# Patient Record
Sex: FEMALE | Race: WHITE | NOT HISPANIC OR LATINO | Employment: OTHER | URBAN - METROPOLITAN AREA
[De-identification: names, ages, dates, MRNs, and addresses within clinical notes are randomized per-mention and may not be internally consistent; named-entity substitution may affect disease eponyms.]

---

## 2017-01-11 ENCOUNTER — TELEPHONE (OUTPATIENT)
Dept: CARDIOLOGY | Facility: MEDICAL CENTER | Age: 72
End: 2017-01-11

## 2017-01-11 NOTE — TELEPHONE ENCOUNTER
----- Message from Prerna Arce R.N. sent at 1/11/2017  1:51 PM PST -----  Pt is not ours but labs in our system. Please send labs to Dr Rios and PCP. Thank you.

## 2017-06-26 ENCOUNTER — HOSPITAL ENCOUNTER (OUTPATIENT)
Dept: HOSPITAL 8 - CFH | Age: 72
Discharge: HOME | End: 2017-06-26
Attending: INTERNAL MEDICINE
Payer: MEDICARE

## 2017-06-26 DIAGNOSIS — I25.9: Primary | ICD-10-CM

## 2017-06-26 DIAGNOSIS — Z98.61: ICD-10-CM

## 2017-06-26 DIAGNOSIS — R29.898: ICD-10-CM

## 2017-06-26 PROCEDURE — 93017 CV STRESS TEST TRACING ONLY: CPT

## 2017-06-26 PROCEDURE — 78452 HT MUSCLE IMAGE SPECT MULT: CPT

## 2017-06-26 PROCEDURE — A9502 TC99M TETROFOSMIN: HCPCS

## 2018-01-01 ENCOUNTER — APPOINTMENT (OUTPATIENT)
Dept: RADIOLOGY | Facility: MEDICAL CENTER | Age: 73
DRG: 629 | End: 2018-01-01
Attending: ORTHOPAEDIC SURGERY
Payer: MEDICARE

## 2018-01-01 ENCOUNTER — APPOINTMENT (OUTPATIENT)
Dept: RADIOLOGY | Facility: MEDICAL CENTER | Age: 73
DRG: 629 | End: 2018-01-01
Attending: EMERGENCY MEDICINE
Payer: MEDICARE

## 2018-01-01 ENCOUNTER — HOSPITAL ENCOUNTER (OUTPATIENT)
Dept: RADIOLOGY | Facility: MEDICAL CENTER | Age: 73
End: 2018-10-25
Attending: NEUROLOGICAL SURGERY
Payer: MEDICARE

## 2018-01-01 ENCOUNTER — HOSPITAL ENCOUNTER (INPATIENT)
Facility: MEDICAL CENTER | Age: 73
LOS: 9 days | DRG: 629 | End: 2019-01-07
Attending: EMERGENCY MEDICINE | Admitting: HOSPITALIST
Payer: MEDICARE

## 2018-01-01 ENCOUNTER — OFFICE VISIT (OUTPATIENT)
Dept: ENDOCRINOLOGY | Facility: MEDICAL CENTER | Age: 73
End: 2018-01-01
Payer: MEDICARE

## 2018-01-01 VITALS
WEIGHT: 214 LBS | SYSTOLIC BLOOD PRESSURE: 122 MMHG | BODY MASS INDEX: 36.54 KG/M2 | HEIGHT: 64 IN | DIASTOLIC BLOOD PRESSURE: 70 MMHG | HEART RATE: 82 BPM | OXYGEN SATURATION: 96 %

## 2018-01-01 DIAGNOSIS — E11.628 DIABETIC FOOT INFECTION (HCC): ICD-10-CM

## 2018-01-01 DIAGNOSIS — E11.621 DIABETIC ULCER OF RIGHT MIDFOOT ASSOCIATED WITH TYPE 2 DIABETES MELLITUS, LIMITED TO BREAKDOWN OF SKIN (HCC): ICD-10-CM

## 2018-01-01 DIAGNOSIS — M25.562 LEFT KNEE PAIN, UNSPECIFIED CHRONICITY: ICD-10-CM

## 2018-01-01 DIAGNOSIS — E04.2 MULTIPLE THYROID NODULES: ICD-10-CM

## 2018-01-01 DIAGNOSIS — I10 ESSENTIAL HYPERTENSION: ICD-10-CM

## 2018-01-01 DIAGNOSIS — L08.9 DIABETIC FOOT INFECTION (HCC): ICD-10-CM

## 2018-01-01 DIAGNOSIS — Z79.4 TYPE 2 DIABETES MELLITUS WITH FOOT ULCER, WITH LONG-TERM CURRENT USE OF INSULIN (HCC): ICD-10-CM

## 2018-01-01 DIAGNOSIS — L97.414 DIABETIC ULCER OF RIGHT MIDFOOT ASSOCIATED WITH TYPE 2 DIABETES MELLITUS, WITH NECROSIS OF BONE (HCC): ICD-10-CM

## 2018-01-01 DIAGNOSIS — E11.621 TYPE 2 DIABETES MELLITUS WITH FOOT ULCER, WITH LONG-TERM CURRENT USE OF INSULIN (HCC): ICD-10-CM

## 2018-01-01 DIAGNOSIS — M25.552 LEFT HIP PAIN: ICD-10-CM

## 2018-01-01 DIAGNOSIS — Z79.4 TYPE 2 DIABETES MELLITUS WITHOUT COMPLICATION, WITH LONG-TERM CURRENT USE OF INSULIN (HCC): ICD-10-CM

## 2018-01-01 DIAGNOSIS — L97.509 TYPE 2 DIABETES MELLITUS WITH FOOT ULCER, WITH LONG-TERM CURRENT USE OF INSULIN (HCC): ICD-10-CM

## 2018-01-01 DIAGNOSIS — I48.0 PAROXYSMAL ATRIAL FIBRILLATION (HCC): ICD-10-CM

## 2018-01-01 DIAGNOSIS — L97.411 DIABETIC ULCER OF RIGHT MIDFOOT ASSOCIATED WITH TYPE 2 DIABETES MELLITUS, LIMITED TO BREAKDOWN OF SKIN (HCC): ICD-10-CM

## 2018-01-01 DIAGNOSIS — E11.9 TYPE 2 DIABETES MELLITUS WITHOUT COMPLICATION, WITH LONG-TERM CURRENT USE OF INSULIN (HCC): ICD-10-CM

## 2018-01-01 DIAGNOSIS — E11.621 DIABETIC ULCER OF RIGHT MIDFOOT ASSOCIATED WITH TYPE 2 DIABETES MELLITUS, WITH NECROSIS OF BONE (HCC): ICD-10-CM

## 2018-01-01 LAB
ALBUMIN SERPL BCP-MCNC: 3.8 G/DL (ref 3.2–4.9)
ALBUMIN/GLOB SERPL: 1.3 G/DL
ALP SERPL-CCNC: 66 U/L (ref 30–99)
ALT SERPL-CCNC: 12 U/L (ref 2–50)
ANION GAP SERPL CALC-SCNC: 7 MMOL/L (ref 0–11.9)
ANION GAP SERPL CALC-SCNC: 8 MMOL/L (ref 0–11.9)
ANION GAP SERPL CALC-SCNC: 8 MMOL/L (ref 0–11.9)
APTT PPP: 26.6 SEC (ref 24.7–36)
AST SERPL-CCNC: 15 U/L (ref 12–45)
BASOPHILS # BLD AUTO: 0.4 % (ref 0–1.8)
BASOPHILS # BLD AUTO: 0.9 % (ref 0–1.8)
BASOPHILS # BLD AUTO: 1 % (ref 0–1.8)
BASOPHILS # BLD: 0.05 K/UL (ref 0–0.12)
BASOPHILS # BLD: 0.06 K/UL (ref 0–0.12)
BASOPHILS # BLD: 0.08 K/UL (ref 0–0.12)
BILIRUB SERPL-MCNC: 0.3 MG/DL (ref 0.1–1.5)
BUN SERPL-MCNC: 25 MG/DL (ref 8–22)
BUN SERPL-MCNC: 28 MG/DL (ref 8–22)
BUN SERPL-MCNC: 36 MG/DL (ref 8–22)
CALCIUM SERPL-MCNC: 10.1 MG/DL (ref 8.5–10.5)
CALCIUM SERPL-MCNC: 9.2 MG/DL (ref 8.5–10.5)
CALCIUM SERPL-MCNC: 9.4 MG/DL (ref 8.5–10.5)
CHLORIDE SERPL-SCNC: 102 MMOL/L (ref 96–112)
CHLORIDE SERPL-SCNC: 104 MMOL/L (ref 96–112)
CHLORIDE SERPL-SCNC: 105 MMOL/L (ref 96–112)
CO2 SERPL-SCNC: 26 MMOL/L (ref 20–33)
CO2 SERPL-SCNC: 27 MMOL/L (ref 20–33)
CO2 SERPL-SCNC: 30 MMOL/L (ref 20–33)
CREAT SERPL-MCNC: 1.01 MG/DL (ref 0.5–1.4)
CREAT SERPL-MCNC: 1.02 MG/DL (ref 0.5–1.4)
CREAT SERPL-MCNC: 1.31 MG/DL (ref 0.5–1.4)
CRP SERPL HS-MCNC: 2.1 MG/DL (ref 0–0.75)
EKG IMPRESSION: NORMAL
EOSINOPHIL # BLD AUTO: 0.01 K/UL (ref 0–0.51)
EOSINOPHIL # BLD AUTO: 0.2 K/UL (ref 0–0.51)
EOSINOPHIL # BLD AUTO: 0.21 K/UL (ref 0–0.51)
EOSINOPHIL NFR BLD: 0.1 % (ref 0–6.9)
EOSINOPHIL NFR BLD: 2.4 % (ref 0–6.9)
EOSINOPHIL NFR BLD: 3 % (ref 0–6.9)
ERYTHROCYTE [DISTWIDTH] IN BLOOD BY AUTOMATED COUNT: 42.2 FL (ref 35.9–50)
ERYTHROCYTE [DISTWIDTH] IN BLOOD BY AUTOMATED COUNT: 43.9 FL (ref 35.9–50)
ERYTHROCYTE [DISTWIDTH] IN BLOOD BY AUTOMATED COUNT: 44 FL (ref 35.9–50)
ERYTHROCYTE [SEDIMENTATION RATE] IN BLOOD BY WESTERGREN METHOD: 38 MM/HOUR (ref 0–30)
EST. AVERAGE GLUCOSE BLD GHB EST-MCNC: 154 MG/DL
GLOBULIN SER CALC-MCNC: 3 G/DL (ref 1.9–3.5)
GLUCOSE BLD-MCNC: 156 MG/DL (ref 65–99)
GLUCOSE BLD-MCNC: 158 MG/DL (ref 65–99)
GLUCOSE BLD-MCNC: 161 MG/DL (ref 65–99)
GLUCOSE BLD-MCNC: 181 MG/DL (ref 65–99)
GLUCOSE BLD-MCNC: 197 MG/DL (ref 65–99)
GLUCOSE BLD-MCNC: 241 MG/DL (ref 65–99)
GLUCOSE BLD-MCNC: 277 MG/DL (ref 65–99)
GLUCOSE BLD-MCNC: 310 MG/DL (ref 65–99)
GLUCOSE BLD-MCNC: 312 MG/DL (ref 65–99)
GLUCOSE BLD-MCNC: 339 MG/DL (ref 65–99)
GLUCOSE SERPL-MCNC: 137 MG/DL (ref 65–99)
GLUCOSE SERPL-MCNC: 183 MG/DL (ref 65–99)
GLUCOSE SERPL-MCNC: 240 MG/DL (ref 65–99)
GRAM STN SPEC: NORMAL
GRAM STN SPEC: NORMAL
HBA1C MFR BLD: 7 % (ref 0–5.6)
HCT VFR BLD AUTO: 31.8 % (ref 37–47)
HCT VFR BLD AUTO: 32.1 % (ref 37–47)
HCT VFR BLD AUTO: 33.7 % (ref 37–47)
HGB BLD-MCNC: 10.5 G/DL (ref 12–16)
HGB BLD-MCNC: 10.7 G/DL (ref 12–16)
HGB BLD-MCNC: 11.1 G/DL (ref 12–16)
IMM GRANULOCYTES # BLD AUTO: 0.05 K/UL (ref 0–0.11)
IMM GRANULOCYTES NFR BLD AUTO: 0.4 % (ref 0–0.9)
IMM GRANULOCYTES NFR BLD AUTO: 0.6 % (ref 0–0.9)
IMM GRANULOCYTES NFR BLD AUTO: 0.7 % (ref 0–0.9)
INR PPP: 1.05 (ref 0.87–1.13)
LYMPHOCYTES # BLD AUTO: 1.3 K/UL (ref 1–4.8)
LYMPHOCYTES # BLD AUTO: 1.45 K/UL (ref 1–4.8)
LYMPHOCYTES # BLD AUTO: 1.57 K/UL (ref 1–4.8)
LYMPHOCYTES NFR BLD: 11.3 % (ref 22–41)
LYMPHOCYTES NFR BLD: 17.7 % (ref 22–41)
LYMPHOCYTES NFR BLD: 22.5 % (ref 22–41)
MCH RBC QN AUTO: 31 PG (ref 27–33)
MCH RBC QN AUTO: 31.1 PG (ref 27–33)
MCH RBC QN AUTO: 31.1 PG (ref 27–33)
MCHC RBC AUTO-ENTMCNC: 32.7 G/DL (ref 33.6–35)
MCHC RBC AUTO-ENTMCNC: 32.9 G/DL (ref 33.6–35)
MCHC RBC AUTO-ENTMCNC: 33.6 G/DL (ref 33.6–35)
MCV RBC AUTO: 92.4 FL (ref 81.4–97.8)
MCV RBC AUTO: 94.1 FL (ref 81.4–97.8)
MCV RBC AUTO: 95 FL (ref 81.4–97.8)
MONOCYTES # BLD AUTO: 0.58 K/UL (ref 0–0.85)
MONOCYTES # BLD AUTO: 0.63 K/UL (ref 0–0.85)
MONOCYTES # BLD AUTO: 1.05 K/UL (ref 0–0.85)
MONOCYTES NFR BLD AUTO: 7.1 % (ref 0–13.4)
MONOCYTES NFR BLD AUTO: 9 % (ref 0–13.4)
MONOCYTES NFR BLD AUTO: 9.1 % (ref 0–13.4)
NEUTROPHILS # BLD AUTO: 4.47 K/UL (ref 2–7.15)
NEUTROPHILS # BLD AUTO: 5.83 K/UL (ref 2–7.15)
NEUTROPHILS # BLD AUTO: 9.04 K/UL (ref 2–7.15)
NEUTROPHILS NFR BLD: 63.9 % (ref 44–72)
NEUTROPHILS NFR BLD: 71.2 % (ref 44–72)
NEUTROPHILS NFR BLD: 78.7 % (ref 44–72)
NRBC # BLD AUTO: 0 K/UL
NRBC BLD-RTO: 0 /100 WBC
PATHOLOGY CONSULT NOTE: NORMAL
PLATELET # BLD AUTO: 345 K/UL (ref 164–446)
PLATELET # BLD AUTO: 376 K/UL (ref 164–446)
PLATELET # BLD AUTO: 379 K/UL (ref 164–446)
PMV BLD AUTO: 9.1 FL (ref 9–12.9)
PMV BLD AUTO: 9.4 FL (ref 9–12.9)
PMV BLD AUTO: 9.4 FL (ref 9–12.9)
POTASSIUM SERPL-SCNC: 4.2 MMOL/L (ref 3.6–5.5)
POTASSIUM SERPL-SCNC: 4.3 MMOL/L (ref 3.6–5.5)
POTASSIUM SERPL-SCNC: 4.6 MMOL/L (ref 3.6–5.5)
PREALB SERPL-MCNC: 20 MG/DL (ref 18–38)
PROT SERPL-MCNC: 6.8 G/DL (ref 6–8.2)
PROTHROMBIN TIME: 13.8 SEC (ref 12–14.6)
RBC # BLD AUTO: 3.38 M/UL (ref 4.2–5.4)
RBC # BLD AUTO: 3.44 M/UL (ref 4.2–5.4)
RBC # BLD AUTO: 3.58 M/UL (ref 4.2–5.4)
SIGNIFICANT IND 70042: NORMAL
SIGNIFICANT IND 70042: NORMAL
SITE SITE: NORMAL
SITE SITE: NORMAL
SODIUM SERPL-SCNC: 138 MMOL/L (ref 135–145)
SODIUM SERPL-SCNC: 139 MMOL/L (ref 135–145)
SODIUM SERPL-SCNC: 140 MMOL/L (ref 135–145)
SOURCE SOURCE: NORMAL
SOURCE SOURCE: NORMAL
T4 FREE SERPL-MCNC: 1.5 NG/DL (ref 0.82–1.77)
TSH SERPL DL<=0.005 MIU/L-ACNC: 0.83 UIU/ML (ref 0.45–4.5)
VANCOMYCIN TROUGH SERPL-MCNC: 10.4 UG/ML (ref 10–20)
WBC # BLD AUTO: 11.5 K/UL (ref 4.8–10.8)
WBC # BLD AUTO: 7 K/UL (ref 4.8–10.8)
WBC # BLD AUTO: 8.2 K/UL (ref 4.8–10.8)

## 2018-01-01 PROCEDURE — 700102 HCHG RX REV CODE 250 W/ 637 OVERRIDE(OP): Performed by: HOSPITALIST

## 2018-01-01 PROCEDURE — 99285 EMERGENCY DEPT VISIT HI MDM: CPT

## 2018-01-01 PROCEDURE — 36415 COLL VENOUS BLD VENIPUNCTURE: CPT

## 2018-01-01 PROCEDURE — 88305 TISSUE EXAM BY PATHOLOGIST: CPT

## 2018-01-01 PROCEDURE — 99233 SBSQ HOSP IP/OBS HIGH 50: CPT | Performed by: HOSPITALIST

## 2018-01-01 PROCEDURE — 87075 CULTR BACTERIA EXCEPT BLOOD: CPT | Mod: 91

## 2018-01-01 PROCEDURE — 82962 GLUCOSE BLOOD TEST: CPT | Mod: 91

## 2018-01-01 PROCEDURE — 73630 X-RAY EXAM OF FOOT: CPT | Mod: RT

## 2018-01-01 PROCEDURE — 700105 HCHG RX REV CODE 258

## 2018-01-01 PROCEDURE — 160048 HCHG OR STATISTICAL LEVEL 1-5: Performed by: ORTHOPAEDIC SURGERY

## 2018-01-01 PROCEDURE — 700111 HCHG RX REV CODE 636 W/ 250 OVERRIDE (IP)

## 2018-01-01 PROCEDURE — 87077 CULTURE AEROBIC IDENTIFY: CPT

## 2018-01-01 PROCEDURE — 160035 HCHG PACU - 1ST 60 MINS PHASE I: Performed by: ORTHOPAEDIC SURGERY

## 2018-01-01 PROCEDURE — 700111 HCHG RX REV CODE 636 W/ 250 OVERRIDE (IP): Performed by: HOSPITALIST

## 2018-01-01 PROCEDURE — A9270 NON-COVERED ITEM OR SERVICE: HCPCS | Performed by: HOSPITALIST

## 2018-01-01 PROCEDURE — 93010 ELECTROCARDIOGRAM REPORT: CPT | Performed by: INTERNAL MEDICINE

## 2018-01-01 PROCEDURE — 0QBN0ZZ EXCISION OF RIGHT METATARSAL, OPEN APPROACH: ICD-10-PCS | Performed by: ORTHOPAEDIC SURGERY

## 2018-01-01 PROCEDURE — 160002 HCHG RECOVERY MINUTES (STAT): Performed by: ORTHOPAEDIC SURGERY

## 2018-01-01 PROCEDURE — A9270 NON-COVERED ITEM OR SERVICE: HCPCS | Performed by: ANESTHESIOLOGY

## 2018-01-01 PROCEDURE — 500423 HCHG DRESSING, ABD COMBINE: Performed by: ORTHOPAEDIC SURGERY

## 2018-01-01 PROCEDURE — 501838 HCHG SUTURE GENERAL: Performed by: ORTHOPAEDIC SURGERY

## 2018-01-01 PROCEDURE — 94760 N-INVAS EAR/PLS OXIMETRY 1: CPT

## 2018-01-01 PROCEDURE — A6454 SELF-ADHER BAND W>=3" <5"/YD: HCPCS | Performed by: ORTHOPAEDIC SURGERY

## 2018-01-01 PROCEDURE — 770001 HCHG ROOM/CARE - MED/SURG/GYN PRIV*

## 2018-01-01 PROCEDURE — 700105 HCHG RX REV CODE 258: Performed by: HOSPITALIST

## 2018-01-01 PROCEDURE — 87070 CULTURE OTHR SPECIMN AEROBIC: CPT | Mod: 91

## 2018-01-01 PROCEDURE — 80048 BASIC METABOLIC PNL TOTAL CA: CPT

## 2018-01-01 PROCEDURE — 73562 X-RAY EXAM OF KNEE 3: CPT | Mod: LT

## 2018-01-01 PROCEDURE — 0QBL0ZZ EXCISION OF RIGHT TARSAL, OPEN APPROACH: ICD-10-PCS | Performed by: ORTHOPAEDIC SURGERY

## 2018-01-01 PROCEDURE — 500881 HCHG PACK, EXTREMITY: Performed by: ORTHOPAEDIC SURGERY

## 2018-01-01 PROCEDURE — 85652 RBC SED RATE AUTOMATED: CPT

## 2018-01-01 PROCEDURE — 99214 OFFICE O/P EST MOD 30 MIN: CPT | Performed by: INTERNAL MEDICINE

## 2018-01-01 PROCEDURE — 700102 HCHG RX REV CODE 250 W/ 637 OVERRIDE(OP): Performed by: ANESTHESIOLOGY

## 2018-01-01 PROCEDURE — 99223 1ST HOSP IP/OBS HIGH 75: CPT | Performed by: INTERNAL MEDICINE

## 2018-01-01 PROCEDURE — 85025 COMPLETE CBC W/AUTO DIFF WBC: CPT

## 2018-01-01 PROCEDURE — 80202 ASSAY OF VANCOMYCIN: CPT

## 2018-01-01 PROCEDURE — 99223 1ST HOSP IP/OBS HIGH 75: CPT | Mod: AI | Performed by: HOSPITALIST

## 2018-01-01 PROCEDURE — 96367 TX/PROPH/DG ADDL SEQ IV INF: CPT

## 2018-01-01 PROCEDURE — 306587 SLEEVE,VASO CALF BARIATRIC: Performed by: HOSPITALIST

## 2018-01-01 PROCEDURE — 83036 HEMOGLOBIN GLYCOSYLATED A1C: CPT

## 2018-01-01 PROCEDURE — A6223 GAUZE >16<=48 NO W/SAL W/O B: HCPCS | Performed by: ORTHOPAEDIC SURGERY

## 2018-01-01 PROCEDURE — 93922 UPR/L XTREMITY ART 2 LEVELS: CPT

## 2018-01-01 PROCEDURE — 88311 DECALCIFY TISSUE: CPT

## 2018-01-01 PROCEDURE — 160038 HCHG SURGERY MINUTES - EA ADDL 1 MIN LEVEL 2: Performed by: ORTHOPAEDIC SURGERY

## 2018-01-01 PROCEDURE — 96366 THER/PROPH/DIAG IV INF ADDON: CPT

## 2018-01-01 PROCEDURE — 87015 SPECIMEN INFECT AGNT CONCNTJ: CPT

## 2018-01-01 PROCEDURE — 73502 X-RAY EXAM HIP UNI 2-3 VIEWS: CPT | Mod: LT

## 2018-01-01 PROCEDURE — 96365 THER/PROPH/DIAG IV INF INIT: CPT

## 2018-01-01 PROCEDURE — 770006 HCHG ROOM/CARE - MED/SURG/GYN SEMI*

## 2018-01-01 PROCEDURE — 306589 SLEEVE,VASO CALF LARGE: Performed by: HOSPITALIST

## 2018-01-01 PROCEDURE — 87186 SC STD MICRODIL/AGAR DIL: CPT

## 2018-01-01 PROCEDURE — 84134 ASSAY OF PREALBUMIN: CPT

## 2018-01-01 PROCEDURE — 160009 HCHG ANES TIME/MIN: Performed by: ORTHOPAEDIC SURGERY

## 2018-01-01 PROCEDURE — 0L8N0ZZ DIVISION OF RIGHT LOWER LEG TENDON, OPEN APPROACH: ICD-10-PCS | Performed by: ORTHOPAEDIC SURGERY

## 2018-01-01 PROCEDURE — 85610 PROTHROMBIN TIME: CPT

## 2018-01-01 PROCEDURE — 160027 HCHG SURGERY MINUTES - 1ST 30 MINS LEVEL 2: Performed by: ORTHOPAEDIC SURGERY

## 2018-01-01 PROCEDURE — 82962 GLUCOSE BLOOD TEST: CPT

## 2018-01-01 PROCEDURE — 99233 SBSQ HOSP IP/OBS HIGH 50: CPT | Performed by: INTERNAL MEDICINE

## 2018-01-01 PROCEDURE — 85730 THROMBOPLASTIN TIME PARTIAL: CPT

## 2018-01-01 PROCEDURE — 87205 SMEAR GRAM STAIN: CPT | Mod: 91

## 2018-01-01 PROCEDURE — 80053 COMPREHEN METABOLIC PANEL: CPT

## 2018-01-01 PROCEDURE — 99232 SBSQ HOSP IP/OBS MODERATE 35: CPT | Performed by: HOSPITALIST

## 2018-01-01 PROCEDURE — 93005 ELECTROCARDIOGRAM TRACING: CPT | Performed by: ORTHOPAEDIC SURGERY

## 2018-01-01 PROCEDURE — 86140 C-REACTIVE PROTEIN: CPT

## 2018-01-01 RX ORDER — OXYCODONE HYDROCHLORIDE 5 MG/1
5 TABLET ORAL
Status: DISCONTINUED | OUTPATIENT
Start: 2018-01-01 | End: 2019-01-01 | Stop reason: HOSPADM

## 2018-01-01 RX ORDER — BISACODYL 10 MG
10 SUPPOSITORY, RECTAL RECTAL
Status: DISCONTINUED | OUTPATIENT
Start: 2018-01-01 | End: 2019-01-01 | Stop reason: HOSPADM

## 2018-01-01 RX ORDER — HYDROCHLOROTHIAZIDE 25 MG/1
12.5 TABLET ORAL
Status: DISCONTINUED | OUTPATIENT
Start: 2018-01-01 | End: 2019-01-01 | Stop reason: HOSPADM

## 2018-01-01 RX ORDER — AMOXICILLIN 250 MG
2 CAPSULE ORAL 2 TIMES DAILY
Status: DISCONTINUED | OUTPATIENT
Start: 2018-01-01 | End: 2019-01-01 | Stop reason: HOSPADM

## 2018-01-01 RX ORDER — SODIUM CHLORIDE 9 MG/ML
INJECTION, SOLUTION INTRAVENOUS
Status: COMPLETED
Start: 2018-01-01 | End: 2018-01-01

## 2018-01-01 RX ORDER — HYDROMORPHONE HYDROCHLORIDE 1 MG/ML
0.2 INJECTION, SOLUTION INTRAMUSCULAR; INTRAVENOUS; SUBCUTANEOUS
Status: DISCONTINUED | OUTPATIENT
Start: 2018-01-01 | End: 2018-01-01 | Stop reason: HOSPADM

## 2018-01-01 RX ORDER — DEXTROSE MONOHYDRATE 25 G/50ML
25 INJECTION, SOLUTION INTRAVENOUS
Status: DISCONTINUED | OUTPATIENT
Start: 2018-01-01 | End: 2018-01-01

## 2018-01-01 RX ORDER — POLYETHYLENE GLYCOL 3350 17 G/17G
1 POWDER, FOR SOLUTION ORAL
Status: DISCONTINUED | OUTPATIENT
Start: 2018-01-01 | End: 2019-01-01 | Stop reason: HOSPADM

## 2018-01-01 RX ORDER — DEXTROSE MONOHYDRATE 25 G/50ML
25 INJECTION, SOLUTION INTRAVENOUS
Status: DISCONTINUED | OUTPATIENT
Start: 2018-01-01 | End: 2019-01-01 | Stop reason: HOSPADM

## 2018-01-01 RX ORDER — SULFAMETHOXAZOLE AND TRIMETHOPRIM 800; 160 MG/1; MG/1
1 TABLET ORAL EVERY 12 HOURS
Status: ON HOLD | COMMUNITY
Start: 2018-01-01 | End: 2019-01-01

## 2018-01-01 RX ORDER — OXYCODONE HCL 5 MG/5 ML
10 SOLUTION, ORAL ORAL
Status: DISCONTINUED | OUTPATIENT
Start: 2018-01-01 | End: 2018-01-01 | Stop reason: HOSPADM

## 2018-01-01 RX ORDER — CARVEDILOL 25 MG/1
12.5 TABLET ORAL DAILY
COMMUNITY
End: 2019-01-01

## 2018-01-01 RX ORDER — VANCOMYCIN HYDROCHLORIDE 500 MG/10ML
INJECTION, POWDER, LYOPHILIZED, FOR SOLUTION INTRAVENOUS
Status: COMPLETED | OUTPATIENT
Start: 2018-01-01 | End: 2018-01-01

## 2018-01-01 RX ORDER — SODIUM CHLORIDE 9 MG/ML
INJECTION, SOLUTION INTRAVENOUS CONTINUOUS
Status: DISCONTINUED | OUTPATIENT
Start: 2018-01-01 | End: 2018-01-01 | Stop reason: HOSPADM

## 2018-01-01 RX ORDER — TOLTERODINE TARTRATE 2 MG/1
4 TABLET, EXTENDED RELEASE ORAL DAILY
COMMUNITY
End: 2019-01-01

## 2018-01-01 RX ORDER — NIACIN 500 MG/1
1000 TABLET, EXTENDED RELEASE ORAL EVERY MORNING
Status: DISCONTINUED | OUTPATIENT
Start: 2018-01-01 | End: 2019-01-01 | Stop reason: HOSPADM

## 2018-01-01 RX ORDER — OXYBUTYNIN CHLORIDE 5 MG/1
5 TABLET ORAL DAILY
Status: DISCONTINUED | OUTPATIENT
Start: 2018-01-01 | End: 2019-01-01 | Stop reason: HOSPADM

## 2018-01-01 RX ORDER — LIDOCAINE HYDROCHLORIDE 10 MG/ML
INJECTION, SOLUTION INFILTRATION; PERINEURAL
Status: DISCONTINUED | OUTPATIENT
Start: 2018-01-01 | End: 2018-01-01 | Stop reason: HOSPADM

## 2018-01-01 RX ORDER — BUPIVACAINE HYDROCHLORIDE 5 MG/ML
INJECTION, SOLUTION EPIDURAL; INTRACAUDAL
Status: DISCONTINUED | OUTPATIENT
Start: 2018-01-01 | End: 2018-01-01 | Stop reason: HOSPADM

## 2018-01-01 RX ORDER — CARVEDILOL 12.5 MG/1
12.5 TABLET ORAL DAILY
Status: DISCONTINUED | OUTPATIENT
Start: 2018-01-01 | End: 2019-01-01 | Stop reason: HOSPADM

## 2018-01-01 RX ORDER — ACETAMINOPHEN 325 MG/1
650 TABLET ORAL EVERY 6 HOURS PRN
Status: DISCONTINUED | OUTPATIENT
Start: 2018-01-01 | End: 2019-01-01 | Stop reason: HOSPADM

## 2018-01-01 RX ORDER — INSULIN GLARGINE 100 [IU]/ML
24 INJECTION, SOLUTION SUBCUTANEOUS EVERY MORNING
Status: DISCONTINUED | OUTPATIENT
Start: 2019-01-01 | End: 2019-01-01

## 2018-01-01 RX ORDER — HYDROMORPHONE HYDROCHLORIDE 1 MG/ML
0.1 INJECTION, SOLUTION INTRAMUSCULAR; INTRAVENOUS; SUBCUTANEOUS
Status: DISCONTINUED | OUTPATIENT
Start: 2018-01-01 | End: 2018-01-01 | Stop reason: HOSPADM

## 2018-01-01 RX ORDER — ONDANSETRON 4 MG/1
4 TABLET, ORALLY DISINTEGRATING ORAL EVERY 4 HOURS PRN
Status: DISCONTINUED | OUTPATIENT
Start: 2018-01-01 | End: 2019-01-01 | Stop reason: HOSPADM

## 2018-01-01 RX ORDER — HALOPERIDOL 5 MG/ML
1 INJECTION INTRAMUSCULAR
Status: DISCONTINUED | OUTPATIENT
Start: 2018-01-01 | End: 2018-01-01 | Stop reason: HOSPADM

## 2018-01-01 RX ORDER — M-VIT,TX,IRON,MINS/CALC/FOLIC 27MG-0.4MG
1 TABLET ORAL DAILY
Status: DISCONTINUED | OUTPATIENT
Start: 2018-01-01 | End: 2019-01-01 | Stop reason: HOSPADM

## 2018-01-01 RX ORDER — M-VIT,TX,IRON,MINS/CALC/FOLIC 27MG-0.4MG
1 TABLET ORAL DAILY
COMMUNITY
End: 2019-01-01

## 2018-01-01 RX ORDER — DIPHENHYDRAMINE HYDROCHLORIDE 50 MG/ML
12.5 INJECTION INTRAMUSCULAR; INTRAVENOUS
Status: DISCONTINUED | OUTPATIENT
Start: 2018-01-01 | End: 2018-01-01 | Stop reason: HOSPADM

## 2018-01-01 RX ORDER — ONDANSETRON 2 MG/ML
4 INJECTION INTRAMUSCULAR; INTRAVENOUS EVERY 4 HOURS PRN
Status: DISCONTINUED | OUTPATIENT
Start: 2018-01-01 | End: 2019-01-01 | Stop reason: HOSPADM

## 2018-01-01 RX ORDER — GABAPENTIN 300 MG/1
300 CAPSULE ORAL ONCE
Status: COMPLETED | OUTPATIENT
Start: 2018-01-01 | End: 2018-01-01

## 2018-01-01 RX ORDER — PRAVASTATIN SODIUM 20 MG
20 TABLET ORAL NIGHTLY
COMMUNITY
End: 2019-01-01

## 2018-01-01 RX ORDER — OXYCODONE HYDROCHLORIDE 5 MG/1
2.5 TABLET ORAL
Status: DISCONTINUED | OUTPATIENT
Start: 2018-01-01 | End: 2019-01-01 | Stop reason: HOSPADM

## 2018-01-01 RX ORDER — HYDRALAZINE HYDROCHLORIDE 20 MG/ML
10 INJECTION INTRAMUSCULAR; INTRAVENOUS EVERY 4 HOURS PRN
Status: DISCONTINUED | OUTPATIENT
Start: 2018-01-01 | End: 2019-01-01 | Stop reason: HOSPADM

## 2018-01-01 RX ORDER — OXYCODONE HCL 5 MG/5 ML
5 SOLUTION, ORAL ORAL
Status: DISCONTINUED | OUTPATIENT
Start: 2018-01-01 | End: 2018-01-01 | Stop reason: HOSPADM

## 2018-01-01 RX ORDER — PRAVASTATIN SODIUM 20 MG
20 TABLET ORAL NIGHTLY
Status: DISCONTINUED | OUTPATIENT
Start: 2018-01-01 | End: 2019-01-01 | Stop reason: HOSPADM

## 2018-01-01 RX ORDER — LOSARTAN POTASSIUM AND HYDROCHLOROTHIAZIDE 12.5; 5 MG/1; MG/1
1 TABLET ORAL DAILY
Status: DISCONTINUED | OUTPATIENT
Start: 2018-01-01 | End: 2018-01-01

## 2018-01-01 RX ORDER — OXYBUTYNIN CHLORIDE 5 MG/1
5 TABLET ORAL DAILY
COMMUNITY
End: 2019-01-01

## 2018-01-01 RX ORDER — ACETAMINOPHEN 500 MG
1000 TABLET ORAL ONCE
Status: COMPLETED | OUTPATIENT
Start: 2018-01-01 | End: 2018-01-01

## 2018-01-01 RX ORDER — ONDANSETRON 2 MG/ML
4 INJECTION INTRAMUSCULAR; INTRAVENOUS
Status: DISCONTINUED | OUTPATIENT
Start: 2018-01-01 | End: 2018-01-01 | Stop reason: HOSPADM

## 2018-01-01 RX ORDER — HYDROMORPHONE HYDROCHLORIDE 1 MG/ML
0.25 INJECTION, SOLUTION INTRAMUSCULAR; INTRAVENOUS; SUBCUTANEOUS
Status: DISCONTINUED | OUTPATIENT
Start: 2018-01-01 | End: 2019-01-01 | Stop reason: HOSPADM

## 2018-01-01 RX ORDER — METRONIDAZOLE 500 MG/1
500 TABLET ORAL EVERY 8 HOURS
Status: DISCONTINUED | OUTPATIENT
Start: 2018-01-01 | End: 2019-01-01

## 2018-01-01 RX ORDER — TIZANIDINE 4 MG/1
4-8 TABLET ORAL
Status: DISCONTINUED | OUTPATIENT
Start: 2018-01-01 | End: 2019-01-01 | Stop reason: HOSPADM

## 2018-01-01 RX ORDER — INSULIN GLARGINE 100 [IU]/ML
20 INJECTION, SOLUTION SUBCUTANEOUS EVERY MORNING
Status: DISCONTINUED | OUTPATIENT
Start: 2018-01-01 | End: 2018-01-01

## 2018-01-01 RX ORDER — LOSARTAN POTASSIUM 50 MG/1
50 TABLET ORAL
Status: DISCONTINUED | OUTPATIENT
Start: 2018-01-01 | End: 2019-01-01 | Stop reason: HOSPADM

## 2018-01-01 RX ORDER — INSULIN GLARGINE 100 [IU]/ML
20 INJECTION, SOLUTION SUBCUTANEOUS EVERY MORNING
COMMUNITY
End: 2019-01-01

## 2018-01-01 RX ORDER — HYDRALAZINE HYDROCHLORIDE 20 MG/ML
5 INJECTION INTRAMUSCULAR; INTRAVENOUS
Status: DISCONTINUED | OUTPATIENT
Start: 2018-01-01 | End: 2018-01-01 | Stop reason: HOSPADM

## 2018-01-01 RX ORDER — LANOLIN ALCOHOL/MO/W.PET/CERES
1000 CREAM (GRAM) TOPICAL EVERY MORNING
COMMUNITY
End: 2019-01-01

## 2018-01-01 RX ORDER — LOSARTAN POTASSIUM AND HYDROCHLOROTHIAZIDE 12.5; 5 MG/1; MG/1
1 TABLET ORAL DAILY
COMMUNITY
End: 2019-01-01

## 2018-01-01 RX ORDER — HYDROMORPHONE HYDROCHLORIDE 1 MG/ML
0.4 INJECTION, SOLUTION INTRAMUSCULAR; INTRAVENOUS; SUBCUTANEOUS
Status: DISCONTINUED | OUTPATIENT
Start: 2018-01-01 | End: 2018-01-01 | Stop reason: HOSPADM

## 2018-01-01 RX ADMIN — MULTIPLE VITAMINS W/ MINERALS TAB 1 TABLET: TAB at 06:13

## 2018-01-01 RX ADMIN — LOSARTAN POTASSIUM 50 MG: 50 TABLET ORAL at 17:39

## 2018-01-01 RX ADMIN — METRONIDAZOLE 500 MG: 500 TABLET ORAL at 23:07

## 2018-01-01 RX ADMIN — CEFTRIAXONE 2 G: 2 INJECTION, POWDER, FOR SOLUTION INTRAMUSCULAR; INTRAVENOUS at 06:12

## 2018-01-01 RX ADMIN — OXYBUTYNIN CHLORIDE 5 MG: 5 TABLET ORAL at 06:24

## 2018-01-01 RX ADMIN — METRONIDAZOLE 500 MG: 500 TABLET ORAL at 22:04

## 2018-01-01 RX ADMIN — METRONIDAZOLE 500 MG: 500 TABLET ORAL at 13:24

## 2018-01-01 RX ADMIN — PRAVASTATIN SODIUM 20 MG: 10 TABLET ORAL at 22:04

## 2018-01-01 RX ADMIN — METRONIDAZOLE 500 MG: 500 TABLET ORAL at 22:00

## 2018-01-01 RX ADMIN — OXYBUTYNIN CHLORIDE 5 MG: 5 TABLET ORAL at 06:13

## 2018-01-01 RX ADMIN — ACETAMINOPHEN 1000 MG: 500 TABLET, FILM COATED ORAL at 16:35

## 2018-01-01 RX ADMIN — PRAVASTATIN SODIUM 20 MG: 10 TABLET ORAL at 23:07

## 2018-01-01 RX ADMIN — INSULIN HUMAN 2 UNITS: 100 INJECTION, SOLUTION PARENTERAL at 18:47

## 2018-01-01 RX ADMIN — ASPIRIN 81 MG: 81 TABLET, COATED ORAL at 17:37

## 2018-01-01 RX ADMIN — MULTIPLE VITAMINS W/ MINERALS TAB 1 TABLET: TAB at 06:24

## 2018-01-01 RX ADMIN — METRONIDAZOLE 500 MG: 500 TABLET ORAL at 12:28

## 2018-01-01 RX ADMIN — LOSARTAN POTASSIUM 50 MG: 50 TABLET ORAL at 06:13

## 2018-01-01 RX ADMIN — NIACIN 1000 MG: 500 TABLET, EXTENDED RELEASE ORAL at 06:16

## 2018-01-01 RX ADMIN — INSULIN HUMAN 6 UNITS: 100 INJECTION, SOLUTION PARENTERAL at 22:17

## 2018-01-01 RX ADMIN — METRONIDAZOLE 500 MG: 500 TABLET ORAL at 15:30

## 2018-01-01 RX ADMIN — VANCOMYCIN HYDROCHLORIDE 1500 MG: 100 INJECTION, POWDER, LYOPHILIZED, FOR SOLUTION INTRAVENOUS at 18:15

## 2018-01-01 RX ADMIN — NIACIN 1000 MG: 500 TABLET, EXTENDED RELEASE ORAL at 07:36

## 2018-01-01 RX ADMIN — METRONIDAZOLE 500 MG: 500 TABLET ORAL at 06:24

## 2018-01-01 RX ADMIN — CARVEDILOL 12.5 MG: 12.5 TABLET, FILM COATED ORAL at 17:38

## 2018-01-01 RX ADMIN — CARVEDILOL 12.5 MG: 12.5 TABLET, FILM COATED ORAL at 06:13

## 2018-01-01 RX ADMIN — HYDROCHLOROTHIAZIDE 12.5 MG: 25 TABLET ORAL at 06:13

## 2018-01-01 RX ADMIN — CARVEDILOL 12.5 MG: 12.5 TABLET, FILM COATED ORAL at 06:24

## 2018-01-01 RX ADMIN — SODIUM CHLORIDE: 9 INJECTION, SOLUTION INTRAVENOUS at 06:30

## 2018-01-01 RX ADMIN — VANCOMYCIN HYDROCHLORIDE 2400 MG: 100 INJECTION, POWDER, LYOPHILIZED, FOR SOLUTION INTRAVENOUS at 18:55

## 2018-01-01 RX ADMIN — INSULIN HUMAN 5 UNITS: 100 INJECTION, SOLUTION PARENTERAL at 06:27

## 2018-01-01 RX ADMIN — METRONIDAZOLE 500 MG: 500 TABLET ORAL at 06:13

## 2018-01-01 RX ADMIN — LOSARTAN POTASSIUM 50 MG: 50 TABLET ORAL at 06:24

## 2018-01-01 RX ADMIN — STANDARDIZED SENNA CONCENTRATE AND DOCUSATE SODIUM 2 TABLET: 8.6; 5 TABLET, FILM COATED ORAL at 06:14

## 2018-01-01 RX ADMIN — STANDARDIZED SENNA CONCENTRATE AND DOCUSATE SODIUM 2 TABLET: 8.6; 5 TABLET, FILM COATED ORAL at 06:25

## 2018-01-01 RX ADMIN — CEFTRIAXONE 2 G: 2 INJECTION, POWDER, FOR SOLUTION INTRAMUSCULAR; INTRAVENOUS at 06:25

## 2018-01-01 RX ADMIN — HYDROCHLOROTHIAZIDE 12.5 MG: 25 TABLET ORAL at 17:59

## 2018-01-01 RX ADMIN — SODIUM CHLORIDE: 9 INJECTION, SOLUTION INTRAVENOUS at 18:15

## 2018-01-01 RX ADMIN — PRAVASTATIN SODIUM 20 MG: 10 TABLET ORAL at 21:00

## 2018-01-01 RX ADMIN — INSULIN HUMAN 3 UNITS: 100 INJECTION, SOLUTION PARENTERAL at 12:21

## 2018-01-01 RX ADMIN — INSULIN GLARGINE 20 UNITS: 100 INJECTION, SOLUTION SUBCUTANEOUS at 06:27

## 2018-01-01 RX ADMIN — GABAPENTIN 300 MG: 300 CAPSULE ORAL at 16:35

## 2018-01-01 RX ADMIN — CEFTRIAXONE 2 G: 2 INJECTION, POWDER, FOR SOLUTION INTRAMUSCULAR; INTRAVENOUS at 15:30

## 2018-01-01 RX ADMIN — ASPIRIN 81 MG: 81 TABLET, COATED ORAL at 06:13

## 2018-01-01 RX ADMIN — HYDROCHLOROTHIAZIDE 12.5 MG: 25 TABLET ORAL at 06:24

## 2018-01-01 ASSESSMENT — ENCOUNTER SYMPTOMS
PALPITATIONS: 0
STRIDOR: 0
HEMOPTYSIS: 0
SORE THROAT: 0
FLANK PAIN: 0
SPEECH CHANGE: 0
MYALGIAS: 0
NAUSEA: 0
SHORTNESS OF BREATH: 0
EYE DISCHARGE: 0
HALLUCINATIONS: 0
SPUTUM PRODUCTION: 0
COUGH: 0
BLOOD IN STOOL: 0
SHORTNESS OF BREATH: 0
EYE PAIN: 0
PALPITATIONS: 0
BLOOD IN STOOL: 0
LOSS OF CONSCIOUSNESS: 0
MYALGIAS: 0
SEIZURES: 0
DIARRHEA: 0
NERVOUS/ANXIOUS: 0
BRUISES/BLEEDS EASILY: 0
ABDOMINAL PAIN: 0
VOMITING: 0
HEMOPTYSIS: 0
STRIDOR: 0
BRUISES/BLEEDS EASILY: 0
VOMITING: 0
EYE PAIN: 0
EYE REDNESS: 0
LOSS OF CONSCIOUSNESS: 0
ROS SKIN COMMENTS: RIGHT FOOT ULCER
ABDOMINAL PAIN: 0
FLANK PAIN: 0
HALLUCINATIONS: 0
EYE DISCHARGE: 0
CHILLS: 0
CHILLS: 0
SPEECH CHANGE: 0
COUGH: 0
NERVOUS/ANXIOUS: 0
SPUTUM PRODUCTION: 0
ROS SKIN COMMENTS: RIGHT FOOT ULCER
SORE THROAT: 0
FEVER: 0
FEVER: 0
FOCAL WEAKNESS: 0
DIARRHEA: 0
EYE REDNESS: 0
FOCAL WEAKNESS: 0
SEIZURES: 0

## 2018-01-01 ASSESSMENT — COGNITIVE AND FUNCTIONAL STATUS - GENERAL
SUGGESTED CMS G CODE MODIFIER MOBILITY: CK
MOBILITY SCORE: 18
SUGGESTED CMS G CODE MODIFIER DAILY ACTIVITY: CJ
WALKING IN HOSPITAL ROOM: A LITTLE
DRESSING REGULAR LOWER BODY CLOTHING: A LITTLE
DAILY ACTIVITIY SCORE: 21
HELP NEEDED FOR BATHING: A LITTLE
TOILETING: A LITTLE
MOVING TO AND FROM BED TO CHAIR: A LITTLE
MOVING FROM LYING ON BACK TO SITTING ON SIDE OF FLAT BED: A LITTLE
STANDING UP FROM CHAIR USING ARMS: A LITTLE
CLIMB 3 TO 5 STEPS WITH RAILING: A LOT

## 2018-01-01 ASSESSMENT — PATIENT HEALTH QUESTIONNAIRE - PHQ9
5. POOR APPETITE OR OVEREATING: NOT AT ALL
SUM OF ALL RESPONSES TO PHQ QUESTIONS 1-9: 3
6. FEELING BAD ABOUT YOURSELF - OR THAT YOU ARE A FAILURE OR HAVE LET YOURSELF OR YOUR FAMILY DOWN: NOT AL ALL
4. FEELING TIRED OR HAVING LITTLE ENERGY: SEVERAL DAYS
7. TROUBLE CONCENTRATING ON THINGS, SUCH AS READING THE NEWSPAPER OR WATCHING TELEVISION: NOT AT ALL
3. TROUBLE FALLING OR STAYING ASLEEP OR SLEEPING TOO MUCH: NOT AT ALL
9. THOUGHTS THAT YOU WOULD BE BETTER OFF DEAD, OR OF HURTING YOURSELF: NOT AT ALL
SUM OF ALL RESPONSES TO PHQ9 QUESTIONS 1 AND 2: 2
1. LITTLE INTEREST OR PLEASURE IN DOING THINGS: SEVERAL DAYS
8. MOVING OR SPEAKING SO SLOWLY THAT OTHER PEOPLE COULD HAVE NOTICED. OR THE OPPOSITE, BEING SO FIGETY OR RESTLESS THAT YOU HAVE BEEN MOVING AROUND A LOT MORE THAN USUAL: NOT AT ALL
2. FEELING DOWN, DEPRESSED, IRRITABLE, OR HOPELESS: SEVERAL DAYS

## 2018-01-01 ASSESSMENT — PAIN SCALES - GENERAL
PAINLEVEL_OUTOF10: 0

## 2018-01-01 ASSESSMENT — LIFESTYLE VARIABLES
ALCOHOL_USE: NO
EVER_SMOKED: YES

## 2018-05-07 ENCOUNTER — OFFICE VISIT (OUTPATIENT)
Dept: ENDOCRINOLOGY | Facility: MEDICAL CENTER | Age: 73
End: 2018-05-07
Payer: MEDICARE

## 2018-05-07 ENCOUNTER — APPOINTMENT (OUTPATIENT)
Dept: ENDOCRINOLOGY | Facility: MEDICAL CENTER | Age: 73
End: 2018-05-07
Payer: MEDICARE

## 2018-05-07 VITALS
HEIGHT: 64 IN | SYSTOLIC BLOOD PRESSURE: 120 MMHG | DIASTOLIC BLOOD PRESSURE: 63 MMHG | HEART RATE: 73 BPM | BODY MASS INDEX: 37.56 KG/M2 | WEIGHT: 220 LBS | OXYGEN SATURATION: 94 %

## 2018-05-07 DIAGNOSIS — E04.2 MULTIPLE THYROID NODULES: ICD-10-CM

## 2018-05-07 PROBLEM — E11.9 TYPE 2 DIABETES MELLITUS WITHOUT COMPLICATION (HCC): Status: ACTIVE | Noted: 2018-05-07

## 2018-05-07 PROBLEM — E78.2 MIXED HYPERLIPIDEMIA: Status: ACTIVE | Noted: 2018-05-07

## 2018-05-07 PROCEDURE — 99204 OFFICE O/P NEW MOD 45 MIN: CPT | Performed by: INTERNAL MEDICINE

## 2018-05-07 NOTE — PROGRESS NOTES
New Patient Consult Note  Primary care physician: Floyd Gould M.D.    Reason for consult: Multiple thyroid nodules    HPI:  Leonie Brock is a 72 y.o. old patient who comes in today for evaluation of thyroid nodules. Thyroid nodules were incidentally found on carotid ultrasound earlier this year. Thyroid ultrasound in February 2018 showed multiple thyroid nodules, in the right thyroid lobe she has 1.0 cm x 0.6 cm x 0.9 cm hypoechoic nodule, 1.6 cm x 1.4 cm x 1.4 cm hypoechoic nodule, 0.8 cm by 1.2 cm x 0.9 cm isoechoic nodule. In the left thyroid lobe she has 2.2 cm x 1.8 cm x 2.6 cm nodule and 0.9 cm x 1.7 cm x 1.5 cm nodule. No prior history of thyroid ultrasound. No history of neck radiation. No difficulty swallowing or breathing. No family history of thyroid cancer. She has never been on any thyroid medications. Labs in February 2018 showed normal TSH.    ROS:  Constitutional: No unintentional weight loss  Cardiac: No palpitations or racing heart  Resp: Positive for dyspnea on exertion  Neuro: No numbness or tinging in feet  All other systems were reviewed and were negative.    Past Medical History:  Patient Active Problem List    Diagnosis Date Noted   • Multiple thyroid nodules 05/07/2018   • Mixed hyperlipidemia 05/07/2018   • Type 2 diabetes mellitus without complication (HCC) 05/07/2018       Past Surgical History:  Past Surgical History:   Procedure Laterality Date   • GYN SURGERY      hysterectomy       Allergies:  Patient has no known allergies.    Social History:  Social History     Social History   • Marital status:      Spouse name: N/A   • Number of children: N/A   • Years of education: N/A     Occupational History   • Not on file.     Social History Main Topics   • Smoking status: Former Smoker   • Smokeless tobacco: Never Used   • Alcohol use No   • Drug use: No   • Sexual activity: Not on file     Other Topics Concern   • Not on file     Social History Narrative   • No narrative on  "file       Family History:  History reviewed. No pertinent family history.    Medications:    Current Outpatient Prescriptions:   •  clopidogrel (PLAVIX) 75 MG TABS, Take 75 mg by mouth every day., Disp: , Rfl:   •  aspirin 81 MG tablet, Take 81 mg by mouth every day. CONTINUE AS PRIOR TO ADMIT , Disp: , Rfl:   •  rosuvastatin (CRESTOR) 20 MG TABS, Take 20 mg by mouth every evening., Disp: , Rfl:   •  valsartan-hydrochlorothiazide (DIOVAN HCT) 80-12.5 MG per tablet, Take 1 Tab by mouth every day., Disp: , Rfl:   •  atenolol (TENORMIN) 100 MG TABS, Take 100 mg by mouth every evening., Disp: , Rfl:   •  Calcium Carbonate (CALCIUM 600) 600 MG TABS, Take 1 Tab by mouth every day., Disp: , Rfl:   •  celecoxib (CELEBREX) 200 MG CAPS, Take 200 mg by mouth 2 times a day as needed for Mild Pain., Disp: , Rfl:   •  tizanidine (ZANAFLEX) 6 MG capsule, Take 6 mg by mouth every evening., Disp: , Rfl:   •  Propoxyphene N-APAP (DARVOCET-N 100 PO), Take 1 Tab by mouth 1 time daily as needed., Disp: , Rfl:   •  insulin NPH (HUMULIN,NOVOLIN) 100 UNIT/ML SUSP, Inject  as instructed. Once daily in evening per sliding scale , Disp: , Rfl:   •  INSULIN REGULAR HUMAN SC, Inject  as instructed 3 times a day before meals. Per sliding scale, Disp: , Rfl:   •  metformin (GLUCOPHAGE) 1000 MG tablet, Take 1,000 mg by mouth 2 times a day, with meals., Disp: , Rfl:     Labs:  Labs from February 2018 showed TSH 1.18    Physical Examination:  Vital signs: /63   Pulse 73   Ht 1.626 m (5' 4\")   Wt 99.8 kg (220 lb)   SpO2 94%   BMI 37.76 kg/m²   General: No apparent distress, cooperative  Eyes: No scleral icterus or discharge  ENMT: Normal on external inspection of nose, lips  Neck: No abnormal masses on inspection  Resp: Normal effort, clear to auscultation bilaterally   CVS: Regular rate and rhythm, S1 S2 normal, no murmur   Extremities: No edema  Neuro: Alert and oriented  Skin: No rash  Psych: Normal mood and affect    Assessment and " Plan:    1. Multiple thyroid nodules  · We will obtain FNA biopsies of 1.6 cm x 1.4 cm x 1.4 cm right thyroid lobe nodule, 2.2 x 1.8 x 1.6 cm left thyroid lobe nodule and 0.9 x 1.7 x 1.5 cm left thyroid lobe nodules  · We discussed about complications of thyroid nodules including risk of thyroid cancer and hyperthyroidism  · We will repeat thyroid ultrasound in October 2018  - US-SOFT TISSUES OF HEAD - NECK; Future  - US-NEEDLE BX-THYROID; Future    Return in about 6 months (around 11/7/2018).    Thank you for allowing me to participate in the care of this patient.    Samantha Gupta M.D.  05/07/18    CC:   Floyd Gould M.D.    This note was created using voice recognition software (Dragon). The accuracy of the dictation is limited by the abilities of the software. I have reviewed the note prior to signing, however some errors in grammar and context are still possible. If you have any questions related to this note please do not hesitate to contact our office.

## 2018-05-07 NOTE — PROGRESS NOTES
New Patient Consult Note  Primary care physician: Floyd Gould M.D.    Reason for consult:     HPI:  Leonie Brock is a 72 y.o. old patient who comes in today for evaluation of multiple bilateral thyroid gland nodules.  She also has Uncontrolled Type 2 Diabetes that she is taking NPH and Regular insulin.  She has had a total Hysterectomy.    ROS:  Constitutional: No weight loss  Cardiac: No palpitations or racing heart  Resp: No shortness of breath  Neuro: No numbness or tinging in feet  Endo: No heat or cold intolerance, no polyuria or polydipsia  All other systems were reviewed and were negative.    Past Medical History:  There are no active problems to display for this patient.      Past Surgical History:  Past Surgical History:   Procedure Laterality Date   • GYN SURGERY      hysterectomy       Allergies:  Patient has no known allergies.    Social History:  Social History     Social History   • Marital status:      Spouse name: N/A   • Number of children: N/A   • Years of education: N/A     Occupational History   • Not on file.     Social History Main Topics   • Smoking status: Former Smoker   • Smokeless tobacco: Not on file   • Alcohol use No   • Drug use: No   • Sexual activity: Not on file     Other Topics Concern   • Not on file     Social History Narrative   • No narrative on file       Family History:  No family history on file.    Medications:    Current Outpatient Prescriptions:   •  clopidogrel (PLAVIX) 75 MG TABS, Take 75 mg by mouth every day., Disp: , Rfl:   •  aspirin 81 MG tablet, Take 81 mg by mouth every day. CONTINUE AS PRIOR TO ADMIT , Disp: , Rfl:   •  rosuvastatin (CRESTOR) 20 MG TABS, Take 20 mg by mouth every evening., Disp: , Rfl:   •  metformin (GLUCOPHAGE) 1000 MG tablet, Take 1,000 mg by mouth 2 times a day, with meals., Disp: , Rfl:   •  valsartan-hydrochlorothiazide (DIOVAN HCT) 80-12.5 MG per tablet, Take 1 Tab by mouth every day., Disp: , Rfl:   •  atenolol (TENORMIN)  100 MG TABS, Take 100 mg by mouth every evening., Disp: , Rfl:   •  Calcium Carbonate (CALCIUM 600) 600 MG TABS, Take 1 Tab by mouth every day., Disp: , Rfl:   •  celecoxib (CELEBREX) 200 MG CAPS, Take 200 mg by mouth 2 times a day as needed for Mild Pain., Disp: , Rfl:   •  tizanidine (ZANAFLEX) 6 MG capsule, Take 6 mg by mouth every evening., Disp: , Rfl:   •  Propoxyphene N-APAP (DARVOCET-N 100 PO), Take 1 Tab by mouth 1 time daily as needed., Disp: , Rfl:   •  insulin NPH (HUMULIN,NOVOLIN) 100 UNIT/ML SUSP, Inject  as instructed. Once daily in evening per sliding scale , Disp: , Rfl:   •  INSULIN REGULAR HUMAN SC, Inject  as instructed 3 times a day before meals. Per sliding scale, Disp: , Rfl:     Labs: Reviewed    Physical Examination:  Vital signs: There were no vitals taken for this visit. There is no height or weight on file to calculate BMI.  General: No apparent distress, cooperative  Eyes: No scleral icterus or discharge  ENMT: Normal on external inspection of nose, lips, normal thyroid exam  Neck: No abnormal masses on inspection  Resp: Normal effort, clear to auscultation bilaterally   CVS: Regular rate and rhythm, S1 S2 normal, no murmur   Extremities: No edema  Abdomen: abdominal obesity present  Neuro: Alert and oriented  Skin: No rash  Psych: Normal mood and affect, intact memory and able to make informed decisions    Assessment and Plan:    1. Thyroid nodule  ***    2. Uncontrolled type 2 diabetes mellitus without complication, with long-term current use of insulin (HCC)  ***    3. Postsurgical menopause  ***      No Follow-up on file.    Total face to face time spent with patient equals 60 minutes. 35/60 minutes were spent on counseling the patient about the pathophysiology of pituitary-thyroid axis, pituitary-adrenal axis, pituitary-gonadal axis, natural history of thyroid nodules, side effects and benefits of thyroid hormone, testosterone, Vit D and Vit B12 replacement.    Thank you for  allowing me to participate in the care of this patient.    Ericka Choudhary R.N.  05/07/18    CC:   Floyd Gould M.D.    This note was created using voice recognition software (Dragon). The accuracy of the dictation is limited by the abilities of the software. I have reviewed the note prior to signing, however some errors in grammar and context are still possible. If you have any questions related to this note please do not hesitate to contact our office.

## 2018-06-05 ENCOUNTER — HOSPITAL ENCOUNTER (OUTPATIENT)
Dept: HOSPITAL 8 - CFH | Age: 73
End: 2018-06-05
Attending: INTERNAL MEDICINE
Payer: MEDICARE

## 2018-06-05 DIAGNOSIS — I25.10: ICD-10-CM

## 2018-06-05 DIAGNOSIS — I10: Primary | ICD-10-CM

## 2018-06-05 PROCEDURE — A9502 TC99M TETROFOSMIN: HCPCS

## 2018-06-05 PROCEDURE — 78452 HT MUSCLE IMAGE SPECT MULT: CPT

## 2018-06-05 PROCEDURE — 93017 CV STRESS TEST TRACING ONLY: CPT

## 2018-11-14 NOTE — PROGRESS NOTES
"Endocrinology Clinic Progress Note    CC: Thyroid nodules    HPI:  1. Multiple thyroid nodules  She had biopsy of 3 thyroid nodules in May or June this year, biopsy was done at [facility], I did not receive any biopsy reports.  We will contact [facility] today to get a copy of biopsy reports.  Thyroid ultrasound in February 2018 showed multiple thyroid nodules, largest nodule in the right thyroid lobe measured 1.6 cm in largest nodule in the left thyroid lobe measured 2.6 cm.  She denies any associated difficulty swallowing or breathing.    2. Essential hypertension  Blood pressure is well controlled.  She reports compliance with medications.    3. Type 2 diabetes mellitus without complication, without long-term current use of insulin (HCC)  She follows with PCP for diabetes management.  She is currently on NPH, and metformin.  Reports that last A1c was 6.5%, about 3 weeks ago.  Denies hypoglycemia.    ROS:  Constitutional: Negative for unintentional weight loss  Cardiac: Negative for palpitations    PMH:  Patient Active Problem List   Diagnosis   • Multiple thyroid nodules   • Mixed hyperlipidemia   • Type 2 diabetes mellitus without complication (HCC)     EXAM:  Vital signs: /70 (BP Location: Left arm, Patient Position: Sitting)   Pulse 82   Ht 1.626 m (5' 4\")   Wt 97.1 kg (214 lb)   SpO2 96% Comment: 3 liters of oxygen  BMI 36.73 kg/m²   General: No apparent distress, cooperative  Eyes: No scleral icterus, no discharge  Neck: Normal on external inspection  Resp: Normal effort  Psych: Alert and oriented, normal mood and affect    Assessment and Plan:    1. Multiple thyroid nodules  · We will repeat thyroid ultrasound now  · We will contact [facility] to get a copy of thyroid biopsy report from earlier this year  · Repeat labs now for TSH and free T4  - TSH; Future  - FREE THYROXINE; Future  - US-SOFT TISSUES OF HEAD - NECK; Future    2. Essential hypertension  · Blood pressure is well controlled    3. " Type 2 diabetes mellitus without complication, without long-term current use of insulin (HCC)  · No changes to medications today    Return in about 6 months (around 5/14/2019).    Thank you for allowing me to participate in the care of this patient.    Samantha Gupta M.D.    CC:   Floyd Gould M.D.    This note was created using voice recognition software (Dragon). The accuracy of the dictation is limited by the abilities of the software. I have reviewed the note prior to signing, however some errors in grammar and context are still possible. If you have any questions related to this note please do not hesitate to contact our office.

## 2018-12-29 PROBLEM — I10 ESSENTIAL HYPERTENSION: Status: ACTIVE | Noted: 2018-01-01

## 2018-12-29 PROBLEM — I25.10 CORONARY ARTERY DISEASE INVOLVING NATIVE CORONARY ARTERY OF NATIVE HEART WITHOUT ANGINA PECTORIS: Status: ACTIVE | Noted: 2018-01-01

## 2018-12-29 PROBLEM — L97.419 DIABETIC ULCER OF RIGHT MIDFOOT ASSOCIATED WITH TYPE 2 DIABETES MELLITUS (HCC): Status: ACTIVE | Noted: 2018-01-01

## 2018-12-29 PROBLEM — Z79.4 TYPE 2 DIABETES MELLITUS WITH SKIN COMPLICATION, WITH LONG-TERM CURRENT USE OF INSULIN (HCC): Status: ACTIVE | Noted: 2018-05-07

## 2018-12-29 PROBLEM — E11.628 TYPE 2 DIABETES MELLITUS WITH SKIN COMPLICATION, WITH LONG-TERM CURRENT USE OF INSULIN (HCC): Status: ACTIVE | Noted: 2018-05-07

## 2018-12-29 PROBLEM — E11.621 DIABETIC ULCER OF RIGHT MIDFOOT ASSOCIATED WITH TYPE 2 DIABETES MELLITUS (HCC): Status: ACTIVE | Noted: 2018-01-01

## 2018-12-29 NOTE — ED PROVIDER NOTES
ED Provider Note    CHIEF COMPLAINT  Chief Complaint   Patient presents with   • Open Wound       HPI  Leonie Brock is a 73 y.o. female who presents to the emergency department as directed by her podiatrist.  The patient has a history of a neuropathic right foot and also an open fracture of the foot which occurred about 1 year ago.  The patient had a wound on the foot for a period of time but it eventually completely closed until about 4 weeks ago when she developed a recurrent wound on the foot and the area has become swollen and is starting to have some drainage.  She says that she was seen in her local emergency department last night and they recommended that she come here for further evaluation but she she did not come last night so she came in today.  She has been taking antibiotics for this for about a week but does not know the name of the antibiotic.    REVIEW OF SYSTEMS she does not feel ill no fever or chills she has not developed any redness on the leg except for in the arch of the foot.  She does not recall any specific acute trauma to the area.  All other systems negative    PAST MEDICAL HISTORY  Past Medical History:   Diagnosis Date   • Arthritis     all over   • Backpain    • CATARACT     removed   • Diabetes     1990   • Myocardial infarct (HCC)     1998       FAMILY HISTORY  No family history on file.    SOCIAL HISTORY  Social History     Social History   • Marital status:      Spouse name: N/A   • Number of children: N/A   • Years of education: N/A     Social History Main Topics   • Smoking status: Former Smoker   • Smokeless tobacco: Never Used   • Alcohol use No   • Drug use: No   • Sexual activity: Not on file     Other Topics Concern   • Not on file     Social History Narrative   • No narrative on file       SURGICAL HISTORY  Past Surgical History:   Procedure Laterality Date   • GYN SURGERY      hysterectomy       CURRENT MEDICATIONS  Home Medications     Reviewed by Kristie VERAS  "ERIK Gipson (Registered Nurse) on 12/29/18 at 1159  Med List Status: <None>   Medication Last Dose Status   aspirin 81 MG tablet  Active   atenolol (TENORMIN) 100 MG TABS  Active   Calcium Carbonate (CALCIUM 600) 600 MG TABS  Active   celecoxib (CELEBREX) 200 MG CAPS  Active   clopidogrel (PLAVIX) 75 MG TABS  Active   insulin NPH (HUMULIN,NOVOLIN) 100 UNIT/ML SUSP  Active   INSULIN REGULAR HUMAN SC  Active   metformin (GLUCOPHAGE) 1000 MG tablet  Active   Propoxyphene N-APAP (DARVOCET-N 100 PO)  Active   rosuvastatin (CRESTOR) 20 MG TABS  Active   tizanidine (ZANAFLEX) 6 MG capsule  Active   valsartan-hydrochlorothiazide (DIOVAN HCT) 80-12.5 MG per tablet  Active                ALLERGIES  No Known Allergies    PHYSICAL EXAM  VITAL SIGNS: BP (!) 170/72   Pulse 84   Temp 36.7 °C (98.1 °F) (Temporal)   Resp 14   Ht 1.626 m (5' 4\")   Wt 97.1 kg (214 lb)   SpO2 92%   BMI 36.73 kg/m²    Oxygen saturation is interpreted as adequate  Constitutional: Awake verbal nontoxic-appearing  HENT: Mucous membranes moist  Eyes: No erythema discharge or jaundice  Neck: Trachea midline no JVD  Cardiovascular: Regular rate and rhythm  Lungs: Clear and equal bilaterally  Abdomen/Back: Moderately obese  Skin: Warm and dry  Musculoskeletal: There is chronic appearing deformity of the right foot there is about a 1 inch diameter area of swelling in the midfoot over the arch and there is an open wound on the bottom of the foot but very little surrounding erythema and I was unable to express any pus from the area  Neurologic: Awake verbal moving the other extremities    CHART REVIEW  I reviewed the ER notes from her visit last night and according to the notes the differential diagnosis last night included cellulitis versus osteomyelitis of the right foot.  The case was apparently reviewed with her podiatrist who recommended against antibiotic therapy until the patient had surgical debridement of the area.  The patient was told to come " to the emergency department last night but apparently refused stating that she would come in today.  Laboratory values were obtained CBC showed white blood cell count of 7.8 hemoglobin is adequate at 11.5 basic metabolic panel is unremarkable except for an elevated blood sugar of 205 anion gap is normal at 9 C-reactive protein was elevated at 3.2    Laboratory  Today here in this emergency department CBC shows a white blood cell count of 8.2 hemoglobin is adequate 11.1 basic metabolic panel is unremarkable INR is normal    Radiology  DX-FOOT-COMPLETE 3+ RIGHT   Final Result      1.  There is marked degenerative change in the midfoot and hindfoot with findings suggesting possibility of a neuropathic foot.   2.  There is diffuse swelling. There is no foreign body or gas in the soft tissues.   3.  There is no plain film evidence of osteomyelitis.        MEDICAL DECISION MAKING and DISPOSITION  In the emergency department an IV was established.  I reviewed the case with the hospitalist and the patient is admitted to the hospitalist service for further evaluation and treatment.  I have also reviewed the case with Dr. Corado from orthopedics who recommends that we start the patient on antibiotics and he will see the patient in consultation.  The hospitalist has ordered intravenous Unasyn.    IMPRESSION  1.  Diabetic foot infection  2.  Chronic Charcot foot, right foot      Electronically signed by: Jose Cruz Murphy, 12/29/2018 2:31 PM

## 2018-12-29 NOTE — ED NOTES
Med Rec Updated and Complete per Pt at bedside with List (returned) and permission to do so in front of family/visitor  Allergies Reviewed    Pt started a 10-day course of Bactrim DS every 12 hours on 12/21/18 Due to End 12/30/18.     Pt reports that she stopped taking Humulin R and NPH approximately 2 weeks ago and started on Lantus 20units every morning.    Pt currently taking Plavix 75mg nightly with her last dose 12/28/18 PM.    Pt taking LD-ASA daily.

## 2018-12-29 NOTE — ED TRIAGE NOTES
Chief Complaint   Patient presents with   • Open Wound   Pt to triage via motorized scooter.  Pt reports foot wound x several months.  Right medial foot, redness/swelling.  Pt reports it started as two broken bones, healed but then wound appeared.  Pt is insulin dependent diabetic.  Pt was seen at [facility] yesterday.  Pt educated on triage process and instructed to notify triage RN of any change in status.

## 2018-12-29 NOTE — ED NOTES
Pt encouraged to transfer to hospital bed, pt refuses, dsg replaced to open wound on foot, pt/family updated on status/plan of care

## 2018-12-29 NOTE — ASSESSMENT & PLAN NOTE
Cardiology following, pt has private cardiologist at [facility]  Plavix re-started   Continue, losartan pravastatin and carvedilol  However no ASA given higher bleeding risk when used w/ Coumadin and Plavix  Angina-trops are negative.  Started on Imdur, continue w/ Coreg  Echocardiogram indicates:  Mildly reduced left ventricular systolic function.  Left ventricular ejection fraction is visually estimated to be 45%.   Indeterminate diastolic function.  Normal left atrial size.  Mild mitral regurgitation.  Given recurrent syncope issue - patient refused stress test. Of note the patient did have a stress test done in [facility] April 2018 which is reportedly normal.  The patient states that she would rather follow up with her cardiologist, Dr. Horan, later this month.  In any case, the patient is well aware of the health risks of serious cardiac disease.   Her EF was 60% per last years stress test and intermittent chest pain which is controlled with nitroglycerin tablet.   Strongly recommend the patient to have definitive testing as soon as possible.  Cardiology input reviewed.

## 2018-12-30 NOTE — PROGRESS NOTES
Pt arrived to T331-2 via own motorized WC with transport tech and spouse @ 2200. Pt able to ambulate from WC to bed with quad cane. A&Ox4, VSS, no c/o pain 2* neuropathy to bilateral feet. Wound present to plantar surface of R foot. Dressing removed, wound photos taken, wound re-dressed. Pt changed into hospital gown, ambulated to restroom with quad cane, SB assist, voided, back to bed. Pt oriented to room, call light, floor routine, updated on POC. Bed locked and in lowest position, treaded sock and SCDs in place, call light and belongings within reach, hourly rounding in progress.    Skin check completed with TABATHA Flowers. Wound to R foot as noted above, scattered bruising to bilateral arms. No pressure related breakdown detected.

## 2018-12-30 NOTE — PROGRESS NOTES
LIMB PRESERVATION SERVICE INITIAL CONSULT    REASON FOR LPS CONSULTATION: Right Plantar Foot Wound    History of Present Illness:     Leonie Brock is a 73 y.o. female, with a past medical history that includes type 2 diabetes and a MI in 1998 with stint placement. She was admitted 12/29/2018 for Diabetic foot ulcer (HCC).   LPS has been consulted for a right plantar foot wound.  This wound is a chronic ulcer that is being treated by her podiatrist and was healed since November of 2018. It has since reopened and increased in pain and drainage and she went to her local ER the week of 12/23/18 and was placed on Bactrim. She was referred to [facility]. She did not come to [facility] until 12/29/18. Pt has been treating the wound with offloading and wound care.   The wound has failed to improve.   IV antibiotics were started on this admission.  Infectious diseases has been consulted.    Xray has been done  and does show degenerative changes and neuropathic foot and the previous fracture with no obvious osteomyelitis  MRI has not been done  Ortho Dr Kwon has been consulted  Pt was diagnosed with type 2 diabetes 35 years ago, and is currently managing with insulin.  Pt checks their blood sugars 3 times daily and reports that these typically run around 130s to 200s.  They have had previous diabetes education and self reports their a1c as 6.4.  They do have numbness in their feet.  They usually wears diabetic shoes and diabetic boot to right foot. They do check their feet routinely. They have not had previous foot ulcers or foot surgery.  They are retired.     Tobacco Abuse Hx:   reports that she has quit smoking. She has never used smokeless tobacco.    Alcohol Abuse Hx:   reports that she does not drink alcohol.    Drug Abuse Hx:   reports that she does not use drugs.    PAST MEDICAL HISTORY:   Past Medical History:   Diagnosis Date   • Arthritis     all over   • Backpain    • CATARACT     removed   • Diabetes     1990   •  Myocardial infarct (HCC)     1998       MEDICATIONS:   Current Facility-Administered Medications   Medication Dose   • senna-docusate (PERICOLACE or SENOKOT S) 8.6-50 MG per tablet 2 Tab  2 Tab    And   • polyethylene glycol/lytes (MIRALAX) PACKET 1 Packet  1 Packet    And   • magnesium hydroxide (MILK OF MAGNESIA) suspension 30 mL  30 mL    And   • bisacodyl (DULCOLAX) suppository 10 mg  10 mg   • acetaminophen (TYLENOL) tablet 650 mg  650 mg   • Pharmacy Consult Request ...Pain Management Review      And   • oxyCODONE immediate-release (ROXICODONE) tablet 2.5 mg  2.5 mg    And   • oxyCODONE immediate-release (ROXICODONE) tablet 5 mg  5 mg    And   • HYDROmorphone pf (DILAUDID) injection 0.25 mg  0.25 mg   • MD Alert...Vancomycin per Pharmacy  1 Each    And   • cefTRIAXone (ROCEPHIN) 2 g in  mL IVPB  2 g    And   • metroNIDAZOLE (FLAGYL) tablet 500 mg  500 mg   • hydrALAZINE (APRESOLINE) injection 10 mg  10 mg   • insulin regular (HUMULIN R) injection 2-9 Units  2-9 Units    And   • glucose 4 g chewable tablet 16 g  16 g    And   • dextrose 50% (D50W) injection 25 mL  25 mL   • ondansetron (ZOFRAN) syringe/vial injection 4 mg  4 mg   • ondansetron (ZOFRAN ODT) dispertab 4 mg  4 mg   • aspirin EC (ECOTRIN) tablet 81 mg  81 mg   • carvedilol (COREG) tablet 12.5 mg  12.5 mg   • insulin glargine (LANTUS) injection 20 Units  20 Units   • niacin SR (NIASPAN) tablet 1,000 mg  1,000 mg   • oxybutynin (DITROPAN) tablet 5 mg  5 mg   • pravastatin (PRAVACHOL) tablet 20 mg  20 mg   • therapeutic multivitamin-minerals (THERAGRAN-M) tablet 1 Tab  1 Tab   • tizanidine (ZANAFLEX) tablet 4-8 mg  4-8 mg   • losartan (COZAAR) tablet 50 mg  50 mg    And   • hydroCHLOROthiazide (HYDRODIURIL) tablet 12.5 mg  12.5 mg   • vancomycin 1,500 mg in  mL IVPB  15 mg/kg   • pneumococcal 13-Ruthy Conj Vacc (PREVNAR 13) syringe 0.5 mL  0.5 mL       ALLERGIES:  No Known Allergies     PAST SURGICAL HISTORY:   Past Surgical History:    Procedure Laterality Date   • GYN SURGERY      hysterectomy       SOCIAL HISTORY:   Social History     Social History   • Marital status:      Spouse name: N/A   • Number of children: N/A   • Years of education: N/A     Social History Main Topics   • Smoking status: Former Smoker   • Smokeless tobacco: Never Used   • Alcohol use No   • Drug use: No   • Sexual activity: Not on file     Other Topics Concern   • Not on file     Social History Narrative   • No narrative on file        FAMILY HISTORY: No family history on file.    REVIEW OF SYSTEMS:   Constitutional: Negative for chills, fever   Respiratory: Negative for cough and shortness of breath.    Cardiovascular:Negative for chest pain, swelling in legs, and claudication.   Gastrointestinal: Negative for constipation, diarrhea, nausea and vomiting.   Genitourinary: Negative for dysuria.   Neurological: numbness in feet and lower legs    DIAGNOSTIC DATA:     Lab Results   Component Value Date/Time    GLUCOSE 183 (H) 12/30/2018 04:19 AM        No results found for: HBA1C       WBC   Date/Time Value Ref Range Status   12/30/2018 04:19 AM 7.0 4.8 - 10.8 K/uL Final     Recent Labs      12/29/18   1345  12/30/18   0419   WBC  8.2  7.0   RBC  3.58*  3.38*   HEMOGLOBIN  11.1*  10.5*   HEMATOCRIT  33.7*  32.1*   MCV  94.1  95.0   MCH  31.0  31.1   MCHC  32.9*  32.7*   RDW  43.9  44.0   PLATELETCT  379  345   MPV  9.4  9.1     Recent Labs      12/29/18   1345  12/30/18   0419   SODIUM  138  139   POTASSIUM  4.6  4.3   CHLORIDE  104  105   CO2  27  26   GLUCOSE  137*  183*   BUN  36*  28*         ESR:   38    CRP:      2.10    X-ray:   12/29/18  DX-FOOT-COMPLETE 3+ RIGHT   Final Result      1.  There is marked degenerative change in the midfoot and hindfoot with findings suggesting possibility of a neuropathic foot.   2.  There is diffuse swelling. There is no foreign body or gas in the soft tissues.   3.  There is no plain film evidence of osteomyelitis.     "  US-SELWYN SINGLE LEVEL BILAT    (Results Pending)   US-EXTREMITY ARTERY LOWER BILAT    (Results Pending)     MRI: NA    Arterial studies:    PEND  R:   L:     RLE:  phasic/phasic   LLE: phasic/phasic     Microbiology:   Results     ** No results found for the last 168 hours. **             PHYSICAL EXAMINATION:     Vitals  BP (!) 97/48 Comment: rn notified   Pulse 81   Temp 36.6 °C (97.9 °F) (Temporal)   Resp 17   Ht 1.626 m (5' 4\")   Wt 96.2 kg (212 lb 1.3 oz)   SpO2 91%   Breastfeeding? No   BMI 36.40 kg/m²      Pain:        Patient resting comfortably    General Appearance:  Well developed, well nourished, in no acute distress    Lower Extremity Assessment:  Edema +2 Pitting Bilat    Pulses: RLE- DP pulse +1 palpable; PT pulse + unpalpable, Doppler: monophasic appears               LLE- DP pulse +1 palpable; PT pulse + unpalpable,  Doppler: monophasic appears    FROM dorsi/plantar  Structural /mechanical  R sided midfoot colapse    Sensory Assessment  Feet Insensate to light touch      Wound Assessment:    Wound Characteristics                                                                            Location:  Initial Evaluation  Date:12/30/2018   Tissue Type and %: 100% Red   Periwound: Macerated, Callus, Edematous, Induration   Drainage: Moderate Serous   Exposed structures None   Wound Edges:   Attached   Odor: None   S&S of Infection:   Edema/Erythema   Edema: Localized at Site   Sensation: Insensate               Measurements: Initial Evaluation  Date:12/30/2018   Length (cm) 0.5   Width (cm) 0.5   Depth (cm)  3.5   Tract/undermine  UM 3.5 Laterally from 11 - 7 oclock              Procedures:       Debridement :     Cleansed with:       NS                                                                   Periwound protected with: skin prep   Primary dressing: plain strip   Secondary Dressing: foam   Other: hypafix tape    Cleansed wound using a cotton tip applicator moistened with Normal Saline (NS) " "and periwound with NS, patted periwound dry and applied Skin Barrier/No sting to periwound.  Fillled wound with 1/4\" plain strip packing. Utilizing a cotton tip applicator, pushed strip into depth of wound, and leave a tail out for drainage wicking.  Covered with non adhesive foam piece and secured with hypafix tape.     Patient Education:    Implications of loss of protective sensation (LOPS) discussed with patient- including increased risk for amputation.  Advised to check foot at least daily, moisturize foot, and to always wear protective foot wear.      Plan:        1. Wound:   Right Plantar Foot  Limb Preservation Status Guarded - Possible Charcot Changes with prior Fx     Offloading:  Offloading boot  when ambulating  Ortho Techs involved:pt to get orthotics/prosthetics  as OP  involved, pt to wear shoes that do not rub on Wound sites    Dressing Orders to be placed when Sx decided    2. Infection: ID involved    3. Vascular. SELWYN pending    4. Diabetes    Diabetes: A1C is 7.0% Pt educated on keeping BS less than 150 to control infection and promote wound healing      5. Antibiotics: Per ID recommendation    6. Surgery: PT NPO Dr Kwon to consult today on Right Plantar Wound for possible I and D     7. Consults: ID Dr Nicole, Ortho/LPS Dr Kwon      DISCHARGE PLAN:    Disposition: TBD    Follow-up: TBD    Other:       Bryan Soliman R.N.      "

## 2018-12-30 NOTE — PROGRESS NOTES
"Pharmacy Kinetics 73 y.o. female on vancomycin day # 1 2018    Currently on Vancomycin new start    Indication for Treatment: SSTI    Pertinent history per medical record: Admitted on 2018 for new diabetic foot ulcer.  Patient has a history of diabetes, R foot fracture and chronic ulcerations. One week prior to admission, she noticed a new ulcer with purulence and drainage. She was started on Bactrim, but the wound failed to improve. Empiric antibiotics initiated for SSTI.     Other antibiotics: ceftriaxone 2 g IV q24h, metronidazole 500 mg IV q8h    Allergies: Patient has no known allergies.     List concerns for renal function: age, BMI 36    Pertinent cultures to date:   None    Recent Labs      18   1345   WBC  8.2   NEUTSPOLYS  71.20     Recent Labs      18   1345   BUN  36*   CREATININE  1.31   ALBUMIN  3.8     Blood pressure (!) 170/72, pulse 84, temperature 36.7 °C (98.1 °F), temperature source Temporal, resp. rate 14, height 1.626 m (5' 4\"), weight 97.1 kg (214 lb), SpO2 92 %, not currently breastfeeding. Temp (24hrs), Av.7 °C (98.1 °F), Min:36.7 °C (98.1 °F), Max:36.7 °C (98.1 °F)      A/P   1. Vancomycin dose change: initiate 15 mg/kg q24h  2. Next vancomycin level: ~2-3 days (not yet ordered)  3. Goal trough: 12-16 mcg/mL  4. Comments: new start vancomycin for SSTI. Would highly recommend wound and blood cultures, as well as consult to limb preservation services. No dosing history on file. Will cautiously dose with q24h dosing for now - if renal function declines, would consider switching to pulse dosing. Will follow.    Hallie Estevez, PharmD    "

## 2018-12-30 NOTE — H&P
Surgery Orthopedic History & Physical Note    Date  12/30/2018    Primary Care Physician  Floyd Gould M.D.    CC  Right diabetic foot wound    HPI  This is a 73 y.o. female who presented with a long history of type 2 diabetes mellitus and approximately 3 week history of right plantar foot draining wound.  She denies a history of trauma.  She denies fevers, chills,chest pain, SOB, nausea, and vomiting.  She has history of cardiac stent placement.    Past Medical History:   Diagnosis Date   • Arthritis     all over   • Backpain    • CATARACT     removed   • Diabetes     1990   • Myocardial infarct (HCC)     1998       Past Surgical History:   Procedure Laterality Date   • GYN SURGERY      hysterectomy       Current Facility-Administered Medications   Medication Dose Route Frequency Provider Last Rate Last Dose   • senna-docusate (PERICOLACE or SENOKOT S) 8.6-50 MG per tablet 2 Tab  2 Tab Oral BID Alden Gorman M.D.   2 Tab at 12/30/18 0625    And   • polyethylene glycol/lytes (MIRALAX) PACKET 1 Packet  1 Packet Oral QDAY PRN Alden Gorman M.D.        And   • magnesium hydroxide (MILK OF MAGNESIA) suspension 30 mL  30 mL Oral QDAY PRN Alden Gorman M.D.        And   • bisacodyl (DULCOLAX) suppository 10 mg  10 mg Rectal QDAY PRN Alden Gorman M.D.       • acetaminophen (TYLENOL) tablet 650 mg  650 mg Oral Q6HRS PRN Alden Gorman M.D.       • Pharmacy Consult Request ...Pain Management Review   Other PRN Alden Gorman M.D.        And   • oxyCODONE immediate-release (ROXICODONE) tablet 2.5 mg  2.5 mg Oral Q3HRS PRN Alden Gorman M.D.        And   • oxyCODONE immediate-release (ROXICODONE) tablet 5 mg  5 mg Oral Q3HRS PRN Alden Gorman M.D.        And   • HYDROmorphone pf (DILAUDID) injection 0.25 mg  0.25 mg Intravenous Q3HRS PRN Alden Gorman M.D.       • MD Alert...Vancomycin per Pharmacy  1 Each Other pharmacy to dose Alden Gorman M.D.        And    • cefTRIAXone (ROCEPHIN) 2 g in  mL IVPB  2 g Intravenous DAILY Alden Gorman M.D.   Stopped at 12/30/18 0655    And   • metroNIDAZOLE (FLAGYL) tablet 500 mg  500 mg Oral Q8HRS Alden Gorman M.D.   500 mg at 12/30/18 1324   • hydrALAZINE (APRESOLINE) injection 10 mg  10 mg Intravenous Q4HRS PRN Alden Gorman M.D.       • insulin regular (HUMULIN R) injection 2-9 Units  2-9 Units Subcutaneous 4X/DAY ACHS Alden Gorman M.D.        And   • glucose 4 g chewable tablet 16 g  16 g Oral Q15 MIN PRN Alden Gorman M.D.        And   • dextrose 50% (D50W) injection 25 mL  25 mL Intravenous Q15 MIN PRN Alden Gorman M.D.       • ondansetron (ZOFRAN) syringe/vial injection 4 mg  4 mg Intravenous Q4HRS PRN Alden Gorman M.D.       • ondansetron (ZOFRAN ODT) dispertab 4 mg  4 mg Oral Q4HRS PRN Alden Gorman M.D.       • aspirin EC (ECOTRIN) tablet 81 mg  81 mg Oral DAILY Alden Gorman M.D.   81 mg at 12/29/18 1737   • carvedilol (COREG) tablet 12.5 mg  12.5 mg Oral DAILY Alden Gorman M.D.   12.5 mg at 12/30/18 0624   • insulin glargine (LANTUS) injection 20 Units  20 Units Subcutaneous QAM Alden Gorman M.D.       • niacin SR (NIASPAN) tablet 1,000 mg  1,000 mg Oral QAM Alden Gorman M.D.   1,000 mg at 12/30/18 0736   • oxybutynin (DITROPAN) tablet 5 mg  5 mg Oral DAILY Alden Gorman M.D.   5 mg at 12/30/18 0624   • pravastatin (PRAVACHOL) tablet 20 mg  20 mg Oral Nightly Alden Gorman M.D.   20 mg at 12/29/18 2307   • therapeutic multivitamin-minerals (THERAGRAN-M) tablet 1 Tab  1 Tab Oral DAILY Alden Gorman M.D.   1 Tab at 12/30/18 0624   • tizanidine (ZANAFLEX) tablet 4-8 mg  4-8 mg Oral QPM PRN Alden Gorman M.D.       • losartan (COZAAR) tablet 50 mg  50 mg Oral Q DAY Alden Gorman M.D.   50 mg at 12/30/18 0624    And   • hydroCHLOROthiazide (HYDRODIURIL) tablet 12.5 mg  12.5 mg Oral Q DAY Alden CALZADA  Quinn Gorman M.D.   12.5 mg at 12/30/18 0624   • vancomycin 1,500 mg in  mL IVPB  15 mg/kg Intravenous Q24HR Alden Gorman M.D.       • pneumococcal 13-Ruthy Conj Vacc (PREVNAR 13) syringe 0.5 mL  0.5 mL Intramuscular Once Alden Gorman M.D.           Social History     Social History   • Marital status:      Spouse name: N/A   • Number of children: N/A   • Years of education: N/A     Occupational History   • Not on file.     Social History Main Topics   • Smoking status: Former Smoker   • Smokeless tobacco: Never Used   • Alcohol use No   • Drug use: No   • Sexual activity: Not on file     Other Topics Concern   • Not on file     Social History Narrative   • No narrative on file       No family history on file.    Allergies  Patient has no known allergies.    Review of Systems  Negative except for as stated in HPI    Physical Exam    Vital Signs  Blood Pressure : (!) 97/48 (rn notified )   Temperature: 36.6 °C (97.9 °F)   Pulse: 81   Respiration: 17   Pulse Oximetry: 91 %       Labs:  Recent Labs      12/29/18   1345  12/30/18   0419   WBC  8.2  7.0   RBC  3.58*  3.38*   HEMOGLOBIN  11.1*  10.5*   HEMATOCRIT  33.7*  32.1*   MCV  94.1  95.0   MCH  31.0  31.1   MCHC  32.9*  32.7*   RDW  43.9  44.0   PLATELETCT  379  345   MPV  9.4  9.1     Recent Labs      12/29/18   1345  12/30/18   0419   SODIUM  138  139   POTASSIUM  4.6  4.3   CHLORIDE  104  105   CO2  27  26   GLUCOSE  137*  183*   BUN  36*  28*   CREATININE  1.31  1.02   CALCIUM  10.1  9.4     Recent Labs      12/29/18   1345   APTT  26.6   INR  1.05     Recent Labs      12/29/18   1345   ASTSGOT  15   ALTSGPT  12   TBILIRUBIN  0.3   ALKPHOSPHAT  66   GLOBULIN  3.0   INR  1.05       Radiology:  US-SELWYN SINGLE LEVEL BILAT   Final Result      DX-FOOT-COMPLETE 3+ RIGHT   Final Result      1.  There is marked degenerative change in the midfoot and hindfoot with findings suggesting possibility of a neuropathic foot.   2.  There is diffuse  swelling. There is no foreign body or gas in the soft tissues.   3.  There is no plain film evidence of osteomyelitis.            Assessment/Plan:  1) Type 2 diabetes mellitus  2) Right midfoot Charcot arthropathy  3) Right infected chronic diabetic ulcer plantar midfoot    -NPO  -Continue antibiotics per ID recs  -Consented for OR today for I&D right foot, right foot ostectomy, and right gastrocsoleus recession

## 2018-12-30 NOTE — ASSESSMENT & PLAN NOTE
Limb preservation services, infectious disease and otho consulted   Surgical debridement 12/30/2018   IV ceftriaxone, Flagyl and vancomycin  Per ID specialist,Daptomycin IV, 6 week therapy, PICC line placed.  Awaiting for outpatient antibiotic arrangement.

## 2018-12-30 NOTE — CARE PLAN
"Problem: Safety  Goal: Will remain free from injury  Outcome: PROGRESSING AS EXPECTED  Pt instructed to call before getting OOB. Pt verbalized understanding and calls appropriately sometimes. Has urinary frequency and is \"afraid of wetting the bed.\"     Problem: Knowledge Deficit  Goal: Knowledge of the prescribed therapeutic regimen will improve  Outcome: PROGRESSING SLOWER THAN EXPECTED  POC discussed with pt. Pt verbalized understanding, all questions answered at this time. Pt and spouse are most concerned with discharge planning and getting home ASAP.      "

## 2018-12-30 NOTE — PROGRESS NOTES
"This RN entered pt room as pt was swallowing a home medication. Pt educated about not taking any home medications and that her  should take them home with him. Pt stated, \"It's just to help slow down the urine. If I don't take it, y'all are going to have a wet bed.\" Upon further inspection, the home med taken was reported as a Detrol tablet. This medication was included in the pt's med rec but not ordered on the MAR. Pt again educated to not take any medications that are not provided by nursing staff. Pt verbalized understanding and agreed not to take any more home medications. Pt's  will take meds home.  "

## 2018-12-30 NOTE — WOUND TEAM
Pt is being followed by LPS. Wound team not following @ this time. Please consult if condition changes.

## 2018-12-30 NOTE — PROGRESS NOTES
Hospital Medicine Daily Progress Note    Date of Service  12/30/2018    Chief Complaint  Right foot wound with discharge    Hospital Course      73 y.o. female with past medical history of diabetes, coronary artery disease, hypertension and chronic back pain admitted 12/29/2018 with diabetic foot ulcer.  Limb preservation services and orthopedics were consulted. Plan for I & D 12/30/2018.           Interval Problem Update  HR 70-80, BP  / 48-81, adding as needed hydralazine   Saturating well on 3 L NC   K 4.3, BUN 28, Cr 1.02, watch renal function closes as long as on vancomycin     -181, high risk for hypo / hyperglycemia infection and scheduled surgery. Watch BS closely     IV ceftriaxone, Flagyl and vancomycin  ID consulted   Final ABX plan based on tissue culture   Plavix has been held for now, anticipated surgery, restart when okay with    Consultants/Specialty  Orthopedics  Limb preservation services    Code Status  Full     Disposition  To be determined after PT OT evaluation     Review of Systems  Review of Systems   Constitutional: Negative for chills and fever.   HENT: Negative for congestion and sore throat.    Eyes: Negative for pain, discharge and redness.   Respiratory: Negative for cough, hemoptysis, sputum production, shortness of breath and stridor.    Cardiovascular: Negative for chest pain, palpitations and leg swelling.   Gastrointestinal: Negative for abdominal pain, blood in stool, diarrhea and vomiting.   Genitourinary: Negative for flank pain, hematuria and urgency.   Musculoskeletal: Positive for joint pain (mostly with walking ). Negative for myalgias.        Pain with walking    Skin:        Right foot ulcer    Neurological: Negative for speech change, focal weakness, seizures and loss of consciousness.   Endo/Heme/Allergies: Does not bruise/bleed easily.   Psychiatric/Behavioral: Negative for hallucinations and suicidal ideas. The patient is not nervous/anxious.          Physical Exam  Temp:  [36.1 °C (96.9 °F)-37.1 °C (98.8 °F)] 36.6 °C (97.9 °F)  Pulse:  [79-84] 81  Resp:  [16-19] 17  BP: ()/(45-81) 97/48    Physical Exam   Constitutional: She is oriented to person, place, and time. She appears well-developed and well-nourished. No distress.   HENT:   Head: Normocephalic and atraumatic.   Right Ear: External ear normal.   Left Ear: External ear normal.   Eyes: Pupils are equal, round, and reactive to light. Conjunctivae are normal. Right eye exhibits no discharge. Left eye exhibits no discharge.   Neck: Normal range of motion. Neck supple. No JVD present. No tracheal deviation present. No thyromegaly present.   Cardiovascular: Normal rate and regular rhythm.  Exam reveals no gallop and no friction rub.    No murmur heard.  Pulmonary/Chest: Effort normal and breath sounds normal. No stridor. No respiratory distress. She has no wheezes. She has no rales. She exhibits no tenderness.   Abdominal: Soft. Bowel sounds are normal. She exhibits no distension. There is no tenderness. There is no rebound and no guarding.   Musculoskeletal: Normal range of motion. She exhibits tenderness. She exhibits no deformity.   Right foot ulcer, tender surrounding tissue    Neurological: She is alert and oriented to person, place, and time. No cranial nerve deficit. Coordination normal.   Skin: Skin is warm and dry. She is not diaphoretic.   Right foot ulcer     Psychiatric: She has a normal mood and affect. Judgment normal.   Nursing note and vitals reviewed.      Fluids  No intake or output data in the 24 hours ending 12/30/18 1554    Laboratory  Recent Labs      12/29/18   1345  12/30/18   0419   WBC  8.2  7.0   RBC  3.58*  3.38*   HEMOGLOBIN  11.1*  10.5*   HEMATOCRIT  33.7*  32.1*   MCV  94.1  95.0   MCH  31.0  31.1   MCHC  32.9*  32.7*   RDW  43.9  44.0   PLATELETCT  379  345   MPV  9.4  9.1     Recent Labs      12/29/18   1345  12/30/18   0419   SODIUM  138  139   POTASSIUM  4.6  4.3    CHLORIDE  104  105   CO2  27  26   GLUCOSE  137*  183*   BUN  36*  28*   CREATININE  1.31  1.02   CALCIUM  10.1  9.4     Recent Labs      12/29/18   1345   APTT  26.6   INR  1.05               Imaging  US-SELWYN SINGLE LEVEL BILAT   Final Result      DX-FOOT-COMPLETE 3+ RIGHT   Final Result      1.  There is marked degenerative change in the midfoot and hindfoot with findings suggesting possibility of a neuropathic foot.   2.  There is diffuse swelling. There is no foreign body or gas in the soft tissues.   3.  There is no plain film evidence of osteomyelitis.           Assessment/Plan  * Diabetic ulcer of right midfoot associated with type 2 diabetes mellitus (HCC)   Assessment & Plan    Limb preservation services and otho consulted   ID consulted   Plan for surgical debridement 12/30/2018   IV ceftriaxone, Flagyl and vancomycin  Final ABX plan based on tissue culture     Coronary artery disease involving native coronary artery of native heart without angina pectoris   Assessment & Plan    Hold plavix, for planned surgery   Continue aspirin losartan pravastatin and carvedilol      Type 2 diabetes mellitus with skin complication, with long-term current use of insulin ([facility])- (present on admission)   Assessment & Plan    With hyperglycemia  Glycated hemoglobin 7   Long & short acting insulin  Accu-Checks, hypoglycemia protocol      Essential hypertension   Assessment & Plan    Carvedilol, Losartan  As needed hydralazine    Monitor blood pressure and adjust as need       Mixed hyperlipidemia- (present on admission)   Assessment & Plan    Continue  pravastatin and niacin           VTE prophylaxis: SCDs, consider Lovenox restarting Plavix when okay with orthopedics

## 2018-12-30 NOTE — H&P
Hospital Medicine History & Physical Note    Date of Service  12/29/2018    Primary Care Physician  Floyd Gould M.D.    Consultants  Orthopedics Dr. Corado  Limb preservation services    Code Status  Full code    Chief Complaint  Right foot ulcer    History of Presenting Illness  73 y.o. female who presented 12/29/2018 with right foot ulcer of 1 week duration.  She was seen at her local emergency room in [place] yesterday deficit her physicians recommended transfer to our facility for higher level of care however she declined and decided to present to our emergency room with her  today.  She reports that she had right foot fractures and a chronic ulcer that finally healed about 4 weeks ago.  Over the past week she is noted a new ulcer over her right foot with increased swelling and drainage she has been started on Bactrim however continued to have drainage and pain and was referred for evaluation.  She denies any fever or chills.  She has a long-standing history of diabetes .  She denies any nausea vomiting, she denies any rash, she has been compliant with her medications.She complains of pain in her right foot moderate to severe at times worse with weightbearing activity relieved by rest nonradiating pain is sharp at times burning she has a history of coronary artery disease with multiple stents in place and has been maintained on aspirin and Plavix.    Review of Systems  Review of Systems   All other systems reviewed and are negative.      Past Medical History   has a past medical history of Arthritis; Backpain; CATARACT; Diabetes; and Myocardial infarct (HCC).    Surgical History   has a past surgical history that includes gyn surgery.  Multiple PCI's    Family History  Reviewed and not pertinent to the presenting problem  Social History   reports that she has quit smoking. She has never used smokeless tobacco. She reports that she does not drink alcohol or use drugs.    Allergies  No Known  Allergies    Medications  Prior to Admission Medications   Prescriptions Last Dose Informant Patient Reported? Taking?   CRANBERRY PO 12/29/2018 at AM Patient Yes Yes   Sig: Take 1 Dose by mouth every day. Unknown OTC Strength   Calcium Carb-Cholecalciferol (CALCIUM 1000 + D PO) 12/29/2018 at AM Patient Yes Yes   Sig: Take 1 Dose by mouth every day.   aspirin 81 MG tablet 12/28/2018 at AM Patient Yes No   Sig: Take 81 mg by mouth every day. CONTINUE AS PRIOR TO ADMIT    carvedilol (COREG) 25 MG Tab 12/28/2018 at AM Patient Yes Yes   Sig: Take 12.5 mg by mouth every day.   celecoxib (CELEBREX) 200 MG CAPS UNK at PRN Patient Yes No   Sig: Take 200 mg by mouth 1 time daily as needed for Mild Pain.   clopidogrel (PLAVIX) 75 MG TABS 12/28/2018 at PM Patient Yes No   Sig: Take 75 mg by mouth every evening.   insulin glargine (LANTUS) 100 UNIT/ML Solution 12/29/2018 at AM Patient Yes Yes   Sig: Inject 20 Units as instructed every morning.   losartan-hydrochlorothiazide (HYZAAR) 50-12.5 MG per tablet 12/28/2018 at AM Patient Yes Yes   Sig: Take 1 Tab by mouth every day.   metformin (GLUCOPHAGE) 1000 MG tablet 12/29/2018 at AM Patient Yes No   Sig: Take 1,000 mg by mouth 2 times a day, with meals.   niacin (SLO-NIACIN) 500 MG tablet 12/29/2018 at AM Patient Yes Yes   Sig: Take 1,000 mg by mouth every morning.   oxybutynin (DITROPAN) 5 MG Tab 12/29/2018 at AM Patient Yes Yes   Sig: Take 5 mg by mouth every day.   pravastatin (PRAVACHOL) 20 MG Tab 12/28/2018 at PM Patient Yes Yes   Sig: Take 20 mg by mouth every evening.   sulfamethoxazole-trimethoprim (BACTRIM DS) 800-160 MG tablet 12/29/2018 at AM Patient Yes Yes   Sig: Take 1 Tab by mouth every 12 hours. 10-day course Starting 12/21/18 Due to End 12/30/18   therapeutic multivitamin-minerals (THERAGRAN-M) Tab 12/29/2018 at AM Patient Yes Yes   Sig: Take 1 Tab by mouth every day.   tizanidine (ZANAFLEX) 4 MG Tab 12/28/2018 at PM Patient Yes No   Sig: Take 4-8 mg by mouth  every evening as needed (Pain).   tolterodine (DETROL) 2 MG Tab 12/29/2018 at AM Patient Yes Yes   Sig: Take 4 mg by mouth every day.      Facility-Administered Medications: None       Physical Exam  Temp:  [36.7 °C (98.1 °F)] 36.7 °C (98.1 °F)  Pulse:  [84] 84  Resp:  [14] 14  BP: (170)/(72) 170/72    Physical Exam   Constitutional: She is oriented to person, place, and time. She appears well-developed and well-nourished.   Obese   HENT:   Head: Normocephalic and atraumatic.   Right Ear: External ear normal.   Left Ear: External ear normal.   Mouth/Throat: No oropharyngeal exudate.   Eyes: Conjunctivae are normal. Right eye exhibits no discharge. Left eye exhibits no discharge. No scleral icterus.   Neck: Neck supple. No JVD present. No tracheal deviation present.   Cardiovascular: Normal rate and regular rhythm.  Exam reveals no gallop and no friction rub.    No murmur heard.  Pulmonary/Chest: Effort normal. No stridor. No respiratory distress. She has decreased breath sounds. She has no wheezes. She has no rales. She exhibits no tenderness.   Abdominal: Soft. Bowel sounds are normal. She exhibits no distension. There is no tenderness. There is no rebound.   Musculoskeletal: She exhibits edema. She exhibits no tenderness.   Neurological: She is alert and oriented to person, place, and time. No cranial nerve deficit. She exhibits normal muscle tone.   Skin: Skin is warm and dry. She is not diaphoretic. No cyanosis. Nails show no clubbing.   Right foot ulcer over the plantar aspect of her arch with minimal purulent drainage noted on the dressing no surrounding erythema her foot is warm   Psychiatric: She has a normal mood and affect. Her behavior is normal. Thought content normal.   Nursing note and vitals reviewed.      Laboratory:  Recent Labs      12/29/18   1345   WBC  8.2   RBC  3.58*   HEMOGLOBIN  11.1*   HEMATOCRIT  33.7*   MCV  94.1   MCH  31.0   MCHC  32.9*   RDW  43.9   PLATELETCT  379   MPV  9.4      Recent Labs      12/29/18   1345   SODIUM  138   POTASSIUM  4.6   CHLORIDE  104   CO2  27   GLUCOSE  137*   BUN  36*   CREATININE  1.31   CALCIUM  10.1     Recent Labs      12/29/18   1345   ALTSGPT  12   ASTSGOT  15   ALKPHOSPHAT  66   TBILIRUBIN  0.3   GLUCOSE  137*     Recent Labs      12/29/18   1345   APTT  26.6   INR  1.05             No results for input(s): TROPONINI in the last 72 hours.    Urinalysis:    No results found     Imaging:  DX-FOOT-COMPLETE 3+ RIGHT   Final Result      1.  There is marked degenerative change in the midfoot and hindfoot with findings suggesting possibility of a neuropathic foot.   2.  There is diffuse swelling. There is no foreign body or gas in the soft tissues.   3.  There is no plain film evidence of osteomyelitis.            Assessment/Plan:  I anticipate this patient will require at least two midnights for appropriate medical management, necessitating inpatient admission.    * Diabetic ulcer of right midfoot associated with type 2 diabetes mellitus (HCC)   Assessment & Plan    With concern for possible deep infection and osteomyelitis    Patient will be admitted  Will discontinue Bactrim and start her on IV ceftriaxone and Flagyl and vancomycin  We will consult limb preservation services and await Dr. Mendoza's evaluation  Patient would likely need surgical debridement  Wound care and offloading       Coronary artery disease involving native coronary artery of native heart without angina pectoris   Assessment & Plan    Hold plavix in case surgical debridement is recommended  Continue aspirin losartan pravastatin and carvedilol     Type 2 diabetes mellitus with skin complication, with long-term current use of insulin (HCC)- (present on admission)   Assessment & Plan    We will continue Lantus and start sliding scale insulin  Check HbA1c  Monitor CBGs and adjust insulin accordingly  Hold metformin at this time     Essential hypertension   Assessment & Plan    Continue  Hyzaar and carvedilol  Monitor blood pressure and adjust accordingly  PRN hydralazine     Mixed hyperlipidemia- (present on admission)   Assessment & Plan    Continue  pravastatin and niacin         VTE prophylaxis: SCD

## 2018-12-30 NOTE — CONSULTS
INFECTIOUS DISEASES INPATIENT CONSULT NOTE     Date of Service: 12/30/2018    Consult Requested By: Byran Soliman RN from limb preservation service    Reason for Consultation: Right diabetic foot infection    History of Present Illness:   Leonie Brock is a 73 y.o. 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  Patient states she broke several bones in her right foot approximately 1 year prior to admission.  At that time, she wore a boot for stabilization and noted a small ulceration on the bottom of her foot but it did not bother her.  Over the past 1-1/2 weeks however she noted that the ulcer had increased in size with increasing pain and erythema.  She was seen by her local podiatrist approximately 1 week ago and he did not like the appearance of her wound.  She was prescribed Bactrim and noted some improvement while on antibiotics.  She denies any associated fevers or chills.  Her right foot pain is worse with ambulation and prolonged standing.  No nausea, vomiting, abdominal pain or diarrhea.  Given increasing right foot pain, she presented to the ED for further evaluation and management.  On presentation, she was afebrile without any leukocytosis.  X-ray reviewed degenerative changes in addition to diffuse swelling but no plain film evidence of osteomyelitis.  She was also found to have normal arterial studies.  Patient is currently on broad-spectrum antibiotics.  She has been evaluated by the limb preservation service with possible plans for I&D tomorrow.  Infectious disease service consulted for further antibiotic recommendations and management.    Review Of Systems:  All other ROS were reviewed and are negative except as noted above in the HPI.    PMH:   Past Medical History:   Diagnosis Date   • Arthritis     all  over   • Backpain    • CATARACT     removed   • Diabetes     1990   • Myocardial infarct (HCC)     1998   Coronary artery disease    PSH:  Past Surgical History:   Procedure Laterality Date   • GYN SURGERY      hysterectomy       FAMILY HX:  Positive for type 2 diabetes    SOCIAL HX:  Social History     Social History   • Marital status:      Spouse name: N/A   • Number of children: N/A   • Years of education: N/A     Occupational History   • Not on file.     Social History Main Topics   • Smoking status: Former Smoker   • Smokeless tobacco: Never Used   • Alcohol use No   • Drug use: No   • Sexual activity: Not on file     Other Topics Concern   • Not on file     Social History Narrative   • No narrative on file     History   Smoking Status   • Former Smoker   Smokeless Tobacco   • Never Used     History   Alcohol Use No       Allergies/Intolerances:  No Known Allergies    History reviewed with the patient     Other Current Medications:    Current Facility-Administered Medications:   •  senna-docusate (PERICOLACE or SENOKOT S) 8.6-50 MG per tablet 2 Tab, 2 Tab, Oral, BID, 2 Tab at 12/30/18 0625 **AND** polyethylene glycol/lytes (MIRALAX) PACKET 1 Packet, 1 Packet, Oral, QDAY PRN **AND** magnesium hydroxide (MILK OF MAGNESIA) suspension 30 mL, 30 mL, Oral, QDAY PRN **AND** bisacodyl (DULCOLAX) suppository 10 mg, 10 mg, Rectal, QDAY PRN, Alden Gorman M.D.  •  acetaminophen (TYLENOL) tablet 650 mg, 650 mg, Oral, Q6HRS PRN, Alden Gorman M.D.  •  Notify provider if pain remains uncontrolled, , , CONTINUOUS **AND** Use the numeric rating scale (NRS-11) on regular floors and Critical-Care Pain Observation Tool (CPOT) on ICUs/Trauma to assess pain, , , CONTINUOUS **AND** Pulse Ox (Oximetry), , , CONTINUOUS **AND** Pharmacy Consult Request ...Pain Management Review, , Other, PRN **AND** If patient difficult to arouse and/or has respiratory depression, stop any opiates that are currently infusing and  call a Rapid Response., , , CONTINUOUS **AND** oxyCODONE immediate-release (ROXICODONE) tablet 2.5 mg, 2.5 mg, Oral, Q3HRS PRN **AND** oxyCODONE immediate-release (ROXICODONE) tablet 5 mg, 5 mg, Oral, Q3HRS PRN **AND** HYDROmorphone pf (DILAUDID) injection 0.25 mg, 0.25 mg, Intravenous, Q3HRS PRN, Alden Gorman M.D.  •  MD Alert...Vancomycin per Pharmacy, 1 Each, Other, pharmacy to dose **AND** cefTRIAXone (ROCEPHIN) 2 g in  mL IVPB, 2 g, Intravenous, DAILY, Stopped at 12/30/18 0655 **AND** metroNIDAZOLE (FLAGYL) tablet 500 mg, 500 mg, Oral, Q8HRS, Alden Gorman M.D., 500 mg at 12/30/18 0624  •  hydrALAZINE (APRESOLINE) injection 10 mg, 10 mg, Intravenous, Q4HRS PRN, Alden Gorman M.D.  •  insulin regular (HUMULIN R) injection 2-9 Units, 2-9 Units, Subcutaneous, 4X/DAY ACHS **AND** Accu-Chek ACHS, , , Q AC AND BEDTIME(S) **AND** NOTIFY MD and PharmD, , , Once **AND** glucose 4 g chewable tablet 16 g, 16 g, Oral, Q15 MIN PRN **AND** dextrose 50% (D50W) injection 25 mL, 25 mL, Intravenous, Q15 MIN PRN, Alden Gorman M.D.  •  ondansetron (ZOFRAN) syringe/vial injection 4 mg, 4 mg, Intravenous, Q4HRS PRN, Alden Gorman M.D.  •  ondansetron (ZOFRAN ODT) dispertab 4 mg, 4 mg, Oral, Q4HRS PRN, Alden Gorman M.D.  •  aspirin EC (ECOTRIN) tablet 81 mg, 81 mg, Oral, DAILY, Alden Gorman M.D., 81 mg at 12/29/18 1737  •  carvedilol (COREG) tablet 12.5 mg, 12.5 mg, Oral, DAILY, Alden Gorman M.D., 12.5 mg at 12/30/18 0624  •  insulin glargine (LANTUS) injection 20 Units, 20 Units, Subcutaneous, LARISSA, Alden Gorman M.D.  •  niacin SR (NIASPAN) tablet 1,000 mg, 1,000 mg, Oral, QAM, Alden Gorman M.D., 1,000 mg at 12/30/18 0736  •  oxybutynin (DITROPAN) tablet 5 mg, 5 mg, Oral, DAILY, Alden Gorman M.D., 5 mg at 12/30/18 0624  •  pravastatin (PRAVACHOL) tablet 20 mg, 20 mg, Oral, Nightly, Alden Gorman M.D., 20 mg at 12/29/18 2307  •   "therapeutic multivitamin-minerals (THERAGRAN-M) tablet 1 Tab, 1 Tab, Oral, DAILY, Alden Gorman M.D., 1 Tab at 18  •  tizanidine (ZANAFLEX) tablet 4-8 mg, 4-8 mg, Oral, QPM PRN, Alden Gorman M.D.  •  losartan (COZAAR) tablet 50 mg, 50 mg, Oral, Q DAY, 50 mg at 18 **AND** hydroCHLOROthiazide (HYDRODIURIL) tablet 12.5 mg, 12.5 mg, Oral, Q DAY, Alden Gorman M.D., 12.5 mg at 18  •  vancomycin 1,500 mg in  mL IVPB, 15 mg/kg, Intravenous, Q24HR, Alden Gorman M.D.  •  pneumococcal 13-Ruthy Conj Vacc (PREVNAR 13) syringe 0.5 mL, 0.5 mL, Intramuscular, Once, Alden Gorman M.D.  [unfilled]    Most Recent Vital Signs:  BP (!) 97/48 Comment: rn notified   Pulse 81   Temp 36.6 °C (97.9 °F) (Temporal)   Resp 17   Ht 1.626 m (5' 4\")   Wt 96.2 kg (212 lb 1.3 oz)   SpO2 91%   Breastfeeding? No   BMI 36.40 kg/m²   Temp  Av.7 °C (98.1 °F)  Min: 36.1 °C (96.9 °F)  Max: 37.1 °C (98.8 °F)    Physical Exam:  General: well-appearing, no acute distress  HEENT: sclera anicteric, PERRL, EOMI, MMM, no oral lesions  Neck: supple, no lymphadenopathy  Chest: CTAB, no r/r/w, normal work of breathing.  Cardiac: RRR, normal S1 S2, no m/r/g   Abdomen: + bowel sounds, soft, non-tender, non-distended, no HSM  Extremities: Right midfoot erythema.  Tissue is very boggy with surrounding erythema.  There is also a wound on the plantar aspect of the right foot that is currently packed  Skin: no rashes or worrisome lesions  Neuro: Alert and oriented times 3, non-focal exam, speech fluent, moves all extremities    Pertinent Lab Results:  Recent Labs      18   1345  18   0419   WBC  8.2  7.0      Recent Labs      18   1345  18   0419   HEMOGLOBIN  11.1*  10.5*   HEMATOCRIT  33.7*  32.1*   MCV  94.1  95.0   MCH  31.0  31.1   PLATELETCT  379  345         Recent Labs      18   1345  18   0419   SODIUM  138  139   POTASSIUM  4.6  4.3 "   CHLORIDE  104  105   CO2  27  26   CREATININE  1.31  1.02        Recent Labs      18   1345   ALBUMIN  3.8        Pertinent Micro:  Results     ** No results found for the last 168 hours. **        No results found for: BLOODCULTU, BLDCULT, BCHOLD     Studies:  Dx-foot-complete 3+ Right    Result Date: 2018 1:03 PM HISTORY/REASON FOR EXAM:  Open wound on the plantar surface of the right foot for one month. TECHNIQUE/EXAM DESCRIPTION AND NUMBER OF VIEWS: 3 views of the RIGHT foot. COMPARISON:  None FINDINGS: There is diffuse swelling of the right ankle and foot. There is no radiopaque foreign body. There is no evidence of displaced fracture or dislocation. There is evidence of a healed fracture involving the right 3rd metatarsal with smooth periosteal reaction. There is marked abnormalities involving the tarsometatarsal junctions and throughout the midfoot and hindfoot with marginal spurring and degenerative change. There are also numerous calcific and ossific radiodensities at the tarsometatarsal junctions with some increased sclerosis. There is loss of the normal arch of the foot on the lateral view in the mid foot. There is mild degenerative change in the IP joints.     1.  There is marked degenerative change in the midfoot and hindfoot with findings suggesting possibility of a neuropathic foot. 2.  There is diffuse swelling. There is no foreign body or gas in the soft tissues. 3.  There is no plain film evidence of osteomyelitis.    Us-selwyn Single Level Bilat    Result Date: 2018   Vascular Laboratory  Conclusions  Limited exam as ankle pressures are not accurately measured due to  calcification and noncompressibility.  Otherwise, grossly normal bilateral SELWYN's.  DAVID KEYS  Age:    73    Gender:     F  MRN:    [id number]  :    1945      BSA:  Exam Date:     2018 11:09  Room #:     Inpatient  Priority:     Routine  Ht (in):             Wt (lb):  Ordering Physician:         JESÚS HOGAN  Referring Physician:       JESÚS HOGAN  Sonographer:               Ken Soliman RVT  Study Type:                Complete Bilateral  Technical Quality:         Adequate  Indications:     Ulceration of LE  CPT Codes:       81822  ICD Codes:       707.1  History:         Ulceration of plantar region of right foot; diabetes mellitus  Limitations:                 RIGHT  Waveform            Systolic BPs (mmHg)                             137           Brachial  Triphasic                                Common Femoral  Bi, non-                   0             Posterior Tibial  reversed  Bi, non-                   0             Dorsalis Pedis  reversed                                           Peroneal                                           SELWYN                                           TBI                       LEFT  Waveform        Systolic BPs (mmHg)                             132           Brachial  Triphasic                                Common Femoral  Biphasic                   0             Posterior Tibial  Biphasic                   0             Dorsalis Pedis                                           Peroneal                                           SELWYN                                           TBI  Findings  Bilateral.  Doppler waveform of the common femoral artery is of high amplitude and  triphasic.  Doppler waveforms at the ankle are brisk and multiphasic.  Ankle pressures are not accurately measured due to calcification and  noncompressibility of the tibial vessels.  Toe-Brachial Index - Right: 1.01   Left: 1.03  Mandy Pfeiffer  (Electronically Signed)  Final Date:      30 December 2018                   11:48      IMPRESSION:   1.  Right diabetic foot infection   2.  Cellulitis   3.  Acute kidney injury  4.  Type 2 diabetes mellitus      PLAN:   Leonie Brock is a 73 y.o. woman with a history of oxygen dependent COPD, type 2 diabetes mellitus complicated by  chronic right foot ulcer admitted for increasing right foot pain and swelling.  Patient found to have an infected diabetic foot ulcer of her right foot.  The ulcer appears to be a stage 2 to 3 ulcer. X-ray shows soft tissue swelling consistent with cellulitis however there is no plan film evidence of any osteomyelitis.  I am however concerned about the possibility of an underlying abscess versus osteomyelitis given its appearance.  She has been evaluated by the limb preservation service and will likely undergo debridement tomorrow.  Agree with current antibiotics for now.  Monitor renal function and Vanco trough in light of acute kidney injury on presentation, which appears to be improving.  Duration of her antibiotics will depend on the extent of surgery and operative findings.  She will also need good glycemic control to help control infection and promote wound healing.  Further recommendations per clinical course and surgical findings.      Plan of care discussed with CRYSTAL Mccann M.D.. Will continue to follow    Renu Nicole M.D.

## 2018-12-31 NOTE — PROGRESS NOTES
Infectious Disease Progress Note    Author: Blanquita Israel M.D. Date & Time of service: 2018  1:35 PM    Chief Complaint:  Right diabetic foot infection             OM    Interval History:  73 y.o. female with oxygen dependent COPD on 3 L, DM, admitted with diabetic foot ulcer, on 2018 AF WBC 11.5 eating well-denies SE abx-tolerated surgery well    Labs Reviewed, Medications Reviewed, Radiology Reviewed and Wound Reviewed.    Review of Systems:  Review of Systems   Constitutional: Negative for chills and fever.   Gastrointestinal: Negative for abdominal pain, nausea and vomiting.   All other systems reviewed and are negative.      Hemodynamics:  Temp (24hrs), Av.3 °C (97.4 °F), Min:35.9 °C (96.6 °F), Max:36.8 °C (98.2 °F)  Temperature: 36.6 °C (97.9 °F)  Pulse  Av.1  Min: 66  Max: 88Heart Rate (Monitored): 78  Blood Pressure : 117/67, NIBP: 151/60       Physical Exam:  Physical Exam   Constitutional: She is oriented to person, place, and time. She appears well-developed.   Obese   HENT:   Head: Normocephalic and atraumatic.   Eyes: Pupils are equal, round, and reactive to light. EOM are normal.   Neck: Neck supple.   Cardiovascular: Normal rate.    Pulmonary/Chest: Effort normal. No respiratory distress.   Abdominal: Soft. She exhibits no distension.   Musculoskeletal: She exhibits edema.   RLE in surgical dressing   Neurological: She is alert and oriented to person, place, and time.   Skin: Skin is warm. She is not diaphoretic.   Nursing note and vitals reviewed.      Meds:    Current Facility-Administered Medications:   •  senna-docusate **AND** polyethylene glycol/lytes **AND** magnesium hydroxide **AND** bisacodyl  •  acetaminophen  •  Notify provider if pain remains uncontrolled **AND** Use the numeric rating scale (NRS-11) on regular floors and Critical-Care Pain Observation Tool (CPOT) on ICUs/Trauma to assess pain **AND** Pulse Ox (Oximetry) **AND** Pharmacy Consult Request  **AND** If patient difficult to arouse and/or has respiratory depression, stop any opiates that are currently infusing and call a Rapid Response. **AND** oxyCODONE immediate-release **AND** oxyCODONE immediate-release **AND** HYDROmorphone  •  MD Alert...Vancomycin per Pharmacy **AND** cefTRIAXone (ROCEPHIN) IV **AND** metroNIDAZOLE  •  hydrALAZINE  •  insulin regular **AND** Accu-Chek ACHS **AND** NOTIFY MD and PharmD **AND** glucose 4 g **AND** dextrose 50%  •  ondansetron  •  ondansetron  •  aspirin EC  •  carvedilol  •  insulin glargine  •  niacin SR  •  oxybutynin  •  pravastatin  •  therapeutic multivitamin-minerals  •  tizanidine  •  losartan **AND** hydroCHLOROthiazide  •  vancomycin    Labs:  Recent Labs      12/29/18   1345  12/30/18   0419  12/31/18   1135   WBC  8.2  7.0  11.5*   RBC  3.58*  3.38*  3.44*   HEMOGLOBIN  11.1*  10.5*  10.7*   HEMATOCRIT  33.7*  32.1*  31.8*   MCV  94.1  95.0  92.4   MCH  31.0  31.1  31.1   RDW  43.9  44.0  42.2   PLATELETCT  379  345  376   MPV  9.4  9.1  9.4   NEUTSPOLYS  71.20  63.90  78.70*   LYMPHOCYTES  17.70*  22.50  11.30*   MONOCYTES  7.10  9.00  9.10   EOSINOPHILS  2.40  3.00  0.10   BASOPHILS  1.00  0.90  0.40     Recent Labs      12/29/18   1345  12/30/18   0419  12/31/18   1135   SODIUM  138  139  140   POTASSIUM  4.6  4.3  4.2   CHLORIDE  104  105  102   CO2  27  26  30   GLUCOSE  137*  183*  240*   BUN  36*  28*  25*     Recent Labs      12/29/18   1345  12/30/18   0419  12/31/18   1135   ALBUMIN  3.8   --    --    TBILIRUBIN  0.3   --    --    ALKPHOSPHAT  66   --    --    TOTPROTEIN  6.8   --    --    ALTSGPT  12   --    --    ASTSGOT  15   --    --    CREATININE  1.31  1.02  1.01       Imaging:  Dx-foot-complete 3+ Right    Result Date: 12/29/2018 12/29/2018 1:03 PM HISTORY/REASON FOR EXAM:  Open wound on the plantar surface of the right foot for one month. TECHNIQUE/EXAM DESCRIPTION AND NUMBER OF VIEWS: 3 views of the RIGHT foot. COMPARISON:  None  FINDINGS: There is diffuse swelling of the right ankle and foot. There is no radiopaque foreign body. There is no evidence of displaced fracture or dislocation. There is evidence of a healed fracture involving the right 3rd metatarsal with smooth periosteal reaction. There is marked abnormalities involving the tarsometatarsal junctions and throughout the midfoot and hindfoot with marginal spurring and degenerative change. There are also numerous calcific and ossific radiodensities at the tarsometatarsal junctions with some increased sclerosis. There is loss of the normal arch of the foot on the lateral view in the mid foot. There is mild degenerative change in the IP joints.     1.  There is marked degenerative change in the midfoot and hindfoot with findings suggesting possibility of a neuropathic foot. 2.  There is diffuse swelling. There is no foreign body or gas in the soft tissues. 3.  There is no plain film evidence of osteomyelitis.    Us-giovana Single Level Bilat    Result Date: 2018   Vascular Laboratory  Conclusions  Limited exam as ankle pressures are not accurately measured due to  calcification and noncompressibility.  Otherwise, grossly normal bilateral GIOVANA's.  DAVID KEYS  Age:    73    Gender:     F  MRN:    [id number]  :    1945      BSA:  Exam Date:     2018 11:09  Room #:     Inpatient  Priority:     Routine  Ht (in):             Wt (lb):  Ordering Physician:        JESÚS HOGAN  Referring Physician:       JESÚS HOGAN  Sonographer:               Ken Soliman RVT  Study Type:                Complete Bilateral  Technical Quality:         Adequate  Indications:     Ulceration of LE  CPT Codes:       58399  ICD Codes:       707.1  History:         Ulceration of plantar region of right foot; diabetes mellitus  Limitations:                 RIGHT  Waveform            Systolic BPs (mmHg)                             137           Brachial  Triphasic                                 Common Femoral  Bi, non-                   0             Posterior Tibial  reversed  Bi, non-                   0             Dorsalis Pedis  reversed                                           Peroneal                                           SELWYN                                           TBI                       LEFT  Waveform        Systolic BPs (mmHg)                             132           Brachial  Triphasic                                Common Femoral  Biphasic                   0             Posterior Tibial  Biphasic                   0             Dorsalis Pedis                                           Peroneal                                           SELWYN                                           TBI  Findings  Bilateral.  Doppler waveform of the common femoral artery is of high amplitude and  triphasic.  Doppler waveforms at the ankle are brisk and multiphasic.  Ankle pressures are not accurately measured due to calcification and  noncompressibility of the tibial vessels.  Toe-Brachial Index - Right: 1.01   Left: 1.03  Mandy Pfeiffer  (Electronically Signed)  Final Date:      30 December 2018                   11:48      Micro:  Results     Procedure Component Value Units Date/Time    GRAM STAIN [[id number]] Collected:  12/30/18 1723    Order Status:  Completed Specimen:  Tissue Updated:  12/31/18 0701     Significant Indicator .     Source TISS     Site Right Medial Cuneiform     Gram Stain Result No organisms seen.    GRAM STAIN [[id number]] Collected:  12/30/18 1719    Order Status:  Completed Specimen:  Tissue Updated:  12/31/18 0701     Significant Indicator .     Source TISS     Site RightFootPlantarSoftTissue     Gram Stain Result Few WBCs.  No organisms seen.      ANAEROBIC CULTURE [[id number]] Collected:  12/30/18 1723    Order Status:  Completed Specimen:  Other Updated:  12/30/18 1856    CULTURE TISSUE W/ GRM STAIN [[id number]] Collected:  12/30/18 1723    Order Status:  Completed  Specimen:  Other Updated:  12/30/18 1856    ANAEROBIC CULTURE [[id number]] Collected:  12/30/18 1719    Order Status:  Completed Specimen:  Other Updated:  12/30/18 1853    CULTURE TISSUE W/ GRM STAIN [[id number]] Collected:  12/30/18 1719    Order Status:  Completed Specimen:  Other Updated:  12/30/18 1853          Assessment:  Active Hospital Problems    Diagnosis   • *Diabetic ulcer of right midfoot associated with type 2 diabetes mellitus ([facility]) [E11.621, L97.419]   • Type 2 diabetes mellitus with skin complication, with long-term current use of insulin ([facility]) [E11.628, Z79.4]   Cellulitis  ISAIAH    Plan:  Right diabetic foot infection  Ulceration with osteomyelitis  Afebrile  Increased leukocytosis post-op  S/p debridement necrotic tissue to bone per OP report  Right plantar bone and sot tissue sent for pathology and culture.  Continue vanco and ceftriaxone pending cx results  Will need PICC  Anticipate 6 weeks IV abx from date of surgery per cx results             Cellulitis   Improved  Abx as above    Acute kidney injury, improved  Monitor closely while onvancomycin    Diabetes mellitus  Keep BS under 150 to help control current infection    Discussed with Ortho

## 2018-12-31 NOTE — PROGRESS NOTES
"Pharmacy Kinetics 73 y.o. female on vancomycin day # 3 2018    Currently on Vancomycin 1500 mg iv q24hr    Indication for Treatment: SSTI    Pertinent history per medical record: Admitted on 2018 for new diabetic foot ulcer.  Patient has a history of diabetes, R foot fracture and chronic ulcerations. One week prior to admission, she noticed a new ulcer with purulence and drainage. She was started on Bactrim, but the wound failed to improve. Empiric antibiotics initiated for SSTI. RID consulting.     Other antibiotics: ceftriaxone 2 g IV q24h, metronidazole 500 mg IV q8h       Allergies: Patient has no known allergies.     List concerns for renal function: age 73, BMI 36, DMII    Pertinent cultures to date:   18 tissue, right foot plantar: no organism seen    Recent Labs      18   1345  18   0419  18   1135   WBC  8.2  7.0  11.5*   NEUTSPOLYS  71.20  63.90  78.70*     Recent Labs      18   1345  18   0419  18   1135   BUN  36*  28*  25*   CREATININE  1.31  1.02  1.01   ALBUMIN  3.8   --    --      No results for input(s): VANCOTROUGH, VANCOPEAK, VANCORANDOM in the last 72 hours.  Intake/Output Summary (Last 24 hours) at 18 1336  Last data filed at 18 0839   Gross per 24 hour   Intake              940 ml   Output                5 ml   Net              935 ml      Blood pressure 117/67, pulse 77, temperature 36.6 °C (97.9 °F), temperature source Temporal, resp. rate 16, height 1.626 m (5' 4\"), weight 96.2 kg (212 lb 1.3 oz), SpO2 97 %, not currently breastfeeding. Temp (24hrs), Av.3 °C (97.4 °F), Min:35.9 °C (96.6 °F), Max:36.8 °C (98.2 °F)      A/P   1. Vancomycin dose change: Not indicated at this time  2. Next vancomycin level: 1700 today  3. Goal trough: 12-16 mcg/mL  4. Comments: Routine 3rd dose vancomycin trough level due tonight.     Manjula Sifuentes, PharmD., BCCCP      "

## 2018-12-31 NOTE — CARE PLAN
Problem: Mobility  Goal: Risk for activity intolerance will decrease  Outcome: PROGRESSING AS EXPECTED  Awaiting boot so that patient can ambulate

## 2018-12-31 NOTE — CARE PLAN
Problem: Safety  Goal: Will remain free from falls  Call light and belongings within reach, bed down and locked, hourly rounding in progress.       Problem: Bowel/Gastric:  Goal: Normal bowel function is maintained or improved  Last BM 12/30--small formed, hard

## 2018-12-31 NOTE — CONSULTS
DATE OF SERVICE:  12/30/2018    CONSULTING PHYSICIAN:  Practitioner at our limb preservation service.    CHIEF COMPLAINT:  Right foot wound.    HISTORY OF PRESENT ILLNESS:  The patient is a 73-year-old female with   diabetes.  She had an ulcer under her foot, she states for about a week or so.    She was seen initially in the emergency room in [place], recommended for   a more definitive management and was transferred here.  She came with her   .  She did have a chronic ulcer that finally healed about 4 weeks ago   in the area.  She started to have a new ulcer with increased drainage and   swelling.  She had been on Bactrim initially.  She has had diabetes for many   years.  She denies any fevers or chills.  She has had some discomfort in her   foot.    PAST MEDICAL HISTORY, MEDICATIONS, ALLERGIES, FAMILY HISTORY, SOCIAL HISTORY,   AND REVIEW OF SYSTEMS:  Reviewed on Dr. Quinn Gorman's H and P, dated 12/29/2018.    PHYSICAL EXAMINATION:  VITAL SIGNS:  Afebrile, vital signs stable.  GENERAL:  Well-appearing female in no acute distress.  PSYCHIATRIC:  Pleasant demeanor, normal affect.  EYES:  Pupils equal and round.  RESPIRATIONS:  Regular rate, unlabored breathing.  CARDIOVASCULAR:  Palpable dorsalis pedis pulses and posterior tibial pulse.    Regular rate and rhythm.  Brisk capillary refill.  NEUROLOGIC:  Diminished sensation throughout her foot with decreased muscle   strength with dorsiflexion and plantar flexion.  SKIN:  Shows swelling and erythema over the plantar medial aspect of her foot.    She does have full-thickness ulceration.  MUSCULOSKELETAL:  She has changes in her foot consistent with midfoot Charcot   with rocker bottom foot on abduction.  She has a bony prominence medial and   plantar.  She has some mild instability to her midfoot.  She has good range of   motion of her ankle.  She has diminished muscle strength.  She is   nonambulatory.    IMAGING:  X-rays reviewed right foot demonstrate  Charcot changes to her   midfoot.    LABORATORY DATA:  White blood cell count 7.0.  Glucose is 183, elevated ESR   and CRP.    IMPRESSION:  1.  Diabetes.  2.  Charcot midfoot.  3.  Essentially acute on chronic ulcerations on her foot.    PLAN:  After discussion with the patient about her options including operative   and nonoperative, the patient elected to proceed with operative intervention.    I recommended right gastroc soleus recession, irrigation and debridement of   multiple compartments of her foot with ostectomy.  The procedure and   postoperative course were discussed in detail.  All questions were answered.    Risks of surgery explained, but not limited to wound problems, infection,   nerve injury, vascular injury, and need for further surgery.  She understands   and accepts these risks and agrees to proceed.  We will schedule this next   available.       ____________________________________     MD CARINA ARAGON / NTS    DD:  12/30/2018 18:04:45  DT:  12/30/2018 20:22:07    D#:  [id number]  Job#:  [id number]    cc: [facility]

## 2018-12-31 NOTE — PROGRESS NOTES
Received bedside shift report at 1900.     Pt resting in bed at this time, a&o x4, denies pain at this time.

## 2018-12-31 NOTE — PROGRESS NOTES
"Pharmacy Kinetics 73 y.o. female on vancomycin day # 2 2018    Currently on Vancomycin 1500 mg iv q24hr    Indication for Treatment: SSTI     Pertinent history per medical record: Admitted on 2018 for new diabetic foot ulcer.  Patient has a history of diabetes, R foot fracture and chronic ulcerations. One week prior to admission, she noticed a new ulcer with purulence and drainage. She was started on Bactrim, but the wound failed to improve. Empiric antibiotics initiated for SSTI. RID consulting.     Other antibiotics: ceftriaxone 2 g IV q24h, metronidazole 500 mg IV q8h    Allergies: Patient has no known allergies.     List concerns for renal function: age 73, BMI 36, DMII    Pertinent cultures to date:   none    Recent Labs      18   1345  18   0419   WBC  8.2  7.0   NEUTSPOLYS  71.20  63.90     Recent Labs      18   1345  18   0419   BUN  36*  28*   CREATININE  1.31  1.02   ALBUMIN  3.8   --      No results for input(s): VANCOTROUGH, VANCOPEAK, VANCORANDOM in the last 72 hours.No intake or output data in the 24 hours ending 18 1606   Blood pressure (!) 97/48, pulse 81, temperature 36.6 °C (97.9 °F), temperature source Temporal, resp. rate 17, height 1.626 m (5' 4\"), weight 96.2 kg (212 lb 1.3 oz), SpO2 91 %, not currently breastfeeding. Temp (24hrs), Av.7 °C (98 °F), Min:36.1 °C (96.9 °F), Max:37.1 °C (98.8 °F)      A/P   1. Vancomycin dose change: not indicated at this time  2. Next vancomycin level: 1700 tomorrow  3. Goal trough: 12-16 mcg/mL  4. Comments: Renal function is back to baseline. Plan to go to the OR for I&D, ostectomy and right gastrocsoleus recession.    Manjula Sifuentes, PharmD., BCCCP      "

## 2018-12-31 NOTE — PROGRESS NOTES
Hospital Medicine Daily Progress Note    Date of Service  12/31/2018    Chief Complaint  Right foot wound with discharge    Hospital Course      73 y.o. female with past medical history of diabetes, coronary artery disease, hypertension and chronic back pain admitted 12/29/2018 with diabetic foot ulcer.  Limb preservation services and orthopedics were consulted. Plan for I & D 12/30/2018.           Interval Problem Update  Heart rate 70s-80s, saturating well on 3 L of oxygen, nasal cannula  Blood pressure within normal limits  Blood sugar is running high, 241-310, increasing insulin glargine to 24, adjusting short acting supplemental insulin  WBC 11.5, hemoglobin 10.7, platelets 376  Potassium 4.2, BUN 25, creatinine 1.01  Continue IV ceftriaxone, Flagyl and vancomycin, pending tissue culture results  Continue to hold Plavix restart when okay with surgery.  Discussed with patient nurse and with multidisciplinary team during including , and charge nurse.     Consultants/Specialty  Orthopedics  Limb preservation services    Code Status  Full     Disposition  To be determined after PT OT evaluation     Review of Systems  Review of Systems   Constitutional: Negative for chills and fever.   HENT: Negative for congestion and sore throat.    Eyes: Negative for pain, discharge and redness.   Respiratory: Negative for cough, hemoptysis, sputum production, shortness of breath and stridor.    Cardiovascular: Negative for chest pain, palpitations and leg swelling.   Gastrointestinal: Negative for abdominal pain, blood in stool, diarrhea and vomiting.   Genitourinary: Negative for flank pain, hematuria and urgency.   Musculoskeletal: Positive for joint pain (mostly with walking ). Negative for myalgias.        Pain with walking    Skin:        Right foot ulcer    Neurological: Negative for speech change, focal weakness, seizures and loss of consciousness.   Endo/Heme/Allergies: Does not bruise/bleed easily.    Psychiatric/Behavioral: Negative for hallucinations and suicidal ideas. The patient is not nervous/anxious.         Physical Exam  Temp:  [35.9 °C (96.6 °F)-36.8 °C (98.2 °F)] 36.6 °C (97.9 °F)  Pulse:  [66-88] 77  Resp:  [10-18] 16  BP: (117-149)/(35-81) 117/67    Physical Exam   Constitutional: She is oriented to person, place, and time. She appears well-developed and well-nourished. No distress.   HENT:   Head: Normocephalic and atraumatic.   Right Ear: External ear normal.   Left Ear: External ear normal.   Eyes: Pupils are equal, round, and reactive to light. Conjunctivae are normal. Right eye exhibits no discharge. Left eye exhibits no discharge.   Neck: Normal range of motion. Neck supple. No JVD present. No tracheal deviation present. No thyromegaly present.   Cardiovascular: Normal rate and regular rhythm.  Exam reveals no gallop and no friction rub.    No murmur heard.  Pulmonary/Chest: Effort normal and breath sounds normal. No stridor. No respiratory distress. She has no wheezes. She has no rales. She exhibits no tenderness.   Abdominal: Soft. Bowel sounds are normal. She exhibits no distension. There is no tenderness. There is no rebound and no guarding.   Musculoskeletal: Normal range of motion. She exhibits tenderness. She exhibits no deformity.   Right foot ulcer, tender surrounding tissue    Neurological: She is alert and oriented to person, place, and time. No cranial nerve deficit. Coordination normal.   Skin: Skin is warm and dry. She is not diaphoretic.   Right foot ulcer     Psychiatric: She has a normal mood and affect. Judgment normal.   Nursing note and vitals reviewed.      Fluids    Intake/Output Summary (Last 24 hours) at 12/31/18 1539  Last data filed at 12/31/18 0839   Gross per 24 hour   Intake              940 ml   Output                5 ml   Net              935 ml       Laboratory  Recent Labs      12/29/18   1345  12/30/18   0419  12/31/18   1135   WBC  8.2  7.0  11.5*   RBC   3.58*  3.38*  3.44*   HEMOGLOBIN  11.1*  10.5*  10.7*   HEMATOCRIT  33.7*  32.1*  31.8*   MCV  94.1  95.0  92.4   MCH  31.0  31.1  31.1   MCHC  32.9*  32.7*  33.6   RDW  43.9  44.0  42.2   PLATELETCT  379  345  376   MPV  9.4  9.1  9.4     Recent Labs      12/29/18   1345  12/30/18   0419  12/31/18   1135   SODIUM  138  139  140   POTASSIUM  4.6  4.3  4.2   CHLORIDE  104  105  102   CO2  27  26  30   GLUCOSE  137*  183*  240*   BUN  36*  28*  25*   CREATININE  1.31  1.02  1.01   CALCIUM  10.1  9.4  9.2     Recent Labs      12/29/18   1345   APTT  26.6   INR  1.05               Imaging  US-SELWYN SINGLE LEVEL BILAT   Final Result      DX-FOOT-COMPLETE 3+ RIGHT   Final Result      1.  There is marked degenerative change in the midfoot and hindfoot with findings suggesting possibility of a neuropathic foot.   2.  There is diffuse swelling. There is no foreign body or gas in the soft tissues.   3.  There is no plain film evidence of osteomyelitis.           Assessment/Plan  * Diabetic ulcer of right midfoot associated with type 2 diabetes mellitus (HCC)   Assessment & Plan    Limb preservation services, infectious disease and otho consulted   Surgical debridement 12/30/2018   IV ceftriaxone, Flagyl and vancomycin  Pending final antibiotic plans, based on tissue cultures      Coronary artery disease involving native coronary artery of native heart without angina pectoris   Assessment & Plan    Hold plavix, for surgery, restart when okay with Ortho   Continue aspirin losartan pravastatin and carvedilol       Type 2 diabetes mellitus with skin complication, with long-term current use of insulin ([facility])- (present on admission)   Assessment & Plan    With hyperglycemia  Glycated hemoglobin 7    Long & short acting insulin  Blood sugar is running high, increased insulin glargine to 24 12/31/2018   Accu-Checks, hypoglycemia protocol       Essential hypertension   Assessment & Plan    Carvedilol, Losartan  As needed hydralazine     Monitor blood pressure and adjust as need        Mixed hyperlipidemia- (present on admission)   Assessment & Plan    Continue  pravastatin and niacin            VTE prophylaxis: SCDs, consider Lovenox restarting Plavix when okay with orthopedics

## 2018-12-31 NOTE — OP REPORT
DATE OF SERVICE:  12/30/2018    PREOPERATIVE DIAGNOSES:  1.  Diabetes.  2.  Right midfoot Charcot.  3.  Right acute on chronic ulceration through her midfoot over her Charcot   deformity.    POSTOPERATIVE DIAGNOSES:  1.  Diabetes.  2.  Right midfoot Charcot.  3.  Right acute on chronic ulceration through her midfoot over her Charcot   deformity.    PROCEDURES PERFORMED:  1.  Right gastrocsoleus recession.  2.  Right irrigation and debridement of foot, multiple compartments.  3.  Right ostectomy with partial excision of the medial cuneiform and first   metatarsal.    SURGEON:  Tomi Kwon MD    FIRST ASSISTANT:  Morgan Michelle MD    ANESTHESIA:  General endotracheal.    ESTIMATED BLOOD LOSS:  None.    COMPLICATIONS:  None.    POSTOPERATIVE PLAN:  1.  Transfer weightbearing in a boot.  2.  Follow up on cultures.  3.  Antibiotics per infectious disease.    SPECIMENS:  1.  Right plantar bone sent for pathology and culture.  2.  Right midfoot soft tissue sent for pathology and culture.    INDICATIONS:  Please see my consultation note.    PROCEDURE IN DETAIL:  The patient was brought into the operating room and   underwent general endotracheal anesthesia without complications.  Her right   lower extremity was prepped and draped in standard fashion in supine position   with all appropriate padding.  Positive site verification confirmed the right   lower extremity as well as the above procedure and confirmation that she   received preoperative antibiotics.  Esmarch was used to exsanguinate her foot   and ankle and leg tourniquet was inflated to 250 mmHg.  Posteromedial incision   was made at the level of musculotendinous junction of the gastroc.  It was   brought through the crural fascia exposing the gastroc tendon.  Under direct   visualization, the gastroc tendon was released from lateral to medial,   releasing the gastroc tendon.  This gave significant improvement in her foot   with dorsiflexion.  The wound was  irrigated with copious irrigation, was   closed in layered fashion using 3-0 Vicryl and 3-0 nylon.  Next, a medial   incision was made over foot.  It was sharply dissected down to the level of   the bone where a sharp excisional debridement of necrotic tissue was   performed.  Length of the incision was approximately 4 cm.  This debridement   went down to the level of the bone.  Microsagittal saw was used to remove the   plantar and medial aspect of the first metatarsal and the medial cuneiform   where it was smooth on direct palpation as well as over the skin.  Any further   soft tissue debridement was performed until all the tissue appeared to be   viable.  A 500 mg of vancomycin powder was placed.  This was done after a very   thorough irrigation was performed.  The wound was then closed with   interrupted nylon suture.  Sterile dressings were applied.  Tourniquet   released.  All toes were pink.  She was transferred to the recovery room in   good condition.    Utilization of Dr. Diaz was necessary for patient positioning, holding,   retracting, wound closure, and dressing placement.  He was present throughout   the entire procedure.       ____________________________________     MD CARINA ARAGON / NTS    DD:  12/30/2018 18:07:45  DT:  12/30/2018 19:31:16    D#:  [id number]  Job#:  [id number]    cc: [facility]

## 2018-12-31 NOTE — PROGRESS NOTES
POST-OP NOTE FOR LIMB PRESERVATION SERVICE    SURGERY DATE: 12/30/2018    PROCEDURE: Procedure(s):  IRRIGATION & DEBRIDEMENT, gastroc recession, ostectomy - Wound Class: Dirty or Infected    WEIGHT BEARING STATUS: Weight bearing as tolerated    PT CONSULT: Yes    ANTIBIOTICS: Per ID recommendation    PLAN TO RETURN TO O.R.: No    WOUND CARE PLAN: Incisional dressing - Change POD #2 (Directions: Adaptic over incision, Gauze, Roll Gauze, Ace Wrap)    FOLLOW-UP: LPS rounds in Wound Clinic    DURABLE MEDICAL EQUIPMENT: Offloading boot  at all times    OTHER:       Morgan Diaz M.D.

## 2018-12-31 NOTE — RESPIRATORY CARE
COPD EDUCATION by COPD CLINICAL EDUCATOR  12/31/2018 at 7:04 AM by Monique Kaba     Patient reviewed by COPD education team. Patient does not qualify for COPD program.   Physical Exam  Mallampati: I  TM Distance: >3 FB  Neck ROM: Full  cardiovascular exam normal  pulmonary exam normal  Patient Demonstrates:  Gravid  dental exam normal        Anesthesia Plan  ASA Status: 2  Anesthesia Type: General  Induction: Intravenous  Reviewed: Lab Results, Past Med History, Medications, Problem List, Allergies and Patient Summary  The proposed anesthetic plan, including its risks and benefits, have been discussed with the Patient and Spouse - along with the risks and benefits of alternatives.  Questions were encouraged and answered and the patient and/or representative agrees to proceed.  Plan Comments: Femoral nerve block for post op pain control        Anesthesia ROS/Med Hx    Overall Review:    Pt. has no history of anesthetic complications    Pulmonary Review:    Negative for pulmonary    Neuro/Psych Review:    Pt. positive for psychiatric history (Anxiety)    Cardiovascular Review:    Pt. negative for all cardio ROS  Exercise tolerance: good    GI/HEPATIC/RENAL Review:    Pt. positive for GERD    End/Other Review:    Pt. negative for endo/other ROS

## 2019-01-01 ENCOUNTER — HOSPITAL ENCOUNTER (OUTPATIENT)
Dept: RADIOLOGY | Facility: MEDICAL CENTER | Age: 74
End: 2019-03-20

## 2019-01-01 ENCOUNTER — APPOINTMENT (OUTPATIENT)
Dept: RADIOLOGY | Facility: MEDICAL CENTER | Age: 74
DRG: 637 | End: 2019-01-01
Attending: INTERNAL MEDICINE
Payer: MEDICARE

## 2019-01-01 ENCOUNTER — APPOINTMENT (OUTPATIENT)
Dept: RADIOLOGY | Facility: MEDICAL CENTER | Age: 74
DRG: 629 | End: 2019-01-01
Attending: INTERNAL MEDICINE
Payer: MEDICARE

## 2019-01-01 ENCOUNTER — APPOINTMENT (OUTPATIENT)
Dept: RADIOLOGY | Facility: MEDICAL CENTER | Age: 74
DRG: 629 | End: 2019-01-01
Attending: NURSE PRACTITIONER
Payer: MEDICARE

## 2019-01-01 ENCOUNTER — PATIENT OUTREACH (OUTPATIENT)
Dept: HEALTH INFORMATION MANAGEMENT | Facility: OTHER | Age: 74
End: 2019-01-01

## 2019-01-01 ENCOUNTER — TELEPHONE (OUTPATIENT)
Dept: VASCULAR LAB | Facility: MEDICAL CENTER | Age: 74
End: 2019-01-01

## 2019-01-01 ENCOUNTER — APPOINTMENT (OUTPATIENT)
Dept: WOUND CARE | Facility: MEDICAL CENTER | Age: 74
End: 2019-01-01
Attending: NURSE PRACTITIONER
Payer: MEDICARE

## 2019-01-01 ENCOUNTER — TELEPHONE (OUTPATIENT)
Dept: INFECTIOUS DISEASES | Facility: MEDICAL CENTER | Age: 74
End: 2019-01-01

## 2019-01-01 ENCOUNTER — HOSPITAL ENCOUNTER (EMERGENCY)
Facility: MEDICAL CENTER | Age: 74
End: 2019-08-17
Attending: EMERGENCY MEDICINE
Payer: MEDICARE

## 2019-01-01 ENCOUNTER — HOSPITAL ENCOUNTER (OUTPATIENT)
Facility: MEDICAL CENTER | Age: 74
End: 2019-07-24
Attending: HOSPITALIST | Admitting: HOSPITALIST
Payer: MEDICARE

## 2019-01-01 ENCOUNTER — HOSPITAL ENCOUNTER (EMERGENCY)
Facility: MEDICAL CENTER | Age: 74
DRG: 690 | End: 2019-03-19
Attending: EMERGENCY MEDICINE
Payer: MEDICARE

## 2019-01-01 ENCOUNTER — APPOINTMENT (OUTPATIENT)
Dept: RADIOLOGY | Facility: MEDICAL CENTER | Age: 74
DRG: 637 | End: 2019-01-01
Attending: EMERGENCY MEDICINE
Payer: MEDICARE

## 2019-01-01 ENCOUNTER — APPOINTMENT (OUTPATIENT)
Dept: RADIOLOGY | Facility: MEDICAL CENTER | Age: 74
DRG: 637 | End: 2019-01-01
Attending: SURGERY
Payer: MEDICARE

## 2019-01-01 ENCOUNTER — APPOINTMENT (OUTPATIENT)
Dept: RADIOLOGY | Facility: MEDICAL CENTER | Age: 74
DRG: 629 | End: 2019-01-01
Attending: PSYCHIATRY & NEUROLOGY
Payer: MEDICARE

## 2019-01-01 ENCOUNTER — APPOINTMENT (OUTPATIENT)
Dept: RADIOLOGY | Facility: MEDICAL CENTER | Age: 74
DRG: 637 | End: 2019-01-01
Attending: HOSPITALIST
Payer: MEDICARE

## 2019-01-01 ENCOUNTER — APPOINTMENT (OUTPATIENT)
Dept: CARDIOLOGY | Facility: MEDICAL CENTER | Age: 74
DRG: 637 | End: 2019-01-01
Attending: HOSPITALIST
Payer: MEDICARE

## 2019-01-01 ENCOUNTER — APPOINTMENT (OUTPATIENT)
Dept: RADIOLOGY | Facility: MEDICAL CENTER | Age: 74
DRG: 629 | End: 2019-01-01
Attending: HOSPITALIST
Payer: MEDICARE

## 2019-01-01 ENCOUNTER — APPOINTMENT (OUTPATIENT)
Dept: RADIOLOGY | Facility: MEDICAL CENTER | Age: 74
End: 2019-01-01
Attending: INTERNAL MEDICINE
Payer: MEDICARE

## 2019-01-01 ENCOUNTER — HOSPITAL ENCOUNTER (INPATIENT)
Facility: MEDICAL CENTER | Age: 74
LOS: 14 days | DRG: 637 | End: 2019-06-08
Attending: EMERGENCY MEDICINE | Admitting: INTERNAL MEDICINE
Payer: MEDICARE

## 2019-01-01 ENCOUNTER — HOSPITAL ENCOUNTER (INPATIENT)
Facility: MEDICAL CENTER | Age: 74
LOS: 6 days | DRG: 690 | End: 2019-03-27
Attending: EMERGENCY MEDICINE | Admitting: HOSPITALIST
Payer: MEDICARE

## 2019-01-01 ENCOUNTER — APPOINTMENT (OUTPATIENT)
Dept: CARDIOLOGY | Facility: MEDICAL CENTER | Age: 74
DRG: 629 | End: 2019-01-01
Attending: INTERNAL MEDICINE
Payer: MEDICARE

## 2019-01-01 ENCOUNTER — APPOINTMENT (OUTPATIENT)
Dept: RADIOLOGY | Facility: MEDICAL CENTER | Age: 74
DRG: 690 | End: 2019-01-01
Attending: EMERGENCY MEDICINE
Payer: MEDICARE

## 2019-01-01 ENCOUNTER — APPOINTMENT (OUTPATIENT)
Dept: RADIOLOGY | Facility: MEDICAL CENTER | Age: 74
End: 2019-01-01
Attending: HOSPITALIST
Payer: MEDICARE

## 2019-01-01 VITALS
TEMPERATURE: 99.8 F | WEIGHT: 182.32 LBS | OXYGEN SATURATION: 93 % | BODY MASS INDEX: 31.13 KG/M2 | SYSTOLIC BLOOD PRESSURE: 122 MMHG | RESPIRATION RATE: 17 BRPM | HEIGHT: 64 IN | HEART RATE: 80 BPM | DIASTOLIC BLOOD PRESSURE: 70 MMHG

## 2019-01-01 VITALS — WEIGHT: 185 LBS | BODY MASS INDEX: 31.74 KG/M2

## 2019-01-01 VITALS
DIASTOLIC BLOOD PRESSURE: 40 MMHG | WEIGHT: 200 LBS | TEMPERATURE: 98.2 F | BODY MASS INDEX: 34.15 KG/M2 | OXYGEN SATURATION: 100 % | SYSTOLIC BLOOD PRESSURE: 97 MMHG | RESPIRATION RATE: 18 BRPM | HEIGHT: 64 IN | HEART RATE: 70 BPM

## 2019-01-01 VITALS
BODY MASS INDEX: 37.71 KG/M2 | DIASTOLIC BLOOD PRESSURE: 67 MMHG | OXYGEN SATURATION: 99 % | RESPIRATION RATE: 16 BRPM | WEIGHT: 220.9 LBS | SYSTOLIC BLOOD PRESSURE: 101 MMHG | HEIGHT: 64 IN | HEART RATE: 88 BPM | TEMPERATURE: 97.3 F

## 2019-01-01 VITALS
TEMPERATURE: 97.5 F | SYSTOLIC BLOOD PRESSURE: 106 MMHG | OXYGEN SATURATION: 98 % | BODY MASS INDEX: 32.78 KG/M2 | DIASTOLIC BLOOD PRESSURE: 42 MMHG | RESPIRATION RATE: 16 BRPM | HEIGHT: 64 IN | HEART RATE: 73 BPM | WEIGHT: 192 LBS
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
## 2019-01-01 DIAGNOSIS — I70.211 ATHEROSCLEROSIS OF NATIVE ARTERY OF RIGHT LOWER EXTREMITY WITH INTERMITTENT CLAUDICATION (HCC): ICD-10-CM

## 2019-01-01 DIAGNOSIS — L97.509 DIABETIC FOOT ULCER WITH OSTEOMYELITIS (HCC): ICD-10-CM

## 2019-01-01 DIAGNOSIS — L08.9 DIABETIC FOOT INFECTION (HCC): ICD-10-CM

## 2019-01-01 DIAGNOSIS — E11.628 DIABETIC FOOT INFECTION (HCC): ICD-10-CM

## 2019-01-01 DIAGNOSIS — I25.10 CORONARY ARTERY DISEASE INVOLVING NATIVE CORONARY ARTERY OF NATIVE HEART WITHOUT ANGINA PECTORIS: ICD-10-CM

## 2019-01-01 DIAGNOSIS — N39.0 URINARY TRACT INFECTION WITHOUT HEMATURIA, SITE UNSPECIFIED: ICD-10-CM

## 2019-01-01 DIAGNOSIS — L97.509 ULCER OF FOOT, UNSPECIFIED LATERALITY, UNSPECIFIED ULCER STAGE (HCC): ICD-10-CM

## 2019-01-01 DIAGNOSIS — I25.5 ISCHEMIC CARDIOMYOPATHY: ICD-10-CM

## 2019-01-01 DIAGNOSIS — S51.011A ELBOW LACERATION, RIGHT, INITIAL ENCOUNTER: ICD-10-CM

## 2019-01-01 DIAGNOSIS — E11.621 DIABETIC FOOT ULCER WITH OSTEOMYELITIS (HCC): ICD-10-CM

## 2019-01-01 DIAGNOSIS — R53.1 GENERALIZED WEAKNESS: ICD-10-CM

## 2019-01-01 DIAGNOSIS — S70.02XA CONTUSION OF LEFT HIP, INITIAL ENCOUNTER: ICD-10-CM

## 2019-01-01 DIAGNOSIS — Z79.01 CHRONIC ANTICOAGULATION: ICD-10-CM

## 2019-01-01 DIAGNOSIS — M86.9 DIABETIC FOOT ULCER WITH OSTEOMYELITIS (HCC): ICD-10-CM

## 2019-01-01 DIAGNOSIS — E11.69 DIABETIC FOOT ULCER WITH OSTEOMYELITIS (HCC): ICD-10-CM

## 2019-01-01 DIAGNOSIS — S70.01XA CONTUSION OF RIGHT HIP, INITIAL ENCOUNTER: ICD-10-CM

## 2019-01-01 DIAGNOSIS — Z51.89 ENCOUNTER FOR WOUND RE-CHECK: ICD-10-CM

## 2019-01-01 DIAGNOSIS — I46.9 CARDIAC ARREST (HCC): ICD-10-CM

## 2019-01-01 LAB
ALBUMIN SERPL BCP-MCNC: 1.9 G/DL (ref 3.2–4.9)
ALBUMIN SERPL BCP-MCNC: 2 G/DL (ref 3.2–4.9)
ALBUMIN SERPL BCP-MCNC: 2 G/DL (ref 3.2–4.9)
ALBUMIN SERPL BCP-MCNC: 2.3 G/DL (ref 3.2–4.9)
ALBUMIN SERPL BCP-MCNC: 2.4 G/DL (ref 3.2–4.9)
ALBUMIN SERPL BCP-MCNC: 2.5 G/DL (ref 3.2–4.9)
ALBUMIN SERPL BCP-MCNC: 2.6 G/DL (ref 3.2–4.9)
ALBUMIN SERPL BCP-MCNC: 2.7 G/DL (ref 3.2–4.9)
ALBUMIN SERPL BCP-MCNC: 2.8 G/DL (ref 3.2–4.9)
ALBUMIN SERPL BCP-MCNC: 2.9 G/DL (ref 3.2–4.9)
ALBUMIN SERPL BCP-MCNC: 3 G/DL (ref 3.2–4.9)
ALBUMIN SERPL BCP-MCNC: 3.1 G/DL (ref 3.2–4.9)
ALBUMIN/GLOB SERPL: 0.5 G/DL
ALBUMIN/GLOB SERPL: 0.6 G/DL
ALBUMIN/GLOB SERPL: 0.7 G/DL
ALBUMIN/GLOB SERPL: 0.9 G/DL
ALBUMIN/GLOB SERPL: 1 G/DL
ALBUMIN/GLOB SERPL: 1.2 G/DL
ALP SERPL-CCNC: 100 U/L (ref 30–99)
ALP SERPL-CCNC: 54 U/L (ref 30–99)
ALP SERPL-CCNC: 55 U/L (ref 30–99)
ALP SERPL-CCNC: 56 U/L (ref 30–99)
ALP SERPL-CCNC: 58 U/L (ref 30–99)
ALP SERPL-CCNC: 59 U/L (ref 30–99)
ALP SERPL-CCNC: 60 U/L (ref 30–99)
ALP SERPL-CCNC: 62 U/L (ref 30–99)
ALP SERPL-CCNC: 62 U/L (ref 30–99)
ALP SERPL-CCNC: 71 U/L (ref 30–99)
ALP SERPL-CCNC: 72 U/L (ref 30–99)
ALP SERPL-CCNC: 72 U/L (ref 30–99)
ALP SERPL-CCNC: 73 U/L (ref 30–99)
ALP SERPL-CCNC: 74 U/L (ref 30–99)
ALP SERPL-CCNC: 78 U/L (ref 30–99)
ALP SERPL-CCNC: 86 U/L (ref 30–99)
ALT SERPL-CCNC: 11 U/L (ref 2–50)
ALT SERPL-CCNC: 12 U/L (ref 2–50)
ALT SERPL-CCNC: 15 U/L (ref 2–50)
ALT SERPL-CCNC: 15 U/L (ref 2–50)
ALT SERPL-CCNC: 19 U/L (ref 2–50)
ALT SERPL-CCNC: 20 U/L (ref 2–50)
ALT SERPL-CCNC: 21 U/L (ref 2–50)
ALT SERPL-CCNC: 22 U/L (ref 2–50)
ALT SERPL-CCNC: 25 U/L (ref 2–50)
ALT SERPL-CCNC: 5 U/L (ref 2–50)
ALT SERPL-CCNC: 7 U/L (ref 2–50)
ALT SERPL-CCNC: 7 U/L (ref 2–50)
ALT SERPL-CCNC: 9 U/L (ref 2–50)
ALT SERPL-CCNC: <5 U/L (ref 2–50)
ANION GAP SERPL CALC-SCNC: 10 MMOL/L (ref 0–11.9)
ANION GAP SERPL CALC-SCNC: 11 MMOL/L (ref 0–11.9)
ANION GAP SERPL CALC-SCNC: 3 MMOL/L (ref 0–11.9)
ANION GAP SERPL CALC-SCNC: 3 MMOL/L (ref 0–11.9)
ANION GAP SERPL CALC-SCNC: 4 MMOL/L (ref 0–11.9)
ANION GAP SERPL CALC-SCNC: 5 MMOL/L (ref 0–11.9)
ANION GAP SERPL CALC-SCNC: 6 MMOL/L (ref 0–11.9)
ANION GAP SERPL CALC-SCNC: 7 MMOL/L (ref 0–11.9)
ANION GAP SERPL CALC-SCNC: 8 MMOL/L (ref 0–11.9)
ANION GAP SERPL CALC-SCNC: 9 MMOL/L (ref 0–11.9)
ANISOCYTOSIS BLD QL SMEAR: ABNORMAL
ANISOCYTOSIS BLD QL SMEAR: ABNORMAL
APPEARANCE UR: ABNORMAL
APTT PPP: 115.1 SEC (ref 24.7–36)
APTT PPP: 138.7 SEC (ref 24.7–36)
APTT PPP: 140.6 SEC (ref 24.7–36)
APTT PPP: 150.4 SEC (ref 24.7–36)
APTT PPP: 171.2 SEC (ref 24.7–36)
APTT PPP: 221 SEC (ref 24.7–36)
APTT PPP: 25.2 SEC (ref 24.7–36)
APTT PPP: 25.8 SEC (ref 24.7–36)
APTT PPP: 26.5 SEC (ref 24.7–36)
APTT PPP: 26.7 SEC (ref 24.7–36)
APTT PPP: 38.3 SEC (ref 24.7–36)
APTT PPP: 40.7 SEC (ref 24.7–36)
APTT PPP: 46.2 SEC (ref 24.7–36)
APTT PPP: 51.7 SEC (ref 24.7–36)
APTT PPP: 52.2 SEC (ref 24.7–36)
APTT PPP: 52.5 SEC (ref 24.7–36)
APTT PPP: 54.2 SEC (ref 24.7–36)
APTT PPP: 55.4 SEC (ref 24.7–36)
APTT PPP: 60.3 SEC (ref 24.7–36)
APTT PPP: 64.8 SEC (ref 24.7–36)
APTT PPP: 65 SEC (ref 24.7–36)
APTT PPP: 68.1 SEC (ref 24.7–36)
APTT PPP: 68.5 SEC (ref 24.7–36)
APTT PPP: 69.1 SEC (ref 24.7–36)
APTT PPP: 70.5 SEC (ref 24.7–36)
APTT PPP: 70.7 SEC (ref 24.7–36)
APTT PPP: 71.1 SEC (ref 24.7–36)
APTT PPP: 72.5 SEC (ref 24.7–36)
APTT PPP: 73.7 SEC (ref 24.7–36)
APTT PPP: 75.8 SEC (ref 24.7–36)
AST SERPL-CCNC: 11 U/L (ref 12–45)
AST SERPL-CCNC: 13 U/L (ref 12–45)
AST SERPL-CCNC: 16 U/L (ref 12–45)
AST SERPL-CCNC: 17 U/L (ref 12–45)
AST SERPL-CCNC: 18 U/L (ref 12–45)
AST SERPL-CCNC: 19 U/L (ref 12–45)
AST SERPL-CCNC: 20 U/L (ref 12–45)
AST SERPL-CCNC: 24 U/L (ref 12–45)
AST SERPL-CCNC: 26 U/L (ref 12–45)
AST SERPL-CCNC: 26 U/L (ref 12–45)
AST SERPL-CCNC: 35 U/L (ref 12–45)
AST SERPL-CCNC: 7 U/L (ref 12–45)
AST SERPL-CCNC: 8 U/L (ref 12–45)
BACTERIA #/AREA URNS HPF: ABNORMAL /HPF
BACTERIA BLD CULT: NORMAL
BACTERIA BLD CULT: NORMAL
BACTERIA SPEC ANAEROBE CULT: NORMAL
BACTERIA SPEC ANAEROBE CULT: NORMAL
BACTERIA TISS AEROBE CULT: ABNORMAL
BACTERIA UR CULT: ABNORMAL
BACTERIA UR CULT: ABNORMAL
BACTERIA UR CULT: NORMAL
BACTERIA WND AEROBE CULT: ABNORMAL
BACTERIA WND AEROBE CULT: ABNORMAL
BASE EXCESS BLDA CALC-SCNC: -1 MMOL/L (ref -4–3)
BASOPHILS # BLD AUTO: 0 % (ref 0–1.8)
BASOPHILS # BLD AUTO: 0.2 % (ref 0–1.8)
BASOPHILS # BLD AUTO: 0.4 % (ref 0–1.8)
BASOPHILS # BLD AUTO: 0.4 % (ref 0–1.8)
BASOPHILS # BLD AUTO: 0.5 % (ref 0–1.8)
BASOPHILS # BLD AUTO: 0.6 % (ref 0–1.8)
BASOPHILS # BLD AUTO: 0.7 % (ref 0–1.8)
BASOPHILS # BLD AUTO: 0.7 % (ref 0–1.8)
BASOPHILS # BLD AUTO: 0.8 % (ref 0–1.8)
BASOPHILS # BLD AUTO: 0.9 % (ref 0–1.8)
BASOPHILS # BLD AUTO: 0.9 % (ref 0–1.8)
BASOPHILS # BLD AUTO: 1 % (ref 0–1.8)
BASOPHILS # BLD AUTO: 1 % (ref 0–1.8)
BASOPHILS # BLD: 0 K/UL (ref 0–0.12)
BASOPHILS # BLD: 0.02 K/UL (ref 0–0.12)
BASOPHILS # BLD: 0.04 K/UL (ref 0–0.12)
BASOPHILS # BLD: 0.05 K/UL (ref 0–0.12)
BASOPHILS # BLD: 0.05 K/UL (ref 0–0.12)
BASOPHILS # BLD: 0.06 K/UL (ref 0–0.12)
BASOPHILS # BLD: 0.07 K/UL (ref 0–0.12)
BASOPHILS # BLD: 0.08 K/UL (ref 0–0.12)
BASOPHILS # BLD: 0.08 K/UL (ref 0–0.12)
BASOPHILS # BLD: 0.09 K/UL (ref 0–0.12)
BASOPHILS # BLD: 0.09 K/UL (ref 0–0.12)
BASOPHILS # BLD: 0.1 K/UL (ref 0–0.12)
BILIRUB SERPL-MCNC: 0.2 MG/DL (ref 0.1–1.5)
BILIRUB SERPL-MCNC: 0.2 MG/DL (ref 0.1–1.5)
BILIRUB SERPL-MCNC: 0.3 MG/DL (ref 0.1–1.5)
BILIRUB SERPL-MCNC: 0.4 MG/DL (ref 0.1–1.5)
BILIRUB SERPL-MCNC: 0.5 MG/DL (ref 0.1–1.5)
BILIRUB SERPL-MCNC: 0.6 MG/DL (ref 0.1–1.5)
BILIRUB SERPL-MCNC: 0.7 MG/DL (ref 0.1–1.5)
BILIRUB UR QL STRIP.AUTO: NEGATIVE
BODY TEMPERATURE: 37 CENTIGRADE
BUN SERPL-MCNC: 10 MG/DL (ref 8–22)
BUN SERPL-MCNC: 11 MG/DL (ref 8–22)
BUN SERPL-MCNC: 12 MG/DL (ref 8–22)
BUN SERPL-MCNC: 13 MG/DL (ref 8–22)
BUN SERPL-MCNC: 16 MG/DL (ref 8–22)
BUN SERPL-MCNC: 17 MG/DL (ref 8–22)
BUN SERPL-MCNC: 21 MG/DL (ref 8–22)
BUN SERPL-MCNC: 21 MG/DL (ref 8–22)
BUN SERPL-MCNC: 23 MG/DL (ref 8–22)
BUN SERPL-MCNC: 25 MG/DL (ref 8–22)
BUN SERPL-MCNC: 25 MG/DL (ref 8–22)
BUN SERPL-MCNC: 26 MG/DL (ref 8–22)
BUN SERPL-MCNC: 28 MG/DL (ref 8–22)
BUN SERPL-MCNC: 31 MG/DL (ref 8–22)
BUN SERPL-MCNC: 35 MG/DL (ref 8–22)
BUN SERPL-MCNC: 36 MG/DL (ref 8–22)
BUN SERPL-MCNC: 40 MG/DL (ref 8–22)
BUN SERPL-MCNC: 44 MG/DL (ref 8–22)
BUN SERPL-MCNC: 45 MG/DL (ref 8–22)
BUN SERPL-MCNC: 47 MG/DL (ref 8–22)
BUN SERPL-MCNC: 48 MG/DL (ref 8–22)
BUN SERPL-MCNC: 50 MG/DL (ref 8–22)
BUN SERPL-MCNC: 52 MG/DL (ref 8–22)
BUN SERPL-MCNC: 61 MG/DL (ref 8–22)
CALCIUM SERPL-MCNC: 8.1 MG/DL (ref 8.5–10.5)
CALCIUM SERPL-MCNC: 8.2 MG/DL (ref 8.5–10.5)
CALCIUM SERPL-MCNC: 8.3 MG/DL (ref 8.5–10.5)
CALCIUM SERPL-MCNC: 8.5 MG/DL (ref 8.5–10.5)
CALCIUM SERPL-MCNC: 8.6 MG/DL (ref 8.4–10.2)
CALCIUM SERPL-MCNC: 8.6 MG/DL (ref 8.4–10.2)
CALCIUM SERPL-MCNC: 8.6 MG/DL (ref 8.5–10.5)
CALCIUM SERPL-MCNC: 8.6 MG/DL (ref 8.5–10.5)
CALCIUM SERPL-MCNC: 8.7 MG/DL (ref 8.5–10.5)
CALCIUM SERPL-MCNC: 8.8 MG/DL (ref 8.5–10.5)
CALCIUM SERPL-MCNC: 8.8 MG/DL (ref 8.5–10.5)
CALCIUM SERPL-MCNC: 8.9 MG/DL (ref 8.4–10.2)
CALCIUM SERPL-MCNC: 8.9 MG/DL (ref 8.5–10.5)
CALCIUM SERPL-MCNC: 9 MG/DL (ref 8.5–10.5)
CALCIUM SERPL-MCNC: 9 MG/DL (ref 8.5–10.5)
CALCIUM SERPL-MCNC: 9.1 MG/DL (ref 8.4–10.2)
CALCIUM SERPL-MCNC: 9.1 MG/DL (ref 8.5–10.5)
CALCIUM SERPL-MCNC: 9.2 MG/DL (ref 8.4–10.2)
CALCIUM SERPL-MCNC: 9.2 MG/DL (ref 8.5–10.5)
CALCIUM SERPL-MCNC: 9.3 MG/DL (ref 8.4–10.2)
CALCIUM SERPL-MCNC: 9.3 MG/DL (ref 8.4–10.2)
CALCIUM SERPL-MCNC: 9.3 MG/DL (ref 8.5–10.5)
CALCIUM SERPL-MCNC: 9.4 MG/DL (ref 8.4–10.2)
CALCIUM SERPL-MCNC: 9.4 MG/DL (ref 8.4–10.2)
CALCIUM SERPL-MCNC: 9.4 MG/DL (ref 8.5–10.5)
CALCIUM SERPL-MCNC: 9.5 MG/DL (ref 8.4–10.2)
CALCIUM SERPL-MCNC: 9.7 MG/DL (ref 8.4–10.2)
CHLORIDE SERPL-SCNC: 100 MMOL/L (ref 96–112)
CHLORIDE SERPL-SCNC: 101 MMOL/L (ref 96–112)
CHLORIDE SERPL-SCNC: 102 MMOL/L (ref 96–112)
CHLORIDE SERPL-SCNC: 102 MMOL/L (ref 96–112)
CHLORIDE SERPL-SCNC: 103 MMOL/L (ref 96–112)
CHLORIDE SERPL-SCNC: 104 MMOL/L (ref 96–112)
CHLORIDE SERPL-SCNC: 105 MMOL/L (ref 96–112)
CHLORIDE SERPL-SCNC: 105 MMOL/L (ref 96–112)
CHLORIDE SERPL-SCNC: 106 MMOL/L (ref 96–112)
CHLORIDE SERPL-SCNC: 107 MMOL/L (ref 96–112)
CHLORIDE SERPL-SCNC: 107 MMOL/L (ref 96–112)
CHLORIDE SERPL-SCNC: 109 MMOL/L (ref 96–112)
CHLORIDE SERPL-SCNC: 110 MMOL/L (ref 96–112)
CHLORIDE SERPL-SCNC: 95 MMOL/L (ref 96–112)
CHLORIDE SERPL-SCNC: 97 MMOL/L (ref 96–112)
CHLORIDE SERPL-SCNC: 98 MMOL/L (ref 96–112)
CHLORIDE SERPL-SCNC: 99 MMOL/L (ref 96–112)
CHLORIDE SERPL-SCNC: 99 MMOL/L (ref 96–112)
CHOLEST SERPL-MCNC: 81 MG/DL (ref 100–199)
CK SERPL-CCNC: 14 U/L (ref 0–154)
CK SERPL-CCNC: 14 U/L (ref 0–154)
CK SERPL-CCNC: 18 U/L (ref 0–154)
CK SERPL-CCNC: 194 U/L (ref 0–154)
CK SERPL-CCNC: 198 U/L (ref 0–154)
CK SERPL-CCNC: 21 U/L (ref 0–154)
CK SERPL-CCNC: 22 U/L (ref 0–154)
CK SERPL-CCNC: 22 U/L (ref 0–154)
CK SERPL-CCNC: 29 U/L (ref 0–154)
CK SERPL-CCNC: 311 U/L (ref 0–154)
CK SERPL-CCNC: 35 U/L (ref 0–154)
CK SERPL-CCNC: 46 U/L (ref 0–154)
CK SERPL-CCNC: 99 U/L (ref 0–154)
CO2 SERPL-SCNC: 23 MMOL/L (ref 20–33)
CO2 SERPL-SCNC: 24 MMOL/L (ref 20–33)
CO2 SERPL-SCNC: 25 MMOL/L (ref 20–33)
CO2 SERPL-SCNC: 26 MMOL/L (ref 20–33)
CO2 SERPL-SCNC: 27 MMOL/L (ref 20–33)
CO2 SERPL-SCNC: 28 MMOL/L (ref 20–33)
CO2 SERPL-SCNC: 29 MMOL/L (ref 20–33)
CO2 SERPL-SCNC: 29 MMOL/L (ref 20–33)
CO2 SERPL-SCNC: 30 MMOL/L (ref 20–33)
CO2 SERPL-SCNC: 31 MMOL/L (ref 20–33)
CO2 SERPL-SCNC: 31 MMOL/L (ref 20–33)
CO2 SERPL-SCNC: 33 MMOL/L (ref 20–33)
CO2 SERPL-SCNC: 34 MMOL/L (ref 20–33)
CO2 SERPL-SCNC: 35 MMOL/L (ref 20–33)
CO2 SERPL-SCNC: 35 MMOL/L (ref 20–33)
CO2 SERPL-SCNC: 36 MMOL/L (ref 20–33)
COLOR UR: YELLOW
COMMENT 1642: NORMAL
CREAT SERPL-MCNC: 0.74 MG/DL (ref 0.5–1.4)
CREAT SERPL-MCNC: 0.77 MG/DL (ref 0.5–1.4)
CREAT SERPL-MCNC: 0.82 MG/DL (ref 0.5–1.4)
CREAT SERPL-MCNC: 0.83 MG/DL (ref 0.5–1.4)
CREAT SERPL-MCNC: 0.83 MG/DL (ref 0.5–1.4)
CREAT SERPL-MCNC: 0.84 MG/DL (ref 0.5–1.4)
CREAT SERPL-MCNC: 0.85 MG/DL (ref 0.5–1.4)
CREAT SERPL-MCNC: 0.86 MG/DL (ref 0.5–1.4)
CREAT SERPL-MCNC: 0.89 MG/DL (ref 0.5–1.4)
CREAT SERPL-MCNC: 0.91 MG/DL (ref 0.5–1.4)
CREAT SERPL-MCNC: 0.93 MG/DL (ref 0.5–1.4)
CREAT SERPL-MCNC: 0.96 MG/DL (ref 0.5–1.4)
CREAT SERPL-MCNC: 0.98 MG/DL (ref 0.5–1.4)
CREAT SERPL-MCNC: 1 MG/DL (ref 0.5–1.4)
CREAT SERPL-MCNC: 1.01 MG/DL (ref 0.5–1.4)
CREAT SERPL-MCNC: 1.01 MG/DL (ref 0.5–1.4)
CREAT SERPL-MCNC: 1.02 MG/DL (ref 0.5–1.4)
CREAT SERPL-MCNC: 1.05 MG/DL (ref 0.5–1.4)
CREAT SERPL-MCNC: 1.06 MG/DL (ref 0.5–1.4)
CREAT SERPL-MCNC: 1.08 MG/DL (ref 0.5–1.4)
CREAT SERPL-MCNC: 1.11 MG/DL (ref 0.5–1.4)
CREAT SERPL-MCNC: 1.13 MG/DL (ref 0.5–1.4)
CREAT SERPL-MCNC: 1.13 MG/DL (ref 0.5–1.4)
CREAT SERPL-MCNC: 1.14 MG/DL (ref 0.5–1.4)
CREAT SERPL-MCNC: 1.15 MG/DL (ref 0.5–1.4)
CREAT SERPL-MCNC: 1.17 MG/DL (ref 0.5–1.4)
CREAT SERPL-MCNC: 1.22 MG/DL (ref 0.5–1.4)
CREAT SERPL-MCNC: 1.23 MG/DL (ref 0.5–1.4)
CREAT SERPL-MCNC: 1.27 MG/DL (ref 0.5–1.4)
CREAT SERPL-MCNC: 1.41 MG/DL (ref 0.5–1.4)
CRP SERPL HS-MCNC: 0.18 MG/DL (ref 0–0.75)
CRP SERPL HS-MCNC: 0.24 MG/DL (ref 0–0.75)
CRP SERPL HS-MCNC: 0.69 MG/DL (ref 0–0.75)
CRP SERPL HS-MCNC: 1.41 MG/DL (ref 0–0.75)
CRP SERPL HS-MCNC: 13.3 MG/DL (ref 0–0.75)
EKG IMPRESSION: NORMAL
EOSINOPHIL # BLD AUTO: 0.08 K/UL (ref 0–0.51)
EOSINOPHIL # BLD AUTO: 0.09 K/UL (ref 0–0.51)
EOSINOPHIL # BLD AUTO: 0.13 K/UL (ref 0–0.51)
EOSINOPHIL # BLD AUTO: 0.18 K/UL (ref 0–0.51)
EOSINOPHIL # BLD AUTO: 0.19 K/UL (ref 0–0.51)
EOSINOPHIL # BLD AUTO: 0.26 K/UL (ref 0–0.51)
EOSINOPHIL # BLD AUTO: 0.29 K/UL (ref 0–0.51)
EOSINOPHIL # BLD AUTO: 0.31 K/UL (ref 0–0.51)
EOSINOPHIL # BLD AUTO: 0.33 K/UL (ref 0–0.51)
EOSINOPHIL # BLD AUTO: 0.34 K/UL (ref 0–0.51)
EOSINOPHIL # BLD AUTO: 0.36 K/UL (ref 0–0.51)
EOSINOPHIL # BLD AUTO: 0.37 K/UL (ref 0–0.51)
EOSINOPHIL # BLD AUTO: 0.37 K/UL (ref 0–0.51)
EOSINOPHIL # BLD AUTO: 0.42 K/UL (ref 0–0.51)
EOSINOPHIL # BLD AUTO: 0.47 K/UL (ref 0–0.51)
EOSINOPHIL # BLD AUTO: 0.65 K/UL (ref 0–0.51)
EOSINOPHIL NFR BLD: 0.8 % (ref 0–6.9)
EOSINOPHIL NFR BLD: 0.9 % (ref 0–6.9)
EOSINOPHIL NFR BLD: 1.2 % (ref 0–6.9)
EOSINOPHIL NFR BLD: 1.5 % (ref 0–6.9)
EOSINOPHIL NFR BLD: 1.9 % (ref 0–6.9)
EOSINOPHIL NFR BLD: 2.6 % (ref 0–6.9)
EOSINOPHIL NFR BLD: 2.9 % (ref 0–6.9)
EOSINOPHIL NFR BLD: 3.2 % (ref 0–6.9)
EOSINOPHIL NFR BLD: 3.5 % (ref 0–6.9)
EOSINOPHIL NFR BLD: 3.8 % (ref 0–6.9)
EOSINOPHIL NFR BLD: 3.8 % (ref 0–6.9)
EOSINOPHIL NFR BLD: 4.3 % (ref 0–6.9)
EOSINOPHIL NFR BLD: 4.4 % (ref 0–6.9)
EOSINOPHIL NFR BLD: 4.6 % (ref 0–6.9)
EOSINOPHIL NFR BLD: 4.6 % (ref 0–6.9)
EOSINOPHIL NFR BLD: 4.7 % (ref 0–6.9)
EOSINOPHIL NFR BLD: 5 % (ref 0–6.9)
EOSINOPHIL NFR BLD: 5.1 % (ref 0–6.9)
EPI CELLS #/AREA URNS HPF: ABNORMAL /HPF
ERYTHROCYTE [DISTWIDTH] IN BLOOD BY AUTOMATED COUNT: 42.9 FL (ref 35.9–50)
ERYTHROCYTE [DISTWIDTH] IN BLOOD BY AUTOMATED COUNT: 43.2 FL (ref 35.9–50)
ERYTHROCYTE [DISTWIDTH] IN BLOOD BY AUTOMATED COUNT: 43.7 FL (ref 35.9–50)
ERYTHROCYTE [DISTWIDTH] IN BLOOD BY AUTOMATED COUNT: 44.3 FL (ref 35.9–50)
ERYTHROCYTE [DISTWIDTH] IN BLOOD BY AUTOMATED COUNT: 44.4 FL (ref 35.9–50)
ERYTHROCYTE [DISTWIDTH] IN BLOOD BY AUTOMATED COUNT: 44.5 FL (ref 35.9–50)
ERYTHROCYTE [DISTWIDTH] IN BLOOD BY AUTOMATED COUNT: 44.5 FL (ref 35.9–50)
ERYTHROCYTE [DISTWIDTH] IN BLOOD BY AUTOMATED COUNT: 44.8 FL (ref 35.9–50)
ERYTHROCYTE [DISTWIDTH] IN BLOOD BY AUTOMATED COUNT: 45 FL (ref 35.9–50)
ERYTHROCYTE [DISTWIDTH] IN BLOOD BY AUTOMATED COUNT: 45 FL (ref 35.9–50)
ERYTHROCYTE [DISTWIDTH] IN BLOOD BY AUTOMATED COUNT: 45.1 FL (ref 35.9–50)
ERYTHROCYTE [DISTWIDTH] IN BLOOD BY AUTOMATED COUNT: 46.1 FL (ref 35.9–50)
ERYTHROCYTE [DISTWIDTH] IN BLOOD BY AUTOMATED COUNT: 46.3 FL (ref 35.9–50)
ERYTHROCYTE [DISTWIDTH] IN BLOOD BY AUTOMATED COUNT: 46.4 FL (ref 35.9–50)
ERYTHROCYTE [DISTWIDTH] IN BLOOD BY AUTOMATED COUNT: 46.5 FL (ref 35.9–50)
ERYTHROCYTE [DISTWIDTH] IN BLOOD BY AUTOMATED COUNT: 47 FL (ref 35.9–50)
ERYTHROCYTE [DISTWIDTH] IN BLOOD BY AUTOMATED COUNT: 47 FL (ref 35.9–50)
ERYTHROCYTE [DISTWIDTH] IN BLOOD BY AUTOMATED COUNT: 47.1 FL (ref 35.9–50)
ERYTHROCYTE [DISTWIDTH] IN BLOOD BY AUTOMATED COUNT: 47.2 FL (ref 35.9–50)
ERYTHROCYTE [DISTWIDTH] IN BLOOD BY AUTOMATED COUNT: 47.8 FL (ref 35.9–50)
ERYTHROCYTE [DISTWIDTH] IN BLOOD BY AUTOMATED COUNT: 48.6 FL (ref 35.9–50)
ERYTHROCYTE [DISTWIDTH] IN BLOOD BY AUTOMATED COUNT: 48.9 FL (ref 35.9–50)
ERYTHROCYTE [DISTWIDTH] IN BLOOD BY AUTOMATED COUNT: 49.2 FL (ref 35.9–50)
ERYTHROCYTE [DISTWIDTH] IN BLOOD BY AUTOMATED COUNT: 52.1 FL (ref 35.9–50)
ERYTHROCYTE [DISTWIDTH] IN BLOOD BY AUTOMATED COUNT: 56.8 FL (ref 35.9–50)
ERYTHROCYTE [DISTWIDTH] IN BLOOD BY AUTOMATED COUNT: 61.5 FL (ref 35.9–50)
ERYTHROCYTE [DISTWIDTH] IN BLOOD BY AUTOMATED COUNT: 69.8 FL (ref 35.9–50)
ERYTHROCYTE [SEDIMENTATION RATE] IN BLOOD BY WESTERGREN METHOD: 35 MM/HOUR (ref 0–30)
ERYTHROCYTE [SEDIMENTATION RATE] IN BLOOD BY WESTERGREN METHOD: 40 MM/HOUR (ref 0–30)
ERYTHROCYTE [SEDIMENTATION RATE] IN BLOOD BY WESTERGREN METHOD: 44 MM/HOUR (ref 0–30)
ERYTHROCYTE [SEDIMENTATION RATE] IN BLOOD BY WESTERGREN METHOD: 49 MM/HOUR (ref 0–30)
ERYTHROCYTE [SEDIMENTATION RATE] IN BLOOD BY WESTERGREN METHOD: 56 MM/HOUR (ref 0–30)
EST. AVERAGE GLUCOSE BLD GHB EST-MCNC: 163 MG/DL
EST. AVERAGE GLUCOSE BLD GHB EST-MCNC: 203 MG/DL
EST. AVERAGE GLUCOSE BLD GHB EST-MCNC: 214 MG/DL
FERRITIN SERPL-MCNC: 30.6 NG/ML (ref 10–291)
FOLATE SERPL-MCNC: 22.6 NG/ML
GLOBULIN SER CALC-MCNC: 2.5 G/DL (ref 1.9–3.5)
GLOBULIN SER CALC-MCNC: 2.5 G/DL (ref 1.9–3.5)
GLOBULIN SER CALC-MCNC: 2.7 G/DL (ref 1.9–3.5)
GLOBULIN SER CALC-MCNC: 2.7 G/DL (ref 1.9–3.5)
GLOBULIN SER CALC-MCNC: 2.9 G/DL (ref 1.9–3.5)
GLOBULIN SER CALC-MCNC: 3.2 G/DL (ref 1.9–3.5)
GLOBULIN SER CALC-MCNC: 3.3 G/DL (ref 1.9–3.5)
GLOBULIN SER CALC-MCNC: 3.3 G/DL (ref 1.9–3.5)
GLOBULIN SER CALC-MCNC: 3.5 G/DL (ref 1.9–3.5)
GLOBULIN SER CALC-MCNC: 3.7 G/DL (ref 1.9–3.5)
GLOBULIN SER CALC-MCNC: 3.8 G/DL (ref 1.9–3.5)
GLOBULIN SER CALC-MCNC: 3.9 G/DL (ref 1.9–3.5)
GLOBULIN SER CALC-MCNC: 4 G/DL (ref 1.9–3.5)
GLOBULIN SER CALC-MCNC: 4 G/DL (ref 1.9–3.5)
GLOBULIN SER CALC-MCNC: 4.1 G/DL (ref 1.9–3.5)
GLOBULIN SER CALC-MCNC: 4.5 G/DL (ref 1.9–3.5)
GLUCOSE BLD-MCNC: 100 MG/DL (ref 65–99)
GLUCOSE BLD-MCNC: 105 MG/DL (ref 65–99)
GLUCOSE BLD-MCNC: 109 MG/DL (ref 65–99)
GLUCOSE BLD-MCNC: 110 MG/DL (ref 65–99)
GLUCOSE BLD-MCNC: 111 MG/DL (ref 65–99)
GLUCOSE BLD-MCNC: 116 MG/DL (ref 65–99)
GLUCOSE BLD-MCNC: 118 MG/DL (ref 65–99)
GLUCOSE BLD-MCNC: 121 MG/DL (ref 65–99)
GLUCOSE BLD-MCNC: 125 MG/DL (ref 65–99)
GLUCOSE BLD-MCNC: 125 MG/DL (ref 65–99)
GLUCOSE BLD-MCNC: 126 MG/DL (ref 65–99)
GLUCOSE BLD-MCNC: 131 MG/DL (ref 65–99)
GLUCOSE BLD-MCNC: 131 MG/DL (ref 65–99)
GLUCOSE BLD-MCNC: 132 MG/DL (ref 65–99)
GLUCOSE BLD-MCNC: 133 MG/DL (ref 65–99)
GLUCOSE BLD-MCNC: 135 MG/DL (ref 65–99)
GLUCOSE BLD-MCNC: 136 MG/DL (ref 65–99)
GLUCOSE BLD-MCNC: 136 MG/DL (ref 65–99)
GLUCOSE BLD-MCNC: 139 MG/DL (ref 65–99)
GLUCOSE BLD-MCNC: 140 MG/DL (ref 65–99)
GLUCOSE BLD-MCNC: 140 MG/DL (ref 65–99)
GLUCOSE BLD-MCNC: 141 MG/DL (ref 65–99)
GLUCOSE BLD-MCNC: 141 MG/DL (ref 65–99)
GLUCOSE BLD-MCNC: 142 MG/DL (ref 65–99)
GLUCOSE BLD-MCNC: 142 MG/DL (ref 65–99)
GLUCOSE BLD-MCNC: 143 MG/DL (ref 65–99)
GLUCOSE BLD-MCNC: 143 MG/DL (ref 65–99)
GLUCOSE BLD-MCNC: 145 MG/DL (ref 65–99)
GLUCOSE BLD-MCNC: 150 MG/DL (ref 65–99)
GLUCOSE BLD-MCNC: 152 MG/DL (ref 65–99)
GLUCOSE BLD-MCNC: 157 MG/DL (ref 65–99)
GLUCOSE BLD-MCNC: 160 MG/DL (ref 65–99)
GLUCOSE BLD-MCNC: 160 MG/DL (ref 65–99)
GLUCOSE BLD-MCNC: 161 MG/DL (ref 65–99)
GLUCOSE BLD-MCNC: 162 MG/DL (ref 65–99)
GLUCOSE BLD-MCNC: 163 MG/DL (ref 65–99)
GLUCOSE BLD-MCNC: 164 MG/DL (ref 65–99)
GLUCOSE BLD-MCNC: 165 MG/DL (ref 65–99)
GLUCOSE BLD-MCNC: 166 MG/DL (ref 65–99)
GLUCOSE BLD-MCNC: 167 MG/DL (ref 65–99)
GLUCOSE BLD-MCNC: 167 MG/DL (ref 65–99)
GLUCOSE BLD-MCNC: 168 MG/DL (ref 65–99)
GLUCOSE BLD-MCNC: 168 MG/DL (ref 65–99)
GLUCOSE BLD-MCNC: 171 MG/DL (ref 65–99)
GLUCOSE BLD-MCNC: 173 MG/DL (ref 65–99)
GLUCOSE BLD-MCNC: 174 MG/DL (ref 65–99)
GLUCOSE BLD-MCNC: 175 MG/DL (ref 65–99)
GLUCOSE BLD-MCNC: 176 MG/DL (ref 65–99)
GLUCOSE BLD-MCNC: 176 MG/DL (ref 65–99)
GLUCOSE BLD-MCNC: 177 MG/DL (ref 65–99)
GLUCOSE BLD-MCNC: 178 MG/DL (ref 65–99)
GLUCOSE BLD-MCNC: 179 MG/DL (ref 65–99)
GLUCOSE BLD-MCNC: 179 MG/DL (ref 65–99)
GLUCOSE BLD-MCNC: 180 MG/DL (ref 65–99)
GLUCOSE BLD-MCNC: 181 MG/DL (ref 65–99)
GLUCOSE BLD-MCNC: 181 MG/DL (ref 65–99)
GLUCOSE BLD-MCNC: 182 MG/DL (ref 65–99)
GLUCOSE BLD-MCNC: 183 MG/DL (ref 65–99)
GLUCOSE BLD-MCNC: 183 MG/DL (ref 65–99)
GLUCOSE BLD-MCNC: 184 MG/DL (ref 65–99)
GLUCOSE BLD-MCNC: 184 MG/DL (ref 65–99)
GLUCOSE BLD-MCNC: 185 MG/DL (ref 65–99)
GLUCOSE BLD-MCNC: 186 MG/DL (ref 65–99)
GLUCOSE BLD-MCNC: 189 MG/DL (ref 65–99)
GLUCOSE BLD-MCNC: 190 MG/DL (ref 65–99)
GLUCOSE BLD-MCNC: 192 MG/DL (ref 65–99)
GLUCOSE BLD-MCNC: 193 MG/DL (ref 65–99)
GLUCOSE BLD-MCNC: 194 MG/DL (ref 65–99)
GLUCOSE BLD-MCNC: 195 MG/DL (ref 65–99)
GLUCOSE BLD-MCNC: 196 MG/DL (ref 65–99)
GLUCOSE BLD-MCNC: 197 MG/DL (ref 65–99)
GLUCOSE BLD-MCNC: 197 MG/DL (ref 65–99)
GLUCOSE BLD-MCNC: 199 MG/DL (ref 65–99)
GLUCOSE BLD-MCNC: 199 MG/DL (ref 65–99)
GLUCOSE BLD-MCNC: 200 MG/DL (ref 65–99)
GLUCOSE BLD-MCNC: 201 MG/DL (ref 65–99)
GLUCOSE BLD-MCNC: 202 MG/DL (ref 65–99)
GLUCOSE BLD-MCNC: 203 MG/DL (ref 65–99)
GLUCOSE BLD-MCNC: 205 MG/DL (ref 65–99)
GLUCOSE BLD-MCNC: 207 MG/DL (ref 65–99)
GLUCOSE BLD-MCNC: 208 MG/DL (ref 65–99)
GLUCOSE BLD-MCNC: 209 MG/DL (ref 65–99)
GLUCOSE BLD-MCNC: 210 MG/DL (ref 65–99)
GLUCOSE BLD-MCNC: 212 MG/DL (ref 65–99)
GLUCOSE BLD-MCNC: 212 MG/DL (ref 65–99)
GLUCOSE BLD-MCNC: 214 MG/DL (ref 65–99)
GLUCOSE BLD-MCNC: 214 MG/DL (ref 65–99)
GLUCOSE BLD-MCNC: 215 MG/DL (ref 65–99)
GLUCOSE BLD-MCNC: 215 MG/DL (ref 65–99)
GLUCOSE BLD-MCNC: 216 MG/DL (ref 65–99)
GLUCOSE BLD-MCNC: 216 MG/DL (ref 65–99)
GLUCOSE BLD-MCNC: 218 MG/DL (ref 65–99)
GLUCOSE BLD-MCNC: 219 MG/DL (ref 65–99)
GLUCOSE BLD-MCNC: 220 MG/DL (ref 65–99)
GLUCOSE BLD-MCNC: 221 MG/DL (ref 65–99)
GLUCOSE BLD-MCNC: 222 MG/DL (ref 65–99)
GLUCOSE BLD-MCNC: 223 MG/DL (ref 65–99)
GLUCOSE BLD-MCNC: 224 MG/DL (ref 65–99)
GLUCOSE BLD-MCNC: 226 MG/DL (ref 65–99)
GLUCOSE BLD-MCNC: 226 MG/DL (ref 65–99)
GLUCOSE BLD-MCNC: 227 MG/DL (ref 65–99)
GLUCOSE BLD-MCNC: 229 MG/DL (ref 65–99)
GLUCOSE BLD-MCNC: 230 MG/DL (ref 65–99)
GLUCOSE BLD-MCNC: 232 MG/DL (ref 65–99)
GLUCOSE BLD-MCNC: 233 MG/DL (ref 65–99)
GLUCOSE BLD-MCNC: 234 MG/DL (ref 65–99)
GLUCOSE BLD-MCNC: 234 MG/DL (ref 65–99)
GLUCOSE BLD-MCNC: 235 MG/DL (ref 65–99)
GLUCOSE BLD-MCNC: 236 MG/DL (ref 65–99)
GLUCOSE BLD-MCNC: 236 MG/DL (ref 65–99)
GLUCOSE BLD-MCNC: 237 MG/DL (ref 65–99)
GLUCOSE BLD-MCNC: 237 MG/DL (ref 65–99)
GLUCOSE BLD-MCNC: 238 MG/DL (ref 65–99)
GLUCOSE BLD-MCNC: 239 MG/DL (ref 65–99)
GLUCOSE BLD-MCNC: 239 MG/DL (ref 65–99)
GLUCOSE BLD-MCNC: 240 MG/DL (ref 65–99)
GLUCOSE BLD-MCNC: 241 MG/DL (ref 65–99)
GLUCOSE BLD-MCNC: 242 MG/DL (ref 65–99)
GLUCOSE BLD-MCNC: 243 MG/DL (ref 65–99)
GLUCOSE BLD-MCNC: 243 MG/DL (ref 65–99)
GLUCOSE BLD-MCNC: 244 MG/DL (ref 65–99)
GLUCOSE BLD-MCNC: 244 MG/DL (ref 65–99)
GLUCOSE BLD-MCNC: 245 MG/DL (ref 65–99)
GLUCOSE BLD-MCNC: 246 MG/DL (ref 65–99)
GLUCOSE BLD-MCNC: 247 MG/DL (ref 65–99)
GLUCOSE BLD-MCNC: 248 MG/DL (ref 65–99)
GLUCOSE BLD-MCNC: 251 MG/DL (ref 65–99)
GLUCOSE BLD-MCNC: 251 MG/DL (ref 65–99)
GLUCOSE BLD-MCNC: 252 MG/DL (ref 65–99)
GLUCOSE BLD-MCNC: 253 MG/DL (ref 65–99)
GLUCOSE BLD-MCNC: 254 MG/DL (ref 65–99)
GLUCOSE BLD-MCNC: 254 MG/DL (ref 65–99)
GLUCOSE BLD-MCNC: 256 MG/DL (ref 65–99)
GLUCOSE BLD-MCNC: 258 MG/DL (ref 65–99)
GLUCOSE BLD-MCNC: 259 MG/DL (ref 65–99)
GLUCOSE BLD-MCNC: 260 MG/DL (ref 65–99)
GLUCOSE BLD-MCNC: 261 MG/DL (ref 65–99)
GLUCOSE BLD-MCNC: 262 MG/DL (ref 65–99)
GLUCOSE BLD-MCNC: 263 MG/DL (ref 65–99)
GLUCOSE BLD-MCNC: 264 MG/DL (ref 65–99)
GLUCOSE BLD-MCNC: 265 MG/DL (ref 65–99)
GLUCOSE BLD-MCNC: 266 MG/DL (ref 65–99)
GLUCOSE BLD-MCNC: 268 MG/DL (ref 65–99)
GLUCOSE BLD-MCNC: 269 MG/DL (ref 65–99)
GLUCOSE BLD-MCNC: 269 MG/DL (ref 65–99)
GLUCOSE BLD-MCNC: 270 MG/DL (ref 65–99)
GLUCOSE BLD-MCNC: 270 MG/DL (ref 65–99)
GLUCOSE BLD-MCNC: 272 MG/DL (ref 65–99)
GLUCOSE BLD-MCNC: 272 MG/DL (ref 65–99)
GLUCOSE BLD-MCNC: 273 MG/DL (ref 65–99)
GLUCOSE BLD-MCNC: 274 MG/DL (ref 65–99)
GLUCOSE BLD-MCNC: 275 MG/DL (ref 65–99)
GLUCOSE BLD-MCNC: 275 MG/DL (ref 65–99)
GLUCOSE BLD-MCNC: 277 MG/DL (ref 65–99)
GLUCOSE BLD-MCNC: 278 MG/DL (ref 65–99)
GLUCOSE BLD-MCNC: 279 MG/DL (ref 65–99)
GLUCOSE BLD-MCNC: 280 MG/DL (ref 65–99)
GLUCOSE BLD-MCNC: 280 MG/DL (ref 65–99)
GLUCOSE BLD-MCNC: 281 MG/DL (ref 65–99)
GLUCOSE BLD-MCNC: 281 MG/DL (ref 65–99)
GLUCOSE BLD-MCNC: 282 MG/DL (ref 65–99)
GLUCOSE BLD-MCNC: 283 MG/DL (ref 65–99)
GLUCOSE BLD-MCNC: 283 MG/DL (ref 65–99)
GLUCOSE BLD-MCNC: 284 MG/DL (ref 65–99)
GLUCOSE BLD-MCNC: 293 MG/DL (ref 65–99)
GLUCOSE BLD-MCNC: 294 MG/DL (ref 65–99)
GLUCOSE BLD-MCNC: 298 MG/DL (ref 65–99)
GLUCOSE BLD-MCNC: 299 MG/DL (ref 65–99)
GLUCOSE BLD-MCNC: 299 MG/DL (ref 65–99)
GLUCOSE BLD-MCNC: 301 MG/DL (ref 65–99)
GLUCOSE BLD-MCNC: 301 MG/DL (ref 65–99)
GLUCOSE BLD-MCNC: 302 MG/DL (ref 65–99)
GLUCOSE BLD-MCNC: 302 MG/DL (ref 65–99)
GLUCOSE BLD-MCNC: 304 MG/DL (ref 65–99)
GLUCOSE BLD-MCNC: 305 MG/DL (ref 65–99)
GLUCOSE BLD-MCNC: 306 MG/DL (ref 65–99)
GLUCOSE BLD-MCNC: 308 MG/DL (ref 65–99)
GLUCOSE BLD-MCNC: 311 MG/DL (ref 65–99)
GLUCOSE BLD-MCNC: 311 MG/DL (ref 65–99)
GLUCOSE BLD-MCNC: 313 MG/DL (ref 65–99)
GLUCOSE BLD-MCNC: 317 MG/DL (ref 65–99)
GLUCOSE BLD-MCNC: 317 MG/DL (ref 65–99)
GLUCOSE BLD-MCNC: 318 MG/DL (ref 65–99)
GLUCOSE BLD-MCNC: 318 MG/DL (ref 65–99)
GLUCOSE BLD-MCNC: 323 MG/DL (ref 65–99)
GLUCOSE BLD-MCNC: 324 MG/DL (ref 65–99)
GLUCOSE BLD-MCNC: 325 MG/DL (ref 65–99)
GLUCOSE BLD-MCNC: 327 MG/DL (ref 65–99)
GLUCOSE BLD-MCNC: 329 MG/DL (ref 65–99)
GLUCOSE BLD-MCNC: 334 MG/DL (ref 65–99)
GLUCOSE BLD-MCNC: 334 MG/DL (ref 65–99)
GLUCOSE BLD-MCNC: 338 MG/DL (ref 65–99)
GLUCOSE BLD-MCNC: 34 MG/DL (ref 65–99)
GLUCOSE BLD-MCNC: 341 MG/DL (ref 65–99)
GLUCOSE BLD-MCNC: 343 MG/DL (ref 65–99)
GLUCOSE BLD-MCNC: 347 MG/DL (ref 65–99)
GLUCOSE BLD-MCNC: 351 MG/DL (ref 65–99)
GLUCOSE BLD-MCNC: 354 MG/DL (ref 65–99)
GLUCOSE BLD-MCNC: 358 MG/DL (ref 65–99)
GLUCOSE BLD-MCNC: 361 MG/DL (ref 65–99)
GLUCOSE BLD-MCNC: 364 MG/DL (ref 65–99)
GLUCOSE BLD-MCNC: 364 MG/DL (ref 65–99)
GLUCOSE BLD-MCNC: 366 MG/DL (ref 65–99)
GLUCOSE BLD-MCNC: 367 MG/DL (ref 65–99)
GLUCOSE BLD-MCNC: 372 MG/DL (ref 65–99)
GLUCOSE BLD-MCNC: 373 MG/DL (ref 65–99)
GLUCOSE BLD-MCNC: 385 MG/DL (ref 65–99)
GLUCOSE BLD-MCNC: 39 MG/DL (ref 65–99)
GLUCOSE BLD-MCNC: 401 MG/DL (ref 65–99)
GLUCOSE BLD-MCNC: 403 MG/DL (ref 65–99)
GLUCOSE BLD-MCNC: 406 MG/DL (ref 65–99)
GLUCOSE BLD-MCNC: 409 MG/DL (ref 65–99)
GLUCOSE BLD-MCNC: 422 MG/DL (ref 65–99)
GLUCOSE BLD-MCNC: 43 MG/DL (ref 65–99)
GLUCOSE BLD-MCNC: 44 MG/DL (ref 65–99)
GLUCOSE BLD-MCNC: 45 MG/DL (ref 65–99)
GLUCOSE BLD-MCNC: 46 MG/DL (ref 65–99)
GLUCOSE BLD-MCNC: 462 MG/DL (ref 65–99)
GLUCOSE BLD-MCNC: 47 MG/DL (ref 65–99)
GLUCOSE BLD-MCNC: 498 MG/DL (ref 65–99)
GLUCOSE BLD-MCNC: 50 MG/DL (ref 65–99)
GLUCOSE BLD-MCNC: 523 MG/DL (ref 65–99)
GLUCOSE BLD-MCNC: 54 MG/DL (ref 65–99)
GLUCOSE BLD-MCNC: 55 MG/DL (ref 65–99)
GLUCOSE BLD-MCNC: 55 MG/DL (ref 65–99)
GLUCOSE BLD-MCNC: 57 MG/DL (ref 65–99)
GLUCOSE BLD-MCNC: 60 MG/DL (ref 65–99)
GLUCOSE BLD-MCNC: 66 MG/DL (ref 65–99)
GLUCOSE BLD-MCNC: 68 MG/DL (ref 65–99)
GLUCOSE BLD-MCNC: 74 MG/DL (ref 65–99)
GLUCOSE BLD-MCNC: 75 MG/DL (ref 65–99)
GLUCOSE BLD-MCNC: 78 MG/DL (ref 65–99)
GLUCOSE BLD-MCNC: 78 MG/DL (ref 65–99)
GLUCOSE BLD-MCNC: 79 MG/DL (ref 65–99)
GLUCOSE BLD-MCNC: 80 MG/DL (ref 65–99)
GLUCOSE BLD-MCNC: 84 MG/DL (ref 65–99)
GLUCOSE BLD-MCNC: 84 MG/DL (ref 65–99)
GLUCOSE BLD-MCNC: 92 MG/DL (ref 65–99)
GLUCOSE BLD-MCNC: 92 MG/DL (ref 65–99)
GLUCOSE BLD-MCNC: 94 MG/DL (ref 65–99)
GLUCOSE BLD-MCNC: 95 MG/DL (ref 65–99)
GLUCOSE SERPL-MCNC: 111 MG/DL (ref 65–99)
GLUCOSE SERPL-MCNC: 128 MG/DL (ref 65–99)
GLUCOSE SERPL-MCNC: 132 MG/DL (ref 65–99)
GLUCOSE SERPL-MCNC: 142 MG/DL (ref 65–99)
GLUCOSE SERPL-MCNC: 145 MG/DL (ref 65–99)
GLUCOSE SERPL-MCNC: 148 MG/DL (ref 65–99)
GLUCOSE SERPL-MCNC: 148 MG/DL (ref 65–99)
GLUCOSE SERPL-MCNC: 151 MG/DL (ref 65–99)
GLUCOSE SERPL-MCNC: 151 MG/DL (ref 65–99)
GLUCOSE SERPL-MCNC: 155 MG/DL (ref 65–99)
GLUCOSE SERPL-MCNC: 174 MG/DL (ref 65–99)
GLUCOSE SERPL-MCNC: 175 MG/DL (ref 65–99)
GLUCOSE SERPL-MCNC: 181 MG/DL (ref 65–99)
GLUCOSE SERPL-MCNC: 188 MG/DL (ref 65–99)
GLUCOSE SERPL-MCNC: 206 MG/DL (ref 65–99)
GLUCOSE SERPL-MCNC: 209 MG/DL (ref 65–99)
GLUCOSE SERPL-MCNC: 213 MG/DL (ref 65–99)
GLUCOSE SERPL-MCNC: 214 MG/DL (ref 65–99)
GLUCOSE SERPL-MCNC: 214 MG/DL (ref 65–99)
GLUCOSE SERPL-MCNC: 215 MG/DL (ref 65–99)
GLUCOSE SERPL-MCNC: 232 MG/DL (ref 65–99)
GLUCOSE SERPL-MCNC: 232 MG/DL (ref 65–99)
GLUCOSE SERPL-MCNC: 235 MG/DL (ref 65–99)
GLUCOSE SERPL-MCNC: 235 MG/DL (ref 65–99)
GLUCOSE SERPL-MCNC: 244 MG/DL (ref 65–99)
GLUCOSE SERPL-MCNC: 256 MG/DL (ref 65–99)
GLUCOSE SERPL-MCNC: 268 MG/DL (ref 65–99)
GLUCOSE SERPL-MCNC: 274 MG/DL (ref 65–99)
GLUCOSE SERPL-MCNC: 274 MG/DL (ref 65–99)
GLUCOSE SERPL-MCNC: 279 MG/DL (ref 65–99)
GLUCOSE SERPL-MCNC: 293 MG/DL (ref 65–99)
GLUCOSE SERPL-MCNC: 303 MG/DL (ref 65–99)
GLUCOSE SERPL-MCNC: 314 MG/DL (ref 65–99)
GLUCOSE SERPL-MCNC: 52 MG/DL (ref 65–99)
GLUCOSE SERPL-MCNC: 71 MG/DL (ref 65–99)
GLUCOSE SERPL-MCNC: 73 MG/DL (ref 65–99)
GLUCOSE SERPL-MCNC: 97 MG/DL (ref 65–99)
GLUCOSE UR STRIP.AUTO-MCNC: 500 MG/DL
GRAM STN SPEC: ABNORMAL
GRAM STN SPEC: NORMAL
HBA1C MFR BLD: 7.3 % (ref 0–5.6)
HBA1C MFR BLD: 8.7 % (ref 0–5.6)
HBA1C MFR BLD: 9.1 % (ref 0–5.6)
HCO3 BLDA-SCNC: 25 MMOL/L (ref 17–25)
HCT VFR BLD AUTO: 26.1 % (ref 37–47)
HCT VFR BLD AUTO: 27.2 % (ref 37–47)
HCT VFR BLD AUTO: 27.8 % (ref 37–47)
HCT VFR BLD AUTO: 29 % (ref 37–47)
HCT VFR BLD AUTO: 30.9 % (ref 37–47)
HCT VFR BLD AUTO: 32.1 % (ref 37–47)
HCT VFR BLD AUTO: 32.3 % (ref 37–47)
HCT VFR BLD AUTO: 32.4 % (ref 37–47)
HCT VFR BLD AUTO: 32.7 % (ref 37–47)
HCT VFR BLD AUTO: 33.1 % (ref 37–47)
HCT VFR BLD AUTO: 33.2 % (ref 37–47)
HCT VFR BLD AUTO: 33.4 % (ref 37–47)
HCT VFR BLD AUTO: 33.4 % (ref 37–47)
HCT VFR BLD AUTO: 33.7 % (ref 37–47)
HCT VFR BLD AUTO: 33.8 % (ref 37–47)
HCT VFR BLD AUTO: 33.9 % (ref 37–47)
HCT VFR BLD AUTO: 34.2 % (ref 37–47)
HCT VFR BLD AUTO: 34.3 % (ref 37–47)
HCT VFR BLD AUTO: 34.3 % (ref 37–47)
HCT VFR BLD AUTO: 34.5 % (ref 37–47)
HCT VFR BLD AUTO: 34.7 % (ref 37–47)
HCT VFR BLD AUTO: 34.9 % (ref 37–47)
HCT VFR BLD AUTO: 35 % (ref 37–47)
HCT VFR BLD AUTO: 35.2 % (ref 37–47)
HCT VFR BLD AUTO: 35.3 % (ref 37–47)
HCT VFR BLD AUTO: 35.4 % (ref 37–47)
HCT VFR BLD AUTO: 35.5 % (ref 37–47)
HCT VFR BLD AUTO: 35.6 % (ref 37–47)
HCT VFR BLD AUTO: 35.8 % (ref 37–47)
HCT VFR BLD AUTO: 36.1 % (ref 37–47)
HCT VFR BLD AUTO: 36.4 % (ref 37–47)
HCT VFR BLD AUTO: 36.5 % (ref 37–47)
HCT VFR BLD AUTO: 39.6 % (ref 37–47)
HCT VFR BLD AUTO: 40.8 % (ref 37–47)
HDLC SERPL-MCNC: 34 MG/DL
HGB BLD-MCNC: 10.1 G/DL (ref 12–16)
HGB BLD-MCNC: 10.2 G/DL (ref 12–16)
HGB BLD-MCNC: 10.2 G/DL (ref 12–16)
HGB BLD-MCNC: 10.3 G/DL (ref 12–16)
HGB BLD-MCNC: 10.3 G/DL (ref 12–16)
HGB BLD-MCNC: 10.4 G/DL (ref 12–16)
HGB BLD-MCNC: 10.5 G/DL (ref 12–16)
HGB BLD-MCNC: 10.6 G/DL (ref 12–16)
HGB BLD-MCNC: 10.7 G/DL (ref 12–16)
HGB BLD-MCNC: 10.7 G/DL (ref 12–16)
HGB BLD-MCNC: 10.8 G/DL (ref 12–16)
HGB BLD-MCNC: 10.9 G/DL (ref 12–16)
HGB BLD-MCNC: 10.9 G/DL (ref 12–16)
HGB BLD-MCNC: 11 G/DL (ref 12–16)
HGB BLD-MCNC: 11 G/DL (ref 12–16)
HGB BLD-MCNC: 11.1 G/DL (ref 12–16)
HGB BLD-MCNC: 11.2 G/DL (ref 12–16)
HGB BLD-MCNC: 11.2 G/DL (ref 12–16)
HGB BLD-MCNC: 11.3 G/DL (ref 12–16)
HGB BLD-MCNC: 11.5 G/DL (ref 12–16)
HGB BLD-MCNC: 11.9 G/DL (ref 12–16)
HGB BLD-MCNC: 12.2 G/DL (ref 12–16)
HGB BLD-MCNC: 12.5 G/DL (ref 12–16)
HGB BLD-MCNC: 8.6 G/DL (ref 12–16)
HGB BLD-MCNC: 8.7 G/DL (ref 12–16)
HGB BLD-MCNC: 8.9 G/DL (ref 12–16)
HGB BLD-MCNC: 9.5 G/DL (ref 12–16)
HYALINE CASTS #/AREA URNS LPF: ABNORMAL /LPF
IMM GRANULOCYTES # BLD AUTO: 0.03 K/UL (ref 0–0.11)
IMM GRANULOCYTES # BLD AUTO: 0.04 K/UL (ref 0–0.11)
IMM GRANULOCYTES # BLD AUTO: 0.05 K/UL (ref 0–0.11)
IMM GRANULOCYTES # BLD AUTO: 0.06 K/UL (ref 0–0.11)
IMM GRANULOCYTES # BLD AUTO: 0.06 K/UL (ref 0–0.11)
IMM GRANULOCYTES # BLD AUTO: 0.07 K/UL (ref 0–0.11)
IMM GRANULOCYTES # BLD AUTO: 0.07 K/UL (ref 0–0.11)
IMM GRANULOCYTES # BLD AUTO: 0.12 K/UL (ref 0–0.11)
IMM GRANULOCYTES # BLD AUTO: 0.16 K/UL (ref 0–0.11)
IMM GRANULOCYTES NFR BLD AUTO: 0.3 % (ref 0–0.9)
IMM GRANULOCYTES NFR BLD AUTO: 0.4 % (ref 0–0.9)
IMM GRANULOCYTES NFR BLD AUTO: 0.5 % (ref 0–0.9)
IMM GRANULOCYTES NFR BLD AUTO: 0.6 % (ref 0–0.9)
IMM GRANULOCYTES NFR BLD AUTO: 0.6 % (ref 0–0.9)
IMM GRANULOCYTES NFR BLD AUTO: 0.7 % (ref 0–0.9)
IMM GRANULOCYTES NFR BLD AUTO: 0.7 % (ref 0–0.9)
IMM GRANULOCYTES NFR BLD AUTO: 1 % (ref 0–0.9)
IMM GRANULOCYTES NFR BLD AUTO: 1.1 % (ref 0–0.9)
IMM GRANULOCYTES NFR BLD AUTO: 1.1 % (ref 0–0.9)
INHALED O2 FLOW RATE: 6 L/MIN (ref 2–10)
INR PPP: 1.09 (ref 0.87–1.13)
INR PPP: 1.1 (ref 0.87–1.13)
INR PPP: 1.12 (ref 0.87–1.13)
INR PPP: 1.14 (ref 0.87–1.13)
INR PPP: 1.14 (ref 0.87–1.13)
INR PPP: 1.16 (ref 0.87–1.13)
INR PPP: 1.24 (ref 0.87–1.13)
INR PPP: 1.25 (ref 0.87–1.13)
INR PPP: 1.27 (ref 0.87–1.13)
INR PPP: 1.35 (ref 0.87–1.13)
INR PPP: 1.36 (ref 0.87–1.13)
INR PPP: 1.45 (ref 0.87–1.13)
INR PPP: 1.46 (ref 0.87–1.13)
INR PPP: 1.88 (ref 0.87–1.13)
INR PPP: 2.01 (ref 0.87–1.13)
INR PPP: 2.34 (ref 0.87–1.13)
INR PPP: 2.62 (ref 0.87–1.13)
IRON SATN MFR SERPL: 6 % (ref 15–55)
IRON SERPL-MCNC: 14 UG/DL (ref 40–170)
KETONES UR STRIP.AUTO-MCNC: NEGATIVE MG/DL
LACTATE BLD-SCNC: 0.6 MMOL/L (ref 0.5–2)
LACTATE BLD-SCNC: 0.6 MMOL/L (ref 0.5–2)
LACTATE BLD-SCNC: 0.8 MMOL/L (ref 0.5–2)
LACTATE BLD-SCNC: 1.2 MMOL/L (ref 0.5–2)
LACTATE BLD-SCNC: 1.4 MMOL/L (ref 0.5–2)
LDLC SERPL CALC-MCNC: 33 MG/DL
LEUKOCYTE ESTERASE UR QL STRIP.AUTO: ABNORMAL
LV EJECT FRACT  99904: 35
LV EJECT FRACT  99904: 45
LV EJECT FRACT MOD 2C 99903: 26.25
LV EJECT FRACT MOD 2C 99903: 31.43
LV EJECT FRACT MOD 4C 99902: 38.12
LV EJECT FRACT MOD 4C 99902: 39.2
LV EJECT FRACT MOD BP 99901: 33.71
LV EJECT FRACT MOD BP 99901: 35.49
LYMPHOCYTES # BLD AUTO: 0.73 K/UL (ref 1–4.8)
LYMPHOCYTES # BLD AUTO: 0.76 K/UL (ref 1–4.8)
LYMPHOCYTES # BLD AUTO: 0.83 K/UL (ref 1–4.8)
LYMPHOCYTES # BLD AUTO: 0.84 K/UL (ref 1–4.8)
LYMPHOCYTES # BLD AUTO: 0.93 K/UL (ref 1–4.8)
LYMPHOCYTES # BLD AUTO: 0.93 K/UL (ref 1–4.8)
LYMPHOCYTES # BLD AUTO: 0.94 K/UL (ref 1–4.8)
LYMPHOCYTES # BLD AUTO: 0.95 K/UL (ref 1–4.8)
LYMPHOCYTES # BLD AUTO: 0.95 K/UL (ref 1–4.8)
LYMPHOCYTES # BLD AUTO: 1.03 K/UL (ref 1–4.8)
LYMPHOCYTES # BLD AUTO: 1.26 K/UL (ref 1–4.8)
LYMPHOCYTES # BLD AUTO: 1.28 K/UL (ref 1–4.8)
LYMPHOCYTES # BLD AUTO: 1.36 K/UL (ref 1–4.8)
LYMPHOCYTES # BLD AUTO: 1.76 K/UL (ref 1–4.8)
LYMPHOCYTES # BLD AUTO: 1.79 K/UL (ref 1–4.8)
LYMPHOCYTES # BLD AUTO: 1.94 K/UL (ref 1–4.8)
LYMPHOCYTES # BLD AUTO: 2.01 K/UL (ref 1–4.8)
LYMPHOCYTES # BLD AUTO: 2.19 K/UL (ref 1–4.8)
LYMPHOCYTES NFR BLD: 12.4 % (ref 22–41)
LYMPHOCYTES NFR BLD: 12.9 % (ref 22–41)
LYMPHOCYTES NFR BLD: 13.9 % (ref 22–41)
LYMPHOCYTES NFR BLD: 16 % (ref 22–41)
LYMPHOCYTES NFR BLD: 19.8 % (ref 22–41)
LYMPHOCYTES NFR BLD: 20.5 % (ref 22–41)
LYMPHOCYTES NFR BLD: 20.7 % (ref 22–41)
LYMPHOCYTES NFR BLD: 21.7 % (ref 22–41)
LYMPHOCYTES NFR BLD: 25.3 % (ref 22–41)
LYMPHOCYTES NFR BLD: 6 % (ref 22–41)
LYMPHOCYTES NFR BLD: 7.7 % (ref 22–41)
LYMPHOCYTES NFR BLD: 8.2 % (ref 22–41)
LYMPHOCYTES NFR BLD: 8.9 % (ref 22–41)
LYMPHOCYTES NFR BLD: 9 % (ref 22–41)
LYMPHOCYTES NFR BLD: 9.4 % (ref 22–41)
LYMPHOCYTES NFR BLD: 9.5 % (ref 22–41)
LYMPHOCYTES NFR BLD: 9.6 % (ref 22–41)
LYMPHOCYTES NFR BLD: 9.8 % (ref 22–41)
MACROCYTES BLD QL SMEAR: ABNORMAL
MACROCYTES BLD QL SMEAR: ABNORMAL
MAGNESIUM SERPL-MCNC: 1.3 MG/DL (ref 1.5–2.5)
MAGNESIUM SERPL-MCNC: 1.3 MG/DL (ref 1.5–2.5)
MAGNESIUM SERPL-MCNC: 1.4 MG/DL (ref 1.5–2.5)
MAGNESIUM SERPL-MCNC: 1.4 MG/DL (ref 1.5–2.5)
MAGNESIUM SERPL-MCNC: 1.6 MG/DL (ref 1.5–2.5)
MAGNESIUM SERPL-MCNC: 1.7 MG/DL (ref 1.5–2.5)
MAGNESIUM SERPL-MCNC: 1.8 MG/DL (ref 1.5–2.5)
MAGNESIUM SERPL-MCNC: 1.9 MG/DL (ref 1.5–2.5)
MANUAL DIFF BLD: NORMAL
MCH RBC QN AUTO: 28.4 PG (ref 27–33)
MCH RBC QN AUTO: 28.6 PG (ref 27–33)
MCH RBC QN AUTO: 28.8 PG (ref 27–33)
MCH RBC QN AUTO: 29 PG (ref 27–33)
MCH RBC QN AUTO: 29 PG (ref 27–33)
MCH RBC QN AUTO: 29.2 PG (ref 27–33)
MCH RBC QN AUTO: 29.2 PG (ref 27–33)
MCH RBC QN AUTO: 29.3 PG (ref 27–33)
MCH RBC QN AUTO: 29.5 PG (ref 27–33)
MCH RBC QN AUTO: 29.5 PG (ref 27–33)
MCH RBC QN AUTO: 29.6 PG (ref 27–33)
MCH RBC QN AUTO: 29.8 PG (ref 27–33)
MCH RBC QN AUTO: 29.8 PG (ref 27–33)
MCH RBC QN AUTO: 29.9 PG (ref 27–33)
MCH RBC QN AUTO: 30.1 PG (ref 27–33)
MCH RBC QN AUTO: 30.1 PG (ref 27–33)
MCH RBC QN AUTO: 30.3 PG (ref 27–33)
MCH RBC QN AUTO: 30.4 PG (ref 27–33)
MCH RBC QN AUTO: 30.6 PG (ref 27–33)
MCH RBC QN AUTO: 30.7 PG (ref 27–33)
MCH RBC QN AUTO: 30.9 PG (ref 27–33)
MCH RBC QN AUTO: 31.1 PG (ref 27–33)
MCH RBC QN AUTO: 31.2 PG (ref 27–33)
MCH RBC QN AUTO: 31.3 PG (ref 27–33)
MCH RBC QN AUTO: 31.4 PG (ref 27–33)
MCH RBC QN AUTO: 32.8 PG (ref 27–33)
MCHC RBC AUTO-ENTMCNC: 29.9 G/DL (ref 33.6–35)
MCHC RBC AUTO-ENTMCNC: 30 G/DL (ref 33.6–35)
MCHC RBC AUTO-ENTMCNC: 30.1 G/DL (ref 33.6–35)
MCHC RBC AUTO-ENTMCNC: 30.5 G/DL (ref 33.6–35)
MCHC RBC AUTO-ENTMCNC: 30.6 G/DL (ref 33.6–35)
MCHC RBC AUTO-ENTMCNC: 30.7 G/DL (ref 33.6–35)
MCHC RBC AUTO-ENTMCNC: 30.8 G/DL (ref 33.6–35)
MCHC RBC AUTO-ENTMCNC: 30.9 G/DL (ref 33.6–35)
MCHC RBC AUTO-ENTMCNC: 31 G/DL (ref 33.6–35)
MCHC RBC AUTO-ENTMCNC: 31.2 G/DL (ref 33.6–35)
MCHC RBC AUTO-ENTMCNC: 31.3 G/DL (ref 33.6–35)
MCHC RBC AUTO-ENTMCNC: 31.3 G/DL (ref 33.6–35)
MCHC RBC AUTO-ENTMCNC: 31.4 G/DL (ref 33.6–35)
MCHC RBC AUTO-ENTMCNC: 31.6 G/DL (ref 33.6–35)
MCHC RBC AUTO-ENTMCNC: 31.7 G/DL (ref 33.6–35)
MCHC RBC AUTO-ENTMCNC: 32 G/DL (ref 33.6–35)
MCHC RBC AUTO-ENTMCNC: 32.1 G/DL (ref 33.6–35)
MCHC RBC AUTO-ENTMCNC: 32.6 G/DL (ref 33.6–35)
MCHC RBC AUTO-ENTMCNC: 32.7 G/DL (ref 33.6–35)
MCHC RBC AUTO-ENTMCNC: 32.8 G/DL (ref 33.6–35)
MCHC RBC AUTO-ENTMCNC: 32.8 G/DL (ref 33.6–35)
MCHC RBC AUTO-ENTMCNC: 33 G/DL (ref 33.6–35)
MCHC RBC AUTO-ENTMCNC: 33 G/DL (ref 33.6–35)
MCHC RBC AUTO-ENTMCNC: 33.4 G/DL (ref 33.6–35)
MCV RBC AUTO: 100.6 FL (ref 81.4–97.8)
MCV RBC AUTO: 101.5 FL (ref 81.4–97.8)
MCV RBC AUTO: 102.6 FL (ref 81.4–97.8)
MCV RBC AUTO: 88.9 FL (ref 81.4–97.8)
MCV RBC AUTO: 89.1 FL (ref 81.4–97.8)
MCV RBC AUTO: 90.6 FL (ref 81.4–97.8)
MCV RBC AUTO: 90.6 FL (ref 81.4–97.8)
MCV RBC AUTO: 91 FL (ref 81.4–97.8)
MCV RBC AUTO: 91.1 FL (ref 81.4–97.8)
MCV RBC AUTO: 91.8 FL (ref 81.4–97.8)
MCV RBC AUTO: 93.2 FL (ref 81.4–97.8)
MCV RBC AUTO: 93.5 FL (ref 81.4–97.8)
MCV RBC AUTO: 93.8 FL (ref 81.4–97.8)
MCV RBC AUTO: 93.9 FL (ref 81.4–97.8)
MCV RBC AUTO: 93.9 FL (ref 81.4–97.8)
MCV RBC AUTO: 94.4 FL (ref 81.4–97.8)
MCV RBC AUTO: 94.8 FL (ref 81.4–97.8)
MCV RBC AUTO: 95.1 FL (ref 81.4–97.8)
MCV RBC AUTO: 95.3 FL (ref 81.4–97.8)
MCV RBC AUTO: 95.5 FL (ref 81.4–97.8)
MCV RBC AUTO: 95.8 FL (ref 81.4–97.8)
MCV RBC AUTO: 97 FL (ref 81.4–97.8)
MCV RBC AUTO: 97.2 FL (ref 81.4–97.8)
MCV RBC AUTO: 97.8 FL (ref 81.4–97.8)
MCV RBC AUTO: 98.1 FL (ref 81.4–97.8)
MCV RBC AUTO: 98.1 FL (ref 81.4–97.8)
MCV RBC AUTO: 98.2 FL (ref 81.4–97.8)
MCV RBC AUTO: 98.4 FL (ref 81.4–97.8)
MCV RBC AUTO: 98.9 FL (ref 81.4–97.8)
MCV RBC AUTO: 99 FL (ref 81.4–97.8)
MCV RBC AUTO: 99.2 FL (ref 81.4–97.8)
MCV RBC AUTO: 99.2 FL (ref 81.4–97.8)
MCV RBC AUTO: 99.7 FL (ref 81.4–97.8)
MICRO URNS: ABNORMAL
MONOCYTES # BLD AUTO: 0.61 K/UL (ref 0–0.85)
MONOCYTES # BLD AUTO: 0.77 K/UL (ref 0–0.85)
MONOCYTES # BLD AUTO: 0.77 K/UL (ref 0–0.85)
MONOCYTES # BLD AUTO: 0.8 K/UL (ref 0–0.85)
MONOCYTES # BLD AUTO: 0.81 K/UL (ref 0–0.85)
MONOCYTES # BLD AUTO: 0.81 K/UL (ref 0–0.85)
MONOCYTES # BLD AUTO: 0.83 K/UL (ref 0–0.85)
MONOCYTES # BLD AUTO: 0.84 K/UL (ref 0–0.85)
MONOCYTES # BLD AUTO: 0.87 K/UL (ref 0–0.85)
MONOCYTES # BLD AUTO: 0.88 K/UL (ref 0–0.85)
MONOCYTES # BLD AUTO: 0.9 K/UL (ref 0–0.85)
MONOCYTES # BLD AUTO: 0.91 K/UL (ref 0–0.85)
MONOCYTES # BLD AUTO: 0.98 K/UL (ref 0–0.85)
MONOCYTES # BLD AUTO: 0.99 K/UL (ref 0–0.85)
MONOCYTES # BLD AUTO: 1 K/UL (ref 0–0.85)
MONOCYTES # BLD AUTO: 1.02 K/UL (ref 0–0.85)
MONOCYTES # BLD AUTO: 1.05 K/UL (ref 0–0.85)
MONOCYTES # BLD AUTO: 1.45 K/UL (ref 0–0.85)
MONOCYTES NFR BLD AUTO: 10 % (ref 0–13.4)
MONOCYTES NFR BLD AUTO: 10 % (ref 0–13.4)
MONOCYTES NFR BLD AUTO: 10.1 % (ref 0–13.4)
MONOCYTES NFR BLD AUTO: 10.3 % (ref 0–13.4)
MONOCYTES NFR BLD AUTO: 10.4 % (ref 0–13.4)
MONOCYTES NFR BLD AUTO: 10.8 % (ref 0–13.4)
MONOCYTES NFR BLD AUTO: 11.4 % (ref 0–13.4)
MONOCYTES NFR BLD AUTO: 11.5 % (ref 0–13.4)
MONOCYTES NFR BLD AUTO: 12.2 % (ref 0–13.4)
MONOCYTES NFR BLD AUTO: 6 % (ref 0–13.4)
MONOCYTES NFR BLD AUTO: 6.9 % (ref 0–13.4)
MONOCYTES NFR BLD AUTO: 7.1 % (ref 0–13.4)
MONOCYTES NFR BLD AUTO: 8 % (ref 0–13.4)
MONOCYTES NFR BLD AUTO: 8.2 % (ref 0–13.4)
MONOCYTES NFR BLD AUTO: 9.3 % (ref 0–13.4)
MONOCYTES NFR BLD AUTO: 9.3 % (ref 0–13.4)
MONOCYTES NFR BLD AUTO: 9.5 % (ref 0–13.4)
MONOCYTES NFR BLD AUTO: 9.9 % (ref 0–13.4)
MORPHOLOGY BLD-IMP: NORMAL
MORPHOLOGY BLD-IMP: NORMAL
MYELOCYTES NFR BLD MANUAL: 1.7 %
NEUTROPHILS # BLD AUTO: 10.46 K/UL (ref 2–7.15)
NEUTROPHILS # BLD AUTO: 10.82 K/UL (ref 2–7.15)
NEUTROPHILS # BLD AUTO: 3.98 K/UL (ref 2–7.15)
NEUTROPHILS # BLD AUTO: 4.86 K/UL (ref 2–7.15)
NEUTROPHILS # BLD AUTO: 5.27 K/UL (ref 2–7.15)
NEUTROPHILS # BLD AUTO: 5.4 K/UL (ref 2–7.15)
NEUTROPHILS # BLD AUTO: 5.45 K/UL (ref 2–7.15)
NEUTROPHILS # BLD AUTO: 6.37 K/UL (ref 2–7.15)
NEUTROPHILS # BLD AUTO: 6.66 K/UL (ref 2–7.15)
NEUTROPHILS # BLD AUTO: 7.03 K/UL (ref 2–7.15)
NEUTROPHILS # BLD AUTO: 7.05 K/UL (ref 2–7.15)
NEUTROPHILS # BLD AUTO: 7.12 K/UL (ref 2–7.15)
NEUTROPHILS # BLD AUTO: 7.14 K/UL (ref 2–7.15)
NEUTROPHILS # BLD AUTO: 7.35 K/UL (ref 2–7.15)
NEUTROPHILS # BLD AUTO: 7.56 K/UL (ref 2–7.15)
NEUTROPHILS # BLD AUTO: 7.62 K/UL (ref 2–7.15)
NEUTROPHILS # BLD AUTO: 8.05 K/UL (ref 2–7.15)
NEUTROPHILS # BLD AUTO: 9.56 K/UL (ref 2–7.15)
NEUTROPHILS NFR BLD: 56.3 % (ref 44–72)
NEUTROPHILS NFR BLD: 59.9 % (ref 44–72)
NEUTROPHILS NFR BLD: 64.8 % (ref 44–72)
NEUTROPHILS NFR BLD: 67.3 % (ref 44–72)
NEUTROPHILS NFR BLD: 68 % (ref 44–72)
NEUTROPHILS NFR BLD: 68.2 % (ref 44–72)
NEUTROPHILS NFR BLD: 70.1 % (ref 44–72)
NEUTROPHILS NFR BLD: 71 % (ref 44–72)
NEUTROPHILS NFR BLD: 74.4 % (ref 44–72)
NEUTROPHILS NFR BLD: 74.6 % (ref 44–72)
NEUTROPHILS NFR BLD: 75.5 % (ref 44–72)
NEUTROPHILS NFR BLD: 76.4 % (ref 44–72)
NEUTROPHILS NFR BLD: 76.6 % (ref 44–72)
NEUTROPHILS NFR BLD: 77.2 % (ref 44–72)
NEUTROPHILS NFR BLD: 77.4 % (ref 44–72)
NEUTROPHILS NFR BLD: 80 % (ref 44–72)
NEUTROPHILS NFR BLD: 82.2 % (ref 44–72)
NEUTROPHILS NFR BLD: 84.7 % (ref 44–72)
NEUTS BAND NFR BLD MANUAL: 0.9 % (ref 0–10)
NITRITE UR QL STRIP.AUTO: POSITIVE
NRBC # BLD AUTO: 0 K/UL
NRBC BLD-RTO: 0 /100 WBC
OVALOCYTES BLD QL SMEAR: NORMAL
PCO2 BLDA: 44.5 MMHG (ref 26–37)
PCO2 TEMP ADJ BLDA: 44.5 MMHG (ref 26–37)
PH BLDA: 7.36 [PH] (ref 7.4–7.5)
PH TEMP ADJ BLDA: 7.36 [PH] (ref 7.4–7.5)
PH UR STRIP.AUTO: 5 [PH]
PHOSPHATE SERPL-MCNC: 2.4 MG/DL (ref 2.5–4.5)
PHOSPHATE SERPL-MCNC: 2.4 MG/DL (ref 2.5–4.5)
PHOSPHATE SERPL-MCNC: 2.8 MG/DL (ref 2.5–4.5)
PHOSPHATE SERPL-MCNC: 2.8 MG/DL (ref 2.5–4.5)
PHOSPHATE SERPL-MCNC: 2.9 MG/DL (ref 2.5–4.5)
PHOSPHATE SERPL-MCNC: 3.2 MG/DL (ref 2.5–4.5)
PHOSPHATE SERPL-MCNC: 3.5 MG/DL (ref 2.5–4.5)
PLATELET # BLD AUTO: 180 K/UL (ref 164–446)
PLATELET # BLD AUTO: 195 K/UL (ref 164–446)
PLATELET # BLD AUTO: 213 K/UL (ref 164–446)
PLATELET # BLD AUTO: 219 K/UL (ref 164–446)
PLATELET # BLD AUTO: 221 K/UL (ref 164–446)
PLATELET # BLD AUTO: 223 K/UL (ref 164–446)
PLATELET # BLD AUTO: 227 K/UL (ref 164–446)
PLATELET # BLD AUTO: 230 K/UL (ref 164–446)
PLATELET # BLD AUTO: 256 K/UL (ref 164–446)
PLATELET # BLD AUTO: 262 K/UL (ref 164–446)
PLATELET # BLD AUTO: 265 K/UL (ref 164–446)
PLATELET # BLD AUTO: 274 K/UL (ref 164–446)
PLATELET # BLD AUTO: 275 K/UL (ref 164–446)
PLATELET # BLD AUTO: 275 K/UL (ref 164–446)
PLATELET # BLD AUTO: 278 K/UL (ref 164–446)
PLATELET # BLD AUTO: 291 K/UL (ref 164–446)
PLATELET # BLD AUTO: 300 K/UL (ref 164–446)
PLATELET # BLD AUTO: 312 K/UL (ref 164–446)
PLATELET # BLD AUTO: 318 K/UL (ref 164–446)
PLATELET # BLD AUTO: 318 K/UL (ref 164–446)
PLATELET # BLD AUTO: 327 K/UL (ref 164–446)
PLATELET # BLD AUTO: 337 K/UL (ref 164–446)
PLATELET # BLD AUTO: 340 K/UL (ref 164–446)
PLATELET # BLD AUTO: 346 K/UL (ref 164–446)
PLATELET # BLD AUTO: 351 K/UL (ref 164–446)
PLATELET # BLD AUTO: 358 K/UL (ref 164–446)
PLATELET # BLD AUTO: 360 K/UL (ref 164–446)
PLATELET # BLD AUTO: 388 K/UL (ref 164–446)
PLATELET # BLD AUTO: 389 K/UL (ref 164–446)
PLATELET # BLD AUTO: 389 K/UL (ref 164–446)
PLATELET # BLD AUTO: 393 K/UL (ref 164–446)
PLATELET # BLD AUTO: 399 K/UL (ref 164–446)
PLATELET # BLD AUTO: 422 K/UL (ref 164–446)
PLATELET BLD QL SMEAR: NORMAL
PLATELET BLD QL SMEAR: NORMAL
PMV BLD AUTO: 10 FL (ref 9–12.9)
PMV BLD AUTO: 9 FL (ref 9–12.9)
PMV BLD AUTO: 9.1 FL (ref 9–12.9)
PMV BLD AUTO: 9.2 FL (ref 9–12.9)
PMV BLD AUTO: 9.3 FL (ref 9–12.9)
PMV BLD AUTO: 9.4 FL (ref 9–12.9)
PMV BLD AUTO: 9.5 FL (ref 9–12.9)
PMV BLD AUTO: 9.6 FL (ref 9–12.9)
PMV BLD AUTO: 9.7 FL (ref 9–12.9)
PMV BLD AUTO: 9.8 FL (ref 9–12.9)
PO2 BLDA: 119.7 MMHG (ref 64–87)
POIKILOCYTOSIS BLD QL SMEAR: NORMAL
POLYCHROMASIA BLD QL SMEAR: NORMAL
POTASSIUM SERPL-SCNC: 3.1 MMOL/L (ref 3.6–5.5)
POTASSIUM SERPL-SCNC: 3.2 MMOL/L (ref 3.6–5.5)
POTASSIUM SERPL-SCNC: 3.4 MMOL/L (ref 3.6–5.5)
POTASSIUM SERPL-SCNC: 3.4 MMOL/L (ref 3.6–5.5)
POTASSIUM SERPL-SCNC: 3.5 MMOL/L (ref 3.6–5.5)
POTASSIUM SERPL-SCNC: 3.6 MMOL/L (ref 3.6–5.5)
POTASSIUM SERPL-SCNC: 3.7 MMOL/L (ref 3.6–5.5)
POTASSIUM SERPL-SCNC: 3.7 MMOL/L (ref 3.6–5.5)
POTASSIUM SERPL-SCNC: 3.8 MMOL/L (ref 3.6–5.5)
POTASSIUM SERPL-SCNC: 3.8 MMOL/L (ref 3.6–5.5)
POTASSIUM SERPL-SCNC: 3.9 MMOL/L (ref 3.6–5.5)
POTASSIUM SERPL-SCNC: 4.1 MMOL/L (ref 3.6–5.5)
POTASSIUM SERPL-SCNC: 4.1 MMOL/L (ref 3.6–5.5)
POTASSIUM SERPL-SCNC: 4.2 MMOL/L (ref 3.6–5.5)
POTASSIUM SERPL-SCNC: 4.4 MMOL/L (ref 3.6–5.5)
POTASSIUM SERPL-SCNC: 4.5 MMOL/L (ref 3.6–5.5)
POTASSIUM SERPL-SCNC: 4.6 MMOL/L (ref 3.6–5.5)
POTASSIUM SERPL-SCNC: 4.7 MMOL/L (ref 3.6–5.5)
POTASSIUM SERPL-SCNC: 4.8 MMOL/L (ref 3.6–5.5)
POTASSIUM SERPL-SCNC: 4.9 MMOL/L (ref 3.6–5.5)
POTASSIUM SERPL-SCNC: 5 MMOL/L (ref 3.6–5.5)
POTASSIUM SERPL-SCNC: 5 MMOL/L (ref 3.6–5.5)
POTASSIUM SERPL-SCNC: 5.1 MMOL/L (ref 3.6–5.5)
POTASSIUM SERPL-SCNC: 5.1 MMOL/L (ref 3.6–5.5)
POTASSIUM SERPL-SCNC: 5.2 MMOL/L (ref 3.6–5.5)
POTASSIUM SERPL-SCNC: 5.3 MMOL/L (ref 3.6–5.5)
PREALB SERPL-MCNC: 11 MG/DL (ref 18–38)
PROT SERPL-MCNC: 4.9 G/DL (ref 6–8.2)
PROT SERPL-MCNC: 5.1 G/DL (ref 6–8.2)
PROT SERPL-MCNC: 5.1 G/DL (ref 6–8.2)
PROT SERPL-MCNC: 5.4 G/DL (ref 6–8.2)
PROT SERPL-MCNC: 5.5 G/DL (ref 6–8.2)
PROT SERPL-MCNC: 5.6 G/DL (ref 6–8.2)
PROT SERPL-MCNC: 5.8 G/DL (ref 6–8.2)
PROT SERPL-MCNC: 6 G/DL (ref 6–8.2)
PROT SERPL-MCNC: 6.1 G/DL (ref 6–8.2)
PROT SERPL-MCNC: 6.3 G/DL (ref 6–8.2)
PROT SERPL-MCNC: 6.3 G/DL (ref 6–8.2)
PROT SERPL-MCNC: 6.4 G/DL (ref 6–8.2)
PROT SERPL-MCNC: 6.8 G/DL (ref 6–8.2)
PROT UR QL STRIP: NEGATIVE MG/DL
PROTHROMBIN TIME: 14.3 SEC (ref 12–14.6)
PROTHROMBIN TIME: 14.3 SEC (ref 12–14.6)
PROTHROMBIN TIME: 14.5 SEC (ref 12–14.6)
PROTHROMBIN TIME: 14.7 SEC (ref 12–14.6)
PROTHROMBIN TIME: 14.8 SEC (ref 12–14.6)
PROTHROMBIN TIME: 15 SEC (ref 12–14.6)
PROTHROMBIN TIME: 15.7 SEC (ref 12–14.6)
PROTHROMBIN TIME: 15.8 SEC (ref 12–14.6)
PROTHROMBIN TIME: 16 SEC (ref 12–14.6)
PROTHROMBIN TIME: 16.8 SEC (ref 12–14.6)
PROTHROMBIN TIME: 16.9 SEC (ref 12–14.6)
PROTHROMBIN TIME: 17.7 SEC (ref 12–14.6)
PROTHROMBIN TIME: 17.9 SEC (ref 12–14.6)
PROTHROMBIN TIME: 21.7 SEC (ref 12–14.6)
PROTHROMBIN TIME: 22.8 SEC (ref 12–14.6)
PROTHROMBIN TIME: 26.3 SEC (ref 12–14.6)
PROTHROMBIN TIME: 28.1 SEC (ref 12–14.6)
RBC # BLD AUTO: 2.65 M/UL (ref 4.2–5.4)
RBC # BLD AUTO: 2.74 M/UL (ref 4.2–5.4)
RBC # BLD AUTO: 2.91 M/UL (ref 4.2–5.4)
RBC # BLD AUTO: 3.09 M/UL (ref 4.2–5.4)
RBC # BLD AUTO: 3.25 M/UL (ref 4.2–5.4)
RBC # BLD AUTO: 3.32 M/UL (ref 4.2–5.4)
RBC # BLD AUTO: 3.35 M/UL (ref 4.2–5.4)
RBC # BLD AUTO: 3.4 M/UL (ref 4.2–5.4)
RBC # BLD AUTO: 3.4 M/UL (ref 4.2–5.4)
RBC # BLD AUTO: 3.44 M/UL (ref 4.2–5.4)
RBC # BLD AUTO: 3.5 M/UL (ref 4.2–5.4)
RBC # BLD AUTO: 3.53 M/UL (ref 4.2–5.4)
RBC # BLD AUTO: 3.54 M/UL (ref 4.2–5.4)
RBC # BLD AUTO: 3.55 M/UL (ref 4.2–5.4)
RBC # BLD AUTO: 3.56 M/UL (ref 4.2–5.4)
RBC # BLD AUTO: 3.59 M/UL (ref 4.2–5.4)
RBC # BLD AUTO: 3.62 M/UL (ref 4.2–5.4)
RBC # BLD AUTO: 3.62 M/UL (ref 4.2–5.4)
RBC # BLD AUTO: 3.63 M/UL (ref 4.2–5.4)
RBC # BLD AUTO: 3.65 M/UL (ref 4.2–5.4)
RBC # BLD AUTO: 3.68 M/UL (ref 4.2–5.4)
RBC # BLD AUTO: 3.7 M/UL (ref 4.2–5.4)
RBC # BLD AUTO: 3.72 M/UL (ref 4.2–5.4)
RBC # BLD AUTO: 3.73 M/UL (ref 4.2–5.4)
RBC # BLD AUTO: 3.74 M/UL (ref 4.2–5.4)
RBC # BLD AUTO: 3.85 M/UL (ref 4.2–5.4)
RBC # BLD AUTO: 3.86 M/UL (ref 4.2–5.4)
RBC # BLD AUTO: 3.87 M/UL (ref 4.2–5.4)
RBC # BLD AUTO: 3.91 M/UL (ref 4.2–5.4)
RBC # BLD AUTO: 3.99 M/UL (ref 4.2–5.4)
RBC # BLD AUTO: 4.12 M/UL (ref 4.2–5.4)
RBC # URNS HPF: ABNORMAL /HPF
RBC BLD AUTO: PRESENT
RBC BLD AUTO: PRESENT
RBC UR QL AUTO: ABNORMAL
SAO2 % BLDA: 97.1 % (ref 93–99)
SIGNIFICANT IND 70042: ABNORMAL
SIGNIFICANT IND 70042: NORMAL
SITE SITE: ABNORMAL
SITE SITE: NORMAL
SMUDGE CELLS BLD QL SMEAR: NORMAL
SODIUM SERPL-SCNC: 133 MMOL/L (ref 135–145)
SODIUM SERPL-SCNC: 134 MMOL/L (ref 135–145)
SODIUM SERPL-SCNC: 134 MMOL/L (ref 135–145)
SODIUM SERPL-SCNC: 136 MMOL/L (ref 135–145)
SODIUM SERPL-SCNC: 137 MMOL/L (ref 135–145)
SODIUM SERPL-SCNC: 137 MMOL/L (ref 135–145)
SODIUM SERPL-SCNC: 138 MMOL/L (ref 135–145)
SODIUM SERPL-SCNC: 139 MMOL/L (ref 135–145)
SODIUM SERPL-SCNC: 140 MMOL/L (ref 135–145)
SODIUM SERPL-SCNC: 141 MMOL/L (ref 135–145)
SODIUM SERPL-SCNC: 142 MMOL/L (ref 135–145)
SODIUM SERPL-SCNC: 143 MMOL/L (ref 135–145)
SODIUM SERPL-SCNC: 144 MMOL/L (ref 135–145)
SODIUM SERPL-SCNC: 145 MMOL/L (ref 135–145)
SOURCE SOURCE: ABNORMAL
SOURCE SOURCE: NORMAL
SP GR UR STRIP.AUTO: 1.02
TIBC SERPL-MCNC: 221 UG/DL (ref 250–450)
TRIGL SERPL-MCNC: 69 MG/DL (ref 0–149)
TROPONIN I SERPL-MCNC: 0.01 NG/ML (ref 0–0.04)
TROPONIN I SERPL-MCNC: 0.02 NG/ML (ref 0–0.04)
TROPONIN I SERPL-MCNC: 0.05 NG/ML (ref 0–0.04)
TROPONIN I SERPL-MCNC: 0.12 NG/ML (ref 0–0.04)
TROPONIN I SERPL-MCNC: 0.13 NG/ML (ref 0–0.04)
TROPONIN I SERPL-MCNC: 0.31 NG/ML (ref 0–0.04)
TSH SERPL DL<=0.005 MIU/L-ACNC: 1.19 UIU/ML (ref 0.38–5.33)
UROBILINOGEN UR STRIP.AUTO-MCNC: 0.2 MG/DL
VANCOMYCIN TROUGH SERPL-MCNC: 18.3 UG/ML (ref 10–20)
VANCOMYCIN TROUGH SERPL-MCNC: 19.1 UG/ML (ref 10–20)
VIT B12 SERPL-MCNC: 332 PG/ML (ref 211–911)
WBC # BLD AUTO: 10 K/UL (ref 4.8–10.8)
WBC # BLD AUTO: 10.4 K/UL (ref 4.8–10.8)
WBC # BLD AUTO: 10.5 K/UL (ref 4.8–10.8)
WBC # BLD AUTO: 10.6 K/UL (ref 4.8–10.8)
WBC # BLD AUTO: 10.6 K/UL (ref 4.8–10.8)
WBC # BLD AUTO: 11.3 K/UL (ref 4.8–10.8)
WBC # BLD AUTO: 11.6 K/UL (ref 4.8–10.8)
WBC # BLD AUTO: 12 K/UL (ref 4.8–10.8)
WBC # BLD AUTO: 12.1 K/UL (ref 4.8–10.8)
WBC # BLD AUTO: 12.5 K/UL (ref 4.8–10.8)
WBC # BLD AUTO: 12.8 K/UL (ref 4.8–10.8)
WBC # BLD AUTO: 14.1 K/UL (ref 4.8–10.8)
WBC # BLD AUTO: 14.6 K/UL (ref 4.8–10.8)
WBC # BLD AUTO: 15.3 K/UL (ref 4.8–10.8)
WBC # BLD AUTO: 7.1 K/UL (ref 4.8–10.8)
WBC # BLD AUTO: 7.4 K/UL (ref 4.8–10.8)
WBC # BLD AUTO: 7.4 K/UL (ref 4.8–10.8)
WBC # BLD AUTO: 7.7 K/UL (ref 4.8–10.8)
WBC # BLD AUTO: 8 K/UL (ref 4.8–10.8)
WBC # BLD AUTO: 8.1 K/UL (ref 4.8–10.8)
WBC # BLD AUTO: 8.4 K/UL (ref 4.8–10.8)
WBC # BLD AUTO: 8.8 K/UL (ref 4.8–10.8)
WBC # BLD AUTO: 8.8 K/UL (ref 4.8–10.8)
WBC # BLD AUTO: 9.3 K/UL (ref 4.8–10.8)
WBC # BLD AUTO: 9.3 K/UL (ref 4.8–10.8)
WBC # BLD AUTO: 9.5 K/UL (ref 4.8–10.8)
WBC # BLD AUTO: 9.5 K/UL (ref 4.8–10.8)
WBC # BLD AUTO: 9.8 K/UL (ref 4.8–10.8)
WBC #/AREA URNS HPF: ABNORMAL /HPF

## 2019-01-01 PROCEDURE — 74174 CTA ABD&PLVS W/CONTRAST: CPT

## 2019-01-01 PROCEDURE — 82962 GLUCOSE BLOOD TEST: CPT | Mod: 91

## 2019-01-01 PROCEDURE — A9270 NON-COVERED ITEM OR SERVICE: HCPCS | Performed by: INTERNAL MEDICINE

## 2019-01-01 PROCEDURE — 85730 THROMBOPLASTIN TIME PARTIAL: CPT

## 2019-01-01 PROCEDURE — 92950 HEART/LUNG RESUSCITATION CPR: CPT

## 2019-01-01 PROCEDURE — 70549 MR ANGIOGRAPH NECK W/O&W/DYE: CPT

## 2019-01-01 PROCEDURE — 85610 PROTHROMBIN TIME: CPT

## 2019-01-01 PROCEDURE — 97162 PT EVAL MOD COMPLEX 30 MIN: CPT

## 2019-01-01 PROCEDURE — 700105 HCHG RX REV CODE 258

## 2019-01-01 PROCEDURE — A9270 NON-COVERED ITEM OR SERVICE: HCPCS | Performed by: HOSPITALIST

## 2019-01-01 PROCEDURE — 82962 GLUCOSE BLOOD TEST: CPT

## 2019-01-01 PROCEDURE — 93922 UPR/L XTREMITY ART 2 LEVELS: CPT

## 2019-01-01 PROCEDURE — 700111 HCHG RX REV CODE 636 W/ 250 OVERRIDE (IP): Performed by: INTERNAL MEDICINE

## 2019-01-01 PROCEDURE — A9585 GADOBUTROL INJECTION: HCPCS | Performed by: INTERNAL MEDICINE

## 2019-01-01 PROCEDURE — 700102 HCHG RX REV CODE 250 W/ 637 OVERRIDE(OP): Performed by: HOSPITALIST

## 2019-01-01 PROCEDURE — 83605 ASSAY OF LACTIC ACID: CPT

## 2019-01-01 PROCEDURE — 700105 HCHG RX REV CODE 258: Performed by: INTERNAL MEDICINE

## 2019-01-01 PROCEDURE — 700111 HCHG RX REV CODE 636 W/ 250 OVERRIDE (IP): Performed by: HOSPITALIST

## 2019-01-01 PROCEDURE — 700102 HCHG RX REV CODE 250 W/ 637 OVERRIDE(OP): Performed by: INTERNAL MEDICINE

## 2019-01-01 PROCEDURE — 99233 SBSQ HOSP IP/OBS HIGH 50: CPT | Performed by: INTERNAL MEDICINE

## 2019-01-01 PROCEDURE — 82550 ASSAY OF CK (CPK): CPT

## 2019-01-01 PROCEDURE — 93005 ELECTROCARDIOGRAM TRACING: CPT | Performed by: INTERNAL MEDICINE

## 2019-01-01 PROCEDURE — 87186 SC STD MICRODIL/AGAR DIL: CPT

## 2019-01-01 PROCEDURE — 85025 COMPLETE CBC W/AUTO DIFF WBC: CPT

## 2019-01-01 PROCEDURE — 96372 THER/PROPH/DIAG INJ SC/IM: CPT

## 2019-01-01 PROCEDURE — 84134 ASSAY OF PREALBUMIN: CPT

## 2019-01-01 PROCEDURE — 80053 COMPREHEN METABOLIC PANEL: CPT

## 2019-01-01 PROCEDURE — A9270 NON-COVERED ITEM OR SERVICE: HCPCS | Performed by: NURSE PRACTITIONER

## 2019-01-01 PROCEDURE — 87086 URINE CULTURE/COLONY COUNT: CPT

## 2019-01-01 PROCEDURE — 80048 BASIC METABOLIC PNL TOTAL CA: CPT

## 2019-01-01 PROCEDURE — 85730 THROMBOPLASTIN TIME PARTIAL: CPT | Mod: 91

## 2019-01-01 PROCEDURE — 94760 N-INVAS EAR/PLS OXIMETRY 1: CPT

## 2019-01-01 PROCEDURE — 36415 COLL VENOUS BLD VENIPUNCTURE: CPT

## 2019-01-01 PROCEDURE — 99285 EMERGENCY DEPT VISIT HI MDM: CPT

## 2019-01-01 PROCEDURE — 73630 X-RAY EXAM OF FOOT: CPT | Mod: RT

## 2019-01-01 PROCEDURE — 85027 COMPLETE CBC AUTOMATED: CPT

## 2019-01-01 PROCEDURE — 83735 ASSAY OF MAGNESIUM: CPT

## 2019-01-01 PROCEDURE — 770006 HCHG ROOM/CARE - MED/SURG/GYN SEMI*

## 2019-01-01 PROCEDURE — 80069 RENAL FUNCTION PANEL: CPT

## 2019-01-01 PROCEDURE — 73720 MRI LWR EXTREMITY W/O&W/DYE: CPT | Mod: RT

## 2019-01-01 PROCEDURE — 338960 DRESSING AG HYDROFIBER,AQUACEL RIBBON: Performed by: FAMILY MEDICINE

## 2019-01-01 PROCEDURE — 93010 ELECTROCARDIOGRAM REPORT: CPT | Mod: 76 | Performed by: INTERNAL MEDICINE

## 2019-01-01 PROCEDURE — 93306 TTE W/DOPPLER COMPLETE: CPT

## 2019-01-01 PROCEDURE — 85007 BL SMEAR W/DIFF WBC COUNT: CPT

## 2019-01-01 PROCEDURE — 302244 HCHG LTACH STAT

## 2019-01-01 PROCEDURE — 05HY33Z INSERTION OF INFUSION DEVICE INTO UPPER VEIN, PERCUTANEOUS APPROACH: ICD-10-PCS | Performed by: INTERNAL MEDICINE

## 2019-01-01 PROCEDURE — 99291 CRITICAL CARE FIRST HOUR: CPT | Performed by: INTERNAL MEDICINE

## 2019-01-01 PROCEDURE — 71045 X-RAY EXAM CHEST 1 VIEW: CPT

## 2019-01-01 PROCEDURE — 700105 HCHG RX REV CODE 258: Performed by: NURSE PRACTITIONER

## 2019-01-01 PROCEDURE — 700101 HCHG RX REV CODE 250: Performed by: INTERNAL MEDICINE

## 2019-01-01 PROCEDURE — 700117 HCHG RX CONTRAST REV CODE 255: Performed by: SURGERY

## 2019-01-01 PROCEDURE — 700105 HCHG RX REV CODE 258: Performed by: HOSPITALIST

## 2019-01-01 PROCEDURE — 96366 THER/PROPH/DIAG IV INF ADDON: CPT

## 2019-01-01 PROCEDURE — 94640 AIRWAY INHALATION TREATMENT: CPT

## 2019-01-01 PROCEDURE — 99232 SBSQ HOSP IP/OBS MODERATE 35: CPT | Performed by: HOSPITALIST

## 2019-01-01 PROCEDURE — 96365 THER/PROPH/DIAG IV INF INIT: CPT

## 2019-01-01 PROCEDURE — 99233 SBSQ HOSP IP/OBS HIGH 50: CPT | Performed by: HOSPITALIST

## 2019-01-01 PROCEDURE — 87077 CULTURE AEROBIC IDENTIFY: CPT

## 2019-01-01 PROCEDURE — 700111 HCHG RX REV CODE 636 W/ 250 OVERRIDE (IP): Mod: JG | Performed by: NURSE PRACTITIONER

## 2019-01-01 PROCEDURE — 700101 HCHG RX REV CODE 250: Performed by: HOSPITALIST

## 2019-01-01 PROCEDURE — 93970 EXTREMITY STUDY: CPT

## 2019-01-01 PROCEDURE — 303747 HCHG EXTRA SUTURE

## 2019-01-01 PROCEDURE — 97165 OT EVAL LOW COMPLEX 30 MIN: CPT

## 2019-01-01 PROCEDURE — 302146: Performed by: HOSPITALIST

## 2019-01-01 PROCEDURE — 83540 ASSAY OF IRON: CPT

## 2019-01-01 PROCEDURE — 99222 1ST HOSP IP/OBS MODERATE 55: CPT | Performed by: PSYCHIATRY & NEUROLOGY

## 2019-01-01 PROCEDURE — 99239 HOSP IP/OBS DSCHRG MGMT >30: CPT | Performed by: INTERNAL MEDICINE

## 2019-01-01 PROCEDURE — 85652 RBC SED RATE AUTOMATED: CPT

## 2019-01-01 PROCEDURE — 93010 ELECTROCARDIOGRAM REPORT: CPT | Performed by: INTERNAL MEDICINE

## 2019-01-01 PROCEDURE — 700102 HCHG RX REV CODE 250 W/ 637 OVERRIDE(OP): Performed by: NURSE PRACTITIONER

## 2019-01-01 PROCEDURE — 302135 SEQUENTIAL COMPRESSION MACHINE: Performed by: INTERNAL MEDICINE

## 2019-01-01 PROCEDURE — 70450 CT HEAD/BRAIN W/O DYE: CPT

## 2019-01-01 PROCEDURE — 97530 THERAPEUTIC ACTIVITIES: CPT

## 2019-01-01 PROCEDURE — 700111 HCHG RX REV CODE 636 W/ 250 OVERRIDE (IP)

## 2019-01-01 PROCEDURE — 80202 ASSAY OF VANCOMYCIN: CPT

## 2019-01-01 PROCEDURE — 770020 HCHG ROOM/CARE - TELE (206)

## 2019-01-01 PROCEDURE — 87205 SMEAR GRAM STAIN: CPT

## 2019-01-01 PROCEDURE — 93005 ELECTROCARDIOGRAM TRACING: CPT | Performed by: FAMILY MEDICINE

## 2019-01-01 PROCEDURE — 700111 HCHG RX REV CODE 636 W/ 250 OVERRIDE (IP): Performed by: FAMILY MEDICINE

## 2019-01-01 PROCEDURE — 99223 1ST HOSP IP/OBS HIGH 75: CPT | Performed by: INTERNAL MEDICINE

## 2019-01-01 PROCEDURE — 84100 ASSAY OF PHOSPHORUS: CPT

## 2019-01-01 PROCEDURE — 90471 IMMUNIZATION ADMIN: CPT

## 2019-01-01 PROCEDURE — 99232 SBSQ HOSP IP/OBS MODERATE 35: CPT | Performed by: INTERNAL MEDICINE

## 2019-01-01 PROCEDURE — 97597 DBRDMT OPN WND 1ST 20 CM/<: CPT

## 2019-01-01 PROCEDURE — 99239 HOSP IP/OBS DSCHRG MGMT >30: CPT | Performed by: HOSPITALIST

## 2019-01-01 PROCEDURE — 306588 SLEEVE,VASO CALF MED: Performed by: INTERNAL MEDICINE

## 2019-01-01 PROCEDURE — 700102 HCHG RX REV CODE 250 W/ 637 OVERRIDE(OP): Performed by: FAMILY MEDICINE

## 2019-01-01 PROCEDURE — 80061 LIPID PANEL: CPT

## 2019-01-01 PROCEDURE — 84484 ASSAY OF TROPONIN QUANT: CPT

## 2019-01-01 PROCEDURE — 83550 IRON BINDING TEST: CPT

## 2019-01-01 PROCEDURE — 700111 HCHG RX REV CODE 636 W/ 250 OVERRIDE (IP): Performed by: EMERGENCY MEDICINE

## 2019-01-01 PROCEDURE — 87040 BLOOD CULTURE FOR BACTERIA: CPT | Mod: 91

## 2019-01-01 PROCEDURE — 93922 UPR/L XTREMITY ART 2 LEVELS: CPT | Mod: 26 | Performed by: SURGERY

## 2019-01-01 PROCEDURE — 700102 HCHG RX REV CODE 250 W/ 637 OVERRIDE(OP): Performed by: EMERGENCY MEDICINE

## 2019-01-01 PROCEDURE — B41F1ZZ FLUOROSCOPY OF RIGHT LOWER EXTREMITY ARTERIES USING LOW OSMOLAR CONTRAST: ICD-10-PCS | Performed by: SURGERY

## 2019-01-01 PROCEDURE — G0378 HOSPITAL OBSERVATION PER HR: HCPCS

## 2019-01-01 PROCEDURE — 70496 CT ANGIOGRAPHY HEAD: CPT

## 2019-01-01 PROCEDURE — 700111 HCHG RX REV CODE 636 W/ 250 OVERRIDE (IP): Mod: JG | Performed by: INTERNAL MEDICINE

## 2019-01-01 PROCEDURE — B41G1ZZ FLUOROSCOPY OF LEFT LOWER EXTREMITY ARTERIES USING LOW OSMOLAR CONTRAST: ICD-10-PCS | Performed by: SURGERY

## 2019-01-01 PROCEDURE — 700101 HCHG RX REV CODE 250: Performed by: EMERGENCY MEDICINE

## 2019-01-01 PROCEDURE — 700117 HCHG RX CONTRAST REV CODE 255: Performed by: INTERNAL MEDICINE

## 2019-01-01 PROCEDURE — 82746 ASSAY OF FOLIC ACID SERUM: CPT

## 2019-01-01 PROCEDURE — 93010 ELECTROCARDIOGRAM REPORT: CPT | Mod: 77 | Performed by: INTERNAL MEDICINE

## 2019-01-01 PROCEDURE — A6212 FOAM DRG <=16 SQ IN W/BORDER: HCPCS | Performed by: INTERNAL MEDICINE

## 2019-01-01 PROCEDURE — 73502 X-RAY EXAM HIP UNI 2-3 VIEWS: CPT | Mod: RT

## 2019-01-01 PROCEDURE — 82803 BLOOD GASES ANY COMBINATION: CPT

## 2019-01-01 PROCEDURE — 90670 PCV13 VACCINE IM: CPT | Performed by: HOSPITALIST

## 2019-01-01 PROCEDURE — 99231 SBSQ HOSP IP/OBS SF/LOW 25: CPT | Performed by: HOSPITALIST

## 2019-01-01 PROCEDURE — 770001 HCHG ROOM/CARE - MED/SURG/GYN PRIV*

## 2019-01-01 PROCEDURE — 700117 HCHG RX CONTRAST REV CODE 255: Performed by: PSYCHIATRY & NEUROLOGY

## 2019-01-01 PROCEDURE — 99232 SBSQ HOSP IP/OBS MODERATE 35: CPT | Performed by: FAMILY MEDICINE

## 2019-01-01 PROCEDURE — A9270 NON-COVERED ITEM OR SERVICE: HCPCS | Performed by: EMERGENCY MEDICINE

## 2019-01-01 PROCEDURE — 81001 URINALYSIS AUTO W/SCOPE: CPT

## 2019-01-01 PROCEDURE — 84484 ASSAY OF TROPONIN QUANT: CPT | Mod: 91

## 2019-01-01 PROCEDURE — 86140 C-REACTIVE PROTEIN: CPT

## 2019-01-01 PROCEDURE — 83036 HEMOGLOBIN GLYCOSYLATED A1C: CPT

## 2019-01-01 PROCEDURE — 70551 MRI BRAIN STEM W/O DYE: CPT

## 2019-01-01 PROCEDURE — 99220 PR INITIAL OBSERVATION CARE,LEVL III: CPT | Performed by: HOSPITALIST

## 2019-01-01 PROCEDURE — B548ZZA ULTRASONOGRAPHY OF SUPERIOR VENA CAVA, GUIDANCE: ICD-10-PCS | Performed by: INTERNAL MEDICINE

## 2019-01-01 PROCEDURE — 93925 LOWER EXTREMITY STUDY: CPT

## 2019-01-01 PROCEDURE — 99284 EMERGENCY DEPT VISIT MOD MDM: CPT

## 2019-01-01 PROCEDURE — 99153 MOD SED SAME PHYS/QHP EA: CPT

## 2019-01-01 PROCEDURE — 304561 HCHG STAT O2

## 2019-01-01 PROCEDURE — 93005 ELECTROCARDIOGRAM TRACING: CPT | Performed by: HOSPITALIST

## 2019-01-01 PROCEDURE — A6213 FOAM DRG >16<=48 SQ IN W/BDR: HCPCS | Performed by: INTERNAL MEDICINE

## 2019-01-01 PROCEDURE — 85014 HEMATOCRIT: CPT

## 2019-01-01 PROCEDURE — 304217 HCHG IRRIGATION SYSTEM

## 2019-01-01 PROCEDURE — A9585 GADOBUTROL INJECTION: HCPCS | Performed by: PSYCHIATRY & NEUROLOGY

## 2019-01-01 PROCEDURE — 97116 GAIT TRAINING THERAPY: CPT

## 2019-01-01 PROCEDURE — 97161 PT EVAL LOW COMPLEX 20 MIN: CPT

## 2019-01-01 PROCEDURE — 73080 X-RAY EXAM OF ELBOW: CPT | Mod: RT

## 2019-01-01 PROCEDURE — 80048 BASIC METABOLIC PNL TOTAL CA: CPT | Mod: 91

## 2019-01-01 PROCEDURE — 99222 1ST HOSP IP/OBS MODERATE 55: CPT | Performed by: INTERNAL MEDICINE

## 2019-01-01 PROCEDURE — 99283 EMERGENCY DEPT VISIT LOW MDM: CPT | Performed by: INTERNAL MEDICINE

## 2019-01-01 PROCEDURE — 93306 TTE W/DOPPLER COMPLETE: CPT | Mod: 26 | Performed by: INTERNAL MEDICINE

## 2019-01-01 PROCEDURE — 99223 1ST HOSP IP/OBS HIGH 75: CPT | Mod: AI | Performed by: INTERNAL MEDICINE

## 2019-01-01 PROCEDURE — 36573 INSJ PICC RS&I 5 YR+: CPT

## 2019-01-01 PROCEDURE — 700105 HCHG RX REV CODE 258: Performed by: EMERGENCY MEDICINE

## 2019-01-01 PROCEDURE — 770021 HCHG ROOM/CARE - ISO PRIVATE

## 2019-01-01 PROCEDURE — 73630 X-RAY EXAM OF FOOT: CPT | Mod: LT

## 2019-01-01 PROCEDURE — 82728 ASSAY OF FERRITIN: CPT

## 2019-01-01 PROCEDURE — 87070 CULTURE OTHR SPECIMN AEROBIC: CPT

## 2019-01-01 PROCEDURE — 82607 VITAMIN B-12: CPT

## 2019-01-01 PROCEDURE — 304999 HCHG REPAIR-SIMPLE/INTERMED LEVEL 1

## 2019-01-01 PROCEDURE — 96367 TX/PROPH/DG ADDL SEQ IV INF: CPT

## 2019-01-01 PROCEDURE — E0191 PROTECTOR HEEL OR ELBOW: HCPCS | Performed by: HOSPITALIST

## 2019-01-01 PROCEDURE — 97535 SELF CARE MNGMENT TRAINING: CPT

## 2019-01-01 PROCEDURE — L4398 FOOT DROP SPLINT PRE OTS: HCPCS

## 2019-01-01 PROCEDURE — 02HV33Z INSERTION OF INFUSION DEVICE INTO SUPERIOR VENA CAVA, PERCUTANEOUS APPROACH: ICD-10-PCS | Performed by: INTERNAL MEDICINE

## 2019-01-01 PROCEDURE — 84443 ASSAY THYROID STIM HORMONE: CPT

## 2019-01-01 PROCEDURE — 85018 HEMOGLOBIN: CPT

## 2019-01-01 PROCEDURE — A6213 FOAM DRG >16<=48 SQ IN W/BDR: HCPCS | Performed by: EMERGENCY MEDICINE

## 2019-01-01 RX ORDER — IPRATROPIUM BROMIDE AND ALBUTEROL SULFATE 2.5; .5 MG/3ML; MG/3ML
3 SOLUTION RESPIRATORY (INHALATION)
Status: DISCONTINUED | OUTPATIENT
Start: 2019-01-01 | End: 2019-01-01

## 2019-01-01 RX ORDER — RISPERIDONE 1 MG/1
2 TABLET ORAL EVERY EVENING
COMMUNITY
End: 2019-01-01

## 2019-01-01 RX ORDER — OMEPRAZOLE 20 MG/1
20 CAPSULE, DELAYED RELEASE ORAL DAILY
Qty: 30 CAP
Start: 2019-01-01

## 2019-01-01 RX ORDER — INSULIN GLARGINE 100 [IU]/ML
27 INJECTION, SOLUTION SUBCUTANEOUS EVERY MORNING
Status: DISCONTINUED | OUTPATIENT
Start: 2019-01-01 | End: 2019-01-01

## 2019-01-01 RX ORDER — INSULIN GLARGINE 100 [IU]/ML
10 INJECTION, SOLUTION SUBCUTANEOUS EVERY EVENING
Status: DISCONTINUED | OUTPATIENT
Start: 2019-01-01 | End: 2019-01-01

## 2019-01-01 RX ORDER — AMOXICILLIN 250 MG
2 CAPSULE ORAL 2 TIMES DAILY
Status: DISCONTINUED | OUTPATIENT
Start: 2019-01-01 | End: 2019-01-01 | Stop reason: HOSPADM

## 2019-01-01 RX ORDER — NYSTATIN 100000 [USP'U]/G
POWDER TOPICAL 2 TIMES DAILY
Status: DISCONTINUED | OUTPATIENT
Start: 2019-01-01 | End: 2019-01-01 | Stop reason: HOSPADM

## 2019-01-01 RX ORDER — HEPARIN SODIUM 1000 [USP'U]/ML
2600 INJECTION, SOLUTION INTRAVENOUS; SUBCUTANEOUS PRN
Status: DISCONTINUED | OUTPATIENT
Start: 2019-01-01 | End: 2019-01-01

## 2019-01-01 RX ORDER — ASPIRIN 325 MG
325 TABLET ORAL DAILY
Status: DISCONTINUED | OUTPATIENT
Start: 2019-01-01 | End: 2019-01-01

## 2019-01-01 RX ORDER — WARFARIN SODIUM 7.5 MG/1
7.5 TABLET ORAL
Status: DISCONTINUED | OUTPATIENT
Start: 2019-01-01 | End: 2019-01-01

## 2019-01-01 RX ORDER — POTASSIUM CHLORIDE 20 MEQ/1
20 TABLET, EXTENDED RELEASE ORAL DAILY
Status: DISCONTINUED | OUTPATIENT
Start: 2019-01-01 | End: 2019-01-01 | Stop reason: HOSPADM

## 2019-01-01 RX ORDER — SODIUM CHLORIDE 9 MG/ML
INJECTION, SOLUTION INTRAVENOUS
Status: COMPLETED
Start: 2019-01-01 | End: 2019-01-01

## 2019-01-01 RX ORDER — CARVEDILOL 6.25 MG/1
6.25 TABLET ORAL 2 TIMES DAILY WITH MEALS
Status: DISCONTINUED | OUTPATIENT
Start: 2019-01-01 | End: 2019-01-01 | Stop reason: HOSPADM

## 2019-01-01 RX ORDER — HYDROMORPHONE HYDROCHLORIDE 1 MG/ML
0.25 INJECTION, SOLUTION INTRAMUSCULAR; INTRAVENOUS; SUBCUTANEOUS
Status: DISCONTINUED | OUTPATIENT
Start: 2019-01-01 | End: 2019-01-01 | Stop reason: HOSPADM

## 2019-01-01 RX ORDER — MIDAZOLAM HYDROCHLORIDE 1 MG/ML
.5-2 INJECTION INTRAMUSCULAR; INTRAVENOUS PRN
Status: ACTIVE | OUTPATIENT
Start: 2019-01-01 | End: 2019-01-01

## 2019-01-01 RX ORDER — IPRATROPIUM BROMIDE AND ALBUTEROL SULFATE 2.5; .5 MG/3ML; MG/3ML
3 SOLUTION RESPIRATORY (INHALATION) 4 TIMES DAILY
Status: DISCONTINUED | OUTPATIENT
Start: 2019-01-01 | End: 2019-01-01

## 2019-01-01 RX ORDER — ONDANSETRON 4 MG/1
4 TABLET, ORALLY DISINTEGRATING ORAL EVERY 4 HOURS PRN
Status: DISCONTINUED | OUTPATIENT
Start: 2019-01-01 | End: 2019-01-01 | Stop reason: HOSPADM

## 2019-01-01 RX ORDER — WARFARIN SODIUM 6 MG/1
6 TABLET ORAL
Status: DISCONTINUED | OUTPATIENT
Start: 2019-01-01 | End: 2019-01-01

## 2019-01-01 RX ORDER — ACETAMINOPHEN 325 MG/1
650 TABLET ORAL EVERY 6 HOURS PRN
Status: DISCONTINUED | OUTPATIENT
Start: 2019-01-01 | End: 2019-01-01 | Stop reason: HOSPADM

## 2019-01-01 RX ORDER — ASPIRIN 81 MG/1
81 TABLET ORAL DAILY
Qty: 30 TAB
Start: 2019-01-01

## 2019-01-01 RX ORDER — WARFARIN SODIUM 6 MG/1
6 TABLET ORAL
Status: COMPLETED | OUTPATIENT
Start: 2019-01-01 | End: 2019-01-01

## 2019-01-01 RX ORDER — GADOBUTROL 604.72 MG/ML
7.5 INJECTION INTRAVENOUS ONCE
Status: COMPLETED | OUTPATIENT
Start: 2019-01-01 | End: 2019-01-01

## 2019-01-01 RX ORDER — INSULIN GLARGINE 100 [IU]/ML
20 INJECTION, SOLUTION SUBCUTANEOUS NIGHTLY
COMMUNITY

## 2019-01-01 RX ORDER — OMEPRAZOLE 20 MG/1
20 CAPSULE, DELAYED RELEASE ORAL DAILY
Status: DISCONTINUED | OUTPATIENT
Start: 2019-01-01 | End: 2019-01-01 | Stop reason: HOSPADM

## 2019-01-01 RX ORDER — FUROSEMIDE 20 MG/1
20 TABLET ORAL DAILY
Status: DISCONTINUED | OUTPATIENT
Start: 2019-01-01 | End: 2019-01-01

## 2019-01-01 RX ORDER — MAGNESIUM SULFATE HEPTAHYDRATE 40 MG/ML
4 INJECTION, SOLUTION INTRAVENOUS ONCE
Status: COMPLETED | OUTPATIENT
Start: 2019-01-01 | End: 2019-01-01

## 2019-01-01 RX ORDER — POTASSIUM CHLORIDE 20 MEQ/1
20 TABLET, EXTENDED RELEASE ORAL DAILY
COMMUNITY
End: 2019-01-01

## 2019-01-01 RX ORDER — DEXTROSE MONOHYDRATE 50 MG/ML
INJECTION, SOLUTION INTRAVENOUS CONTINUOUS
Status: DISCONTINUED | OUTPATIENT
Start: 2019-01-01 | End: 2019-01-01

## 2019-01-01 RX ORDER — PRAVASTATIN SODIUM 20 MG
20 TABLET ORAL NIGHTLY
Status: DISCONTINUED | OUTPATIENT
Start: 2019-01-01 | End: 2019-01-01 | Stop reason: HOSPADM

## 2019-01-01 RX ORDER — ASPIRIN 300 MG/1
300 SUPPOSITORY RECTAL DAILY
Status: DISCONTINUED | OUTPATIENT
Start: 2019-01-01 | End: 2019-01-01

## 2019-01-01 RX ORDER — IPRATROPIUM BROMIDE AND ALBUTEROL SULFATE 2.5; .5 MG/3ML; MG/3ML
3 SOLUTION RESPIRATORY (INHALATION) EVERY 4 HOURS PRN
Qty: 30 BULLET
Start: 2019-01-01

## 2019-01-01 RX ORDER — TRIAMCINOLONE ACETONIDE 1 MG/G
CREAM TOPICAL 2 TIMES DAILY
Status: DISCONTINUED | OUTPATIENT
Start: 2019-01-01 | End: 2019-01-01 | Stop reason: HOSPADM

## 2019-01-01 RX ORDER — FUROSEMIDE 20 MG/1
20 TABLET ORAL DAILY
COMMUNITY
End: 2019-01-01

## 2019-01-01 RX ORDER — INSULIN GLARGINE 100 [IU]/ML
10 INJECTION, SOLUTION SUBCUTANEOUS ONCE
Status: COMPLETED | OUTPATIENT
Start: 2019-01-01 | End: 2019-01-01

## 2019-01-01 RX ORDER — TRAZODONE HYDROCHLORIDE 100 MG/1
100 TABLET ORAL NIGHTLY
COMMUNITY
End: 2019-01-01

## 2019-01-01 RX ORDER — FUROSEMIDE 40 MG/1
20 TABLET ORAL DAILY
Status: DISCONTINUED | OUTPATIENT
Start: 2019-01-01 | End: 2019-01-01 | Stop reason: HOSPADM

## 2019-01-01 RX ORDER — POTASSIUM CHLORIDE 20 MEQ/1
20 TABLET, EXTENDED RELEASE ORAL 2 TIMES DAILY
Qty: 60 TAB | Refills: 11 | Status: SHIPPED | OUTPATIENT
Start: 2019-01-01

## 2019-01-01 RX ORDER — ALBUTEROL SULFATE 90 UG/1
2 AEROSOL, METERED RESPIRATORY (INHALATION) EVERY 4 HOURS PRN
Qty: 8.5 G
Start: 2019-01-01

## 2019-01-01 RX ORDER — LOSARTAN POTASSIUM 50 MG/1
50 TABLET ORAL DAILY
Qty: 30 TAB | Refills: 0 | Status: SHIPPED | OUTPATIENT
Start: 2019-01-01 | End: 2019-01-01

## 2019-01-01 RX ORDER — WARFARIN SODIUM 5 MG/1
5 TABLET ORAL
Status: DISCONTINUED | OUTPATIENT
Start: 2019-01-01 | End: 2019-01-01 | Stop reason: HOSPADM

## 2019-01-01 RX ORDER — HEPARIN SODIUM 1000 [USP'U]/ML
2600 INJECTION, SOLUTION INTRAVENOUS; SUBCUTANEOUS PRN
Status: ACTIVE | OUTPATIENT
Start: 2019-01-01 | End: 2019-01-01

## 2019-01-01 RX ORDER — LISINOPRIL 5 MG/1
5 TABLET ORAL
Status: DISCONTINUED | OUTPATIENT
Start: 2019-01-01 | End: 2019-01-01 | Stop reason: HOSPADM

## 2019-01-01 RX ORDER — WARFARIN SODIUM 7.5 MG/1
3.75 TABLET ORAL
Status: DISCONTINUED | OUTPATIENT
Start: 2019-01-01 | End: 2019-01-01 | Stop reason: HOSPADM

## 2019-01-01 RX ORDER — LISINOPRIL 5 MG/1
5 TABLET ORAL DAILY
Qty: 30 TAB
Start: 2019-01-01

## 2019-01-01 RX ORDER — CLOPIDOGREL BISULFATE 75 MG/1
75 TABLET ORAL EVERY EVENING
Status: DISCONTINUED | OUTPATIENT
Start: 2019-01-01 | End: 2019-01-01 | Stop reason: HOSPADM

## 2019-01-01 RX ORDER — SODIUM CHLORIDE 9 MG/ML
INJECTION, SOLUTION INTRAVENOUS CONTINUOUS
Status: DISCONTINUED | OUTPATIENT
Start: 2019-01-01 | End: 2019-01-01

## 2019-01-01 RX ORDER — IODIXANOL 270 MG/ML
16 INJECTION, SOLUTION INTRAVASCULAR ONCE
Status: COMPLETED | OUTPATIENT
Start: 2019-01-01 | End: 2019-01-01

## 2019-01-01 RX ORDER — GLIPIZIDE 5 MG/1
5 TABLET ORAL 2 TIMES DAILY
COMMUNITY
End: 2019-01-01

## 2019-01-01 RX ORDER — GABAPENTIN 400 MG/1
400 CAPSULE ORAL 2 TIMES DAILY
COMMUNITY
End: 2019-01-01

## 2019-01-01 RX ORDER — POLYETHYLENE GLYCOL 3350 17 G/17G
17 POWDER, FOR SOLUTION ORAL
Refills: 3
Start: 2019-01-01

## 2019-01-01 RX ORDER — ONDANSETRON 4 MG/1
4 TABLET, ORALLY DISINTEGRATING ORAL EVERY 4 HOURS PRN
Qty: 10 TAB | Refills: 0 | Status: SHIPPED | OUTPATIENT
Start: 2019-01-01

## 2019-01-01 RX ORDER — MAGNESIUM SULFATE 1 G/100ML
1 INJECTION INTRAVENOUS ONCE
Status: COMPLETED | OUTPATIENT
Start: 2019-01-01 | End: 2019-01-01

## 2019-01-01 RX ORDER — IPRATROPIUM BROMIDE AND ALBUTEROL SULFATE 2.5; .5 MG/3ML; MG/3ML
3 SOLUTION RESPIRATORY (INHALATION) 4 TIMES DAILY
COMMUNITY
End: 2019-01-01

## 2019-01-01 RX ORDER — FUROSEMIDE 80 MG
80 TABLET ORAL EVERY MORNING
COMMUNITY
End: 2019-01-01

## 2019-01-01 RX ORDER — POLYETHYLENE GLYCOL 3350 17 G/17G
1 POWDER, FOR SOLUTION ORAL
Status: DISCONTINUED | OUTPATIENT
Start: 2019-01-01 | End: 2019-01-01 | Stop reason: HOSPADM

## 2019-01-01 RX ORDER — M-VIT,TX,IRON,MINS/CALC/FOLIC 27MG-0.4MG
1 TABLET ORAL DAILY
Status: DISCONTINUED | OUTPATIENT
Start: 2019-01-01 | End: 2019-01-01 | Stop reason: HOSPADM

## 2019-01-01 RX ORDER — MIDAZOLAM HYDROCHLORIDE 1 MG/ML
INJECTION INTRAMUSCULAR; INTRAVENOUS
Status: COMPLETED
Start: 2019-01-01 | End: 2019-01-01

## 2019-01-01 RX ORDER — CLOPIDOGREL BISULFATE 75 MG/1
75 TABLET ORAL EVERY EVENING
Qty: 30 TAB | Refills: 0 | Status: SHIPPED | OUTPATIENT
Start: 2019-01-01 | End: 2019-01-01

## 2019-01-01 RX ORDER — GUAIFENESIN 600 MG/1
600 TABLET, EXTENDED RELEASE ORAL EVERY 12 HOURS
Status: ON HOLD | COMMUNITY
End: 2019-01-01

## 2019-01-01 RX ORDER — WARFARIN SODIUM 5 MG/1
5 TABLET ORAL
Status: DISCONTINUED | OUTPATIENT
Start: 2019-01-01 | End: 2019-01-01

## 2019-01-01 RX ORDER — CLOPIDOGREL BISULFATE 75 MG/1
75 TABLET ORAL DAILY
Status: ON HOLD | COMMUNITY
End: 2019-01-01

## 2019-01-01 RX ORDER — SPIRONOLACTONE 25 MG/1
25 TABLET ORAL
Status: DISCONTINUED | OUTPATIENT
Start: 2019-01-01 | End: 2019-01-01 | Stop reason: HOSPADM

## 2019-01-01 RX ORDER — ALBUTEROL SULFATE 90 UG/1
2 AEROSOL, METERED RESPIRATORY (INHALATION)
Status: DISCONTINUED | OUTPATIENT
Start: 2019-01-01 | End: 2019-01-01 | Stop reason: HOSPADM

## 2019-01-01 RX ORDER — LACTOBACILLUS RHAMNOSUS GG 10B CELL
1 CAPSULE ORAL
Qty: 30 CAP
Start: 2019-01-01

## 2019-01-01 RX ORDER — SODIUM CHLORIDE 9 MG/ML
INJECTION, SOLUTION INTRAVENOUS
Status: ACTIVE
Start: 2019-01-01 | End: 2019-01-01

## 2019-01-01 RX ORDER — FUROSEMIDE 10 MG/ML
40 INJECTION INTRAMUSCULAR; INTRAVENOUS
Status: DISCONTINUED | OUTPATIENT
Start: 2019-01-01 | End: 2019-01-01 | Stop reason: HOSPADM

## 2019-01-01 RX ORDER — SPIRONOLACTONE 25 MG/1
25 TABLET ORAL DAILY
Qty: 30 TAB | Refills: 3 | Status: SHIPPED | OUTPATIENT
Start: 2019-01-01

## 2019-01-01 RX ORDER — PRAVASTATIN SODIUM 20 MG
20 TABLET ORAL NIGHTLY
COMMUNITY
End: 2019-01-01

## 2019-01-01 RX ORDER — OMEPRAZOLE 20 MG/1
20 CAPSULE, DELAYED RELEASE ORAL DAILY
COMMUNITY
End: 2019-01-01

## 2019-01-01 RX ORDER — GABAPENTIN 400 MG/1
400 CAPSULE ORAL 2 TIMES DAILY
Status: DISCONTINUED | OUTPATIENT
Start: 2019-01-01 | End: 2019-01-01 | Stop reason: HOSPADM

## 2019-01-01 RX ORDER — WARFARIN SODIUM 7.5 MG/1
3.75-7.5 TABLET ORAL
Qty: 30 TAB | Refills: 3
Start: 2019-01-01 | End: 2019-01-01

## 2019-01-01 RX ORDER — INSULIN GLARGINE 100 [IU]/ML
30 INJECTION, SOLUTION SUBCUTANEOUS EVERY MORNING
Qty: 10 ML | Refills: 0 | Status: SHIPPED | OUTPATIENT
Start: 2019-01-01 | End: 2019-01-01

## 2019-01-01 RX ORDER — DEXTROSE MONOHYDRATE 25 G/50ML
25 INJECTION, SOLUTION INTRAVENOUS
Status: DISCONTINUED | OUTPATIENT
Start: 2019-01-01 | End: 2019-01-01 | Stop reason: HOSPADM

## 2019-01-01 RX ORDER — ESCITALOPRAM OXALATE 5 MG/1
15 TABLET ORAL EVERY MORNING
COMMUNITY
End: 2019-01-01

## 2019-01-01 RX ORDER — INSULIN GLARGINE 100 [IU]/ML
30 INJECTION, SOLUTION SUBCUTANEOUS EVERY EVENING
Status: DISCONTINUED | OUTPATIENT
Start: 2019-01-01 | End: 2019-01-01

## 2019-01-01 RX ORDER — BISACODYL 10 MG
10 SUPPOSITORY, RECTAL RECTAL
Status: DISCONTINUED | OUTPATIENT
Start: 2019-01-01 | End: 2019-01-01 | Stop reason: HOSPADM

## 2019-01-01 RX ORDER — ONDANSETRON 2 MG/ML
4 INJECTION INTRAMUSCULAR; INTRAVENOUS EVERY 4 HOURS PRN
Status: DISCONTINUED | OUTPATIENT
Start: 2019-01-01 | End: 2019-01-01 | Stop reason: HOSPADM

## 2019-01-01 RX ORDER — PREDNISONE 10 MG/1
40 TABLET ORAL DAILY
Status: ON HOLD | COMMUNITY
Start: 2019-01-01 | End: 2019-01-01

## 2019-01-01 RX ORDER — HEPARIN SODIUM 1000 [USP'U]/ML
5000 INJECTION, SOLUTION INTRAVENOUS; SUBCUTANEOUS ONCE
Status: COMPLETED | OUTPATIENT
Start: 2019-01-01 | End: 2019-01-01

## 2019-01-01 RX ORDER — PRAVASTATIN SODIUM 20 MG
20 TABLET ORAL DAILY
Qty: 30 TAB | Refills: 11
Start: 2019-01-01

## 2019-01-01 RX ORDER — SODIUM CHLORIDE 9 MG/ML
500 INJECTION, SOLUTION INTRAVENOUS
Status: ACTIVE | OUTPATIENT
Start: 2019-01-01 | End: 2019-01-01

## 2019-01-01 RX ORDER — HYDROCHLOROTHIAZIDE 12.5 MG/1
12.5 TABLET ORAL DAILY
Qty: 5 TAB | Refills: 0 | Status: SHIPPED | OUTPATIENT
Start: 2019-01-01 | End: 2019-01-01

## 2019-01-01 RX ORDER — TRAMADOL HYDROCHLORIDE 50 MG/1
50-100 TABLET ORAL EVERY 6 HOURS PRN
Qty: 10 TAB | Refills: 0 | Status: SHIPPED | OUTPATIENT
Start: 2019-01-01 | End: 2019-01-01

## 2019-01-01 RX ORDER — POLYETHYLENE GLYCOL 3350 17 G/17G
17 POWDER, FOR SOLUTION ORAL DAILY
COMMUNITY
End: 2019-01-01

## 2019-01-01 RX ORDER — ACETAMINOPHEN 325 MG/1
650 TABLET ORAL EVERY 6 HOURS PRN
Qty: 30 TAB | Refills: 0 | Status: SHIPPED | OUTPATIENT
Start: 2019-01-01

## 2019-01-01 RX ORDER — SODIUM CHLORIDE 9 MG/ML
500 INJECTION, SOLUTION INTRAVENOUS
Status: COMPLETED | OUTPATIENT
Start: 2019-01-01 | End: 2019-01-01

## 2019-01-01 RX ORDER — HEPARIN SODIUM 5000 [USP'U]/ML
5000 INJECTION, SOLUTION INTRAVENOUS; SUBCUTANEOUS EVERY 8 HOURS
Status: DISCONTINUED | OUTPATIENT
Start: 2019-01-01 | End: 2019-01-01

## 2019-01-01 RX ORDER — NITROGLYCERIN 0.4 MG/1
TABLET SUBLINGUAL
Status: COMPLETED
Start: 2019-01-01 | End: 2019-01-01

## 2019-01-01 RX ORDER — GADOBUTROL 604.72 MG/ML
10 INJECTION INTRAVENOUS ONCE
Status: COMPLETED | OUTPATIENT
Start: 2019-01-01 | End: 2019-01-01

## 2019-01-01 RX ORDER — INSULIN GLARGINE 100 [IU]/ML
20 INJECTION, SOLUTION SUBCUTANEOUS EVERY MORNING
Status: DISCONTINUED | OUTPATIENT
Start: 2019-01-01 | End: 2019-01-01

## 2019-01-01 RX ORDER — OXYCODONE HYDROCHLORIDE 5 MG/1
5 TABLET ORAL
Qty: 15 TAB | Refills: 0 | Status: SHIPPED | OUTPATIENT
Start: 2019-01-01 | End: 2019-01-01

## 2019-01-01 RX ORDER — TRIAMCINOLONE ACETONIDE 1 MG/G
1 CREAM TOPICAL 2 TIMES DAILY
Qty: 1 TUBE | Refills: 0
Start: 2019-01-01

## 2019-01-01 RX ORDER — AMOXICILLIN 250 MG
2 CAPSULE ORAL 2 TIMES DAILY
Qty: 30 TAB | Refills: 0 | Status: SHIPPED | OUTPATIENT
Start: 2019-01-01

## 2019-01-01 RX ORDER — FUROSEMIDE 40 MG/1
20 TABLET ORAL DAILY
Qty: 30 TAB | Refills: 0
Start: 2019-01-01

## 2019-01-01 RX ORDER — WARFARIN SODIUM 7.5 MG/1
7.5 TABLET ORAL
Status: DISCONTINUED | OUTPATIENT
Start: 2019-01-01 | End: 2019-01-01 | Stop reason: HOSPADM

## 2019-01-01 RX ORDER — LOSARTAN POTASSIUM 50 MG/1
50 TABLET ORAL DAILY
Status: DISCONTINUED | OUTPATIENT
Start: 2019-01-01 | End: 2019-01-01

## 2019-01-01 RX ORDER — GABAPENTIN 400 MG/1
400 CAPSULE ORAL 2 TIMES DAILY
Qty: 90 CAP
Start: 2019-01-01

## 2019-01-01 RX ORDER — DIVALPROEX SODIUM 125 MG/1
250-375 CAPSULE, COATED PELLETS ORAL 2 TIMES DAILY
COMMUNITY
End: 2019-01-01

## 2019-01-01 RX ORDER — CHOLECALCIFEROL (VITAMIN D3) 125 MCG
1000 CAPSULE ORAL DAILY
Status: DISCONTINUED | OUTPATIENT
Start: 2019-01-01 | End: 2019-01-01 | Stop reason: HOSPADM

## 2019-01-01 RX ORDER — IPRATROPIUM BROMIDE AND ALBUTEROL SULFATE 2.5; .5 MG/3ML; MG/3ML
SOLUTION RESPIRATORY (INHALATION)
Status: ACTIVE
Start: 2019-01-01 | End: 2019-01-01

## 2019-01-01 RX ORDER — M-VIT,TX,IRON,MINS/CALC/FOLIC 27MG-0.4MG
1 TABLET ORAL DAILY
COMMUNITY
End: 2019-01-01

## 2019-01-01 RX ORDER — LISINOPRIL 20 MG/1
20 TABLET ORAL EVERY MORNING
COMMUNITY
End: 2019-01-01

## 2019-01-01 RX ORDER — ACETAMINOPHEN 325 MG/1
650 TABLET ORAL ONCE
Status: COMPLETED | OUTPATIENT
Start: 2019-01-01 | End: 2019-01-01

## 2019-01-01 RX ORDER — ISOSORBIDE MONONITRATE 30 MG/1
30 TABLET, EXTENDED RELEASE ORAL
Status: DISCONTINUED | OUTPATIENT
Start: 2019-01-01 | End: 2019-01-01 | Stop reason: HOSPADM

## 2019-01-01 RX ORDER — INSULIN GLARGINE 100 [IU]/ML
20 INJECTION, SOLUTION SUBCUTANEOUS EVERY EVENING
Status: DISCONTINUED | OUTPATIENT
Start: 2019-01-01 | End: 2019-01-01 | Stop reason: HOSPADM

## 2019-01-01 RX ORDER — CARVEDILOL 3.12 MG/1
3.12 TABLET ORAL 2 TIMES DAILY WITH MEALS
Status: DISCONTINUED | OUTPATIENT
Start: 2019-01-01 | End: 2019-01-01 | Stop reason: HOSPADM

## 2019-01-01 RX ORDER — CARVEDILOL 6.25 MG/1
6.25 TABLET ORAL 2 TIMES DAILY WITH MEALS
Qty: 60 TAB
Start: 2019-01-01

## 2019-01-01 RX ORDER — INSULIN GLARGINE 100 [IU]/ML
32 INJECTION, SOLUTION SUBCUTANEOUS EVERY MORNING
Status: DISCONTINUED | OUTPATIENT
Start: 2019-01-01 | End: 2019-01-01 | Stop reason: HOSPADM

## 2019-01-01 RX ORDER — ONDANSETRON 2 MG/ML
4 INJECTION INTRAMUSCULAR; INTRAVENOUS PRN
Status: ACTIVE | OUTPATIENT
Start: 2019-01-01 | End: 2019-01-01

## 2019-01-01 RX ORDER — ONDANSETRON 4 MG/1
4 TABLET, ORALLY DISINTEGRATING ORAL EVERY 4 HOURS PRN
Qty: 10 TAB | Refills: 0 | Status: SHIPPED | OUTPATIENT
Start: 2019-01-01 | End: 2019-01-01

## 2019-01-01 RX ORDER — NITROGLYCERIN 0.4 MG/1
0.4 TABLET SUBLINGUAL
Status: DISCONTINUED | OUTPATIENT
Start: 2019-01-01 | End: 2019-01-01 | Stop reason: HOSPADM

## 2019-01-01 RX ORDER — CLOPIDOGREL BISULFATE 75 MG/1
75 TABLET ORAL EVERY EVENING
Status: DISCONTINUED | OUTPATIENT
Start: 2019-01-01 | End: 2019-01-01

## 2019-01-01 RX ORDER — LIDOCAINE HYDROCHLORIDE AND EPINEPHRINE 10; 10 MG/ML; UG/ML
10 INJECTION, SOLUTION INFILTRATION; PERINEURAL ONCE
Status: COMPLETED | OUTPATIENT
Start: 2019-01-01 | End: 2019-01-01

## 2019-01-01 RX ORDER — LORATADINE 10 MG/1
10 TABLET ORAL DAILY
COMMUNITY
End: 2019-01-01

## 2019-01-01 RX ORDER — MORPHINE SULFATE 4 MG/ML
2 INJECTION, SOLUTION INTRAMUSCULAR; INTRAVENOUS ONCE
Status: COMPLETED | OUTPATIENT
Start: 2019-01-01 | End: 2019-01-01

## 2019-01-01 RX ORDER — CARVEDILOL 3.12 MG/1
3.12 TABLET ORAL 2 TIMES DAILY WITH MEALS
Status: DISCONTINUED | OUTPATIENT
Start: 2019-01-01 | End: 2019-01-01

## 2019-01-01 RX ORDER — NITROGLYCERIN 0.4 MG/1
TABLET SUBLINGUAL
Status: ACTIVE
Start: 2019-01-01 | End: 2019-01-01

## 2019-01-01 RX ORDER — FUROSEMIDE 10 MG/ML
40 INJECTION INTRAMUSCULAR; INTRAVENOUS ONCE
Status: DISCONTINUED | OUTPATIENT
Start: 2019-01-01 | End: 2019-01-01

## 2019-01-01 RX ORDER — CARVEDILOL 12.5 MG/1
12.5 TABLET ORAL DAILY
Status: DISCONTINUED | OUTPATIENT
Start: 2019-01-01 | End: 2019-01-01

## 2019-01-01 RX ORDER — WARFARIN SODIUM 5 MG/1
5 TABLET ORAL
Qty: 30 TAB | Refills: 3 | Status: SHIPPED | OUTPATIENT
Start: 2019-01-01 | End: 2019-01-01

## 2019-01-01 RX ORDER — INSULIN GLARGINE 100 [IU]/ML
30 INJECTION, SOLUTION SUBCUTANEOUS EVERY MORNING
Status: DISCONTINUED | OUTPATIENT
Start: 2019-01-01 | End: 2019-01-01

## 2019-01-01 RX ORDER — ASPIRIN 81 MG/1
324 TABLET, CHEWABLE ORAL DAILY
Status: DISCONTINUED | OUTPATIENT
Start: 2019-01-01 | End: 2019-01-01

## 2019-01-01 RX ORDER — IPRATROPIUM BROMIDE AND ALBUTEROL SULFATE 2.5; .5 MG/3ML; MG/3ML
3 SOLUTION RESPIRATORY (INHALATION)
Status: DISCONTINUED | OUTPATIENT
Start: 2019-01-01 | End: 2019-01-01 | Stop reason: HOSPADM

## 2019-01-01 RX ORDER — INSULIN GLARGINE 100 [IU]/ML
20 INJECTION, SOLUTION SUBCUTANEOUS EVERY EVENING
Status: DISCONTINUED | OUTPATIENT
Start: 2019-01-01 | End: 2019-01-01

## 2019-01-01 RX ORDER — HYDRALAZINE HYDROCHLORIDE 25 MG/1
50 TABLET, FILM COATED ORAL 3 TIMES DAILY
COMMUNITY
End: 2019-01-01

## 2019-01-01 RX ORDER — WARFARIN SODIUM 7.5 MG/1
7.5 TABLET ORAL
Qty: 30 TAB | Refills: 3 | Status: SHIPPED | OUTPATIENT
Start: 2019-01-01 | End: 2019-01-01

## 2019-01-01 RX ORDER — POTASSIUM CHLORIDE 1500 MG/1
40 TABLET, EXTENDED RELEASE ORAL EVERY MORNING
COMMUNITY
End: 2019-01-01

## 2019-01-01 RX ORDER — OXYCODONE HYDROCHLORIDE 5 MG/1
5 TABLET ORAL
Status: DISCONTINUED | OUTPATIENT
Start: 2019-01-01 | End: 2019-01-01 | Stop reason: HOSPADM

## 2019-01-01 RX ORDER — MAGNESIUM SULFATE HEPTAHYDRATE 40 MG/ML
2 INJECTION, SOLUTION INTRAVENOUS ONCE
Status: COMPLETED | OUTPATIENT
Start: 2019-01-01 | End: 2019-01-01

## 2019-01-01 RX ORDER — CARVEDILOL 3.12 MG/1
3.12 TABLET ORAL 2 TIMES DAILY WITH MEALS
COMMUNITY
End: 2019-01-01

## 2019-01-01 RX ORDER — POTASSIUM CHLORIDE 20 MEQ/1
20 TABLET, EXTENDED RELEASE ORAL DAILY
Status: DISCONTINUED | OUTPATIENT
Start: 2019-01-01 | End: 2019-01-01

## 2019-01-01 RX ORDER — LACTOBACILLUS RHAMNOSUS GG 10B CELL
1 CAPSULE ORAL
Status: DISCONTINUED | OUTPATIENT
Start: 2019-01-01 | End: 2019-01-01 | Stop reason: HOSPADM

## 2019-01-01 RX ORDER — ONDANSETRON 4 MG/1
4 TABLET, ORALLY DISINTEGRATING ORAL EVERY 4 HOURS PRN
Status: DISCONTINUED | OUTPATIENT
Start: 2019-01-01 | End: 2019-01-01

## 2019-01-01 RX ORDER — BISACODYL 10 MG
10 SUPPOSITORY, RECTAL RECTAL
Refills: 0
Start: 2019-01-01

## 2019-01-01 RX ORDER — POTASSIUM CHLORIDE 20 MEQ/1
20 TABLET, EXTENDED RELEASE ORAL 2 TIMES DAILY
Status: DISCONTINUED | OUTPATIENT
Start: 2019-01-01 | End: 2019-01-01 | Stop reason: HOSPADM

## 2019-01-01 RX ORDER — HEPARIN SODIUM 1000 [USP'U]/ML
3200 INJECTION, SOLUTION INTRAVENOUS; SUBCUTANEOUS PRN
Status: DISCONTINUED | OUTPATIENT
Start: 2019-01-01 | End: 2019-01-01

## 2019-01-01 RX ORDER — ISOSORBIDE MONONITRATE 30 MG/1
30 TABLET, EXTENDED RELEASE ORAL DAILY
Qty: 7 TAB | Refills: 0 | Status: SHIPPED | OUTPATIENT
Start: 2019-01-01 | End: 2019-01-01

## 2019-01-01 RX ORDER — ACETAMINOPHEN 325 MG/1
650 TABLET ORAL EVERY 6 HOURS PRN
Qty: 30 TAB | Refills: 0 | Status: SHIPPED | OUTPATIENT
Start: 2019-01-01 | End: 2019-01-01

## 2019-01-01 RX ORDER — OXYBUTYNIN CHLORIDE 5 MG/1
5 TABLET ORAL DAILY
Status: DISCONTINUED | OUTPATIENT
Start: 2019-01-01 | End: 2019-01-01 | Stop reason: HOSPADM

## 2019-01-01 RX ORDER — OXYCODONE HYDROCHLORIDE 5 MG/1
2.5 TABLET ORAL
Status: DISCONTINUED | OUTPATIENT
Start: 2019-01-01 | End: 2019-01-01 | Stop reason: HOSPADM

## 2019-01-01 RX ORDER — INSULIN GLARGINE 100 [IU]/ML
34 INJECTION, SOLUTION SUBCUTANEOUS EVERY MORNING
Status: DISCONTINUED | OUTPATIENT
Start: 2019-01-01 | End: 2019-01-01 | Stop reason: HOSPADM

## 2019-01-01 RX ADMIN — MAGNESIUM SULFATE IN WATER 2 G: 40 INJECTION, SOLUTION INTRAVENOUS at 10:27

## 2019-01-01 RX ADMIN — POTASSIUM CHLORIDE 20 MEQ: 1500 TABLET, EXTENDED RELEASE ORAL at 05:32

## 2019-01-01 RX ADMIN — TRIAMCINOLONE ACETONIDE: 1 CREAM TOPICAL at 18:00

## 2019-01-01 RX ADMIN — PIPERACILLIN AND TAZOBACTAM 3.38 G: 3; .375 INJECTION, POWDER, LYOPHILIZED, FOR SOLUTION INTRAVENOUS; PARENTERAL at 17:21

## 2019-01-01 RX ADMIN — TRIAMCINOLONE ACETONIDE: 1 CREAM TOPICAL at 05:40

## 2019-01-01 RX ADMIN — POTASSIUM CHLORIDE 20 MEQ: 1500 TABLET, EXTENDED RELEASE ORAL at 17:17

## 2019-01-01 RX ADMIN — VANCOMYCIN HYDROCHLORIDE 1000 MG: 100 INJECTION, POWDER, LYOPHILIZED, FOR SOLUTION INTRAVENOUS at 23:51

## 2019-01-01 RX ADMIN — LOSARTAN POTASSIUM 50 MG: 50 TABLET ORAL at 04:32

## 2019-01-01 RX ADMIN — RIVAROXABAN 15 MG: 15 TABLET, FILM COATED ORAL at 07:56

## 2019-01-01 RX ADMIN — CLOPIDOGREL BISULFATE 75 MG: 75 TABLET ORAL at 19:59

## 2019-01-01 RX ADMIN — SODIUM CHLORIDE: 234 INJECTION INTRAMUSCULAR; INTRAVENOUS; SUBCUTANEOUS at 14:00

## 2019-01-01 RX ADMIN — VANCOMYCIN HYDROCHLORIDE 1500 MG: 100 INJECTION, POWDER, LYOPHILIZED, FOR SOLUTION INTRAVENOUS at 18:24

## 2019-01-01 RX ADMIN — HEPARIN SODIUM 1000 UNITS: 5000 INJECTION, SOLUTION INTRAVENOUS at 08:43

## 2019-01-01 RX ADMIN — PRAVASTATIN SODIUM 20 MG: 20 TABLET ORAL at 21:02

## 2019-01-01 RX ADMIN — SENNOSIDES, DOCUSATE SODIUM 2 TABLET: 50; 8.6 TABLET, FILM COATED ORAL at 05:42

## 2019-01-01 RX ADMIN — MULTIPLE VITAMINS W/ MINERALS TAB 1 TABLET: TAB at 06:02

## 2019-01-01 RX ADMIN — INSULIN HUMAN 3 UNITS: 100 INJECTION, SOLUTION PARENTERAL at 17:32

## 2019-01-01 RX ADMIN — INSULIN HUMAN 3 UNITS: 100 INJECTION, SOLUTION PARENTERAL at 06:11

## 2019-01-01 RX ADMIN — AMPICILLIN SODIUM AND SULBACTAM SODIUM 3 G: 2; 1 INJECTION, POWDER, FOR SOLUTION INTRAMUSCULAR; INTRAVENOUS at 15:36

## 2019-01-01 RX ADMIN — Medication 1 TABLET: at 09:08

## 2019-01-01 RX ADMIN — GABAPENTIN 400 MG: 400 CAPSULE ORAL at 05:57

## 2019-01-01 RX ADMIN — INSULIN HUMAN 2 UNITS: 100 INJECTION, SOLUTION PARENTERAL at 21:01

## 2019-01-01 RX ADMIN — INSULIN HUMAN 2 UNITS: 100 INJECTION, SOLUTION PARENTERAL at 17:45

## 2019-01-01 RX ADMIN — AMPICILLIN SODIUM AND SULBACTAM SODIUM 3 G: 2; 1 INJECTION, POWDER, FOR SOLUTION INTRAMUSCULAR; INTRAVENOUS at 06:00

## 2019-01-01 RX ADMIN — INSULIN GLARGINE 30 UNITS: 100 INJECTION, SOLUTION SUBCUTANEOUS at 18:06

## 2019-01-01 RX ADMIN — NIACIN 1000 MG: 500 TABLET, EXTENDED RELEASE ORAL at 06:07

## 2019-01-01 RX ADMIN — FUROSEMIDE 40 MG: 10 INJECTION, SOLUTION INTRAVENOUS at 06:45

## 2019-01-01 RX ADMIN — NYSTATIN: 100000 POWDER TOPICAL at 05:43

## 2019-01-01 RX ADMIN — TRIAMCINOLONE ACETONIDE: 1 CREAM TOPICAL at 05:46

## 2019-01-01 RX ADMIN — CYANOCOBALAMIN TAB 500 MCG 1000 MCG: 500 TAB at 06:46

## 2019-01-01 RX ADMIN — POTASSIUM CHLORIDE 20 MEQ: 1500 TABLET, EXTENDED RELEASE ORAL at 05:45

## 2019-01-01 RX ADMIN — LOSARTAN POTASSIUM 50 MG: 50 TABLET ORAL at 04:31

## 2019-01-01 RX ADMIN — TRIAMCINOLONE ACETONIDE: 1 CREAM TOPICAL at 17:06

## 2019-01-01 RX ADMIN — INSULIN GLARGINE 30 UNITS: 100 INJECTION, SOLUTION SUBCUTANEOUS at 18:23

## 2019-01-01 RX ADMIN — INSULIN HUMAN 5 UNITS: 100 INJECTION, SOLUTION PARENTERAL at 08:15

## 2019-01-01 RX ADMIN — HEPARIN SODIUM 2600 UNITS: 1000 INJECTION, SOLUTION INTRAVENOUS; SUBCUTANEOUS at 03:39

## 2019-01-01 RX ADMIN — EPINEPHRINE 1 MG: 0.1 INJECTION, SOLUTION ENDOTRACHEAL; INTRACARDIAC; INTRAVENOUS at 10:33

## 2019-01-01 RX ADMIN — PRAVASTATIN SODIUM 20 MG: 20 TABLET ORAL at 22:46

## 2019-01-01 RX ADMIN — PRAVASTATIN SODIUM 20 MG: 20 TABLET ORAL at 21:36

## 2019-01-01 RX ADMIN — CLOPIDOGREL 75 MG: 75 TABLET, FILM COATED ORAL at 17:03

## 2019-01-01 RX ADMIN — INSULIN HUMAN 3 UNITS: 100 INJECTION, SOLUTION PARENTERAL at 12:00

## 2019-01-01 RX ADMIN — HEPARIN SODIUM 2600 UNITS: 1000 INJECTION, SOLUTION INTRAVENOUS; SUBCUTANEOUS at 23:19

## 2019-01-01 RX ADMIN — INSULIN GLARGINE 20 UNITS: 100 INJECTION, SOLUTION SUBCUTANEOUS at 08:38

## 2019-01-01 RX ADMIN — GABAPENTIN 400 MG: 400 CAPSULE ORAL at 18:23

## 2019-01-01 RX ADMIN — WARFARIN SODIUM 5 MG: 5 TABLET ORAL at 16:32

## 2019-01-01 RX ADMIN — PIPERACILLIN AND TAZOBACTAM 3.38 G: 3; .375 INJECTION, POWDER, LYOPHILIZED, FOR SOLUTION INTRAVENOUS; PARENTERAL at 05:06

## 2019-01-01 RX ADMIN — ACETAMINOPHEN 650 MG: 325 TABLET, FILM COATED ORAL at 20:46

## 2019-01-01 RX ADMIN — ISOSORBIDE MONONITRATE 30 MG: 30 TABLET ORAL at 06:17

## 2019-01-01 RX ADMIN — GABAPENTIN 400 MG: 400 CAPSULE ORAL at 06:46

## 2019-01-01 RX ADMIN — PRAVASTATIN SODIUM 20 MG: 20 TABLET ORAL at 20:39

## 2019-01-01 RX ADMIN — TRIAMCINOLONE ACETONIDE: 1 CREAM TOPICAL at 21:03

## 2019-01-01 RX ADMIN — HEPARIN SODIUM 3200 UNITS: 1000 INJECTION, SOLUTION INTRAVENOUS; SUBCUTANEOUS at 15:57

## 2019-01-01 RX ADMIN — ASPIRIN 81 MG: 81 TABLET, DELAYED RELEASE ORAL at 05:40

## 2019-01-01 RX ADMIN — NIACIN 1000 MG: 500 TABLET, EXTENDED RELEASE ORAL at 06:18

## 2019-01-01 RX ADMIN — Medication 1 CAPSULE: at 10:03

## 2019-01-01 RX ADMIN — ASPIRIN 325 MG: 325 TABLET ORAL at 01:12

## 2019-01-01 RX ADMIN — GABAPENTIN 400 MG: 400 CAPSULE ORAL at 19:59

## 2019-01-01 RX ADMIN — PRAVASTATIN SODIUM 20 MG: 20 TABLET ORAL at 21:15

## 2019-01-01 RX ADMIN — PRAVASTATIN SODIUM 20 MG: 20 TABLET ORAL at 20:46

## 2019-01-01 RX ADMIN — INSULIN HUMAN 5 UNITS: 100 INJECTION, SOLUTION PARENTERAL at 11:20

## 2019-01-01 RX ADMIN — OXYBUTYNIN CHLORIDE 5 MG: 5 TABLET ORAL at 06:18

## 2019-01-01 RX ADMIN — HEPARIN SODIUM 5000 UNITS: 5000 INJECTION, SOLUTION INTRAVENOUS; SUBCUTANEOUS at 14:16

## 2019-01-01 RX ADMIN — VANCOMYCIN HYDROCHLORIDE 1500 MG: 100 INJECTION, POWDER, LYOPHILIZED, FOR SOLUTION INTRAVENOUS at 19:11

## 2019-01-01 RX ADMIN — GABAPENTIN 400 MG: 400 CAPSULE ORAL at 17:08

## 2019-01-01 RX ADMIN — INSULIN HUMAN 6 UNITS: 100 INJECTION, SOLUTION PARENTERAL at 17:12

## 2019-01-01 RX ADMIN — WARFARIN SODIUM 7.5 MG: 7.5 TABLET ORAL at 17:05

## 2019-01-01 RX ADMIN — ACETAMINOPHEN 650 MG: 325 TABLET, FILM COATED ORAL at 23:09

## 2019-01-01 RX ADMIN — IOHEXOL 100 ML: 350 INJECTION, SOLUTION INTRAVENOUS at 03:04

## 2019-01-01 RX ADMIN — SODIUM CHLORIDE: 9 INJECTION, SOLUTION INTRAVENOUS at 08:48

## 2019-01-01 RX ADMIN — INSULIN HUMAN 3 UNITS: 100 INJECTION, SOLUTION PARENTERAL at 21:30

## 2019-01-01 RX ADMIN — OMEPRAZOLE 20 MG: 20 CAPSULE, DELAYED RELEASE ORAL at 05:17

## 2019-01-01 RX ADMIN — AMPICILLIN SODIUM AND SULBACTAM SODIUM 3 G: 2; 1 INJECTION, POWDER, FOR SOLUTION INTRAMUSCULAR; INTRAVENOUS at 18:23

## 2019-01-01 RX ADMIN — EPINEPHRINE 1 MG: 0.1 INJECTION, SOLUTION ENDOTRACHEAL; INTRACARDIAC; INTRAVENOUS at 10:40

## 2019-01-01 RX ADMIN — OMEPRAZOLE 20 MG: 20 CAPSULE, DELAYED RELEASE ORAL at 05:56

## 2019-01-01 RX ADMIN — OXYBUTYNIN CHLORIDE 5 MG: 5 TABLET ORAL at 06:01

## 2019-01-01 RX ADMIN — INSULIN HUMAN 3 UNITS: 100 INJECTION, SOLUTION PARENTERAL at 17:13

## 2019-01-01 RX ADMIN — ENOXAPARIN SODIUM 100 MG: 100 INJECTION SUBCUTANEOUS at 11:46

## 2019-01-01 RX ADMIN — ACETAMINOPHEN 650 MG: 325 TABLET, FILM COATED ORAL at 21:16

## 2019-01-01 RX ADMIN — AMPICILLIN SODIUM AND SULBACTAM SODIUM 3 G: 2; 1 INJECTION, POWDER, FOR SOLUTION INTRAMUSCULAR; INTRAVENOUS at 01:21

## 2019-01-01 RX ADMIN — DAPTOMYCIN 650 MG: 350 INJECTION, POWDER, LYOPHILIZED, FOR SOLUTION INTRAVENOUS at 10:33

## 2019-01-01 RX ADMIN — INSULIN HUMAN 8 UNITS: 100 INJECTION, SOLUTION PARENTERAL at 12:37

## 2019-01-01 RX ADMIN — AMPICILLIN SODIUM AND SULBACTAM SODIUM 3 G: 2; 1 INJECTION, POWDER, FOR SOLUTION INTRAMUSCULAR; INTRAVENOUS at 23:27

## 2019-01-01 RX ADMIN — OXYBUTYNIN CHLORIDE 5 MG: 5 TABLET ORAL at 06:40

## 2019-01-01 RX ADMIN — INSULIN GLARGINE 32 UNITS: 100 INJECTION, SOLUTION SUBCUTANEOUS at 06:08

## 2019-01-01 RX ADMIN — CLOPIDOGREL 75 MG: 75 TABLET, FILM COATED ORAL at 17:12

## 2019-01-01 RX ADMIN — PRAVASTATIN SODIUM 20 MG: 20 TABLET ORAL at 21:37

## 2019-01-01 RX ADMIN — AMPICILLIN SODIUM AND SULBACTAM SODIUM 3 G: 2; 1 INJECTION, POWDER, FOR SOLUTION INTRAMUSCULAR; INTRAVENOUS at 21:03

## 2019-01-01 RX ADMIN — HEPARIN SODIUM 5000 UNITS: 1000 INJECTION, SOLUTION INTRAVENOUS; SUBCUTANEOUS at 18:00

## 2019-01-01 RX ADMIN — OXYCODONE HYDROCHLORIDE 5 MG: 5 TABLET ORAL at 00:19

## 2019-01-01 RX ADMIN — NYSTATIN: 100000 POWDER TOPICAL at 17:30

## 2019-01-01 RX ADMIN — CARVEDILOL 6.25 MG: 6.25 TABLET, FILM COATED ORAL at 07:58

## 2019-01-01 RX ADMIN — INSULIN HUMAN 5 UNITS: 100 INJECTION, SOLUTION PARENTERAL at 16:42

## 2019-01-01 RX ADMIN — LOSARTAN POTASSIUM 50 MG: 50 TABLET ORAL at 05:00

## 2019-01-01 RX ADMIN — PRAVASTATIN SODIUM 20 MG: 20 TABLET ORAL at 19:52

## 2019-01-01 RX ADMIN — OMEPRAZOLE 20 MG: 20 CAPSULE, DELAYED RELEASE ORAL at 05:36

## 2019-01-01 RX ADMIN — TRIAMCINOLONE ACETONIDE: 1 CREAM TOPICAL at 17:19

## 2019-01-01 RX ADMIN — NYSTATIN: 100000 POWDER TOPICAL at 05:37

## 2019-01-01 RX ADMIN — CARVEDILOL 3.12 MG: 3.12 TABLET, FILM COATED ORAL at 19:59

## 2019-01-01 RX ADMIN — POTASSIUM CHLORIDE 20 MEQ: 1500 TABLET, EXTENDED RELEASE ORAL at 05:55

## 2019-01-01 RX ADMIN — INSULIN HUMAN 5 UNITS: 100 INJECTION, SOLUTION PARENTERAL at 20:19

## 2019-01-01 RX ADMIN — TRIAMCINOLONE ACETONIDE: 1 CREAM TOPICAL at 17:16

## 2019-01-01 RX ADMIN — INSULIN GLARGINE 24 UNITS: 100 INJECTION, SOLUTION SUBCUTANEOUS at 06:04

## 2019-01-01 RX ADMIN — ASPIRIN 81 MG: 81 TABLET, DELAYED RELEASE ORAL at 05:03

## 2019-01-01 RX ADMIN — OXYCODONE HYDROCHLORIDE 5 MG: 5 TABLET ORAL at 21:28

## 2019-01-01 RX ADMIN — CARVEDILOL 6.25 MG: 6.25 TABLET, FILM COATED ORAL at 08:56

## 2019-01-01 RX ADMIN — INSULIN HUMAN 2 UNITS: 100 INJECTION, SOLUTION PARENTERAL at 21:03

## 2019-01-01 RX ADMIN — INSULIN HUMAN 2 UNITS: 100 INJECTION, SOLUTION PARENTERAL at 21:00

## 2019-01-01 RX ADMIN — TRIAMCINOLONE ACETONIDE: 1 CREAM TOPICAL at 17:11

## 2019-01-01 RX ADMIN — CLOPIDOGREL 75 MG: 75 TABLET, FILM COATED ORAL at 17:11

## 2019-01-01 RX ADMIN — CARVEDILOL 3.12 MG: 3.12 TABLET, FILM COATED ORAL at 17:15

## 2019-01-01 RX ADMIN — NIACIN 1000 MG: 500 TABLET, EXTENDED RELEASE ORAL at 09:20

## 2019-01-01 RX ADMIN — FUROSEMIDE 20 MG: 40 TABLET ORAL at 05:57

## 2019-01-01 RX ADMIN — NYSTATIN: 100000 POWDER TOPICAL at 17:50

## 2019-01-01 RX ADMIN — AMPICILLIN SODIUM AND SULBACTAM SODIUM 3 G: 2; 1 INJECTION, POWDER, FOR SOLUTION INTRAMUSCULAR; INTRAVENOUS at 17:02

## 2019-01-01 RX ADMIN — LISINOPRIL 5 MG: 5 TABLET ORAL at 05:32

## 2019-01-01 RX ADMIN — WARFARIN SODIUM 7.5 MG: 7.5 TABLET ORAL at 21:00

## 2019-01-01 RX ADMIN — POTASSIUM CHLORIDE 20 MEQ: 1500 TABLET, EXTENDED RELEASE ORAL at 17:59

## 2019-01-01 RX ADMIN — OMEPRAZOLE 20 MG: 20 CAPSULE, DELAYED RELEASE ORAL at 17:24

## 2019-01-01 RX ADMIN — ENOXAPARIN SODIUM 40 MG: 100 INJECTION SUBCUTANEOUS at 05:40

## 2019-01-01 RX ADMIN — HEPARIN SODIUM 950 UNITS/HR: 5000 INJECTION, SOLUTION INTRAVENOUS; SUBCUTANEOUS at 08:09

## 2019-01-01 RX ADMIN — OXYCODONE HYDROCHLORIDE 5 MG: 5 TABLET ORAL at 11:30

## 2019-01-01 RX ADMIN — MULTIPLE VITAMINS W/ MINERALS TAB 1 TABLET: TAB at 04:32

## 2019-01-01 RX ADMIN — ASPIRIN 81 MG: 81 TABLET, DELAYED RELEASE ORAL at 05:56

## 2019-01-01 RX ADMIN — DEXTROSE MONOHYDRATE: 50 INJECTION, SOLUTION INTRAVENOUS at 09:45

## 2019-01-01 RX ADMIN — INSULIN HUMAN 2 UNITS: 100 INJECTION, SOLUTION PARENTERAL at 17:44

## 2019-01-01 RX ADMIN — FUROSEMIDE 40 MG: 10 INJECTION, SOLUTION INTRAVENOUS at 06:01

## 2019-01-01 RX ADMIN — INSULIN HUMAN 5 UNITS: 100 INJECTION, SOLUTION PARENTERAL at 21:12

## 2019-01-01 RX ADMIN — SODIUM CHLORIDE: 9 INJECTION, SOLUTION INTRAVENOUS at 05:16

## 2019-01-01 RX ADMIN — CARVEDILOL 3.12 MG: 3.12 TABLET, FILM COATED ORAL at 07:38

## 2019-01-01 RX ADMIN — GADOBUTROL 10 ML: 604.72 INJECTION INTRAVENOUS at 16:37

## 2019-01-01 RX ADMIN — SODIUM CHLORIDE: 9 INJECTION, SOLUTION INTRAVENOUS at 18:30

## 2019-01-01 RX ADMIN — TRIAMCINOLONE ACETONIDE: 1 CREAM TOPICAL at 05:17

## 2019-01-01 RX ADMIN — DEXTROSE MONOHYDRATE 250 ML: 100 INJECTION, SOLUTION INTRAVENOUS at 11:46

## 2019-01-01 RX ADMIN — INSULIN HUMAN 8 UNITS: 100 INJECTION, SOLUTION PARENTERAL at 20:27

## 2019-01-01 RX ADMIN — FUROSEMIDE 40 MG: 10 INJECTION, SOLUTION INTRAVENOUS at 05:45

## 2019-01-01 RX ADMIN — LISINOPRIL 5 MG: 5 TABLET ORAL at 05:36

## 2019-01-01 RX ADMIN — OXYCODONE HYDROCHLORIDE 5 MG: 5 TABLET ORAL at 11:27

## 2019-01-01 RX ADMIN — NIACIN 1000 MG: 500 TABLET, EXTENDED RELEASE ORAL at 06:03

## 2019-01-01 RX ADMIN — Medication 1 TABLET: at 05:24

## 2019-01-01 RX ADMIN — CLOPIDOGREL 75 MG: 75 TABLET, FILM COATED ORAL at 17:17

## 2019-01-01 RX ADMIN — FUROSEMIDE 40 MG: 10 INJECTION, SOLUTION INTRAVENOUS at 05:30

## 2019-01-01 RX ADMIN — POTASSIUM CHLORIDE 20 MEQ: 1500 TABLET, EXTENDED RELEASE ORAL at 05:42

## 2019-01-01 RX ADMIN — ASPIRIN 81 MG: 81 TABLET, DELAYED RELEASE ORAL at 06:46

## 2019-01-01 RX ADMIN — OXYCODONE HYDROCHLORIDE 5 MG: 5 TABLET ORAL at 21:23

## 2019-01-01 RX ADMIN — FUROSEMIDE 20 MG: 20 TABLET ORAL at 05:42

## 2019-01-01 RX ADMIN — INSULIN GLARGINE 30 UNITS: 100 INJECTION, SOLUTION SUBCUTANEOUS at 08:57

## 2019-01-01 RX ADMIN — GABAPENTIN 400 MG: 400 CAPSULE ORAL at 17:26

## 2019-01-01 RX ADMIN — ASPIRIN 81 MG: 81 TABLET, DELAYED RELEASE ORAL at 05:38

## 2019-01-01 RX ADMIN — MULTIPLE VITAMINS W/ MINERALS TAB 1 TABLET: TAB at 04:37

## 2019-01-01 RX ADMIN — Medication 1 TABLET: at 05:29

## 2019-01-01 RX ADMIN — OXYBUTYNIN CHLORIDE 5 MG: 5 TABLET ORAL at 06:10

## 2019-01-01 RX ADMIN — OXYCODONE HYDROCHLORIDE 5 MG: 5 TABLET ORAL at 19:39

## 2019-01-01 RX ADMIN — GABAPENTIN 400 MG: 400 CAPSULE ORAL at 05:42

## 2019-01-01 RX ADMIN — CEFTRIAXONE SODIUM 2 G: 2 INJECTION, POWDER, FOR SOLUTION INTRAMUSCULAR; INTRAVENOUS at 05:44

## 2019-01-01 RX ADMIN — Medication 1 CAPSULE: at 07:48

## 2019-01-01 RX ADMIN — RIVAROXABAN 15 MG: 15 TABLET, FILM COATED ORAL at 08:25

## 2019-01-01 RX ADMIN — MIDAZOLAM HYDROCHLORIDE 0.5 MG: 1 INJECTION, SOLUTION INTRAMUSCULAR; INTRAVENOUS at 13:05

## 2019-01-01 RX ADMIN — INSULIN HUMAN 2 UNITS: 100 INJECTION, SOLUTION PARENTERAL at 06:33

## 2019-01-01 RX ADMIN — CARVEDILOL 6.25 MG: 6.25 TABLET, FILM COATED ORAL at 07:56

## 2019-01-01 RX ADMIN — STANDARDIZED SENNA CONCENTRATE AND DOCUSATE SODIUM 2 TABLET: 8.6; 5 TABLET, FILM COATED ORAL at 16:57

## 2019-01-01 RX ADMIN — ACETAMINOPHEN 650 MG: 325 TABLET, FILM COATED ORAL at 16:38

## 2019-01-01 RX ADMIN — OXYBUTYNIN CHLORIDE 5 MG: 5 TABLET ORAL at 04:37

## 2019-01-01 RX ADMIN — OMEPRAZOLE 20 MG: 20 CAPSULE, DELAYED RELEASE ORAL at 05:38

## 2019-01-01 RX ADMIN — CYANOCOBALAMIN TAB 500 MCG 1000 MCG: 500 TAB at 05:03

## 2019-01-01 RX ADMIN — AMPICILLIN SODIUM AND SULBACTAM SODIUM 3 G: 2; 1 INJECTION, POWDER, FOR SOLUTION INTRAMUSCULAR; INTRAVENOUS at 00:23

## 2019-01-01 RX ADMIN — DAPTOMYCIN 650 MG: 350 INJECTION, POWDER, LYOPHILIZED, FOR SOLUTION INTRAVENOUS at 12:45

## 2019-01-01 RX ADMIN — POTASSIUM CHLORIDE 20 MEQ: 1500 TABLET, EXTENDED RELEASE ORAL at 05:17

## 2019-01-01 RX ADMIN — SENNOSIDES,DOCUSATE SODIUM 2 TABLET: 8.6; 5 TABLET, FILM COATED ORAL at 17:11

## 2019-01-01 RX ADMIN — GABAPENTIN 400 MG: 400 CAPSULE ORAL at 04:30

## 2019-01-01 RX ADMIN — CYANOCOBALAMIN TAB 500 MCG 1000 MCG: 500 TAB at 12:03

## 2019-01-01 RX ADMIN — HEPARIN SODIUM 3200 UNITS: 1000 INJECTION, SOLUTION INTRAVENOUS; SUBCUTANEOUS at 01:17

## 2019-01-01 RX ADMIN — OMEPRAZOLE 20 MG: 20 CAPSULE, DELAYED RELEASE ORAL at 05:42

## 2019-01-01 RX ADMIN — TRIAMCINOLONE ACETONIDE: 1 CREAM TOPICAL at 10:37

## 2019-01-01 RX ADMIN — LISINOPRIL 5 MG: 5 TABLET ORAL at 05:45

## 2019-01-01 RX ADMIN — CARVEDILOL 3.12 MG: 3.12 TABLET, FILM COATED ORAL at 08:37

## 2019-01-01 RX ADMIN — FUROSEMIDE 20 MG: 40 TABLET ORAL at 04:30

## 2019-01-01 RX ADMIN — INSULIN HUMAN 5 UNITS: 100 INJECTION, SOLUTION PARENTERAL at 07:42

## 2019-01-01 RX ADMIN — MULTIPLE VITAMINS W/ MINERALS TAB 1 TABLET: TAB at 06:13

## 2019-01-01 RX ADMIN — LISINOPRIL 5 MG: 5 TABLET ORAL at 06:04

## 2019-01-01 RX ADMIN — NYSTATIN: 100000 POWDER TOPICAL at 18:03

## 2019-01-01 RX ADMIN — LOSARTAN POTASSIUM 50 MG: 50 TABLET ORAL at 05:55

## 2019-01-01 RX ADMIN — CARVEDILOL 12.5 MG: 12.5 TABLET, FILM COATED ORAL at 06:02

## 2019-01-01 RX ADMIN — OMEPRAZOLE 20 MG: 20 CAPSULE, DELAYED RELEASE ORAL at 06:03

## 2019-01-01 RX ADMIN — IPRATROPIUM BROMIDE AND ALBUTEROL SULFATE 3 ML: .5; 3 SOLUTION RESPIRATORY (INHALATION) at 18:53

## 2019-01-01 RX ADMIN — IPRATROPIUM BROMIDE AND ALBUTEROL SULFATE 3 ML: .5; 3 SOLUTION RESPIRATORY (INHALATION) at 11:21

## 2019-01-01 RX ADMIN — INSULIN GLARGINE 10 UNITS: 100 INJECTION, SOLUTION SUBCUTANEOUS at 17:25

## 2019-01-01 RX ADMIN — INSULIN HUMAN 2 UNITS: 100 INJECTION, SOLUTION PARENTERAL at 17:20

## 2019-01-01 RX ADMIN — RIVAROXABAN 15 MG: 15 TABLET, FILM COATED ORAL at 18:04

## 2019-01-01 RX ADMIN — INSULIN HUMAN 5 UNITS: 100 INJECTION, SOLUTION PARENTERAL at 11:50

## 2019-01-01 RX ADMIN — OXYCODONE HYDROCHLORIDE 5 MG: 5 TABLET ORAL at 17:05

## 2019-01-01 RX ADMIN — INSULIN HUMAN 2 UNITS: 100 INJECTION, SOLUTION PARENTERAL at 12:51

## 2019-01-01 RX ADMIN — CARVEDILOL 3.12 MG: 3.12 TABLET, FILM COATED ORAL at 08:48

## 2019-01-01 RX ADMIN — GABAPENTIN 400 MG: 400 CAPSULE ORAL at 04:32

## 2019-01-01 RX ADMIN — GABAPENTIN 400 MG: 400 CAPSULE ORAL at 17:11

## 2019-01-01 RX ADMIN — RIVAROXABAN 15 MG: 15 TABLET, FILM COATED ORAL at 17:18

## 2019-01-01 RX ADMIN — HYDROCHLOROTHIAZIDE 12.5 MG: 25 TABLET ORAL at 05:00

## 2019-01-01 RX ADMIN — MULTIPLE VITAMINS W/ MINERALS TAB 1 TABLET: TAB at 05:42

## 2019-01-01 RX ADMIN — OXYBUTYNIN CHLORIDE 5 MG: 5 TABLET ORAL at 06:00

## 2019-01-01 RX ADMIN — CARVEDILOL 12.5 MG: 12.5 TABLET, FILM COATED ORAL at 04:38

## 2019-01-01 RX ADMIN — CARVEDILOL 3.12 MG: 3.12 TABLET, FILM COATED ORAL at 08:40

## 2019-01-01 RX ADMIN — DAPTOMYCIN 650 MG: 350 INJECTION, POWDER, LYOPHILIZED, FOR SOLUTION INTRAVENOUS at 12:43

## 2019-01-01 RX ADMIN — OMEPRAZOLE 20 MG: 20 CAPSULE, DELAYED RELEASE ORAL at 05:45

## 2019-01-01 RX ADMIN — ASPIRIN 81 MG: 81 TABLET, DELAYED RELEASE ORAL at 05:45

## 2019-01-01 RX ADMIN — CARVEDILOL 6.25 MG: 6.25 TABLET, FILM COATED ORAL at 08:50

## 2019-01-01 RX ADMIN — LOSARTAN POTASSIUM 50 MG: 50 TABLET ORAL at 04:37

## 2019-01-01 RX ADMIN — OXYCODONE HYDROCHLORIDE 5 MG: 5 TABLET ORAL at 12:57

## 2019-01-01 RX ADMIN — SPIRONOLACTONE 25 MG: 25 TABLET ORAL at 05:57

## 2019-01-01 RX ADMIN — VANCOMYCIN HYDROCHLORIDE 1500 MG: 100 INJECTION, POWDER, LYOPHILIZED, FOR SOLUTION INTRAVENOUS at 17:41

## 2019-01-01 RX ADMIN — GABAPENTIN 400 MG: 400 CAPSULE ORAL at 05:33

## 2019-01-01 RX ADMIN — SENNOSIDES,DOCUSATE SODIUM 2 TABLET: 8.6; 5 TABLET, FILM COATED ORAL at 17:30

## 2019-01-01 RX ADMIN — SENNOSIDES,DOCUSATE SODIUM 2 TABLET: 8.6; 5 TABLET, FILM COATED ORAL at 05:17

## 2019-01-01 RX ADMIN — INSULIN HUMAN 6 UNITS: 100 INJECTION, SOLUTION PARENTERAL at 17:46

## 2019-01-01 RX ADMIN — LISINOPRIL 5 MG: 5 TABLET ORAL at 06:46

## 2019-01-01 RX ADMIN — OMEPRAZOLE 20 MG: 20 CAPSULE, DELAYED RELEASE ORAL at 04:32

## 2019-01-01 RX ADMIN — INSULIN GLARGINE 10 UNITS: 100 INJECTION, SOLUTION SUBCUTANEOUS at 18:12

## 2019-01-01 RX ADMIN — DAPTOMYCIN 650 MG: 350 INJECTION, POWDER, LYOPHILIZED, FOR SOLUTION INTRAVENOUS at 12:56

## 2019-01-01 RX ADMIN — INSULIN HUMAN 3 UNITS: 100 INJECTION, SOLUTION PARENTERAL at 21:22

## 2019-01-01 RX ADMIN — GABAPENTIN 400 MG: 400 CAPSULE ORAL at 17:50

## 2019-01-01 RX ADMIN — FUROSEMIDE 40 MG: 10 INJECTION, SOLUTION INTRAVENOUS at 05:37

## 2019-01-01 RX ADMIN — INSULIN HUMAN 6 UNITS: 100 INJECTION, SOLUTION PARENTERAL at 17:25

## 2019-01-01 RX ADMIN — HEPARIN SODIUM 1050 UNITS/HR: 5000 INJECTION, SOLUTION INTRAVENOUS at 21:39

## 2019-01-01 RX ADMIN — PRAVASTATIN SODIUM 20 MG: 20 TABLET ORAL at 21:24

## 2019-01-01 RX ADMIN — INSULIN HUMAN 3 UNITS: 100 INJECTION, SOLUTION PARENTERAL at 08:57

## 2019-01-01 RX ADMIN — NIACIN 1000 MG: 500 TABLET, EXTENDED RELEASE ORAL at 06:40

## 2019-01-01 RX ADMIN — INSULIN GLARGINE 20 UNITS: 100 INJECTION, SOLUTION SUBCUTANEOUS at 17:56

## 2019-01-01 RX ADMIN — HYDROCHLOROTHIAZIDE 12.5 MG: 25 TABLET ORAL at 04:38

## 2019-01-01 RX ADMIN — PRAVASTATIN SODIUM 20 MG: 20 TABLET ORAL at 20:47

## 2019-01-01 RX ADMIN — TRIAMCINOLONE ACETONIDE: 1 CREAM TOPICAL at 18:24

## 2019-01-01 RX ADMIN — HEPARIN SODIUM 1000 UNITS/HR: 5000 INJECTION, SOLUTION INTRAVENOUS at 03:35

## 2019-01-01 RX ADMIN — INSULIN GLARGINE 10 UNITS: 100 INJECTION, SOLUTION SUBCUTANEOUS at 17:47

## 2019-01-01 RX ADMIN — CLOPIDOGREL 75 MG: 75 TABLET, FILM COATED ORAL at 16:57

## 2019-01-01 RX ADMIN — POTASSIUM CHLORIDE 20 MEQ: 1500 TABLET, EXTENDED RELEASE ORAL at 06:08

## 2019-01-01 RX ADMIN — ASPIRIN 81 MG: 81 TABLET, DELAYED RELEASE ORAL at 12:01

## 2019-01-01 RX ADMIN — OXYCODONE HYDROCHLORIDE 5 MG: 5 TABLET ORAL at 03:39

## 2019-01-01 RX ADMIN — SENNOSIDES,DOCUSATE SODIUM 2 TABLET: 8.6; 5 TABLET, FILM COATED ORAL at 05:46

## 2019-01-01 RX ADMIN — TRIAMCINOLONE ACETONIDE: 1 CREAM TOPICAL at 17:21

## 2019-01-01 RX ADMIN — ACETAMINOPHEN 650 MG: 325 TABLET, FILM COATED ORAL at 15:26

## 2019-01-01 RX ADMIN — NITROGLYCERIN 0.4 MG: 0.4 TABLET SUBLINGUAL at 00:34

## 2019-01-01 RX ADMIN — INSULIN HUMAN 2 UNITS: 100 INJECTION, SOLUTION PARENTERAL at 18:06

## 2019-01-01 RX ADMIN — SODIUM CHLORIDE: 9 INJECTION, SOLUTION INTRAVENOUS at 22:45

## 2019-01-01 RX ADMIN — CARVEDILOL 3.12 MG: 3.12 TABLET, FILM COATED ORAL at 17:24

## 2019-01-01 RX ADMIN — CLOPIDOGREL 75 MG: 75 TABLET, FILM COATED ORAL at 17:08

## 2019-01-01 RX ADMIN — AMPICILLIN SODIUM AND SULBACTAM SODIUM 3 G: 2; 1 INJECTION, POWDER, FOR SOLUTION INTRAMUSCULAR; INTRAVENOUS at 05:39

## 2019-01-01 RX ADMIN — FUROSEMIDE 40 MG: 10 INJECTION, SOLUTION INTRAVENOUS at 05:25

## 2019-01-01 RX ADMIN — OXYBUTYNIN CHLORIDE 5 MG: 5 TABLET ORAL at 04:57

## 2019-01-01 RX ADMIN — INSULIN HUMAN 2 UNITS: 100 INJECTION, SOLUTION PARENTERAL at 21:35

## 2019-01-01 RX ADMIN — INSULIN HUMAN 2 UNITS: 100 INJECTION, SOLUTION PARENTERAL at 19:35

## 2019-01-01 RX ADMIN — CARVEDILOL 3.12 MG: 3.12 TABLET, FILM COATED ORAL at 17:21

## 2019-01-01 RX ADMIN — Medication 1 TABLET: at 12:03

## 2019-01-01 RX ADMIN — POTASSIUM CHLORIDE 20 MEQ: 1500 TABLET, EXTENDED RELEASE ORAL at 05:43

## 2019-01-01 RX ADMIN — ASPIRIN 81 MG: 81 TABLET, DELAYED RELEASE ORAL at 06:05

## 2019-01-01 RX ADMIN — CLOPIDOGREL 75 MG: 75 TABLET, FILM COATED ORAL at 16:22

## 2019-01-01 RX ADMIN — SENNOSIDES,DOCUSATE SODIUM 2 TABLET: 8.6; 5 TABLET, FILM COATED ORAL at 05:32

## 2019-01-01 RX ADMIN — HYDROCHLOROTHIAZIDE 12.5 MG: 25 TABLET ORAL at 06:18

## 2019-01-01 RX ADMIN — HEPARIN SODIUM 800 UNITS/HR: 5000 INJECTION, SOLUTION INTRAVENOUS; SUBCUTANEOUS at 02:46

## 2019-01-01 RX ADMIN — CARVEDILOL 6.25 MG: 6.25 TABLET, FILM COATED ORAL at 17:17

## 2019-01-01 RX ADMIN — DEXTROSE MONOHYDRATE 250 ML: 100 INJECTION, SOLUTION INTRAVENOUS at 08:23

## 2019-01-01 RX ADMIN — CYANOCOBALAMIN TAB 500 MCG 1000 MCG: 500 TAB at 05:34

## 2019-01-01 RX ADMIN — ISOSORBIDE MONONITRATE 30 MG: 30 TABLET ORAL at 14:12

## 2019-01-01 RX ADMIN — CARVEDILOL 3.12 MG: 3.12 TABLET, FILM COATED ORAL at 08:56

## 2019-01-01 RX ADMIN — NYSTATIN: 100000 POWDER TOPICAL at 05:17

## 2019-01-01 RX ADMIN — PIPERACILLIN AND TAZOBACTAM 3.38 G: 3; .375 INJECTION, POWDER, LYOPHILIZED, FOR SOLUTION INTRAVENOUS; PARENTERAL at 05:17

## 2019-01-01 RX ADMIN — SENNOSIDES,DOCUSATE SODIUM 2 TABLET: 8.6; 5 TABLET, FILM COATED ORAL at 05:04

## 2019-01-01 RX ADMIN — OMEPRAZOLE 20 MG: 20 CAPSULE, DELAYED RELEASE ORAL at 04:36

## 2019-01-01 RX ADMIN — GABAPENTIN 400 MG: 400 CAPSULE ORAL at 05:46

## 2019-01-01 RX ADMIN — INSULIN HUMAN 3 UNITS: 100 INJECTION, SOLUTION PARENTERAL at 20:40

## 2019-01-01 RX ADMIN — OMEPRAZOLE 20 MG: 20 CAPSULE, DELAYED RELEASE ORAL at 05:29

## 2019-01-01 RX ADMIN — SENNOSIDES,DOCUSATE SODIUM 2 TABLET: 8.6; 5 TABLET, FILM COATED ORAL at 17:06

## 2019-01-01 RX ADMIN — Medication 1 TABLET: at 06:01

## 2019-01-01 RX ADMIN — MAGNESIUM SULFATE IN DEXTROSE 1 G: 10 INJECTION, SOLUTION INTRAVENOUS at 11:26

## 2019-01-01 RX ADMIN — OXYBUTYNIN CHLORIDE 5 MG: 5 TABLET ORAL at 04:59

## 2019-01-01 RX ADMIN — OXYCODONE HYDROCHLORIDE 5 MG: 5 TABLET ORAL at 18:25

## 2019-01-01 RX ADMIN — LOSARTAN POTASSIUM 50 MG: 50 TABLET ORAL at 06:12

## 2019-01-01 RX ADMIN — Medication 1 CAPSULE: at 08:40

## 2019-01-01 RX ADMIN — IODIXANOL 16 ML: 270 INJECTION, SOLUTION INTRAVASCULAR at 13:15

## 2019-01-01 RX ADMIN — RIVAROXABAN 15 MG: 15 TABLET, FILM COATED ORAL at 08:56

## 2019-01-01 RX ADMIN — OXYBUTYNIN CHLORIDE 5 MG: 5 TABLET ORAL at 05:57

## 2019-01-01 RX ADMIN — AMPICILLIN SODIUM AND SULBACTAM SODIUM 3 G: 2; 1 INJECTION, POWDER, FOR SOLUTION INTRAMUSCULAR; INTRAVENOUS at 00:33

## 2019-01-01 RX ADMIN — GABAPENTIN 400 MG: 400 CAPSULE ORAL at 17:05

## 2019-01-01 RX ADMIN — PRAVASTATIN SODIUM 20 MG: 20 TABLET ORAL at 20:14

## 2019-01-01 RX ADMIN — Medication 1 TABLET: at 05:37

## 2019-01-01 RX ADMIN — GABAPENTIN 400 MG: 400 CAPSULE ORAL at 17:40

## 2019-01-01 RX ADMIN — IOHEXOL 100 ML: 350 INJECTION, SOLUTION INTRAVENOUS at 15:57

## 2019-01-01 RX ADMIN — IPRATROPIUM BROMIDE AND ALBUTEROL SULFATE 3 ML: .5; 3 SOLUTION RESPIRATORY (INHALATION) at 03:16

## 2019-01-01 RX ADMIN — NITROGLYCERIN 0.4 MG: 0.4 TABLET SUBLINGUAL at 11:57

## 2019-01-01 RX ADMIN — INSULIN HUMAN 2 UNITS: 100 INJECTION, SOLUTION PARENTERAL at 05:43

## 2019-01-01 RX ADMIN — INSULIN HUMAN 5 UNITS: 100 INJECTION, SOLUTION PARENTERAL at 17:06

## 2019-01-01 RX ADMIN — SODIUM CHLORIDE 500 ML: 9 INJECTION, SOLUTION INTRAVENOUS at 12:45

## 2019-01-01 RX ADMIN — CARVEDILOL 6.25 MG: 6.25 TABLET, FILM COATED ORAL at 17:50

## 2019-01-01 RX ADMIN — LOSARTAN POTASSIUM 50 MG: 50 TABLET ORAL at 04:57

## 2019-01-01 RX ADMIN — GABAPENTIN 400 MG: 400 CAPSULE ORAL at 18:04

## 2019-01-01 RX ADMIN — GABAPENTIN 400 MG: 400 CAPSULE ORAL at 05:05

## 2019-01-01 RX ADMIN — SENNOSIDES,DOCUSATE SODIUM 2 TABLET: 8.6; 5 TABLET, FILM COATED ORAL at 05:40

## 2019-01-01 RX ADMIN — NYSTATIN: 100000 POWDER TOPICAL at 17:47

## 2019-01-01 RX ADMIN — POTASSIUM CHLORIDE 20 MEQ: 1500 TABLET, EXTENDED RELEASE ORAL at 05:33

## 2019-01-01 RX ADMIN — NYSTATIN: 100000 POWDER TOPICAL at 17:11

## 2019-01-01 RX ADMIN — Medication 1 CAPSULE: at 08:56

## 2019-01-01 RX ADMIN — INSULIN GLARGINE 27 UNITS: 100 INJECTION, SOLUTION SUBCUTANEOUS at 09:18

## 2019-01-01 RX ADMIN — CARVEDILOL 3.12 MG: 3.12 TABLET, FILM COATED ORAL at 17:30

## 2019-01-01 RX ADMIN — CARVEDILOL 12.5 MG: 12.5 TABLET, FILM COATED ORAL at 06:18

## 2019-01-01 RX ADMIN — CYANOCOBALAMIN TAB 500 MCG 1000 MCG: 500 TAB at 05:24

## 2019-01-01 RX ADMIN — CARVEDILOL 12.5 MG: 12.5 TABLET, FILM COATED ORAL at 06:13

## 2019-01-01 RX ADMIN — VANCOMYCIN HYDROCHLORIDE 900 MG: 100 INJECTION, POWDER, LYOPHILIZED, FOR SOLUTION INTRAVENOUS at 16:06

## 2019-01-01 RX ADMIN — VANCOMYCIN HYDROCHLORIDE 2200 MG: 100 INJECTION, POWDER, LYOPHILIZED, FOR SOLUTION INTRAVENOUS at 16:22

## 2019-01-01 RX ADMIN — HEPARIN SODIUM 1100 UNITS/HR: 5000 INJECTION, SOLUTION INTRAVENOUS at 14:34

## 2019-01-01 RX ADMIN — SENNOSIDES, DOCUSATE SODIUM 2 TABLET: 50; 8.6 TABLET, FILM COATED ORAL at 18:00

## 2019-01-01 RX ADMIN — INSULIN GLARGINE 20 UNITS: 100 INJECTION, SOLUTION SUBCUTANEOUS at 18:08

## 2019-01-01 RX ADMIN — SPIRONOLACTONE 25 MG: 25 TABLET ORAL at 06:06

## 2019-01-01 RX ADMIN — TRIAMCINOLONE ACETONIDE: 1 CREAM TOPICAL at 05:38

## 2019-01-01 RX ADMIN — INSULIN HUMAN 2 UNITS: 100 INJECTION, SOLUTION PARENTERAL at 12:15

## 2019-01-01 RX ADMIN — SPIRONOLACTONE 25 MG: 25 TABLET ORAL at 05:45

## 2019-01-01 RX ADMIN — CARVEDILOL 6.25 MG: 6.25 TABLET, FILM COATED ORAL at 07:48

## 2019-01-01 RX ADMIN — INSULIN HUMAN 3 UNITS: 100 INJECTION, SOLUTION PARENTERAL at 12:45

## 2019-01-01 RX ADMIN — OXYCODONE HYDROCHLORIDE 5 MG: 5 TABLET ORAL at 22:16

## 2019-01-01 RX ADMIN — POTASSIUM CHLORIDE 20 MEQ: 1500 TABLET, EXTENDED RELEASE ORAL at 11:33

## 2019-01-01 RX ADMIN — STANDARDIZED SENNA CONCENTRATE AND DOCUSATE SODIUM 2 TABLET: 8.6; 5 TABLET, FILM COATED ORAL at 16:22

## 2019-01-01 RX ADMIN — RIVAROXABAN 15 MG: 15 TABLET, FILM COATED ORAL at 08:48

## 2019-01-01 RX ADMIN — SENNOSIDES,DOCUSATE SODIUM 2 TABLET: 8.6; 5 TABLET, FILM COATED ORAL at 17:50

## 2019-01-01 RX ADMIN — CLOPIDOGREL 75 MG: 75 TABLET, FILM COATED ORAL at 17:26

## 2019-01-01 RX ADMIN — INSULIN GLARGINE 34 UNITS: 100 INJECTION, SOLUTION SUBCUTANEOUS at 08:37

## 2019-01-01 RX ADMIN — CARVEDILOL 6.25 MG: 6.25 TABLET, FILM COATED ORAL at 17:11

## 2019-01-01 RX ADMIN — Medication 16 G: at 09:06

## 2019-01-01 RX ADMIN — FUROSEMIDE 20 MG: 40 TABLET ORAL at 05:54

## 2019-01-01 RX ADMIN — Medication 1 TABLET: at 05:19

## 2019-01-01 RX ADMIN — INSULIN HUMAN 5 UNITS: 100 INJECTION, SOLUTION PARENTERAL at 21:00

## 2019-01-01 RX ADMIN — INSULIN HUMAN 5 UNITS: 100 INJECTION, SOLUTION PARENTERAL at 17:11

## 2019-01-01 RX ADMIN — OMEPRAZOLE 20 MG: 20 CAPSULE, DELAYED RELEASE ORAL at 05:24

## 2019-01-01 RX ADMIN — TRIAMCINOLONE ACETONIDE: 1 CREAM TOPICAL at 06:07

## 2019-01-01 RX ADMIN — MULTIPLE VITAMINS W/ MINERALS TAB 1 TABLET: TAB at 05:55

## 2019-01-01 RX ADMIN — CARVEDILOL 12.5 MG: 12.5 TABLET, FILM COATED ORAL at 04:59

## 2019-01-01 RX ADMIN — CARVEDILOL 3.12 MG: 3.12 TABLET, FILM COATED ORAL at 09:07

## 2019-01-01 RX ADMIN — OXYCODONE HYDROCHLORIDE 5 MG: 5 TABLET ORAL at 21:02

## 2019-01-01 RX ADMIN — ENOXAPARIN SODIUM 100 MG: 100 INJECTION SUBCUTANEOUS at 16:32

## 2019-01-01 RX ADMIN — Medication 1 CAPSULE: at 09:07

## 2019-01-01 RX ADMIN — METRONIDAZOLE 500 MG: 500 TABLET ORAL at 04:37

## 2019-01-01 RX ADMIN — TRIAMCINOLONE ACETONIDE: 1 CREAM TOPICAL at 05:05

## 2019-01-01 RX ADMIN — AMPICILLIN SODIUM AND SULBACTAM SODIUM 3 G: 2; 1 INJECTION, POWDER, FOR SOLUTION INTRAMUSCULAR; INTRAVENOUS at 14:17

## 2019-01-01 RX ADMIN — Medication 1 TABLET: at 05:57

## 2019-01-01 RX ADMIN — FUROSEMIDE 20 MG: 40 TABLET ORAL at 05:43

## 2019-01-01 RX ADMIN — FUROSEMIDE 20 MG: 40 TABLET ORAL at 04:36

## 2019-01-01 RX ADMIN — Medication 1 TABLET: at 06:45

## 2019-01-01 RX ADMIN — INSULIN HUMAN 3 UNITS: 100 INJECTION, SOLUTION PARENTERAL at 11:55

## 2019-01-01 RX ADMIN — INSULIN HUMAN 5 UNITS: 100 INJECTION, SOLUTION PARENTERAL at 11:43

## 2019-01-01 RX ADMIN — HYDROMORPHONE HYDROCHLORIDE 0.25 MG: 1 INJECTION, SOLUTION INTRAMUSCULAR; INTRAVENOUS; SUBCUTANEOUS at 22:16

## 2019-01-01 RX ADMIN — PRAVASTATIN SODIUM 20 MG: 20 TABLET ORAL at 19:29

## 2019-01-01 RX ADMIN — HEPARIN SODIUM 2600 UNITS: 1000 INJECTION, SOLUTION INTRAVENOUS; SUBCUTANEOUS at 23:21

## 2019-01-01 RX ADMIN — Medication 1 TABLET: at 06:00

## 2019-01-01 RX ADMIN — DAPTOMYCIN 800 MG: 350 INJECTION, POWDER, LYOPHILIZED, FOR SOLUTION INTRAVENOUS at 14:32

## 2019-01-01 RX ADMIN — INSULIN HUMAN 3 UNITS: 100 INJECTION, SOLUTION PARENTERAL at 06:56

## 2019-01-01 RX ADMIN — OXYCODONE HYDROCHLORIDE 5 MG: 5 TABLET ORAL at 20:47

## 2019-01-01 RX ADMIN — LIDOCAINE HYDROCHLORIDE,EPINEPHRINE BITARTRATE 10 ML: 10; .01 INJECTION, SOLUTION INFILTRATION; PERINEURAL at 14:42

## 2019-01-01 RX ADMIN — GABAPENTIN 400 MG: 400 CAPSULE ORAL at 05:37

## 2019-01-01 RX ADMIN — ACETAMINOPHEN 650 MG: 325 TABLET, FILM COATED ORAL at 08:44

## 2019-01-01 RX ADMIN — ASPIRIN 81 MG: 81 TABLET, DELAYED RELEASE ORAL at 05:42

## 2019-01-01 RX ADMIN — ENOXAPARIN SODIUM 40 MG: 100 INJECTION SUBCUTANEOUS at 12:01

## 2019-01-01 RX ADMIN — MULTIPLE VITAMINS W/ MINERALS TAB 1 TABLET: TAB at 06:00

## 2019-01-01 RX ADMIN — HEPARIN SODIUM 950 UNITS/HR: 5000 INJECTION, SOLUTION INTRAVENOUS; SUBCUTANEOUS at 19:18

## 2019-01-01 RX ADMIN — TRIAMCINOLONE ACETONIDE: 1 CREAM TOPICAL at 17:30

## 2019-01-01 RX ADMIN — GABAPENTIN 400 MG: 400 CAPSULE ORAL at 17:21

## 2019-01-01 RX ADMIN — INSULIN GLARGINE 10 UNITS: 100 INJECTION, SOLUTION SUBCUTANEOUS at 16:41

## 2019-01-01 RX ADMIN — CLOPIDOGREL 75 MG: 75 TABLET, FILM COATED ORAL at 17:24

## 2019-01-01 RX ADMIN — CLOPIDOGREL 75 MG: 75 TABLET, FILM COATED ORAL at 17:40

## 2019-01-01 RX ADMIN — SENNOSIDES,DOCUSATE SODIUM 2 TABLET: 8.6; 5 TABLET, FILM COATED ORAL at 17:17

## 2019-01-01 RX ADMIN — INSULIN HUMAN 5 UNITS: 100 INJECTION, SOLUTION PARENTERAL at 12:26

## 2019-01-01 RX ADMIN — SPIRONOLACTONE 25 MG: 25 TABLET ORAL at 05:32

## 2019-01-01 RX ADMIN — INSULIN HUMAN 2 UNITS: 100 INJECTION, SOLUTION PARENTERAL at 13:07

## 2019-01-01 RX ADMIN — CYANOCOBALAMIN TAB 500 MCG 1000 MCG: 500 TAB at 05:27

## 2019-01-01 RX ADMIN — FUROSEMIDE 20 MG: 20 TABLET ORAL at 05:40

## 2019-01-01 RX ADMIN — CARVEDILOL 3.12 MG: 3.12 TABLET, FILM COATED ORAL at 17:40

## 2019-01-01 RX ADMIN — PRAVASTATIN SODIUM 20 MG: 20 TABLET ORAL at 20:22

## 2019-01-01 RX ADMIN — OMEPRAZOLE 20 MG: 20 CAPSULE, DELAYED RELEASE ORAL at 06:46

## 2019-01-01 RX ADMIN — ASPIRIN 81 MG: 81 TABLET, COATED ORAL at 06:01

## 2019-01-01 RX ADMIN — PRAVASTATIN SODIUM 20 MG: 10 TABLET ORAL at 20:09

## 2019-01-01 RX ADMIN — PRAVASTATIN SODIUM 20 MG: 10 TABLET ORAL at 20:17

## 2019-01-01 RX ADMIN — HEPARIN SODIUM 1050 UNITS/HR: 5000 INJECTION, SOLUTION INTRAVENOUS at 18:00

## 2019-01-01 RX ADMIN — POTASSIUM CHLORIDE 20 MEQ: 1500 TABLET, EXTENDED RELEASE ORAL at 06:17

## 2019-01-01 RX ADMIN — SODIUM CHLORIDE: 9 INJECTION, SOLUTION INTRAVENOUS at 16:07

## 2019-01-01 RX ADMIN — INSULIN HUMAN 2 UNITS: 100 INJECTION, SOLUTION PARENTERAL at 13:02

## 2019-01-01 RX ADMIN — GADOBUTROL 7.5 ML: 604.72 INJECTION INTRAVENOUS at 18:58

## 2019-01-01 RX ADMIN — POTASSIUM CHLORIDE 20 MEQ: 1500 TABLET, EXTENDED RELEASE ORAL at 19:58

## 2019-01-01 RX ADMIN — POTASSIUM PHOSPHATE, MONOBASIC AND POTASSIUM PHOSPHATE, DIBASIC 15 MMOL: 224; 236 INJECTION, SOLUTION, CONCENTRATE INTRAVENOUS at 12:32

## 2019-01-01 RX ADMIN — POTASSIUM CHLORIDE 20 MEQ: 1500 TABLET, EXTENDED RELEASE ORAL at 04:32

## 2019-01-01 RX ADMIN — GABAPENTIN 400 MG: 400 CAPSULE ORAL at 05:55

## 2019-01-01 RX ADMIN — INSULIN GLARGINE 30 UNITS: 100 INJECTION, SOLUTION SUBCUTANEOUS at 17:54

## 2019-01-01 RX ADMIN — INSULIN GLARGINE 32 UNITS: 100 INJECTION, SOLUTION SUBCUTANEOUS at 06:34

## 2019-01-01 RX ADMIN — CYANOCOBALAMIN TAB 500 MCG 1000 MCG: 500 TAB at 05:32

## 2019-01-01 RX ADMIN — ASPIRIN 81 MG: 81 TABLET, DELAYED RELEASE ORAL at 05:24

## 2019-01-01 RX ADMIN — WARFARIN SODIUM 5 MG: 5 TABLET ORAL at 17:12

## 2019-01-01 RX ADMIN — HEPARIN SODIUM 2600 UNITS: 1000 INJECTION, SOLUTION INTRAVENOUS; SUBCUTANEOUS at 08:57

## 2019-01-01 RX ADMIN — DAPTOMYCIN 690 MG: 350 INJECTION, POWDER, LYOPHILIZED, FOR SOLUTION INTRAVENOUS at 18:30

## 2019-01-01 RX ADMIN — OXYCODONE HYDROCHLORIDE 5 MG: 5 TABLET ORAL at 10:13

## 2019-01-01 RX ADMIN — MULTIPLE VITAMINS W/ MINERALS TAB 1 TABLET: TAB at 04:57

## 2019-01-01 RX ADMIN — GABAPENTIN 400 MG: 400 CAPSULE ORAL at 17:17

## 2019-01-01 RX ADMIN — RIVAROXABAN 15 MG: 15 TABLET, FILM COATED ORAL at 07:48

## 2019-01-01 RX ADMIN — INSULIN GLARGINE 32 UNITS: 100 INJECTION, SOLUTION SUBCUTANEOUS at 06:46

## 2019-01-01 RX ADMIN — HEPARIN SODIUM 850 UNITS/HR: 5000 INJECTION, SOLUTION INTRAVENOUS at 21:04

## 2019-01-01 RX ADMIN — INSULIN HUMAN 2 UNITS: 100 INJECTION, SOLUTION PARENTERAL at 18:08

## 2019-01-01 RX ADMIN — NYSTATIN: 100000 POWDER TOPICAL at 17:16

## 2019-01-01 RX ADMIN — POTASSIUM CHLORIDE 20 MEQ: 1500 TABLET, EXTENDED RELEASE ORAL at 06:40

## 2019-01-01 RX ADMIN — Medication 1 CAPSULE: at 08:23

## 2019-01-01 RX ADMIN — CYANOCOBALAMIN TAB 500 MCG 1000 MCG: 500 TAB at 05:17

## 2019-01-01 RX ADMIN — RIVAROXABAN 15 MG: 15 TABLET, FILM COATED ORAL at 17:50

## 2019-01-01 RX ADMIN — MULTIPLE VITAMINS W/ MINERALS TAB 1 TABLET: TAB at 04:59

## 2019-01-01 RX ADMIN — CARVEDILOL 3.12 MG: 3.12 TABLET, FILM COATED ORAL at 17:05

## 2019-01-01 RX ADMIN — CEFTRIAXONE SODIUM 2 G: 2 INJECTION, POWDER, FOR SOLUTION INTRAMUSCULAR; INTRAVENOUS at 14:14

## 2019-01-01 RX ADMIN — DEXTROSE MONOHYDRATE 250 ML: 100 INJECTION, SOLUTION INTRAVENOUS at 12:51

## 2019-01-01 RX ADMIN — OMEPRAZOLE 20 MG: 20 CAPSULE, DELAYED RELEASE ORAL at 05:40

## 2019-01-01 RX ADMIN — CARVEDILOL 6.25 MG: 6.25 TABLET, FILM COATED ORAL at 17:45

## 2019-01-01 RX ADMIN — MORPHINE SULFATE 2 MG: 4 INJECTION INTRAVENOUS at 01:11

## 2019-01-01 RX ADMIN — LISINOPRIL 5 MG: 5 TABLET ORAL at 05:24

## 2019-01-01 RX ADMIN — INSULIN HUMAN 2 UNITS: 100 INJECTION, SOLUTION PARENTERAL at 06:45

## 2019-01-01 RX ADMIN — CARVEDILOL 3.12 MG: 3.12 TABLET, FILM COATED ORAL at 17:25

## 2019-01-01 RX ADMIN — OXYBUTYNIN CHLORIDE 5 MG: 5 TABLET ORAL at 05:42

## 2019-01-01 RX ADMIN — NYSTATIN: 100000 POWDER TOPICAL at 06:04

## 2019-01-01 RX ADMIN — TRIAMCINOLONE ACETONIDE: 1 CREAM TOPICAL at 05:43

## 2019-01-01 RX ADMIN — INSULIN HUMAN 5 UNITS: 100 INJECTION, SOLUTION PARENTERAL at 12:16

## 2019-01-01 RX ADMIN — FUROSEMIDE 40 MG: 10 INJECTION, SOLUTION INTRAVENOUS at 05:28

## 2019-01-01 RX ADMIN — OMEPRAZOLE 20 MG: 20 CAPSULE, DELAYED RELEASE ORAL at 04:31

## 2019-01-01 RX ADMIN — PRAVASTATIN SODIUM 20 MG: 10 TABLET ORAL at 20:54

## 2019-01-01 RX ADMIN — OMEPRAZOLE 20 MG: 20 CAPSULE, DELAYED RELEASE ORAL at 05:58

## 2019-01-01 RX ADMIN — CARVEDILOL 3.12 MG: 3.12 TABLET, FILM COATED ORAL at 08:35

## 2019-01-01 RX ADMIN — WARFARIN SODIUM 7.5 MG: 7.5 TABLET ORAL at 17:19

## 2019-01-01 RX ADMIN — INSULIN HUMAN 8 UNITS: 100 INJECTION, SOLUTION PARENTERAL at 17:54

## 2019-01-01 RX ADMIN — ISOSORBIDE MONONITRATE 30 MG: 30 TABLET ORAL at 04:59

## 2019-01-01 RX ADMIN — PIPERACILLIN AND TAZOBACTAM 3.38 G: 3; .375 INJECTION, POWDER, LYOPHILIZED, FOR SOLUTION INTRAVENOUS; PARENTERAL at 13:38

## 2019-01-01 RX ADMIN — LOSARTAN POTASSIUM 50 MG: 50 TABLET ORAL at 06:40

## 2019-01-01 RX ADMIN — CARVEDILOL 6.25 MG: 6.25 TABLET, FILM COATED ORAL at 08:24

## 2019-01-01 RX ADMIN — SENNOSIDES, DOCUSATE SODIUM 2 TABLET: 50; 8.6 TABLET, FILM COATED ORAL at 05:57

## 2019-01-01 RX ADMIN — PRAVASTATIN SODIUM 20 MG: 10 TABLET ORAL at 21:02

## 2019-01-01 RX ADMIN — IPRATROPIUM BROMIDE AND ALBUTEROL SULFATE 3 ML: .5; 3 SOLUTION RESPIRATORY (INHALATION) at 21:35

## 2019-01-01 RX ADMIN — CLOPIDOGREL 75 MG: 75 TABLET, FILM COATED ORAL at 16:33

## 2019-01-01 RX ADMIN — FENTANYL CITRATE 25 MCG: 50 INJECTION INTRAMUSCULAR; INTRAVENOUS at 13:05

## 2019-01-01 RX ADMIN — INSULIN GLARGINE 34 UNITS: 100 INJECTION, SOLUTION SUBCUTANEOUS at 09:20

## 2019-01-01 RX ADMIN — MULTIPLE VITAMINS W/ MINERALS TAB 1 TABLET: TAB at 05:57

## 2019-01-01 RX ADMIN — ACETAMINOPHEN 650 MG: 325 TABLET, FILM COATED ORAL at 20:14

## 2019-01-01 RX ADMIN — OMEPRAZOLE 20 MG: 20 CAPSULE, DELAYED RELEASE ORAL at 04:54

## 2019-01-01 RX ADMIN — PRAVASTATIN SODIUM 20 MG: 20 TABLET ORAL at 22:56

## 2019-01-01 RX ADMIN — LISINOPRIL 5 MG: 5 TABLET ORAL at 05:31

## 2019-01-01 RX ADMIN — HEPARIN SODIUM 1000 UNITS/HR: 5000 INJECTION, SOLUTION INTRAVENOUS at 11:41

## 2019-01-01 RX ADMIN — SPIRONOLACTONE 25 MG: 25 TABLET ORAL at 06:46

## 2019-01-01 RX ADMIN — INSULIN GLARGINE 10 UNITS: 100 INJECTION, SOLUTION SUBCUTANEOUS at 17:27

## 2019-01-01 RX ADMIN — TRIAMCINOLONE ACETONIDE: 1 CREAM TOPICAL at 05:39

## 2019-01-01 RX ADMIN — POTASSIUM CHLORIDE 20 MEQ: 1500 TABLET, EXTENDED RELEASE ORAL at 05:24

## 2019-01-01 RX ADMIN — MULTIPLE VITAMINS W/ MINERALS TAB 1 TABLET: TAB at 06:17

## 2019-01-01 RX ADMIN — GABAPENTIN 400 MG: 400 CAPSULE ORAL at 17:30

## 2019-01-01 RX ADMIN — POTASSIUM CHLORIDE 20 MEQ: 1500 TABLET, EXTENDED RELEASE ORAL at 17:06

## 2019-01-01 RX ADMIN — LOSARTAN POTASSIUM 50 MG: 50 TABLET ORAL at 05:43

## 2019-01-01 RX ADMIN — MIDAZOLAM HYDROCHLORIDE 0.5 MG: 1 INJECTION INTRAMUSCULAR; INTRAVENOUS at 13:05

## 2019-01-01 RX ADMIN — INSULIN GLARGINE 30 UNITS: 100 INJECTION, SOLUTION SUBCUTANEOUS at 23:59

## 2019-01-01 RX ADMIN — GABAPENTIN 400 MG: 400 CAPSULE ORAL at 05:24

## 2019-01-01 RX ADMIN — ASPIRIN 81 MG: 81 TABLET, DELAYED RELEASE ORAL at 05:27

## 2019-01-01 RX ADMIN — NYSTATIN: 100000 POWDER TOPICAL at 17:06

## 2019-01-01 RX ADMIN — CARVEDILOL 3.12 MG: 3.12 TABLET, FILM COATED ORAL at 10:03

## 2019-01-01 RX ADMIN — INSULIN HUMAN 2 UNITS: 100 INJECTION, SOLUTION PARENTERAL at 08:35

## 2019-01-01 RX ADMIN — POTASSIUM CHLORIDE 20 MEQ: 1500 TABLET, EXTENDED RELEASE ORAL at 18:03

## 2019-01-01 RX ADMIN — CARVEDILOL 3.12 MG: 3.12 TABLET, FILM COATED ORAL at 09:17

## 2019-01-01 RX ADMIN — NYSTATIN: 100000 POWDER TOPICAL at 05:30

## 2019-01-01 RX ADMIN — OMEPRAZOLE 20 MG: 20 CAPSULE, DELAYED RELEASE ORAL at 05:04

## 2019-01-01 RX ADMIN — WARFARIN SODIUM 5 MG: 5 TABLET ORAL at 23:40

## 2019-01-01 RX ADMIN — GABAPENTIN 400 MG: 400 CAPSULE ORAL at 17:06

## 2019-01-01 RX ADMIN — Medication 1 CAPSULE: at 09:17

## 2019-01-01 RX ADMIN — NYSTATIN: 100000 POWDER TOPICAL at 05:45

## 2019-01-01 RX ADMIN — INSULIN HUMAN 2 UNITS: 100 INJECTION, SOLUTION PARENTERAL at 21:23

## 2019-01-01 RX ADMIN — Medication 1 CAPSULE: at 08:48

## 2019-01-01 RX ADMIN — ASPIRIN 81 MG: 81 TABLET, DELAYED RELEASE ORAL at 05:33

## 2019-01-01 RX ADMIN — CARVEDILOL 6.25 MG: 6.25 TABLET, FILM COATED ORAL at 17:59

## 2019-01-01 RX ADMIN — SPIRONOLACTONE 25 MG: 25 TABLET ORAL at 05:39

## 2019-01-01 RX ADMIN — RIVAROXABAN 15 MG: 15 TABLET, FILM COATED ORAL at 17:11

## 2019-01-01 RX ADMIN — SODIUM CHLORIDE: 9 INJECTION, SOLUTION INTRAVENOUS at 10:31

## 2019-01-01 RX ADMIN — INSULIN HUMAN 9 UNITS: 100 INJECTION, SOLUTION PARENTERAL at 17:55

## 2019-01-01 RX ADMIN — POTASSIUM CHLORIDE 20 MEQ: 1500 TABLET, EXTENDED RELEASE ORAL at 04:57

## 2019-01-01 RX ADMIN — GABAPENTIN 400 MG: 400 CAPSULE ORAL at 06:05

## 2019-01-01 RX ADMIN — HYDROCHLOROTHIAZIDE 12.5 MG: 25 TABLET ORAL at 06:40

## 2019-01-01 RX ADMIN — POTASSIUM CHLORIDE 20 MEQ: 1500 TABLET, EXTENDED RELEASE ORAL at 17:11

## 2019-01-01 RX ADMIN — INSULIN HUMAN 8 UNITS: 100 INJECTION, SOLUTION PARENTERAL at 13:18

## 2019-01-01 RX ADMIN — PRAVASTATIN SODIUM 20 MG: 20 TABLET ORAL at 20:28

## 2019-01-01 RX ADMIN — METRONIDAZOLE 500 MG: 500 TABLET ORAL at 06:01

## 2019-01-01 RX ADMIN — PIPERACILLIN AND TAZOBACTAM 3.38 G: 3; .375 INJECTION, POWDER, LYOPHILIZED, FOR SOLUTION INTRAVENOUS; PARENTERAL at 14:33

## 2019-01-01 RX ADMIN — INSULIN HUMAN 6 UNITS: 100 INJECTION, SOLUTION PARENTERAL at 20:59

## 2019-01-01 RX ADMIN — INSULIN HUMAN 2 UNITS: 100 INJECTION, SOLUTION PARENTERAL at 12:44

## 2019-01-01 RX ADMIN — LOSARTAN POTASSIUM 50 MG: 50 TABLET ORAL at 06:02

## 2019-01-01 RX ADMIN — NYSTATIN: 100000 POWDER TOPICAL at 17:19

## 2019-01-01 RX ADMIN — INSULIN HUMAN 2 UNITS: 100 INJECTION, SOLUTION PARENTERAL at 20:56

## 2019-01-01 RX ADMIN — FUROSEMIDE 40 MG: 10 INJECTION, SOLUTION INTRAVENOUS at 05:38

## 2019-01-01 RX ADMIN — INSULIN HUMAN 3 UNITS: 100 INJECTION, SOLUTION PARENTERAL at 18:23

## 2019-01-01 RX ADMIN — POTASSIUM CHLORIDE 20 MEQ: 1500 TABLET, EXTENDED RELEASE ORAL at 05:38

## 2019-01-01 RX ADMIN — PRAVASTATIN SODIUM 20 MG: 20 TABLET ORAL at 21:04

## 2019-01-01 RX ADMIN — POTASSIUM CHLORIDE 20 MEQ: 1500 TABLET, EXTENDED RELEASE ORAL at 04:36

## 2019-01-01 RX ADMIN — INSULIN HUMAN 5 UNITS: 100 INJECTION, SOLUTION PARENTERAL at 12:31

## 2019-01-01 RX ADMIN — NYSTATIN: 100000 POWDER TOPICAL at 17:21

## 2019-01-01 RX ADMIN — GABAPENTIN 400 MG: 400 CAPSULE ORAL at 04:58

## 2019-01-01 RX ADMIN — ACETAMINOPHEN 650 MG: 325 TABLET, FILM COATED ORAL at 21:36

## 2019-01-01 RX ADMIN — HYDROCHLOROTHIAZIDE 12.5 MG: 25 TABLET ORAL at 06:01

## 2019-01-01 RX ADMIN — AMPICILLIN SODIUM AND SULBACTAM SODIUM 3 G: 2; 1 INJECTION, POWDER, FOR SOLUTION INTRAMUSCULAR; INTRAVENOUS at 12:01

## 2019-01-01 RX ADMIN — INSULIN GLARGINE 34 UNITS: 100 INJECTION, SOLUTION SUBCUTANEOUS at 08:29

## 2019-01-01 RX ADMIN — INSULIN HUMAN 2 UNITS: 100 INJECTION, SOLUTION PARENTERAL at 08:37

## 2019-01-01 RX ADMIN — Medication 1 CAPSULE: at 07:56

## 2019-01-01 RX ADMIN — RIVAROXABAN 15 MG: 15 TABLET, FILM COATED ORAL at 17:46

## 2019-01-01 RX ADMIN — POTASSIUM CHLORIDE 20 MEQ: 1500 TABLET, EXTENDED RELEASE ORAL at 17:31

## 2019-01-01 RX ADMIN — POTASSIUM CHLORIDE 20 MEQ: 1500 TABLET, EXTENDED RELEASE ORAL at 04:59

## 2019-01-01 RX ADMIN — PIPERACILLIN AND TAZOBACTAM 3.38 G: 3; .375 INJECTION, POWDER, LYOPHILIZED, FOR SOLUTION INTRAVENOUS; PARENTERAL at 20:50

## 2019-01-01 RX ADMIN — CARVEDILOL 6.25 MG: 6.25 TABLET, FILM COATED ORAL at 17:31

## 2019-01-01 RX ADMIN — TRIAMCINOLONE ACETONIDE: 1 CREAM TOPICAL at 17:50

## 2019-01-01 RX ADMIN — AMPICILLIN SODIUM AND SULBACTAM SODIUM 3 G: 2; 1 INJECTION, POWDER, FOR SOLUTION INTRAMUSCULAR; INTRAVENOUS at 12:02

## 2019-01-01 RX ADMIN — CYANOCOBALAMIN TAB 500 MCG 1000 MCG: 500 TAB at 05:38

## 2019-01-01 RX ADMIN — MAGNESIUM SULFATE 1 G: 1 INJECTION INTRAVENOUS at 18:28

## 2019-01-01 RX ADMIN — MAGNESIUM SULFATE IN WATER 4 G: 40 INJECTION, SOLUTION INTRAVENOUS at 13:59

## 2019-01-01 RX ADMIN — PRAVASTATIN SODIUM 20 MG: 20 TABLET ORAL at 21:03

## 2019-01-01 RX ADMIN — NYSTATIN: 100000 POWDER TOPICAL at 05:39

## 2019-01-01 RX ADMIN — POTASSIUM CHLORIDE 20 MEQ: 1500 TABLET, EXTENDED RELEASE ORAL at 17:50

## 2019-01-01 RX ADMIN — PRAVASTATIN SODIUM 20 MG: 20 TABLET ORAL at 20:54

## 2019-01-01 RX ADMIN — VANCOMYCIN HYDROCHLORIDE 1300 MG: 100 INJECTION, POWDER, LYOPHILIZED, FOR SOLUTION INTRAVENOUS at 20:09

## 2019-01-01 RX ADMIN — LISINOPRIL 5 MG: 5 TABLET ORAL at 05:59

## 2019-01-01 RX ADMIN — FUROSEMIDE 20 MG: 20 TABLET ORAL at 05:17

## 2019-01-01 RX ADMIN — POTASSIUM CHLORIDE 20 MEQ: 1500 TABLET, EXTENDED RELEASE ORAL at 05:40

## 2019-01-01 RX ADMIN — GABAPENTIN 400 MG: 400 CAPSULE ORAL at 17:24

## 2019-01-01 RX ADMIN — GABAPENTIN 400 MG: 400 CAPSULE ORAL at 05:17

## 2019-01-01 RX ADMIN — AMPICILLIN SODIUM AND SULBACTAM SODIUM 3 G: 2; 1 INJECTION, POWDER, FOR SOLUTION INTRAMUSCULAR; INTRAVENOUS at 11:11

## 2019-01-01 RX ADMIN — POTASSIUM CHLORIDE 20 MEQ: 1500 TABLET, EXTENDED RELEASE ORAL at 17:15

## 2019-01-01 RX ADMIN — AMPICILLIN SODIUM AND SULBACTAM SODIUM 3 G: 2; 1 INJECTION, POWDER, FOR SOLUTION INTRAMUSCULAR; INTRAVENOUS at 05:37

## 2019-01-01 RX ADMIN — GABAPENTIN 400 MG: 400 CAPSULE ORAL at 17:18

## 2019-01-01 RX ADMIN — METRONIDAZOLE 500 MG: 500 TABLET ORAL at 14:04

## 2019-01-01 RX ADMIN — GABAPENTIN 400 MG: 400 CAPSULE ORAL at 17:45

## 2019-01-01 RX ADMIN — CARVEDILOL 6.25 MG: 6.25 TABLET, FILM COATED ORAL at 18:03

## 2019-01-01 RX ADMIN — CYANOCOBALAMIN TAB 500 MCG 1000 MCG: 500 TAB at 06:06

## 2019-01-01 RX ADMIN — FUROSEMIDE 20 MG: 20 TABLET ORAL at 05:38

## 2019-01-01 RX ADMIN — POTASSIUM CHLORIDE 20 MEQ: 1500 TABLET, EXTENDED RELEASE ORAL at 17:46

## 2019-01-01 RX ADMIN — NYSTATIN: 100000 POWDER TOPICAL at 18:04

## 2019-01-01 RX ADMIN — FUROSEMIDE 40 MG: 10 INJECTION, SOLUTION INTRAVENOUS at 06:08

## 2019-01-01 RX ADMIN — VANCOMYCIN HYDROCHLORIDE 1300 MG: 100 INJECTION, POWDER, LYOPHILIZED, FOR SOLUTION INTRAVENOUS at 20:33

## 2019-01-01 RX ADMIN — LOSARTAN POTASSIUM 50 MG: 50 TABLET ORAL at 06:18

## 2019-01-01 RX ADMIN — CLOPIDOGREL 75 MG: 75 TABLET, FILM COATED ORAL at 16:05

## 2019-01-01 RX ADMIN — Medication 1 TABLET: at 05:33

## 2019-01-01 RX ADMIN — FUROSEMIDE 20 MG: 40 TABLET ORAL at 04:57

## 2019-01-01 RX ADMIN — POTASSIUM CHLORIDE 20 MEQ: 1500 TABLET, EXTENDED RELEASE ORAL at 05:29

## 2019-01-01 RX ADMIN — GABAPENTIN 400 MG: 400 CAPSULE ORAL at 05:38

## 2019-01-01 RX ADMIN — CARVEDILOL 3.12 MG: 3.12 TABLET, FILM COATED ORAL at 18:04

## 2019-01-01 RX ADMIN — TRIAMCINOLONE ACETONIDE: 1 CREAM TOPICAL at 18:03

## 2019-01-01 RX ADMIN — INSULIN GLARGINE 30 UNITS: 100 INJECTION, SOLUTION SUBCUTANEOUS at 08:17

## 2019-01-01 RX ADMIN — DAPTOMYCIN 650 MG: 350 INJECTION, POWDER, LYOPHILIZED, FOR SOLUTION INTRAVENOUS at 12:36

## 2019-01-01 RX ADMIN — INSULIN GLARGINE 10 UNITS: 100 INJECTION, SOLUTION SUBCUTANEOUS at 17:45

## 2019-01-01 RX ADMIN — INSULIN GLARGINE 10 UNITS: 100 INJECTION, SOLUTION SUBCUTANEOUS at 21:02

## 2019-01-01 RX ADMIN — PIPERACILLIN AND TAZOBACTAM 3.38 G: 3; .375 INJECTION, POWDER, LYOPHILIZED, FOR SOLUTION INTRAVENOUS; PARENTERAL at 22:55

## 2019-01-01 RX ADMIN — Medication 1 TABLET: at 05:45

## 2019-01-01 RX ADMIN — INSULIN HUMAN 9 UNITS: 100 INJECTION, SOLUTION PARENTERAL at 21:11

## 2019-01-01 RX ADMIN — Medication 1 TABLET: at 05:04

## 2019-01-01 RX ADMIN — GABAPENTIN 400 MG: 400 CAPSULE ORAL at 05:29

## 2019-01-01 RX ADMIN — SPIRONOLACTONE 25 MG: 25 TABLET ORAL at 05:37

## 2019-01-01 RX ADMIN — CEFTRIAXONE 2 G: 2 INJECTION, POWDER, FOR SOLUTION INTRAMUSCULAR; INTRAVENOUS at 06:02

## 2019-01-01 RX ADMIN — POTASSIUM CHLORIDE 20 MEQ: 1500 TABLET, EXTENDED RELEASE ORAL at 06:46

## 2019-01-01 RX ADMIN — DAPTOMYCIN 650 MG: 350 INJECTION, POWDER, LYOPHILIZED, FOR SOLUTION INTRAVENOUS at 11:20

## 2019-01-01 RX ADMIN — TRIAMCINOLONE ACETONIDE: 1 CREAM TOPICAL at 17:47

## 2019-01-01 RX ADMIN — INSULIN GLARGINE 32 UNITS: 100 INJECTION, SOLUTION SUBCUTANEOUS at 05:04

## 2019-01-01 RX ADMIN — NYSTATIN: 100000 POWDER TOPICAL at 05:24

## 2019-01-01 RX ADMIN — CYANOCOBALAMIN TAB 500 MCG 1000 MCG: 500 TAB at 05:42

## 2019-01-01 RX ADMIN — WARFARIN SODIUM 7.5 MG: 7.5 TABLET ORAL at 17:40

## 2019-01-01 RX ADMIN — MULTIPLE VITAMINS W/ MINERALS TAB 1 TABLET: TAB at 04:36

## 2019-01-01 RX ADMIN — INSULIN HUMAN 3 UNITS: 100 INJECTION, SOLUTION PARENTERAL at 20:27

## 2019-01-01 RX ADMIN — GABAPENTIN 400 MG: 400 CAPSULE ORAL at 18:03

## 2019-01-01 RX ADMIN — AMPICILLIN SODIUM AND SULBACTAM SODIUM 3 G: 2; 1 INJECTION, POWDER, FOR SOLUTION INTRAMUSCULAR; INTRAVENOUS at 06:03

## 2019-01-01 RX ADMIN — POTASSIUM CHLORIDE 20 MEQ: 1500 TABLET, EXTENDED RELEASE ORAL at 05:05

## 2019-01-01 RX ADMIN — METFORMIN HYDROCHLORIDE 500 MG: 500 TABLET, FILM COATED ORAL at 09:21

## 2019-01-01 RX ADMIN — TRIAMCINOLONE ACETONIDE: 1 CREAM TOPICAL at 05:24

## 2019-01-01 RX ADMIN — POTASSIUM CHLORIDE 20 MEQ: 1500 TABLET, EXTENDED RELEASE ORAL at 05:57

## 2019-01-01 RX ADMIN — NYSTATIN: 100000 POWDER TOPICAL at 07:42

## 2019-01-01 RX ADMIN — SPIRONOLACTONE 25 MG: 25 TABLET ORAL at 10:26

## 2019-01-01 RX ADMIN — GABAPENTIN 400 MG: 400 CAPSULE ORAL at 05:34

## 2019-01-01 RX ADMIN — OXYBUTYNIN CHLORIDE 5 MG: 5 TABLET ORAL at 05:55

## 2019-01-01 RX ADMIN — NIACIN 1000 MG: 500 TABLET, EXTENDED RELEASE ORAL at 05:00

## 2019-01-01 RX ADMIN — INSULIN HUMAN 3 UNITS: 100 INJECTION, SOLUTION PARENTERAL at 17:54

## 2019-01-01 RX ADMIN — OXYCODONE HYDROCHLORIDE 5 MG: 5 TABLET ORAL at 20:22

## 2019-01-01 RX ADMIN — NYSTATIN: 100000 POWDER TOPICAL at 05:27

## 2019-01-01 RX ADMIN — OMEPRAZOLE 20 MG: 20 CAPSULE, DELAYED RELEASE ORAL at 05:35

## 2019-01-01 RX ADMIN — INSULIN HUMAN 2 UNITS: 100 INJECTION, SOLUTION PARENTERAL at 17:11

## 2019-01-01 RX ADMIN — OMEPRAZOLE 20 MG: 20 CAPSULE, DELAYED RELEASE ORAL at 05:32

## 2019-01-01 RX ADMIN — TRIAMCINOLONE ACETONIDE: 1 CREAM TOPICAL at 18:04

## 2019-01-01 RX ADMIN — NIACIN 1000 MG: 500 TABLET, EXTENDED RELEASE ORAL at 06:12

## 2019-01-01 RX ADMIN — DAPTOMYCIN 650 MG: 350 INJECTION, POWDER, LYOPHILIZED, FOR SOLUTION INTRAVENOUS at 11:45

## 2019-01-01 RX ADMIN — HEPARIN SODIUM 1200 UNITS/HR: 5000 INJECTION, SOLUTION INTRAVENOUS; SUBCUTANEOUS at 18:30

## 2019-01-01 RX ADMIN — ACETAMINOPHEN 650 MG: 325 TABLET, FILM COATED ORAL at 17:35

## 2019-01-01 RX ADMIN — DAPTOMYCIN 650 MG: 350 INJECTION, POWDER, LYOPHILIZED, FOR SOLUTION INTRAVENOUS at 09:57

## 2019-01-01 RX ADMIN — INSULIN HUMAN 5 UNITS: 100 INJECTION, SOLUTION PARENTERAL at 08:36

## 2019-01-01 RX ADMIN — PIPERACILLIN AND TAZOBACTAM 3.38 G: 3; .375 INJECTION, POWDER, LYOPHILIZED, FOR SOLUTION INTRAVENOUS; PARENTERAL at 05:43

## 2019-01-01 RX ADMIN — SENNOSIDES, DOCUSATE SODIUM 2 TABLET: 50; 8.6 TABLET, FILM COATED ORAL at 04:37

## 2019-01-01 RX ADMIN — INSULIN GLARGINE 34 UNITS: 100 INJECTION, SOLUTION SUBCUTANEOUS at 08:35

## 2019-01-01 RX ADMIN — CARVEDILOL 12.5 MG: 12.5 TABLET, FILM COATED ORAL at 06:41

## 2019-01-01 RX ADMIN — CLOPIDOGREL 75 MG: 75 TABLET, FILM COATED ORAL at 17:05

## 2019-01-01 RX ADMIN — DAPTOMYCIN 650 MG: 350 INJECTION, POWDER, LYOPHILIZED, FOR SOLUTION INTRAVENOUS at 12:37

## 2019-01-01 RX ADMIN — NYSTATIN: 100000 POWDER TOPICAL at 18:23

## 2019-01-01 RX ADMIN — OXYBUTYNIN CHLORIDE 5 MG: 5 TABLET ORAL at 04:31

## 2019-01-01 RX ADMIN — CEFTRIAXONE 2 G: 2 INJECTION, POWDER, FOR SOLUTION INTRAMUSCULAR; INTRAVENOUS at 04:39

## 2019-01-01 RX ADMIN — OXYCODONE HYDROCHLORIDE 5 MG: 5 TABLET ORAL at 14:22

## 2019-01-01 RX ADMIN — GABAPENTIN 400 MG: 400 CAPSULE ORAL at 17:59

## 2019-01-01 RX ADMIN — CARVEDILOL 3.12 MG: 3.12 TABLET, FILM COATED ORAL at 07:41

## 2019-01-01 RX ADMIN — CARVEDILOL 3.12 MG: 3.12 TABLET, FILM COATED ORAL at 09:21

## 2019-01-01 RX ADMIN — ACETAMINOPHEN 650 MG: 325 TABLET, FILM COATED ORAL at 19:52

## 2019-01-01 RX ADMIN — NYSTATIN: 100000 POWDER TOPICAL at 05:40

## 2019-01-01 RX ADMIN — Medication 1 CAPSULE: at 07:58

## 2019-01-01 RX ADMIN — PRAVASTATIN SODIUM 20 MG: 20 TABLET ORAL at 20:34

## 2019-01-01 RX ADMIN — PRAVASTATIN SODIUM 20 MG: 10 TABLET ORAL at 20:46

## 2019-01-01 RX ADMIN — EPINEPHRINE 1 MG: 0.1 INJECTION, SOLUTION ENDOTRACHEAL; INTRACARDIAC; INTRAVENOUS at 10:36

## 2019-01-01 RX ADMIN — POTASSIUM CHLORIDE 20 MEQ: 1500 TABLET, EXTENDED RELEASE ORAL at 04:31

## 2019-01-01 RX ADMIN — LOSARTAN POTASSIUM 50 MG: 50 TABLET ORAL at 06:01

## 2019-01-01 RX ADMIN — CYANOCOBALAMIN TAB 500 MCG 1000 MCG: 500 TAB at 05:56

## 2019-01-01 RX ADMIN — TRIAMCINOLONE ACETONIDE: 1 CREAM TOPICAL at 05:30

## 2019-01-01 RX ADMIN — CARVEDILOL 3.12 MG: 3.12 TABLET, FILM COATED ORAL at 18:23

## 2019-01-01 RX ADMIN — NYSTATIN: 100000 POWDER TOPICAL at 08:56

## 2019-01-01 RX ADMIN — FUROSEMIDE 20 MG: 20 TABLET ORAL at 05:04

## 2019-01-01 RX ADMIN — CLOPIDOGREL 75 MG: 75 TABLET, FILM COATED ORAL at 17:41

## 2019-01-01 RX ADMIN — CEFTRIAXONE SODIUM 1 G: 1 INJECTION, POWDER, FOR SOLUTION INTRAMUSCULAR; INTRAVENOUS at 05:53

## 2019-01-01 RX ADMIN — PIPERACILLIN AND TAZOBACTAM 3.38 G: 3; .375 INJECTION, POWDER, LYOPHILIZED, FOR SOLUTION INTRAVENOUS; PARENTERAL at 12:59

## 2019-01-01 RX ADMIN — SENNOSIDES,DOCUSATE SODIUM 2 TABLET: 8.6; 5 TABLET, FILM COATED ORAL at 17:15

## 2019-01-01 RX ADMIN — GABAPENTIN 400 MG: 400 CAPSULE ORAL at 17:31

## 2019-01-01 RX ADMIN — PRAVASTATIN SODIUM 20 MG: 10 TABLET ORAL at 20:26

## 2019-01-01 RX ADMIN — GABAPENTIN 400 MG: 400 CAPSULE ORAL at 05:40

## 2019-01-01 RX ADMIN — MULTIPLE VITAMINS W/ MINERALS TAB 1 TABLET: TAB at 06:40

## 2019-01-01 RX ADMIN — METRONIDAZOLE 500 MG: 500 TABLET ORAL at 20:46

## 2019-01-01 RX ADMIN — PNEUMOCOCCAL 13-VALENT CONJUGATE VACCINE 0.5 ML: 2.2; 2.2; 2.2; 2.2; 2.2; 4.4; 2.2; 2.2; 2.2; 2.2; 2.2; 2.2; 2.2 INJECTION, SUSPENSION INTRAMUSCULAR at 08:48

## 2019-01-01 RX ADMIN — GABAPENTIN 400 MG: 400 CAPSULE ORAL at 17:15

## 2019-01-01 RX ADMIN — TRIAMCINOLONE ACETONIDE: 1 CREAM TOPICAL at 06:03

## 2019-01-01 RX ADMIN — INSULIN GLARGINE 32 UNITS: 100 INJECTION, SOLUTION SUBCUTANEOUS at 06:23

## 2019-01-01 RX ADMIN — HYDROCHLOROTHIAZIDE 12.5 MG: 25 TABLET ORAL at 06:11

## 2019-01-01 RX ADMIN — FUROSEMIDE 20 MG: 40 TABLET ORAL at 04:32

## 2019-01-01 RX ADMIN — DAPTOMYCIN 650 MG: 350 INJECTION, POWDER, LYOPHILIZED, FOR SOLUTION INTRAVENOUS at 12:53

## 2019-01-01 RX ADMIN — CYANOCOBALAMIN TAB 500 MCG 1000 MCG: 500 TAB at 05:45

## 2019-01-01 RX ADMIN — DAPTOMYCIN 690 MG: 350 INJECTION, POWDER, LYOPHILIZED, FOR SOLUTION INTRAVENOUS at 17:59

## 2019-01-01 RX ADMIN — ASPIRIN 81 MG: 81 TABLET, DELAYED RELEASE ORAL at 05:17

## 2019-01-01 RX ADMIN — OMEPRAZOLE 20 MG: 20 CAPSULE, DELAYED RELEASE ORAL at 05:57

## 2019-01-01 RX ADMIN — WARFARIN SODIUM 6 MG: 6 TABLET ORAL at 17:13

## 2019-01-01 RX ADMIN — INSULIN HUMAN 8 UNITS: 100 INJECTION, SOLUTION PARENTERAL at 20:45

## 2019-01-01 RX ADMIN — NYSTATIN: 100000 POWDER TOPICAL at 18:00

## 2019-01-01 RX ADMIN — METFORMIN HYDROCHLORIDE 500 MG: 500 TABLET, FILM COATED ORAL at 17:05

## 2019-01-01 RX ADMIN — GABAPENTIN 400 MG: 400 CAPSULE ORAL at 04:36

## 2019-01-01 RX ADMIN — EPINEPHRINE 1 MG: 0.1 INJECTION, SOLUTION ENDOTRACHEAL; INTRACARDIAC; INTRAVENOUS at 10:29

## 2019-01-01 RX ADMIN — TRIAMCINOLONE ACETONIDE: 1 CREAM TOPICAL at 08:56

## 2019-01-01 RX ADMIN — WARFARIN SODIUM 6 MG: 6 TABLET ORAL at 17:07

## 2019-01-01 ASSESSMENT — ENCOUNTER SYMPTOMS
VOMITING: 0
STRIDOR: 0
HEARTBURN: 0
NECK PAIN: 0
DIZZINESS: 0
EYE PAIN: 0
DIARRHEA: 0
BACK PAIN: 1
DIARRHEA: 0
COUGH: 0
DIARRHEA: 0
MYALGIAS: 0
WEAKNESS: 1
TINGLING: 0
ABDOMINAL PAIN: 0
VOMITING: 0
LOSS OF CONSCIOUSNESS: 0
SHORTNESS OF BREATH: 0
COUGH: 0
MYALGIAS: 0
VOMITING: 0
VOMITING: 0
MYALGIAS: 0
HEMOPTYSIS: 0
SORE THROAT: 0
NECK PAIN: 0
VOMITING: 0
NAUSEA: 0
COUGH: 0
VOMITING: 0
DOUBLE VISION: 0
CHILLS: 0
MYALGIAS: 1
SENSORY CHANGE: 1
FEVER: 0
HEADACHES: 0
NAUSEA: 0
PALPITATIONS: 0
CHILLS: 0
BACK PAIN: 0
SHORTNESS OF BREATH: 0
PALPITATIONS: 0
FEVER: 0
COUGH: 0
CHILLS: 0
DOUBLE VISION: 0
DIAPHORESIS: 0
MYALGIAS: 0
SHORTNESS OF BREATH: 1
EYE DISCHARGE: 0
ABDOMINAL PAIN: 0
VOMITING: 0
NAUSEA: 0
BACK PAIN: 0
FEVER: 0
TINGLING: 0
DIARRHEA: 0
DIARRHEA: 0
NAUSEA: 0
NAUSEA: 1
FEVER: 0
CHILLS: 0
ABDOMINAL PAIN: 0
VOMITING: 0
HEADACHES: 0
CONSTIPATION: 0
DIZZINESS: 0
COUGH: 0
NECK PAIN: 0
ABDOMINAL PAIN: 0
DIZZINESS: 0
CHILLS: 0
CONSTIPATION: 0
NAUSEA: 0
FEVER: 0
MYALGIAS: 0
PALPITATIONS: 0
HEMOPTYSIS: 0
CHILLS: 0
BLOOD IN STOOL: 0
EYE REDNESS: 0
HEADACHES: 0
DIARRHEA: 0
INSOMNIA: 0
DIARRHEA: 0
DIARRHEA: 0
CHILLS: 0
TINGLING: 0
HEADACHES: 0
FOCAL WEAKNESS: 0
GASTROINTESTINAL NEGATIVE: 1
BLURRED VISION: 0
DOUBLE VISION: 0
FEVER: 0
FOCAL WEAKNESS: 0
WEAKNESS: 1
FOCAL WEAKNESS: 0
CHILLS: 0
NAUSEA: 0
CHILLS: 0
EYES NEGATIVE: 1
HEMOPTYSIS: 0
NAUSEA: 0
COUGH: 0
WEAKNESS: 1
PHOTOPHOBIA: 0
PALPITATIONS: 0
SEIZURES: 0
NERVOUS/ANXIOUS: 0
DIARRHEA: 0
TINGLING: 0
SPEECH CHANGE: 0
EYE DISCHARGE: 0
WEAKNESS: 0
COUGH: 0
CONSTIPATION: 0
FEVER: 0
BACK PAIN: 1
DIZZINESS: 0
NAUSEA: 0
NAUSEA: 0
COUGH: 0
SHORTNESS OF BREATH: 0
PALPITATIONS: 0
NERVOUS/ANXIOUS: 0
MYALGIAS: 0
HEADACHES: 0
FEVER: 0
DIAPHORESIS: 0
DIZZINESS: 0
FEVER: 0
DIAPHORESIS: 0
NAUSEA: 0
COUGH: 0
NAUSEA: 0
CHILLS: 0
FEVER: 0
MYALGIAS: 1
COUGH: 0
MYALGIAS: 0
DIARRHEA: 0
MYALGIAS: 0
SENSORY CHANGE: 1
CHILLS: 0
SORE THROAT: 0
SHORTNESS OF BREATH: 0
CONSTIPATION: 0
ORTHOPNEA: 0
CONSTIPATION: 0
FEVER: 0
FALLS: 0
BLOOD IN STOOL: 0
HEADACHES: 0
ORTHOPNEA: 0
ABDOMINAL PAIN: 0
VOMITING: 0
MEMORY LOSS: 1
FOCAL WEAKNESS: 0
COUGH: 0
FOCAL WEAKNESS: 0
MYALGIAS: 1
PHOTOPHOBIA: 0
NAUSEA: 0
MYALGIAS: 0
EYE PAIN: 0
SORE THROAT: 0
DIZZINESS: 0
FEVER: 0
SHORTNESS OF BREATH: 0
SORE THROAT: 0
VOMITING: 0
DIARRHEA: 0
HALLUCINATIONS: 0
CHILLS: 0
ABDOMINAL PAIN: 0
VOMITING: 0
PALPITATIONS: 0
SHORTNESS OF BREATH: 0
FEVER: 0
BLURRED VISION: 0
COUGH: 1
PALPITATIONS: 0
MYALGIAS: 1
WEAKNESS: 1
CONSTIPATION: 0
NERVOUS/ANXIOUS: 0
SHORTNESS OF BREATH: 0
COUGH: 1
WEAKNESS: 1
SORE THROAT: 0
VOMITING: 0
RESPIRATORY NEGATIVE: 1
PALPITATIONS: 0
CONSTIPATION: 0
COUGH: 0
HEADACHES: 0
HEADACHES: 0
WEAKNESS: 1
SHORTNESS OF BREATH: 0
BLOOD IN STOOL: 0
CHILLS: 0
NAUSEA: 0
ABDOMINAL PAIN: 0
DIARRHEA: 0
HEARTBURN: 0
COUGH: 0
FEVER: 0
DIZZINESS: 0
HEADACHES: 0
PALPITATIONS: 0
CHILLS: 0
FEVER: 0
SPUTUM PRODUCTION: 0
CHILLS: 0
CHILLS: 0
DIARRHEA: 0
SEIZURES: 0
DIZZINESS: 0
DIARRHEA: 0
BACK PAIN: 0
FLANK PAIN: 0
SORE THROAT: 0
FOCAL WEAKNESS: 0
HALLUCINATIONS: 0
WEAKNESS: 0
PHOTOPHOBIA: 0
VOMITING: 0
ABDOMINAL PAIN: 0
FEVER: 0
HEADACHES: 0
DIZZINESS: 0
HEMOPTYSIS: 0
HEADACHES: 0
ABDOMINAL PAIN: 0
INSOMNIA: 0
NAUSEA: 0
DIZZINESS: 0
NAUSEA: 0
TINGLING: 0
NAUSEA: 0
ABDOMINAL PAIN: 0
DEPRESSION: 0
ABDOMINAL PAIN: 0
WEAKNESS: 1
SENSORY CHANGE: 1
DIARRHEA: 0
CONSTIPATION: 0
DIAPHORESIS: 0
MEMORY LOSS: 1
NAUSEA: 0
SHORTNESS OF BREATH: 1
COUGH: 0
SPEECH CHANGE: 0
BRUISES/BLEEDS EASILY: 0
DIAPHORESIS: 0
DIARRHEA: 0
VOMITING: 0
DIAPHORESIS: 0
VOMITING: 0
DIARRHEA: 0
MYALGIAS: 0
DIAPHORESIS: 0
ABDOMINAL PAIN: 0
DIZZINESS: 0
SHORTNESS OF BREATH: 0
CONSTIPATION: 0
HEARTBURN: 0
BLURRED VISION: 0
BACK PAIN: 0
NAUSEA: 0
BACK PAIN: 1
MYALGIAS: 0
WEAKNESS: 0
HEADACHES: 0
FEVER: 0
CONSTIPATION: 0
COUGH: 0
MYALGIAS: 1
ABDOMINAL PAIN: 0
NERVOUS/ANXIOUS: 0
VOMITING: 0
HEMOPTYSIS: 0
HEADACHES: 0
MEMORY LOSS: 1
HEADACHES: 0
CHILLS: 0
EYE PAIN: 0
SENSORY CHANGE: 0
HEMOPTYSIS: 0
DIZZINESS: 0
CONSTIPATION: 1
ABDOMINAL PAIN: 0
CONSTIPATION: 0
VOMITING: 0
MEMORY LOSS: 1
NAUSEA: 0
DOUBLE VISION: 0
CHILLS: 0
COUGH: 0
SPUTUM PRODUCTION: 0
NERVOUS/ANXIOUS: 0
PALPITATIONS: 0
MYALGIAS: 1
MYALGIAS: 0
NERVOUS/ANXIOUS: 0
VOMITING: 0
DEPRESSION: 0
EYE PAIN: 0
WEAKNESS: 1
PALPITATIONS: 0
ABDOMINAL PAIN: 0
ROS SKIN COMMENTS: RIGHT FOOT ULCER
CHILLS: 0
SHORTNESS OF BREATH: 0
HEADACHES: 0
CHILLS: 0
SHORTNESS OF BREATH: 0
NAUSEA: 0
BRUISES/BLEEDS EASILY: 0
CHILLS: 0
DIARRHEA: 0
PALPITATIONS: 0
CHILLS: 0
MYALGIAS: 1
FEVER: 0
EYE PAIN: 0
ABDOMINAL PAIN: 0
ABDOMINAL PAIN: 0
DIARRHEA: 0
MYALGIAS: 0
FLANK PAIN: 0
SHORTNESS OF BREATH: 0
COUGH: 0
DEPRESSION: 0
CHILLS: 0
DIARRHEA: 0
ABDOMINAL PAIN: 0
NAUSEA: 0
ABDOMINAL PAIN: 0
SHORTNESS OF BREATH: 0
SHORTNESS OF BREATH: 0
DIAPHORESIS: 0
DIZZINESS: 0
FEVER: 0
DEPRESSION: 0
SENSORY CHANGE: 1
DIAPHORESIS: 0
SENSORY CHANGE: 1
VOMITING: 0
WEAKNESS: 1
DIARRHEA: 0
BACK PAIN: 1
ORTHOPNEA: 0
SHORTNESS OF BREATH: 0
DIAPHORESIS: 0
ABDOMINAL PAIN: 0
SHORTNESS OF BREATH: 0
CHILLS: 0
MYALGIAS: 0
CONSTIPATION: 0
MEMORY LOSS: 1
EYE PAIN: 0
HEARTBURN: 0
WEIGHT LOSS: 0
TINGLING: 0
ABDOMINAL PAIN: 0
ABDOMINAL PAIN: 0
SHORTNESS OF BREATH: 0
SPUTUM PRODUCTION: 0
COUGH: 0
COUGH: 0
CHILLS: 0
NAUSEA: 0
NAUSEA: 0
SENSORY CHANGE: 0
DEPRESSION: 0
WEAKNESS: 0
FEVER: 0
PALPITATIONS: 0
PALPITATIONS: 0
NERVOUS/ANXIOUS: 0
EYE DISCHARGE: 0
CONSTIPATION: 0
SHORTNESS OF BREATH: 0
ORTHOPNEA: 0
DIZZINESS: 0
COUGH: 0
HEARTBURN: 0
FEVER: 0
BACK PAIN: 0
FOCAL WEAKNESS: 0
WEAKNESS: 1
PSYCHIATRIC NEGATIVE: 1
TINGLING: 0
DIZZINESS: 0
SENSORY CHANGE: 1
CHILLS: 0
DIARRHEA: 0
FOCAL WEAKNESS: 0
SENSORY CHANGE: 0
FOCAL WEAKNESS: 0
VOMITING: 0
DIARRHEA: 0
WEAKNESS: 1
HEADACHES: 0
NAUSEA: 0
DIZZINESS: 0
CHILLS: 0
COUGH: 0
VOMITING: 0
FEVER: 0
HEADACHES: 0
SPUTUM PRODUCTION: 0
DIARRHEA: 0
SHORTNESS OF BREATH: 0
CONSTIPATION: 0
DOUBLE VISION: 0
CONSTIPATION: 0
PALPITATIONS: 0
NERVOUS/ANXIOUS: 0
FEVER: 0
DIARRHEA: 0
MYALGIAS: 1
COUGH: 0
NERVOUS/ANXIOUS: 0
ABDOMINAL PAIN: 0
NAUSEA: 0
BACK PAIN: 1
SORE THROAT: 0
HEARTBURN: 0
NAUSEA: 0
VOMITING: 0
VOMITING: 0
WEAKNESS: 0
WEAKNESS: 0
COUGH: 0
NERVOUS/ANXIOUS: 0
VOMITING: 0
MEMORY LOSS: 1
HEADACHES: 0
DEPRESSION: 0
CHILLS: 0
CHILLS: 0
MYALGIAS: 0
FOCAL WEAKNESS: 0
HEMOPTYSIS: 0
FEVER: 0
MYALGIAS: 0
BACK PAIN: 0
DIZZINESS: 0
PALPITATIONS: 0
COUGH: 0
NAUSEA: 0
NAUSEA: 0
HEARTBURN: 0
CHILLS: 0
ABDOMINAL PAIN: 0
HEADACHES: 0
SENSORY CHANGE: 1
SHORTNESS OF BREATH: 0
PHOTOPHOBIA: 0
MYALGIAS: 0
BACK PAIN: 0
ABDOMINAL PAIN: 0
MYALGIAS: 0
HEADACHES: 0
DIARRHEA: 0
WEAKNESS: 1
ABDOMINAL PAIN: 0
NAUSEA: 0
ABDOMINAL PAIN: 0
PALPITATIONS: 0
CONSTIPATION: 0
BLURRED VISION: 0
NEUROLOGICAL NEGATIVE: 1
HEADACHES: 0
TINGLING: 1
HEADACHES: 0
DIZZINESS: 0
FEVER: 0
FEVER: 0
PALPITATIONS: 0
DIZZINESS: 0
ABDOMINAL PAIN: 0
WEAKNESS: 0
DIZZINESS: 0
DIARRHEA: 0
SHORTNESS OF BREATH: 0
HEADACHES: 0
HEADACHES: 0
PHOTOPHOBIA: 0
HEARTBURN: 0
CONSTITUTIONAL NEGATIVE: 1
MYALGIAS: 0
FEVER: 0
FOCAL WEAKNESS: 0
FEVER: 0
NAUSEA: 0
FEVER: 0
FEVER: 0
HEADACHES: 0
SORE THROAT: 0
WHEEZING: 0
DIARRHEA: 0
WEAKNESS: 1
MEMORY LOSS: 1
CONSTIPATION: 0
SHORTNESS OF BREATH: 0
DIZZINESS: 0
ABDOMINAL PAIN: 0
MYALGIAS: 1
TINGLING: 0
TREMORS: 0
SORE THROAT: 0
NAUSEA: 0
HEADACHES: 0
FEVER: 0
COUGH: 0
HEARTBURN: 0
EYE PAIN: 0
ABDOMINAL PAIN: 0
CHILLS: 0
FOCAL WEAKNESS: 0
HEARTBURN: 0
FEVER: 0
WEAKNESS: 0
FEVER: 0
ABDOMINAL PAIN: 0
SHORTNESS OF BREATH: 0
NAUSEA: 0
CLAUDICATION: 0
CONSTIPATION: 0
DIZZINESS: 0
NECK PAIN: 0
PSYCHIATRIC NEGATIVE: 1
EYE REDNESS: 0
ABDOMINAL PAIN: 0
ABDOMINAL PAIN: 0
VOMITING: 0
DIARRHEA: 0
NERVOUS/ANXIOUS: 0
COUGH: 0
SHORTNESS OF BREATH: 0
CHILLS: 0
SENSORY CHANGE: 1
FEVER: 0
BLURRED VISION: 0
FOCAL WEAKNESS: 0
CHILLS: 0
ABDOMINAL PAIN: 0
DOUBLE VISION: 0
HEADACHES: 0
DIZZINESS: 0
FEVER: 0
COUGH: 0
VOMITING: 0
HEARTBURN: 0
MYALGIAS: 0
SENSORY CHANGE: 0
FEVER: 0
CHILLS: 0
BACK PAIN: 0
FLANK PAIN: 0
SPUTUM PRODUCTION: 0
TINGLING: 0
CONSTIPATION: 0
MYALGIAS: 0
DIARRHEA: 0
NAUSEA: 0
DOUBLE VISION: 0
VOMITING: 0
DIARRHEA: 0
ABDOMINAL PAIN: 0
COUGH: 0
FOCAL WEAKNESS: 0
DOUBLE VISION: 0
EYE PAIN: 0
DIAPHORESIS: 0
ORTHOPNEA: 0
DIARRHEA: 0
SENSORY CHANGE: 1
CHILLS: 0
VOMITING: 0
HEARTBURN: 0
NAUSEA: 0
WEAKNESS: 1
VOMITING: 0
MYALGIAS: 0
VOMITING: 0
CHILLS: 0
CONSTIPATION: 0
MYALGIAS: 1
BACK PAIN: 1
HEADACHES: 0
NAUSEA: 0
SHORTNESS OF BREATH: 0
SHORTNESS OF BREATH: 0
NECK PAIN: 0
NAUSEA: 0
SENSORY CHANGE: 1
SPEECH CHANGE: 0
SHORTNESS OF BREATH: 0
DIZZINESS: 1
NERVOUS/ANXIOUS: 0
NERVOUS/ANXIOUS: 0
PALPITATIONS: 0
SHORTNESS OF BREATH: 0
DIARRHEA: 0
CHILLS: 0
COUGH: 0
SHORTNESS OF BREATH: 0
FOCAL WEAKNESS: 0
BLURRED VISION: 0
CHILLS: 0
ABDOMINAL PAIN: 0
ABDOMINAL PAIN: 0
DIARRHEA: 0
DIZZINESS: 0
PALPITATIONS: 0
SPUTUM PRODUCTION: 0
FEVER: 0
FEVER: 0
DIARRHEA: 0
DIARRHEA: 0
ABDOMINAL PAIN: 0
SHORTNESS OF BREATH: 0
FALLS: 0
VOMITING: 1
SHORTNESS OF BREATH: 0
WEAKNESS: 0
VOMITING: 0
DIZZINESS: 0
CHILLS: 0
TINGLING: 0
TINGLING: 0
NAUSEA: 0

## 2019-01-01 ASSESSMENT — COGNITIVE AND FUNCTIONAL STATUS - GENERAL
SUGGESTED CMS G CODE MODIFIER MOBILITY: CL
SUGGESTED CMS G CODE MODIFIER DAILY ACTIVITY: CK
DRESSING REGULAR LOWER BODY CLOTHING: A LOT
WALKING IN HOSPITAL ROOM: A LOT
TURNING FROM BACK TO SIDE WHILE IN FLAT BAD: A LOT
TOILETING: A LOT
CLIMB 3 TO 5 STEPS WITH RAILING: A LOT
STANDING UP FROM CHAIR USING ARMS: A LITTLE
MOVING FROM LYING ON BACK TO SITTING ON SIDE OF FLAT BED: A LITTLE
MOVING TO AND FROM BED TO CHAIR: A LOT
CLIMB 3 TO 5 STEPS WITH RAILING: TOTAL
MOVING TO AND FROM BED TO CHAIR: UNABLE
DRESSING REGULAR LOWER BODY CLOTHING: A LOT
WALKING IN HOSPITAL ROOM: A LITTLE
SUGGESTED CMS G CODE MODIFIER DAILY ACTIVITY: CK
MOVING TO AND FROM BED TO CHAIR: A LITTLE
WALKING IN HOSPITAL ROOM: A LITTLE
MOBILITY SCORE: 18
CLIMB 3 TO 5 STEPS WITH RAILING: A LOT
HELP NEEDED FOR BATHING: A LOT
PERSONAL GROOMING: A LITTLE
TURNING FROM BACK TO SIDE WHILE IN FLAT BAD: A LOT
CLIMB 3 TO 5 STEPS WITH RAILING: A LITTLE
MOVING FROM LYING ON BACK TO SITTING ON SIDE OF FLAT BED: A LOT
DAILY ACTIVITIY SCORE: 16
MOBILITY SCORE: 13
SUGGESTED CMS G CODE MODIFIER DAILY ACTIVITY: CJ
MOBILITY SCORE: 20
SUGGESTED CMS G CODE MODIFIER DAILY ACTIVITY: CK
MOVING TO AND FROM BED TO CHAIR: A LITTLE
MOVING FROM LYING ON BACK TO SITTING ON SIDE OF FLAT BED: UNABLE
PERSONAL GROOMING: A LITTLE
MOBILITY SCORE: 15
DRESSING REGULAR LOWER BODY CLOTHING: A LITTLE
WALKING IN HOSPITAL ROOM: A LOT
TURNING FROM BACK TO SIDE WHILE IN FLAT BAD: A LITTLE
WALKING IN HOSPITAL ROOM: A LITTLE
MOVING FROM LYING ON BACK TO SITTING ON SIDE OF FLAT BED: A LITTLE
SUGGESTED CMS G CODE MODIFIER DAILY ACTIVITY: CK
DRESSING REGULAR LOWER BODY CLOTHING: A LITTLE
DRESSING REGULAR UPPER BODY CLOTHING: A LITTLE
STANDING UP FROM CHAIR USING ARMS: A LITTLE
DAILY ACTIVITIY SCORE: 18
WALKING IN HOSPITAL ROOM: A LOT
MOBILITY SCORE: 20
TURNING FROM BACK TO SIDE WHILE IN FLAT BAD: A LITTLE
TOILETING: A LOT
DAILY ACTIVITIY SCORE: 16
HELP NEEDED FOR BATHING: A LITTLE
SUGGESTED CMS G CODE MODIFIER MOBILITY: CK
HELP NEEDED FOR BATHING: A LITTLE
MOVING FROM LYING ON BACK TO SITTING ON SIDE OF FLAT BED: UNABLE
MOBILITY SCORE: 10
CLIMB 3 TO 5 STEPS WITH RAILING: A LOT
SUGGESTED CMS G CODE MODIFIER MOBILITY: CL
TURNING FROM BACK TO SIDE WHILE IN FLAT BAD: A LITTLE
STANDING UP FROM CHAIR USING ARMS: A LOT
HELP NEEDED FOR BATHING: A LOT
DAILY ACTIVITIY SCORE: 16
DAILY ACTIVITIY SCORE: 22
MOVING FROM LYING ON BACK TO SITTING ON SIDE OF FLAT BED: UNABLE
DRESSING REGULAR LOWER BODY CLOTHING: A LOT
DRESSING REGULAR UPPER BODY CLOTHING: A LITTLE
STANDING UP FROM CHAIR USING ARMS: A LOT
PERSONAL GROOMING: A LITTLE
MOBILITY SCORE: 8
DRESSING REGULAR UPPER BODY CLOTHING: A LOT
MOBILITY SCORE: 13
STANDING UP FROM CHAIR USING ARMS: A LOT
SUGGESTED CMS G CODE MODIFIER MOBILITY: CM
DRESSING REGULAR LOWER BODY CLOTHING: A LOT
CLIMB 3 TO 5 STEPS WITH RAILING: A LOT
MOVING FROM LYING ON BACK TO SITTING ON SIDE OF FLAT BED: A LOT
WALKING IN HOSPITAL ROOM: TOTAL
SUGGESTED CMS G CODE MODIFIER MOBILITY: CJ
SUGGESTED CMS G CODE MODIFIER MOBILITY: CJ
CLIMB 3 TO 5 STEPS WITH RAILING: A LOT
STANDING UP FROM CHAIR USING ARMS: A LITTLE
MOVING TO AND FROM BED TO CHAIR: UNABLE
TOILETING: A LOT
CLIMB 3 TO 5 STEPS WITH RAILING: A LITTLE
SUGGESTED CMS G CODE MODIFIER MOBILITY: CL
MOVING FROM LYING ON BACK TO SITTING ON SIDE OF FLAT BED: A LITTLE
DRESSING REGULAR UPPER BODY CLOTHING: A LITTLE
HELP NEEDED FOR BATHING: A LOT
SUGGESTED CMS G CODE MODIFIER MOBILITY: CK
TURNING FROM BACK TO SIDE WHILE IN FLAT BAD: A LITTLE
TOILETING: A LOT
SUGGESTED CMS G CODE MODIFIER DAILY ACTIVITY: CK
STANDING UP FROM CHAIR USING ARMS: A LITTLE
HELP NEEDED FOR BATHING: A LOT
MOVING TO AND FROM BED TO CHAIR: UNABLE
DRESSING REGULAR UPPER BODY CLOTHING: A LOT
DAILY ACTIVITIY SCORE: 16
TOILETING: A LOT
MOVING TO AND FROM BED TO CHAIR: A LITTLE

## 2019-01-01 ASSESSMENT — PATIENT HEALTH QUESTIONNAIRE - PHQ9
6. FEELING BAD ABOUT YOURSELF - OR THAT YOU ARE A FAILURE OR HAVE LET YOURSELF OR YOUR FAMILY DOWN: SEVERAL DAYS
1. LITTLE INTEREST OR PLEASURE IN DOING THINGS: NOT AT ALL
5. POOR APPETITE OR OVEREATING: NOT AT ALL
2. FEELING DOWN, DEPRESSED, IRRITABLE, OR HOPELESS: NOT AT ALL
1. LITTLE INTEREST OR PLEASURE IN DOING THINGS: NOT AT ALL
9. THOUGHTS THAT YOU WOULD BE BETTER OFF DEAD, OR OF HURTING YOURSELF: NOT AT ALL
1. LITTLE INTEREST OR PLEASURE IN DOING THINGS: SEVERAL DAYS
2. FEELING DOWN, DEPRESSED, IRRITABLE, OR HOPELESS: NOT AT ALL
1. LITTLE INTEREST OR PLEASURE IN DOING THINGS: NOT AT ALL
SUM OF ALL RESPONSES TO PHQ9 QUESTIONS 1 AND 2: 0
3. TROUBLE FALLING OR STAYING ASLEEP OR SLEEPING TOO MUCH: NOT AT ALL
7. TROUBLE CONCENTRATING ON THINGS, SUCH AS READING THE NEWSPAPER OR WATCHING TELEVISION: NOT AT ALL
4. FEELING TIRED OR HAVING LITTLE ENERGY: SEVERAL DAYS
2. FEELING DOWN, DEPRESSED, IRRITABLE, OR HOPELESS: SEVERAL DAYS
SUM OF ALL RESPONSES TO PHQ QUESTIONS 1-9: 4
2. FEELING DOWN, DEPRESSED, IRRITABLE, OR HOPELESS: NOT AT ALL
SUM OF ALL RESPONSES TO PHQ9 QUESTIONS 1 AND 2: 0
SUM OF ALL RESPONSES TO PHQ9 QUESTIONS 1 AND 2: 0
1. LITTLE INTEREST OR PLEASURE IN DOING THINGS: NOT AT ALL
SUM OF ALL RESPONSES TO PHQ9 QUESTIONS 1 AND 2: 2
2. FEELING DOWN, DEPRESSED, IRRITABLE, OR HOPELESS: NOT AT ALL
SUM OF ALL RESPONSES TO PHQ9 QUESTIONS 1 AND 2: 0
8. MOVING OR SPEAKING SO SLOWLY THAT OTHER PEOPLE COULD HAVE NOTICED. OR THE OPPOSITE, BEING SO FIGETY OR RESTLESS THAT YOU HAVE BEEN MOVING AROUND A LOT MORE THAN USUAL: NOT AT ALL

## 2019-01-01 ASSESSMENT — CHA2DS2 SCORE
DIABETES: YES
AGE 65 TO 74: NO
VASCULAR DISEASE: NO
DIABETES: YES
SEX: FEMALE
HYPERTENSION: YES
CHF OR LEFT VENTRICULAR DYSFUNCTION: NO
CHA2DS2 VASC SCORE: 6
PRIOR STROKE OR TIA OR THROMBOEMBOLISM: NO
AGE 75 OR GREATER: YES
CHA2DS2 VASC SCORE: 5
HYPERTENSION: YES
PRIOR STROKE OR TIA OR THROMBOEMBOLISM: NO
VASCULAR DISEASE: YES
AGE 65 TO 74: NO
CHF OR LEFT VENTRICULAR DYSFUNCTION: NO
AGE 75 OR GREATER: YES
SEX: FEMALE

## 2019-01-01 ASSESSMENT — ACTIVITIES OF DAILY LIVING (ADL)
TOILETING: REQUIRES ASSIST
TOILETING: INDEPENDENT
TOILETING: INDEPENDENT

## 2019-01-01 ASSESSMENT — COPD QUESTIONNAIRES
IN THE PAST 12 MONTHS DO YOU DO LESS THAN YOU USED TO BECAUSE OF YOUR BREATHING PROBLEMS: DISAGREE/UNSURE
DURING THE PAST 4 WEEKS HOW MUCH DID YOU FEEL SHORT OF BREATH: NONE/LITTLE OF THE TIME
DURING THE PAST 4 WEEKS HOW MUCH DID YOU FEEL SHORT OF BREATH: NONE/LITTLE OF THE TIME
COPD SCREENING SCORE: 6
COPD SCREENING SCORE: 4
DO YOU EVER COUGH UP ANY MUCUS OR PHLEGM?: NO/ONLY WITH OCCASIONAL COLDS OR INFECTIONS
DURING THE PAST 4 WEEKS HOW MUCH DID YOU FEEL SHORT OF BREATH: SOME OF THE TIME
DO YOU EVER COUGH UP ANY MUCUS OR PHLEGM?: YES, EVERY DAY
COPD SCREENING SCORE: 4
HAVE YOU SMOKED AT LEAST 100 CIGARETTES IN YOUR ENTIRE LIFE: YES
DO YOU EVER COUGH UP ANY MUCUS OR PHLEGM?: NO/ONLY WITH OCCASIONAL COLDS OR INFECTIONS

## 2019-01-01 ASSESSMENT — PAIN SCALES - GENERAL
PAINLEVEL_OUTOF10: 0
PAINLEVEL_OUTOF10: 8
PAINLEVEL_OUTOF10: 0
PAINLEVEL_OUTOF10: 3
PAINLEVEL_OUTOF10: 0
PAINLEVEL_OUTOF10: 8
PAINLEVEL_OUTOF10: 8
PAINLEVEL_OUTOF10: 0
PAINLEVEL_OUTOF10: 8
PAINLEVEL_OUTOF10: 5

## 2019-01-01 ASSESSMENT — GAIT ASSESSMENTS
GAIT LEVEL OF ASSIST: UNABLE TO PARTICIPATE
GAIT LEVEL OF ASSIST: MINIMAL ASSIST
ASSISTIVE DEVICE: FRONT WHEEL WALKER
GAIT LEVEL OF ASSIST: MODERATE ASSIST
DISTANCE (FEET): 15
DISTANCE (FEET): 43
ASSISTIVE DEVICE: FRONT WHEEL WALKER
DISTANCE (FEET): 40
DISTANCE (FEET): 100
GAIT LEVEL OF ASSIST: STAND BY ASSIST
GAIT LEVEL OF ASSIST: UNABLE TO PARTICIPATE
DEVIATION: DECREASED BASE OF SUPPORT;DECREASED HEEL STRIKE;DECREASED TOE OFF;OTHER (COMMENT)
GAIT LEVEL OF ASSIST: STAND BY ASSIST
DEVIATION: ANTALGIC;STEP TO;DECREASED BASE OF SUPPORT;DECREASED HEEL STRIKE;DECREASED TOE OFF;OTHER (COMMENT)

## 2019-01-01 ASSESSMENT — LIFESTYLE VARIABLES
DO YOU DRINK ALCOHOL: NO
EVER_SMOKED: YES
EVER_SMOKED: YES
ALCOHOL_USE: NO
EVER_SMOKED: YES
DO YOU DRINK ALCOHOL: NO

## 2019-01-01 NOTE — CARE PLAN
Problem: Safety  Goal: Will remain free from injury  Outcome: PROGRESSING AS EXPECTED  All fall precautions in place, bed alarm in place, hourly rounding complete, call light and personal belongings kept within reach at all times. Education done with pt on importance of calling before getting OOB, pt verbalizes an understanding. Will continue to monitor.     Problem: Pain Management  Goal: Pain level will decrease to patient's comfort goal  Outcome: PROGRESSING AS EXPECTED  Pain well controlled at this time per pt

## 2019-01-01 NOTE — PROGRESS NOTES
Infectious Disease Progress Note    Author: Blanquita Israel M.D. Date & Time of service: 2019  12:08 PM    Chief Complaint:  Right diabetic foot infection             OM    Interval History:  73 y.o. 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    Review of Systems:  Review of Systems   Constitutional: Negative for chills and fever.   Respiratory: Negative for cough.    Cardiovascular: Negative for chest pain.   Gastrointestinal: Negative for abdominal pain, nausea and vomiting.   All other systems reviewed and are negative.      Hemodynamics:  Temp (24hrs), Av.6 °C (97.8 °F), Min:36.4 °C (97.5 °F), Max:36.7 °C (98 °F)  Temperature: 36.4 °C (97.5 °F)  Pulse  Av.8  Min: 66  Max: 88   Blood Pressure : 122/52       Physical Exam:  Physical Exam   Constitutional: She is oriented to person, place, and time. She appears well-developed. No distress.   Obese   HENT:   Head: Normocephalic and atraumatic.   Eyes: Conjunctivae are normal. No scleral icterus.   Neck: Neck supple.   Cardiovascular: Normal rate.    Pulmonary/Chest: Breath sounds normal. She has no wheezes. She has no rales.   Abdominal: Soft. There is no rebound and no guarding.   Musculoskeletal: She exhibits edema.   RLE in boot dressed-pictures reviewed in media  GSR site with suture, intact  Medial surgical site-no necrosis or purulence seen   Neurological: She is alert and oriented to person, place, and time.   Skin: Skin is warm.   Nursing note and vitals reviewed.      Meds:    Current Facility-Administered Medications:   •  clopidogrel  •  [START ON 2019] insulin glargine  •  insulin regular **AND** Accu-Chek ACHS **AND** NOTIFY MD and PharmD **AND** glucose 4 g **AND** dextrose 50%  •   senna-docusate **AND** polyethylene glycol/lytes **AND** magnesium hydroxide **AND** bisacodyl  •  acetaminophen  •  Notify provider if pain remains uncontrolled **AND** Use the numeric rating scale (NRS-11) on regular floors and Critical-Care Pain Observation Tool (CPOT) on ICUs/Trauma to assess pain **AND** Pulse Ox (Oximetry) **AND** Pharmacy Consult Request **AND** If patient difficult to arouse and/or has respiratory depression, stop any opiates that are currently infusing and call a Rapid Response. **AND** oxyCODONE immediate-release **AND** oxyCODONE immediate-release **AND** HYDROmorphone  •  MD Alert...Vancomycin per Pharmacy **AND** cefTRIAXone (ROCEPHIN) IV **AND** metroNIDAZOLE  •  hydrALAZINE  •  ondansetron  •  ondansetron  •  aspirin EC  •  carvedilol  •  niacin SR  •  oxybutynin  •  pravastatin  •  therapeutic multivitamin-minerals  •  tizanidine  •  losartan **AND** hydroCHLOROthiazide  •  vancomycin    Labs:  Recent Labs      12/30/18   0419 12/31/18   1135  01/01/19   0231   WBC  7.0  11.5*  10.6   RBC  3.38*  3.44*  3.25*   HEMOGLOBIN  10.5*  10.7*  10.2*   HEMATOCRIT  32.1*  31.8*  30.9*   MCV  95.0  92.4  95.1   MCH  31.1  31.1  31.4   RDW  44.0  42.2  44.4   PLATELETCT  345  376  337   MPV  9.1  9.4  9.4   NEUTSPOLYS  63.90  78.70*  67.30   LYMPHOCYTES  22.50  11.30*  20.70*   MONOCYTES  9.00  9.10  9.90   EOSINOPHILS  3.00  0.10  0.80   BASOPHILS  0.90  0.40  0.80     Recent Labs      12/30/18   0419  12/31/18   1135  01/01/19   0231   SODIUM  139  140  140   POTASSIUM  4.3  4.2  4.2   CHLORIDE  105  102  106   CO2  26  30  27   GLUCOSE  183*  240*  314*   BUN  28*  25*  26*     Recent Labs      12/29/18   1345  12/30/18   0419  12/31/18   1135  01/01/19   0231   ALBUMIN  3.8   --    --    --    TBILIRUBIN  0.3   --    --    --    ALKPHOSPHAT  66   --    --    --    TOTPROTEIN  6.8   --    --    --    ALTSGPT  12   --    --    --    ASTSGOT  15   --    --    --    CREATININE  1.31  1.02   1.01  1.02       Imaging:  Dx-foot-complete 3+ Right    Result Date: 2018 1:03 PM HISTORY/REASON FOR EXAM:  Open wound on the plantar surface of the right foot for one month. TECHNIQUE/EXAM DESCRIPTION AND NUMBER OF VIEWS: 3 views of the RIGHT foot. COMPARISON:  None FINDINGS: There is diffuse swelling of the right ankle and foot. There is no radiopaque foreign body. There is no evidence of displaced fracture or dislocation. There is evidence of a healed fracture involving the right 3rd metatarsal with smooth periosteal reaction. There is marked abnormalities involving the tarsometatarsal junctions and throughout the midfoot and hindfoot with marginal spurring and degenerative change. There are also numerous calcific and ossific radiodensities at the tarsometatarsal junctions with some increased sclerosis. There is loss of the normal arch of the foot on the lateral view in the mid foot. There is mild degenerative change in the IP joints.     1.  There is marked degenerative change in the midfoot and hindfoot with findings suggesting possibility of a neuropathic foot. 2.  There is diffuse swelling. There is no foreign body or gas in the soft tissues. 3.  There is no plain film evidence of osteomyelitis.    Us-giovana Single Level Bilat    Result Date: 2018   Vascular Laboratory  Conclusions  Limited exam as ankle pressures are not accurately measured due to  calcification and noncompressibility.  Otherwise, grossly normal bilateral GIOVANA's.  DAVID KEYS  Age:    73    Gender:     F  MRN:    [id number]  :    1945      BSA:  Exam Date:     2018 11:09  Room #:     Inpatient  Priority:     Routine  Ht (in):             Wt (lb):  Ordering Physician:        JESÚS HOGAN  Referring Physician:       JESÚS HOGAN  Sonographer:               Ken Soliman RVT  Study Type:                Complete Bilateral  Technical Quality:         Adequate  Indications:     Ulceration of LE   CPT Codes:       54587  ICD Codes:       707.1  History:         Ulceration of plantar region of right foot; diabetes mellitus  Limitations:                 RIGHT  Waveform            Systolic BPs (mmHg)                             137           Brachial  Triphasic                                Common Femoral  Bi, non-                   0             Posterior Tibial  reversed  Bi, non-                   0             Dorsalis Pedis  reversed                                           Peroneal                                           SELWYN                                           TBI                       LEFT  Waveform        Systolic BPs (mmHg)                             132           Brachial  Triphasic                                Common Femoral  Biphasic                   0             Posterior Tibial  Biphasic                   0             Dorsalis Pedis                                           Peroneal                                           SELWYN                                           TBI  Findings  Bilateral.  Doppler waveform of the common femoral artery is of high amplitude and  triphasic.  Doppler waveforms at the ankle are brisk and multiphasic.  Ankle pressures are not accurately measured due to calcification and  noncompressibility of the tibial vessels.  Toe-Brachial Index - Right: 1.01   Left: 1.03  Mandy Pfeiffer  (Electronically Signed)  Final Date:      30 December 2018                   11:48      Micro:  Results     Procedure Component Value Units Date/Time    CULTURE TISSUE W/ GRM STAIN [[id number]] Collected:  12/30/18 1723    Order Status:  Completed Specimen:  Tissue Updated:  01/01/19 1153     Significant Indicator NEG     Source TISS     Site Right Medial Cuneiform     Tissue Culture No growth at 48 hours     Gram Stain Result No organisms seen.    ANAEROBIC CULTURE [[id number]] Collected:  12/30/18 1723    Order Status:  Completed Specimen:  Tissue Updated:  01/01/19 1154      Significant Indicator NEG     Source TISS     Site Right Medial Cuneiform     Anaerobic Culture, Culture Res Culture in progress.    CULTURE TISSUE W/ GRM STAIN [[id number]] Collected:  12/30/18 1719    Order Status:  Completed Specimen:  Tissue Updated:  01/01/19 1156     Significant Indicator NEG     Source TISS     Site RightFootPlantarSoftTissue     Tissue Culture No growth at 48 hours     Gram Stain Result Few WBCs.  No organisms seen.      ANAEROBIC CULTURE [[id number]] Collected:  12/30/18 1719    Order Status:  Completed Specimen:  Tissue Updated:  01/01/19 1156     Significant Indicator NEG     Source TISS     Site RightFootPlantarSoftTissue     Anaerobic Culture, Culture Res Culture in progress.    GRAM STAIN [[id number]] Collected:  12/30/18 1723    Order Status:  Completed Specimen:  Tissue Updated:  12/31/18 0701     Significant Indicator .     Source TISS     Site Right Medial Cuneiform     Gram Stain Result No organisms seen.    GRAM STAIN [[id number]] Collected:  12/30/18 1719    Order Status:  Completed Specimen:  Tissue Updated:  12/31/18 0701     Significant Indicator .     Source TISS     Site RightFootPlantarSoftTissue     Gram Stain Result Few WBCs.  No organisms seen.            Assessment:  Active Hospital Problems    Diagnosis   • *Diabetic ulcer of right midfoot associated with type 2 diabetes mellitus ([facility]) [E11.621, L97.419]   • Type 2 diabetes mellitus with skin complication, with long-term current use of insulin ([facility]) [E11.628, Z79.4]   Cellulitis  ISAIAH    Plan:  Right diabetic foot infection  Ulceration with osteomyelitis  Afebrile  Increased leukocytosis post-op-now resolved  S/p debridement necrotic tissue to bone per OP report  Right plantar bone and soft tissue sent for pathology and culture-no growth (on abx prior to surgery)  Continue vanco and ceftriaxone pending cx results  Will need PICC  Anticipate 6 weeks IV abx from date of surgery per cx results             Cellulitis    Improved  Abx as above    Acute kidney injury, improved  Monitor closely while on vancomycin    Diabetes mellitus  Keep BS under 150 to help control current infection

## 2019-01-01 NOTE — THERAPY
"Physical Therapy Evaluation completed.   Bed Mobility:  Supine to Sit: Contact Guard Assist  Transfers: Sit to Stand: Contact Guard Assist  Gait: Level Of Assist: Minimal Assist with Front-Wheel Walker       Plan of Care: Will benefit from Physical Therapy 4 times per week  Discharge Recommendations: Equipment: Will Continue to Assess for Equipment Needs.     See \"Rehab Therapy-Acute\" Patient Summary Report for complete documentation.     Pt was recenlty admitted for a R foot ulcer and swelling with poor healing. Pt is now s/p R gastroc recession, R foot I&D, and R foot ostectomy with WBAT precautions in place for RLE with offloading boot on at all times. Pt presented with impaired balance, impaired gait, pain, weakness, and dec activity tolerance. Pt was able to demonstrate CGA to Min A for all functional mobility w/FWW use. No lara LOB was noted with ambulation, however, pt did demonstrate with L knee buckling and required to take a rest break and relied on FWW excessively; Pt demonstrated with an antalgic gait, with step to pattern, however, good WB tolerance on RLE for foot clearence on LLE; With current functional mobility will recommend post acute therapy prior to d/c home given current objective findings, however, pt may be able to d/c home with HH therapy services and family support if she is able to improve functional mobility while in house, will continue to follow and update POC.   "

## 2019-01-01 NOTE — WOUND TEAM
" LIMB PRESERVATION SERVICE      HPI:  Leonie Brock is a 73 y.o. female, with a past medical history that includes type 2 diabetes and a MI in 1998 with stent placement. She was admitted 12/29/2018 for Diabetic foot ulcer (HCC).   LPS has been consulted for a right plantar foot wound.  This wound is a chronic ulcer that is being treated by her podiatrist and was healed since November of 2018. It has since reopened and increased in pain and drainage and she went to her local ER the week of 12/23/18 and was placed on Bactrim. She was referred to [facility]. She did not come to [facility] until 12/29/18. Pt has been treating the wound with offloading and wound care.   The wound has failed to improve.   IV antibiotics were started on this admission.  Infectious diseases has been consulted.    Xray has been done  and does show degenerative changes and neuropathic foot and the previous fracture with no obvious osteomyelitis  MRI has not been done  Ortho Dr Kwon has been consulted  Pt was diagnosed with type 2 diabetes 35 years ago, and is currently managing with insulin.  Pt checks their blood sugars 3 times daily and reports that these typically run around 130s to 200s.  They have had previous diabetes education and self reports their a1c as 6.4.  They do have numbness in their feet.  They usually wears diabetic shoes and diabetic boot to right foot. They do check their feet routinely. They have not had previous foot ulcers or foot surgery.  They are retired.     SURGERY DATE: 12/30/2018    PROCEDURE: Procedure(s):  IRRIGATION & DEBRIDEMENT, gastroc recession, ostectomy - Wound Class: Dirty or Infected    Patient denies fevers, chills, nausea, vomiting.  Pain well controlled.     /52   Pulse 67   Temp 36.4 °C (97.5 °F) (Temporal)   Resp 17   Ht 1.626 m (5' 4\")   Wt 96.2 kg (212 lb 1.3 oz)   SpO2 95%   Breastfeeding? No   BMI 36.40 kg/m²     SURGICAL SITE ASSESSMENT:    Incision well approximated, sutures " intact.  No erythema, mild edema. Drainage is moderate sanguinous from plantar foot wound and incision, no drainage from GSR site. Foot is warm.    DIABETES MANAGEMENT:  Blood glucose: 314  A1c: 7 (date 12/30/2018)  Diabetes education: ordered 12/29/2018    INFECTION MANAGEMENT:  WBC: 10.6  Wound culture results: negative at this time, still pending  Antibiotics: Per ID recommendation        PLAN: This RN removed boot and dressings, cleansed incisions and plantar foot wound with normal saline and gauze, no sting skin prep to michael wound and incisions. Adhesive foam to GSR site applied, adaptic and non adhesive foam to medial incision, small strip of Aquacel silver hydrofiber to plantar wound depth and covered with non adhesive foam, all secured in place with roll gauze and tape, ace wrap applied.    Wound care: Incisional dressing - Change POD #2 (Directions: Adaptic over incision, Gauze, Roll Gauze, Ace Wrap)  Aquacel silver hydrofiber into open plantar foot wound, covered with non adhesive foam, roll gauze, ace wrap    Antibiotics: Per ID recommendation    Weight Bearing Status: Weight bearing as tolerated    Offloading: Offloading boot  at all times    PT Consult: Yes            Plan to return to O.R.: No    DISCHARGE PLAN:    Disposition:     Follow-up: LPS rounds in Wound Clinic. Sutures to be removed approximately 3 weeks post-op    Other: none      Chitra Hernandez R.N.    If any questions or concerns, please call [phone]

## 2019-01-01 NOTE — PROGRESS NOTES
"Pharmacy Kinetics 73 y.o. female on vancomycin day # 4 2019    Currently on Vancomycin 1500 mg iv q24hr    Indication for Treatment: SSTI     Pertinent history per medical record: Admitted on 2018 for new diabetic foot ulcer.  Patient has a history of diabetes, R foot fracture and chronic ulcerations. One week prior to admission, she noticed a new ulcer with purulence and drainage. She was started on Bactrim, but the wound failed to improve. Empiric antibiotics initiated for SSTI. RID consulting. Anticipate 6 weeks IV antibiotics.     Other antibiotics: ceftriaxone 2 g IV q24h, metronidazole 500 mg IV q8h    Allergies: Patient has no known allergies.     List concerns for renal function: age 73, BMI 36, DMII    Pertinent cultures to date:   18 tissue, right foot plantar: no organism seen    Recent Labs      18   1345  18   0419  18   1135  19   0231   WBC  8.2  7.0  11.5*  10.6   NEUTSPOLYS  71.20  63.90  78.70*  67.30     Recent Labs      18   1345  18   0419  18   1135  19   0231   BUN  36*  28*  25*  26*   CREATININE  1.31  1.02  1.01  1.02   ALBUMIN  3.8   --    --    --      Recent Labs      18   1653   VANCOTROUGH  10.4   No intake or output data in the 24 hours ending 19 1231   Blood pressure 122/52, pulse 67, temperature 36.4 °C (97.5 °F), temperature source Temporal, resp. rate 17, height 1.626 m (5' 4\"), weight 96.2 kg (212 lb 1.3 oz), SpO2 95 %, not currently breastfeeding. Temp (24hrs), Av.6 °C (97.8 °F), Min:36.4 °C (97.5 °F), Max:36.7 °C (98 °F)      A/P   1. Vancomycin dose change: Not indicated  2. Next vancomycin level: 2 to 3 days  3. Goal trough: 12-16 mcg/mL  4. Comments: Renal function is stable. Continue current dose and frequency of vancomycin. The trough level drawn last night was slightly subtherapeutic. I anticipate that this patient will accumulate into a therapeutic range. No change.    Manjula Sifuentes, PharmD., " [facility]CP

## 2019-01-01 NOTE — PROGRESS NOTES
"A&Ox4, pleasant.  at bedside, also pleasant. Denies pain. Dressing over R foot CDI, boot in place per order. R PIVs x 2 assessed, patent. No s/s of infection/infiltration, fluids running per order. Up one person assist with a FWW, tolerates well. POC discussed, including need to work with PT/OT, continue IV abx and pain control, all questions and concerns were addressed.     Blood pressure 122/52, pulse 67, temperature 36.4 °C (97.5 °F), temperature source Temporal, resp. rate 17, height 1.626 m (5' 4\"), weight 96.2 kg (212 lb 1.3 oz), SpO2 95 %, not currently breastfeeding.      "

## 2019-01-01 NOTE — PROGRESS NOTES
Hospital Medicine Daily Progress Note    Date of Service  1/1/2019    Chief Complaint  Right foot wound with discharge    Hospital Course      73 y.o. female with past medical history of diabetes, coronary artery disease, hypertension and chronic back pain admitted 12/29/2018 with diabetic foot ulcer.  Limb preservation services and orthopedics were consulted. Plan for I & D 12/30/2018.           Interval Problem Update  1/1- Patient states overall she is doing well, pain is controlled until she ambulates, then it is bearable. Patient is anxious to know next step in plan and when she can discharge. PICC line ordered. Plavix re-started. Lantus increased as sugars remain high. Patient remains afebrile with no elevation in WBC. Will continue to monitor. ID anticipating 6 weeks IV antibiotic therapy. Will work with CM/SW to assist with this.      Consultants/Specialty  Orthopedics  Limb preservation services    Code Status  Full     Disposition  Patient will need 6 weeks IV antibiotics, questionable placement versus infusion with home health     Review of Systems  Review of Systems   Constitutional: Negative for chills and fever.   HENT: Negative for congestion and sore throat.    Eyes: Negative for blurred vision and double vision.   Respiratory: Negative for cough and shortness of breath.    Cardiovascular: Negative for chest pain and leg swelling.   Gastrointestinal: Negative for abdominal pain, diarrhea and vomiting.   Genitourinary: Negative for dysuria and urgency.   Musculoskeletal: Positive for joint pain (controlled, usually occurs with ambulation ). Negative for myalgias.        Pain with walking    Skin:        Right foot ulcer    Neurological: Negative for dizziness and headaches.   Psychiatric/Behavioral: Negative for depression. The patient is not nervous/anxious.         Physical Exam  Temp:  [36.4 °C (97.5 °F)-36.7 °C (98 °F)] 36.4 °C (97.5 °F)  Pulse:  [67-86] 67  Resp:  [15-17] 17  BP:  (122-141)/(45-59) 122/52    Physical Exam   Constitutional: She is oriented to person, place, and time. She appears well-developed and well-nourished. She is cooperative. No distress. Nasal cannula in place.   Pleasant obese female laying in bed    HENT:   Head: Normocephalic and atraumatic.   Right Ear: External ear normal.   Left Ear: External ear normal.   Eyes: Conjunctivae and lids are normal. Right eye exhibits no discharge. Left eye exhibits no discharge.   Neck: Normal range of motion. Neck supple. No tracheal tenderness present.   Cardiovascular: Normal rate.  An irregular rhythm present.   No murmur heard.  Pulmonary/Chest: Effort normal and breath sounds normal. No respiratory distress. She has no decreased breath sounds. She has no wheezes. She exhibits no tenderness.   Abdominal: Soft. Bowel sounds are normal. She exhibits no distension. There is no tenderness.   Musculoskeletal: Normal range of motion. She exhibits tenderness. She exhibits no deformity.   Right foot with surgical dressing and boot in place   Neurological: She is alert and oriented to person, place, and time. No cranial nerve deficit.   Skin: Skin is warm and dry. She is not diaphoretic.   Right foot ulcer in surgical dressing      Psychiatric: She has a normal mood and affect. Her speech is normal. Judgment normal.   Patient anxious to know plan and when she can leave    Nursing note and vitals reviewed.      Fluids  No intake or output data in the 24 hours ending 01/01/19 1258    Laboratory  Recent Labs      12/30/18 0419 12/31/18   1135  01/01/19   0231   WBC  7.0  11.5*  10.6   RBC  3.38*  3.44*  3.25*   HEMOGLOBIN  10.5*  10.7*  10.2*   HEMATOCRIT  32.1*  31.8*  30.9*   MCV  95.0  92.4  95.1   MCH  31.1  31.1  31.4   MCHC  32.7*  33.6  33.0*   RDW  44.0  42.2  44.4   PLATELETCT  345  376  337   MPV  9.1  9.4  9.4     Recent Labs      12/30/18   0419  12/31/18   1135  01/01/19   0231   SODIUM  139  140  140   POTASSIUM  4.3  4.2   4.2   CHLORIDE  105  102  106   CO2  26  30  27   GLUCOSE  183*  240*  314*   BUN  28*  25*  26*   CREATININE  1.02  1.01  1.02   CALCIUM  9.4  9.2  9.0     Recent Labs      12/29/18   1345   APTT  26.6   INR  1.05               Imaging  US-SELWYN SINGLE LEVEL BILAT   Final Result      DX-FOOT-COMPLETE 3+ RIGHT   Final Result      1.  There is marked degenerative change in the midfoot and hindfoot with findings suggesting possibility of a neuropathic foot.   2.  There is diffuse swelling. There is no foreign body or gas in the soft tissues.   3.  There is no plain film evidence of osteomyelitis.           Assessment/Plan  * Diabetic ulcer of right midfoot associated with type 2 diabetes mellitus (HCC)   Assessment & Plan    Limb preservation services, infectious disease and otho consulted   Surgical debridement 12/30/2018   IV ceftriaxone, Flagyl and vancomycin  Pending final antibiotic plans, based on tissue cultures , Per ID likely 6 week IV antibiotic therapy, PICC line ordered      Coronary artery disease involving native coronary artery of native heart without angina pectoris   Assessment & Plan    Plavix re-started   Continue aspirin losartan pravastatin and carvedilol       Type 2 diabetes mellitus with skin complication, with long-term current use of insulin ([facility])- (present on admission)   Assessment & Plan    With hyperglycemia  HgbA1C-  7%    Long & short acting insulin  Blood sugar is running high, increased insulin glargine to 27 on 1/1   Accu-Checks, hypoglycemia protocol       Essential hypertension   Assessment & Plan    Carvedilol, Losartan  As needed hydralazine    Monitor blood pressure and adjust as need        Mixed hyperlipidemia- (present on admission)   Assessment & Plan    Continue  pravastatin and niacin            VTE prophylaxis: SCDs, Plavix

## 2019-01-02 NOTE — THERAPY
"Physical Therapy Treatment completed.   Bed Mobility:  Supine to Sit: Stand by Assist  Transfers: Sit to Stand: Stand by Assist  Gait: Level Of Assist: Stand by Assist with Front-Wheel Walker       Plan of Care: Will benefit from Physical Therapy 4 times per week  Discharge Recommendations: Equipment: Will Continue to Assess for Equipment Needs. See below    Pt progressing as expected. She was able to demonstrate short distance ambulation with FWW with SBA with no lara LOB. She was primarily limited 2' to LLE knee pain 2' to prior co-morbidities (arthritis that is likely exacerbated 2' to increased reliance on LLE given current RLE deficits). Anticiapte that pt will progress towards dc to home though would recommend continued PT after dc to home for optimal functional recovery, balance training and general functional progression. WIll continue to visit.     See \"Rehab Therapy-Acute\" Patient Summary Report for complete documentation.       "

## 2019-01-02 NOTE — CARE PLAN
Problem: Safety  Goal: Will remain free from falls  Bed alarm in place.  Call light within reach.  Pt calls for assistance. Provide assistance with mobility    Problem: Venous Thromboembolism (VTW)/Deep Vein Thrombosis (DVT) Prevention:  Goal: Patient will participate in Venous Thrombosis (VTE)/Deep Vein Thrombosis (DVT)Prevention Measures    Intervention: Ensure patient wears graduated elastic stockings (KARMA hose) and/or SCDs, if ordered, when in bed or chair (Remove at least once per shift for skin check)  Pt refusing her SCD.  Education provided.  Pt encouraged to mobilize

## 2019-01-02 NOTE — PROGRESS NOTES
[facility] Medicine Daily Progress Note    Date of Service  1/2/2019    Chief Complaint  73 y.o. female admitted 12/29/2018 with Right foot wound with discharge    Hospital Course  73 y.o. female with past medical history of diabetes, coronary artery disease, hypertension and chronic back pain admitted 12/29/2018 with diabetic foot ulcer.  Limb preservation services and orthopedics were consulted. Plan for I & D 12/30/2018.     Interval Problem Update      Consultants/Specialty  Orthopedics  Limb preservation services    Code Status  full    Disposition  pending    Review of Systems  Review of Systems   Constitutional: Negative for chills, diaphoresis and fever.   HENT: Negative for ear discharge, ear pain and tinnitus.    Eyes: Negative for double vision, photophobia and pain.   Respiratory: Negative for cough, hemoptysis and sputum production.    Cardiovascular: Negative for chest pain, palpitations and orthopnea.   Gastrointestinal: Negative for heartburn, nausea and vomiting.   Genitourinary: Negative for dysuria, frequency and urgency.   Musculoskeletal: Negative for myalgias and neck pain.   Skin: Negative for itching and rash.   Neurological: Negative for dizziness, tingling and headaches.        Physical Exam  Temp:  [36.1 °C (97 °F)-36.9 °C (98.5 °F)] 36.1 °C (97 °F)  Pulse:  [] 73  Resp:  [15-18] 17  BP: (111-151)/(48-80) 139/76    Physical Exam   Constitutional: She is oriented to person, place, and time. No distress.   HENT:   Head: Normocephalic and atraumatic.   Eyes: Pupils are equal, round, and reactive to light. Conjunctivae are normal.   Neck: Normal range of motion. Neck supple.   Cardiovascular: Normal rate and regular rhythm.    Pulmonary/Chest: Effort normal and breath sounds normal.   Abdominal: Soft. Bowel sounds are normal.   Morbidly obese   Musculoskeletal:   Boot is on the right leg   Neurological: She is alert and oriented to person, place, and time.   Skin: Skin is warm and dry. She  is not diaphoretic.       Fluids    Intake/Output Summary (Last 24 hours) at 01/02/19 1300  Last data filed at 01/02/19 0857   Gross per 24 hour   Intake              250 ml   Output                0 ml   Net              250 ml       Laboratory  Recent Labs      12/31/18   1135  01/01/19   0231  01/02/19   0015   WBC  11.5*  10.6  10.4   RBC  3.44*  3.25*  3.35*   HEMOGLOBIN  10.7*  10.2*  10.3*   HEMATOCRIT  31.8*  30.9*  32.1*   MCV  92.4  95.1  95.8   MCH  31.1  31.4  30.7   MCHC  33.6  33.0*  32.1*   RDW  42.2  44.4  45.0   PLATELETCT  376  337  300   MPV  9.4  9.4  9.2     Recent Labs      12/31/18   1135  01/01/19   0231  01/02/19   0015   SODIUM  140  140  138   POTASSIUM  4.2  4.2  3.9   CHLORIDE  102  106  106   CO2  30  27  23   GLUCOSE  240*  314*  293*   BUN  25*  26*  17   CREATININE  1.01  1.02  0.85   CALCIUM  9.2  9.0  9.1     Recent Labs      01/02/19   0015   INR  1.35*               Imaging      Assessment/Plan  * Diabetic ulcer of right midfoot associated with type 2 diabetes mellitus (HCC)   Assessment & Plan    Limb preservation services, infectious disease and otho consulted   Surgical debridement 12/30/2018   IV ceftriaxone, Flagyl and vancomycin  Pending final antibiotic plans, based on tissue cultures , Per ID likely 6 week IV antibiotic therapy, PICC line ordered      Coronary artery disease involving native coronary artery of native heart without angina pectoris   Assessment & Plan    Plavix re-started   Continue aspirin losartan pravastatin and carvedilol    Had chest pain last night that resolved  trops are negative     Type 2 diabetes mellitus with skin complication, with long-term current use of insulin (HCC)- (present on admission)   Assessment & Plan    With hyperglycemia  HgbA1C-  7%    S.s.insulin  accu checks are noted  lantus is increased  Accu-Checks, hypoglycemia protocol       Essential hypertension   Assessment & Plan    Carvedilol, Losartan  hctz  As needed hydralazine     Monitor blood pressure and adjust as need        Mixed hyperlipidemia- (present on admission)   Assessment & Plan    Continue  pravastatin and niacin            VTE prophylaxis: heparin

## 2019-01-02 NOTE — PROGRESS NOTES
S: I was called because she is having chest pain.  She is hospitalized for diabetic foot ulcer.  Upon inquiry she has a history of CAD with stents. She is on aspirin and Plavix. She sees Dr. Rios and Dr. [facility].  At the bedside, she is having mild chest pain however it is pleuritic and also reproducible by palpation. She is actually speaking full sentences and smiling.  at bedside.   O: Vital signs are stable and her O2Sats is 97%.  Ordered CXR. That is clear on my review.  I reviewed 2 EKGs. Nonspecific ST depression on lateral lead that was not there on prior EKG but the subsequent EKG is unchanged. No evidence of ST elevation or ischemic changes.   Auscultation to me is clear. Slight murmur.  Chest tenderness noted on palpation of sternum.  Reviewed morning labs. No leukocytosis. Normal creatinine.    A/P  Chest pain with history of CAD and stents. Musoskeletal pain component. Nonspecific ST depressions on EKG. Could be anginal. Telemetry, ordered troponins, echo ordered, ASA, Plavix, PRN morphine, ordered lipid panel.  Troponin was not back yet. If troponins are elevated, I have instructed rapid team/nursing to call hospitalist on call to order heparin gtt and call Cardiology. If subsequent EKGs show ST elevations or changes in telemetry and patient is actively having chest pain (currently none on my exam) then consider heparinizing and calling Cardiology. I will transfer patient to telemetry in the meantime.    I personally provided 31 minutes critical care time exclusive of time spent on procedures. Time includes review of laboratory data, imaging, discussion withrapid team and monitoring cardiac status for potential decompensation.

## 2019-01-02 NOTE — PROGRESS NOTES
to bedside to evaluate pt. Pt states pain to center of chest is worse with inspiration and palpation. MD informed pt if chest pain continues and troponin is elevated, plan to start pt on heparin.

## 2019-01-02 NOTE — PROGRESS NOTES
Pharmacy Kinetics 73 y.o. female on vancomycin day # 5  1/2/2019    Currently Dose: Vancomycin 1500 mg iv q24hr  Received Load Dose: Yes    Indication for Treatment: SSTI  ID Service Following: Yes    Pertinent history per medical record: Admitted on 12/29/2018 for new diabetic foot ulceration. Noted history of diabetes. Patient reports new ulceration with drainage. Course of Bactrim reportedly given prior to arrival but wound failed to improve. Admitted for further workup and treatment. ID consulted for suspicion of OM (anticipate ~6 weeks IV antibiotics).    Other antibiotics: ceftriaxone 2 gm iv q24h, metronidazole 500 mg po Q8h    Allergies: Patient has no known allergies.     List concerns for accumulation of vancomycin: age 73, BMI ~36    Pertinent cultures to date:   Results     Procedure Component Value Units Date/Time    CULTURE TISSUE W/ GRM STAIN [[id number]]  (Abnormal) Collected:  12/30/18 1719    Order Status:  Completed Specimen:  Tissue Updated:  01/01/19 1712     Significant Indicator POS (POS)     Source TISS     Site RightFootPlantarSoftTissue     Tissue Culture No growth at 48 hours  Growth noted after further incubation, see below for  organism identification.   (A)     Gram Stain Result Few WBCs.  No organisms seen.       Tissue Culture Group D Enterococcus species  Isolated from enrichment broth only, please correlate with  clinical condition.  Combination therapy with ampicillin, penicillin, or  vancomycin (for susceptible strains) plus an aminoglycoside  is usually indicated for serious enterococcal infections,  such as endocarditis unless high-level resistance to both  gentamicin and streptomycin is documented; such combinations  are predicted to result in synergistic killing of the  Enterococcus.   (A)      Staphylococcus species  Isolated from enrichment broth only, please correlate with  clinical condition.   (A)    ANAEROBIC CULTURE [[id number]] Collected:  12/30/18 1719    Order Status:   "Completed Specimen:  Tissue Updated:  19 171     Significant Indicator NEG     Source TISS     Site RightFootPlantarSoftTissue     Anaerobic Culture, Culture Res Culture in progress.    CULTURE TISSUE W/ GRM STAIN [[id number]] Collected:  18    Order Status:  Completed Specimen:  Tissue Updated:  19 115     Significant Indicator NEG     Source TISS     Site Right Medial Cuneiform     Tissue Culture No growth at 48 hours     Gram Stain Result No organisms seen.    ANAEROBIC CULTURE [[id number]] Collected:  18    Order Status:  Completed Specimen:  Tissue Updated:  19 115     Significant Indicator NEG     Source TISS     Site Right Medial Cuneiform     Anaerobic Culture, Culture Res Culture in progress.    GRAM STAIN [[id number]] Collected:  18    Order Status:  Completed Specimen:  Tissue Updated:  18 0701     Significant Indicator .     Source TISS     Site Right Medial Cuneiform     Gram Stain Result No organisms seen.    GRAM STAIN [[id number]] Collected:  18    Order Status:  Completed Specimen:  Tissue Updated:  18 0701     Significant Indicator .     Source TISS     Site RightFootPlantarSoftTissue     Gram Stain Result Few WBCs.  No organisms seen.          Recent Labs      18   1135  19   0231  19   0015   WBC  11.5*  10.6  10.4   NEUTSPOLYS  78.70*  67.30   --      Recent Labs      18   1135  19   0231  19   0015   BUN  25*  26*  17   CREATININE  1.01  1.02  0.85     Recent Labs      18   1653   [facility]     Blood pressure 151/80, pulse (!) 102, temperature 36.3 °C (97.3 °F), temperature source Temporal, resp. rate 16, height 1.626 m (5' 4\"), weight 96.2 kg (212 lb 1.3 oz), SpO2 95 %, not currently breastfeeding. Temp (24hrs), Av.6 °C (97.8 °F), Min:36.1 °C (97 °F), Max:36.9 °C (98.5 °F)    Estimated Creatinine Clearance: 64.4 mL/min (by C-G formula based on SCr of 0.85 " mg/dL).    A/P   1. Vancomycin dose change: not indicated   2. Next vancomycin level: 1/3/19 @1730 (ordered)  3. Goal trough: 12-16 mcg/mL  4. Comments: VS stable. Afebrile. WBC stable/improved. CrCl ~64 mL/min (SCr improved). Microbiology pending. Prior level noted. Factors for accumulation noted. Trough in place prior to PM dose 1/3/19 to assess clearance. ID consulted and anticipate ~6 weeks of IV antibiotic therapy. Will follow.    Darian Dillon, PharmD

## 2019-01-02 NOTE — PROGRESS NOTES
Infectious Disease Progress Note    Author: Eddie Ribeiro M.D. Date & Time of service: 2019  1:46 PM    Chief Complaint:  Right diabetic foot infection             OM    Interval History:  73 y.o. 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  Patient reports no new issues, tolerating antibiotics.    Labs Reviewed and Medications Reviewed.    Review of Systems:  Review of Systems   Constitutional: Negative for chills and fever.   Respiratory: Negative for cough.    Cardiovascular: Negative for chest pain.   Gastrointestinal: Negative for abdominal pain, nausea and vomiting.   All other systems reviewed and are negative.      Hemodynamics:  Temp (24hrs), Av.6 °C (97.8 °F), Min:36.1 °C (97 °F), Max:36.9 °C (98.5 °F)  Temperature: 36.1 °C (97 °F)  Pulse  Av.2  Min: 66  Max: 117   Blood Pressure : 139/76       Physical Exam:  Physical Exam   Constitutional: She is oriented to person, place, and time. She appears well-developed and well-nourished. No distress.   Obese   HENT:   Head: Normocephalic and atraumatic.   Mouth/Throat: Oropharynx is clear and moist.   Eyes: Conjunctivae are normal. No scleral icterus.   Neck: Neck supple.   Cardiovascular: Normal rate, regular rhythm and normal heart sounds.    No murmur heard.  Pulmonary/Chest: Effort normal and breath sounds normal. No respiratory distress. She has no wheezes.   Abdominal: Soft. There is no rebound and no guarding.   Musculoskeletal: She exhibits edema.   RLE in boot dressed   Lymphadenopathy:     She has no cervical adenopathy.   Neurological: She is alert and oriented to person, place, and time.   No gross focal neuro deficit   Skin: Skin is warm. She is not diaphoretic.   Psychiatric: She has a normal mood and affect. Her behavior is  normal.   Pleasant   Nursing note and vitals reviewed.      Meds:    Current Facility-Administered Medications:   •  aspirin **OR** aspirin **OR** aspirin  •  nitroglycerin  •  [START ON 1/3/2019] insulin glargine  •  heparin  •  clopidogrel  •  insulin regular **AND** Accu-Chek ACHS **AND** NOTIFY MD and PharmD **AND** glucose 4 g **AND** dextrose 50%  •  senna-docusate **AND** polyethylene glycol/lytes **AND** magnesium hydroxide **AND** bisacodyl  •  acetaminophen  •  Notify provider if pain remains uncontrolled **AND** Use the numeric rating scale (NRS-11) on regular floors and Critical-Care Pain Observation Tool (CPOT) on ICUs/Trauma to assess pain **AND** Pulse Ox (Oximetry) **AND** Pharmacy Consult Request **AND** If patient difficult to arouse and/or has respiratory depression, stop any opiates that are currently infusing and call a Rapid Response. **AND** oxyCODONE immediate-release **AND** oxyCODONE immediate-release **AND** HYDROmorphone  •  MD Alert...Vancomycin per Pharmacy **AND** cefTRIAXone (ROCEPHIN) IV **AND** metroNIDAZOLE  •  hydrALAZINE  •  ondansetron  •  ondansetron  •  carvedilol  •  niacin SR  •  oxybutynin  •  pravastatin  •  therapeutic multivitamin-minerals  •  tizanidine  •  losartan **AND** hydroCHLOROthiazide  •  vancomycin    Labs:  Recent Labs      12/31/18   1135  01/01/19   0231  01/02/19   0015   WBC  11.5*  10.6  10.4   RBC  3.44*  3.25*  3.35*   HEMOGLOBIN  10.7*  10.2*  10.3*   HEMATOCRIT  31.8*  30.9*  32.1*   MCV  92.4  95.1  95.8   MCH  31.1  31.4  30.7   RDW  42.2  44.4  45.0   PLATELETCT  376  337  300   MPV  9.4  9.4  9.2   NEUTSPOLYS  78.70*  67.30   --    LYMPHOCYTES  11.30*  20.70*   --    MONOCYTES  9.10  9.90   --    EOSINOPHILS  0.10  0.80   --    BASOPHILS  0.40  0.80   --      Recent Labs      12/31/18   1135  01/01/19   0231  01/02/19   0015   SODIUM  140  140  138   POTASSIUM  4.2  4.2  3.9   CHLORIDE  102  106  106   CO2  30  27  23   GLUCOSE  240*  314*  293*    BUN  25*  26*  17     Recent Labs      18   1135  19   0231  19   0015   CREATININE  1.01  1.02  0.85       Imaging:  Dx-foot-complete 3+ Right    Result Date: 2018 1:03 PM HISTORY/REASON FOR EXAM:  Open wound on the plantar surface of the right foot for one month. TECHNIQUE/EXAM DESCRIPTION AND NUMBER OF VIEWS: 3 views of the RIGHT foot. COMPARISON:  None FINDINGS: There is diffuse swelling of the right ankle and foot. There is no radiopaque foreign body. There is no evidence of displaced fracture or dislocation. There is evidence of a healed fracture involving the right 3rd metatarsal with smooth periosteal reaction. There is marked abnormalities involving the tarsometatarsal junctions and throughout the midfoot and hindfoot with marginal spurring and degenerative change. There are also numerous calcific and ossific radiodensities at the tarsometatarsal junctions with some increased sclerosis. There is loss of the normal arch of the foot on the lateral view in the mid foot. There is mild degenerative change in the IP joints.     1.  There is marked degenerative change in the midfoot and hindfoot with findings suggesting possibility of a neuropathic foot. 2.  There is diffuse swelling. There is no foreign body or gas in the soft tissues. 3.  There is no plain film evidence of osteomyelitis.    Us-giovana Single Level Bilat    Result Date: 2018   Vascular Laboratory  Conclusions  Limited exam as ankle pressures are not accurately measured due to  calcification and noncompressibility.  Otherwise, grossly normal bilateral GIOVANA's.  DAVID KEYS  Age:    73    Gender:     F  MRN:    [id number]  :    1945      BSA:  Exam Date:     2018 11:09  Room #:     Inpatient  Priority:     Routine  Ht (in):             Wt (lb):  Ordering Physician:        JESÚS HOGAN  Referring Physician:       JESÚS HOGAN  Sonographer:               Ken Soliman RVT  Study  Type:                Complete Bilateral  Technical Quality:         Adequate  Indications:     Ulceration of LE  CPT Codes:       01845  ICD Codes:       707.1  History:         Ulceration of plantar region of right foot; diabetes mellitus  Limitations:                 RIGHT  Waveform            Systolic BPs (mmHg)                             137           Brachial  Triphasic                                Common Femoral  Bi, non-                   0             Posterior Tibial  reversed  Bi, non-                   0             Dorsalis Pedis  reversed                                           Peroneal                                           SELWYN                                           TBI                       LEFT  Waveform        Systolic BPs (mmHg)                             132           Brachial  Triphasic                                Common Femoral  Biphasic                   0             Posterior Tibial  Biphasic                   0             Dorsalis Pedis                                           Peroneal                                           SELWYN                                           TBI  Findings  Bilateral.  Doppler waveform of the common femoral artery is of high amplitude and  triphasic.  Doppler waveforms at the ankle are brisk and multiphasic.  Ankle pressures are not accurately measured due to calcification and  noncompressibility of the tibial vessels.  Toe-Brachial Index - Right: 1.01   Left: 1.03  Mandy Pfeiffer  (Electronically Signed)  Final Date:      30 December 2018                   11:48      Micro:  Results     Procedure Component Value Units Date/Time    CULTURE TISSUE W/ GRM STAIN [[id number]]  (Abnormal) Collected:  12/30/18 1719    Order Status:  Completed Specimen:  Tissue Updated:  01/01/19 1712     Significant Indicator POS (POS)     Source TISS     Site RightFootPlantarSoftTissue     Tissue Culture No growth at 48 hours  Growth noted after further incubation,  see below for  organism identification.   (A)     Gram Stain Result Few WBCs.  No organisms seen.       Tissue Culture Group D Enterococcus species  Isolated from enrichment broth only, please correlate with  clinical condition.  Combination therapy with ampicillin, penicillin, or  vancomycin (for susceptible strains) plus an aminoglycoside  is usually indicated for serious enterococcal infections,  such as endocarditis unless high-level resistance to both  gentamicin and streptomycin is documented; such combinations  are predicted to result in synergistic killing of the  Enterococcus.   (A)      Staphylococcus species  Isolated from enrichment broth only, please correlate with  clinical condition.   (A)    ANAEROBIC CULTURE [[id number]] Collected:  12/30/18 1719    Order Status:  Completed Specimen:  Tissue Updated:  01/01/19 1712     Significant Indicator NEG     Source TISS     Site RightFootPlantarSoftTissue     Anaerobic Culture, Culture Res Culture in progress.    CULTURE TISSUE W/ GRM STAIN [[id number]] Collected:  12/30/18 1723    Order Status:  Completed Specimen:  Tissue Updated:  01/01/19 1157     Significant Indicator NEG     Source TISS     Site Right Medial Cuneiform     Tissue Culture No growth at 48 hours     Gram Stain Result No organisms seen.    ANAEROBIC CULTURE [[id number]] Collected:  12/30/18 1723    Order Status:  Completed Specimen:  Tissue Updated:  01/01/19 1157     Significant Indicator NEG     Source TISS     Site Right Medial Cuneiform     Anaerobic Culture, Culture Res Culture in progress.    GRAM STAIN [[id number]] Collected:  12/30/18 1723    Order Status:  Completed Specimen:  Tissue Updated:  12/31/18 0701     Significant Indicator .     Source TISS     Site Right Medial Cuneiform     Gram Stain Result No organisms seen.    GRAM STAIN [[id number]] Collected:  12/30/18 1719    Order Status:  Completed Specimen:  Tissue Updated:  12/31/18 0701     Significant Indicator .     Source TISS      Site RightFootPlantarSoftTissue     Gram Stain Result Few WBCs.  No organisms seen.            Assessment:  73-year-old woman with poorly controlled diabetes, COPD on 3 L home O2, CAD status post stents, Charcot foot, right foot chronic ulcer, presented on 12/29 with infected right foot ulcer.  On 12/30, patient underwent I&D of the foot, ostectomy with partial excision of the medial cuneiform and the first metatarsal, right gastrosoleus recession.  Necrotic bone was noted which was excised.  Group D Enterococcus and a Staph species are growing from cultures thus far.    Plan:  Right diabetic foot infection  Ulceration with underlying osteomyelitis  Afebrile  Increased leukocytosis post-op-now resolved  S/p debridement necrotic tissue to bone per OP report  Right plantar bone and soft tissue sent for pathology and culture-group D enterococcus and staph species thus far  Will discontinue ceftriaxone, Flagyl, start IV Unasyn 3 g every 6 hours   Continue IV vancomycin, monitor renal function and vancomycin levels  Will need PICC  Anticipate 6 weeks IV abx from date of surgery.  Choice of antibiotic pending susceptibilities of the above organisms (anticipated stop date: 2/10/2019)             Cellulitis   Improved  Abx as above    Acute kidney injury, resolved  Resolved, monitor especially while on vancomycin  Renally dose antibiotics    Diabetes mellitus  Hemoglobin A1c of 7.  Will adversely affect wound healing and resolution of infection  Keep BS under 150 to help control current infection    Discussed with Dr. Ames    ID will follow.  Please call with questions.

## 2019-01-02 NOTE — FACE TO FACE
Face to Face Supporting Documentation - Home Health    The encounter with this patient was in whole or in part the primary reason for home health admission.    Date of encounter:   Patient:                    MRN:                       YOB: 2021  Leonie Brock  [id number]  1945     Home health to see patient for:  Skilled Nursing care for assessment, interventions & education    Skilled need for:  Surgical Aftercare diabetic foot debridement    Skilled nursing interventions to include:  Wound Care    Homebound status evidenced by:  Needs the assistance of another person in order to leave the home. Leaving home requires a considerable and taxing effort. There is a normal inability to leave the home.    Community Physician to provide follow up care: Floyd Gould M.D.     Optional Interventions? No      I certify the face to face encounter for this home health care referral meets the CMS requirements and the encounter/clinical assessment with the patient was, in whole, or in part, for the medical condition(s) listed above, which is the primary reason for home health care. Based on my clinical findings: the service(s) are medically necessary, support the need for home health care, and the homebound criteria are met.  I certify that this patient has had a face to face encounter by myself.  Brandy Ames M.D. - NPI: [id number]

## 2019-01-02 NOTE — THERAPY
"Occupational Therapy Evaluation completed.   Functional Status:    Pleasant 72 y/o female admitted following poor healing of R foot diabetic ulcer, now s/p  gastrocsoleus recession, I&D of foot, and ostectomy with partial excision of the medial cuneiform and first metatarsal. Presenting with WBAT precautions with offloading boot on at all times. Pt using toilet when received by OT, completes pericare Mod I and ascended from toilet with CGA. Pt mobilized back to bed using FWW and was unable to demo LB dressing, reporting that she uses a \"sock tool\" at home for this task. Pt required min A for b2b 2/2 weight of offloading boot. Anticipate 1-2 more sessions with pt.    Plan of Care: Will benefit from Occupational Therapy 3 times per week  Discharge Recommendations:  Equipment: Will Continue to Assess for Equipment Needs. Post-acute therapy Recommend home health or outpatient transitional care services for continued occupational therapy services      See \"Rehab Therapy-Acute\" Patient Summary Report for complete documentation.    "

## 2019-01-02 NOTE — CODE DOCUMENTATION
Pt states chest pain 3/10 after RN administered nitro SL as ordered by .  Pt transferring to T804-2

## 2019-01-02 NOTE — PROGRESS NOTES
2 RN skin check with Kris RN:    Patient's sacrum, elbows, and left heel are non-red and blanching. JEANINE right foot due to boot placed after I&D of diabetic ulcer, wound team following. Behind patient's ears are blanching, but offloaded with foam pads. Patient capable of turning self. No further need for skin breakdown interventions at this time, will continue to monitor.

## 2019-01-02 NOTE — PROGRESS NOTES
Patient brought up to floor on zoll, placed on monitor, monitor room notified. Patient complaining of chest pain 2-3/10, states it is getting better. Call light within reach, no other needs at this time.

## 2019-01-02 NOTE — DISCHARGE PLANNING
Transitional Care Coordinator:    Per chart review pt may benefit from home health services for COPD, DM, Wound care.  Pt's LACE+ score indicates a high risk of readmission.  Please consider a home health order and F2F for discharge planning.

## 2019-01-02 NOTE — DIETARY
Nutrition Services: Consult  Consult received for diabetes diet instruction.  RD provided DM education on 12/31 via explanation and handouts.      Thank you for the consult, please consult RD as needed.

## 2019-01-02 NOTE — PROGRESS NOTES
Pt is AAOx4   at the bedside  Pt reports a 0/10 pain level  Medicated per MAR  VS WNL  Dressing CDI, walking boot on at all times  POC discussed  All needs met at this time  Bed in low position  Call light within reach  Rounding in place

## 2019-01-02 NOTE — PROGRESS NOTES
7425 rapid called on pt because she was having chest pain.    Upon this RN entering the room the patient was taking 2 of her own Nitro that the pt had in her purse.  Pt educated to not take those and to wait for the rapid response team to get there but pt still took her own.    Paged on call hospitalist Dr. Varela. New orders received    7541 pt transported up to tele 8

## 2019-01-03 NOTE — PROGRESS NOTES
Received report and care assumed. Patient A&Ox 4. Pt. Reports no pain currently. Work of breathing even and mildly labored with exertion. Discussed POC. Call light within reach and pt. instructed to call when in need of assistance.  Denies any other needs at this time.

## 2019-01-03 NOTE — PROGRESS NOTES
Pharmacy Kinetics 73 y.o. female on vancomycin day # 6 1/3/2019    Currently on Vancomycin 1500 mg iv q24hr    Indication for Treatment: SSTI    Pertinent history per medical record: Admitted on 12/29/2018 for new diabetic foot ulceration w/ hx diabetes. Patient reports new ulceration w/ drainage. Pt reported course of Bactrim p/t to arrival w/ no improvement reported. Admitted for further work-up and treatment. ID consulted for suspicion of OM.    Other antibiotics: ampicillin/sulbactam 3 grams Q 6 hours  -recently received doses of ceftriaxone and metronidazole    Allergies: Patient has no known allergies.     List concerns for renal function (possible concerns include abnormal LFTs, BUN/SCr ratio > 20:1, CHF, obesity, malnutrition/low albumin, hypermetabolic state (SIRS), pressors/hypotension, nephrotoxic drugs, etc.): electrolyte derangements, BMI > 30 (~36), elderly, labile renal function w/ improving Scr.    Pertinent cultures to date:   Results     Procedure Component Value Units Date/Time    CULTURE TISSUE W/ GRM STAIN [[id number]]  (Abnormal) Collected:  12/30/18 1719    Order Status:  Completed Specimen:  Tissue Updated:  01/02/19 1528     Significant Indicator POS (POS)     Source TISS     Site RightFootPlantarSoftTissue     Tissue Culture No growth at 48 hours  Growth noted after further incubation, see below for  organism identification.   (A)     Gram Stain Result Few WBCs.  No organisms seen.       Tissue Culture Group D Enterococcus species  Isolated from enrichment broth only, please correlate with  clinical condition.  Combination therapy with ampicillin, penicillin, or  vancomycin (for susceptible strains) plus an aminoglycoside  is usually indicated for serious enterococcal infections,  such as endocarditis unless high-level resistance to both  gentamicin and streptomycin is documented; such combinations  are predicted to result in synergistic killing of the  Enterococcus.   (A)       Staphylococcus species  Isolated from enrichment broth only, please correlate with  clinical condition.   (A)    ANAEROBIC CULTURE [[id number]] Collected:  12/30/18 1719    Order Status:  Completed Specimen:  Tissue Updated:  01/02/19 1528     Significant Indicator NEG     Source TISS     Site RightFootPlantarSoftTissue     Anaerobic Culture, Culture Res No Anaerobes isolated.    CULTURE TISSUE W/ GRM STAIN [[id number]]  (Abnormal) Collected:  12/30/18 1723    Order Status:  Completed Specimen:  Tissue Updated:  01/02/19 1528     Significant Indicator POS (POS)     Source TISS     Site Right Medial Cuneiform     Tissue Culture Growth noted after further incubation, see below for  organism identification.   (A)     Gram Stain Result No organisms seen.     Tissue Culture Coagulase-negative Staphylococcus species  Isolated from enrichment broth only, please correlate with  clinical condition.   (A)    ANAEROBIC CULTURE [[id number]] Collected:  12/30/18 1723    Order Status:  Completed Specimen:  Tissue Updated:  01/02/19 1528     Significant Indicator NEG     Source TISS     Site Right Medial Cuneiform     Anaerobic Culture, Culture Res No Anaerobes isolated.    GRAM STAIN [[id number]] Collected:  12/30/18 1723    Order Status:  Completed Specimen:  Tissue Updated:  12/31/18 0701     Significant Indicator .     Source TISS     Site Right Medial Cuneiform     Gram Stain Result No organisms seen.    GRAM STAIN [[id number]] Collected:  12/30/18 1719    Order Status:  Completed Specimen:  Tissue Updated:  12/31/18 0701     Significant Indicator .     Source TISS     Site RightFootPlantarSoftTissue     Gram Stain Result Few WBCs.  No organisms seen.          Recent Labs      12/31/18   1135  01/01/19   0231  01/02/19   0015  01/03/19   0031  01/03/19   0235   WBC  11.5*  10.6  10.4  9.8  8.8   NEUTSPOLYS  78.70*  67.30   --   68.00   --      Recent Labs      12/31/18   1135  01/01/19   0231  01/02/19   0015  01/03/19   0031   "19   0235   BUN  25*  26*  17  17  17   CREATININE  1.01  1.02  0.85  0.84  0.77   ALBUMIN   --    --    --    --   2.9*     Recent Labs      18   1653   CHIKI  10.4     Intake/Output Summary (Last 24 hours) at 19 0817  Last data filed at 19 1750   Gross per 24 hour   Intake              700 ml   Output                0 ml   Net              700 ml      Blood pressure 127/76, pulse 82, temperature 36.2 °C (97.1 °F), temperature source Temporal, resp. rate 16, height 1.626 m (5' 4\"), weight 95.3 kg (210 lb 1.6 oz), SpO2 100 %, not currently breastfeeding. Temp (24hrs), Av.2 °C (97.2 °F), Min:36.1 °C (97 °F), Max:36.6 °C (97.8 °F)    A/P   1. Vancomycin dose change: no change  2. Next vancomycin level: 2018 @ 1730 (ordered)  3. Goal trough: 12-16 mcg/ml  4. Comments: VSS. WBC unremarkable, afebrile, microbiology noted and taken into account. Will continue w/ current dosing regimen. Suspected that pt may accumulate vancomycin d/t above factors. Will assess vancomycin trough (prior to 4th dose) today and adjust accordingly. Previous vancomycin trough noted. Pharmacy will continue to monitor for appropriateness and duration of treatment in suspected OM. ID consulted and anticipating ~6 weeks for antibiotic therapy.    Ken Carbone  2019 Doctor of Pharmacy Candidate    Pharmacy Addendum:  Pharmacy Kinetics 73 y.o. female on vancomycin day # 6  1/3/2019    Currently Dose: Vancomycin 1500 mg iv q24hr (~15 mg/kg)  Received Load Dose: Yes    Indication for Treatment: SSTI/OM  ID Service Following: Yes    Pertinent history per medical record: Admitted on 2018 for new diabetic foot ulceration. Noted history of diabetes. Patient reports new ulceration with drainage. Course of Bactrim reportedly given prior to arrival but wound failed to improve. Admitted for further workup and treatment. ID consulted for suspicion of OM (anticipate ~6 weeks IV antibiotics).    Other antibiotics: " ampicillin/sulbactam 3 gm iv q6h    Allergies: Patient has no known allergies.     List concerns for accumulation of vancomycin: age 73, BMI ~36, low albumin/malnutrition    Pertinent cultures to date:   Results     Procedure Component Value Units Date/Time    CULTURE TISSUE W/ GRM STAIN [[id number]]  (Abnormal) Collected:  12/30/18 1719    Order Status:  Completed Specimen:  Tissue Updated:  01/02/19 1528     Significant Indicator POS (POS)     Source TISS     Site RightFootPlantarSoftTissue     Tissue Culture No growth at 48 hours  Growth noted after further incubation, see below for  organism identification.   (A)     Gram Stain Result Few WBCs.  No organisms seen.       Tissue Culture Group D Enterococcus species  Isolated from enrichment broth only, please correlate with  clinical condition.  Combination therapy with ampicillin, penicillin, or  vancomycin (for susceptible strains) plus an aminoglycoside  is usually indicated for serious enterococcal infections,  such as endocarditis unless high-level resistance to both  gentamicin and streptomycin is documented; such combinations  are predicted to result in synergistic killing of the  Enterococcus.   (A)      Staphylococcus species  Isolated from enrichment broth only, please correlate with  clinical condition.   (A)    ANAEROBIC CULTURE [[id number]] Collected:  12/30/18 1719    Order Status:  Completed Specimen:  Tissue Updated:  01/02/19 1528     Significant Indicator NEG     Source TISS     Site RightFootPlantarSoftTissue     Anaerobic Culture, Culture Res No Anaerobes isolated.    CULTURE TISSUE W/ GRM STAIN [[id number]]  (Abnormal) Collected:  12/30/18 1723    Order Status:  Completed Specimen:  Tissue Updated:  01/02/19 1528     Significant Indicator POS (POS)     Source TISS     Site Right Medial Cuneiform     Tissue Culture Growth noted after further incubation, see below for  organism identification.   (A)     Gram Stain Result No organisms seen.      "Tissue Culture Coagulase-negative Staphylococcus species  Isolated from enrichment broth only, please correlate with  clinical condition.   (A)    ANAEROBIC CULTURE [[id number]] Collected:  18    Order Status:  Completed Specimen:  Tissue Updated:  19 1528     Significant Indicator NEG     Source TISS     Site Right Medial Cuneiform     Anaerobic Culture, Culture Res No Anaerobes isolated.    GRAM STAIN [[id number]] Collected:  18    Order Status:  Completed Specimen:  Tissue Updated:  18 0701     Significant Indicator .     Source TISS     Site Right Medial Cuneiform     Gram Stain Result No organisms seen.    GRAM STAIN [[id number]] Collected:  18    Order Status:  Completed Specimen:  Tissue Updated:  18 07     Significant Indicator .     Source TISS     Site RightFootPlantarSoftTissue     Gram Stain Result Few WBCs.  No organisms seen.          Recent Labs      18   1135  19   0231  19   0015  19   0031  19   0235   WBC  11.5*  10.6  10.4  9.8  8.8   NEUTSPOLYS  78.70*  67.30   --   68.00   --      Recent Labs      18   1135  19   0231  19   0015  19   0031  19   0235   BUN  25*  26*  17  17  17   CREATININE  1.01  1.02  0.85  0.84  0.77   ALBUMIN   --    --    --    --   2.9*     Recent Labs      18   1653   [facility]  10.4     Intake/Output Summary (Last 24 hours) at 19 0912  Last data filed at 19 0847   Gross per 24 hour   Intake              710 ml   Output                0 ml   Net              710 ml      Blood pressure 119/54, pulse (!) 55, temperature 36.1 °C (97 °F), temperature source Temporal, resp. rate 17, height 1.626 m (5' 4\"), weight 95.3 kg (210 lb 1.6 oz), SpO2 98 %, not currently breastfeeding. Temp (24hrs), Av.2 °C (97.1 °F), Min:36.1 °C (97 °F), Max:36.2 °C (97.2 °F)    Estimated Creatinine Clearance: 70.7 mL/min (by C-G formula based on SCr of 0.77 " mg/dL).    A/P   5. Vancomycin dose change: not indicated   6. Next vancomycin level: 1/3/19 @1730 (ordered)  7. Goal trough: 12-16 mcg/mL  8. Comments: VS stable. Afebrile. WBC stable. Microbiology noted. CrCl ~71 mL/min (SCr improved). Factors for accumulation noted. Level in place prior to PM dose 1/3/19 to assess clearance. ID service consulted and anticipate ~6 weeks antibiotic therapy. Pharmacy will continue to follow.    Darian Dillon, PharmD

## 2019-01-03 NOTE — CARE PLAN
Problem: Infection  Goal: Will remain free from infection  Outcome: PROGRESSING AS EXPECTED  Patient will verbalize signs and symptoms of infection (specifically wound care), interventions that can prevent infection, and when to notify a healthcare provider regarding signs and symptoms of infection.     Problem: Knowledge Deficit  Goal: Knowledge of disease process/condition, treatment plan, diagnostic tests, and medications will improve  Outcome: PROGRESSING AS EXPECTED  Knowledge of disease process, current condition, plan of care, medications, and diagnostics will improve.

## 2019-01-03 NOTE — PROGRESS NOTES
Infectious Disease Progress Note    Author: MAXIME Mcintosh Date & Time of service: 1/3/2019  11:22 AM    Chief Complaint:  Right diabetic foot infection             OM    Interval History:  73 y.o. 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  Patient reports no new issues, tolerating antibiotics.  1/3- AF, WBC 8.8, no issue with abx, denies pain to RLE, still lightheaded/dizzy from syncopal episode last night.    Labs Reviewed, Medications Reviewed, Radiology Reviewed and Wound Reviewed.    Review of Systems:  Review of Systems   Constitutional: Positive for malaise/fatigue. Negative for chills and fever.   Respiratory: Negative for cough and shortness of breath.    Cardiovascular: Negative for chest pain and palpitations.   Gastrointestinal: Positive for nausea and vomiting. Negative for abdominal pain, constipation and diarrhea.   Genitourinary: Negative for dysuria and hematuria.   Musculoskeletal: Negative for joint pain and myalgias.   Skin: Negative for rash.   Neurological: Positive for dizziness and sensory change. Negative for headaches.       Hemodynamics:  Temp (24hrs), Av.2 °C (97.1 °F), Min:36.1 °C (97 °F), Max:36.2 °C (97.2 °F)  Temperature: 36.2 °C (97.2 °F)  Pulse  Av.8  Min: 55  Max: 117   Blood Pressure : (!) 94/52       Physical Exam:  Physical Exam   Constitutional: She is oriented to person, place, and time. She appears well-developed and well-nourished. No distress.   Obese.  Elderly, pale.   HENT:   Head: Normocephalic and atraumatic.   Eyes: Pupils are equal, round, and reactive to light. EOM are normal. No scleral icterus.   Neck: Normal range of motion. Neck supple. No tracheal deviation present.   Cardiovascular: Normal rate, regular rhythm and normal heart sounds.     No murmur heard.  BLE faint distal pulses   Pulmonary/Chest: Effort normal and breath sounds normal. No respiratory distress. She has no wheezes.   4 L NC   Abdominal: Soft. Bowel sounds are normal. She exhibits no distension. There is no tenderness.   Musculoskeletal: She exhibits edema (Trace RLE). She exhibits no tenderness.   Right plantar- sutures in place, minimal serous drainage without odor, mild maceration to surrounding tissue, trace erythema to surrounding tissue, non tender to palpation.    Neurological: She is alert and oriented to person, place, and time. Coordination normal.   Skin: Skin is warm. No rash noted. There is erythema.   Psychiatric: She has a normal mood and affect. Thought content normal.   Nursing note and vitals reviewed.      Meds:    Current Facility-Administered Medications:   •  nitroglycerin  •  insulin glargine  •  ampicillin-sulbactam (UNASYN) IV  •  heparin **AND** heparin **AND** Protocol 440 Heparin Weight Based DO NOT GIVE ANY HEPARIN BOLUS TO STROKE PATIENT **AND** Protocol 440 Heparin Weight Based Discontinue Enoxaparin (Lovenox), Dabigatran (Pradaxa), Rivaroxaban (Xarelto), Apixaban (Eliquis), Edoxaban (Savaysa, Lixiana), Fondaparinux (Arixtra) and Argatroban prior to heparin administration **AND** Protocol 440 Heparin Weight Based Draw baseline aPTT, PT, and platelet count if not already done **AND** Protocol 440 Heparin Weight Based Draw aPTT 6 hours after beginning infusion.  **AND** Protocol 440 Heparin Weight Based Record Patient Data **AND** Protocol 440 Heparin Weight Based INSTRUCTIONS **AND** Protocol 440 Heparin Weight Based Review aPTT results 6 hours after infusion is begun as detailed **AND** Protocol 440 Heparin Weight Based Draw Platelet count every three days. Contact MD if platelet is 50% lower than baseline count. **AND** Protocol 440 Heparin Weight Based Adjust heparin to maintain aPTT between 55-96 sec **AND** Protocol 440 Heparin Weight Based Order  aPTT 6 hours after any rate change or hold until aPTT is therapeutic (55-96 seconds) **AND** Protocol 440 Heparin Weight Based Documentation and verification  •  clopidogrel  •  insulin regular **AND** Accu-Chek ACHS **AND** NOTIFY MD and PharmD **AND** glucose 4 g **AND** dextrose 50%  •  senna-docusate **AND** polyethylene glycol/lytes **AND** magnesium hydroxide **AND** bisacodyl  •  acetaminophen  •  Notify provider if pain remains uncontrolled **AND** Use the numeric rating scale (NRS-11) on regular floors and Critical-Care Pain Observation Tool (CPOT) on ICUs/Trauma to assess pain **AND** Pulse Ox (Oximetry) **AND** Pharmacy Consult Request **AND** If patient difficult to arouse and/or has respiratory depression, stop any opiates that are currently infusing and call a Rapid Response. **AND** oxyCODONE immediate-release **AND** oxyCODONE immediate-release **AND** HYDROmorphone  •  MD Alert...Vancomycin per Pharmacy **AND** [DISCONTINUED] cefTRIAXone (ROCEPHIN) IV **AND** [DISCONTINUED] metroNIDAZOLE  •  hydrALAZINE  •  ondansetron  •  ondansetron  •  carvedilol  •  niacin SR  •  oxybutynin  •  pravastatin  •  therapeutic multivitamin-minerals  •  tizanidine  •  losartan **AND** hydroCHLOROthiazide  •  vancomycin    Labs:  Recent Labs      12/31/18   1135  01/01/19   0231  01/02/19   0015  01/03/19   0031  01/03/19   0235   WBC  11.5*  10.6  10.4  9.8  8.8   RBC  3.44*  3.25*  3.35*  3.85*  3.44*   HEMOGLOBIN  10.7*  10.2*  10.3*  11.9*  10.8*   HEMATOCRIT  31.8*  30.9*  32.1*  36.5*  32.3*   MCV  92.4  95.1  95.8  94.8  93.9   MCH  31.1  31.4  30.7  30.9  31.4   RDW  42.2  44.4  45.0  44.5  43.7   PLATELETCT  376  337  300  389  358   MPV  9.4  9.4  9.2  9.0  9.1   NEUTSPOLYS  78.70*  67.30   --   68.00   --    LYMPHOCYTES  11.30*  20.70*   --   20.50*   --    MONOCYTES  9.10  9.90   --   8.20   --    EOSINOPHILS  0.10  0.80   --   1.90   --    BASOPHILS  0.40  0.80   --   1.00   --      Recent Labs       19   0015  19   0031  19   0235   SODIUM  138  141  140   POTASSIUM  3.9  3.9  3.5*   CHLORIDE  106  104  105   CO2  23  30  25   GLUCOSE  293*  206*  235*   BUN  17  17  17     Recent Labs      19   0015  19   0031  19   0235   ALBUMIN   --    --   2.9*   TBILIRUBIN   --    --   0.3   ALKPHOSPHAT   --    --   58   TOTPROTEIN   --    --   5.4*   ALTSGPT   --    --   15   ASTSGOT   --    --   18   CREATININE  0.85  0.84  0.77       Imagin/3/2019    CT-CTA HEAD WITH & W/O-POST PROCESS   Impression     CT angiogram of the [place] within normal limits for age with some atherosclerosis.    Moderate to severe white matter hypodensity is nonspecific     1/3/2019    CT-HEAD W/O  Impression     No noncontrast CT evidence of acute intracranial hemorrhage.    Advanced white matter hypodensity is present.  This is a nonspecific finding which usually is found to represent chronic microvascular disease in patient's of this demographic.  Demyelination, age indeterminant ischemia and gliosis are also common   possibilities.    Moderate cerebral volume loss.         Micro:  Results     Procedure Component Value Units Date/Time    CULTURE TISSUE W/ GRM STAIN [[id number]]  (Abnormal) Collected:  18 1719    Order Status:  Completed Specimen:  Tissue Updated:  19 1528     Significant Indicator POS (POS)     Source TISS     Site RightFootPlantarSoftTissue     Tissue Culture No growth at 48 hours  Growth noted after further incubation, see below for  organism identification.   (A)     Gram Stain Result Few WBCs.  No organisms seen.       Tissue Culture Group D Enterococcus species  Isolated from enrichment broth only, please correlate with  clinical condition.  Combination therapy with ampicillin, penicillin, or  vancomycin (for susceptible strains) plus an aminoglycoside  is usually indicated for serious enterococcal infections,  such as endocarditis unless high-level  resistance to both  gentamicin and streptomycin is documented; such combinations  are predicted to result in synergistic killing of the  Enterococcus.   (A)      Staphylococcus species  Isolated from enrichment broth only, please correlate with  clinical condition.   (A)    ANAEROBIC CULTURE [[id number]] Collected:  12/30/18 1719    Order Status:  Completed Specimen:  Tissue Updated:  01/02/19 1528     Significant Indicator NEG     Source TISS     Site RightFootPlantarSoftTissue     Anaerobic Culture, Culture Res No Anaerobes isolated.    CULTURE TISSUE W/ GRM STAIN [[id number]]  (Abnormal) Collected:  12/30/18 1723    Order Status:  Completed Specimen:  Tissue Updated:  01/02/19 1528     Significant Indicator POS (POS)     Source TISS     Site Right Medial Cuneiform     Tissue Culture Growth noted after further incubation, see below for  organism identification.   (A)     Gram Stain Result No organisms seen.     Tissue Culture Coagulase-negative Staphylococcus species  Isolated from enrichment broth only, please correlate with  clinical condition.   (A)    ANAEROBIC CULTURE [[id number]] Collected:  12/30/18 1723    Order Status:  Completed Specimen:  Tissue Updated:  01/02/19 1528     Significant Indicator NEG     Source TISS     Site Right Medial Cuneiform     Anaerobic Culture, Culture Res No Anaerobes isolated.    GRAM STAIN [[id number]] Collected:  12/30/18 1723    Order Status:  Completed Specimen:  Tissue Updated:  12/31/18 0701     Significant Indicator .     Source TISS     Site Right Medial Cuneiform     Gram Stain Result No organisms seen.    GRAM STAIN [[id number]] Collected:  12/30/18 1719    Order Status:  Completed Specimen:  Tissue Updated:  12/31/18 0701     Significant Indicator .     Source TISS     Site RightFootPlantarSoftTissue     Gram Stain Result Few WBCs.  No organisms seen.            Assessment:  73-year-old woman with poorly controlled diabetes, COPD on 3 L home O2, CAD status post stents,  Charcot foot, right foot chronic ulcer, presented on 12/29 with infected right foot ulcer.  On 12/30, patient underwent I&D of the foot, ostectomy with partial excision of the medial cuneiform and the first metatarsal, right gastrosoleus recession.  Necrotic bone was noted which was excised.  Group D Enterococcus and a Staph species are growing from cultures thus far.    Plan:  Right diabetic foot infection  Ulceration with underlying osteomyelitis  Afebrile  Increased leukocytosis post-op-now resolved  S/p debridement necrotic tissue to bone per OP report by Dr. Kwon on 12/30  OR bone & tissue cx from 12/30 +CoNS, Group D Enterococcus and Staph species  Pathology from 12/30 negative OM or malignancy  Continue IV Unasyn 3 g Q6h and Vancomycin  On Vanco, monitor renal function and troughs  Will need PICC- will hold today d/t syncopal episode last night  Anticipate 6 weeks IV abx from date of surgery.  Choice of antibiotic pending susceptibilities of the above organisms (anticipated stop date: 2/10/2019)  If able to do once daily abx, then ok to go to [facility] for [facility] abx (will need PCP to write orders).  If unable to do once daily abx, then will need SNF placement.              Cellulitis   Improved  Abx as above    Acute kidney injury, resolved  Resolved, monitor closely while on IV Vancomycin  Renally dose antibiotics as needed    Diabetes mellitus  Hemoglobin A1c of 7.0 on 12/30/18.    Will adversely affect wound healing and resolution of infection  Keep BS under 150 to help control current infection    Syncopal episode  CT head negative      ID will follow.  Please call with questions.

## 2019-01-03 NOTE — CONSULTS
Chief Complaint   Patient presents with   • Open Wound       Problem List Items Addressed This Visit     Type 2 diabetes mellitus with skin complication, with long-term current use of insulin (HCC)     With hyperglycemia  HgbA1C-  7%    S.s.insulin  accu checks are noted  lantus is increased  Accu-Checks, hypoglycemia protocol           Relevant Medications    insulin glargine (LANTUS) 100 UNIT/ML Solution    insulin regular (HUMULIN R) injection 2-9 Units    glucose 4 g chewable tablet 16 g    insulin glargine (LANTUS) injection 32 Units    Other Relevant Orders    REFERRAL TO WOUND CLINIC    * (Principal)Diabetic ulcer of right midfoot associated with type 2 diabetes mellitus (HCC)     Limb preservation services, infectious disease and otho consulted   Surgical debridement 12/30/2018   IV ceftriaxone, Flagyl and vancomycin  Pending final antibiotic plans, based on tissue cultures , Per ID likely 6 week IV antibiotic therapy, PICC line ordered          Relevant Medications    insulin glargine (LANTUS) 100 UNIT/ML Solution    insulin regular (HUMULIN R) injection 2-9 Units    glucose 4 g chewable tablet 16 g    insulin glargine (LANTUS) injection 32 Units    Other Relevant Orders    REFERRAL TO HOME HEALTH    REFERRAL TO WOUND CLINIC      Other Visit Diagnoses     Diabetic foot infection (HCC)        Relevant Medications    insulin glargine (LANTUS) 100 UNIT/ML Solution    insulin regular (HUMULIN R) injection 2-9 Units    glucose 4 g chewable tablet 16 g    insulin glargine (LANTUS) injection 32 Units          History of present illness:  This is a 73-year old female with PMHx significant for type II diabetes mellitus, MI s/p multiple stents (remotely) who presented to [facility] on 12/29/18 for a chief complaint of a draining wound to Right foot; now s/p I&D of Right foot on 12/29/18. Patient had been doing well, receiving IV abx per ID; yesterday (1/2/19), patient was noted to have Atrial fibrillation per  "telemetry. Was started on Heparin gtt per primary team with plan for cardiology consultation. Around 0230 this morning, patient was using the bathroom when she reported called to her nurse that she felt that she was going to pass out. Patient was then witnessed to (presumably) syncopize; however further details of the event remain unclear (unclear if LOC, etc). Patient reportedly was brought back to bed where she had return baseline mentation with mild disorientation/lack of recollection of the event. At that time, she was felt by nursing to also have LUE/LLE drift and mild aphasia; in setting of recent Afib, Stroke Alert was called. CT Brain, CTA Brain and neck both unremarkable. Patient was determine not to be a candidate for IV tPA secondary to concurrent Heparin use, also secondary to mild/non disabling/rapidly resolving symptoms and symptoms more consistent with syncope rather than stroke.   Currently, patient is sitting up in bed; awake and alert. She reports that she feels well, nearly in her usual state of health; admits to mild pain to Right foot. She states that she recalls using that bathroom last night, then \"waking up with people standing over her calling her name.\" She denies previous similar episodes or history of syncope. She denies headache or dizziness. Denies new weakness, numbness or tingling to any part of the body. Denies new problems with vision, speech or swallowing.     Neurology has been consulted by Dr. Alden Gorman to further evaluate the findings noted above.     Past medical history:   Past Medical History:   Diagnosis Date   • Arthritis     all over   • Backpain    • CATARACT     removed   • Diabetes     1990   • Myocardial infarct (HCC)     1998       Past surgical history:   Past Surgical History:   Procedure Laterality Date   • IRRIGATION & DEBRIDEMENT ORTHO Right 12/30/2018    Procedure: IRRIGATION & DEBRIDEMENT, gastroc recession, ostectomy;  Surgeon: Tomi Kwon M.D.; "  Location: SURGERY [facility];  Service: Orthopedics   • GYN SURGERY      hysterectomy       Family history:   History reviewed. No pertinent family history.    Social history:   Social History     Social History   • Marital status:      Spouse name: N/A   • Number of children: N/A   • Years of education: N/A     Occupational History   • Not on file.     Social History Main Topics   • Smoking status: Former Smoker   • Smokeless tobacco: Never Used   • Alcohol use No   • Drug use: No   • Sexual activity: Not on file     Other Topics Concern   • Not on file     Social History Narrative   • No narrative on file       Current medications:   Current Facility-Administered Medications   Medication Dose   • nitroglycerin (NITROSTAT) tablet 0.4 mg  0.4 mg   • insulin glargine (LANTUS) injection 32 Units  32 Units   • ampicillin/sulbactam (UNASYN) 3 g in  mL IVPB  3 g   • heparin injection 3,200 Units  3,200 Units    And   • heparin infusion 25,000 units in 500 ml 0.45% nacl     • clopidogrel (PLAVIX) tablet 75 mg  75 mg   • insulin regular (HUMULIN R) injection 2-9 Units  2-9 Units    And   • glucose 4 g chewable tablet 16 g  16 g    And   • dextrose 50% (D50W) injection 25 mL  25 mL   • senna-docusate (PERICOLACE or SENOKOT S) 8.6-50 MG per tablet 2 Tab  2 Tab    And   • polyethylene glycol/lytes (MIRALAX) PACKET 1 Packet  1 Packet    And   • magnesium hydroxide (MILK OF MAGNESIA) suspension 30 mL  30 mL    And   • bisacodyl (DULCOLAX) suppository 10 mg  10 mg   • acetaminophen (TYLENOL) tablet 650 mg  650 mg   • Pharmacy Consult Request ...Pain Management Review      And   • oxyCODONE immediate-release (ROXICODONE) tablet 2.5 mg  2.5 mg    And   • oxyCODONE immediate-release (ROXICODONE) tablet 5 mg  5 mg    And   • HYDROmorphone pf (DILAUDID) injection 0.25 mg  0.25 mg   • MD Alert...Vancomycin per Pharmacy  1 Each   • hydrALAZINE (APRESOLINE) injection 10 mg  10 mg   • ondansetron (ZOFRAN) syringe/vial  injection 4 mg  4 mg   • ondansetron (ZOFRAN ODT) dispertab 4 mg  4 mg   • carvedilol (COREG) tablet 12.5 mg  12.5 mg   • niacin SR (NIASPAN) tablet 1,000 mg  1,000 mg   • oxybutynin (DITROPAN) tablet 5 mg  5 mg   • pravastatin (PRAVACHOL) tablet 20 mg  20 mg   • therapeutic multivitamin-minerals (THERAGRAN-M) tablet 1 Tab  1 Tab   • tizanidine (ZANAFLEX) tablet 4-8 mg  4-8 mg   • losartan (COZAAR) tablet 50 mg  50 mg    And   • hydroCHLOROthiazide (HYDRODIURIL) tablet 12.5 mg  12.5 mg   • vancomycin 1,500 mg in  mL IVPB  15 mg/kg       Medication Allergy:  No Known Allergies    Review of systems:   Constitutional: denies fever, night sweats, weight loss.   Eyes: denies acute vision change, eye pain or secretion.   Ears, Nose, Mouth, Throat: denies nasal secretion, nasal bleeding, difficulty swallowing, hearing loss, tinnitus, vertigo, ear pain, acute dental problems, oral ulcers or lesions.   Endocrine: denies recent weight changes, heat or cold intolerance, polyuria, polydypsia, polyphagia,abnormal hair growth.  Cardiovascular: denies new onset of chest pain, palpitations, syncope, or dyspnea of exertion.  Pulmonary: denies shortness of breath, new onset of cough, hemoptysis, wheezing, chest pain or flu-like symptoms.   GI: denies nausea, vomiting, diarrhea, GI bleeding, change in appetite, abdominal pain, and change in bowel habits.  : denies dysuria, urinary incontinence, hematuria.  Heme/oncology: denies history of easy bruising or bleeding. No history of cancer, DVTor PE.  Allergy/immunology: denies hives/urticaria, or itching.   Dermatologic: denies new rash, or new skin lesions.  Musculoskeletal:denies joint swelling or pain, muscle pain, neck and back pain.  Neurologic: as noted above.   Psychiatric: denies symptoms of depression, anxiety, hallucinations, mood swings or changes, suicidal or homicidal thoughts.     Physical examination:   Vitals:    01/03/19 1024 01/03/19 1025 01/03/19 1217 01/03/19  1237   BP: (!) 93/53 (!) 94/52 (!) 83/46 114/74   Pulse: 84  80 83   Resp: 18  16    Temp: 36.2 °C (97.2 °F)  36.5 °C (97.7 °F)    TempSrc:   Temporal    SpO2: 93%  100% 98%   Weight:       Height:         General: Patient in no acute distress, pleasant and cooperative.  HEENT: Normocephalic, no signs of acute trauma.   Neck: supple, no meningeal signs or carotid bruits. There is normal range of motion. No tenderness on exam.   Chest: clear to auscultation. No cough.   CV: RRR, no murmurs.   Skin: no signs of acute rashes or trauma.   Musculoskeletal: joints exhibit full range of motion, without any pain to palpation. There are no signs of joint or muscle swelling. There is no tenderness to deep palpation of muscles.   Psychiatric: No hallucinatory behavior. Denies symptoms of depression or suicidal ideation. Mood and affect appear normal on exam.     NEUROLOGICAL EXAM:   Mental status, orientation: Awake, alert and fully oriented.   Speech and language: speech is clear and fluent. The patient is able to name, repeat and comprehend.   Memory: There is intact recollection of recent and remote events.   Cranial nerve exam: Pupils are 3-4 mm bilaterally and equally reactive to light and accommodation. Visual fields are intact by confrontation. There is no nystagmus on primary or secondary gaze. Intact full EOM in all directions of gaze. Face appears symmetric. Sensation in the face is intact to light touch. Uvula is midline. Palate elevates symmetrically. Tongue is midline and without any signs of tongue biting or fasciculations. Sternocleidomastoid muscles exhibit is normal strength bilaterally. Shoulder shrug is intact bilaterally.   Motor exam: Strength is 5/5 in BUE. Strength 4+/5 to LLE, no drift; 4/5 RLE secondary to recent infection/limited secondary to pain. Tone is normal. No abnormal movements were seen on exam.   Sensory exam reveals normal sense of light touch and pinprick in all extremities.   Deep tendon  reflexes:  2 throughout (decreased likely in setting of neuropathy/diabetes). Plantar responses are flexor. There is no clonus.   Coordination: shows a normal finger-nose-finger. Normal rapidly alternating movements.   Gait: Not assessed at this time.     NIH Stroke Scale    1a Level of Consciousness   1b Orientation Questions   1c Response to Commands   2 Gaze   3 Visual Fields   4 Facial Movement   5 Motor Function (arm)   a Left   b Right   6 Motor Function (leg)   a Left   b Right 1 (functional; see exam above)  7 Limb Ataxia   8 Sensory   9 Language   10 Articulation   11 Extinction/Inattention     Score: 0 (1 given in setting of functional deficit/recent procedure/pain)    ANCILLARY DATA REVIEWED:     Lab Data Review:  Recent Results (from the past 24 hour(s))   EKG    Collection Time: 19  2:25 PM   Result Value Ref Range    Report       [facility]    Test Date:  2019  Pt Name:    DAVID KEYS              Department: 183  MRN:        [id number]                      Room:       [facility]  Gender:     Female                       Technician: PC  :        1945                   Requested By:MACKENZIE BUSTAMANTE  Order #:    [id number]                    Reading MD: Arnav Leon MD    Measurements  Intervals                                Axis  Rate:       86                           P:          112  TX:         172                          QRS:        -9  QRSD:       98                           T:          -20  QT:         380  QTc:        455    Interpretive Statements    PROBABLE ATRIAL FIB  INFERIOR INFARCT, AGE INDETERMINATE  BASELINE WANDER IN LEAD(S) V6  Compared to ECG 2019 00:27:42  Rhythm change is present    Electronically Signed On 2019 16:04:45 PST by Arnav Leon MD     ACCU-CHEK GLUCOSE    Collection Time: 19  4:22 PM   Result Value Ref Range    Glucose - Accu-Ck 223 (H) 65 - 99 mg/dL   EKG    Collection Time: 19  4:24 PM   Result Value Ref Range     Report       [facility]    Test Date:  2019  Pt Name:    DAVID KEYS              Department: 183  MRN:        [id number]                      Room:       T804  Gender:     Female                       Technician: [facility]  :        1945                   Requested By:MACKENZIE BUSTAMANTE  Order #:    [id number]                    Reading MD: Wilman Taylor MD    Measurements  Intervals                                Axis  Rate:       81                           P:  TN:                                      QRS:        -12  QRSD:       110                          T:          173  QT:         380  QTc:        441    Interpretive Statements  ATRIAL FIBRILLATION  NONSPECIFIC INTRAVENTRICULAR CONDUCTION DELAY  ST SEGMENT DEPRESSION, ANTERIOR AND LATERAL LEADS, CONSIDER ISCHEMIA  INFERIOR INFARCT, AGE INDETERMINATE      Electronically Signed On 1-3-2019 8:03:22 PST by Wilman Taylor MD     ACCU-CHEK GLUCOSE    Collection Time: 19  9:06 PM   Result Value Ref Range    Glucose - Accu-Ck 190 (H) 65 - 99 mg/dL   APTT    Collection Time: 19 12:31 AM   Result Value Ref Range    APTT 25.8 24.7 - 36.0 sec   Lipid Profile    Collection Time: 19 12:31 AM   Result Value Ref Range    Cholesterol,Tot 81 (L) 100 - 199 mg/dL    Triglycerides 69 0 - 149 mg/dL    HDL 34 (A) >=40 mg/dL    LDL 33 <100 mg/dL   BASIC METABOLIC PANEL    Collection Time: 19 12:31 AM   Result Value Ref Range    Sodium 141 135 - 145 mmol/L    Potassium 3.9 3.6 - 5.5 mmol/L    Chloride 104 96 - 112 mmol/L    Co2 30 20 - 33 mmol/L    Glucose 206 (H) 65 - 99 mg/dL    Bun 17 8 - 22 mg/dL    Creatinine 0.84 0.50 - 1.40 mg/dL    Calcium 9.4 8.5 - 10.5 mg/dL    Anion Gap 7.0 0.0 - 11.9   CBC WITH DIFFERENTIAL    Collection Time: 19 12:31 AM   Result Value Ref Range    WBC 9.8 4.8 - 10.8 K/uL    RBC 3.85 (L) 4.20 - 5.40 M/uL    Hemoglobin 11.9 (L) 12.0 - 16.0 g/dL    Hematocrit 36.5 (L) 37.0 - 47.0 %    MCV 94.8 81.4  - 97.8 fL    MCH 30.9 27.0 - 33.0 pg    MCHC 32.6 (L) 33.6 - 35.0 g/dL    RDW 44.5 35.9 - 50.0 fL    Platelet Count 389 164 - 446 K/uL    MPV 9.0 9.0 - 12.9 fL    Neutrophils-Polys 68.00 44.00 - 72.00 %    Lymphocytes 20.50 (L) 22.00 - 41.00 %    Monocytes 8.20 0.00 - 13.40 %    Eosinophils 1.90 0.00 - 6.90 %    Basophils 1.00 0.00 - 1.80 %    Immature Granulocytes 0.40 0.00 - 0.90 %    Nucleated RBC 0.00 /100 WBC    Neutrophils (Absolute) 6.66 2.00 - 7.15 K/uL    Lymphs (Absolute) 2.01 1.00 - 4.80 K/uL    Monos (Absolute) 0.80 0.00 - 0.85 K/uL    Eos (Absolute) 0.19 0.00 - 0.51 K/uL    Baso (Absolute) 0.10 0.00 - 0.12 K/uL    Immature Granulocytes (abs) 0.04 0.00 - 0.11 K/uL    NRBC (Absolute) 0.00 K/uL   ESTIMATED GFR    Collection Time: 19 12:31 AM   Result Value Ref Range    GFR If African American >60 >60 mL/min/1.73 m 2    GFR If Non African American >60 >60 mL/min/1.73 m 2   EKG    Collection Time: 19  2:17 AM   Result Value Ref Range    Report       [facility]    Test Date:  2019  Pt Name:    DAVID KEYS              Department: 183  MRN:        [id number]                      Room:       [facility]  Gender:     Female                       Technician: DGG  :        1945                   Requested By:MACKENZIE BUSTAMANTE  Order #:    [id number]                    Reading MD: Wilman Taylor MD    Measurements  Intervals                                Axis  Rate:       84                           P:  WI:                                      QRS:        3  QRSD:       120                          T:          198  QT:         352  QTc:        417    Interpretive Statements  ATRIAL FIBRILLATION, V-RATE    NONSPECIFIC INTRAVENTRICULAR CONDUCTION DELAY  INFERIOR INFARCT, AGE INDETERMINATE  ST SEGMENT DEPRESSION, ANTERIOR AND LATERAL LEADS, CONSIDER ISCHEMIA  Electronically Signed On 1-3-2019 8:17:22 PST by Wilman Taylor MD     ACCU-CHEK GLUCOSE    Collection Time:  01/03/19  2:22 AM   Result Value Ref Range    Glucose - Accu-Ck 220 (H) 65 - 99 mg/dL   CBC WITHOUT DIFFERENTIAL    Collection Time: 01/03/19  2:35 AM   Result Value Ref Range    WBC 8.8 4.8 - 10.8 K/uL    RBC 3.44 (L) 4.20 - 5.40 M/uL    Hemoglobin 10.8 (L) 12.0 - 16.0 g/dL    Hematocrit 32.3 (L) 37.0 - 47.0 %    MCV 93.9 81.4 - 97.8 fL    MCH 31.4 27.0 - 33.0 pg    MCHC 33.4 (L) 33.6 - 35.0 g/dL    RDW 43.7 35.9 - 50.0 fL    Platelet Count 358 164 - 446 K/uL    MPV 9.1 9.0 - 12.9 fL   COMP METABOLIC PANEL    Collection Time: 01/03/19  2:35 AM   Result Value Ref Range    Sodium 140 135 - 145 mmol/L    Potassium 3.5 (L) 3.6 - 5.5 mmol/L    Chloride 105 96 - 112 mmol/L    Co2 25 20 - 33 mmol/L    Anion Gap 10.0 0.0 - 11.9    Glucose 235 (H) 65 - 99 mg/dL    Bun 17 8 - 22 mg/dL    Creatinine 0.77 0.50 - 1.40 mg/dL    Calcium 9.0 8.5 - 10.5 mg/dL    AST(SGOT) 18 12 - 45 U/L    ALT(SGPT) 15 2 - 50 U/L    Alkaline Phosphatase 58 30 - 99 U/L    Total Bilirubin 0.3 0.1 - 1.5 mg/dL    Albumin 2.9 (L) 3.2 - 4.9 g/dL    Total Protein 5.4 (L) 6.0 - 8.2 g/dL    Globulin 2.5 1.9 - 3.5 g/dL    A-G Ratio 1.2 g/dL   PROTHROMBIN TIME    Collection Time: 01/03/19  2:35 AM   Result Value Ref Range    PT 17.9 (H) 12.0 - 14.6 sec    INR 1.46 (H) 0.87 - 1.13   APTT    Collection Time: 01/03/19  2:35 AM   Result Value Ref Range    APTT 26.5 24.7 - 36.0 sec   ESTIMATED GFR    Collection Time: 01/03/19  2:35 AM   Result Value Ref Range    GFR If African American >60 >60 mL/min/1.73 m 2    GFR If Non African American >60 >60 mL/min/1.73 m 2   ACCU-CHEK GLUCOSE    Collection Time: 01/03/19  6:06 AM   Result Value Ref Range    Glucose - Accu-Ck 230 (H) 65 - 99 mg/dL   APTT    Collection Time: 01/03/19  7:19 AM   Result Value Ref Range    APTT 171.2 (HH) 24.7 - 36.0 sec   ACCU-CHEK GLUCOSE    Collection Time: 01/03/19 11:11 AM   Result Value Ref Range    Glucose - Accu-Ck 196 (H) 65 - 99 mg/dL       Labs reviewed by me.       Imaging reviewed  by me:     CT-CTA HEAD WITH & W/O-POST PROCESS   Final Result      CT angiogram of the [place] within normal limits for age with some atherosclerosis.      Moderate to severe white matter hypodensity is nonspecific      CT-HEAD W/O   Final Result      No noncontrast CT evidence of acute intracranial hemorrhage.      Advanced white matter hypodensity is present.  This is a nonspecific finding which usually is found to represent chronic microvascular disease in patient's of this demographic.  Demyelination, age indeterminant ischemia and gliosis are also common    possibilities.      Moderate cerebral volume loss.      EC-ECHOCARDIOGRAM COMPLETE W/O CONT   Final Result      DX-CHEST-PORTABLE (1 VIEW)   Final Result      Mild left basilar atelectasis.      US-SELWYN SINGLE LEVEL BILAT   Final Result      DX-FOOT-COMPLETE 3+ RIGHT   Final Result      1.  There is marked degenerative change in the midfoot and hindfoot with findings suggesting possibility of a neuropathic foot.   2.  There is diffuse swelling. There is no foreign body or gas in the soft tissues.   3.  There is no plain film evidence of osteomyelitis.      MR-BRAIN-W/O    (Results Pending)   MR-MRA NECK-WITH    (Results Pending)       ASSESSMENT AND PLAN:  73-year old female with PMHx significant for type II diabetes mellitus, MI s/p multiple stents (remotely) who presented to [facility] on 12/29/18 for a chief complaint of a draining wound to Right foot; now s/p I&D of Right foot on 12/29/18; yesterday patient developed Afib and was subsequently started on Heparin gtt. Last night, patient had a presumed syncopal event, after which time she was witnessed by nursing to have concern LUE/LLE drift, CT and CTA brain/neck unremarkable. Patient was determined not to be a candidate for IV tPA given concurrent Heparin gtt use; event also more consistent with syncope (likely vasovagal as patient was on toilet; may also consider dysautonomia in setting  of advanced age and diabetes) rather then stroke (NIHSS currently 0, no focal deficits). However, considering newly discovered Afib, will proceed with MRI Brain to rule out acute ischemic event.     Recommendations/Plan:     -q4h and PRN neuro assessment. VS per nursing/unit protocol.   -Obtain MRI brain wo contrast. Will follow up with results and make additional recs accordingly.  -Telemetry; currently Afib (Afib/NSR; no RVR at this time). TTE with EF 45%. Recommend to consider cardiology consultation to further evaluate need for anticoagulation in setting of Afib and stroke risk (CHADSVASC2 score 4, moderate-high risk).   -Start  mg PO q day and Atorvastatin 40 mg PO q HS. Note LDL 33.   -Recommend aggressive BG management per primary team. Hemoglobin A1c pending.   -Continue PT/OT; physiatry consult. May forgo SLP eval at this time as patient's speech is non dysarhtic/non aphasic.   -Counsled patient at length regarding life style and risk factor modifications for secondary stroke prevention.   -All other medical management per primary team.   -DVT prophylaxis: SCDs.     The plan of care above has been discussed with Dr. Piper.     Fide Cruz MSN, BSN, AGNP-C  [facility]

## 2019-01-03 NOTE — PROGRESS NOTES
Patient had syncopal episode on toilet. Patient placed back in bed, still somnolent and pale. Answering questions somewhat purposefully, requires stimulation to arouse. Code stroke called.

## 2019-01-03 NOTE — CODE DOCUMENTATION
Heparin drip had been paused prior to patient getting up to the bathroom. Per RN, approximately 0200.

## 2019-01-03 NOTE — CONSULTS
"Called for possible stroke    Mrs Leonie Brock is a 74 y/o female who is currently admitted for diabetic foot ulcer who was also found to have new onset atrial fibrillation, placed on a Heparin drip. The patient was going to the bathroom at which time she called her nurse and stated \"I feel like I'm going to pass out\". Subsequently the patient had a syncopal event of unspecified duration. When she was brought back to the bed the patient's nurse noted a change in mentation which slowly improved. Additionally, she was found to have drift on her LUE, LLE, and RLE (although large boot on this extremity). She was also given points for aphasia and level of consciousness. Overall, NIHSS was \"8-10\" at the time per the nurse who performed the scale. Since they had recently lost IV access there was some concern over Heparin drip being efficacious. Given new onset afib, concern for stroke prompted stroke alert.     CT Head W/O CST personally reviewed w/o evidence of hemorrhage or acute stroke.     CTA Head personally reviewed w/o evidence of LVO. Formal radiology read is pending.    Given that the patient's coagulation parameters are abnormal from heparin drip (PT 17.9, INR 1.46, PTT 26.5) although not therapeutic and given that the event is clinically most consistent with syncope, she is not a candidate for TPA.     Plan:  -Not a TPA cadidate due to  event clinically c/w a syncopal event 2/2 valsalva and some light coagulopathy from heparin drip, in the setting of rapidly improving symptoms  -If patient worsens symptomatically please call for re-evaluation.  -Spoke to patient's nurse, since Heparin drip is as of this time subtherapeutic, and there is no ICH on CT Head W/O CST, they will re-bolus and attempt to get her therapeutic.   -CTA w/o LVO on my read. Formal radiology read is pending.  -MR Brain W/O in the am. If findings c/w stroke call neurology for formal consult.        "

## 2019-01-04 PROBLEM — R55 SYNCOPE: Status: ACTIVE | Noted: 2019-01-01

## 2019-01-04 NOTE — PROGRESS NOTES
CNA called writer stating patient was in bathroom and was feeling dizzy. Writer went to bathroom to access patient. Patient c/o being very dizzy. Speech was slurred and patient was slumped to the left side. Attempted to place patient on wheelchair and patient unable to get up. Rapid called. Placed patient in wheelchair and in bed bp was 94/52. Patient able to answer correctly all the orientation questions. Neuro doctor to room and did assessment. MRI ordered.

## 2019-01-04 NOTE — PROGRESS NOTES
"Pharmacy Kinetics 73 y.o. female on vancomycin day # 7 2019    Currently on Vancomycin 1500 mg iv q24hr (1800)  Indication for Treatment: SSTI    Pertinent history per medical record: Admitted on 2018 for new diabetic foot ulceration w/ hx diabetes. Patient reports new ulceration w/ drainage. Pt reported course of Bactrim p/t to arrival w/ no improvement reported. Admitted for further work-up and treatment. ID consulted for suspicion of OM.     Other antibiotics: ampicillin/sulbactam 3 grams Q 6 hours    Allergies: Patient has no known allergies.     List concerns for renal function: Age, BMI>30     Pertinent cultures to date:   18:Rt Ft Planter,TISS:Group D Enterococcus species ,Staphylococcus species   18:Rt Medial Cuneiform:Coagulase-negative Staphylococcus species     Recent Labs      19   0015  19   0031  19   0235  19   0623   WBC  10.4  9.8  8.8  9.8   NEUTSPOLYS   --   68.00   --   64.80     Recent Labs      19   0015  19   0031  19   0235  19   2322  19   0623   BUN  17  17  17  17  16   CREATININE  0.85  0.84  0.77  1.01  0.89   ALBUMIN   --    --   2.9*   --   2.8*     Recent Labs      19   1734   [facility]  18.3     Intake/Output Summary (Last 24 hours) at 19 0814  Last data filed at 19 2208   Gross per 24 hour   Intake              760 ml   Output                0 ml   Net              760 ml      Blood pressure 145/59, pulse 76, temperature 36.7 °C (98.1 °F), temperature source Temporal, resp. rate 18, height 1.626 m (5' 4\"), weight 95.1 kg (209 lb 10.5 oz), SpO2 100 %, not currently breastfeeding. Temp (24hrs), Av.4 °C (97.6 °F), Min:36.2 °C (97.1 °F), Max:36.8 °C (98.3 °F)      A/P   1. Vancomycin dose change: Vancomycin 1300 mg iv q24hr ()  2. Next vancomycin level: ~2 days   3. Goal trough: 12-16 mcg/mL   4. Comments: No leukocytosis , afebrile. Preliminary cx above, follow for C&S. Renal indices " appear stable, level above goal, maintenance dose reduced. ID following : Anticipate 6 weeks IV abx from date of surgery.  Choice of antibiotic pending susceptibilities of the above organisms (anticipated stop date: 2/10/2019)    Gilbert Lira PharmD BCPS

## 2019-01-04 NOTE — PROGRESS NOTES
Hospital Medicine Daily Progress Note    Date of Service  1/3/2019    Chief Complaint  73 y.o. female admitted 12/29/2018 with Right foot wound with discharge    Hospital Course  73 y.o. female with past medical history of diabetes, coronary artery disease, hypertension and chronic back pain admitted 12/29/2018 with diabetic foot ulcer.  Limb preservation services and orthopedics were consulted. Plan for I & D 12/30/2018.       Interval Problem Update  1/3/19:  The patient resting comfortably in bed.  Per the patient's nurse, the patient passed out briefly while using restroom.  Suspect, vasovagal syncope.  The patient have MRI done today.  1/4/19:  The patient resting in bed.  She was taken to [facility] stress lab today for stress test.  The patient refused to have stress test done despite knowing that she can have serious coronary artery disease undetected by refusing stress test.  Discussed discharge planning with social work.  The patient prefer outpatient antibiotic infusion either in home or in a medical facility near home.  Antibiotic course modified by ID specialist.    Consultants/Specialty  Orthopedics  Limb preservation services  ID specialist.    Code Status  full    Disposition  SNF versus home with home health.    Review of Systems  Review of Systems   Constitutional: Negative for chills, diaphoresis and fever.   HENT: Negative for ear discharge, ear pain and tinnitus.    Eyes: Negative for double vision, photophobia and pain.   Respiratory: Negative for cough, hemoptysis and sputum production.    Cardiovascular: Negative for chest pain, palpitations and orthopnea.   Gastrointestinal: Negative for heartburn, nausea and vomiting.   Genitourinary: Negative for dysuria, frequency and urgency.   Musculoskeletal: Negative for myalgias and neck pain.   Skin: Negative for itching and rash.   Neurological: Negative for dizziness, tingling and headaches.        Physical Exam  Temp:  [36.2 °C (97.1 °F)-36.8 °C (98.3  °F)] 36.4 °C (97.5 °F)  Pulse:  [] 79  Resp:  [16-18] 17  BP: ()/(36-77) 126/52    Physical Exam   Constitutional: She is oriented to person, place, and time. No distress.   HENT:   Head: Normocephalic and atraumatic.   Eyes: Pupils are equal, round, and reactive to light. Conjunctivae are normal.   Neck: Normal range of motion. Neck supple.   Cardiovascular: Normal rate and regular rhythm.    Pulmonary/Chest: Effort normal and breath sounds normal.   Abdominal: Soft. Bowel sounds are normal.   Morbidly obese   Musculoskeletal:   Boot is on the right leg   Neurological: She is alert and oriented to person, place, and time.   Skin: Skin is warm and dry. She is not diaphoretic.       Fluids    Intake/Output Summary (Last 24 hours)  Last data filed at 01/03/19 1800   Gross per 24 hour   Intake              520 ml   Output                0 ml   Net              520 ml       Laboratory  Recent Labs      01/03/19   0031  01/03/19   0235  01/04/19 0623   WBC  9.8  8.8  9.8   RBC  3.85*  3.44*  3.09*   HEMOGLOBIN  11.9*  10.8*  9.5*   HEMATOCRIT  36.5*  32.3*  29.0*   MCV  94.8  93.9  93.9   MCH  30.9  31.4  30.7   MCHC  32.6*  33.4*  32.8*   RDW  44.5  43.7  44.8   PLATELETCT  389  358  318   MPV  9.0  9.1  9.4     Recent Labs      01/03/19   0235  01/03/19 2322 01/04/19   0623   SODIUM  140  141  143   POTASSIUM  3.5*  3.1*  3.2*   CHLORIDE  105  106  109   CO2  25  27  24   GLUCOSE  235*  268*  232*   BUN  17  17  16   CREATININE  0.77  1.01  0.89   CALCIUM  9.0  9.2  8.7     Recent Labs      01/02/19   0015   01/03/19   0235   01/03/19   1505  01/03/19   2322  01/04/19   0623   APTT   --    < >  26.5   < >  140.6*  115.1*  65.0*   INR  1.35*   --   1.46*   --    --    --    --     < > = values in this interval not displayed.         Recent Labs      01/03/19   0031   TRIGLYCERIDE  69   HDL  34*   LDL  33       Imaging      Assessment/Plan  * Diabetic ulcer of right midfoot associated with type 2  diabetes mellitus (HCC)   Assessment & Plan    Limb preservation services, infectious disease and otho consulted   Surgical debridement 12/30/2018   IV ceftriaxone, Flagyl and vancomycin  Per ID specialist,Daptomycin IV, 6 week therapy, PICC line ordered      Coronary artery disease involving native coronary artery of native heart without angina pectoris   Assessment & Plan    Plavix re-started   Continue aspirin losartan pravastatin and carvedilol    Had chest pain previously that resolved  trops are negative  Echocardiogram indicates:  Mildly reduced left ventricular systolic function.  Left ventricular ejection fraction is visually estimated to be 45%.  Indeterminate diastolic function.  Normal left atrial size.  Mild mitral regurgitation.  Given recurrent syncope issue.  I recommended stress test.  However, the patient refused stress test. Of note the patient did have a stress test done in [place] April 2018 which is reportedly normal.  The patient states that she would rather follow up with her cardiologist, Dr. Horan, later this month.  In any case, the patient is well aware of the health risks of serious cardiac disease.     Type 2 diabetes mellitus with skin complication, with long-term current use of insulin (HCC)- (present on admission)   Assessment & Plan    With hyperglycemia  HgbA1C-  7%    S.s.insulin  accu checks are noted  lantus is increased  Accu-Checks, hypoglycemia protocol       Essential hypertension   Assessment & Plan    Carvedilol, Losartan  hctz  As needed hydralazine    Monitor blood pressure and adjust as need        Syncope   Assessment & Plan    Suspect vasovagal syncope as the patient's syncope occurred when she tried to bear down to use bathroom.  Echocardiogram result noted.  No event today.     Mixed hyperlipidemia- (present on admission)   Assessment & Plan    Continue  pravastatin and niacin            VTE prophylaxis: heparin

## 2019-01-04 NOTE — PROGRESS NOTES
Infectious Disease Progress Note    Author: MAXIME Mcintosh Date & Time of service: 2019  1:13 PM    Chief Complaint:  Right diabetic foot infection             OM    Interval History:  73 y.o. 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  Patient reports no new issues, tolerating antibiotics.  1/3- AF, WBC 8.8, no issue with abx, denies pain to RLE, still lightheaded/dizzy from syncopal episode last night.  - AF, WBC 9.8, denies pain currently to RLE but notes it gets up to 8/10 at night, tolerating abx, no more lightheadedness or dizziness, waiting for stress test.    Labs Reviewed, Medications Reviewed and Wound Reviewed.    Review of Systems:  Review of Systems   Constitutional: Negative for chills, fever and malaise/fatigue.   HENT: Negative for sore throat.    Respiratory: Negative for cough and shortness of breath.    Cardiovascular: Positive for leg swelling. Negative for chest pain and palpitations.   Gastrointestinal: Negative for abdominal pain, constipation, diarrhea, nausea and vomiting.   Genitourinary: Negative for dysuria and hematuria.   Musculoskeletal: Positive for joint pain. Negative for myalgias.   Skin: Negative for itching.   Neurological: Negative for dizziness, sensory change and headaches.       Hemodynamics:  Temp (24hrs), Av.5 °C (97.7 °F), Min:36.2 °C (97.1 °F), Max:36.8 °C (98.3 °F)  Temperature: 36.4 °C (97.6 °F)  Pulse  Av.7  Min: 55  Max: 117   Blood Pressure : 107/64       Physical Exam:  Physical Exam   Constitutional: She is oriented to person, place, and time. She appears well-developed. No distress.   Obese.  Elderly, pale.   HENT:   Head: Normocephalic and atraumatic.   Eyes: Pupils are equal, round, and reactive to light. Conjunctivae and EOM are  normal. No scleral icterus.   Neck: Normal range of motion. Neck supple. No JVD present.   Cardiovascular: Normal rate and regular rhythm.  Exam reveals no friction rub.    No murmur heard.  Pulmonary/Chest: Effort normal and breath sounds normal. No respiratory distress. She has no rales.   4 L NC   Abdominal: Soft. Bowel sounds are normal. She exhibits no distension. There is no rebound and no guarding.   Musculoskeletal: She exhibits edema (Trace RLE). She exhibits no tenderness.   Right plantar- sutures in place, minimal serous drainage, no odor, mild maceration to surrounding tissue, trace erythema to surrounding tissue, non tender to palpation.    Neurological: She is alert and oriented to person, place, and time. Coordination normal.   Skin: Skin is warm. No rash noted. She is not diaphoretic. There is erythema.   Diffuse ecchymosis to BUEs    Psychiatric: She has a normal mood and affect. Her behavior is normal.   Nursing note and vitals reviewed.      Meds:    Current Facility-Administered Medications:   •  NS  •  NS  •  vancomycin  •  potassium chloride SA  •  nitroglycerin  •  insulin glargine  •  ampicillin-sulbactam (UNASYN) IV  •  heparin **AND** heparin **AND** Protocol 440 Heparin Weight Based DO NOT GIVE ANY HEPARIN BOLUS TO STROKE PATIENT **AND** Protocol 440 Heparin Weight Based Discontinue Enoxaparin (Lovenox), Dabigatran (Pradaxa), Rivaroxaban (Xarelto), Apixaban (Eliquis), Edoxaban (Savaysa, Lixiana), Fondaparinux (Arixtra) and Argatroban prior to heparin administration **AND** Protocol 440 Heparin Weight Based Draw baseline aPTT, PT, and platelet count if not already done **AND** Protocol 440 Heparin Weight Based Draw aPTT 6 hours after beginning infusion.  **AND** Protocol 440 Heparin Weight Based Record Patient Data **AND** Protocol 440 Heparin Weight Based INSTRUCTIONS **AND** Protocol 440 Heparin Weight Based Review aPTT results 6 hours after infusion is begun as detailed **AND** Protocol  440 Heparin Weight Based Draw Platelet count every three days. Contact MD if platelet is 50% lower than baseline count. **AND** Protocol 440 Heparin Weight Based Adjust heparin to maintain aPTT between 55-96 sec **AND** Protocol 440 Heparin Weight Based Order aPTT 6 hours after any rate change or hold until aPTT is therapeutic (55-96 seconds) **AND** Protocol 440 Heparin Weight Based Documentation and verification  •  clopidogrel  •  insulin regular **AND** Accu-Chek ACHS **AND** NOTIFY MD and PharmD **AND** glucose 4 g **AND** dextrose 50%  •  senna-docusate **AND** polyethylene glycol/lytes **AND** magnesium hydroxide **AND** bisacodyl  •  acetaminophen  •  Notify provider if pain remains uncontrolled **AND** Use the numeric rating scale (NRS-11) on regular floors and Critical-Care Pain Observation Tool (CPOT) on ICUs/Trauma to assess pain **AND** Pulse Ox (Oximetry) **AND** Pharmacy Consult Request **AND** If patient difficult to arouse and/or has respiratory depression, stop any opiates that are currently infusing and call a Rapid Response. **AND** oxyCODONE immediate-release **AND** oxyCODONE immediate-release **AND** HYDROmorphone  •  MD Alert...Vancomycin per Pharmacy **AND** [DISCONTINUED] cefTRIAXone (ROCEPHIN) IV **AND** [DISCONTINUED] metroNIDAZOLE  •  hydrALAZINE  •  ondansetron  •  ondansetron  •  carvedilol  •  niacin SR  •  oxybutynin  •  pravastatin  •  therapeutic multivitamin-minerals  •  tizanidine  •  losartan **AND** hydroCHLOROthiazide    Labs:  Recent Labs      01/03/19   0031  01/03/19   0235  01/04/19   0623   WBC  9.8  8.8  9.8   RBC  3.85*  3.44*  3.09*   HEMOGLOBIN  11.9*  10.8*  9.5*   HEMATOCRIT  36.5*  32.3*  29.0*   MCV  94.8  93.9  93.9   MCH  30.9  31.4  30.7   RDW  44.5  43.7  44.8   PLATELETCT  389  358  318   MPV  9.0  9.1  9.4   NEUTSPOLYS  68.00   --   64.80   LYMPHOCYTES  20.50*   --   19.80*   MONOCYTES  8.20   --   10.00   EOSINOPHILS  1.90   --   3.80   BASOPHILS  1.00   --    0.90     Recent Labs      19   0235  19   2322  19   0623   SODIUM  140  141  143   POTASSIUM  3.5*  3.1*  3.2*   CHLORIDE  105  106  109   CO2  25  27  24   GLUCOSE  235*  268*  232*   BUN  17  17  16     Recent Labs      19   0235  19   2322  19   0623   ALBUMIN  2.9*   --   2.8*   TBILIRUBIN  0.3   --    --    ALKPHOSPHAT  58   --    --    TOTPROTEIN  5.4*   --    --    ALTSGPT  15   --    --    ASTSGOT  18   --    --    CREATININE  0.77  1.01  0.89       Imagin/3/2019    CT-CTA HEAD WITH & W/O-POST PROCESS   Impression     CT angiogram of the [place] within normal limits for age with some atherosclerosis.    Moderate to severe white matter hypodensity is nonspecific     1/3/2019    CT-HEAD W/O  Impression     No noncontrast CT evidence of acute intracranial hemorrhage.    Advanced white matter hypodensity is present.  This is a nonspecific finding which usually is found to represent chronic microvascular disease in patient's of this demographic.  Demyelination, age indeterminant ischemia and gliosis are also common   possibilities.    Moderate cerebral volume loss.         Micro:  Results     Procedure Component Value Units Date/Time    CULTURE TISSUE W/ GRM STAIN [[id number]]  (Abnormal)  (Susceptibility) Collected:  18 4171    Order Status:  Completed Specimen:  Tissue Updated:  19 1203     Significant Indicator POS (POS)     Source TISS     Site RightFootPlantarSoftTissue     Tissue Culture Growth noted after further incubation, see below for  organism identification.   (A)     Gram Stain Result Few WBCs.  No organisms seen.       Tissue Culture Enterococcus faecalis  Isolated from enrichment broth only, please correlate with  clinical condition.  Combination therapy with ampicillin, penicillin, or  vancomycin (for susceptible strains) plus an aminoglycoside  is usually indicated for serious enterococcal infections,  such as endocarditis unless  high-level resistance to both  gentamicin and streptomycin is documented; such combinations  are predicted to result in synergistic killing of the  Enterococcus.   (A)      Staphylococcus epidermidis  Isolated from enrichment broth only, please correlate with  clinical condition.   (A)    Culture & Susceptibility     ENTEROCOCCUS FAECALIS     Antibiotic Sensitivity Microscan Unit Status    Ampicillin Sensitive <=2 mcg/mL Final    Method: SENSITIVITY, BERNARDINO    Daptomycin Sensitive 1 mcg/mL Final    Method: SENSITIVITY, BERNARDINO    Gent Synergy Sensitive <=500 mcg/mL Final    Method: SENSITIVITY, BERNARDINO    Penicillin Sensitive 2 mcg/mL Final    Method: SENSITIVITY, BERNARDINO    Vancomycin Sensitive 2 mcg/mL Final    Method: SENSITIVITY, BERNARDINO              STAPHYLOCOCCUS EPIDERMIDIS     Antibiotic Sensitivity Microscan Unit Status    Ampicillin/sulbactam Sensitive <=8/4 mcg/mL Final    Method: SENSITIVITY, BERNARDINO    Clindamycin Sensitive <=0.5 mcg/mL Final    Method: SENSITIVITY, BERNARDINO    Daptomycin Sensitive <=0.5 mcg/mL Final    Method: SENSITIVITY, BERNARDINO    Erythromycin Resistant >4 mcg/mL Final    Method: SENSITIVITY, BERNARDINO    Moxifloxacin Sensitive <=0.5 mcg/mL Final    Method: SENSITIVITY, BERNARDINO    Oxacillin Sensitive <=0.25 mcg/mL Final    Method: SENSITIVITY, BERNARDINO    Penicillin Resistant 8 mcg/mL Final    Method: SENSITIVITY, BERNARDINO    Tetracycline Sensitive <=4 mcg/mL Final    Method: SENSITIVITY, BERNARDINO    Trimeth/Sulfa Resistant >2/38 mcg/mL Final    Method: SENSITIVITY, BERNARDINO    Vancomycin Sensitive 2 mcg/mL Final    Method: SENSITIVITY, BERNARDINO                       ANAEROBIC CULTURE [[id number]] Collected:  12/30/18 1719    Order Status:  Completed Specimen:  Tissue Updated:  01/04/19 1203     Significant Indicator NEG     Source TISS     Site RightFootPlantarSoftTissue     Anaerobic Culture, Culture Res No Anaerobes isolated.    CULTURE TISSUE W/ GRM STAIN [[id number]]  (Abnormal)  (Susceptibility) Collected:  12/30/18 1723    Order Status:   Completed Specimen:  Tissue Updated:  01/04/19 0857     Significant Indicator POS (POS)     Source TISS     Site Right Medial Cuneiform     Tissue Culture Growth noted after further incubation, see below for  organism identification.   (A)     Gram Stain Result No organisms seen.     Tissue Culture Staphylococcus epidermidis  Isolated from enrichment broth only, please correlate with  clinical condition.   (A)    Culture & Susceptibility     STAPHYLOCOCCUS EPIDERMIDIS     Antibiotic Sensitivity Microscan Unit Status    Ampicillin/sulbactam Resistant <=8/4 mcg/mL Final    Method: SENSITIVITY, BERNARDINO    Clindamycin Sensitive <=0.5 mcg/mL Final    Method: SENSITIVITY, BERNARDINO    Daptomycin Sensitive <=0.5 mcg/mL Final    Method: SENSITIVITY, BERNARDINO    Erythromycin Resistant >4 mcg/mL Final    Method: SENSITIVITY, BERNARDINO    Moxifloxacin Sensitive <=0.5 mcg/mL Final    Method: SENSITIVITY, BERNARDINO    Oxacillin Resistant 0.5 mcg/mL Final    Method: SENSITIVITY, BERNARDINO    Penicillin Resistant 2 mcg/mL Final    Method: SENSITIVITY, BERNARDINO    Tetracycline Sensitive <=4 mcg/mL Final    Method: SENSITIVITY, BERNARDINO    Trimeth/Sulfa Resistant >2/38 mcg/mL Final    Method: SENSITIVITY, BERNARDINO    Vancomycin Sensitive 2 mcg/mL Final    Method: SENSITIVITY, BERNARDINO                       ANAEROBIC CULTURE [[id number]] Collected:  12/30/18 1723    Order Status:  Completed Specimen:  Tissue Updated:  01/04/19 0857     Significant Indicator NEG     Source TISS     Site Right Medial Cuneiform     Anaerobic Culture, Culture Res No Anaerobes isolated.    GRAM STAIN [[id number]] Collected:  12/30/18 1723    Order Status:  Completed Specimen:  Tissue Updated:  12/31/18 0701     Significant Indicator .     Source TISS     Site Right Medial Cuneiform     Gram Stain Result No organisms seen.    GRAM STAIN [[id number]] Collected:  12/30/18 0429    Order Status:  Completed Specimen:  Tissue Updated:  12/31/18 0701     Significant Indicator .     Source TISS     Site  RightFootPlantarSoftTissue     Gram Stain Result Few WBCs.  No organisms seen.            Assessment:  73-year-old woman with poorly controlled diabetes, COPD on 3 L home O2, CAD status post stents, Charcot foot, right foot chronic ulcer, presented on 12/29 with infected right foot ulcer.  On 12/30, patient underwent I&D of the foot, ostectomy with partial excision of the medial cuneiform and the first metatarsal, right gastrosoleus recession.  Necrotic bone was noted which was excised.  Group D Enterococcus and a Staph species are growing from cultures thus far.    Plan:  Right diabetic foot infection  Ulceration with underlying osteomyelitis  Afebrile  Increased leukocytosis post-op-now resolved  S/p debridement necrotic tissue to bone per OP report by Dr. Kwon on 12/30  OR bone & tissue cx from 12/30 +MRSE, MSSE and E faecalis  Pathology from 12/30 negative OM or malignancy  Stop IV Unasyn  Continue IV Vancomycin   On Vanco, monitor renal function and troughs  PICC ordered  Anticipate 6 weeks IV abx from date of surgery  Anticipated stop date: 2/10/2019  Can [facility] Daptomycin [facility] at [facility], PCP will need to write orders (recommendations as below)  Will need 1st dose of IV Daptomycin prior to discharge  CPK ordered             Cellulitis   Improved  Abx as above    Acute kidney injury, resolved  Continue to monitor closely while on IV Vancomycin  Renally dose antibiotics as needed    Diabetes mellitus  Hemoglobin A1c of 7.0 on 12/30/18.    Will adversely affect wound healing and resolution of infection  Keep BS under 150 to help control current infection    Syncopal episode  CT head negative  Stress test today        Recommendation for IV antibiotics:  IV Daptomycin 8 mg/kg daily through 2/10/19  Weekly CBC, CMP and CPK.  ESR every other week.  Routine PICC care per facility policy.  DC PICC after last infusion dose      Discussed with JEANIE Damon.  Will contact PCP to write orders for [place].    ID will follow.  Please call with questions.

## 2019-01-04 NOTE — CARE PLAN
Problem: Knowledge Deficit  Goal: Knowledge of disease process/condition, treatment plan, diagnostic tests, and medications will improve    Intervention: Explain information regarding disease process/condition, treatment plan, diagnostic tests, and medications and document in education  Pt educated to the indication of Hep gtt r/t disease process. Pt verbalized understanding.       Problem: Pain Management  Goal: Pain level will decrease to patient's comfort goal    Intervention: Follow pain managment plan developed in collaboration with patient and Interdisciplinary Team  Pt c/o 8/10 pain in surgical site. Pt medicated per MAR. Right lower extremity elevated.

## 2019-01-04 NOTE — PROGRESS NOTES
[facility] Medicine Daily Progress Note    Date of Service  1/3/2019    Chief Complaint  73 y.o. female admitted 12/29/2018 with Right foot wound with discharge    Hospital Course  73 y.o. female with past medical history of diabetes, coronary artery disease, hypertension and chronic back pain admitted 12/29/2018 with diabetic foot ulcer.  Limb preservation services and orthopedics were consulted. Plan for I & D 12/30/2018.       Interval Problem Update  1/3/19:  The patient resting comfortably in bed.  Per the patient's nurse, the patient passed out briefly while using restroom.  Suspect, vasovagal syncope.  The patient have MRI done today.    Consultants/Specialty  Orthopedics  Limb preservation services    Code Status  full    Disposition  pending    Review of Systems  Review of Systems   Constitutional: Negative for chills, diaphoresis and fever.   HENT: Negative for ear discharge, ear pain and tinnitus.    Eyes: Negative for double vision, photophobia and pain.   Respiratory: Negative for cough, hemoptysis and sputum production.    Cardiovascular: Negative for chest pain, palpitations and orthopnea.   Gastrointestinal: Negative for heartburn, nausea and vomiting.   Genitourinary: Negative for dysuria, frequency and urgency.   Musculoskeletal: Negative for myalgias and neck pain.   Skin: Negative for itching and rash.   Neurological: Negative for dizziness, tingling and headaches.        Physical Exam  Temp:  [36.1 °C (97 °F)-36.8 °C (98.3 °F)] 36.8 °C (98.3 °F)  Pulse:  [] 108  Resp:  [14-20] 18  BP: ()/(36-77) 111/36    Physical Exam   Constitutional: She is oriented to person, place, and time. No distress.   HENT:   Head: Normocephalic and atraumatic.   Eyes: Pupils are equal, round, and reactive to light. Conjunctivae are normal.   Neck: Normal range of motion. Neck supple.   Cardiovascular: Normal rate and regular rhythm.    Pulmonary/Chest: Effort normal and breath sounds normal.   Abdominal:  Soft. Bowel sounds are normal.   Morbidly obese   Musculoskeletal:   Boot is on the right leg   Neurological: She is alert and oriented to person, place, and time.   Skin: Skin is warm and dry. She is not diaphoretic.       Fluids    Intake/Output Summary (Last 24 hours)  Last data filed at 01/03/19 1800   Gross per 24 hour   Intake              520 ml   Output                0 ml   Net              520 ml       Laboratory  Recent Labs      01/02/19   0015  01/03/19   0031  01/03/19   0235   WBC  10.4  9.8  8.8   RBC  3.35*  3.85*  3.44*   HEMOGLOBIN  10.3*  11.9*  10.8*   HEMATOCRIT  32.1*  36.5*  32.3*   MCV  95.8  94.8  93.9   MCH  30.7  30.9  31.4   MCHC  32.1*  32.6*  33.4*   RDW  45.0  44.5  43.7   PLATELETCT  300  389  358   MPV  9.2  9.0  9.1     Recent Labs      01/03/19   0031  01/03/19   0235  01/03/19   2322   SODIUM  141  140  141   POTASSIUM  3.9  3.5*  3.1*   CHLORIDE  104  105  106   CO2  30  25  27   GLUCOSE  206*  235*  268*   BUN  17  17  17   CREATININE  0.84  0.77  1.01   CALCIUM  9.4  9.0  9.2     Recent Labs      01/02/19   0015   01/03/19   0235  01/03/19   0719  01/03/19   1505  01/03/19   2322   APTT   --    < >  26.5  171.2*  140.6*  115.1*   INR  1.35*   --   1.46*   --    --    --     < > = values in this interval not displayed.         Recent Labs      01/03/19   0031   TRIGLYCERIDE  69   HDL  34*   LDL  33       Imaging      Assessment/Plan  * Diabetic ulcer of right midfoot associated with type 2 diabetes mellitus (HCC)   Assessment & Plan    Limb preservation services, infectious disease and otho consulted   Surgical debridement 12/30/2018   IV ceftriaxone, Flagyl and vancomycin  Pending final antibiotic plans, based on tissue cultures , Per ID likely 6 week IV antibiotic therapy, PICC line ordered      Coronary artery disease involving native coronary artery of native heart without angina pectoris   Assessment & Plan    Plavix re-started   Continue aspirin losartan pravastatin and  carvedilol    Had chest pain previously that resolved  trops are negative  Echocardiogram indicates:  Mildly reduced left ventricular systolic function.  Left ventricular ejection fraction is visually estimated to be 45%.  Indeterminate diastolic function.  Normal left atrial size.  Mild mitral regurgitation.  Given recurrent syncope issue.  Will consider stress test.     Type 2 diabetes mellitus with skin complication, with long-term current use of insulin ([facility])- (present on admission)   Assessment & Plan    With hyperglycemia  HgbA1C-  7%    S.s.insulin  accu checks are noted  lantus is increased  Accu-Checks, hypoglycemia protocol       Essential hypertension   Assessment & Plan    Carvedilol, Losartan  hctz  As needed hydralazine    Monitor blood pressure and adjust as need        Syncope   Assessment & Plan    Suspect vasovagal syncope as the patient's syncope occurred when she tried to bear down to use bathroom.  Echocardiogram result noted.     Mixed hyperlipidemia- (present on admission)   Assessment & Plan    Continue  pravastatin and niacin            VTE prophylaxis: heparin

## 2019-01-04 NOTE — PROGRESS NOTES
Diabetes education: Met with patient this afternoon. Please see consult note.  Plan: Pt has no further needs at this time. Pt may benefit in increasing insulin coverage or Lantus dose if blood sugars are not at goal tomorrow ( Lantus just increased from 27 units this am). Please call [phone] if needs change.

## 2019-01-04 NOTE — ASSESSMENT & PLAN NOTE
Cardiology recommended no further workup for now- likely neurocardiogenic  Echocardiogram result noted.  No event today.

## 2019-01-04 NOTE — DISCHARGE PLANNING
Anticipated Discharge Disposition: D/C to Home with HH and OP Infusion Services    Action: LSW informed by ID provider that pt will be getting a PICC placed and will do OP infusions through [facility]. Pt will need IV abx daily until 2/10. Given pt lives in [place], pt's PCP will need to write the infusion orders and submit these to [facility] for infusion set-up. ID provider indicated they will put their recommendations in their note for today.    LSW contacted Dr. Gould's office to notify of above. LSW informed that Dr. Gould is out of the office today, but he will return on Monday. LSW faxed a facesheet and ID note with recommendations to Dr. Gould's office at [phone].    The soonest pt will d/c is Monday, pending HH acceptance and infusion set-up. MD is placing PICC order today.    Barriers to Discharge: HH acceptance, Infusion acceptance.    Plan: LSW to f/u with pt's IV infusions and HH choice/acceptance on Monday.

## 2019-01-04 NOTE — PROGRESS NOTES
Report received by day shift RN. Pt sitting up in bed awake alert and oriented and neurologically intact. Pt updated to POC and verbalized understanding. Heparin GTT rate verified at 950 units/ hr. Bed locked in low position with fall precautions in place and call light in reach.

## 2019-01-04 NOTE — CONSULTS
Diabetes education: Met with pt this afternoon regarding diabetes management.   Pt has history of diabetes using insulin with vials and syringes. Pt states she was on N and Regular but has a severe low and was changed to Lantus with occasional regular coverage.  Discussed what effects blood sugars, goals for blood sugars, storage, shelf life of insulin and site rotation.  Pt was admitted with blood sugar of 137 and HgA1c of 7.0%.  Pt is currently on Lantus 32 units in AM and regular insulin sliding scale coverage ac and hs with blood sugars of 230 (4 units), 196 ( 3 units) and 261 ( 6 units). Pt may need more insulin to bring blood sugars to goal of less than 150.  Pt states she has her insulin and supplies at home. Questions answered.  Plan: Pt has no further needs at this time. Pt may benefit in increasing insulin coverage or Lantus dose if blood sugars are not at goal tomorrow ( Lantus just increased from 27 units this am). Please call [phone] if needs change.

## 2019-01-05 NOTE — PROCEDURES
Vascular Access Team     Date of Insertion: 1/5/19  Arm Circumference: 34  Internal length: 43  External Length: 3  Vein Occupancy %: 45  Reason for PICC: Antibiotic Therapy   Labs: on 1/4/19 WBC 9.8, , BUN 16, Cr 0.89, GFR >60, INR not collected     Prior to beginning procedure pt reported sharp, stabbing chest pain located in the center of her chest, non radiating. Pt's oxygen was under her chin, placed in her nose and Tenitra primary RN notified. Procedure started following x1 nitro tab administration and EKG completion. Pt reported chest pain had resolved. RUE noted to have large discoloration from wrist to elbow due to IV heparin infiltration per RN.     Consents confirmed, vessel patency confirmed with ultrasound. Risks and benefits of procedure explained to patient and education regarding central line associated bloodstream infections provided. Questions answered.     PICC placed in RUE per MD order with ultrasound guidance, initial arm circumference 34cm. 4 Fr, single lumen PICC placed in basilic vein after 1 attempt(s). 2 cc's of 1% lidocaine injected intradermally, 21 gauge microintroducer needle and modified Seldinger technique used. 46 cm catheter inserted with good blood return. Secured at 3 cm marker. Each lumen flushed without resistance with 10 mL 0.9% normal saline. PICC line secured with Biopatch and Tegaderm.     PICC placement is confirmed by nurse using 3CG technology. PICC line is appropriate for use at this time. Patient experienced some pain, resolved post procedure, without complications.  Patient condition relayed to unit RN or ordering physician via this post procedure note in the EMR.     Ultrasound images uploaded to PACS and viewable in the EMR - yes  Ultrasound imaged printed and placed in paper chart - no     [employer] Power PICC ref # [id number], Lot # [id number]

## 2019-01-05 NOTE — CARE PLAN
Problem: Safety  Goal: Will remain free from falls  Outcome: PROGRESSING AS EXPECTED  Bed in lowest position with call light within reach. Instructed patient to use call light for assistance,verbalized understanding.

## 2019-01-05 NOTE — PROGRESS NOTES
Patient c/o sharp, mid sternal chest pain 7/10. Stat EKG done and shown patient now in a-fib. One nitro sublingual given with relief. Notified Dr. Carlson. Dr. Chavez will be by to see patient later today.

## 2019-01-05 NOTE — PROGRESS NOTES
Hospital Medicine Daily Progress Note    Date of Service  1/3/2019    Chief Complaint  73 y.o. female admitted 12/29/2018 with Right foot wound with discharge    Hospital Course  73 y.o. female with past medical history of diabetes, coronary artery disease, hypertension and chronic back pain admitted 12/29/2018 with diabetic foot ulcer.  Limb preservation services and orthopedics were consulted. Plan for I & D 12/30/2018.       Interval Problem Update  1/3/19:  The patient resting comfortably in bed.  Per the patient's nurse, the patient passed out briefly while using restroom.  Suspect, vasovagal syncope.  The patient have MRI done today.  1/4/19:  The patient resting in bed.  She was taken to [facility] stress lab today for stress test.  The patient refused to have stress test done despite knowing that she can have serious coronary artery disease undetected by refusing stress test.  Discussed discharge planning with social work.  The patient prefer outpatient antibiotic infusion either in home or in a medical facility near home.  Antibiotic course modified by ID specialist.  1/5/19:  No syncope overnight.  However, the patient reports of chest pain again. Afib is noted  The patient is unaware that she atrial fibrillation.  Consult cardiologist to evaluate this patient.  PICCline placement this morning.    Consultants/Specialty  Orthopedics  Limb preservation services  ID specialist.    Code Status  full    Disposition  SNF versus home with home health.  Outpatient antibiotic needs to be written by her primary care provider in [place] per social work.    Review of Systems  Review of Systems   Constitutional: Negative for chills, diaphoresis and fever.   HENT: Negative for ear discharge, ear pain and tinnitus.    Eyes: Negative for double vision, photophobia and pain.   Respiratory: Negative for cough, hemoptysis and sputum production.    Cardiovascular: Negative for chest pain, palpitations and orthopnea.    Gastrointestinal: Negative for heartburn, nausea and vomiting.   Genitourinary: Negative for dysuria, frequency and urgency.   Musculoskeletal: Negative for myalgias and neck pain.   Skin: Negative for itching and rash.   Neurological: Negative for dizziness, tingling and headaches.        Physical Exam  Temp:  [36.3 °C (97.3 °F)-36.9 °C (98.4 °F)] 36.3 °C (97.3 °F)  Pulse:  [65-95] 95  Resp:  [16-18] 17  BP: (107-133)/(42-77) 114/77    Physical Exam   Constitutional: She is oriented to person, place, and time. She appears well-developed. No distress.   HENT:   Head: Normocephalic and atraumatic.   Mouth/Throat: Oropharynx is clear and moist.   Eyes: Pupils are equal, round, and reactive to light. Conjunctivae are normal. Right eye exhibits no discharge. Left eye exhibits no discharge.   Neck: Normal range of motion. Neck supple. No JVD present.   Cardiovascular: Exam reveals no gallop.    Irregularly irregular rhythm   Pulmonary/Chest: Effort normal and breath sounds normal. No respiratory distress.   Abdominal: Soft. Bowel sounds are normal. She exhibits no distension. There is no tenderness.   Morbidly obese   Musculoskeletal: She exhibits edema.   Boot is on the right leg   Neurological: She is alert and oriented to person, place, and time.   Follows commands appropriately.   Skin: Skin is warm and dry. She is not diaphoretic.   Large area of bruise in her right forearm. The patient states is from her previous iv line.   Psychiatric: She has a normal mood and affect.       Fluids    Intake/Output Summary (Last 24 hours)  Last data filed at 01/03/19 1800   Gross per 24 hour   Intake              520 ml   Output                0 ml   Net              520 ml       Laboratory  Recent Labs      01/03/19   0031  01/03/19   0235  01/04/19   0623   WBC  9.8  8.8  9.8   RBC  3.85*  3.44*  3.09*   HEMOGLOBIN  11.9*  10.8*  9.5*   HEMATOCRIT  36.5*  32.3*  29.0*   MCV  94.8  93.9  93.9   MCH  30.9  31.4  30.7   MCHC   32.6*  33.4*  32.8*   RDW  44.5  43.7  44.8   PLATELETCT  389  358  318   MPV  9.0  9.1  9.4     Recent Labs      01/03/19   0235  01/03/19   2322  01/04/19   0623   SODIUM  140  141  143   POTASSIUM  3.5*  3.1*  3.2*   CHLORIDE  105  106  109   CO2  25  27  24   GLUCOSE  235*  268*  232*   BUN  17  17  16   CREATININE  0.77  1.01  0.89   CALCIUM  9.0  9.2  8.7     Recent Labs      01/03/19   0235   01/04/19   0623  01/04/19   1158  01/04/19   1803   APTT  26.5   < >  65.0*  64.8*  71.1*   INR  1.46*   --    --    --    --     < > = values in this interval not displayed.         Recent Labs      01/03/19   0031   TRIGLYCERIDE  69   HDL  34*   LDL  33       Imaging      Assessment/Plan  * Diabetic ulcer of right midfoot associated with type 2 diabetes mellitus (HCC)   Assessment & Plan    Limb preservation services, infectious disease and otho consulted   Surgical debridement 12/30/2018   IV ceftriaxone, Flagyl and vancomycin  Per ID specialist,Daptomycin IV, 6 week therapy, PICC line placed.  Awaiting for outpatient antibiotic arrangement.     Coronary artery disease involving native coronary artery of native heart without angina pectoris   Assessment & Plan    Plavix re-started   Continue aspirin losartan pravastatin and carvedilol    Had chest pain previously that resolved  trops are negative  Echocardiogram indicates:  Mildly reduced left ventricular systolic function.  Left ventricular ejection fraction is visually estimated to be 45%.   Indeterminate diastolic function.  Normal left atrial size.  Mild mitral regurgitation.  Given recurrent syncope issue.  I recommended stress test.  However, the patient refused stress test. Of note the patient did have a stress test done in [place] April 2018 which is reportedly normal.  The patient states that she would rather follow up with her cardiologist, Dr. Horan, later this month.  In any case, the patient is well aware of the health risks of serious cardiac disease.    Her EF was 60% per last years stress test and intermittent chest pain which is controlled with nitroglycerin tablet.   Strongly recommend the patient to have definitive testing as soon as possible.     Type 2 diabetes mellitus with skin complication, with long-term current use of insulin (HCC)- (present on admission)   Assessment & Plan    With hyperglycemia  HgbA1C-  7%    S.s.insulin  accu checks are noted  lantus is increased  Accu-Checks, hypoglycemia protocol       Essential hypertension   Assessment & Plan    Carvedilol, Losartan  hctz  As needed hydralazine    Monitor blood pressure and adjust as need        Paroxysmal atrial fibrillation (HCC)   Assessment & Plan    Continue carvedilol for rate control  -- uncertain whether this is a new diagnosis for this patient but the patient is unaware of it.  Consult cardiologist to assists with the management of this patient.   -- CHADS VASC score is high for this patient.  Discussed anticoagulation strategy with this patient.   The patient prefers coumadin.       Syncope   Assessment & Plan    Suspect vasovagal syncope as the patient's syncope occurred when she tried to bear down to use bathroom.  Echocardiogram result noted.  No event today.     Mixed hyperlipidemia- (present on admission)   Assessment & Plan    Continue  pravastatin and niacin            VTE prophylaxis: heparin

## 2019-01-05 NOTE — PROGRESS NOTES
"Pharmacy Kinetics 73 y.o. female on vancomycin day # 8  2019    Currently on Vancomycin 1300 mg iv q24hr ()  Indication for Treatment: SSTI     Pertinent history per medical record: Admitted on 2018 for new diabetic foot ulceration w/ hx diabetes. Patient reports new ulceration w/ drainage. Pt reported course of Bactrim p/t to arrival w/ no improvement reported. Admitted for further work-up and treatment. ID consulted for suspicion of OM.     Other antibiotics: ampicillin/sulbactam 3 grams Q 6 hours     Allergies: Patient has no known allergies.      List concerns for renal function: Age, BMI>30      Pertinent cultures to date:   18:Rt Ft Rios,TISS:Enterococcus faecalis , Staphylococcus epidermidis   18:Rt Medial Cuneiform:Staphylococcus epidermidis (MRSE)    Recent Labs      19   0031  19   0235  19   0623   WBC  9.8  8.8  9.8   NEUTSPOLYS  68.00   --   64.80     Recent Labs      19   0031  19   0235  19   2322  19   0623   BUN  17  17  17  16   CREATININE  0.84  0.77  1.01  0.89   ALBUMIN   --   2.9*   --   2.8*     Recent Labs      19   1734   VANCOTROUGH  18.3     Intake/Output Summary (Last 24 hours) at 19 0808  Last data filed at 19 1743   Gross per 24 hour   Intake              400 ml   Output                0 ml   Net              400 ml      Blood pressure 119/49, pulse 74, temperature 36.7 °C (98.1 °F), temperature source Temporal, resp. rate 17, height 1.626 m (5' 4\"), weight 95.4 kg (210 lb 5.1 oz), SpO2 97 %, not currently breastfeeding. Temp (24hrs), Av.7 °C (98 °F), Min:36.4 °C (97.5 °F), Max:36.9 °C (98.4 °F)      A/P        1. Vancomycin dose change: No change  2. Next vancomycin level: 18@1930  3. Goal trough: 12-16 mcg/mL   4. Comments: No leukocytosis , afebrile. Cx: Rt Ft Rios,TISS:Enterococcus faecalis , Staphylococcus epidermidis (MRSE): Rt Medial Cuneiform:Staphylococcus epidermidis (MRSE). " Renal indices appear stable, follow up level ordered.   1. ID following : Anticipate 6 weeks IV abx from date of surgery.  Choice of antibiotic pending susceptibilities of the above organisms (anticipated stop date: 2/10/2019)     Gilbert Lira PharmD BCPS

## 2019-01-05 NOTE — THERAPY
"Physical Therapy Treatment completed.   Bed Mobility:  Supine to Sit: Supervised  Transfers: Sit to Stand: Stand by Assist  Gait: Level Of Assist: Stand by Assist with Front-Wheel Walker       Plan of Care: Will benefit from Physical Therapy 4 times per week  Discharge Recommendations: Equipment: Will Continue to Assess for Equipment Needs. Post-acute therapy Recommend outpatient or home health transitional care services for continued physical therapy services.      See \"Rehab Therapy-Acute\" Patient Summary Report for complete documentation.       "

## 2019-01-05 NOTE — PROGRESS NOTES
Bedside report received. Pt is resting in bed.  Patient A&O x 4 currently requiring 3L via NC which is baseline.  No complaints of pain or SOB at this time.  POC discussed with patient.  Patient verbalized understanding.  Call light and belongings with in reach.  Bed locked and in lowest position, alarm and fall precautions in place.

## 2019-01-05 NOTE — CONSULTS
Cardiology Consult Note    Date of note:    1/5/2019      Patient ID:    Name:             Leonie Brock   YOB: 1945  Age:                 73 y.o.  female   MRN:               [id number]                                                             Consulting Physician: Felice Carlson MD    Consult question: Evaluation of apparent new systolic dysfunction and syncope    History of Present Illness:      Leonie Brock is a 73-year-old woman with a history of type 2 diabetes, coronary artery disease status post previous stent implantation over 1 year ago with the exact details unknown, PAD medically managed with Plavix, oxygen dependent COPD who was admitted about a week ago with an infected diabetic foot ulcer.  She was found to have new onset atrial fibrillation, and apparently syncopized at which time she was evaluated by neurology for a stroke.  She did not have a stroke.    The patient had been offered myocardial perfusion imaging to further evaluate her new mild left ventricular systolic dysfunction and declined the same.    In speaking with the patient, she does have midsternal pressure-like chest discomfort with rapid atrial fibrillation that she mostly discounts as anxiety.  She tells me that she feels that it is different in character than chest pain that she had previous to coronary stenting though that is not entirely clear based on her history as they sound fairly similar.  She did have palpitations with her atrial fibrillation.  She denies any other cardiovascular complaints.    Review of Systems: Diabetic foot ulcer.  Otherwise, except as above, all others reviewed and negative.    Past Medical History:   Past Medical History:   Diagnosis Date   • Arthritis     all over   • Backpain    • CATARACT     removed   • Diabetes     1990   • Myocardial infarct (HCC)     1998     Active Hospital Problems    Diagnosis   • Coronary artery disease involving native coronary artery of native  heart without angina pectoris [I25.10]     Priority: High   • Diabetic ulcer of right midfoot associated with type 2 diabetes mellitus (HCC) [E11.621, L97.419]     Priority: High   • Type 2 diabetes mellitus with skin complication, with long-term current use of insulin (HCC) [E11.628, Z79.4]     Priority: High   • Essential hypertension [I10]     Priority: Medium   • Mixed hyperlipidemia [E78.2]     Priority: Low   • Syncope [R55]       Past Surgical History:  Past Surgical History:   Procedure Laterality Date   • IRRIGATION & DEBRIDEMENT ORTHO Right 12/30/2018    Procedure: IRRIGATION & DEBRIDEMENT, gastroc recession, ostectomy;  Surgeon: Tomi Kwon M.D.;  Location: SURGERY [facility] [place];  Service: Orthopedics   • GYN SURGERY      [facility]       Hospital Medications:    Current Facility-Administered Medications:   •  vancomycin 1,300 mg in  mL IVPB, 13 mg/kg, Intravenous, Q24HR, Felice Carlson D.O., Stopped at 01/04/19 2209  •  potassium chloride SA (Kdur) tablet 20 mEq, 20 mEq, Oral, DAILY, Felice Carlson D.O., 20 mEq at 01/05/19 0640  •  nitroglycerin (NITROSTAT) tablet 0.4 mg, 0.4 mg, Sublingual, Q5 MIN PRN, Pop Varela M.D., 0.4 mg at 01/05/19 1157  •  insulin glargine (LANTUS) injection 32 Units, 32 Units, Subcutaneous, QAM, Brandy Ames M.D., 32 Units at 01/05/19 0646  •  heparin injection 3,200 Units, 3,200 Units, Intravenous, PRN, 3,200 Units at 01/03/19 0117 **AND** heparin infusion 25,000 units in 500 ml 0.45% nacl, , Intravenous, Continuous, Last Rate: 16 mL/hr at 01/05/19 0658, 800 Units/hr at 01/05/19 0658 **AND** Protocol 440 Heparin Weight Based DO NOT GIVE ANY HEPARIN BOLUS TO STROKE PATIENT, , , CONTINUOUS **AND** Protocol 440 Heparin Weight Based Discontinue Enoxaparin (Lovenox), Dabigatran (Pradaxa), Rivaroxaban (Xarelto), Apixaban (Eliquis), Edoxaban (Savaysa, Lixiana), Fondaparinux (Arixtra) and Argatroban prior to heparin administration, , , Once **AND** Protocol  440 Heparin Weight Based Draw baseline aPTT, PT, and platelet count if not already done, , , CONTINUOUS **AND** Protocol 440 Heparin Weight Based Draw aPTT 6 hours after beginning infusion. , , , CONTINUOUS **AND** Protocol 440 Heparin Weight Based Record Patient Data, , , CONTINUOUS **AND** Protocol 440 Heparin Weight Based INSTRUCTIONS, , , CONTINUOUS **AND** Protocol 440 Heparin Weight Based Review aPTT results 6 hours after infusion is begun as detailed, , , CONTINUOUS **AND** Protocol 440 Heparin Weight Based Draw Platelet count every three days. Contact MD if platelet is 50% lower than baseline count., , , CONTINUOUS **AND** Protocol 440 Heparin Weight Based Adjust heparin to maintain aPTT between 55-96 sec, , , CONTINUOUS **AND** Protocol 440 Heparin Weight Based Order aPTT 6 hours after any rate change or hold until aPTT is therapeutic (55-96 seconds), , , CONTINUOUS **AND** Protocol 440 Heparin Weight Based Documentation and verification, , , CONTINUOUS, Brandy Ames M.D.  •  clopidogrel (PLAVIX) tablet 75 mg, 75 mg, Oral, Q EVENING, Blanquita Maharaj, A.P.R.N., 75 mg at 01/04/19 1657  •  insulin regular (HUMULIN R) injection 2-9 Units, 2-9 Units, Subcutaneous, 4X/DAY ACHS, 6 Units at 01/05/19 1141 **AND** Accu-Chek ACHS, , , Q AC AND BEDTIME(S) **AND** NOTIFY MD and PharmD, , , Once **AND** glucose 4 g chewable tablet 16 g, 16 g, Oral, Q15 MIN PRN **AND** dextrose 50% (D50W) injection 25 mL, 25 mL, Intravenous, Q15 MIN PRN, Arely Mccann M.D.  •  senna-docusate (PERICOLACE or SENOKOT S) 8.6-50 MG per tablet 2 Tab, 2 Tab, Oral, BID, 2 Tab at 01/04/19 1657 **AND** polyethylene glycol/lytes (MIRALAX) PACKET 1 Packet, 1 Packet, Oral, QDAY PRN **AND** magnesium hydroxide (MILK OF MAGNESIA) suspension 30 mL, 30 mL, Oral, QDAY PRN **AND** bisacodyl (DULCOLAX) suppository 10 mg, 10 mg, Rectal, QDAY PRN, Alden Gorman M.D.  •  acetaminophen (TYLENOL) tablet 650 mg, 650 mg, Oral, Q6HRS PRN, Alden  ELSI Gorman M.D.  •  Notify provider if pain remains uncontrolled, , , CONTINUOUS **AND** Use the numeric rating scale (NRS-11) on regular floors and Critical-Care Pain Observation Tool (CPOT) on ICUs/Trauma to assess pain, , , CONTINUOUS **AND** Pulse Ox (Oximetry), , , CONTINUOUS **AND** Pharmacy Consult Request ...Pain Management Review, , Other, PRN **AND** If patient difficult to arouse and/or has respiratory depression, stop any opiates that are currently infusing and call a Rapid Response., , , CONTINUOUS **AND** oxyCODONE immediate-release (ROXICODONE) tablet 2.5 mg, 2.5 mg, Oral, Q3HRS PRN **AND** oxyCODONE immediate-release (ROXICODONE) tablet 5 mg, 5 mg, Oral, Q3HRS PRN, 5 mg at 01/04/19 2128 **AND** HYDROmorphone pf (DILAUDID) injection 0.25 mg, 0.25 mg, Intravenous, Q3HRS PRN, Alden Gorman M.D.  •  MD Alert...Vancomycin per Pharmacy, 1 Each, Other, pharmacy to dose **AND** [DISCONTINUED] cefTRIAXone (ROCEPHIN) 2 g in  mL IVPB, 2 g, Intravenous, DAILY, Stopped at 01/02/19 0509 **AND** [DISCONTINUED] metroNIDAZOLE (FLAGYL) tablet 500 mg, 500 mg, Oral, Q8HRS, Alden Gorman M.D., 500 mg at 01/02/19 0437  •  hydrALAZINE (APRESOLINE) injection 10 mg, 10 mg, Intravenous, Q4HRS PRN, Alden Gorman M.D.  •  ondansetron (ZOFRAN) syringe/vial injection 4 mg, 4 mg, Intravenous, Q4HRS PRN, Alden Gorman M.D.  •  ondansetron (ZOFRAN ODT) dispertab 4 mg, 4 mg, Oral, Q4HRS PRN, Alden Gorman M.D.  •  carvedilol (COREG) tablet 12.5 mg, 12.5 mg, Oral, DAILY, Alden Gorman M.D., 12.5 mg at 01/05/19 0641  •  niacin SR (NIASPAN) tablet 1,000 mg, 1,000 mg, Oral, QAM, Alden Gorman M.D., 1,000 mg at 01/05/19 0640  •  oxybutynin (DITROPAN) tablet 5 mg, 5 mg, Oral, DAILY, Alden Gorman M.D., 5 mg at 01/05/19 0640  •  pravastatin (PRAVACHOL) tablet 20 mg, 20 mg, Oral, Nightly, Alden Gorman M.D., 20 mg at 01/04/19 2009  •  therapeutic multivitamin-minerals  (THERAGRAN-M) tablet 1 Tab, 1 Tab, Oral, DAILY, Alden Gorman M.D., 1 Tab at 01/05/19 0640  •  tizanidine (ZANAFLEX) tablet 4-8 mg, 4-8 mg, Oral, QPM PRN, Alden Gorman M.D.  •  losartan (COZAAR) tablet 50 mg, 50 mg, Oral, Q DAY, 50 mg at 01/05/19 0640 **AND** hydroCHLOROthiazide (HYDRODIURIL) tablet 12.5 mg, 12.5 mg, Oral, Q DAY, Alden Gorman M.D., 12.5 mg at 01/05/19 0640    Current Outpatient Medications:  Prescriptions Prior to Admission   Medication Sig Dispense Refill Last Dose   • losartan-hydrochlorothiazide (HYZAAR) 50-12.5 MG per tablet Take 1 Tab by mouth every day.   12/28/2018 at AM   • Calcium Carb-Cholecalciferol (CALCIUM 1000 + D PO) Take 1 Dose by mouth every day.   12/29/2018 at AM   • pravastatin (PRAVACHOL) 20 MG Tab Take 20 mg by mouth every evening.   12/28/2018 at PM   • insulin glargine (LANTUS) 100 UNIT/ML Solution Inject 20 Units as instructed every morning.   12/29/2018 at AM   • therapeutic multivitamin-minerals (THERAGRAN-M) Tab Take 1 Tab by mouth every day.   12/29/2018 at AM   • CRANBERRY PO Take 1 Dose by mouth every day. Unknown OTC Strength   12/29/2018 at AM   • carvedilol (COREG) 25 MG Tab Take 12.5 mg by mouth every day.   12/28/2018 at AM   • tolterodine (DETROL) 2 MG Tab Take 4 mg by mouth every day.   12/29/2018 at AM   • niacin (SLO-NIACIN) 500 MG tablet Take 1,000 mg by mouth every morning.   12/29/2018 at AM   • oxybutynin (DITROPAN) 5 MG Tab Take 5 mg by mouth every day.   12/29/2018 at AM   • sulfamethoxazole-trimethoprim (BACTRIM DS) 800-160 MG tablet Take 1 Tab by mouth every 12 hours. 10-day course Starting 12/21/18 Due to End 12/30/18 12/29/2018 at AM   • clopidogrel (PLAVIX) 75 MG TABS Take 75 mg by mouth every evening.   12/28/2018 at PM   • aspirin 81 MG tablet Take 81 mg by mouth every day. CONTINUE AS PRIOR TO ADMIT    12/28/2018 at AM   • metformin (GLUCOPHAGE) 1000 MG tablet Take 1,000 mg by mouth 2 times a day, with meals.   12/29/2018  "at AM   • celecoxib (CELEBREX) 200 MG CAPS Take 200 mg by mouth 1 time daily as needed for Mild Pain.   UNK at PRN   • tizanidine (ZANAFLEX) 4 MG Tab Take 4-8 mg by mouth every evening as needed (Pain).   12/28/2018 at PM       Medication Allergy:  No Known Allergies    Family History:  History reviewed. No pertinent family history.    Social History:  Social History     Social History   • Marital status:      Spouse name: N/A   • Number of children: N/A   • Years of education: N/A     Occupational History   • Not on file.     Social History Main Topics   • Smoking status: Former Smoker   • Smokeless tobacco: Never Used   • Alcohol use No   • Drug use: No   • Sexual activity: Not on file     Other Topics Concern   • Not on file     Social History Narrative   • No narrative on file         Physical Exam:   Vitals/   Weight/BMI: Body mass index is 36.1 kg/m².  Blood pressure 114/77, pulse 95, temperature 36.3 °C (97.3 °F), temperature source Temporal, resp. rate 17, height 1.626 m (5' 4\"), weight 95.4 kg (210 lb 5.1 oz), SpO2 97 %, not currently breastfeeding.  Vitals:    01/04/19 2030 01/05/19 0154 01/05/19 0420 01/05/19 0810   BP: 127/42 109/56 119/49 114/77   Pulse: 75 80 74 95   Resp: 18 16 17 17   Temp: 36.7 °C (98 °F) 36.8 °C (98.2 °F) 36.7 °C (98.1 °F) 36.3 °C (97.3 °F)   TempSrc: Temporal Temporal Temporal Temporal   SpO2: 99% 98% 97% 97%   Weight: 95.4 kg (210 lb 5.1 oz)      Height:         Oxygen Therapy:  Pulse Oximetry: 97 %, O2 (LPM): 4, O2 Delivery: Silicone Nasal Cannula    Physical Exam   Constitutional: She is oriented to person, place, and time. She appears well-developed and well-nourished. No distress.   Pleasant, centrally obese, elderly woman in by her  in no distress   HENT:   Head: Normocephalic and atraumatic.   Eyes: Pupils are equal, round, and reactive to light. Conjunctivae and EOM are normal.   Neck: No JVD present.   Cardiovascular: Normal rate and normal heart sounds.  An " irregularly irregular rhythm present. Exam reveals no gallop and no friction rub.    No murmur heard.  Pulmonary/Chest: Effort normal and breath sounds normal. No respiratory distress. She has no wheezes. She has no rales.   Abdominal: Soft. Bowel sounds are normal. She exhibits no distension.   Musculoskeletal: She exhibits edema (1+ left lower extremity edema, right lower extremity not well able to be examined given that she is in a boot).   Neurological: She is alert and oriented to person, place, and time.   Skin: Skin is warm and dry. No rash noted. She is not diaphoretic. No erythema. No pallor.   Psychiatric: She has a normal mood and affect. Her behavior is normal. Judgment and thought content normal.   Nursing note and vitals reviewed.      MDM (Data Review):     Records reviewed and summarized in current documentation    Lab Data Review:  Recent Results (from the past 24 hour(s))   ACCU-CHEK GLUCOSE    Collection Time: 01/04/19  4:57 PM   Result Value Ref Range    Glucose - Accu-Ck 215 (H) 65 - 99 mg/dL   APTT    Collection Time: 01/04/19  6:03 PM   Result Value Ref Range    APTT 71.1 (H) 24.7 - 36.0 sec   ACCU-CHEK GLUCOSE    Collection Time: 01/04/19  8:13 PM   Result Value Ref Range    Glucose - Accu-Ck 232 (H) 65 - 99 mg/dL   CREATINE KINASE    Collection Time: 01/05/19  2:25 AM   Result Value Ref Range    CPK Total 22 0 - 154 U/L   CBC WITH DIFFERENTIAL    Collection Time: 01/05/19  2:25 AM   Result Value Ref Range    WBC 8.1 4.8 - 10.8 K/uL    RBC 2.91 (L) 4.20 - 5.40 M/uL    Hemoglobin 8.9 (L) 12.0 - 16.0 g/dL    Hematocrit 27.8 (L) 37.0 - 47.0 %    MCV 95.5 81.4 - 97.8 fL    MCH 30.6 27.0 - 33.0 pg    MCHC 32.0 (L) 33.6 - 35.0 g/dL    RDW 46.5 35.9 - 50.0 fL    Platelet Count 318 164 - 446 K/uL    MPV 9.7 9.0 - 12.9 fL    Neutrophils-Polys 59.90 44.00 - 72.00 %    Lymphocytes 21.70 (L) 22.00 - 41.00 %    Monocytes 12.20 0.00 - 13.40 %    Eosinophils 4.60 0.00 - 6.90 %    Basophils 1.00 0.00 - 1.80  %    Immature Granulocytes 0.60 0.00 - 0.90 %    Nucleated RBC 0.00 /100 WBC    Neutrophils (Absolute) 4.86 2.00 - 7.15 K/uL    Lymphs (Absolute) 1.76 1.00 - 4.80 K/uL    Monos (Absolute) 0.99 (H) 0.00 - 0.85 K/uL    Eos (Absolute) 0.37 0.00 - 0.51 K/uL    Baso (Absolute) 0.08 0.00 - 0.12 K/uL    Immature Granulocytes (abs) 0.05 0.00 - 0.11 K/uL    NRBC (Absolute) 0.00 K/uL   MAGNESIUM    Collection Time: 19  2:25 AM   Result Value Ref Range    Magnesium 1.6 1.5 - 2.5 mg/dL   Renal Function Panel    Collection Time: 19  2:25 AM   Result Value Ref Range    Sodium 140 135 - 145 mmol/L    Potassium 3.4 (L) 3.6 - 5.5 mmol/L    Chloride 107 96 - 112 mmol/L    Co2 26 20 - 33 mmol/L    Glucose 274 (H) 65 - 99 mg/dL    Creatinine 0.96 0.50 - 1.40 mg/dL    Bun 13 8 - 22 mg/dL    Calcium 9.1 8.5 - 10.5 mg/dL    Phosphorus 2.4 (L) 2.5 - 4.5 mg/dL    Albumin 2.6 (L) 3.2 - 4.9 g/dL   ESTIMATED GFR    Collection Time: 19  2:25 AM   Result Value Ref Range    GFR If African American >60 >60 mL/min/1.73 m 2    GFR If Non  57 (A) >60 mL/min/1.73 m 2   ACCU-CHEK GLUCOSE    Collection Time: 19  6:46 AM   Result Value Ref Range    Glucose - Accu-Ck 240 (H) 65 - 99 mg/dL   ACCU-CHEK GLUCOSE    Collection Time: 19 11:40 AM   Result Value Ref Range    Glucose - Accu-Ck 281 (H) 65 - 99 mg/dL   EKG    Collection Time: 19 11:56 AM   Result Value Ref Range    Report       [facility]    Test Date:  2019  Pt Name:    DAVID KEYS              Department: 183  MRN:        [id number]                      Room:       T804  Gender:     Female                       Technician: SHYANNE  :        1945                   Requested By:SHANNAN MAURICE  Order #:    [id number]                    Reading MD: Natividad Onofre    Measurements  Intervals                                Axis  Rate:       77                           P:  DC:                                      QRS:        7  QRSD:      "  116                          T:          163  QT:         380  QTc:        431    Interpretive Statements  Probably sinus or ectopic atrial rhythm  NONSPECIFIC INTRAVENTRICULAR CONDUCTION DELAY  LOW VOLTAGE THROUGHOUT  BORDERLINE ST DEPRESSION, ANTERIOR LEADS, consider ischemia    Electronically Signed On 1-5-2019 12:06:58 PST by Natividad Onofre         Imaging/Procedures Review (all reviewed and pertinent for):      Myocardial perfusion imaging, 6/5/2018 (done at [facility]): Showed normal left ventricular ejection fraction of 60%, read as normal wall motion, there is a fixed, large inferior lateral defect seen.  Images are not available, and a large area of infarction with normal wall motion is a bit curious.    EKG (1/5/2019, tracings and report reviewed, my interpretation): Shows sinus versus ectopic atrial rhythm with ventricular rate around 75 bpm, incomplete left bundle branch block.  Previously on 1/3/2019 here he showed a slightly different intraventricular conduction delay, and atrial fibrillation    Echocardiogram, 1/2/2019:  \"CONCLUSIONS  No prior study is available for comparison.   Mildly reduced left ventricular systolic function.  Left ventricular ejection fraction is visually estimated to be 45%.  Indeterminate diastolic function.  Normal left atrial size.  Mild mitral regurgitation.  Structurally normal aortic valve without significant stenosis or regurgitation.  Structurally normal tricuspid valve without significant stenosis or   regurgitation\"      Impression and Recommendations:       1.  Anginal chest pain: I do not see evidence at this point of acute coronary syndrome though she does have what I think is exertional angina in conjunction with rapid atrial fibrillation.  She should continue to use sublingual nitroglycerin as needed, and I did add Imdur to her medical regimen at 30 mg p.o. daily in addition to Coreg for treatment of her angina.  I discussed with her that should she not have " episodes relieved by sublingual nitroglycerin x3 every 5 minutes she should go to the emergency room for urgent angiography.  Otherwise, she can discuss angiography with her cardiologist at [place].    2.  Mild systolic dysfunction: She may well have progression of her coronary disease though this also could be related to her rapid atrial fibrillation less likely.  Again, she will pursue ischemic evaluation as an outpatient and is on a beta-blocker, and ACE inhibitor.  I do not think she has additional blood pressure room at this time to add an aldosterone antagonist.    3.  Newly diagnosed atrial fibrillation: I discussed with her multiple risk factors for stroke and agree with the use of an oral anticoagulant.  I discussed with her the use of Coumadin versus a direct oral anticoagulant, and that there is some evidence of slightly lower bleeding risk on for example Xarelto and Plavix versus Coumadin and Plavix.  I discussed with her expense, vitamin K intake, and lab testing.  Her decision hinge point focuses around expense related to which I think Coumadin is an excellent choice.  Rate control strategy with Coreg is reasonable though if she continues to be symptomatic with episodes of atrial fibrillation I would recommend initiating an amiodarone oral load with 400 mg p.o. twice daily for 2 weeks, then 200 mg p.o. daily after that.    4.  Coronary artery disease: I do think she would be better off with a high-dose statin instead of pravastatin and niacin though I will defer ultimately that choice again to her primary cardiologist.  Continue ACE inhibitor, beta-blocker, and Plavix.  Would not initiate aspirin given the increased risk of bleeding with dual antiplatelet therapy and Coumadin.    5.  Syncope: The sounds to have been neurocardiogenic in etiology.  I was initially concerned about conversion pauses though she has been back and forth in and out of atrial fibrillation without conversion pauses on  telemetry.  No further workup is necessary at this time.    I have no additional recommendations, and I will sign off at this time.    Wil Chavez MD  Cardiologist, [facility] [place]     cc: Rah Rios MD

## 2019-01-06 PROBLEM — I48.0 PAROXYSMAL ATRIAL FIBRILLATION (HCC): Status: ACTIVE | Noted: 2019-01-01

## 2019-01-06 NOTE — PROGRESS NOTES
LIMB PRESERVATION SERVICE    HPI:      Leonie Brock is a 73 y.o. female, with a past medical history that includes type 2 diabetes and a MI in 1998 with stint placement. She was admitted 12/29/2018 for Diabetic foot ulcer (HCC).   LPS has been consulted for a right plantar foot wound.  This wound is a chronic ulcer that is being treated by her podiatrist and was healed since November of 2018. It has since reopened and increased in pain and drainage and she went to her local ER the week of 12/23/18 and was placed on Bactrim. She was referred to [facility]. She did not come to [facility] until 12/29/18. Pt has been treating the wound with offloading and wound care.   The wound has failed to improve.   IV antibiotics were started on this admission.  Infectious diseases has been consulted.    Xray has been done  and does show degenerative changes and neuropathic foot and the previous fracture with no obvious osteomyelitis  MRI has not been done  Ortho Dr Kwon has been consulted  Pt was diagnosed with type 2 diabetes 35 years ago, and is currently managing with insulin.  Pt checks their blood sugars 3 times daily and reports that these typically run around 130s to 200s.  They have had previous diabetes education and self reports their a1c as 6.4.  They do have numbness in their feet.  They usually wears diabetic shoes and diabetic boot to right foot. They do check their feet routinely. They have not had previous foot ulcers or foot surgery.  They are retired.      ASSESSMENT: Procedure(s):  IRRIGATION & DEBRIDEMENT, gastroc recession, ostectomy - Wound Class: Dirty or Infected with Dr. Kwon on    12/31/2018  1.  Right gastrocsoleus recession.  2.  Right irrigation and debridement of foot, multiple compartments.  3.  Right ostectomy with partial excision of the medial cuneiform and first   Metatarsal.    Patient denies fevers, chills, nausea, vomiting.  Pain well controlled.   /64   Pulse 67   Temp 36.1 °C (97  "°F) (Temporal)   Resp 16   Ht 1.626 m (5' 4\")   Wt 98.7 kg (217 lb 9.5 oz)   SpO2 99%   Breastfeeding? No   BMI 37.35 kg/m²       Wound Characteristics           Location: Right Plantar Foot/Medial incision Site Initial Evaluation  Date:1/6/2019   Tissue Type and %: 100% Red   Periwound: Erythema, Callus, Edematous, Maceration   Drainage: Minimal Serosanginous   Exposed structures Sutures   Wound Edges:   Attached 70% Approximated Midline Area of Dehiscence   Odor: None   S&S of Infection:   Edema/Erythema   Edema: Localized at Site   Sensation: Insensate               DIABETES MANAGEMENT:  Lab Results   Component Value Date/Time    GLUCOSE 181 (H) 01/06/2019 08:17 AM      Lab Results   Component Value Date/Time    HBA1C 7.0 (H) 12/30/2018 04:19 AM      Diabetes education     INFECTION MANAGEMENT:  WBC   Date/Time Value Ref Range Status   01/06/2019 08:17 AM 7.1 4.8 - 10.8 K/uL Final       Microbiology:   Results     Procedure Component Value Units Date/Time    CULTURE TISSUE W/ GRM STAIN [[id number]]  (Abnormal)  (Susceptibility) Collected:  12/30/18 9609    Order Status:  Completed Specimen:  Tissue Updated:  01/04/19 1203     Significant Indicator POS (POS)     Source TISS     Site RightFootPlantarSoftTissue     Tissue Culture Growth noted after further incubation, see below for  organism identification.   (A)     Gram Stain Result Few WBCs.  No organisms seen.       Tissue Culture Enterococcus faecalis  Isolated from enrichment broth only, please correlate with  clinical condition.  Combination therapy with ampicillin, penicillin, or  vancomycin (for susceptible strains) plus an aminoglycoside  is usually indicated for serious enterococcal infections,  such as endocarditis unless high-level resistance to both  gentamicin and streptomycin is documented; such combinations  are predicted to result in synergistic killing of the  Enterococcus.   (A)      Staphylococcus epidermidis  Isolated from enrichment broth " only, please correlate with  clinical condition.   (A)        PLAN:  1. Wound:  I&D right foot, right foot ostectomy, and right gastrocsoleus recession  Right midfoot Charcot arthropathy                Offloading:  Offloading boot  Continous POD#6 weeks POD Exp 2/11/19  Ortho Techs involved:pt to get orthotics/prosthetics  as OP  involved, pt to wear shoes that do not rub on Wound sites               Dressing Orders updated    2. Infection: ID involved     3. Diabetes               Diabetes: A1C is 7.0% Pt educated on keeping BS less than 150 to control infection and promote wound healing                            4. Antibiotics: Per ID recommendation IV Daptomycin 8 mg/kg daily through 2/10/19     5. Consults: ID Dr Nicole, Ortho/LPS Dr Kwon        DISCHARGE PLAN:     Disposition: Patient requires skilled therapeutic intervention for debridement, product selection and application, education, wound bed preparation and assessment. OK to discharge from Ortho/LPS standpoint    Follow-up: LPS ROUNDS 1/18/19, OP Wound Care Ida Coos     Other:      Bryan Soliman R.N.

## 2019-01-06 NOTE — PROGRESS NOTES
Inpatient Anticoagulation Service Note    Date: 1/6/2019  Reason for Anticoagulation: Atrial Fibrillation   [id number] VASc Score: 6    Hemoglobin Value: (!) 8.6  Hematocrit Value: (!) 26.1  Lab Platelet Value: 275  Target INR: 2.0 to 3.0    INR from last 7 days     Date/Time INR Value    01/06/19 0817 (!)  1.14    01/05/19 1331 (!)  1.16    01/03/19 0235 (!)  1.46    01/02/19 0015 (!)  1.35        Dose from last 7 days     Date/Time Dose (mg)    01/06/19 0900  5        Significant Interactions: Plavix  Bridge Therapy: No   Reversal Agent Administered: Not Applicable    Comments:  · PMHx: PAD,CAD,DM,HTN,HLD  · INR below goal. Warfarin day 1.  · H/H low , little change over interval. No documented s/sx of bleeding.    Plan:  Warfarin 5mg po daliy. INR in AM.  Education Material Provided?: No  Pharmacist suggested discharge dosing: Warfarin 5mg po daily , follow up with anticoagulation clinic 48-72 hr after discharge.      Gilbert Lira PharmD BCPS

## 2019-01-06 NOTE — DISCHARGE PLANNING
Anticipated Discharge Disposition: Home, OP infusion     Action: LSW spoke with patient and  at bedside. Patient lives with  in [facility]. Patient uses a scooter, walker, and cane. Patient is in 24/7 02, owns portable and home concentrator. Patient uses [facility] in [place] for Rx.   LSW spoke with patient about HHC and needs for wound care. Patient signed choice for [facility].     LSW faxed choice to [facility]    Barriers to Discharge: HHC acceptance, infusion set up    Plan: LSW to f/u with [facility]/IV infusion set up      Care Transition Team Assessment    Information Source  Orientation : Oriented x 4  Information Given By: Patient  Informant's Name:  (Leonie Brock)  Who is responsible for making decisions for patient? : Patient    Readmission Evaluation  Is this a readmission?: No    Elopement Risk  Legal Hold: No  Ambulatory or Self Mobile in Wheelchair: Yes  Disoriented: No  Psychiatric Symptoms: None  History of Wandering: No  Elopement this Admit: No  Vocalizing Wanting to Leave: No  Displays Behaviors, Body Language Wanting to Leave: No-Not at Risk for Elopement  Elopement Risk: Not at Risk for Elopement    Interdisciplinary Discharge Planning  Lives with - Patient's Self Care Capacity: Spouse  Patient or legal guardian wants to designate a caregiver (see row info): No  Housing / Facility: [address]  Prior Services: None    Discharge Preparedness  What is your plan after discharge?: Home with help, Other (comment) (Outpatient infusions)  What are your discharge supports?: Spouse  Prior Functional Level: Needs Assist with Activities of Daily Living    Functional Assesment  Prior Functional Level: Needs Assist with Activities of Daily Living    Finances  Financial Barriers to Discharge: No  Prescription Coverage: Yes    Vision / Hearing Impairment  Vision Impairment : Yes  Right Eye Vision: Impaired, Wears Glasses  Left Eye Vision: Impaired, Wears Glasses  Hearing Impairment :  No    Values / Beliefs / Concerns  Values / Beliefs Concerns : No    Advance Directive  Advance Directive?: None  Advance Directive offered?: AD Booklet given    Domestic Abuse  Have you ever been the victim of abuse or violence?: No  Physical Abuse or Sexual Abuse: No  Verbal Abuse or Emotional Abuse: No  Possible Abuse Reported to:: Not Applicable    Psychological Assessment  History of Substance Abuse: None  History of Psychiatric Problems: No  Non-compliant with Treatment: No  Newly Diagnosed Illness: Yes    Discharge Risks or Barriers  Discharge risks or barriers?: Complex medical needs  Patient risk factors: Complex medical needs    Anticipated Discharge Information  Anticipated discharge disposition: Home  Discharge Address:  ([phone] [facility] [address])  Discharge Contact Phone Number:  ([phone])

## 2019-01-06 NOTE — PROGRESS NOTES
Bedside report received. Pt is resting in bed.  Patient A&O x 4 currently requiring 3L via NC.  No complaints of pain or SOB at this time.  POC discussed with patient.  Patient verbalized understanding.  Call light and belongings with in reach.  Bed locked and in lowest position, alarm and fall precautions in place.

## 2019-01-06 NOTE — DISCHARGE PLANNING
Received Choice form at [phone]  Agency/Facility Name: [facility]  Referral sent per Choice form @ [phone]

## 2019-01-06 NOTE — PROGRESS NOTES
Hospital Medicine Daily Progress Note    Date of Service  1/3/2019    Chief Complaint  73 y.o. female admitted 12/29/2018 with Right foot wound with discharge    Hospital Course  73 y.o. female with past medical history of diabetes, coronary artery disease, hypertension and chronic back pain admitted 12/29/2018 with diabetic foot ulcer.  Limb preservation services and orthopedics were consulted. Plan for I & D 12/30/2018.       Interval Problem Update  1/3/19:  The patient resting comfortably in bed.  Per the patient's nurse, the patient passed out briefly while using restroom.  Suspect, vasovagal syncope.  The patient have MRI done today.  1/4/19:  The patient resting in bed.  She was taken to [facility] stress lab today for stress test.  The patient refused to have stress test done despite knowing that she can have serious coronary artery disease undetected by refusing stress test.  Discussed discharge planning with social work.  The patient prefer outpatient antibiotic infusion either in home or in a medical facility near home.  Antibiotic course modified by ID specialist. Last date of iv abx 2/10/19.  Will switch to IV Daptomycin last dose prior to discharge.   1/5/19:  No syncope overnight.  However, the patient reports of chest pain again. Afib is noted  The patient is unaware that she atrial fibrillation.  Consult cardiologist to evaluate this patient.  PICCline placement this morning.  1/6/19:  Cardiology rec reviewed. Now on Coumadin.  No further work up needed for syncopal episode.  Imdur added to help control anginal symptoms as cardiologist did not find active coronary artery disease.  PLANNED DISCHARGE TOMORROW waiting for homehealth to be set up.  Will need ID follow up in 2-3 from discharge.      Consultants/Specialty  Orthopedics  Limb preservation services  ID specialist.  cardiology    Code Status  full    Disposition  home with home health.  Outpatient antibiotic needs to be written by her primary care  provider in [place] per social work.    Review of Systems  Review of Systems   Constitutional: Negative for chills, diaphoresis and fever.   HENT: Negative for ear discharge, ear pain and tinnitus.    Eyes: Negative for double vision, photophobia and pain.   Respiratory: Negative for cough, hemoptysis and sputum production.    Cardiovascular: Negative for chest pain, palpitations and orthopnea.   Gastrointestinal: Negative for heartburn, nausea and vomiting.   Genitourinary: Negative for dysuria, frequency and urgency.   Musculoskeletal: Negative for myalgias and neck pain.   Skin: Negative for itching and rash.   Neurological: Negative for dizziness, tingling and headaches.        Physical Exam  Temp:  [35.8 °C (96.5 °F)-36.7 °C (98 °F)] 36.1 °C (97 °F)  Pulse:  [60-85] 67  Resp:  [14-18] 16  BP: (104-147)/(42-74) 108/64    Physical Exam   Constitutional: She is oriented to person, place, and time. She appears well-developed. No distress.   HENT:   Head: Normocephalic and atraumatic.   Mouth/Throat: Oropharynx is clear and moist.   Eyes: Pupils are equal, round, and reactive to light. Conjunctivae are normal. Right eye exhibits no discharge. Left eye exhibits no discharge.   Neck: Normal range of motion. Neck supple. No JVD present.   Cardiovascular: Exam reveals no gallop.    Irregularly irregular rhythm   Pulmonary/Chest: Effort normal and breath sounds normal. No respiratory distress.   Abdominal: Soft. Bowel sounds are normal. She exhibits no distension. There is no tenderness.   Morbidly obese   Musculoskeletal: She exhibits edema.   Boot is on the right leg   Neurological: She is alert and oriented to person, place, and time.   Follows commands appropriately.   Skin: Skin is warm and dry. She is not diaphoretic.   Psychiatric: She has a normal mood and affect.       Fluids    Intake/Output Summary (Last 24 hours)  Last data filed at 01/03/19 1800   Gross per 24 hour   Intake              520 ml   Output                 0 ml   Net              520 ml       Laboratory  Recent Labs      01/04/19   0623  01/05/19   0225  01/06/19   0817   WBC  9.8  8.1  7.1   RBC  3.09*  2.91*  2.74*   HEMOGLOBIN  9.5*  8.9*  8.6*   HEMATOCRIT  29.0*  27.8*  26.1*   MCV  93.9  95.5  95.3   MCH  30.7  30.6  31.4   MCHC  32.8*  32.0*  33.0*   RDW  44.8  46.5  47.1   PLATELETCT  318  318  275   MPV  9.4  9.7  9.3     Recent Labs      01/04/19   0623  01/05/19   0225  01/06/19   0817   SODIUM  143  140  142   POTASSIUM  3.2*  3.4*  3.6   CHLORIDE  109  107  109   CO2  24  26  30   GLUCOSE  232*  274*  181*   BUN  16  13  10   CREATININE  0.89  0.96  0.91   CALCIUM  8.7  9.1  8.6     Recent Labs      01/04/19   1803  01/05/19   1331  01/06/19   0615  01/06/19   0817   APTT  71.1*  46.2*  69.1*   --    INR   --   1.16*   --   1.14*               Imaging      Assessment/Plan  * Diabetic ulcer of right midfoot associated with type 2 diabetes mellitus (HCC)- (present on admission)   Assessment & Plan    Limb preservation services, infectious disease and otho consulted   Surgical debridement 12/30/2018   IV ceftriaxone, Flagyl and vancomycin  Per ID specialist,Daptomycin IV, 6 week therapy, PICC line placed.  Awaiting for outpatient antibiotic arrangement.     Coronary artery disease involving native coronary artery of native heart without angina pectoris   Assessment & Plan    Cardiology following, pt has private cardiologist at [place]  Plavix re-started   Continue, losartan pravastatin and carvedilol  However no ASA given higher bleeding risk when used w/ Coumadin and Plavix  Angina-trops are negative.  Started on Imdur, continue w/ Coreg  Echocardiogram indicates:  Mildly reduced left ventricular systolic function.  Left ventricular ejection fraction is visually estimated to be 45%.   Indeterminate diastolic function.  Normal left atrial size.  Mild mitral regurgitation.  Given recurrent syncope issue - patient refused stress test. Of note  the patient did have a stress test done in [place] April 2018 which is reportedly normal.  The patient states that she would rather follow up with her cardiologist, Dr. Horan, later this month.  In any case, the patient is well aware of the health risks of serious cardiac disease.   Her EF was 60% per last years stress test and intermittent chest pain which is controlled with nitroglycerin tablet.   Strongly recommend the patient to have definitive testing as soon as possible.  Cardiology input reviewed.      Type 2 diabetes mellitus with skin complication, with long-term current use of insulin (HCC)- (present on admission)   Assessment & Plan    Sugars reviewed over last 24 hrs  HgbA1C-  7%    S.s.insulin  lantus continue     Essential hypertension- (present on admission)   Assessment & Plan    Carvedilol, Losartan  hctz  As needed hydralazine    Monitor blood pressure and adjust as need        Paroxysmal atrial fibrillation (HCC)   Assessment & Plan    Continue carvedilol for rate control  cardiologist following, pt chooses Coumadin  -may consider adding amiodarone if a-fib persists and or becomes symptomatic       Syncope   Assessment & Plan    Cardiology recommended no further workup for now- likely neurocardiogenic  Echocardiogram result noted.  No event today.     Mixed hyperlipidemia- (present on admission)   Assessment & Plan    Continue  pravastatin and niacin            VTE prophylaxis: heparin

## 2019-01-06 NOTE — PROGRESS NOTES
Infectious Disease Progress Note    Author: Eddie Ribeiro M.D. Date & Time of service: 2019  12:25 PM    Chief Complaint:  Right diabetic foot infection             OM    Interval History:  73 y.o. 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  Patient reports no new issues, tolerating antibiotics.  1/3- AF, WBC 8.8, no issue with abx, denies pain to RLE, still lightheaded/dizzy from syncopal episode last night.  - AF, WBC 9.8, denies pain currently to RLE but notes it gets up to 8/10 at night, tolerating abx, no more lightheadedness or dizziness, waiting for stress test.   afebrile, white count 7.1.  Course complicated by RITO [place] with exertional angina, impaired systolic function.  Cardiology following. PICC line placed .  No new issues, tolerating antibiotics.    Labs Reviewed, Medications Reviewed and Wound Reviewed.    Review of Systems:  Review of Systems   Constitutional: Negative for chills, fever and malaise/fatigue.   HENT: Negative for sore throat.    Respiratory: Negative for cough and shortness of breath.    Cardiovascular: Positive for leg swelling. Negative for chest pain and palpitations.   Gastrointestinal: Negative for abdominal pain, constipation, diarrhea, nausea and vomiting.   Genitourinary: Negative for dysuria and hematuria.   Musculoskeletal: Positive for joint pain. Negative for myalgias.   Skin: Negative for itching.   Neurological: Negative for dizziness, sensory change and headaches.       Hemodynamics:  Temp (24hrs), Av.2 °C (97.1 °F), Min:35.8 °C (96.5 °F), Max:36.7 °C (98 °F)  Temperature: 36 °C (96.8 °F)  Pulse  Av.8  Min: 55  Max: 117   Blood Pressure : 118/47       Physical Exam:  Physical Exam   Constitutional: She is oriented to person, place, and time. She  appears well-developed. No distress.   Obese.  Elderly, pale.   HENT:   Head: Normocephalic and atraumatic.   Eyes: Pupils are equal, round, and reactive to light. Conjunctivae and EOM are normal. No scleral icterus.   Neck: Normal range of motion. Neck supple. No JVD present.   Cardiovascular: Normal rate and regular rhythm.  Exam reveals no friction rub.    No murmur heard.  Pulmonary/Chest: Effort normal and breath sounds normal. No respiratory distress. She has no rales.   Abdominal: Soft. Bowel sounds are normal. She exhibits no distension. There is no rebound and no guarding.   Musculoskeletal: She exhibits edema (Trace RLE). She exhibits no tenderness.   Right foot with surgical dressing boot in place   Right upper extremity PICC in place   Neurological: She is alert and oriented to person, place, and time. Coordination normal.   Skin: Skin is warm. No rash noted. She is not diaphoretic. There is erythema.   Diffuse ecchymosis to BUEs    Psychiatric: She has a normal mood and affect. Her behavior is normal.   Nursing note and vitals reviewed.      Meds:    Current Facility-Administered Medications:   •  MD Alert...Warfarin per Pharmacy  •  vancomycin  •  warfarin  •  isosorbide mononitrate SR  •  potassium chloride SA  •  nitroglycerin  •  insulin glargine  •  heparin **AND** heparin **AND** Protocol 440 Heparin Weight Based DO NOT GIVE ANY HEPARIN BOLUS TO STROKE PATIENT **AND** Protocol 440 Heparin Weight Based Discontinue Enoxaparin (Lovenox), Dabigatran (Pradaxa), Rivaroxaban (Xarelto), Apixaban (Eliquis), Edoxaban (Savaysa, Lixiana), Fondaparinux (Arixtra) and Argatroban prior to heparin administration **AND** Protocol 440 Heparin Weight Based Draw baseline aPTT, PT, and platelet count if not already done **AND** Protocol 440 Heparin Weight Based Draw aPTT 6 hours after beginning infusion.  **AND** Protocol 440 Heparin Weight Based Record Patient Data **AND** Protocol 440 Heparin Weight Based  INSTRUCTIONS **AND** Protocol 440 Heparin Weight Based Review aPTT results 6 hours after infusion is begun as detailed **AND** Protocol 440 Heparin Weight Based Draw Platelet count every three days. Contact MD if platelet is 50% lower than baseline count. **AND** Protocol 440 Heparin Weight Based Adjust heparin to maintain aPTT between 55-96 sec **AND** Protocol 440 Heparin Weight Based Order aPTT 6 hours after any rate change or hold until aPTT is therapeutic (55-96 seconds) **AND** Protocol 440 Heparin Weight Based Documentation and verification  •  clopidogrel  •  insulin regular **AND** Accu-Chek ACHS **AND** NOTIFY MD and PharmD **AND** glucose 4 g **AND** dextrose 50%  •  senna-docusate **AND** polyethylene glycol/lytes **AND** magnesium hydroxide **AND** bisacodyl  •  acetaminophen  •  Notify provider if pain remains uncontrolled **AND** Use the numeric rating scale (NRS-11) on regular floors and Critical-Care Pain Observation Tool (CPOT) on ICUs/Trauma to assess pain **AND** Pulse Ox (Oximetry) **AND** Pharmacy Consult Request **AND** If patient difficult to arouse and/or has respiratory depression, stop any opiates that are currently infusing and call a Rapid Response. **AND** oxyCODONE immediate-release **AND** oxyCODONE immediate-release **AND** HYDROmorphone  •  MD Alert...Vancomycin per Pharmacy **AND** [DISCONTINUED] cefTRIAXone (ROCEPHIN) IV **AND** [DISCONTINUED] metroNIDAZOLE  •  hydrALAZINE  •  ondansetron  •  ondansetron  •  carvedilol  •  niacin SR  •  oxybutynin  •  pravastatin  •  therapeutic multivitamin-minerals  •  tizanidine  •  losartan **AND** hydroCHLOROthiazide    Labs:  Recent Labs      01/04/19   0623  01/05/19   0225  01/06/19   0817   WBC  9.8  8.1  7.1   RBC  3.09*  2.91*  2.74*   HEMOGLOBIN  9.5*  8.9*  8.6*   HEMATOCRIT  29.0*  27.8*  26.1*   MCV  93.9  95.5  95.3   MCH  30.7  30.6  31.4   RDW  44.8  46.5  47.1   PLATELETCT  318  318  275   MPV  9.4  9.7  9.3   NEUTSPOLYS  64.80   59.90  56.30   LYMPHOCYTES  19.80*  21.70*  25.30   MONOCYTES  10.00  12.20  11.50   EOSINOPHILS  3.80  4.60  5.10   BASOPHILS  0.90  1.00  0.80     Recent Labs      19   0817   SODIUM  143  140  142   POTASSIUM  3.2*  3.4*  3.6   CHLORIDE  109  107  109   CO2  24  26  30   GLUCOSE  232*  274*  181*   BUN  16  13  10   CPKTOTAL   --   22   --      Recent Labs      19   ALBUMIN  2.8*  2.6*   --    CREATININE  0.89  0.96  0.91       Imagin/3/2019    CT-CTA HEAD WITH & W/O-POST PROCESS   Impression     CT angiogram of the [place] within normal limits for age with some atherosclerosis.    Moderate to severe white matter hypodensity is nonspecific     1/3/2019    CT-HEAD W/O  Impression     No noncontrast CT evidence of acute intracranial hemorrhage.    Advanced white matter hypodensity is present.  This is a nonspecific finding which usually is found to represent chronic microvascular disease in patient's of this demographic.  Demyelination, age indeterminant ischemia and gliosis are also common   possibilities.    Moderate cerebral volume loss.         Micro:  Results     Procedure Component Value Units Date/Time    CULTURE TISSUE W/ GRM STAIN [[id number]]  (Abnormal)  (Susceptibility) Collected:  18 0189    Order Status:  Completed Specimen:  Tissue Updated:  19 1203     Significant Indicator POS (POS)     Source TISS     Site RightFootPlantarSoftTissue     Tissue Culture Growth noted after further incubation, see below for  organism identification.   (A)     Gram Stain Result Few WBCs.  No organisms seen.       Tissue Culture Enterococcus faecalis  Isolated from enrichment broth only, please correlate with  clinical condition.  Combination therapy with ampicillin, penicillin, or  vancomycin (for susceptible strains) plus an aminoglycoside  is usually indicated for serious enterococcal infections,  such as  endocarditis unless high-level resistance to both  gentamicin and streptomycin is documented; such combinations  are predicted to result in synergistic killing of the  Enterococcus.   (A)      Staphylococcus epidermidis  Isolated from enrichment broth only, please correlate with  clinical condition.   (A)    Culture & Susceptibility     ENTEROCOCCUS FAECALIS     Antibiotic Sensitivity Microscan Unit Status    Ampicillin Sensitive <=2 mcg/mL Final    Method: SENSITIVITY, BERNARDINO    Daptomycin Sensitive 1 mcg/mL Final    Method: SENSITIVITY, BERNARDINO    Gent Synergy Sensitive <=500 mcg/mL Final    Method: SENSITIVITY, BERNARDINO    Penicillin Sensitive 2 mcg/mL Final    Method: SENSITIVITY, BERNARDINO    Vancomycin Sensitive 2 mcg/mL Final    Method: SENSITIVITY, BERNARDINO              STAPHYLOCOCCUS EPIDERMIDIS     Antibiotic Sensitivity Microscan Unit Status    Ampicillin/sulbactam Sensitive <=8/4 mcg/mL Final    Method: SENSITIVITY, BERNARDINO    Clindamycin Sensitive <=0.5 mcg/mL Final    Method: SENSITIVITY, BERNARDINO    Daptomycin Sensitive <=0.5 mcg/mL Final    Method: SENSITIVITY, BERNARDINO    Erythromycin Resistant >4 mcg/mL Final    Method: SENSITIVITY, BERNARDINO    Moxifloxacin Sensitive <=0.5 mcg/mL Final    Method: SENSITIVITY, BERNARDINO    Oxacillin Sensitive <=0.25 mcg/mL Final    Method: SENSITIVITY, BERNARDINO    Penicillin Resistant 8 mcg/mL Final    Method: SENSITIVITY, BERNARDINO    Tetracycline Sensitive <=4 mcg/mL Final    Method: SENSITIVITY, BERNARDINO    Trimeth/Sulfa Resistant >2/38 mcg/mL Final    Method: SENSITIVITY, BERNARDINO    Vancomycin Sensitive 2 mcg/mL Final    Method: SENSITIVITY, BERNARDINO                       ANAEROBIC CULTURE [[id number]] Collected:  12/30/18 1719    Order Status:  Completed Specimen:  Tissue Updated:  01/04/19 1203     Significant Indicator NEG     Source TISS     Site RightFootPlantarSoftTissue     Anaerobic Culture, Culture Res No Anaerobes isolated.    CULTURE TISSUE W/ GRM STAIN [[id number]]  (Abnormal)  (Susceptibility) Collected:  12/30/18 4756     Order Status:  Completed Specimen:  Tissue Updated:  01/04/19 0857     Significant Indicator POS (POS)     Source TISS     Site Right Medial Cuneiform     Tissue Culture Growth noted after further incubation, see below for  organism identification.   (A)     Gram Stain Result No organisms seen.     Tissue Culture Staphylococcus epidermidis  Isolated from enrichment broth only, please correlate with  clinical condition.   (A)    Culture & Susceptibility     STAPHYLOCOCCUS EPIDERMIDIS     Antibiotic Sensitivity Microscan Unit Status    Ampicillin/sulbactam Resistant <=8/4 mcg/mL Final    Method: SENSITIVITY, BERNARDINO    Clindamycin Sensitive <=0.5 mcg/mL Final    Method: SENSITIVITY, BERNARDINO    Daptomycin Sensitive <=0.5 mcg/mL Final    Method: SENSITIVITY, BERNARDINO    Erythromycin Resistant >4 mcg/mL Final    Method: SENSITIVITY, BERNARDINO    Moxifloxacin Sensitive <=0.5 mcg/mL Final    Method: SENSITIVITY, BERNARDINO    Oxacillin Resistant 0.5 mcg/mL Final    Method: SENSITIVITY, BERNARDINO    Penicillin Resistant 2 mcg/mL Final    Method: SENSITIVITY, BERNARDINO    Tetracycline Sensitive <=4 mcg/mL Final    Method: SENSITIVITY, BERNARDINO    Trimeth/Sulfa Resistant >2/38 mcg/mL Final    Method: SENSITIVITY, BERNARDINO    Vancomycin Sensitive 2 mcg/mL Final    Method: SENSITIVITY, BERNARDINO                       ANAEROBIC CULTURE [[id number]] Collected:  12/30/18 1723    Order Status:  Completed Specimen:  Tissue Updated:  01/04/19 0857     Significant Indicator NEG     Source TISS     Site Right Medial Cuneiform     Anaerobic Culture, Culture Res No Anaerobes isolated.    GRAM STAIN [[id number]] Collected:  12/30/18 1723    Order Status:  Completed Specimen:  Tissue Updated:  12/31/18 0701     Significant Indicator .     Source TISS     Site Right Medial Cuneiform     Gram Stain Result No organisms seen.    GRAM STAIN [[id number]] Collected:  12/30/18 1719    Order Status:  Completed Specimen:  Tissue Updated:  12/31/18 0701     Significant Indicator .     Source TISS     Site  RightFootPlantarSoftTissue     Gram Stain Result Few WBCs.  No organisms seen.            Assessment:  73-year-old woman with poorly controlled diabetes, COPD on 3 L home O2, CAD status post stents, Charcot foot, right foot chronic ulcer, presented on 12/29 with infected right foot ulcer.  On 12/30, patient underwent I&D of the foot, ostectomy with partial excision of the medial cuneiform and the first metatarsal, right gastrosoleus recession.  Necrotic bone was noted which was excised.  Group D Enterococcus and a Staph species are growing from cultures thus far.    Plan:  Right diabetic foot infection  Ulceration with underlying osteomyelitis  Afebrile  Increased leukocytosis post-op-now resolved  S/p debridement necrotic tissue to bone per OP report by Dr. Kwon on 12/30  OR bone & tissue cx from 12/30 +MRSE, MSSE and E faecalis  Pathology from 12/30 negative OM or malignancy  Continue IV Vancomycin while inpatient to cover above organisms  On Vanco, monitor renal function and troughs.  Last trough of 19.1 on 1/5  PICC placed 1/5/2019  Anticipate 6 weeks IV abx from date of surgery  Anticipated stop date: 2/10/2019  Can do IV Daptomycin 8 mg/kg q24h at [facility], PCP will need to write orders (recommendations as below)  Will need 1st dose of IV Daptomycin prior to discharge  CPK ordered             Cellulitis   Improved  Abx as above    Acute kidney injury, resolved  Continue to monitor closely while on IV Vancomycin  Renally dose antibiotics as needed    Diabetes mellitus  Hemoglobin A1c of 7.0 on 12/30/18.    Will adversely affect wound healing and resolution of infection  Keep BS under 150 to help control current infection    Recommendation for IV antibiotics:  IV Daptomycin 8 mg/kg daily through 2/10/19  Weekly CBC, CMP and CPK.  ESR every other week.  Routine PICC care per facility policy.  DC PICC after last infusion dose    1/4/2019 Discussed with JEANIE Damon.  Will contact PCP to write orders for  [facility].    ID will follow.  Please call with questions.    ID clinic follow-up in 2-3 weeks.

## 2019-01-06 NOTE — PROGRESS NOTES
"Pharmacy Kinetics 73 y.o. female on vancomycin day # 9   2019    Currently on Vancomycin 1300 mg iv q24hr ()  Indication for Treatment: SSTI     Pertinent history per medical record: Admitted on 2018 for new diabetic foot ulceration w/ hx diabetes. Patient reports new ulceration w/ drainage. Pt reported course of Bactrim p/t to arrival w/ no improvement reported. Admitted for further work-up and treatment. ID consulted for suspicion of OM.     Other antibiotics:      Allergies: Patient has no known allergies.      List concerns for renal function: Age, BMI>30      Pertinent cultures to date:   18:[place],TISS:Enterococcus faecalis , Staphylococcus epidermidis   18:Rt Medial Cuneiform:Staphylococcus epidermidis (MRSE)    Recent Labs      19   0623  19   0225   WBC  9.8  8.1   NEUTSPOLYS  64.80  59.90     Recent Labs      19   2322  19   0623  19   0225   BUN  17  16  13   CREATININE  1.01  0.89  0.96   ALBUMIN   --   2.8*  2.6*     Recent Labs      19   1734  19   2023   VANCOTROUGH  18.3  19.1     Intake/Output Summary (Last 24 hours) at 19 0820  Last data filed at 19 1800   Gross per 24 hour   Intake              550 ml   Output                0 ml   Net              550 ml      Blood pressure 147/60, pulse 85, temperature 35.9 °C (96.6 °F), temperature source Temporal, resp. rate 14, height 1.626 m (5' 4\"), weight 98.7 kg (217 lb 9.5 oz), SpO2 98 %, not currently breastfeeding. Temp (24hrs), Av.2 °C (97.2 °F), Min:35.8 °C (96.5 °F), Max:36.7 °C (98 °F)         A/P        1. Vancomycin dose change: Vancomycin 1000 mg iv q24hr ()  2. Next vancomycin level: ~2 days   3. Goal trough: 12-16 mcg/mL   4. Comments: No leukocytosis , afebrile. Cx: [place],TISS:Enterococcus faecalis , Staphylococcus epidermidis: Rt Medial Cuneiform:Staphylococcus epidermidis (MRSE). Renal indices appear stable(18), level above goal, " maintenance dose reduced.   1. ID following : Anticipate 6 weeks IV abx from date of surgery.  Choice of antibiotic pending susceptibilities of the above organisms (anticipated stop date: 2/10/2019)     Gilbert Lira PharmD BCPS

## 2019-01-06 NOTE — ASSESSMENT & PLAN NOTE
Continue carvedilol for rate control  cardiologist following, pt chooses Coumadin  -may consider adding amiodarone if a-fib persists and or becomes symptomatic

## 2019-01-06 NOTE — PROGRESS NOTES
Bedside report received from night RN.  Patient sleeping, appears to be comfortable.  On 4L NC.  Heparin drip running at 950 u/hr.  On tele in SR with frequent PAC.  Safety precautions in place.  Call light within reach.

## 2019-01-07 NOTE — DISCHARGE PLANNING
Anticipated Discharge Disposition: D/C to Home with HH and OP Infusions    Action: LSW received a call from Jyoti with [facility] stating pt has been approved and can begin infusions with them tomorrow. Pt's first appointment is Tuesday, January 8, 2019 @ [id number]. LSW notified RN.    Barriers to Discharge: None    Plan: No further d/c planning needs known at this time.

## 2019-01-07 NOTE — DISCHARGE PLANNING
Agency/Facility Name: [facility]  Spoke To: Intake  Outcome: Attempted to follow up, however no answer. Voicemail was left.

## 2019-01-07 NOTE — PROGRESS NOTES
Received report and care assumed. Patient A&Ox 4. Pt. Reports no pain. Work of breathing even and unlabored on 3L nasal canula. Discussed POC. Call light within reach and pt. instructed to call when in need of assistance.  Denies any other needs at this time.

## 2019-01-07 NOTE — PROGRESS NOTES
Diabetes education: Met with patient and  to answer questions. Pt does have supplies and medication at home. Discussed Lantus dose as she was on 20 units of Lantus once a day at home with the occasional dose of regular. In hospital she has been using Lantus 32 units in AM and consistently using regular per sliding scale. Blood sugars have been 256 (6 units), 193 ( 3 units) an this am 253 ( 6 units). She has not been taking her Meformin in the hospital, so numbers may be better when she resumes the Metformin. Pt has number for CDE if questions.

## 2019-01-07 NOTE — DISCHARGE INSTRUCTIONS
Discharge Instructions    Discharged to home by car with relative. Discharged via wheelchair, hospital escort: Yes.  Special equipment needed: Wheelchair    Be sure to schedule a follow-up appointment with your primary care doctor or any specialists as instructed.     Discharge Plan:   Pneumococcal Vaccine Administered/Refused: Not given - Patient refused pneumococcal vaccine  Influenza Vaccine Indication: Not indicated: Previously immunized this influenza season and > 8 years of age    I understand that a diet low in cholesterol, fat, and sodium is recommended for good health. Unless I have been given specific instructions below for another diet, I accept this instruction as my diet prescription.   Other diet: Cardiac, Diabetic diet    Special Instructions: None    · Is patient discharged on Warfarin / Coumadin?   Yes    You are receiving the drug warfarin. Please understand the importance of monitoring warfarin with scheduled PT/INR blood draws.  Follow[facility]up with the [facility] in one week for INR lab..    IMPORTANT: HOW TO USE THIS INFORMATION:  This is a summary and does NOT have all possible information about this product. This information does not assure that this product is safe, effective, or appropriate for you. This information is not individual medical advice and does not substitute for the advice of your health care professional. Always ask your health care professional for complete information about this product and your specific health needs.      WARFARIN - ORAL (WARF-uh-rin)      COMMON BRAND NAME(S): Coumadin      WARNING:  Warfarin can cause very serious (possibly fatal) bleeding. This is more likely to occur when you first start taking this medication or if you take too much warfarin. To decrease your risk for bleeding, your doctor or other health care provider will monitor you closely and check your lab results (INR test) to make sure you are not taking too much warfarin. Keep all medical and  "laboratory appointments. Tell your doctor right away if you notice any signs of serious bleeding. See also Side Effects section.      USES:  This medication is used to treat blood clots (such as in deep vein thrombosis-DVT or pulmonary embolus-PE) and/or to prevent new clots from forming in your body. Preventing harmful blood clots helps to reduce the risk of a stroke or heart attack. Conditions that increase your risk of developing blood clots include a certain type of irregular heart rhythm (atrial fibrillation), heart valve replacement, recent heart attack, and certain surgeries (such as hip/knee replacement). Warfarin is commonly called a \"blood thinner,\" but the more correct term is \"anticoagulant.\" It helps to keep blood flowing smoothly in your body by decreasing the amount of certain substances (clotting proteins) in your blood.      HOW TO USE:  Read the Medication Guide provided by your pharmacist before you start taking warfarin and each time you get a refill. If you have any questions, ask your doctor or pharmacist. Take this medication by mouth with or without food as directed by your doctor or other health care professional, usually once a day. It is very important to take it exactly as directed. Do not increase the dose, take it more frequently, or stop using it unless directed by your doctor. Dosage is based on your medical condition, laboratory tests (such as INR), and response to treatment. Your doctor or other health care provider will monitor you closely while you are taking this medication to determine the right dose for you. Use this medication regularly to get the most benefit from it. To help you remember, take it at the same time each day. It is important to eat a balanced, consistent diet while taking warfarin. Some foods can affect how warfarin works in your body and may affect your treatment and dose. Avoid sudden large increases or decreases in your intake of foods high in vitamin K " (such as broccoli, cauliflower, cabbage, brussels sprouts, kale, spinach, and other green leafy vegetables, liver, green tea, certain vitamin supplements). If you are trying to lose weight, check with your doctor before you try to go on a diet. Cranberry products may also affect how your warfarin works. Limit the amount of cranberry juice (16 ounces/480 milliliters a day) or other cranberry products you may drink or eat.      SIDE EFFECTS:  Nausea, loss of appetite, or stomach/abdominal pain may occur. If any of these effects persist or worsen, tell your doctor or pharmacist promptly. Remember that your doctor has prescribed this medication because he or she has judged that the benefit to you is greater than the risk of side effects. Many people using this medication do not have serious side effects. This medication can cause serious bleeding if it affects your blood clotting proteins too much (shown by unusually high INR lab results). Even if your doctor stops your medication, this risk of bleeding can continue for up to a week. Tell your doctor right away if you have any signs of serious bleeding, including: unusual pain/swelling/discomfort, unusual/easy bruising, prolonged bleeding from cuts or gums, persistent/frequent nosebleeds, unusually heavy/prolonged menstrual flow, pink/dark urine, coughing up blood, vomit that is bloody or looks like coffee grounds, severe headache, dizziness/fainting, unusual or persistent tiredness/weakness, bloody/black/tarry stools, chest pain, shortness of breath, difficulty swallowing. Tell your doctor right away if any of these unlikely but serious side effects occur: persistent nausea/vomiting, severe stomach/abdominal pain, yellowing eyes/skin. This drug rarely has caused very serious (possibly fatal) problems if its effects lead to small blood clots (usually at the beginning of treatment). This can lead to severe skin/tissue damage that may require surgery or amputation if left  untreated. Patients with certain blood conditions (protein C or S deficiency) may be at greater risk. Get medical help right away if any of these rare but serious side effects occur: painful/red/purplish patches on the skin (such as on the toe, breast, abdomen), change in the amount of urine, vision changes, confusion, slurred speech, weakness on one side of the body. A very serious allergic reaction to this drug is rare. However, get medical help right away if you notice any symptoms of a serious allergic reaction, including: rash, itching/swelling (especially of the face/tongue/throat), severe dizziness, trouble breathing. This is not a complete list of possible side effects. If you notice other effects not listed above, contact your doctor or pharmacist. In the [place] - Call your doctor for medical advice about side effects. You may report side effects to FDA at 6-315-PQQ-2858. In [place] - Call your doctor for medical advice about side effects. You may report side effects to [facility] [place] at 1-166.286.3208.      PRECAUTIONS:  Before taking warfarin, tell your doctor or pharmacist if you are allergic to it; or if you have any other allergies. This product may contain inactive ingredients, which can cause allergic reactions or other problems. Talk to your pharmacist for more details. Before using this medication, tell your doctor or pharmacist your medical history, especially of: blood disorders (such as anemia, hemophilia), bleeding problems (such as bleeding of the stomach/intestines, bleeding in the brain), blood vessel disorders (such as aneurysms), recent major injury/surgery, liver disease, alcohol use, mental/mood disorders (including memory problems), frequent falls/injuries. It is important that all your doctors and dentists know that you take warfarin. Before having surgery or any medical/dental procedures, tell your doctor or dentist that you are taking this medication and about all the products you use  (including prescription drugs, nonprescription drugs, and herbal products). Avoid getting injections into the muscles. If you must have an injection into a muscle (for example, a flu shot), it should be given in the arm. This way, it will be easier to check for bleeding and/or apply pressure bandages. This medication may cause stomach bleeding. Daily use of alcohol while using this medicine will increase your risk for stomach bleeding and may also affect how this medication works. Limit or avoid alcoholic beverages. If you have not been eating well, if you have an illness or infection that causes fever, vomiting, or diarrhea for more than 2 days, or if you start using any antibiotic medications, contact your doctor or pharmacist immediately because these conditions can affect how warfarin works. This medication can cause heavy bleeding. To lower the chance of getting cut, bruised, or injured, use great caution with sharp objects like safety razors and nail cutters. Use an electric razor when shaving and a soft toothbrush when brushing your teeth. Avoid activities such as contact sports. If you fall or injure yourself, especially if you hit your head, call your doctor immediately. Your doctor may need to check you. The Food & Drug Administration has stated that generic warfarin products are interchangeable. However, consult your doctor or pharmacist before switching warfarin products. Be careful not to take more than one medication that contains warfarin unless specifically directed by the doctor or health care provider who is monitoring your warfarin treatment. Older adults may be at greater risk for bleeding while using this drug. This medication is not recommended for use during pregnancy because of serious (possibly fatal) harm to an unborn baby. Discuss the use of reliable forms of birth control with your doctor. If you become pregnant or think you may be pregnant, tell your doctor immediately. If you are  "planning pregnancy, discuss a plan for managing your condition with your doctor before you become pregnant. Your doctor may switch the type of medication you use during pregnancy. Very small amounts of this medication may pass into breast milk but is unlikely to harm a nursing infant. Consult your doctor before breast-feeding.      DRUG INTERACTIONS:  Drug interactions may change how your medications work or increase your risk for serious side effects. This document does not contain all possible drug interactions. Keep a list of all the products you use (including prescription/nonprescription drugs and herbal products) and share it with your doctor and pharmacist. Do not start, stop, or change the dosage of any medicines without your doctor's approval. Warfarin interacts with many prescription, nonprescription, vitamin, and herbal products. This includes medications that are applied to the skin or inside the vagina or rectum. The interactions with warfarin usually result in an increase or decrease in the \"blood-thinning\" (anticoagulant) effect. Your doctor or other health care professional should closely monitor you to prevent serious bleeding or clotting problems. While taking warfarin, it is very important to tell your doctor or pharmacist of any changes in medications, vitamins, or herbal products that you are taking. Some products that may interact with this drug include: capecitabine, imatinib, mifepristone. Aspirin, aspirin-like drugs (salicylates), and nonsteroidal anti-inflammatory drugs (NSAIDs such as ibuprofen, naproxen, celecoxib) may have effects similar to warfarin. These drugs may increase the risk of bleeding problems if taken during treatment with warfarin. Carefully check all prescription/nonprescription product labels (including drugs applied to the skin such as pain-relieving creams) since the products may contain NSAIDs or salicylates. Talk to your doctor about using a different medication (such " as acetaminophen) to treat pain/fever. Low-dose aspirin and related drugs (such as clopidogrel, ticlopidine) should be continued if prescribed by your doctor for specific medical reasons such as heart attack or stroke prevention. Consult your doctor or pharmacist for more details. Many herbal products interact with warfarin. Tell your doctor before taking any herbal products, especially bromelains, coenzyme Q10, cranberry, danshen, dong quai, fenugreek, garlic, ginkgo biloba, ginseng, and Landon's wort, among others. This medication may interfere with a certain laboratory test to measure theophylline levels, possibly causing false test results. Make sure laboratory personnel and all your doctors know you use this drug.      OVERDOSE:  If overdose is suspected, contact a poison control center or emergency room immediately. [place] residents can call the [place] National Poison Hotline at 1-868.119.8471. [place] residents can call a provincial poison control center. Symptoms of overdose may include: bloody/black/tarry stools, pink/dark urine, unusual/prolonged bleeding.      NOTES:  Do not share this medication with others. Laboratory and/or medical tests (such as INR, complete blood count) must be performed periodically to monitor your progress or check for side effects. Consult your doctor for more details.      MISSED DOSE:  For the best possible benefit, do not miss any doses. If you do miss a dose and remember on the same day, take it as soon as you remember. If you remember on the next day, skip the missed dose and resume your usual dosing schedule. Do not double the dose to catch up because this could increase your risk for bleeding. Keep a record of missed doses to give to your doctor or pharmacist. Contact your doctor or pharmacist if you miss 2 or more doses in a row.      STORAGE:  Store at room temperature away from light and moisture. Do not store in the bathroom. Keep all medications away from children and pets.  Do not flush medications down the toilet or pour them into a drain unless instructed to do so. Properly discard this product when it is  or no longer needed. Consult your pharmacist or local waste disposal company for more details about how to safely discard your product.      MEDICAL ALERT:  Your condition and medication can cause complications in a medical emergency. For information about enrolling in MedicAlert, call 1-770.342.4952 ([place]) or 1-242.577.3113 ([place]).      Information last revised 2010 Copyright(c)  [employer]             Depression / Suicide Risk    As you are discharged from this [facility] facility, it is important to learn how to keep safe from harming yourself.    Recognize the warning signs:  · Abrupt changes in personality, positive or negative- including increase in energy   · Giving away possessions  · Change in eating patterns- significant weight changes-  positive or negative  · Change in sleeping patterns- unable to sleep or sleeping all the time   · Unwillingness or inability to communicate  · Depression  · Unusual sadness, discouragement and loneliness  · Talk of wanting to die  · Neglect of personal appearance   · Rebelliousness- reckless behavior  · Withdrawal from people/activities they love  · Confusion- inability to concentrate     If you or a loved one observes any of these behaviors or has concerns about self-harm, here's what you can do:  · Talk about it- your feelings and reasons for harming yourself  · Remove any means that you might use to hurt yourself (examples: pills, rope, extension cords, firearm)  · Get professional help from the community (Mental Health, Substance Abuse, psychological counseling)  · Do not be alone:Call your Safe Contact- someone whom you trust who will be there for you.  · Call your local CRISIS HOTLINE 630-1195 or 935-535-0856  · Call your local Children's Mobile Crisis Response Team [place] (098) 017-9177 or  www.[employer]  · Call the toll free National Suicide Prevention Hotlines   · National Suicide Prevention Lifeline 103-070-BEHA (0766)  · [employer] Hope Line Network 800-SUICIDE (207-5174)      Atrial Fibrillation  Introduction  Atrial fibrillation is a type of heartbeat that is irregular or fast (rapid). If you have this condition, your heart keeps quivering in a weird (chaotic) way. This condition can make it so your heart cannot pump blood normally. Having this condition gives a person more risk for stroke, heart failure, and other heart problems. There are different types of atrial fibrillation. Talk with your doctor to learn about the type that you have.  Follow these instructions at home:  · Take over-the-counter and prescription medicines only as told by your doctor.  · If your doctor prescribed a blood-thinning medicine, take it exactly as told. Taking too much of it can cause bleeding. If you do not take enough of it, you will not have the protection that you need against stroke and other problems.  · Do not use any tobacco products. These include cigarettes, chewing tobacco, and e-cigarettes. If you need help quitting, ask your doctor.  · If you have apnea (obstructive sleep apnea), manage it as told by your doctor.  · Do not drink alcohol.  · Do not drink beverages that have caffeine. These include coffee, soda, and tea.  · Maintain a healthy weight. Do not use diet pills unless your doctor says they are safe for you. Diet pills may make heart problems worse.  · Follow diet instructions as told by your doctor.  · Exercise regularly as told by your doctor.  · Keep all follow-up visits as told by your doctor. This is important.  Contact a doctor if:  · You notice a change in the speed, rhythm, or strength of your heartbeat.  · You are taking a blood-thinning medicine and you notice more bruising.  · You get tired more easily when you move or exercise.  Get help right away if:  · You have pain in your  "chest or your belly (abdomen).  · You have sweating or weakness.  · You feel sick to your stomach (nauseous).  · You notice blood in your throw up (vomit), poop (stool), or pee (urine).  · You are short of breath.  · You suddenly have swollen feet and ankles.  · You feel dizzy.  · Your suddenly get weak or numb in your face, arms, or legs, especially if it happens on one side of your body.  · You have trouble talking, trouble understanding, or both.  · Your face or your eyelid droops on one side.  These symptoms may be an emergency. Do not wait to see if the symptoms will go away. Get medical help right away. Call your local emergency services (911 in the [place] Do not drive yourself to the hospital.   This information is not intended to replace advice given to you by your health care provider. Make sure you discuss any questions you have with your health care provider.  Document Released: 09/26/2009 Document Revised: 05/25/2017 Document Reviewed: 04/13/2016  © 2017 [employer]      PICC Line Instructions    How to Care for your PICC  • Do not take a bath, swim, or use hot tubs when you have a PICC. Cover PICC line with clear plastic wrap and tape to keep it dry while showering  • Check the PICC insertion site daily for leakage, redness, swelling, or pain.  • Flush the PICC as directed by your health care provider. Let your health care provider know right away if the PICC is difficult to flush or does not flush. Do not use force to flush the PICC.  • Do not use a syringe that is less than 10 mL to flush the PICC  • Avoid blood pressure checks on the arm with the PICC.  • Do not take the PICC out yourself. Only a trained clinical professional should remove the PICC  • Make sure you or anyone who access your PICC Line washes their hands before using the line  • Make sure the hub of the line is \"scrubbed\" prior to using the line  Dressing Changes  • Change the PICC dressing as instructed by your health care " "provider.  • Change your PICC dressing if it becomes loose or wet.  When to seek medical attention  • PICC is accidentally pulled all the way out  • There is any type of drainage, redness, or swelling where the PICC enters the skin.  • You cannot flush the PICC, it is difficult to flush, or the PICC leaks around the insertion site when it is flushed.  • You hear a \"flushing\" sound when the PICC is flushed.  • You notice a hole or tear in the PICC.  • You develop chills or a fever        "

## 2019-01-07 NOTE — DISCHARGE SUMMARY
Discharge Summary    CHIEF COMPLAINT ON ADMISSION  Chief Complaint   Patient presents with   • Open Wound       Reason for Admission  Right foot pain     Admission Date  12/29/2018    CODE STATUS  Full Code    HPI & HOSPITAL COURSE  73 y.o. female with past medical history of diabetes, coronary artery disease, hypertension and chronic back pain admitted 12/29/2018 with diabetic foot ulcer.  Limb preservation services and orthopedics were consulted. Plan for I & D 12/30/2018.      Interval Problem Update  1/3/19:  The patient resting comfortably in bed.  Per the patient's nurse, the patient passed out briefly while using restroom.  Suspect, vasovagal syncope.  The patient have MRI done today.  1/4/19:  The patient resting in bed.  She was taken to [facility] stress lab today for stress test.  The patient refused to have stress test done despite knowing that she can have serious coronary artery disease undetected by refusing stress test.  Discussed discharge planning with social work.  The patient prefer outpatient antibiotic infusion either in home or in a medical facility near home.  Antibiotic course modified by ID specialist. Last date of iv abx 2/10/19.  Will switch to IV Daptomycin last dose prior to discharge.   1/5/19:  No syncope overnight.  However, the patient reports of chest pain again. Afib is noted  The patient is unaware that she atrial fibrillation.  Consult cardiologist to evaluate this patient.  PICCline placement this morning.  1/6/19:  Cardiology rec reviewed. Now on Coumadin.  No further work up needed for syncopal episode.  Imdur added to help control anginal symptoms as cardiologist did not find active coronary artery disease.  PLANNED DISCHARGE TOMORROW waiting for homehealth to be set up.  Will need ID follow up in 2-3 from discharge.    1/7/19- pt discharged with Coumadin bridging given high ChadsVAsc score.  She was given lovenox and coumadin education.  Home health set up and directions for  antibiotics faxed to her PMD in [place].  Pt will need Coumadin clinic follow up.       Consultants/Specialty  Orthopedics  Limb preservation services  ID specialist.  cardiology     Code Status  full     Disposition  home with home health.  Outpatient antibiotic needs to be written by her primary care provider in [place] per social work.     The patient met 2-midnight criteria for an inpatient stay at the time of discharge.    Discharge Date  1/7/19    FOLLOW UP ITEMS POST DISCHARGE  INR  Labs as faxed to her PMD    DISCHARGE DIAGNOSES  Principal Problem:    Diabetic ulcer of right midfoot associated with type 2 diabetes mellitus (HCC) POA: Yes  Active Problems:    Type 2 diabetes mellitus with skin complication, with long-term current use of insulin (HCC) POA: Yes    Coronary artery disease involving native coronary artery of native heart without angina pectoris POA: Unknown    Essential hypertension POA: Yes    Mixed hyperlipidemia POA: Yes    Syncope POA: Unknown    Paroxysmal atrial fibrillation (HCC) POA: Unknown  Resolved Problems:    * No resolved hospital problems. *      FOLLOW UP  Future Appointments  Date Time Provider Department Center   1/18/2019 8:00 AM Tomi Kwon M.D. [address]     No follow-up provider specified.    MEDICATIONS ON DISCHARGE     Medication List      START taking these medications      Instructions   enoxaparin 100 MG/ML Soln inj  Commonly known as:  LOVENOX   Inject 100 mg as instructed every 12 hours.  Dose:  1 mg/kg     hydroCHLOROthiazide 12.5 MG tablet  Start taking on:  1/8/2019  Commonly known as:  HYDRODIURIL   Take 1 Tab by mouth every day for 5 days.  Dose:  12.5 mg     isosorbide mononitrate SR 30 MG Tb24  Start taking on:  1/8/2019  Commonly known as:  IMDUR   Take 1 Tab by mouth every day for 7 days.  Dose:  30 mg     losartan 50 MG Tabs  Start taking on:  1/8/2019  Commonly known as:  COZAAR   Take 1 Tab by mouth every day.  Dose:  50 mg     warfarin 5 MG  Tabs  Commonly known as:  COUMADIN   Take 1 Tab by mouth COUMADIN-DAILY.  Dose:  5 mg        CONTINUE taking these medications      Instructions   CALCIUM 1000 + D PO   Take 1 Dose by mouth every day.  Dose:  1 Dose     carvedilol 25 MG Tabs  Commonly known as:  COREG   Take 12.5 mg by mouth every day.  Dose:  12.5 mg     CRANBERRY PO   Take 1 Dose by mouth every day. Unknown OTC Strength  Dose:  1 Dose     insulin glargine 100 UNIT/ML Soln  Commonly known as:  LANTUS   Inject 20 Units as instructed every morning.  Dose:  20 Units     losartan-hydrochlorothiazide 50-12.5 MG per tablet  Commonly known as:  HYZAAR   Take 1 Tab by mouth every day.  Dose:  1 Tab     metformin 1000 MG tablet  Commonly known as:  GLUCOPHAGE   Take 1,000 mg by mouth 2 times a day, with meals.  Dose:  1000 mg     niacin 500 MG tablet  Commonly known as:  SLO-NIACIN   Take 1,000 mg by mouth every morning.  Dose:  1000 mg     oxybutynin 5 MG Tabs  Commonly known as:  DITROPAN   Take 5 mg by mouth every day.  Dose:  5 mg     PLAVIX 75 MG Tabs  Generic drug:  clopidogrel   Take 75 mg by mouth every evening.  Dose:  75 mg     pravastatin 20 MG Tabs  Commonly known as:  PRAVACHOL   Take 20 mg by mouth every evening.  Dose:  20 mg     therapeutic multivitamin-minerals Tabs   Take 1 Tab by mouth every day.  Dose:  1 Tab     tolterodine 2 MG Tabs  Commonly known as:  DETROL   Take 4 mg by mouth every day.  Dose:  4 mg     ZANAFLEX 4 MG Tabs  Generic drug:  tizanidine   Take 4-8 mg by mouth every evening as needed (Pain).  Dose:  4-8 mg        STOP taking these medications    aspirin 81 MG tablet     CELEBREX 200 MG Caps  Generic drug:  celecoxib     sulfamethoxazole-trimethoprim 800-160 MG tablet  Commonly known as:  BACTRIM DS            Allergies  No Known Allergies    DIET  Orders Placed This Encounter   Procedures   • Diet Order Diabetic     Standing Status:   Standing     Number of Occurrences:   1     Order Specific Question:   Diet:      Answer:   Diabetic [3]       ACTIVITY  As tolerated.  Weight bearing as tolerated      LABORATORY  Lab Results   Component Value Date    SODIUM 142 01/06/2019    POTASSIUM 3.6 01/06/2019    CHLORIDE 109 01/06/2019    CO2 30 01/06/2019    GLUCOSE 181 (H) 01/06/2019    BUN 10 01/06/2019    CREATININE 0.91 01/06/2019        Lab Results   Component Value Date    WBC 7.1 01/06/2019    HEMOGLOBIN 8.6 (L) 01/06/2019    HEMATOCRIT 26.1 (L) 01/06/2019    PLATELETCT 275 01/06/2019        Total time of the discharge process exceeds 50 minutes.

## 2019-01-07 NOTE — DISCHARGE PLANNING
Anticipated Discharge Disposition: D/C to Home with HH and OP Infusions    Action: LSW contacted Dr. Gould's office to ensure they received the documents faxed to them on Friday. LSW informed by Vannesa with the front office that they thought they had them, but she is unable to locate them for Dr. Gould to look at. Vannesa requested LSW re-fax documents to [phone]. LSW sent fax and will f/u with office.    Pt is still pending HH.    Barriers to Discharge: Infusion set-up, HH acceptance.    Plan: LSW to continue f/u on infusions and HH referral.

## 2019-01-07 NOTE — DISCHARGE PLANNING
Anticipated Discharge Disposition: D/C to Home with HH and OP Infusions    Action: LSW contacted Dr. Gould's office to confirm they received LSW's second fax. It was confirmed that RN has the information and will obtain orders from Dr. Gould when he gets a chance. LSW reiterated that pt's d/c is dependant upon Dr. Gould's orders and set-up through [facility].    Barriers to Discharge: OP Infusions, HH acceptance.    Plan: LSW to continue f/u with Dr. Gould's office in an effort to ensure timely d/c and set-up on OP Infusions.

## 2019-01-07 NOTE — DISCHARGE PLANNING
Anticipated Discharge Disposition: D/C to Home with HH and OP Infusions     Action: LSW contacted [facility] (ph# [phone]) to discuss the impending referral for daily dapto 8mg/kg until 2/10/19. It was requested that LSW fax the PICC report, H&P, and facesheet to [phone]. The only thing pending for pt's set-up is the order from Dr. Gould's office.    LSW received a call from [facility] stating they are accepting the patient and will see her on Thursday. It was requested that pt's d/c summary and the progress note from 1/2/19.    Barriers to Discharge: OP infusion set-up.     Plan: LSW to continue f/u with Dr. Gould's office for pt's infusion orders.

## 2019-01-08 NOTE — PROGRESS NOTES
Inpatient Anticoagulation Service Note  Date: 1/7/2019  Reason for Anticoagulation: Atrial Fibrillation   [id number] VASc Score: 6  Hemoglobin Value: (!) 8.6  Hematocrit Value: (!) 26.1  Lab Platelet Value: 275  Target INR: 2.0 to 3.0  INR from last 7 days     Date/Time INR Value    01/07/19 0510  1.12    01/06/19 1225 (!)  1.14    01/06/19 0817 (!)  1.14    01/05/19 1331 (!)  1.16    01/03/19 0235 (!)  1.46    01/02/19 0015 (!)  1.35        Dose from last 7 days     Date/Time Dose (mg)    01/07/19 1700  5    01/06/19 0900  5        Average Dose (mg): TBD (new start warfarin therapy)  Significant Interactions: Antibiotics, Clopidogrel, Statin  Bridge Therapy: Yes (enoxaparin ~1 mg/kg Q12H)  Bridge Therapy Start Date: 01/06/19  Days of Overlap Therapy: 1   INR Value Greater than 2 Prior to Discontinuation of Parenteral Anticoagulation: Not Applicable  Reversal Agent Administered: Not Applicable  Comments: INR at baseline. No new H/H or PLT. No overt bleeding noted. Warfarin interactions noted. No overt bleeding noted. Will give 5 mg today. INR with AM labs. Likely to need dose adjustments. Overlap with enoxaparin ~1 mg/kg Q12H noted.    Plan:  Warfarin 5 mg 1/7/18  Education Material Provided?: Yes (given by pharmacy student 1/7/19)  Pharmacist suggested discharge dosing: warfarin 5 mg daily    Darian Dillon, PharmD

## 2019-01-08 NOTE — DISCHARGE PLANNING
Agency/Facility Name: [facility]  Outcome: Per verbal from MIGUEL ÁNGEL Hirsch progress note from 1/2, and discharge summary faxed to facility.

## 2019-01-09 NOTE — TELEPHONE ENCOUNTER
Called and LM for pt to return my call to schedule a hospital follow up appointment with Infectious Disease.  -AMP

## 2019-01-18 NOTE — TELEPHONE ENCOUNTER
Called and left msg for pt to call back to establish care regarding Anticoagulation referral for warfarin from Dr. Holland on 1/7/19.    [facility] at [phone], fax [phone]    Dinora Oliva, AlessiaD

## 2019-01-30 NOTE — TELEPHONE ENCOUNTER
LM with patient to contact [facility] to establish care per referral from discharging team.  Ruddy Morton, PharmD

## 2019-02-07 NOTE — LETTER
February 7, 2019        Leonie Brock      [phone] [facility] [place] [address]      Dear Leonie Brock ,    We have been unsuccessful in our attempts to contact you regarding your Anticoagulation Service referral.  Anticoagulants are medications that can cause potential harmful side effects when not monitored properly. Without proper monitoring there is potential for serious, sometimes life-threatening bleeding problems or life-threatening blood clots or stroke could result.    Please contact our clinic so we may assist you.  We are open Monday-Friday 8 am until 5 pm.  You may reach our Service at [phone].    To monitor your anticoagulant effectively, we need to be able to communicate with you.  This is a requirement to be followed by our Service.  Please contact the clinic as soon as possible to establish care and provide your current contact information.  Thank you.        Sincerely,        Evans Kim, PharmD, [facility]  Pharmacy Clinical Supervisor  [facility]  Outpatient Anticoagulation Service

## 2019-02-07 NOTE — TELEPHONE ENCOUNTER
Called and left msg for pt to call back to establish care regarding Anticoagulation referral for warfarin from Dr. Holland on 1/7/19.     Unable to establish care, sent letter and FYI to referring provider.    [facility] at [phone], fax [phone]     Dinora Oliva, PharmD

## 2019-02-12 NOTE — TELEPHONE ENCOUNTER
Called and LM for pt to return my call to schedule a hospital follow up appointment with Infectious Disease. Letter sent.  -AMP

## 2019-03-19 NOTE — ED NOTES
Pt changed into clothes, provided non-slip socks. Pt assisted over to motorized w/c. Instructed on how to operate it, adjusted seatbelt. Reviewed discharge instructions, pt verbalized understanding of instructions and medication. States she will schedule follow-up appointment. Denies further questions at this time. Pt assisted into van in stable condition.

## 2019-03-19 NOTE — ED NOTES
Pt bib [facility] with c/c right elbow pain/laceration & right hip pain secondary slipping while getting onto a bus today.  Negative LOC.  Pt a&ox4, gcs 15.  Chart up for ERP evaluation.

## 2019-03-19 NOTE — ED NOTES
Assumed care of pt, report received. Introduced self as pt RN. Pt wound to RT elbow bleeding beyond dressing. Soiled dressed removed, new one applied. Pt tolerated well. Pt to xray in stable condition.

## 2019-03-19 NOTE — ED PROVIDER NOTES
ED Provider Note    CHIEF COMPLAINT  Chief Complaint   Patient presents with   • T-5000 GLF     slipped getting out of bus today, c/c R elbow & R hip pain       HPI  Leonie Brock is a 73 y.o. female who presents for evaluation of ground-level fall getting out of a bus falling onto her right elbow and right hip.  She denies injury to the head neck chest abdomen or pelvis.  She does take Plavix but denies any loss of consciousness headache numbness tingling or weakness.  This was reportedly a slip and fall.  She has no other complaints.  She did sustain a large skin tear on the right lateral elbow.    REVIEW OF SYSTEMS  See HPI for further details.  No loss of conscious neck pain back pain all other systems are negative.     PAST MEDICAL HISTORY  Past Medical History:   Diagnosis Date   • Arthritis     all over   • Backpain    • CATARACT     removed   • Diabetes     1990   • Myocardial infarct (HCC)     1998       FAMILY HISTORY  Noncontributory    SOCIAL HISTORY  Social History     Social History   • Marital status:      Spouse name: N/A   • Number of children: N/A   • Years of education: N/A     Social History Main Topics   • Smoking status: Former Smoker   • Smokeless tobacco: Never Used   • Alcohol use No   • Drug use: No   • Sexual activity: Not on file     Other Topics Concern   • Not on file     Social History Narrative   • No narrative on file    no history of bleeding disorder    SURGICAL HISTORY  Past Surgical History:   Procedure Laterality Date   • IRRIGATION & DEBRIDEMENT ORTHO Right 12/30/2018    Procedure: IRRIGATION & DEBRIDEMENT, gastroc recession, ostectomy;  Surgeon: Tomi Kwon M.D.;  Location: SURGERY [facility];  Service: Orthopedics   • GYN SURGERY      hysterectomy       CURRENT MEDICATIONS  Home Medications     Reviewed by Shara Duvall R.N. (Registered Nurse) on 03/19/19 at 1226  Med List Status: Not Addressed   Medication Last Dose Status   Calcium  "Carb-Cholecalciferol (CALCIUM 1000 + D PO)  Active   carvedilol (COREG) 25 MG Tab  Active   clopidogrel (PLAVIX) 75 MG TABS  Active   CRANBERRY PO  Active   enoxaparin (LOVENOX) 100 MG/ML Solution inj  Active   insulin glargine (LANTUS) 100 UNIT/ML Solution  Active   losartan (COZAAR) 50 MG Tab  Active   losartan-hydrochlorothiazide (HYZAAR) 50-12.5 MG per tablet  Active   metformin (GLUCOPHAGE) 1000 MG tablet  Active   niacin (SLO-NIACIN) 500 MG tablet  Active   oxybutynin (DITROPAN) 5 MG Tab  Active   pravastatin (PRAVACHOL) 20 MG Tab  Active   therapeutic multivitamin-minerals (THERAGRAN-M) Tab  Active   tizanidine (ZANAFLEX) 4 MG Tab  Active   tolterodine (DETROL) 2 MG Tab  Active   warfarin (COUMADIN) 5 MG Tab  Active                ALLERGIES  No Known Allergies    PHYSICAL EXAM  VITAL SIGNS: /52   Pulse 64   Temp 36.8 °C (98.2 °F) (Temporal)   Resp 20   Ht 1.626 m (5' 4\")   Wt 90.7 kg (200 lb)   SpO2 93% Comment: wears home oxygen 3L  BMI 34.33 kg/m²       Constitutional: Well developed, Well nourished, No acute distress, Non-toxic appearance.   HENT: Normocephalic, Atraumatic, Bilateral external ears normal, Oropharynx moist, No oral exudates, Nose normal.   Eyes: PERRLA, EOMI, Conjunctiva normal, No discharge.   Neck: Normal range of motion, No tenderness, Supple, No stridor.   Cardiovascular: Normal heart rate, Normal rhythm, No murmurs, No rubs, No gallops.   Thorax & Lungs: Normal breath sounds, No respiratory distress, No wheezing, No chest tenderness.   Abdomen: Bowel sounds normal, Soft, No tenderness, No masses, No pulsatile masses.   Skin: Warm, Dry, No erythema, No rash.   Back: No tenderness, No CVA tenderness.   Extremities: Right elbow exam is notable for a 4 x 6 cm senile skin tear.  There is no underlying deep tissue laceration approximately 4.5 cm no arterial bleeding no extension into the joint.  Minimal pain with range of motion the right hip no shortening no internal or " external rotation neurologic: Alert & oriented x 3, Normal motor function, Normal sensory function, No focal deficits noted.   Psychiatric: Anxious    RADIOLOGY/PROCEDURES  DX-HIP-COMPLETE - UNILATERAL 2+ RIGHT   Final Result      No evidence of fracture or arthropathy.      DX-ELBOW-COMPLETE 3+ RIGHT   Final Result      No evidence of acute fracture or dislocation.        Physician procedure 4.5 cm right elbow deep tissue laceration.  Wound was anesthetized with a total of 10 cc of 1% lidocaine with epinephrine.  It was copiously irrigated with 500 cc of sterile saline.  Sterile prep and drape.  The overlying skin was too friable to close but the deep tissue was approximated with a total of 5, buried absorbable 3.0 Vicryl sutures.  No complications    COURSE & MEDICAL DECISION MAKING  Pertinent Labs & Imaging studies reviewed. (See chart for details)  The wound was infiltrated with 10 cc of 1% lidocaine with epinephrine.  It was gently irrigated with sterile saline.  This is a senile skin tear and is not amenable to primary closure.  I will apply some Surgicel and nonadhesive dressings and recommend the patient either follow-up here in 2 days for wound check or with PCP.  She is on Plavix apparently not on Coumadin but did not strike her head and is neurologically intact and did not feel that any additional imaging studies or any blood test are indicated    FINAL IMPRESSION  1.  Right elbow contusion with 4.5 centigrade deep tissue laceration  2.  Right hip contusion      Electronically signed by: Adams Spence, 3/19/2019 12:51 PM

## 2019-03-20 PROBLEM — N30.00 ACUTE CYSTITIS WITHOUT HEMATURIA: Status: ACTIVE | Noted: 2019-01-01

## 2019-03-20 PROBLEM — E11.621 DIABETIC ULCER OF RIGHT MIDFOOT ASSOCIATED WITH TYPE 2 DIABETES MELLITUS (HCC): Status: RESOLVED | Noted: 2018-01-01 | Resolved: 2019-01-01

## 2019-03-20 PROBLEM — L97.419 DIABETIC ULCER OF RIGHT MIDFOOT ASSOCIATED WITH TYPE 2 DIABETES MELLITUS (HCC): Status: RESOLVED | Noted: 2018-01-01 | Resolved: 2019-01-01
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
## 2019-03-20 NOTE — ED NOTES
Report received from Jessica.    Pt resting comfortably in bed, Pt rotated to lie on right side. Vitals stable. Pt updated on plan of care, no further questions at this time.

## 2019-03-20 NOTE — ED NOTES
Pt thinks she took her home medications today at [place]. No record sent with patient. Meds held at this time.   Awaiting transport. RN apologized for delay

## 2019-03-20 NOTE — ASSESSMENT & PLAN NOTE
With cardiomyopathy, EF 45%. Currently euvolemic  - continue medical management  - continue home lasix 20mg daily

## 2019-03-20 NOTE — ED NOTES
Med Rec complete per Pt at bedside and MAR from [place]  Allergies Reviewed    Pt Completed a 7 day course of PREDNISONE on 3/18

## 2019-03-20 NOTE — ED PROVIDER NOTES
ED Provider Note    Scribed for Fernando Nash M.D. by Evelyn Lopez. 3/20/2019  10:13 AM    Primary care provider: Floyd Gould M.D.  Means of arrival: Ambulance  History obtained from: Patient  History limited by: Patient is a poor historian    CHIEF COMPLAINT  Chief Complaint   Patient presents with   • GLF     x2 yesterday. Pt states she fell while trying to get in van and attempting to get out of a rolling chair at home. Negative LOC. C/o L hip and R elbow.        HPI  Leonie Brock is a 73 y.o. female with a history of MI and diabetes who presents to the Emergency Department for evaluation of generalized weakness after 2 ground falls yesterday. Patient was seen in this ED 1 day ago following a fall and underwent a laceration repair. She then returned home and sustained a second ground level fall and presented to [facility]. Patient currently complains of associated left hip pain. Patient was noted to have resided in a rehabilitation facility for 4 months and left AMA. She was sent to this ED given the multiple falls and worsening weakness. She denies head trauma or loss of consciousness.      HPI limited by patient being a poor historian.     REVIEW OF SYSTEMS  Pertinent positives include generalized weakness, falls, left hip pain.   Pertinent negatives include no head trauma, loss of consciousness.    See HPI for further details.     ROS limited by patient being a poor historian.     PAST MEDICAL HISTORY   has a past medical history of Arthritis; Backpain; CATARACT; Diabetes; and Myocardial infarct (HCC).    SURGICAL HISTORY   has a past surgical history that includes gyn surgery and irrigation & debridement ortho (Right, 12/30/2018).    SOCIAL HISTORY  Social History   Substance Use Topics   • Smoking status: Former Smoker   • Smokeless tobacco: Never Used   • Alcohol use No      History   Drug Use No       FAMILY HISTORY  History reviewed. No pertinent family history.    CURRENT MEDICATIONS  Home  "Medications    **Home medications have not yet been reviewed for this encounter**         ALLERGIES  No Known Allergies    PHYSICAL EXAM  VITAL SIGNS: /56   Pulse 81   Temp 36.6 °C (97.8 °F) (Temporal)   Resp 18   Ht 1.626 m (5' 4\")   Wt 87.1 kg (192 lb)   SpO2 98%   BMI 32.96 kg/m²     Nursing note and vitals reviewed.  Constitutional: Well-developed and well-nourished. No distress.   HENT: Head is normocephalic and atraumatic. Oropharynx is clear and moist without exudate or erythema.   Eyes: Pupils are equal, round, and reactive to light. Conjunctiva are normal.   Cardiovascular: Normal rate and regular rhythm. No murmur heard. Normal radial pulses.  Pulmonary/Chest: Breath sounds normal. No wheezes or rales.   Abdominal: Soft and non-tender. No distention    Musculoskeletal: Extremities exhibit normal range of motion, Trace edema to bilateral lower extremities. Mild tenderness to left hip.   Neurological: Awake, alert and oriented to person, place, and time. No focal deficits noted.  Skin: Skin is warm, Dressing about the elbow of right upper extremity. No rash.   Psychiatric: Normal mood and affect. Appropriate for clinical situation.    DIAGNOSTIC STUDIES / PROCEDURES    LABS    See laboratory studies from outside hospital.    All labs reviewed by me.    RADIOLOGY  OUTSIDE IMAGES-DX LOWER EXTREMITY, RIGHT   Final Result      OUTSIDE IMAGES-CT CERVICAL SPINE   Final Result      OUTSIDE IMAGES-DX CHEST   Final Result      OUTSIDE IMAGES-CT HEAD   Final Result      OUTSIDE IMAGES-DX PELVIS   Final Result      The radiologist's interpretation of all radiological studies have been reviewed by me.    COURSE & MEDICAL DECISION MAKING  Nursing notes, VS, PMSFHx reviewed in chart.     Review of medical records from previous facility showed:    UA:  2+ leukocyte  3+ bacteria  WBC 15    10:13 AM Patient seen and examined at bedside. Ordered urine culture to evaluate her symptoms. Patient was informed of " further plan of care including admission for further work up and possible placement in advanced facility.     The patient presents today with generalized weakness and multiple falls in the last 2 days.  She was seen at an outside hospital and transferred here.  Laboratory studies are consistent with urinary tract infection.    10:31 AM Consulted Dr. Gorman, Hospitalist, who agreed to admit patient.     DISPOSITION:  Patient will be admitted to Dr. Gorman, Hospitalist, in stable condition.    FINAL IMPRESSION  1. Fall, initial encounter    2. Urinary tract infection without hematuria, site unspecified    3. Generalized weakness    4. Encounter for wound re-check    5. Contusion of left hip, initial encounter       Evelyn BUI (Scribe), am scribing for, and in the presence of, Fernando Nash M.D..  Electronically signed by: Evelyn Lopez ([place]rcolby), 3/20/2019  Fernando BUI M.D. personally performed the services described in this documentation, as scribed by Evelyn Lopez in my presence, and it is both accurate and complete. C.     The note accurately reflects work and decisions made by me.  Fernando Nash  3/20/2019  11:21 AM

## 2019-03-20 NOTE — ASSESSMENT & PLAN NOTE
A1C 7%, with hyperglycemia in the setting of UTI.  - moderate sliding scale  - DM diet and hypoglycemia protocol  - lantus 34U qAM  - starting metformin  - updated A1C pending

## 2019-03-20 NOTE — H&P
Hospital Medicine History & Physical Note    Date of Service  3/20/2019    Primary Care Physician  Floyd Gould M.D.    Consultants  None    Code Status  Full code    Chief Complaint  Recurrent falls and UTI    History of Presenting Illness  73 y.o. female who presented 3/20/2019 with history of diabetes and diabetic foot ulcer for which she had a prolonged stay at [facility] and subsequently at [facility].  She was not happy with the care she was receiving at [facility] and left AMA yesterday.  While she was getting in her car she sustained a fall with a right arm laceration and was seen in our emergency room and underwent repair of her laceration.  She returned home and earlier today sustained a ground-level fall she did not pass out or suffer any injuries.  She was seen at her local emergency room was diagnosed with a UTI and was transferred to our facility for unclear reasons.  She was told that she needed a higher level of care.  She has been having urinary urgency she denies abdominal pain no dysuria no hematuria.  She is diabetic and is on insulin.  She is not sure of her medications as her  manages them and is not at her bedside.  She is maintained on insulin but he she is not sure of the dosage.  She denies any fever or chills.  She denies any diarrhea      Review of Systems  Review of Systems   All other systems reviewed and are negative.      Past Medical History   has a past medical history of Arthritis; Backpain; CATARACT; Diabetes; and Myocardial infarct (HCC).  Atrial fibrillation diabetic foot ulcer    Surgical History   has a past surgical history that includes gyn surgery and irrigation & debridement ortho (Right, 12/30/2018).     Family History  Reviewed and not pertinent to the presenting problem    Social History   reports that she has quit smoking. She has never used smokeless tobacco. She reports that she does not drink alcohol or use drugs.    Allergies  No Known  Allergies    Medications  Prior to Admission Medications   Prescriptions Last Dose Informant Patient Reported? Taking?   Diclofenac Sodium (VOLTAREN) 1 % Gel 3/19/2019 at 0800 MAR from Other Facility Yes Yes   Sig: Apply 1 Application to skin as directed 3 times a day. Knee pain   carvedilol (COREG) 3.125 MG Tab 3/18/2019 at 1800 MAR from Other Facility Yes Yes   Sig: Take 3.125 mg by mouth 2 times a day, with meals.   clopidogrel (PLAVIX) 75 MG Tab 3/19/2019 at 0800 MAR from Other Facility Yes Yes   Sig: Take 75 mg by mouth every day.   furosemide (LASIX) 20 MG Tab 3/19/2019 at 0800 MAR from Other Facility Yes Yes   Sig: Take 20 mg by mouth every day.   gabapentin (NEURONTIN) 400 MG Cap 3/19/2019 at 0800 MAR from Other Facility Yes Yes   Sig: Take 400 mg by mouth 2 times a day.   guaiFENesin LA (GUAIFENEX LA) 600 MG TABLET SR 12 HR 3/19/2019 at 0800 MAR from Other Facility Yes Yes   Sig: Take 600 mg by mouth every 12 hours.   insulin aspart (NOVOLOG) 100 UNIT/ML Solution 3/19/2019 at 1130 MAR from Other Facility Yes Yes   Sig: Inject 2-10 Units as instructed 3 times a day before meals. Sliding Scale   150 - 200 = 2 units  201 - 250 = 4 units  251 - 300 = 6 units  301 - 350 = 8 units  351 - 400 = 10 units  >401 Call MD   insulin detemir (LEVEMIR FLEXPEN) 100 UNIT/ML Solution Pen-injector injection 3/19/2019 at 0800 MAR from Other Facility Yes Yes   Sig: Inject 10 Units as instructed 2 times a day.   ipratropium-albuterol (DUONEB) 0.5-2.5 (3) MG/3ML nebulizer solution 3/17/2019 at 0400 MAR from Other Facility Yes Yes   Sig: 3 mL by Nebulization route 4 times a day.   omeprazole (PRILOSEC) 20 MG delayed-release capsule 3/19/2019 at 0800 MAR from Other Facility Yes Yes   Sig: Take 20 mg by mouth every day.   polyethylene glycol/lytes (MIRALAX) Pack 3/19/2019 at 0800 MAR from Other Facility Yes Yes   Sig: Take 17 g by mouth every day.   potassium chloride SA (KDUR) 20 MEQ Tab CR 3/16/2019 at 1100 MAR from Other Facility  Yes Yes   Sig: Take 20 mEq by mouth every day.   pravastatin (PRAVACHOL) 20 MG Tab 3/19/2019 at 2000 MAR from Other Facility Yes Yes   Sig: Take 20 mg by mouth every evening.   predniSONE (DELTASONE) 10 MG Tab 3/18/2019 at complete MAR from Other Facility Yes Yes   Sig: Take 40 mg by mouth every day. Pt Completed a 7 day course of PREDNISONE on 3/18   therapeutic multivitamin-minerals (THERAGRAN-M) Tab 3/19/2019 at 0800 MAR from Other Facility Yes Yes   Sig: Take 1 Tab by mouth every day.      Facility-Administered Medications: None       Physical Exam  Temp:  [36.1 °C (96.9 °F)-36.6 °C (97.8 °F)] 36.1 °C (96.9 °F)  Pulse:  [69-81] 75  Resp:  [12-18] 14  BP: (108)/(56) 108/56  SpO2:  [97 %-100 %] 100 %    Physical Exam   Constitutional: She is oriented to person, place, and time. She appears well-developed and well-nourished.   HENT:   Head: Normocephalic and atraumatic.   Right Ear: External ear normal.   Left Ear: External ear normal.   Mouth/Throat: No oropharyngeal exudate.   Eyes: Conjunctivae are normal. Right eye exhibits no discharge. Left eye exhibits no discharge. No scleral icterus.   Neck: Neck supple. No JVD present. No tracheal deviation present.   Cardiovascular: Normal rate and regular rhythm.  Exam reveals no gallop and no friction rub.    No murmur heard.  Pulmonary/Chest: Effort normal and breath sounds normal. No stridor. No respiratory distress. She has no wheezes. She has no rales. She exhibits no tenderness.   Abdominal: Soft. Bowel sounds are normal. She exhibits no distension. There is no tenderness. There is no rebound.   Musculoskeletal: She exhibits edema and deformity. She exhibits no tenderness.   Right arm wound dressed with clean dressing    Right foot surgical site healed   Neurological: She is alert and oriented to person, place, and time. No cranial nerve deficit. She exhibits normal muscle tone.   Skin: Skin is warm and dry. She is not diaphoretic. No cyanosis. Nails show no  clubbing.   Psychiatric: She has a normal mood and affect. Her behavior is normal. Thought content normal.   Nursing note and vitals reviewed.      Laboratory:          No results for input(s): ALTSGPT, ASTSGOT, ALKPHOSPHAT, TBILIRUBIN, DBILIRUBIN, GAMMAGT, AMYLASE, LIPASE, ALB, PREALBUMIN, GLUCOSE in the last 72 hours.              No results for input(s): TROPONINI in the last 72 hours.       Labs done at the outside facility were reviewed    WBC 15 hemoglobin 10.6 hematocrit 32.6 platelet count 272  Glucose 370 BUN 30 creatinine 1.0 sodium 140 potassium 4.2 chloride 97 bicarb 34 calcium 9.6 urinalysis revealed 2+ leukocyte esterase 5-10 white blood cells    Urinalysis:    No results found     Imaging:  OUTSIDE IMAGES-DX LOWER EXTREMITY, RIGHT   Final Result      OUTSIDE IMAGES-DX LOWER EXTREMITY, RIGHT   Final Result      OUTSIDE IMAGES-CT CERVICAL SPINE   Final Result      OUTSIDE IMAGES-DX CHEST   Final Result      OUTSIDE IMAGES-CT HEAD   Final Result      OUTSIDE IMAGES-DX PELVIS   Final Result            Assessment/Plan:  I anticipate this patient is appropriate for observation status at this time.    * Acute cystitis without hematuria   Assessment & Plan    Patient will be admitted and started on IV ceftriaxone will follow up on her culture results and de-escalate accordingly     Coronary artery disease involving native coronary artery of native heart without angina pectoris- (present on admission)   Assessment & Plan    With cardiomyopathy EF 45%     continue medical therapy with carvedilol losartan Plavix and pravastatin  Continue current dose of Lasix     Type 2 diabetes mellitus with skin complication, with long-term current use of insulin (HCC)- (present on admission)   Assessment & Plan    Continue Levemir and sliding scale insulin  Monitor CBGs and adjust accordingly     Essential hypertension- (present on admission)   Assessment & Plan    Continue carvedilol losartan monitor blood pressure and  adjust accordingly     Fall   Assessment & Plan    PT/OT mateus's discussed with the patient that she would likely need referral back to SNF given her recurrent falls she is agreeable to consider a different facility     Paroxysmal atrial fibrillation (HCC)- (present on admission)   Assessment & Plan    Continue carvedilol  Patient was discharged on warfarin from her last hospitalization she is not sure whether she has been taking this medication or not we will try to confirm her home medications  We will check INR and resume warfarin     Mixed hyperlipidemia- (present on admission)   Assessment & Plan    Continue pravastatin         VTE prophylaxis: warfarin/scd

## 2019-03-21 NOTE — PROGRESS NOTES
Received report and assumed patient care. Patient is AAOx4. Patient is complaining of pain in her elbow related to her stitches/wound and pain in her legs. Medicated with Tylenol per MAR. Assessment complete on 2L O2. Safety precautions, 2 RN skin check and hourly rounding in place.

## 2019-03-21 NOTE — CARE PLAN
Problem: Pain Management  Goal: Pain level will decrease to patient's comfort goal    Intervention: Follow pain managment plan developed in collaboration with patient and Interdisciplinary Team  Patient c/o pain. Medications given with good results. Pre and post pain assessment documented.

## 2019-03-21 NOTE — CARE PLAN
Problem: Safety  Goal: Will remain free from falls  Outcome: PROGRESSING AS EXPECTED  Educated patient to call for assistance.     Problem: Skin Integrity  Goal: Risk for impaired skin integrity will decrease  Outcome: PROGRESSING SLOWER THAN EXPECTED  Sacral wound present. Elbow wound with sutures present. Picture taken and uploaded.

## 2019-03-21 NOTE — THERAPY
"Occupational Therapy Evaluation completed.   Functional Status:  Max A with ADLs and txfs, 2/2 weakness  Plan of Care: Will benefit from Occupational Therapy 3 times per week  Discharge Recommendations:  Equipment: Will Continue to Assess for Equipment Needs. Post-acute therapy Discharge to a transitional care facility for continued therapy services.    See \"Rehab Therapy-Acute\" Patient Summary Report for complete documentation.    "

## 2019-03-21 NOTE — CARE PLAN
Problem: Safety  Goal: Will remain free from injury  Call light and personal belongings within reach. Pt instructed to call for assistance, pt verbalizes understanding. Non skid socks on. Strip alarm in place. Bed in lowest position.

## 2019-03-21 NOTE — PROGRESS NOTES
· 2 RN skin check complete with TABATHA [place].  · Devices in place oxygen tubing and glasses.  · Skin assessed under devices Yes.  · Confirmed pressure ulcers found on sacrum. Non blanching boggy heels. Right elbow laceration.  · Wound consult placed and wounds charted, photos uploaded.  · The following interventions in place moisture absorbant pads, dressing on elbow, heel float boots applied, extras pillows and assisted turns.

## 2019-03-21 NOTE — CARE PLAN
Problem: Safety  Goal: Will remain free from falls  Outcome: PROGRESSING SLOWER THAN EXPECTED  Patient wanting to get to the edge of the bed. Patient educated on fall risk.     Problem: Skin Integrity  Goal: Risk for impaired skin integrity will decrease  Outcome: PROGRESSING SLOWER THAN EXPECTED  Educated patient to call RN as soon as she feels wet.

## 2019-03-21 NOTE — PROGRESS NOTES
Hospital Medicine Daily Progress Note    Date of Service  3/21/2019    Chief Complaint  73 y.o. female admitted 3/20/2019 with UTI and fall    Hospital Course    This is a 73-year-old woman who presented 3/20/2019 with history of diabetes and diabetic foot ulcer for which she had a prolonged stay at [facility] and subsequently at skilled nursing facility.  She was not happy with the care she was receiving at [facility] and left AMA yesterday.  While she was getting in her car she sustained a fall with a right arm laceration and was seen in our emergency room and underwent repair of her laceration.  She returned home and earlier today sustained a ground-level fall she did not pass out or suffer any injuries.  She was seen at her local emergency room was diagnosed with a UTI and was transferred to our facility for unclear reasons.  She was admitted for treatment of her uncontrolled hyperglycemia and UTI.        Interval Problem Update  3/21 - resting comfortably in bed. Complaining of some mild back pain. Questions answered. Tentatively agreeable to SNF.     Consultants/Specialty  None    Code Status  Full    Disposition  Unclear, maybe SNF?    Review of Systems  Review of Systems   Constitutional: Negative for chills and fever.   Respiratory: Negative for cough and shortness of breath.    Cardiovascular: Negative for chest pain and palpitations.   Gastrointestinal: Negative for abdominal pain, constipation, diarrhea, nausea and vomiting.   Genitourinary: Positive for frequency. Negative for dysuria.   Musculoskeletal: Positive for back pain. Negative for myalgias.   Skin: Negative for itching.   Neurological: Negative for dizziness, focal weakness, weakness and headaches.   All other systems reviewed and are negative.       Physical Exam  Temp:  [36 °C (96.8 °F)-36.9 °C (98.4 °F)] 36 °C (96.8 °F)  Pulse:  [] 73  Resp:  [14-18] 18  BP: ()/(34-56) 98/42  SpO2:  [96 %-100 %] 99 %    Physical Exam    Constitutional: She appears well-developed.   Elderly, overnourished, comfortable appearing   HENT:   Head: Normocephalic and atraumatic.   Mouth/Throat: Oropharynx is clear and moist.   Eyes: Pupils are equal, round, and reactive to light. Conjunctivae are normal. No scleral icterus.   Cardiovascular: Normal rate, regular rhythm, normal heart sounds and intact distal pulses.    No murmur heard.  Pulmonary/Chest: Effort normal and breath sounds normal. No respiratory distress. She has no wheezes.   Abdominal: Soft. Bowel sounds are normal. She exhibits no distension. There is no tenderness.   Musculoskeletal: Normal range of motion. She exhibits no edema or tenderness.   Neurological: She is alert.   Vitals reviewed.      Fluids    Intake/Output Summary (Last 24 hours) at 03/21/19 1623  Last data filed at 03/20/19 1850   Gross per 24 hour   Intake              480 ml   Output                0 ml   Net              480 ml       Laboratory  Recent Labs      03/21/19   0402   WBC  10.5   RBC  2.65*   HEMOGLOBIN  8.7*   HEMATOCRIT  27.2*   MCV  102.6*   MCH  32.8   MCHC  32.0*   RDW  69.8*   PLATELETCT  180   MPV  10.0     Recent Labs      03/20/19   1002  03/21/19   0402   SODIUM  143  144   POTASSIUM  4.7  3.7   CHLORIDE  103  105   CO2  34*  28   GLUCOSE  244*  274*   BUN  26*  28*   CREATININE  1.02  1.05   CALCIUM  8.9  8.3*     Recent Labs      03/20/19   1002  03/21/19   0402   INR  1.24*  1.27*               Imaging  OUTSIDE IMAGES-DX LOWER EXTREMITY, RIGHT   Final Result      OUTSIDE IMAGES-DX LOWER EXTREMITY, RIGHT   Final Result      OUTSIDE IMAGES-CT CERVICAL SPINE   Final Result      OUTSIDE IMAGES-DX CHEST   Final Result      OUTSIDE IMAGES-CT HEAD   Final Result      OUTSIDE IMAGES-DX PELVIS   Final Result           Assessment/Plan  * Acute cystitis without hematuria- (present on admission)   Assessment & Plan    Active Orders     Ordered     Ordering Provider       Thu Mar 21, 2019 11:04 AM    03/21/19  1104  cefTRIAXone (ROCEPHIN) 1 g in  mL IVPB  EVERY 24 HOURS     Comments:  Per P&T Antimicrobial Dose Adjustment Protocol    Zak Hernandez M.D.      We will follow clinically; she likely can switch to PO tomorrow     Coronary artery disease involving native coronary artery of native heart without angina pectoris- (present on admission)   Assessment & Plan    With cardiomyopathy EF 45%   medical management   Diuresis at home dose     Type 2 diabetes mellitus with skin complication, with long-term current use of insulin (HCC)- (present on admission)   Assessment & Plan    In-hospital FSBG goal less than 180 mg/dL  SSI qAC & qHS when eating, q6 when NPO  Glucose - Accu-Ck   Date/Time Value Ref Range Status   03/21/2019 12:36  (H) 65 - 99 mg/dL Final     Comment:     Followed orders   03/21/2019 08:35  (H) 65 - 99 mg/dL Final   03/20/2019 09:07  (HH) 65 - 99 mg/dL Final   03/20/2019 05:53  (HH) 65 - 99 mg/dL Final        Essential hypertension- (present on admission)   Assessment & Plan    SBP goal less than 140 mmHg  DBP goal less than 90 mmHg  PRN and antihypertensives to titrate by hospitalist towards goal  Most recent Blood Pressure : (!) 98/42      Fall- (present on admission)   Assessment & Plan    PT/OT eval  Suspect will need SNF again     Paroxysmal atrial fibrillation (HCC)- (present on admission)   Assessment & Plan    Continue carvedilol  Patient was discharged on warfarin from her last hospitalization she is not sure whether she has been taking this medication or not we will try to confirm her home medications  We will check INR and resume warfarin     Mixed hyperlipidemia- (present on admission)   Assessment & Plan    Continue pravastatin          VTE prophylaxis: heparin

## 2019-03-21 NOTE — DISCHARGE PLANNING
Anticipated Discharge Disposition: skilled    Action: spoke to patient about d/c planning. Patient stated she would like to go home, but does not think she is strong enough to go. Patient stated she will go as long it isn't [facility].  Patient was d/c to [facility] and then transferred to [facility]. Patient stated she didn't like the way she was treated by staff at [facility].  OT has seen the patient and are recommending skilled, waiting on PT.  SW provide list of skilled facilities to patient    Barriers to Discharge: m/c and skilled acceptance    Plan: follow up with patient for choice

## 2019-03-21 NOTE — PROGRESS NOTES
· 2 RN skin check complete with TABATHA [place].  · Devices in place: nasal cannula, heel float boots, glasses.  · Skin assessed under devices: yes.  · Confirmed pressure ulcers found on: mid sacrum.  · New potential pressure ulcers noted on: N/A  · The following interventions in place: q2 turns, frequent linen changes due to incontinence, heel float boots, waffle mattress, pillows for positioning.    Elbow laceration with sutures covered with mepilex, heels are boggy and blanching, generalized bruising.

## 2019-03-21 NOTE — PROGRESS NOTES
Inpatient Anticoagulation Service Note    Date: 3/21/2019  Reason for Anticoagulation: Atrial Fibrillation   [id number] VASc Score: 5    Hemoglobin Value: (!) 8.7  Hematocrit Value: (!) 27.2  Lab Platelet Value: 180  Target INR: 2.0 to 3.0    INR from last 7 days     Date/Time INR Value    03/21/19 0402 (!)  1.27    03/20/19 1002 (!)  1.24        Dose from last 7 days     Date/Time Dose (mg)    03/21/19 0842  6    03/20/19 1700  5        Average Dose (mg): 5  Significant Interactions: Antibiotics, Proton Pump Inhibitor, Statin  Bridge Therapy: No   Reversal Agent Administered: Not Applicable    HPI: 72 yo female admitted on 3/22/19 for UTI, treating with antibiotics. Patient requires chronic anticoagulation with warfarin therapy for Afib - recently initiated on warfarin while inpatient, appears patient was non-compliant after discharge with new therapy. INR Subtherapeutic on admission. Warfarin 5mg po daily was the intended regimen.     Assessment: INR remains SUBtherapeutic following resumption of warfarin 5mg last night upon arrival. No bridge therapy needed for Afib indication with [id number] VASc 5 (Low/mod risk).     · Potential drug-drug interactions identified with acute inpatient medications: Ceftriaxone    · Potential drug-drug interactions identified with chronic home medications: Omeprazole and Pravastatin which will become established interactions with warfarin.   · Inpatient Diet: Cardiac, Diabetic     Plan: Warfarin 6mg po x one tonight followed by resumption of warfarin 5mg po daily. Further dose adjustments may be indicated. INR monitoring daily for now.     Education Material Provided?: No    Pharmacist suggested discharge dosing: Warfarin 5mg po daily.      Shasta K. Rubens, PharmD

## 2019-03-21 NOTE — THERAPY
"Physical Therapy Evaluation completed.   Bed Mobility:  Supine to Sit: Moderate Assist  Transfers: Sit to Stand: Maximal Assist (2 person/ total assist off toilet)  Gait: Level Of Assist: Moderate Assist with Front-Wheel Walker       Plan of Care: Will benefit from Physical Therapy 3 times per week  Discharge Recommendations: Equipment: Will Continue to Assess for Equipment Needs. Post-acute therapy Recommend inpatient transitional care services for continued physical therapy services.        See \"Rehab Therapy-Acute\" Patient Summary Report for complete documentation.     "

## 2019-03-21 NOTE — PROGRESS NOTES
Inpatient Anticoagulation Service Note    Date: 3/20/2019  Reason for Anticoagulation: Atrial Fibrillation   [id number] VASc Score: 5    Target INR: 2.0 to 3.0    INR from last 7 days     Date/Time INR Value    03/20/19 1002 (!)  1.24        Dose from last 7 days     Date/Time Dose (mg)    03/20/19 1700  5 mg daily        Average Dose (mg):  (Home dose: 5mg daily (per last visit))  Significant Interactions: Antibiotics, Clopidogrel, Proton Pump Inhibitor, Statin  Bridge Therapy: No     Reversal Agent Administered: Not Applicable  Comments: Admit for UTI. PMH of sahil - was on warfarin last admission but doesn't appear that the patient continued this medication outpatient. Last known dose was 5mg daily. No CBC to assess H/H; no indication of active bleeding. There is mention of frequent falls and a fall hx - weigh risk vs benefit of warfarin. DDI identified above. Pharmacy to [facility].     Plan:  Will start previous dose of 5 mg daily. INR daily x 3 ordered.   Education Material Provided?: No (Likely needs education again prior to d/c)  Pharmacist suggested discharge dosing: Warfarin 5 mg po daily     Shara Slade

## 2019-03-21 NOTE — RESPIRATORY CARE
COPD EDUCATION by COPD CLINICAL EDUCATOR  3/21/2019 at 6:49 AM by Monique Kaba     Patient reviewed by COPD education team. Patient does not qualify for COPD program.

## 2019-03-21 NOTE — WOUND TEAM
"[facility]  Inpatient Services  Initial Wound and Skin Care Evaluation    Admission Date:  3/20/2019   HPI, PMH, SH: Reviewed  Unit where seen by Wound Team: [id number]    WOUND CONSULT RELATED TO:  Sacral wound, right elbow wound, bilateral heels    SUBJECTIVE:  \"I got this a while ago\" (referring to sacral wound)     Self Report / Pain Level:  Pt states back is tender       OBJECTIVE:  Pt in bed, able to roll herself with some effort.      WOUND TYPE, LOCATION, CHARACTERISTICS (Pressure ulcers: location, stage, POA or date identified)       Pressure Injury 03/20/19 Coccyx (Active)   Pressure Injury Stage U 3/21/2019  1:00 PM   State of Healing Non-healing 3/21/2019  1:00 PM   Site Assessment [place] 3/21/2019  1:00 PM   Nataliya-wound Assessment Blanchable erythema;Scar tissue - proximal to wound is a tea saucer size echymotic area 3/21/2019  1:00 PM   Margins Defined edges 3/21/2019  1:00 PM   Wound Length (cm) 0.5 cm 3/21/2019  1:00 PM   Wound Width (cm) 0.5 cm 3/21/2019  1:00 PM   Wound Depth (cm) 0.2 cm 3/20/2019 11:44 AM   Wound Surface Area (cm^2) 0.25 cm^2 3/21/2019  1:00 PM   Tunneling 0 cm 3/21/2019  1:00 PM   Undermining 0 cm 3/21/2019  1:00 PM   Closure Secondary intention 3/21/2019  1:00 PM   Drainage Amount Scant 3/21/2019  1:00 PM   Drainage Description Serous 3/21/2019  1:00 PM   Non-staged Wound Description Partial thickness 3/20/2019 11:44 AM   Treatments Site care;Cleansed 3/21/2019  1:00 PM   Cleansing Approved Wound Cleanser 3/21/2019  1:00 PM   Dressing Options Mepilex 3/21/2019  1:00 PM   Dressing Changed Changed 3/21/2019  1:00 PM   Dressing Change Frequency Every 72 hrs 3/21/2019  1:00 PM   NEXT Dressing Change  03/24/19 3/21/2019  1:00 PM   NEXT Weekly Photo (Inpatient Only) 03/28/19 3/21/2019  1:00 PM   WOUND NURSE ONLY - Odor None 3/21/2019  1:00 PM   WOUND NURSE ONLY - Exposed Structures None 3/21/2019  1:00 PM   WOUND NURSE ONLY - Tissue Type and Percentage 100% yellow 3/21/2019  " 1:00 PM   WOUND NURSE ONLY - Time Spent with Patient (mins) 60 3/21/2019  1:00 PM        Wound 03/20/19 Skin Tear Elbow right elbow  (Active)   Site Assessment Red 3/21/2019  1:00 PM   Nataliya-wound Assessment Black;Excoriated 3/21/2019  1:00 PM   Margins Undefined edges 3/21/2019  1:00 PM   Wound Length (cm) 4.5 cm 3/21/2019  1:00 PM   Wound Width (cm) 3 cm 3/21/2019  1:00 PM   Wound Surface Area (cm^2) 13.5 cm^2 3/21/2019  1:00 PM   Tunneling 0 cm 3/21/2019  1:00 PM   Undermining 0 cm 3/21/2019  1:00 PM   Closure Secondary intention 3/21/2019  1:00 PM   Drainage Amount Scant 3/21/2019  1:00 PM   Drainage Description Serosanguineous 3/21/2019  1:00 PM   Treatments Site care;Cleansed 3/21/2019  1:00 PM   Cleansing Approved Wound Cleanser 3/21/2019  1:00 PM   Periwound Protectant Skin Moisturizer 3/21/2019  1:00 PM   Dressing Options Mepilex 3/21/2019  1:00 PM   Dressing Changed Changed 3/21/2019  1:00 PM   Dressing Change Frequency Every 72 hrs 3/21/2019  1:00 PM   NEXT Dressing Change  03/24/19 3/21/2019  1:00 PM   NEXT Weekly Photo (Inpatient Only) 03/28/19 3/21/2019  1:00 PM   WOUND NURSE ONLY - Odor None 3/21/2019  1:00 PM   WOUND NURSE ONLY - Exposed Structures None 3/21/2019  1:00 PM   WOUND NURSE ONLY - Tissue Type and Percentage 50% red, 50 black 3/21/2019  1:00 PM   WOUND NURSE ONLY - Time Spent with Patient (mins) 60 3/21/2019  1:00 PM     Bilateral heels are blanching with erythema L>R    Lab Values:    WBC:       WBC   Date/Time Value Ref Range Status   03/21/2019 04:02 AM 10.5 4.8 - 10.8 K/uL Final     AIC:      Lab Results   Component Value Date/Time    HBA1C 7.0 (H) 12/30/2018 04:19 AM         INTERVENTIONS BY WOUND TEAM:  Dressings removed, wounds cleansed with wound cleanser, elbow wound debrided of 2 cm2 loose slough and cleansed again.  Covered sacral wound with mepilex.  Covered  Right elbow with mepitel and mepilex sacral secured with rolled gauze.  Heel float boots in room and placed pt's feet.   Ordered a waffle mattress overlay for bed.      Dressing selection:  mepilex sacral         Interdisciplinary consultation:  RN, patient      EVALUATION:  Pt with traumatic and pressure related injuries which should resolve with protection and off loading.  Preventative measures for the heels in place.     Factors affecting wound healing:  Immobility, DM  Goals:  Steady decrease in wound area and depth weekly     NURSING PLAN OF CARE ORDERS (X):    Dressing changes: See Dressing Care orders:   X  Skin care: See Skin Care orders:   Rectal tube care: See Rectal Tube Care orders:   Other orders:    RSKIN: CURRENT (X) ORDERED (O)  Q shift Anthony:  X  Q shift pressure point assessments:  X  Pressure redistribution mattress        Waffle overlay O  CARLA      Bariatric CARLA      Bariatric foam        Heel float boots     X  Heels floated on pillows      Barrier wipes      Barrier Cream      Barrier paste      Sacral silicone dressing    X  Silicone O2 tubing      Anchorfast      Trach with Optifoam split foam       Waffle cushion      Rectal tube or BMS      Antifungal tx    Turn q 2 hours   X  Up to chair     Ambulate   PT/OT   X  Dietician      PO     TF   TPN   NPO   # days   Other       WOUND TEAM PLAN OF CARE (X):   NPWT change 3 x week:        Dressing changes by wound team:       Follow up as needed:     X  Other (explain):    Anticipated discharge plans (X):  SNF:        X - pt may need ongoing wound care upon discharge   Home Care:           Outpatient Wound Center:            Self Care:            Other:

## 2019-03-22 NOTE — DISCHARGE PLANNING
Anticipated Discharge Disposition: skilled    Action: spoke to patient about d/c planning.  Patient stated she and her  discussed, and [facility]. Patient stated her  has called them and they are requesting documentation be forwarded to them.    Faxed Choice to [facility]    Barriers to Discharge: Acceptance    Plan: follow up with [facility] for acceptance

## 2019-03-22 NOTE — PROGRESS NOTES
Received report and assumed patient care. Patient is AAOx4. No complaints of pain at this time. Assessment complete on 2L O2. No family present. Safety precautions and hourly rounding in place.

## 2019-03-22 NOTE — PROGRESS NOTES
Hospital Medicine Daily Progress Note    Date of Service  3/22/2019    Chief Complaint  73 y.o. female admitted 3/20/2019 with UTI and fall    Hospital Course    This is a 73-year-old woman who presented 3/20/2019 with history of diabetes and diabetic foot ulcer for which she had a prolonged stay at [facility] and subsequently at skilled nursing facility.  She was not happy with the care she was receiving at [facility] and left AMA yesterday.  While she was getting in her car she sustained a fall with a right arm laceration and was seen in our emergency room and underwent repair of her laceration.  She returned home and earlier today sustained a ground-level fall she did not pass out or suffer any injuries.  She was seen at her local emergency room was diagnosed with a UTI and was transferred to our facility for unclear reasons.  She was admitted for treatment of her uncontrolled hyperglycemia and UTI.        Interval Problem Update  3/22 - she is agreeable to SNF as long as it's not [facility]. Her  and SW are discussing. Questions answered. She wants a [place] catheter and I advised against it.     3/21 - resting comfortably in bed. Complaining of some mild back pain. Questions answered. Tentatively agreeable to SNF.     Consultants/Specialty  None    Code Status  Full    Disposition  SNF    Review of Systems  Review of Systems   Constitutional: Negative for chills and fever.   Respiratory: Negative for cough and shortness of breath.    Cardiovascular: Negative for chest pain and palpitations.   Gastrointestinal: Negative for abdominal pain, constipation, diarrhea, nausea and vomiting.   Genitourinary: Positive for frequency. Negative for dysuria.   Musculoskeletal: Positive for back pain. Negative for myalgias.   Skin: Negative for itching.   Neurological: Negative for dizziness, focal weakness, weakness and headaches.   All other systems reviewed and are negative.       Physical Exam  Temp:  [36 °C (96.8 °F)-36.5  °C (97.7 °F)] 36.4 °C (97.6 °F)  Pulse:  [72-88] 88  Resp:  [16-19] 16  BP: ()/(42-59) 125/50  SpO2:  [97 %-100 %] 98 %    Physical Exam   Constitutional: She appears well-developed.   Elderly, overnourished, comfortable appearing   HENT:   Head: Normocephalic and atraumatic.   Mouth/Throat: Oropharynx is clear and moist.   Eyes: Pupils are equal, round, and reactive to light. Conjunctivae are normal. No scleral icterus.   Cardiovascular: Normal rate, regular rhythm, normal heart sounds and intact distal pulses.    No murmur heard.  Pulmonary/Chest: Effort normal and breath sounds normal. No respiratory distress. She has no wheezes.   Abdominal: Soft. Bowel sounds are normal. She exhibits no distension. There is no tenderness.   Musculoskeletal: Normal range of motion. She exhibits no edema or tenderness.   Neurological: She is alert.   Vitals reviewed.  3/22 - acutely unchanged today    Fluids    Intake/Output Summary (Last 24 hours) at 03/22/19 0945  Last data filed at 03/21/19 1858   Gross per 24 hour   Intake              600 ml   Output                0 ml   Net              600 ml       Laboratory  Recent Labs      03/21/19   0402   WBC  10.5   RBC  2.65*   HEMOGLOBIN  8.7*   HEMATOCRIT  27.2*   MCV  102.6*   MCH  32.8   MCHC  32.0*   RDW  69.8*   PLATELETCT  180   MPV  10.0     Recent Labs      03/20/19   1002  03/21/19   0402   SODIUM  143  144   POTASSIUM  4.7  3.7   CHLORIDE  103  105   CO2  34*  28   GLUCOSE  244*  274*   BUN  26*  28*   CREATININE  1.02  1.05   CALCIUM  8.9  8.3*     Recent Labs      03/20/19   1002  03/21/19   0402  03/22/19   0257   INR  1.24*  1.27*  1.25*               Imaging  OUTSIDE IMAGES-DX LOWER EXTREMITY, RIGHT   Final Result      OUTSIDE IMAGES-DX LOWER EXTREMITY, RIGHT   Final Result      OUTSIDE IMAGES-CT CERVICAL SPINE   Final Result      OUTSIDE IMAGES-DX CHEST   Final Result      OUTSIDE IMAGES-CT HEAD   Final Result      OUTSIDE IMAGES-DX PELVIS   Final Result            Assessment/Plan  * Acute cystitis without hematuria- (present on admission)   Assessment & Plan    She got 3 days' worth and she is improved enough. She has completed her dose.     Coronary artery disease involving native coronary artery of native heart without angina pectoris- (present on admission)   Assessment & Plan    With cardiomyopathy EF 45%   medical management   Diuresis at home dose     Type 2 diabetes mellitus with skin complication, with long-term current use of insulin (HCC)- (present on admission)   Assessment & Plan    In-hospital FSBG goal less than 180 mg/dL  SSI qAC & qHS when eating, q6 when NPO  Glucose - Accu-Ck   Date/Time Value Ref Range Status   03/22/2019 08:15  (H) 65 - 99 mg/dL Final   03/21/2019 08:16  (H) 65 - 99 mg/dL Final   03/21/2019 05:05  (H) 65 - 99 mg/dL Final   03/21/2019 12:36  (H) 65 - 99 mg/dL Final     Comment:     Followed orders   Increased Lantus yesterday, will consider increasing again tomorrow.     Essential hypertension- (present on admission)   Assessment & Plan    SBP goal less than 140 mmHg  DBP goal less than 90 mmHg  PRN and antihypertensives to titrate by hospitalist towards goal  Most recent Blood Pressure : 125/50      Fall- (present on admission)   Assessment & Plan    PT/OT eval  Suspect will need SNF again     Paroxysmal atrial fibrillation (HCC)- (present on admission)   Assessment & Plan    Continue carvedilol  Patient was discharged on warfarin from her last hospitalization she is not sure whether she has been taking this medication or not we will try to confirm her home medications  We will check INR and resume warfarin     Mixed hyperlipidemia- (present on admission)   Assessment & Plan    Continue pravastatin          VTE prophylaxis: heparin

## 2019-03-22 NOTE — CARE PLAN
Problem: Communication  Goal: The ability to communicate needs accurately and effectively will improve  Outcome: PROGRESSING AS EXPECTED  Actively listened to pt    Problem: Skin Integrity  Goal: Risk for impaired skin integrity will decrease  Outcome: PROGRESSING AS EXPECTED  Q2 turn, Frequent linen change. Heel float boots. Waffle overlay. Pillows used for positioning.     Problem: Pain Management  Goal: Pain level will decrease to patient's comfort goal  Outcome: PROGRESSING AS EXPECTED  Pt complained pain 9/10. PRN Tylenol is available for pain. Tylenol administered. Pt fell into sleep and hasn't complained pain so far this shift.

## 2019-03-22 NOTE — CARE PLAN
Problem: Bowel/Gastric:  Goal: Normal bowel function is maintained or improved  Outcome: PROGRESSING SLOWER THAN EXPECTED  Educated patient to call RN when she has to go to the bathroom.     Problem: Skin Integrity  Goal: Risk for impaired skin integrity will decrease  Outcome: PROGRESSING AS EXPECTED  Patient has a pink and blanching sacrum. Mepilex and barrier cream being used.

## 2019-03-22 NOTE — PROGRESS NOTES
Inpatient Anticoagulation Service Note    Date: 3/22/2019  Reason for Anticoagulation: Atrial Fibrillation   [id number] VASc Score: 5  HAS-BLED Score: 3     Hemoglobin Value: (!) 8.7  Hematocrit Value: (!) 27.2  Lab Platelet Value: 180  Target INR: 2.0 to 3.0    INR from last 7 days     Date/Time INR Value    03/22/19 0257 (!)  1.25    03/21/19 0402 (!)  1.27    03/20/19 1002 (!)  1.24        Dose from last 7 days     Date/Time Dose (mg)    03/22/19 1500  6    03/21/19 0842  6    03/20/19 1700  5        Average Dose (mg): 5  Significant Interactions: Clopidogrel, Proton Pump Inhibitor, Statin  Bridge Therapy: No   Reversal Agent Administered: Not Applicable    HPI: 74 yo female admitted on 3/22/19 for UTI, treating with antibiotics. Patient requires chronic anticoagulation with warfarin therapy for Afib - recently initiated on warfarin while inpatient, appears patient was non-compliant after discharge with new therapy. INR Subtherapeutic on admission. Warfarin 5mg po daily was the intended regimen.      Assessment: INR remains SUbtherapeutic with slight trend downward 1.25 from 1.27 following an warfarin 6mg last night. Anticipate 3 - 5 days for warfarin to achieve full anticoagulation. Warfarin 6mg given last night also not yet fully reflected in today's INR.   · Potential drug-drug interactions identified with acute inpatient medications: Ceftriaxone    · Potential drug-drug interactions identified with chronic home medications: Omeprazole and Pravastatin which will become established interactions with warfarin.   · Inpatient Diet: Cardiac, Diabetic      Plan: Continue warfarin 6mg tonight. INR monitoring daily.      Education Material Provided?: No     Pharmacist suggested discharge dosing: Warfarin 5mg po daily.      Shasta Art, PharmD

## 2019-03-22 NOTE — PROGRESS NOTES
· 2 RN skin check complete with TABATHA Potts.   · Devices in place: nasal cannula, heel float boots  · Skin assessed under devices: yes.  · Confirmed pressure ulcers found on: scarum.  · New potential pressure ulcers noted on NA.   · The following skin issues noted: Reddish bruising on BUE. Scabs LUE and laceration on Right elbow with dressing on. C/D/I.  Heels are boggy and blanching. Old healed incision on right foot.  · The following interventions in place: Q2 turn, Frequent linen change. Heel float boots. Waffle overlay. Pillows used for positioning.

## 2019-03-22 NOTE — DISCHARGE PLANNING
Received Choice form at 0845  Agency/Facility Name: [facility]  Referral sent per Choice form at 0900

## 2019-03-22 NOTE — PROGRESS NOTES
Gibran Brock () called and would like to be notified when Leonie is going to be discharged. His phone number is [phone].

## 2019-03-22 NOTE — DISCHARGE PLANNING
Agency/Facility Name: [facility]  Spoke To: Brianne  Outcome: Referral currently under review.

## 2019-03-22 NOTE — PROGRESS NOTES
Assumed care of pt at shift change. A/Ox4, discussed plan of care. Pt stated that her  wants her to go to [facility]. Will pass on to next shift to let  know.    All needs met at this time. Bed in lowest position, treaded socks on, personal belongings and call light within reach, instructed to call for any assistance, hourly rounding in place.

## 2019-03-23 NOTE — PROGRESS NOTES
· 2 RN skin check complete with TABATHA Singletary.  · Devices in place Nasal Cannula.  · Skin assessed under devices Yes, bilateral back of the ears is pink and blanching.  · Confirmed pressure ulcers found on sacral area.  · New potential pressure ulcers noted on N/A. Wound consult placed and wound reported.    Noted wound in the right elbow. Scabs and bruises in lower extremities, bilaterally. Sacral area had open area and bruise.     · The following interventions in place Applied barrier cream in the sacral area. Incontinence care done. Assisted to turn to sides every 2 hours.. On pressure redistribution mattress

## 2019-03-23 NOTE — PROGRESS NOTES
Hospital Medicine Daily Progress Note    Date of Service  3/23/2019    Chief Complaint  73 y.o. female admitted 3/20/2019 with UTI and fall    Hospital Course    This is a 73-year-old woman who presented 3/20/2019 with history of diabetes and diabetic foot ulcer for which she had a prolonged stay at [facility] and subsequently at skilled nursing facility.  She was not happy with the care she was receiving at [facility] and left AMA yesterday.  While she was getting in her car she sustained a fall with a right arm laceration and was seen in our emergency room and underwent repair of her laceration.  She returned home and earlier today sustained a ground-level fall she did not pass out or suffer any injuries.  She was seen at her local emergency room was diagnosed with a UTI and was transferred to our facility for unclear reasons.  She was admitted for treatment of her uncontrolled hyperglycemia and UTI.        Interval Problem Update  3/23 -  thinks that [facility] may be a better fit for patient then [place] [facility]. CM aware.  Otherwise, no acute overnight events.      3/22 - she is agreeable to SNF as long as it's not [place]. Her  and SW are discussing. Questions answered. She wants a Whiting catheter and I advised against it.     3/21 - resting comfortably in bed. Complaining of some mild back pain. Questions answered. Tentatively agreeable to SNF.     Consultants/Specialty  None    Code Status  Full    Disposition  SNF    Review of Systems  Review of Systems   Constitutional: Negative for chills and fever.   Respiratory: Negative for cough and shortness of breath.    Cardiovascular: Negative for chest pain and palpitations.   Gastrointestinal: Negative for abdominal pain, constipation, diarrhea, nausea and vomiting.   Genitourinary: Positive for frequency. Negative for dysuria.   Musculoskeletal: Positive for back pain. Negative for myalgias.   Skin: Negative for itching.   Neurological: Negative for  dizziness, focal weakness, weakness and headaches.   All other systems reviewed and are negative.       Physical Exam  Temp:  [36.3 °C (97.4 °F)] 36.3 °C (97.4 °F)  Pulse:  [61-92] 71  Resp:  [16-17] 16  BP: ()/(41-56) 102/46  SpO2:  [97 %-100 %] 97 %    Physical Exam   Constitutional: She appears well-developed.   Elderly, overnourished, comfortable appearing   HENT:   Head: Normocephalic and atraumatic.   Mouth/Throat: Oropharynx is clear and moist.   Eyes: Pupils are equal, round, and reactive to light. Conjunctivae are normal. No scleral icterus.   Cardiovascular: Normal rate, regular rhythm, normal heart sounds and intact distal pulses.    No murmur heard.  Pulmonary/Chest: Effort normal and breath sounds normal. No respiratory distress. She has no wheezes.   Abdominal: Soft. Bowel sounds are normal. She exhibits no distension. There is no tenderness.   Musculoskeletal: Normal range of motion. She exhibits no edema or tenderness.   Neurological: She is alert.   Vitals reviewed.  3/22 - acutely unchanged today    Fluids    Intake/Output Summary (Last 24 hours) at 03/23/19 1247  Last data filed at 03/23/19 1000   Gross per 24 hour   Intake              660 ml   Output                0 ml   Net              660 ml       Laboratory  Recent Labs      03/21/19   0402   WBC  10.5   RBC  2.65*   HEMOGLOBIN  8.7*   HEMATOCRIT  27.2*   MCV  102.6*   MCH  32.8   MCHC  32.0*   RDW  69.8*   PLATELETCT  180   MPV  10.0     Recent Labs      03/21/19   0402   SODIUM  144   POTASSIUM  3.7   CHLORIDE  105   CO2  28   GLUCOSE  274*   BUN  28*   CREATININE  1.05   CALCIUM  8.3*     Recent Labs      03/21/19   0402  03/22/19   0257  03/23/19   0304   INR  1.27*  1.25*  1.36*               Current Facility-Administered Medications:   •  Respiratory Care per Protocol, , Nebulization, Continuous RT, Wilman Dukes M.D.  •  [START ON 3/24/2019] insulin glargine (LANTUS) injection 34 Units, 34 Units, Subcutaneous, Wilman DIAS  NAV Dukes  •  warfarin (COUMADIN) tablet 6 mg, 6 mg, Oral, COUMADIN-DAILY, Zak Hernandez M.D., 6 mg at 03/22/19 1713  •  clopidogrel (PLAVIX) tablet 75 mg, 75 mg, Oral, Q EVENING, Alden Gorman M.D., 75 mg at 03/22/19 1711  •  losartan (COZAAR) tablet 50 mg, 50 mg, Oral, DAILY, Alden Gorman M.D., 50 mg at 03/23/19 0457  •  oxybutynin (DITROPAN) tablet 5 mg, 5 mg, Oral, DAILY, Alden Gorman M.D., 5 mg at 03/23/19 0457  •  pravastatin (PRAVACHOL) tablet 20 mg, 20 mg, Oral, Nightly, Alden Gorman M.D., 20 mg at 03/22/19 2137  •  therapeutic multivitamin-minerals (THERAGRAN-M) tablet 1 Tab, 1 Tab, Oral, DAILY, Alden Gorman M.D., 1 Tab at 03/23/19 0457  •  MD Alert...Warfarin per Pharmacy, , Other, PHARMACY TO DOSE, Alden Gorman M.D.  •  senna-docusate (PERICOLACE or SENOKOT S) 8.6-50 MG per tablet 2 Tab, 2 Tab, Oral, BID, 2 Tab at 03/21/19 0542 **AND** polyethylene glycol/lytes (MIRALAX) PACKET 1 Packet, 1 Packet, Oral, QDAY PRN **AND** magnesium hydroxide (MILK OF MAGNESIA) suspension 30 mL, 30 mL, Oral, QDAY PRN **AND** bisacodyl (DULCOLAX) suppository 10 mg, 10 mg, Rectal, QDAY PRN, Alden Gorman M.D.  •  acetaminophen (TYLENOL) tablet 650 mg, 650 mg, Oral, Q6HRS PRN, Alden Gorman M.D., 650 mg at 03/22/19 1526  •  ondansetron (ZOFRAN) syringe/vial injection 4 mg, 4 mg, Intravenous, Q4HRS PRN, Alden Gorman M.D.  •  ondansetron (ZOFRAN ODT) dispertab 4 mg, 4 mg, Oral, Q4HRS PRN, Alden Gorman M.D.  •  insulin regular (HUMULIN R) injection 2-9 Units, 2-9 Units, Subcutaneous, 4X/DAY ACHS, 3 Units at 03/23/19 0857 **AND** Accu-Chek ACHS, , , Q AC AND BEDTIME(S) **AND** NOTIFY MD and PharmD, , , Once **AND** glucose 4 g chewable tablet 16 g, 16 g, Oral, Q15 MIN PRN **AND** dextrose 50% (D50W) injection 25 mL, 25 mL, Intravenous, Q15 MIN PRN, Alden Gorman M.D.  •  carvedilol (COREG) tablet 3.125 mg, 3.125 mg, Oral, BID WITH MEALS,  Alden Gorman M.D., 3.125 mg at 03/23/19 0856  •  furosemide (LASIX) tablet 20 mg, 20 mg, Oral, DAILY, Alden Gorman M.D., 20 mg at 03/23/19 0457  •  gabapentin (NEURONTIN) capsule 400 mg, 400 mg, Oral, BID, Alden Gorman M.D., 400 mg at 03/23/19 0458  •  ipratropium-albuterol (DUONEB) nebulizer solution, 3 mL, Nebulization, 4X/DAY, Alden Gorman M.D., 3 mL at 03/21/19 1121  •  omeprazole (PRILOSEC) capsule 20 mg, 20 mg, Oral, DAILY, Alden Gorman M.D., 20 mg at 03/23/19 0454  •  potassium chloride SA (Kdur) tablet 20 mEq, 20 mEq, Oral, DAILY, Alden Gorman M.D., 20 mEq at 03/23/19 0457          Assessment/Plan  * Acute cystitis without hematuria- (present on admission)   Assessment & Plan    She got 3 days' worth and she is improved enough. She has completed her dose.     Coronary artery disease involving native coronary artery of native heart without angina pectoris- (present on admission)   Assessment & Plan    With cardiomyopathy EF 45%   medical management   Diuresis at home dose     Type 2 diabetes mellitus with skin complication, with long-term current use of insulin (HCC)- (present on admission)   Assessment & Plan    In-hospital FSBG goal less than 180 mg/dL  SSI qAC & qHS when eating, q6 when NPO  Glucose - Accu-Ck   Date/Time Value Ref Range Status   03/22/2019 08:15  (H) 65 - 99 mg/dL Final   03/21/2019 08:16  (H) 65 - 99 mg/dL Final   03/21/2019 05:05  (H) 65 - 99 mg/dL Final   03/21/2019 12:36  (H) 65 - 99 mg/dL Final     Comment:     Followed orders   Increased Lantus yesterday, will consider increasing again tomorrow.     Essential hypertension- (present on admission)   Assessment & Plan    SBP goal less than 140 mmHg  DBP goal less than 90 mmHg  PRN and antihypertensives to titrate by hospitalist towards goal     Fall- (present on admission)   Assessment & Plan    PT/OT eval  Suspect will need SNF again     Paroxysmal atrial  fibrillation (HCC)- (present on admission)   Assessment & Plan    Continue carvedilol  Patient was discharged on warfarin from her last hospitalization she is not sure whether she has been taking this medication or not we will try to confirm her home medications  We will check INR and resume warfarin     Mixed hyperlipidemia- (present on admission)   Assessment & Plan    Continue pravastatin          VTE prophylaxis: heparin    Patient medically cleared for discharge to skilled nursing facility once appropriate when can be found.  CM aware.

## 2019-03-23 NOTE — PROGRESS NOTES
· 2 RN skin check complete with TABATHA Hayward.  · Devices in place: nasal cannula, heel float boots, glasses.  · Skin assessed under devices: yes.  · Confirmed pressure ulcers found on: mid sacrum.  · New potential pressure ulcers noted on: N/A  · The following interventions in place: 2q turns, frequent linen changes due to incontinence, heel float boots, waffle mattress, pillows for positioning.     Elbow laceration with sutures covered in mepilex. Heels are boggy, red and blanching. Generalized bruising throughout.

## 2019-03-23 NOTE — PROGRESS NOTES
Inpatient Anticoagulation Service Note    Date: 3/23/2019  Reason for Anticoagulation: Atrial Fibrillation   [id number] VASc Score: 5  HAS-BLED Score: 3     Hemoglobin Value: (!) 8.7  Hematocrit Value: (!) 27.2  Lab Platelet Value: 180  Target INR: 2.0 to 3.0    INR from last 7 days     Date/Time INR Value    03/23/19 0304 (!)  1.36    03/22/19 0257 (!)  1.25    03/21/19 0402 (!)  1.27    03/20/19 1002 (!)  1.24        Dose from last 7 days     Date/Time Dose (mg)    03/23/19 1400  7.5    03/22/19 1500  6    03/21/19 0842  6    03/20/19 1700  5        Average Dose (mg): 5  Significant Interactions: Clopidogrel, Proton Pump Inhibitor, Statin  Bridge Therapy: No   Reversal Agent Administered: Not Applicable      HPI: 72 yo female admitted on 3/22/19 for UTI, treating with antibiotics. Patient requires chronic anticoagulation with warfarin therapy for Afib - recently initiated on warfarin while inpatient, appears patient was non-compliant after discharge with new therapy. INR Subtherapeutic on admission. Warfarin 5mg po daily was the intended regimen.      Assessment: INR remains SUBtherapeutic with some movement towards goal, 1.36 from 1.25 following 2nd dose of warfarin 6mg.   · Potential drug-drug interactions identified with acute inpatient medications: Ceftriaxone complete (3/20 - 3/22)  · Potential drug-drug interactions identified with chronic home medications: Omeprazole and Pravastatin which will become established interactions with warfarin.   · Inpatient Diet: Cardiac, Diabetic      Plan: Increase to warfarin 7.5mg po daily. Continue daily INR monitoring.      Education Material Provided?: No     Pharmacist suggested discharge dosing: Warfarin 7.5mg po daily.      Shasta Art, PharmD

## 2019-03-23 NOTE — PROGRESS NOTES
Pt eating breakfast sitting up in bed. No needs at this time; no c/o pain. Insulin coverage given for high blood glucose per MAR.       Safety: Call light in reach, fall precautions in place.

## 2019-03-23 NOTE — PROGRESS NOTES
Fax received by [facility] containing urine culture results. Results faxed to Medical Records for input into EMR.

## 2019-03-24 NOTE — PROGRESS NOTES
Pt alert and oriented x4. Wound care done. Incontinence care. Safety precautions in placed. Bed in lowest position. Upper siderails up. Reinforced the use of call light when needing assistance.

## 2019-03-24 NOTE — PROGRESS NOTES
Inpatient Anticoagulation Service Note    Date: 3/24/2019  Reason for Anticoagulation: Atrial Fibrillation   [id number] VASc Score: 5  HAS-BLED Score: 3     Hemoglobin Value: (!) 8.7  Hematocrit Value: (!) 27.2  Lab Platelet Value: 180  Target INR: 2.0 to 3.0    INR from last 7 days     Date/Time INR Value    03/24/19 0211 (!)  1.45    03/23/19 0304 (!)  1.36    03/22/19 0257 (!)  1.25    03/21/19 0402 (!)  1.27    03/20/19 1002 (!)  1.24        Dose from last 7 days     Date/Time Dose (mg)    03/24/19 0800  7.5    03/23/19 1400  7.5    03/22/19 1500  6    03/21/19 0842  6    03/20/19 1700  5        Average Dose (mg): 5  Significant Interactions: Clopidogrel, Proton Pump Inhibitor, Statin  Bridge Therapy: No   Reversal Agent Administered: Not Applicable      HPI: 72 yo female admitted on 3/22/19 for UTI, treating with antibiotics. Patient requires chronic anticoagulation with warfarin therapy for Afib - recently initiated on warfarin while inpatient, appears patient was non-compliant after discharge with new therapy. INR Subtherapeutic on admission. Warfarin 5mg po daily was the intended regimen.      Assessment: INR remains SUBtherapeutic, continuing to trend upward 1.45 from 1.36 following dose increase to warfarin 7.5mg last night - not yet fully reflected in today's INR.   · Potential drug-drug interactions identified with acute inpatient medications: Ceftriaxone complete (3/20 - 3/22)  · Potential drug-drug interactions identified with chronic home medications: Omeprazole and Pravastatin which will become established interactions with warfarin.   · Inpatient Diet: Cardiac, Diabetic      Plan: Continue warfarin 7.5mg po daily. Continue daily INR monitoring.      Education Material Provided?: No     Pharmacist suggested discharge dosing: Warfarin 7.5mg po daily.      Shasta Art, PharmD

## 2019-03-24 NOTE — CARE PLAN
Problem: Safety  Goal: Will remain free from falls  Outcome: PROGRESSING AS EXPECTED  Pt has had no falls this shift.     Problem: Skin Integrity  Goal: Risk for impaired skin integrity will decrease  Outcome: PROGRESSING SLOWER THAN EXPECTED  Pt wanting to take break overnight from heel float boots despite education. Pt stated she will allow for them to be placed back in the morning.

## 2019-03-24 NOTE — PROGRESS NOTES
Hospital Medicine Daily Progress Note    Date of Service  3/24/2019    Chief Complaint  73 y.o. female admitted 3/20/2019 with UTI and fall    Hospital Course    This is a 73-year-old woman who presented 3/20/2019 with history of diabetes and diabetic foot ulcer for which she had a prolonged stay at [facility] and subsequently at skilled nursing facility.  She was not happy with the care she was receiving at [facility] and left AMA yesterday.  While she was getting in her car she sustained a fall with a right arm laceration and was seen in our emergency room and underwent repair of her laceration.  She returned home and earlier today sustained a ground-level fall she did not pass out or suffer any injuries.  She was seen at her local emergency room was diagnosed with a UTI and was transferred to our facility for unclear reasons.  She was admitted for treatment of her uncontrolled hyperglycemia and UTI.        Interval Problem Update  3/24 - Continuing to await SNF placement.  Patient considering checking out to home with .  Responds well to gentle advisory that this is likely unwise given her debility and his inability to care for her.      3/23 -  thinks that [facility] may be a better fit for patient then [place]. CM aware.  Otherwise, no acute overnight events.      3/22 - she is agreeable to SNF as long as it's not [place]. Her  and SW are discussing. Questions answered. She wants a Whiting catheter and I advised against it.     3/21 - resting comfortably in bed. Complaining of some mild back pain. Questions answered. Tentatively agreeable to SNF.     Consultants/Specialty  None    Code Status  Full    Disposition  SNF    Review of Systems  Review of Systems   Constitutional: Negative for chills and fever.   Respiratory: Negative for cough and shortness of breath.    Cardiovascular: Negative for chest pain and palpitations.   Gastrointestinal: Negative for abdominal pain, constipation,  diarrhea, nausea and vomiting.   Genitourinary: Negative for dysuria and frequency.   Musculoskeletal: Positive for back pain. Negative for myalgias.   Skin: Negative for itching.   Neurological: Negative for dizziness, focal weakness, weakness and headaches.   All other systems reviewed and are negative.       Physical Exam  Temp:  [36.4 °C (97.6 °F)-36.6 °C (97.9 °F)] 36.6 °C (97.8 °F)  Pulse:  [64-71] 67  Resp:  [16] 16  BP: (100-103)/(51-60) 102/55  SpO2:  [93 %-97 %] 96 %    Physical Exam   Constitutional: She appears well-developed.   Elderly, overnourished, comfortable appearing   HENT:   Head: Normocephalic and atraumatic.   Mouth/Throat: Oropharynx is clear and moist.   Eyes: Pupils are equal, round, and reactive to light. Conjunctivae are normal. No scleral icterus.   Cardiovascular: Normal rate, regular rhythm, normal heart sounds and intact distal pulses.    No murmur heard.  Pulmonary/Chest: Effort normal and breath sounds normal. No respiratory distress. She has no wheezes.   Abdominal: Soft. Bowel sounds are normal. She exhibits no distension. There is no tenderness.   Musculoskeletal: Normal range of motion. She exhibits no edema or tenderness.   Neurological: She is alert.   Vitals reviewed.  3/22 - acutely unchanged today    Fluids    Intake/Output Summary (Last 24 hours) at 03/24/19 1314  Last data filed at 03/23/19 2000   Gross per 24 hour   Intake              338 ml   Output                0 ml   Net              338 ml       Laboratory          Recent Labs      03/22/19   0257  03/23/19   0304  03/24/19   0211   INR  1.25*  1.36*  1.45*               Current Facility-Administered Medications:   •  Respiratory Care per Protocol, , Nebulization, Continuous RT, Wilman Dukes M.D.  •  insulin glargine (LANTUS) injection 34 Units, 34 Units, Subcutaneous, QAM, Wilman Dukes M.D., 34 Units at 03/24/19 0835  •  ipratropium-albuterol (DUONEB) nebulizer solution, 3 mL, Nebulization, Q4H PRN (RT),  Wilman Dukes M.D.  •  warfarin (COUMADIN) tablet 7.5 mg, 7.5 mg, Oral, COUMADIN-DAILY, Wilman Dukes M.D., 7.5 mg at 03/23/19 2100  •  clopidogrel (PLAVIX) tablet 75 mg, 75 mg, Oral, Q EVENING, Alden Gorman M.D., 75 mg at 03/23/19 1726  •  losartan (COZAAR) tablet 50 mg, 50 mg, Oral, DAILY, Alden Gorman M.D., 50 mg at 03/24/19 0432  •  oxybutynin (DITROPAN) tablet 5 mg, 5 mg, Oral, DAILY, Alden Gorman M.D., 5 mg at 03/24/19 0431  •  pravastatin (PRAVACHOL) tablet 20 mg, 20 mg, Oral, Nightly, Alden Gorman M.D., 20 mg at 03/23/19 2103  •  therapeutic multivitamin-minerals (THERAGRAN-M) tablet 1 Tab, 1 Tab, Oral, DAILY, Alden Gorman M.D., 1 Tab at 03/24/19 0432  •  MD Alert...Warfarin per Pharmacy, , Other, PHARMACY TO DOSE, Alden Gorman M.D.  •  senna-docusate (PERICOLACE or SENOKOT S) 8.6-50 MG per tablet 2 Tab, 2 Tab, Oral, BID, 2 Tab at 03/21/19 0542 **AND** polyethylene glycol/lytes (MIRALAX) PACKET 1 Packet, 1 Packet, Oral, QDAY PRN **AND** magnesium hydroxide (MILK OF MAGNESIA) suspension 30 mL, 30 mL, Oral, QDAY PRN **AND** bisacodyl (DULCOLAX) suppository 10 mg, 10 mg, Rectal, QDAY PRN, Alden Gorman M.D.  •  acetaminophen (TYLENOL) tablet 650 mg, 650 mg, Oral, Q6HRS PRN, Alden Gorman M.D., 650 mg at 03/23/19 1735  •  ondansetron (ZOFRAN) syringe/vial injection 4 mg, 4 mg, Intravenous, Q4HRS PRN, Alden Gorman M.D.  •  ondansetron (ZOFRAN ODT) dispertab 4 mg, 4 mg, Oral, Q4HRS PRN, Alden Gorman M.D.  •  insulin regular (HUMULIN R) injection 2-9 Units, 2-9 Units, Subcutaneous, 4X/DAY ACHS, 2 Units at 03/24/19 1244 **AND** Accu-Chek ACHS, , , Q AC AND BEDTIME(S) **AND** NOTIFY MD and PharmD, , , Once **AND** glucose 4 g chewable tablet 16 g, 16 g, Oral, Q15 MIN PRN **AND** dextrose 50% (D50W) injection 25 mL, 25 mL, Intravenous, Q15 MIN PRN, Alden Gorman M.D.  •  carvedilol (COREG) tablet 3.125 mg, 3.125 mg, Oral, BID WITH  MEALS, Alden Gorman M.D., 3.125 mg at 03/24/19 0835  •  furosemide (LASIX) tablet 20 mg, 20 mg, Oral, DAILY, Alden Gorman M.D., 20 mg at 03/24/19 0432  •  gabapentin (NEURONTIN) capsule 400 mg, 400 mg, Oral, BID, Alden Gorman M.D., 400 mg at 03/24/19 0432  •  omeprazole (PRILOSEC) capsule 20 mg, 20 mg, Oral, DAILY, Alden Gorman M.D., 20 mg at 03/24/19 0432  •  potassium chloride SA (Kdur) tablet 20 mEq, 20 mEq, Oral, DAILY, Alden Gorman M.D., 20 mEq at 03/24/19 0432          Assessment/Plan  * Acute cystitis without hematuria- (present on admission)   Assessment & Plan    She got 3 days' worth and she is improved enough. She has completed her dose.     Coronary artery disease involving native coronary artery of native heart without angina pectoris- (present on admission)   Assessment & Plan    With cardiomyopathy EF 45%   medical management   Diuresis at home dose     Type 2 diabetes mellitus with skin complication, with long-term current use of insulin (HCC)- (present on admission)   Assessment & Plan    In-hospital FSBG goal less than 180 mg/dL  SSI qAC & qHS when eating, q6 when NPO  Glucose - Accu-Ck   Date/Time Value Ref Range Status   03/22/2019 08:15  (H) 65 - 99 mg/dL Final   03/21/2019 08:16  (H) 65 - 99 mg/dL Final   03/21/2019 05:05  (H) 65 - 99 mg/dL Final   03/21/2019 12:36  (H) 65 - 99 mg/dL Final     Comment:     Followed orders   Increased Lantus yesterday, will consider increasing again tomorrow.     Essential hypertension- (present on admission)   Assessment & Plan    SBP goal less than 140 mmHg  DBP goal less than 90 mmHg  PRN and antihypertensives to titrate by hospitalist towards goal     Fall- (present on admission)   Assessment & Plan    PT/OT eval  Suspect will need SNF again     Paroxysmal atrial fibrillation (HCC)- (present on admission)   Assessment & Plan    Continue carvedilol  Patient was discharged on warfarin from her  last hospitalization she is not sure whether she has been taking this medication or not we will try to confirm her home medications  We will check INR and resume warfarin     Mixed hyperlipidemia- (present on admission)   Assessment & Plan    Continue pravastatin          VTE prophylaxis: heparin    Patient medically cleared for discharge to skilled nursing facility once appropriate when can be found.  CM aware.

## 2019-03-24 NOTE — PROGRESS NOTES
· 2 RN skin check complete with TABATHA Singletary.  · Devices in place Nasal Cannula.  · Skin assessed under devices Yes, bilateral back of the ears is pink and blanching.  · Confirmed pressure ulcers found on sacral area.  · New potential pressure ulcers noted on N/A. Wound consult placed and wound reported.    Noted wound in the right elbow. Scabs and bruises in lower extremities, bilaterally. Sacral area had open area and bruise.      · The following interventions in place Applied barrier cream in the sacral area. Incontinence care done. Assisted to turn to sides every 2 hours. Wound care in placed  On pressure redistribution mattress

## 2019-03-25 PROBLEM — R32 INCONTINENCE: Status: ACTIVE | Noted: 2019-01-01

## 2019-03-25 NOTE — PROGRESS NOTES
2 Rn skin check performed with TABATHA Hayward:  -Devices in place: nasal cannula  -Skin assessed under devices: skin intact  -Generalized bruising noted.  -Confirmed pressure ulcer noted on: sacrum  -New potential pressure ulcer noted on: Open wound noted on Right elbow with mepilex dressing in place. Scabs noted throughout.     T&P q2h, eloina cream applied, waffle mattress, frequent incontinent care provided, right elbow dressing changed.

## 2019-03-25 NOTE — PROGRESS NOTES
Inpatient Anticoagulation Service Note    Date: 3/25/2019  Reason for Anticoagulation: Atrial Fibrillation   [id number] VASc Score: 5  HAS-BLED Score: 3     Hemoglobin Value: (!) 8.7  Hematocrit Value: (!) 27.2  Lab Platelet Value: 180  Target INR: 2.0 to 3.0    INR from last 7 days     Date/Time INR Value    03/25/19 0224 (!)  1.88    03/24/19 0211 (!)  1.45    03/23/19 0304 (!)  1.36    03/22/19 0257 (!)  1.25    03/21/19 0402 (!)  1.27    03/20/19 1002 (!)  1.24        Dose from last 7 days     Date/Time Dose (mg)    03/25/19 1400  7.5    03/24/19 0800  7.5    03/23/19 1400  7.5    03/22/19 1500  6    03/21/19 0842  6    03/20/19 1700  5        Average Dose (mg): 5  Significant Interactions: Clopidogrel, Proton Pump Inhibitor, Statin  Bridge Therapy: No   Reversal Agent Administered: Not Applicable    HPI: 72 yo female admitted on 3/22/19 for UTI, treating with antibiotics. Patient requires chronic anticoagulation with warfarin therapy for Afib - recently initiated on warfarin while inpatient, appears patient was non-compliant after discharge with new therapy. INR Subtherapeutic on admission. Warfarin 5mg po daily was the intended regimen.      Assessment: INR remains SUBtherapeutic with larger trend upward today, 1.88 from 1.45 following second dose of warfarin 7.5mg last night, although 2nd dose not yet fully reflected in INR.   · Potential drug-drug interactions identified with acute inpatient medications: Ceftriaxone complete (3/20 - 3/22)  · Potential drug-drug interactions identified with chronic home medications: Clopidogrel, Omeprazole and Pravastatin which will become established interactions with warfarin.   · Inpatient Diet: Cardiac, Diabetic      Plan: Continue warfarin 7.5mg po daily, watch INR trend closely. Continue daily INR monitoring.      Education Material Provided?: No     Pharmacist suggested discharge dosing: Warfarin 7.5mg po daily.      Shasta Art, PharmD

## 2019-03-25 NOTE — CARE PLAN
Problem: Communication  Goal: The ability to communicate needs accurately and effectively will improve  Outcome: PROGRESSING AS EXPECTED  A&O x4, calm and cooperative with care. Able to make needs known. Explained all care as given, verbalized understanding.     Problem: Safety  Goal: Will remain free from injury  Outcome: PROGRESSING AS EXPECTED  Resting in bed at this time, call bell in reach, bed in lowest position. Frequent checks provided.

## 2019-03-25 NOTE — DISCHARGE PLANNING
Agency/Facility Name: [facility]  Spoke To: Kacy  Outcome: Per Kacy: Experiencing a flu outbreak, can not take anyone.    MIGUEL ÁNGEL Shea informed.

## 2019-03-25 NOTE — PROGRESS NOTES
AAOx4. C/o 0/10 pain, declining intervention at this time. -N/V. -N/T. Denies new onset of chest pain/SOB. Bed alarm on, call light within reach, & hourly rounding in place.

## 2019-03-25 NOTE — PROGRESS NOTES
Hospital Medicine Daily Progress Note    Date of Service  3/25/2019    Chief Complaint  73 y.o. female admitted 3/20/2019 with UTI and fall    Hospital Course    This is a 73-year-old woman who presented 3/20/2019 with history of diabetes and diabetic foot ulcer for which she had a prolonged stay at [facility] and subsequently at skilled nursing facility.  She was not happy with the care she was receiving at [facility] and left AMA yesterday.  While she was getting in her car she sustained a fall with a right arm laceration and was seen in our emergency room and underwent repair of her laceration.  She returned home and earlier today sustained a ground-level fall she did not pass out or suffer any injuries.  She was seen at her local emergency room was diagnosed with a UTI and was transferred to our facility for unclear reasons.  She was admitted for treatment of her uncontrolled hyperglycemia and UTI.        Interval Problem Update    3/25 - Patient continues to request gaona. Believes she has developed a dermatitis from her chronic incontinence.   Otherwise, no acute overnight events.      3/24 - Continuing to await SNF placement.  Patient considering checking out to home with .  Responds well to gentle advisory that this is likely unwise given her debility and his inability to care for her.      3/23 -  thinks that [facility] may be a better fit for patient then [facility]. CM aware.  Otherwise, no acute overnight events.      3/22 - she is agreeable to SNF as long as it's not [facility]. Her  and SW are discussing. Questions answered. She wants a Gaona catheter and I advised against it.     3/21 - resting comfortably in bed. Complaining of some mild back pain. Questions answered. Tentatively agreeable to SNF.     Consultants/Specialty  None    Code Status  Full    Disposition  SNF    Review of Systems  Review of Systems   Constitutional: Negative for chills and fever.   Respiratory: Negative  for cough and shortness of breath.    Cardiovascular: Negative for chest pain and palpitations.   Gastrointestinal: Negative for abdominal pain, constipation, diarrhea, nausea and vomiting.   Genitourinary: Negative for dysuria and frequency.   Musculoskeletal: Positive for back pain. Negative for myalgias.   Skin: Negative for itching.   Neurological: Negative for dizziness, focal weakness, weakness and headaches.   All other systems reviewed and are negative.       Physical Exam  Temp:  [36.2 °C (97.1 °F)-36.3 °C (97.3 °F)] 36.2 °C (97.1 °F)  Pulse:  [70-85] 85  Resp:  [15-16] 16  BP: (106-113)/(43-44) 106/44  SpO2:  [97 %-98 %] 97 %    Physical Exam   Constitutional: She appears well-developed.   Elderly, overnourished, comfortable appearing   HENT:   Head: Normocephalic and atraumatic.   Mouth/Throat: Oropharynx is clear and moist.   Eyes: Pupils are equal, round, and reactive to light. Conjunctivae are normal. No scleral icterus.   Cardiovascular: Normal rate, regular rhythm, normal heart sounds and intact distal pulses.    No murmur heard.  Pulmonary/Chest: Effort normal and breath sounds normal. No respiratory distress. She has no wheezes.   Abdominal: Soft. Bowel sounds are normal. She exhibits no distension. There is no tenderness.   Musculoskeletal: Normal range of motion. She exhibits no edema or tenderness.   Neurological: She is alert.   Vitals reviewed.  3/22 - acutely unchanged today    Fluids    Intake/Output Summary (Last 24 hours) at 03/25/19 1611  Last data filed at 03/24/19 2000   Gross per 24 hour   Intake              200 ml   Output                0 ml   Net              200 ml       Laboratory          Recent Labs      03/23/19   0304  03/24/19   0211  03/25/19   0224   INR  1.36*  1.45*  1.88*               Current Facility-Administered Medications:   •  Respiratory Care per Protocol, , Nebulization, Continuous RT, Wilman Dukes M.D.  •  insulin glargine (LANTUS) injection 34 Units, 34  Units, Subcutaneous, QAM, Wilman Dukes M.D., 34 Units at 03/25/19 0837  •  ipratropium-albuterol (DUONEB) nebulizer solution, 3 mL, Nebulization, Q4H PRN (RT), Wilman Dukes M.D.  •  warfarin (COUMADIN) tablet 7.5 mg, 7.5 mg, Oral, COUMADIN-DAILY, Wilman Dukes M.D., 7.5 mg at 03/24/19 1740  •  clopidogrel (PLAVIX) tablet 75 mg, 75 mg, Oral, Q EVENING, Alden Gorman M.D., 75 mg at 03/24/19 1740  •  losartan (COZAAR) tablet 50 mg, 50 mg, Oral, DAILY, Alden Gorman M.D., 50 mg at 03/25/19 0431  •  oxybutynin (DITROPAN) tablet 5 mg, 5 mg, Oral, DAILY, Alden Gorman M.D., 5 mg at 03/25/19 0600  •  pravastatin (PRAVACHOL) tablet 20 mg, 20 mg, Oral, Nightly, Alden Gorman M.D., 20 mg at 03/24/19 1952  •  therapeutic multivitamin-minerals (THERAGRAN-M) tablet 1 Tab, 1 Tab, Oral, DAILY, Alden Gorman M.D., 1 Tab at 03/25/19 0600  •  MD Alert...Warfarin per Pharmacy, , Other, PHARMACY TO DOSE, Alden Gorman M.D.  •  senna-docusate (PERICOLACE or SENOKOT S) 8.6-50 MG per tablet 2 Tab, 2 Tab, Oral, BID, 2 Tab at 03/21/19 0542 **AND** polyethylene glycol/lytes (MIRALAX) PACKET 1 Packet, 1 Packet, Oral, QDAY PRN **AND** magnesium hydroxide (MILK OF MAGNESIA) suspension 30 mL, 30 mL, Oral, QDAY PRN **AND** bisacodyl (DULCOLAX) suppository 10 mg, 10 mg, Rectal, QDAY PRN, Alden Gorman M.D.  •  acetaminophen (TYLENOL) tablet 650 mg, 650 mg, Oral, Q6HRS PRN, Alden Gorman M.D., 650 mg at 03/24/19 1952  •  ondansetron (ZOFRAN) syringe/vial injection 4 mg, 4 mg, Intravenous, Q4HRS PRN, Alden Gorman M.D.  •  ondansetron (ZOFRAN ODT) dispertab 4 mg, 4 mg, Oral, Q4HRS PRN, Alden Gorman M.D.  •  insulin regular (HUMULIN R) injection 2-9 Units, 2-9 Units, Subcutaneous, 4X/DAY ACHS, 3 Units at 03/25/19 1200 **AND** Accu-Chek ACHS, , , Q AC AND BEDTIME(S) **AND** NOTIFY MD and PharmD, , , Once **AND** glucose 4 g chewable tablet 16 g, 16 g, Oral, Q15 MIN PRN  **AND** dextrose 50% (D50W) injection 25 mL, 25 mL, Intravenous, Q15 MIN PRN, Alden Gorman M.D.  •  carvedilol (COREG) tablet 3.125 mg, 3.125 mg, Oral, BID WITH MEALS, Alden Gorman M.D., 3.125 mg at 03/25/19 0837  •  furosemide (LASIX) tablet 20 mg, 20 mg, Oral, DAILY, Alden Gorman M.D., 20 mg at 03/25/19 0430  •  gabapentin (NEURONTIN) capsule 400 mg, 400 mg, Oral, BID, Alden Gorman M.D., 400 mg at 03/25/19 0430  •  omeprazole (PRILOSEC) capsule 20 mg, 20 mg, Oral, DAILY, Alden Gorman M.D., 20 mg at 03/25/19 0431  •  potassium chloride SA (Kdur) tablet 20 mEq, 20 mEq, Oral, DAILY, Alden Gorman M.D., 20 mEq at 03/25/19 0431          Assessment/Plan  * Acute cystitis without hematuria- (present on admission)   Assessment & Plan    She got 3 days' worth and she is improved enough. She has completed her dose.     Coronary artery disease involving native coronary artery of native heart without angina pectoris- (present on admission)   Assessment & Plan    With cardiomyopathy EF 45%   medical management   Diuresis at home dose     Type 2 diabetes mellitus with skin complication, with long-term current use of insulin (HCC)- (present on admission)   Assessment & Plan    In-hospital FSBG goal less than 180 mg/dL  SSI qAC & qHS when eating, q6 when NPO  Glucose - Accu-Ck   Date/Time Value Ref Range Status   03/22/2019 08:15  (H) 65 - 99 mg/dL Final   03/21/2019 08:16  (H) 65 - 99 mg/dL Final   03/21/2019 05:05  (H) 65 - 99 mg/dL Final   03/21/2019 12:36  (H) 65 - 99 mg/dL Final     Comment:     Followed orders   Increased Lantus yesterday, will consider increasing again tomorrow.     Essential hypertension- (present on admission)   Assessment & Plan    SBP goal less than 140 mmHg  DBP goal less than 90 mmHg  PRN and antihypertensives to titrate by hospitalist towards goal     Incontinence   Assessment & Plan    Will request repeat wound RN check, can  consider gaona if there is moderate to severe incontinence related dermatitis.      Fall- (present on admission)   Assessment & Plan    PT/OT eval  Suspect will need SNF again     Paroxysmal atrial fibrillation (HCC)- (present on admission)   Assessment & Plan    Continue carvedilol  Patient was discharged on warfarin from her last hospitalization she is not sure whether she has been taking this medication or not we will try to confirm her home medications  We will check INR and resume warfarin     Mixed hyperlipidemia- (present on admission)   Assessment & Plan    Continue pravastatin          VTE prophylaxis: heparin    Patient medically cleared for discharge to skilled nursing facility once appropriate when can be found.  CM aware.

## 2019-03-25 NOTE — PROGRESS NOTES
A&O x4, calm and cooperative with care. No c/o. Incontinent. Frequent incontinence care provided. Dressing changed on skin tear on right elbow. Pressure ulcer noted on sacrum, TRESA. Call bell in reach.

## 2019-03-25 NOTE — PROGRESS NOTES
diastolic in 30's automatic and manually. Pt put in trendelenburg, b/p 94/47. Pulse in 60's. Asymptomatic. MD notified. Awaiting orders at this time.

## 2019-03-25 NOTE — DISCHARGE PLANNING
Anticipated Discharge Disposition: skilled    Action: SW informed patient that there is a flu outbreak at [facility] with no time frame on when they can accept.  SW provided list of local and [facility] skilled facilities.  Patient stated she will call her  and decide what facility.    Barriers to Discharge: choice and acceptance    Plan: follow up with patient for choice

## 2019-03-25 NOTE — ASSESSMENT & PLAN NOTE
States that this is relatively new. Has had increased urge as well  - avoid gaona for now unless with significant dermatitis

## 2019-03-25 NOTE — CARE PLAN
Problem: Safety  Goal: Will remain free from injury  Outcome: PROGRESSING AS EXPECTED  Fall precautions in place. Treaded socks on pt. Appropriate signs on doorway. Bedrails up. Bed in lowest position and locked. Call light and phone within reach. Patient educated on importance of calling nurses before getting out of bed, verbalizes understanding. Bed alarm on.    Problem: Venous Thromboembolism (VTW)/Deep Vein Thrombosis (DVT) Prevention:  Goal: Patient will participate in Venous Thrombosis (VTE)/Deep Vein Thrombosis (DVT)Prevention Measures  Outcome: PROGRESSING AS EXPECTED   03/24/19 2000   OTHER   Risk Assessment Score 2   VTE RISK Moderate   Pharmacologic Prophylaxis Used Warfarin (Coumadin)

## 2019-03-25 NOTE — PROGRESS NOTES
Report received by TABATHA Bui. Assumed care of pt. Assessment complete. Pt A&Ox4, VSS. Pt reports bilateral knee pain rated 8/10, medicated with PRN tylenol. Plan of care discussed. Call light within reach, bed in lowest position, and pt has no further questions at this time.

## 2019-03-26 NOTE — THERAPY
"Occupational Therapy Treatment completed with focus on ADLs, ADL transfers and patient education.  Functional Status: Pt was seen for Occupational Therapy treatment today, see Therapy Kardex for details. Treatment included education in breath control with activity and at rest, self pacing techs and energy conservation for pain management. Educated pt in safety awareness techs as well. Psychosocial intervention addressed. Pt required Min/Mod A for supine to sit at EOB. Pt demonstrated Min A for UB dressing, LB dressing to be done post shower with CNA per pt request. Mod A for sit to stand and for ADL transfer to shower chair. Pt able to do simple oral hygiene and grooming seated base with SBA. Discussed  I ADL's and DME needs for home. Pt was left with CNA for showering in a large shower room. When OT returned to do LB dressing training with pt , pt stated she \"just received lunch, was hungry and wanted to eat\". Pt requested OT come back later. Came back again,  pt just had sutures taken out of Right posterior arm by wound care. Pt stated her pain was \"a lot\". \"I can't really use my arm now\". Pt requested to work on LB dressing training tomorrow. RN/CNA informed of OT attempt. Pt was left up in bed, call light in reach and nursing is aware. RN/CNA updated.  Continue Occupational Therapy services as per plan.    Plan of Care: Will benefit from Occupational Therapy 3 times per week  Discharge Recommendations:  Equipment Will Continue to Assess for Equipment Needs. Post-acute therapy Discharge to a transitional care facility for continued skilled therapy services.    See \"Rehab Therapy-Acute\" Patient Summary Report for complete documentation.   "

## 2019-03-26 NOTE — PROGRESS NOTES
Pt resting in bed. No c/o pain at this time. No other needs stated.       Safety: Call light in reach, fall precautions in place.

## 2019-03-26 NOTE — PROGRESS NOTES
Hospital Medicine Daily Progress Note    Date of Service  3/26/2019    Chief Complaint  73 y.o. female admitted 3/20/2019 with UTI and fall    Hospital Course    This is a 73-year-old woman who presented 3/20/2019 with history of diabetes and diabetic foot ulcer for which she had a prolonged stay at [facility] and subsequently at skilled nursing facility.  She was not happy with the care she was receiving at [facility] and left AMA yesterday.  While she was getting in her car she sustained a fall with a right arm laceration and was seen in our emergency room and underwent repair of her laceration.  She returned home and earlier today sustained a ground-level fall she did not pass out or suffer any injuries.  She was seen at her local emergency room was diagnosed with a UTI and was transferred to our facility for unclear reasons.  She was admitted for treatment of her uncontrolled hyperglycemia and UTI.        Interval Problem Update  3/26 - Doing well, requests a gaona as she has had increased urge and episode of incontinence this AM. No acute overnight events    3/25 - Patient continues to request gaona. Believes she has developed a dermatitis from her chronic incontinence.   Otherwise, no acute overnight events.    3/24 - Continuing to await SNF placement.  Patient considering checking out to home with .  Responds well to gentle advisory that this is likely unwise given her debility and his inability to care for her.    3/23 -  thinks that [facility] may be a better fit for patient then [facility]. CM aware.  Otherwise, no acute overnight events.    3/22 - she is agreeable to SNF as long as it's not [facility]. Her  and SW are discussing. Questions answered. She wants a Gaona catheter and I advised against it.   3/21 - resting comfortably in bed. Complaining of some mild back pain. Questions answered. Tentatively agreeable to SNF.     Consultants/Specialty  None    Code  Status  Full    Disposition  SNF    Review of Systems  Review of Systems   Constitutional: Positive for malaise/fatigue. Negative for chills and fever.   Respiratory: Negative for cough and shortness of breath.    Cardiovascular: Negative for chest pain and palpitations.   Gastrointestinal: Negative for abdominal pain, constipation, diarrhea, nausea and vomiting.   Genitourinary: Positive for urgency. Negative for dysuria, frequency and hematuria.   Musculoskeletal: Positive for back pain. Negative for falls and myalgias.   Skin: Negative for itching.   Neurological: Positive for weakness. Negative for dizziness, focal weakness and headaches.   All other systems reviewed and are negative.       Physical Exam  Temp:  [36.3 °C (97.4 °F)-36.5 °C (97.7 °F)] 36.3 °C (97.4 °F)  Pulse:  [62-75] 75  Resp:  [16-18] 16  BP: ()/(36-47) 90/43  SpO2:  [96 %-100 %] 96 %    Physical Exam   Constitutional: She is oriented to person, place, and time. She appears well-developed.   HENT:   Head: Normocephalic.   Mouth/Throat: Oropharynx is clear and moist.   Eyes: Pupils are equal, round, and reactive to light. EOM are normal. No scleral icterus.   Neck: Normal range of motion.   Cardiovascular: Normal rate, regular rhythm and intact distal pulses.    No murmur heard.  Pulmonary/Chest: Effort normal and breath sounds normal. No respiratory distress. She has no wheezes.   Abdominal: Soft. Bowel sounds are normal. She exhibits no distension. There is no tenderness.   Musculoskeletal: Normal range of motion. She exhibits no edema or tenderness.   Neurological: She is alert and oriented to person, place, and time. No cranial nerve deficit.   Skin: Skin is warm and dry. She is not diaphoretic. No erythema.   Psychiatric: She has a normal mood and affect. Her behavior is normal.   Vitals reviewed.      Fluids  No intake or output data in the 24 hours ending 03/26/19 0747    Laboratory          Recent Labs      03/24/19   0219   03/25/19   0224  03/26/19   0217   INR  1.45*  1.88*  2.01*               Current Facility-Administered Medications:   •  Respiratory Care per Protocol, , Nebulization, Continuous RT, Wilman Dukes M.D.  •  insulin glargine (LANTUS) injection 34 Units, 34 Units, Subcutaneous, QAM, Wilman Dukes M.D., 34 Units at 03/25/19 0837  •  ipratropium-albuterol (DUONEB) nebulizer solution, 3 mL, Nebulization, Q4H PRN (RT), Wilman Dukes M.D.  •  warfarin (COUMADIN) tablet 7.5 mg, 7.5 mg, Oral, COUMADIN-DAILY, Wilman Dukes M.D., 7.5 mg at 03/25/19 1719  •  clopidogrel (PLAVIX) tablet 75 mg, 75 mg, Oral, Q EVENING, Alden Gorman M.D., 75 mg at 03/25/19 1717  •  oxybutynin (DITROPAN) tablet 5 mg, 5 mg, Oral, DAILY, Alden Gorman M.D., 5 mg at 03/26/19 0437  •  pravastatin (PRAVACHOL) tablet 20 mg, 20 mg, Oral, Nightly, Alden Gorman M.D., 20 mg at 03/25/19 2124  •  therapeutic multivitamin-minerals (THERAGRAN-M) tablet 1 Tab, 1 Tab, Oral, DAILY, Alden Gorman M.D., 1 Tab at 03/26/19 0436  •  MD Alert...Warfarin per Pharmacy, , Other, PHARMACY TO DOSE, Alden Gorman M.D.  •  senna-docusate (PERICOLACE or SENOKOT S) 8.6-50 MG per tablet 2 Tab, 2 Tab, Oral, BID, 2 Tab at 03/26/19 0437 **AND** polyethylene glycol/lytes (MIRALAX) PACKET 1 Packet, 1 Packet, Oral, QDAY PRN **AND** magnesium hydroxide (MILK OF MAGNESIA) suspension 30 mL, 30 mL, Oral, QDAY PRN **AND** bisacodyl (DULCOLAX) suppository 10 mg, 10 mg, Rectal, QDAY PRN, Alden Gorman M.D.  •  acetaminophen (TYLENOL) tablet 650 mg, 650 mg, Oral, Q6HRS PRN, Alden Gorman M.D., 650 mg at 03/25/19 3396  •  ondansetron (ZOFRAN) syringe/vial injection 4 mg, 4 mg, Intravenous, Q4HRS PRN, Alden Gorman M.D.  •  ondansetron (ZOFRAN ODT) dispertab 4 mg, 4 mg, Oral, Q4HRS PRN, Alden Gorman M.D.  •  insulin regular (HUMULIN R) injection 2-9 Units, 2-9 Units, Subcutaneous, 4X/DAY ACHS, 2 Units at 03/25/19 4341  **AND** Accu-Chek ACHS, , , Q AC AND BEDTIME(S) **AND** NOTIFY MD and PharmD, , , Once **AND** glucose 4 g chewable tablet 16 g, 16 g, Oral, Q15 MIN PRN **AND** dextrose 50% (D50W) injection 25 mL, 25 mL, Intravenous, Q15 MIN PRN, Alden Gorman M.D.  •  carvedilol (COREG) tablet 3.125 mg, 3.125 mg, Oral, BID WITH MEALS, Alden Gorman M.D., Stopped at 03/25/19 1730  •  furosemide (LASIX) tablet 20 mg, 20 mg, Oral, DAILY, Alden Gorman M.D., 20 mg at 03/26/19 0436  •  gabapentin (NEURONTIN) capsule 400 mg, 400 mg, Oral, BID, Alden Gorman M.D., 400 mg at 03/26/19 0436  •  omeprazole (PRILOSEC) capsule 20 mg, 20 mg, Oral, DAILY, Alden Gorman M.D., 20 mg at 03/26/19 0436  •  potassium chloride SA (Kdur) tablet 20 mEq, 20 mEq, Oral, DAILY, Alden Gorman M.D., 20 mEq at 03/26/19 0436          Assessment/Plan  * Acute cystitis without hematuria- (present on admission)   Assessment & Plan    - s/p ceftriaxone x3 days     Coronary artery disease involving native coronary artery of native heart without angina pectoris- (present on admission)   Assessment & Plan    With cardiomyopathy, EF 45%. Currently euvolemic  - continue medical management  - continue home lasix 20mg daily     Type 2 diabetes mellitus with skin complication, with long-term current use of insulin (HCC)- (present on admission)   Assessment & Plan    A1C 7%, with hyperglycemia in the setting of UTI.  - moderate sliding scale  - DM diet and hypoglycemia protocol  - lantus 34U qAM  - starting metformin  - updated A1C pending     Essential hypertension- (present on admission)   Assessment & Plan    BP has been soft.   - continue home regimen with holding parameters     Incontinence   Assessment & Plan    States that this is relatively new. Has had increased urge as well  - avoid gaona for now unless with significant dermatitis      Fall- (present on admission)   Assessment & Plan    - PT/OT eval pending  - anticipate  SNF need     Paroxysmal atrial fibrillation (HCC)- (present on admission)   Assessment & Plan    Continue carvedilol and coumadin. INR 2.01     Mixed hyperlipidemia- (present on admission)   Assessment & Plan    Continue pravastatin          VTE prophylaxis: heparin

## 2019-03-26 NOTE — DISCHARGE PLANNING
Anticipated Discharge Disposition: Skilled    Action: Spoke to patient about [facility] still not taking any patients as they have a flu outbreak. Patient stated that is where her  stated she should go. SW requested permission to speak to her spouse, given.   PC to spouse, [facility] [phone]; SW informed patient that [facility] is still not accepting patients, with no time frame on when they will.   Patient requested to know what options are.  SW went over them, possible patient going home with H/H, spouse did not want this.  Spouse allowed a blanket referral to [facility].  Faxed Choice forms to [facility] for blanket referrals    Barriers to Discharge: acceptance    Plan: follow up with [facility]

## 2019-03-26 NOTE — WOUND TEAM
"[facility]  Inpatient Services  Wound and Skin Care Progress Note    Admission Date:  3/20/2019   HPI, PMH, SH: Reviewed  Unit where seen by Wound Team: [id number]/[id number]    WOUND CONSULT RELATED TO:  Evaluation of IAD and follow up with sacral wound and right elbow wound    SUBJECTIVE:  \"It hurts right here\" (referring to sacral wound)     Self Report / Pain Level:  Denies overall       OBJECTIVE:  Patient in bed; can move self in bed      WOUND TYPE, LOCATION, CHARACTERISTICS (Pressure ulcers: location, stage, POA or date identified)    Pressure Injury 03/20/19 Coccyx (Active)   Pressure Injury Stage 2 3/26/2019  4:00 PM   State of Healing Early/partial granulation 3/26/2019  4:00 PM   Site Assessment Clean;Intact 3/26/2019  4:00 PM   Nataliya-wound Assessment Clean;Intact;Pink 3/26/2019  4:00 PM   Margins Attached edges 3/26/2019  4:00 PM   Wound Length (cm) 0.5 cm 3/21/2019  1:00 PM   Wound Width (cm) 0.5 cm 3/21/2019  1:00 PM   Wound Depth (cm) 0.2 cm 3/20/2019 11:44 AM   Wound Surface Area (cm^2) 0.25 cm^2 3/21/2019  1:00 PM   Tunneling 0 cm 3/26/2019  4:00 PM   Undermining 0 cm 3/26/2019  4:00 PM   Closure Secondary intention 3/26/2019  4:00 PM   Drainage Amount None 3/26/2019  4:00 PM   Drainage Description Serous 3/24/2019  8:00 PM   Non-staged Wound Description Partial thickness 3/24/2019  8:00 PM   Treatments Site care 3/26/2019  4:00 PM   Cleansing Not Applicable 3/26/2019  4:00 PM   Periwound Protectant Not Applicable 3/26/2019  4:00 PM   Dressing Options Mepilex 3/26/2019  4:00 PM   Dressing Cleansing/Solutions Not Applicable 3/26/2019  4:00 PM   Dressing Changed New 3/26/2019  4:00 PM   Dressing Status Intact 3/26/2019  4:00 PM   Dressing Change Frequency Every 48 hrs 3/26/2019  4:00 PM   NEXT Dressing Change  03/28/19 3/26/2019  4:00 PM   NEXT Weekly Photo (Inpatient Only) 03/28/19 3/26/2019  4:00 PM   WOUND NURSE ONLY - Odor None 3/26/2019  4:00 PM   WOUND NURSE ONLY - Exposed Structures None " 3/26/2019  4:00 PM   WOUND NURSE ONLY - Tissue Type and Percentage red 100% 3/26/2019  4:00 PM   WOUND NURSE ONLY - Time Spent with Patient (mins) 60 3/26/2019  4:00 PM        Wound 03/20/19 Skin Tear Elbow right elbow  (Active)   Site Assessment Clean;Intact 3/26/2019  4:00 PM   Nataliya-wound Assessment Clean;Intact 3/26/2019  4:00 PM   Margins Attached edges 3/26/2019  4:00 PM   Wound Length (cm) 4.5 cm 3/21/2019  1:00 PM   Wound Width (cm) 3 cm 3/21/2019  1:00 PM   Wound Surface Area (cm^2) 13.5 cm^2 3/21/2019  1:00 PM   Tunneling 0 cm 3/26/2019  4:00 PM   Undermining 0 cm 3/26/2019  4:00 PM   Closure Secondary intention 3/26/2019  4:00 PM   Drainage Amount None 3/26/2019  4:00 PM   Drainage Description Serosanguineous 3/26/2019 11:00 AM   Non-staged Wound Description Full thickness 3/26/2019  4:00 PM   Treatments Cleansed;Site care 3/26/2019  4:00 PM   Cleansing Approved Wound Cleanser 3/26/2019  4:00 PM   Periwound Protectant Not Applicable 3/26/2019  4:00 PM   Dressing Options Petroleum Gauze (clear);Adhesive Foam;Roll Gauze 3/26/2019  4:00 PM   Dressing Cleansing/Solutions Not Applicable 3/26/2019  4:00 PM   Dressing Changed Changed 3/26/2019  4:00 PM   Dressing Status Intact 3/26/2019  4:00 PM   Dressing Change Frequency Every 48 hrs 3/26/2019  4:00 PM   NEXT Dressing Change  03/28/19 3/26/2019  4:00 PM   NEXT Weekly Photo (Inpatient Only) 03/28/19 3/26/2019  4:00 PM   Sutures Removed Intact Yes 3/26/2019  4:00 PM   Number of Sutures Removed 2 3/26/2019  4:00 PM   WOUND NURSE ONLY - Odor None 3/26/2019  4:00 PM   WOUND NURSE ONLY - Exposed Structures None 3/26/2019  4:00 PM   WOUND NURSE ONLY - Tissue Type and Percentage red 100% 3/26/2019  4:00 PM   WOUND NURSE ONLY - Time Spent with Patient (mins) 60 3/21/2019  1:00 PM      Lab Values:    WBC:       WBC   Date/Time Value Ref Range Status   03/21/2019 04:02 AM 10.5 4.8 - 10.8 K/uL Final     AIC:      Lab Results   Component Value Date/Time    HBA1C 7.0 (H)  "12/30/2018 04:19 AM         INTERVENTIONS BY WOUND TEAM:  Met with patient who related her medical history.  She had showered today and no mepilex dressing in place.  Examined perineum and buttocks finding no rash.  She has healed scarred areas right buttock from a pustular rash which patient reports \"the DrFara\" said wasn't shingles.  No IAD found.  Coccyx wound unchanged and pink blanching michael wound noted.  Mepilex replaced.  Dressing removed from right arm as not covering skin tear.  Noted 3 sutures dried in wound and tried to remove with partial success.  Cleansed wound with wound cleanser and applied petrolatum gauze and secured with adhesive foam and also wrapped lightly with roll gauze.  Discussed with staff RN and orders clarified.  Instructed patient to lie side to side in bed.  Will contact LPS for questions and FU with right foot surgical wound which is healed but patient never had follow up due to multiple hospital admissions and transfers and patient living in [place].    Dressing selection:  See above         Interdisciplinary consultation:  RN, patient      EVALUATION:  Clean skin tear that is dried due to dressing falling off and sacrum unchanged due to no mepilex in place and patient not motivated to move self in bed; refusing heel float boots    Factors affecting wound healing:  Immobility, DM  Goals:  Steady decrease in wound area and depth weekly     NURSING PLAN OF CARE ORDERS (X):    Dressing changes: See Dressing Care orders:   X updated  Skin care: See Skin Care orders:   Rectal tube care: See Rectal Tube Care orders:   Other orders:        WOUND TEAM PLAN OF CARE (X):   NPWT change 3 x week:        Dressing changes by wound team:       Follow up as needed:     X  Other (explain):    Anticipated discharge plans (X):  SNF:           Home Care:           Outpatient Wound Center:            Self Care:            Other:            "

## 2019-03-26 NOTE — PROGRESS NOTES
· 2 RN skin check complete with Rolando MAYS.  · Devices in place :Nasal cannula  · Skin assessed under devices ;yes  · Confirmed pressure ulcers found on:sacrum  · New potential pressure ulcers noted on:open wound to right elbow noted with mepilex in placed,scabs and bruising noted throughout body.  · The following interventions in place;every 2 hours turn,eloina cream,waffle overlay clean linen.

## 2019-03-26 NOTE — CARE PLAN
Problem: Safety  Goal: Will remain free from falls  Outcome: PROGRESSING AS EXPECTED  Pt has had no falls this shift. Pt became dizzy and off-balance when transferring from shower chair to bed. With x2 assist, able to safely go back to bed.     Problem: Skin Integrity  Goal: Risk for impaired skin integrity will decrease  Outcome: PROGRESSING AS EXPECTED  Pt dressing changed this shift.

## 2019-03-26 NOTE — PROGRESS NOTES
Inpatient Anticoagulation Service Note    Date: 3/26/2019  Reason for Anticoagulation: Atrial Fibrillation   [id number] VASc Score: 5  HAS-BLED Score: 3     Hemoglobin Value: (!) 8.7  Hematocrit Value: (!) 27.2  Lab Platelet Value: 180  Target INR: 2.0 to 3.0    INR from last 7 days     Date/Time INR Value    03/26/19 0217 (!)  2.01    03/25/19 0224 (!)  1.88    03/24/19 0211 (!)  1.45    03/23/19 0304 (!)  1.36    03/22/19 0257 (!)  1.25    03/21/19 0402 (!)  1.27    03/20/19 1002 (!)  1.24        Dose from last 7 days     Date/Time Dose (mg)    03/26/19 1600  7.5    03/25/19 1400  7.5    03/24/19 0800  7.5    03/23/19 1400  7.5    03/22/19 1500  6    03/21/19 0842  6    03/20/19 1700  5        Average Dose (mg): 6.7  Significant Interactions: Clopidogrel, Proton Pump Inhibitor, Statin (gabapentin )  Bridge Therapy: No     Reversal Agent Administered: Not Applicable  Comments: Therapeutic INR. No recent hematology indices available for review. No new warfarin-DDI's noted per chart review. No s/sx of overt bleeding noted. Continue warfarin 7.5 mg po tonight and repeat INR in AM to continue to trend INR.  If INR remains therapeutic will consider changing INR levels to Monday and Thursday.  Pharmacy will continue to monitor.     Plan:  Continue warfarin 7.5 mg po tonight and repeat INR in AM to continue to trend INR.  If INR remains therapeutic will consider changing INR levels to Monday and Thursday.  Pharmacy will continue to monitor.   Education Material Provided?: No  Pharmacist suggested discharge dosing: Consider warfarin 7.5 mg po qday with follow up INR within 48-72 hours of discharge.      Chitra Marshall, PharmD, BCOP

## 2019-03-26 NOTE — DOCUMENTATION QUERY
[facility]                                                                         Query Response Note      PATIENT:               DAVID KEYS  ACCT #:                  [id number]  MRN:                       [id number]  :                       1945  ADMIT DATE:       3/20/2019 9:56 AM  DISCH DATE:          RESPONDING  PROVIDER #:        [id number]           RESPONSE TEXT:    Agree with Wound Care RN assessment: Pressure Injury 3/20/19 Coxxyc (Active) Stage U, Non-healing    QUERY TEXT:    Pressure Ulcer Type [id number]    Pressure ulcer is documented as follows by the Wound Care RN in the Medical Record.     Pressure Injury 3/20/19 Coxxyc (Active) Stage U, Non-healing      Please specify if you:    NOTE:  If an appropriate response is not listed below, please respond with a new note.      The patient's Clinical Indicators include:  3/21 Wound RN Note: Pressure Injury 3/20/19 Coxxyc (Active) Stage U, Non-healing  Treatment: Cleansed, Mepilex, Waffle mattress  Risk Factors: Immobility, DM, Advanced age, UTI  Query created by: Paulo Monk on 3/25/2019 1:06 PM        Electronically signed by:  GENEVA JORGENSEN MD 3/25/2019 5:31 PM

## 2019-03-27 PROBLEM — N30.00 ACUTE CYSTITIS WITHOUT HEMATURIA: Status: RESOLVED | Noted: 2019-01-01 | Resolved: 2019-01-01
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
## 2019-03-27 NOTE — DISCHARGE PLANNING
Care Transition Team Assessment    Information Source  Orientation : Oriented x 4  Information Given By: Patient  Informant's Name: Leonie Brock    Readmission Evaluation  Is this a readmission?: Yes - unplanned readmission    Elopement Risk  Legal Hold: No  Ambulatory or Self Mobile in Wheelchair: No-Not an Elopement Risk  Elopement Risk: Not at Risk for Elopement    Interdisciplinary Discharge Planning  Does Admitting Nurse Feel This Could be a Complex Discharge?: No  Primary Care Physician: Floyd Gould  Lives with - Patient's Self Care Capacity: Spouse  Patient or legal guardian wants to designate a caregiver (see row info): No  Support Systems: Spouse / Significant Other  Housing / Facility: [address] [place] [address]  Do You Take your Prescribed Medications Regularly: No  Reasons Why Not Taking Medications : Didn't Refill Prescriptions   Able to Return to Previous ADL's: Other  Mobility Issues: Yes  Prior Services: Other (Comments)  Patient Expects to be Discharged to:: Home   Assistance Needed: Yes  Durable Medical Equipment: Home Oxygen    Discharge Preparedness  What is your plan after discharge?: Skilled nursing facility  Prior Functional Level: Needs Assist with Activities of Daily Living  Difficulity with ADLs: Bathing, Dressing, Toileting, Walking    Functional Assesment  Prior Functional Level: Needs Assist with Activities of Daily Living         Vision / Hearing Impairment  Vision Impairment : No  Right Eye Vision: Impaired, Wears Glasses  Left Eye Vision: Impaired, Wears Glasses  Hearing Impairment : No              Domestic Abuse  Have you ever been the victim of abuse or violence?: No  Physical Abuse or Sexual Abuse: No  Verbal Abuse or Emotional Abuse: No  Possible Abuse Reported to:: Not Applicable              Anticipated Discharge Information  Anticipated discharge disposition: SNF  Discharge Address:  [facility]

## 2019-03-27 NOTE — THERAPY
"Pt demonstrating improved strength and initiation. Pt demonstrated fair logroll technique for bed mobility w/bed features and no physical assist form therapist. Pt w/L knee pain, impairing balance at times, requiring physical assist. Pt incontinenet of B/B, required extra time for pericare/cleanup, then requested back to bed. Pt would benefit from further acute PT txs to progress towards goals and independence. Recommend post acute placement at an inpatient transitional care facility to address deficits.  Physical Therapy Treatment completed.   Bed Mobility:  Supine to Sit: Supervised  Transfers: Sit to Stand: Minimal Assist  Gait: Level Of Assist: Unable to Participate d/t incontinence        Plan of Care: Will benefit from Physical Therapy 3 times per week    See \"Rehab Therapy-Acute\" Patient Summary Report for complete documentation.       "

## 2019-03-27 NOTE — PROGRESS NOTES
Pt educated regarding discharge plan and instructions. Questions and concerns addressed at this time. Report called to TABATHA Spencer at [place]. IV removed. Pt belongings with pt. [facility] [employer] transport, COBRA packet with transport.

## 2019-03-27 NOTE — PROGRESS NOTES
LIMB PRESERVATION SERVICE     Asked to f/u on pt by wound team RN Dina MUÑOZ regarding recent charcot foot surgery by Dr. Kwon.  S/p R GSR and R ostectomy of medial cuneiform and 1st metatarsal on 12/30/18. Pt unable to f/u with surgeon d/t other hospital admissions since surgery and living in [place].   Pt is concerned if she still needs to float her foot while in bed.  Reports she has diabetic shoes and inserts from [employer] that were made after her surgery        Incisions to R calf and R midfoot healed. Sutures have been removed.    Implications of loss of protective sensation (LOPS) discussed with patient- including increased risk for amputation.  Advised to check feet at least daily, moisturize feet, and to always wear protective foot wear.       Currently admitted for recurrent falls and UTI. Being discharged to [facility]  All questions and concerns answered.      PLAN  F/U with Dr. Kwon/Dr. Chance within the next week at [facility].  Continue to float heels while in bed with pillow or prevlon boots d/t neuropathy  [facility] at [facility]    D/W: pt, RN, Dr. Chance, Dina MUÑOZ

## 2019-03-27 NOTE — DISCHARGE SUMMARY
"Discharge Summary    CHIEF COMPLAINT ON ADMISSION  Chief Complaint   Patient presents with   • GLF     x2 yesterday. Pt states she fell while trying to get in van and attempting to get out of a rolling chair at home. Negative LOC. C/o L hip and R elbow.        Reason for Admission  EMS     CODE STATUS  Full Code    HPI & HOSPITAL COURSE  This is a 73 y.o. Female with COPD, DM2 complicated by DM foot ulcer, HTN, pAfib here with recurrent falls and UTI. She had a prolonged stay at [facility] and then SNF, left AMA from [place] as she did not like the care that she was receiving. Had a fall while getting into her car, evaluated in ED and sent home, then had another ground level fall at home without syncope. First evaluated at [facility], and then transferred here \"for placement\" per ER RN note. On evaluation, she was found to be hyperglycemic to 406, with a UTI and generally weak. She was started on empiric antibiotics and IVF. She was treated for the complete course. Evaluated by PT/OT who recommended continued therapy in a SNF, patient was agreeable to this as long as it is a different one. She continued to improve, no suprapubic or flank tenderness.     Of note, during her last hospitalization she was evaluated by cardiology and started on coumadin. It is unclear if she had been taking her medication, it was resumed here and monitored by pharmacy. Of note, it is ok for plavix and coumadin but asa was not advised by cardiology given the increased bleeding risk. She had no chest pain during this hospitalization.    Therefore, she is discharged in good and stable condition to skilled nursing facility.    The patient met 2-midnight criteria for an inpatient stay at the time of discharge.      FOLLOW UP ITEMS POST DISCHARGE  Please follow up INR in 3 days and adjust dose of coumadin as needed.  Routine follow up with outpatient PCP and Cardiologist.     DISCHARGE DIAGNOSES  Principal Problem (Resolved):    Acute cystitis " without hematuria POA: Yes  Active Problems:    Type 2 diabetes mellitus with skin complication, with long-term current use of insulin (HCC) POA: Yes    Coronary artery disease involving native coronary artery of native heart without angina pectoris POA: Yes    Essential hypertension POA: Yes    Mixed hyperlipidemia POA: Yes    Paroxysmal atrial fibrillation (HCC) POA: Yes    Incontinence POA: Yes  Resolved Problems:    Fall POA: Yes      FOLLOW UP  Floyd Gould M.D.  [address] [place] [address] [phone]            MEDICATIONS ON DISCHARGE     Medication List      START taking these medications      Instructions   acetaminophen 325 MG Tabs  Commonly known as:  TYLENOL   Take 2 Tabs by mouth every 6 hours as needed (Mild Pain; (Pain scale 1-3); Temp greater than 100.5 F).  Dose:  650 mg     metFORMIN 500 MG Tabs  Commonly known as:  GLUCOPHAGE   Take 1 Tab by mouth 2 times a day, with meals.  Dose:  500 mg     ondansetron 4 MG Tbdp  Commonly known as:  ZOFRAN ODT   Take 1 Tab by mouth every four hours as needed (give PO if IV route is unavailable. May give per feeding tube.).  Dose:  4 mg     warfarin 7.5 MG Tabs  Commonly known as:  COUMADIN   Doctor's comments:  Take 3.75 every Wednesday and then 7.5 all other days.  Take 0.5-1 Tabs by mouth every Wednesday.  Dose:  3.75-7.5 mg        CHANGE how you take these medications      Instructions   clopidogrel 75 MG Tabs  What changed:  Another medication with the same name was removed. Continue taking this medication, and follow the directions you see here.  Commonly known as:  PLAVIX   Take 1 Tab by mouth every evening.  Dose:  75 mg     insulin glargine 100 UNIT/ML Soln  Start taking on:  3/28/2019  What changed:  how much to take  Commonly known as:  LANTUS   Inject 30 Units as instructed every morning.  Dose:  30 Units        CONTINUE taking these medications      Instructions   carvedilol 3.125 MG Tabs  Commonly known as:  COREG   Take 3.125  mg by mouth 2 times a day, with meals.  Dose:  3.125 mg     furosemide 20 MG Tabs  Commonly known as:  LASIX   Take 20 mg by mouth every day.  Dose:  20 mg     gabapentin 400 MG Caps  Commonly known as:  NEURONTIN   Take 400 mg by mouth 2 times a day.  Dose:  400 mg     insulin aspart 100 UNIT/ML Soln  Commonly known as:  NOVOLOG   Inject 2-10 Units as instructed 3 times a day before meals. Sliding Scale  150 - 200 = 2 units 201 - 250 = 4 units 251 - 300 = 6 units 301 - 350 = 8 units 351 - 400 = 10 units >401 Call MD  Dose:  2-10 Units     ipratropium-albuterol 0.5-2.5 (3) MG/3ML nebulizer solution  Commonly known as:  DUONEB   3 mL by Nebulization route 4 times a day.  Dose:  3 mL     omeprazole 20 MG delayed-release capsule  Commonly known as:  PRILOSEC   Take 20 mg by mouth every day.  Dose:  20 mg     polyethylene glycol/lytes Pack  Commonly known as:  MIRALAX   Take 17 g by mouth every day.  Dose:  17 g     potassium chloride SA 20 MEQ Tbcr  Commonly known as:  Kdur   Take 20 mEq by mouth every day.  Dose:  20 mEq     pravastatin 20 MG Tabs  Commonly known as:  PRAVACHOL   Take 20 mg by mouth every evening.  Dose:  20 mg     therapeutic multivitamin-minerals Tabs   Take 1 Tab by mouth every day.  Dose:  1 Tab     VOLTAREN 1 % Gel  Generic drug:  Diclofenac Sodium   Apply 1 Application to skin as directed 3 times a day. Knee pain  Dose:  1 Application        STOP taking these medications    GUAIFENEX  MG Tb12  Generic drug:  guaiFENesin LA     LEVEMIR FLEXPEN 100 UNIT/ML Sopn injection  Generic drug:  insulin detemir     predniSONE 10 MG Tabs  Commonly known as:  DELTASONE            Allergies  No Known Allergies    DIET  Orders Placed This Encounter   Procedures   • Diet Order Cardiac, Diabetic     Standing Status:   Standing     Number of Occurrences:   1     Order Specific Question:   Diet:     Answer:   Cardiac [6]     Order Specific Question:   Diet:     Answer:   Diabetic [3]       ACTIVITY  As  tolerated and directed by skilled nursing.  Weight bearing as tolerated    LINES, DRAINS, AND WOUNDS  This is an automated list. Peripheral IVs will be removed prior to discharge.  Peripheral IV 03/22/19 22 G Right Wrist (Active)   Site Assessment Clean;Dry;Intact 3/27/2019  8:15 AM   Dressing Type Transparent 3/27/2019  8:15 AM   Line Status Saline locked 3/27/2019  8:15 AM   Dressing Status Clean;Dry;Intact 3/27/2019  8:15 AM   Dressing Intervention N/A 3/27/2019  8:15 AM   Date Primary Tubing Changed 03/22/19 3/22/2019  7:00 AM   Date Secondary Tubing Changed 03/22/19 3/22/2019  7:00 AM   NEXT Primary Tubing Change  03/23/19 3/22/2019  8:00 AM   NEXT Secondary Tubing Change  03/23/19 3/22/2019  7:00 AM   Infiltration Grading ([facility]) 0 3/27/2019  8:15 AM   Phlebitis Scale ([facility] Only) 0 3/27/2019  8:15 AM       Wound 03/20/19 Skin Tear Elbow right elbow  (Active)   Site Assessment Clean;Dry;Intact 3/26/2019  9:00 PM   Nataliya-wound Assessment Clean;Dry;Intact 3/26/2019  9:00 PM   Margins Attached edges 3/26/2019  4:00 PM   Wound Length (cm) 4.5 cm 3/21/2019  1:00 PM   Wound Width (cm) 3 cm 3/21/2019  1:00 PM   Wound Surface Area (cm^2) 13.5 cm^2 3/21/2019  1:00 PM   Tunneling 0 cm 3/26/2019  4:00 PM   Undermining 0 cm 3/26/2019  4:00 PM   Closure Secondary intention 3/26/2019  4:00 PM   Drainage Amount Scant 3/26/2019  9:00 PM   Drainage Description Serosanguineous 3/26/2019  9:00 PM   Non-staged Wound Description Full thickness 3/26/2019  4:00 PM   Treatments Cleansed;Site care 3/26/2019  4:00 PM   Cleansing Approved Wound Cleanser 3/26/2019  9:00 PM   Periwound Protectant Skin Moisturizer 3/26/2019  9:00 PM   Dressing Options Mepilex 3/26/2019  9:00 PM   Dressing Cleansing/Solutions Not Applicable 3/26/2019  4:00 PM   Dressing Changed Changed 3/26/2019  4:00 PM   Dressing Status Clean;Dry;Intact 3/26/2019  9:00 PM   Dressing Change Frequency Every 72 hrs 3/26/2019  9:00 PM   NEXT Dressing Change  03/28/19  3/26/2019  9:00 PM   NEXT Weekly Photo (Inpatient Only) 03/28/19 3/26/2019  4:00 PM   Sutures Removed Intact Yes 3/26/2019  4:00 PM   Number of Sutures Removed 2 3/26/2019  4:00 PM   WOUND NURSE ONLY - Odor None 3/26/2019  4:00 PM   WOUND NURSE ONLY - Exposed Structures None 3/26/2019  4:00 PM   WOUND NURSE ONLY - Tissue Type and Percentage red 100% 3/26/2019  4:00 PM   WOUND NURSE ONLY - Time Spent with Patient (mins) 60 3/21/2019  1:00 PM       Peripheral IV 03/22/19 22 G Right Wrist (Active)   Site Assessment Clean;Dry;Intact 3/27/2019  8:15 AM   Dressing Type Transparent 3/27/2019  8:15 AM   Line Status Saline locked 3/27/2019  8:15 AM   Dressing Status Clean;Dry;Intact 3/27/2019  8:15 AM   Dressing Intervention N/A 3/27/2019  8:15 AM   Date Primary Tubing Changed 03/22/19 3/22/2019  7:00 AM   Date Secondary Tubing Changed 03/22/19 3/22/2019  7:00 AM   NEXT Primary Tubing Change  03/23/19 3/22/2019  8:00 AM   NEXT Secondary Tubing Change  03/23/19 3/22/2019  7:00 AM   [facility] 0 3/27/2019  8:15 AM   Phlebitis Scale ([facility] Only) 0 3/27/2019  8:15 AM               MENTAL STATUS ON TRANSFER  Level of Consciousness: Alert  Orientation : Oriented x 4       CONSULTATIONS  None    PROCEDURES  OUTSIDE IMAGES-DX LOWER EXTREMITY, RIGHT   Final Result      OUTSIDE IMAGES-DX LOWER EXTREMITY, RIGHT   Final Result      OUTSIDE IMAGES-CT CERVICAL SPINE   Final Result      OUTSIDE IMAGES-DX CHEST   Final Result      OUTSIDE IMAGES-CT HEAD   Final Result      OUTSIDE IMAGES-DX PELVIS   Final Result            LABORATORY  Lab Results   Component Value Date    SODIUM 141 03/27/2019    POTASSIUM 3.8 03/27/2019    CHLORIDE 104 03/27/2019    CO2 33 03/27/2019    GLUCOSE 148 (H) 03/27/2019    BUN 21 03/27/2019    CREATININE 1.06 03/27/2019        Lab Results   Component Value Date    WBC 10.5 03/21/2019    HEMOGLOBIN 8.7 (L) 03/21/2019    HEMATOCRIT 27.2 (L) 03/21/2019    PLATELETCT 180 03/21/2019         Total time of the discharge process exceeds 38 minutes.

## 2019-03-27 NOTE — DISCHARGE PLANNING
Agency/Facility Name: [facility]  Spoke To: Collette  Outcome: Patient accepted, bed available today.    Received Transport Form at 1030  Spoke to Collette at [facility]  Transport is scheduled for today at 1500 going to [facility] by [facility].

## 2019-03-27 NOTE — DISCHARGE PLANNING
Agency/Facility Name: [facility]  Spoke To: Brianne  Outcome: Patient pending acceptance, Per Brianne : [place] (Liaison) will be in today to talk with patient.

## 2019-03-27 NOTE — DISCHARGE PLANNING
Received Choice form at 0800  Agency/Facility Name: [facility], [facility]  Referral sent per Choice form at 0825

## 2019-03-27 NOTE — DISCHARGE INSTRUCTIONS
Discharge Instructions    Discharged to other by medical transportation with escort. Discharged via wheelchair, hospital escort: Yes.  Special equipment needed: Not Applicable    Be sure to schedule a follow-up appointment with your primary care doctor or any specialists as instructed.     Discharge Plan:   Diet Plan: Discussed  Activity Level: Discussed  Confirmed Follow up Appointment: Patient to Call and Schedule Appointment  Confirmed Symptoms Management: Discussed  Medication Reconciliation Updated: Yes  Pneumococcal Vaccine Administered/Refused: Given (See MAR)  Influenza Vaccine Indication: Not indicated: Previously immunized this influenza season and > 8 years of age    I understand that a diet low in cholesterol, fat, and sodium is recommended for good health. Unless I have been given specific instructions below for another diet, I accept this instruction as my diet prescription.   Other diet: cardiac, diabetic    Special Instructions: None    · Is patient discharged on Warfarin / Coumadin?   No     Depression / Suicide Risk    As you are discharged from this [facility] facility, it is important to learn how to keep safe from harming yourself.    Recognize the warning signs:  · Abrupt changes in personality, positive or negative- including increase in energy   · Giving away possessions  · Change in eating patterns- significant weight changes-  positive or negative  · Change in sleeping patterns- unable to sleep or sleeping all the time   · Unwillingness or inability to communicate  · Depression  · Unusual sadness, discouragement and loneliness  · Talk of wanting to die  · Neglect of personal appearance   · Rebelliousness- reckless behavior  · Withdrawal from people/activities they love  · Confusion- inability to concentrate     If you or a loved one observes any of these behaviors or has concerns about self-harm, here's what you can do:  · Talk about it- your feelings and reasons for harming  yourself  · Remove any means that you might use to hurt yourself (examples: pills, rope, extension cords, firearm)  · Get professional help from the community (Mental Health, Substance Abuse, psychological counseling)  · Do not be alone:Call your Safe Contact- someone whom you trust who will be there for you.  · Call your local CRISIS HOTLINE 356-8704 or 549-552-5690  · Call your local Children's Mobile Crisis Response Team [place] (107) 258-4849 or wwwBluefly  · Call the toll free National Suicide Prevention Hotlines   · National Suicide Prevention Lifeline 263-561-YXKH (3967)  National Hope Line Network 800-SUICIDE (808-4495)      Urinary Tract Infection, Adult  Introduction  A urinary tract infection (UTI) is an infection of any part of the urinary tract. The urinary tract includes the:  · Kidneys.  · Ureters.  · Bladder.  · Urethra.  These organs make, store, and get rid of pee (urine) in the body.  Follow these instructions at home:  · Take over-the-counter and prescription medicines only as told by your doctor.  · If you were prescribed an antibiotic medicine, take it as told by your doctor. Do not stop taking the antibiotic even if you start to feel better.  · Avoid the following drinks:  ¨ Alcohol.  ¨ Caffeine.  ¨ Tea.  ¨ Carbonated drinks.  · Drink enough fluid to keep your pee clear or pale yellow.  · Keep all follow-up visits as told by your doctor. This is important.  · Make sure to:  ¨ Empty your bladder often and completely. Do not to hold pee for long periods of time.  ¨ Empty your bladder before and after sex.  ¨ Wipe from front to back after a bowel movement if you are female. Use each tissue one time when you wipe.  Contact a doctor if:  · You have back pain.  · You have a fever.  · You feel sick to your stomach (nauseous).  · You throw up (vomit).  · Your symptoms do not get better after 3 days.  · Your symptoms go away and then come back.  Get help right away if:  · You have very  bad back pain.  · You have very bad lower belly (abdominal) pain.  · You are throwing up and cannot keep down any medicines or water.  This information is not intended to replace advice given to you by your health care provider. Make sure you discuss any questions you have with your health care provider.  Document Released: 06/05/2009 Document Revised: 05/25/2017 Document Reviewed: 11/07/2016  © 2017 Elsevier      Urinary Frequency  The number of times a normal person urinates depends upon how much liquid they take in and how much liquid they are losing. If the temperature is hot and there is high humidity, then the person will sweat more and usually breathe a little more frequently. These factors decrease the amount of frequency of urination that would be considered normal.  The amount you drink is easily determined, but the amount of fluid lost is sometimes more difficult to calculate.   Fluid is lost in two ways:  · Sensible fluid loss is usually measured by the amount of urine that you get rid of. Losses of fluid can also occur with diarrhea.  · Insensible fluid loss is more difficult to measure. It is caused by evaporation. Insensible loss of fluid occurs through breathing and sweating. It usually ranges from a little less than a quart to a little more than a quart of fluid a day.  In normal temperatures and activity levels, the average person may urinate 4 to 7 times in a 24-hour period. Needing to urinate more often than that could indicate a problem. If one urinates 4 to 7 times in 24 hours and has large volumes each time, that could indicate a different problem from one who urinates 4 to 7 times a day and has small volumes. The time of urinating is also important. Most urinating should be done during the waking hours. Getting up at night to urinate frequently can indicate some problems.  CAUSES   The bladder is the organ in your lower abdomen that holds urine. Like a balloon, it swells some as it fills up.  Your nerves sense this and tell you it is time to head for the bathroom. There are a number of reasons that you might feel the need to urinate more often than usual. They include:  · Urinary tract infection. This is usually associated with other signs such as burning when you urinate.  · In men, problems with the prostate (a walnut-size gland that is located near the tube that carries urine out of your body). There are two reasons why the prostate can cause an increased frequency of urination:  ¨ An enlarged prostate that does not let the bladder empty well. If the bladder only half empties when you urinate, then it only has half the capacity to fill before you have to urinate again.  ¨ The nerves in the bladder become more hypersensitive with an increased size of the prostate even if the bladder empties completely.  · Pregnancy.  · Obesity. Excess weight is more likely to cause a problem for women than for men.  · Bladder stones or other bladder problems.  · Caffeine.  · Alcohol.  · Medications. For example, drugs that help the body get rid of extra fluid (diuretics) increase urine production. Some other medicines must be taken with lots of fluids.  · Muscle or nerve weakness. This might be the result of a spinal cord injury, a stroke, multiple sclerosis, or Parkinson disease.  · Long-standing diabetes can decrease the sensation of the bladder. This loss of sensation makes it harder to sense the bladder needs to be emptied. Over a period of years, the bladder is stretched out by constant overfilling. This weakens the bladder muscles so that the bladder does not empty well and has less capacity to fill with new urine.  · Interstitial cystitis (also called painful bladder syndrome). This condition develops because the tissues that line the inside of the bladder are inflamed (inflammation is the body's way of reacting to injury or infection). It causes pain and frequent urination. It occurs in women more often than in  men.  DIAGNOSIS   · To decide what might be causing your urinary frequency, your health care provider will probably:  ¨ Ask about symptoms you have noticed.  ¨ Ask about your overall health. This will include questions about any medications you are taking.  ¨ Do a physical examination.  · Order some tests. These might include:  ¨ A blood test to check for diabetes or other health issues that could be contributing to the problem.  ¨ Urine testing. This could measure the flow of urine and the pressure on the bladder.  ¨ A test of your neurological system (the brain, spinal cord, and nerves). This is the system that senses the need to urinate.  ¨ A bladder test to check whether it is emptying completely when you urinate.  ¨ Cystoscopy. This test uses a thin tube with a tiny camera on it. It offers a look inside your urethra and bladder to see if there are problems.  ¨ Imaging tests. You might be given a contrast dye and then asked to urinate. X-rays are taken to see how your bladder is working.  TREATMENT   It is important for you to be evaluated to determine if the amount or frequency that you have is unusual or abnormal. If it is found to be abnormal, the cause should be determined and this can usually be found out easily. Depending upon the cause, treatment could include medication, stimulation of the nerves, or surgery.  There are not too many things that you can do as an individual to change your urinary frequency. It is important that you balance the amount of fluid intake needed to compensate for your activity and the temperature. Medical problems will be diagnosed and taken care of by your physician. There is no particular bladder training such as Kegel exercises that you can do to help urinary frequency. This is an exercise that is usually recommended for people who have leaking of urine when they laugh, cough, or sneeze.  HOME CARE INSTRUCTIONS   · Take any medications your health care provider prescribed or  suggested. Follow the directions carefully.  · Practice any lifestyle changes that are recommended. These might include:  ¨ Drinking less fluid or drinking at different times of the day. If you need to urinate often during the night, for example, you may need to stop drinking fluids early in the evening.  ¨ Cutting down on caffeine or alcohol. They both can make you need to urinate more often than normal. Caffeine is found in coffee, tea, and sodas.  ¨ Losing weight, if that is recommended.  · Keep a journal or a log. You might be asked to record how much you drink and when and where you feel the need to urinate. This will also help evaluate how well the treatment provided by your physician is working.  SEEK MEDICAL CARE IF:   · Your need to urinate often gets worse.  · You feel increased pain or irritation when you urinate.  · You notice blood in your urine.  · You have questions about any medications that your health care provider recommended.  · You notice blood, pus, or swelling at the site of any test or treatment procedure.  · You develop a fever of more than 100.5°F (38.1°C).  SEEK IMMEDIATE MEDICAL CARE IF:   You develop a fever of more than 102.0°F (38.9°C).  This information is not intended to replace advice given to you by your health care provider. Make sure you discuss any questions you have with your health care provider.  Document Released: 10/14/2010 Document Revised: 01/08/2016 Document Reviewed: 07/13/2016  Elsevier Interactive Patient Education © 2017 [employer]        Fall Prevention in the Home  Introduction  Falls can cause injuries. They can happen to people of all ages. There are many things you can do to make your home safe and to help prevent falls.  What can I do on the outside of my home?  · Regularly fix the edges of walkways and driveways and fix any cracks.  · Remove anything that might make you trip as you walk through a door, such as a raised step or threshold.  · Trim any bushes  or trees on the path to your home.  · Use bright outdoor lighting.  · Clear any walking paths of anything that might make someone trip, such as rocks or tools.  · Regularly check to see if handrails are loose or broken. Make sure that both sides of any steps have handrails.  · Any raised decks and porches should have guardrails on the edges.  · Have any leaves, snow, or ice cleared regularly.  · Use sand or salt on walking paths during winter.  · Clean up any spills in your garage right away. This includes oil or grease spills.  What can I do in the bathroom?  · Use night lights.  · Install grab bars by the toilet and in the tub and shower. Do not use towel bars as grab bars.  · Use non-skid mats or decals in the tub or shower.  · If you need to sit down in the shower, use a plastic, non-slip stool.  · Keep the floor dry. Clean up any water that spills on the floor as soon as it happens.  · Remove soap buildup in the tub or shower regularly.  · Attach bath mats securely with double-sided non-slip rug tape.  · Do not have throw rugs and other things on the floor that can make you trip.  What can I do in the bedroom?  · Use night lights.  · Make sure that you have a light by your bed that is easy to reach.  · Do not use any sheets or blankets that are too big for your bed. They should not hang down onto the floor.  · Have a firm chair that has side arms. You can use this for support while you get dressed.  · Do not have throw rugs and other things on the floor that can make you trip.  What can I do in the kitchen?  · Clean up any spills right away.  · Avoid walking on wet floors.  · Keep items that you use a lot in easy-to-reach places.  · If you need to reach something above you, use a strong step stool that has a grab bar.  · Keep electrical cords out of the way.  · Do not use floor polish or wax that makes floors slippery. If you must use wax, use non-skid floor wax.  · Do not have throw rugs and other things on  the floor that can make you trip.  What can I do with my stairs?  · Do not leave any items on the stairs.  · Make sure that there are handrails on both sides of the stairs and use them. Fix handrails that are broken or loose. Make sure that handrails are as long as the stairways.  · Check any carpeting to make sure that it is firmly attached to the stairs. Fix any carpet that is loose or worn.  · Avoid having throw rugs at the top or bottom of the stairs. If you do have throw rugs, attach them to the floor with carpet tape.  · Make sure that you have a light switch at the top of the stairs and the bottom of the stairs. If you do not have them, ask someone to add them for you.  What else can I do to help prevent falls?  · Wear shoes that:  ¨ Do not have high heels.  ¨ Have rubber bottoms.  ¨ Are comfortable and fit you well.  ¨ Are closed at the toe. Do not wear sandals.  · If you use a stepladder:  ¨ Make sure that it is fully opened. Do not climb a closed stepladder.  ¨ Make sure that both sides of the stepladder are locked into place.  ¨ Ask someone to hold it for you, if possible.  · Clearly fito and make sure that you can see:  ¨ Any grab bars or handrails.  ¨ First and last steps.  ¨ Where the edge of each step is.  · Use tools that help you move around (mobility aids) if they are needed. These include:  ¨ Canes.  ¨ Walkers.  ¨ Scooters.  ¨ Crutches.  · Turn on the lights when you go into a dark area. Replace any light bulbs as soon as they burn out.  · Set up your furniture so you have a clear path. Avoid moving your furniture around.  · If any of your floors are uneven, fix them.  · If there are any pets around you, be aware of where they are.  · Review your medicines with your doctor. Some medicines can make you feel dizzy. This can increase your chance of falling.  Ask your doctor what other things that you can do to help prevent falls.  This information is not intended to replace advice given to you by  your health care provider. Make sure you discuss any questions you have with your health care provider.  Document Released: 10/14/2010 Document Revised: 05/25/2017 Document Reviewed: 01/22/2016  © 2017 Elsevier

## 2019-03-27 NOTE — PROGRESS NOTES
· 2 RN skin check complete with TABATHA Nicholson.  · Devices in place:nasal cannula  · Skin assessed under devices:yes  · Confirmed pressure ulcers found on;sacrum  · New potential pressure ulcers noted on :open wound to right elbow noted with mepilex in placed,scabs and bruising noted .  · The following interventions in place:every 2 hours turn,eloina cream,waffle overlay and clean linen.

## 2019-03-27 NOTE — DISCHARGE PLANNING
Received Choice form at 0800  Agency/Facility Name: [facility]  Referral sent per Choice form 0825

## 2019-03-27 NOTE — CARE PLAN
Problem: Discharge Barriers/Planning  Goal: Patient's continuum of care needs will be met  Outcome: PROGRESSING AS EXPECTED  Discuss discharge plan, address questions and concerns. VS stable, pt denies pain, tolerating diet with good appetite.    Problem: Skin Integrity  Goal: Risk for impaired skin integrity will decrease  Outcome: PROGRESSING AS EXPECTED  Frequent linen changes with incontinence, barrier cream and dressings CDI changed as ordered

## 2019-03-27 NOTE — DISCHARGE PLANNING
Anticipated Discharge Disposition: skilled    Action: Pt has been accepted to [facility]. Transportation form completed and faxed to [facility]. Transportation is set up for 15:00. Bedside RN and MD notified. Chart copy to be placed on chart.     Barriers to Discharge:     Plan: transfer to [facility] at 15:00 via [facility].

## 2019-03-30 NOTE — ED TRIAGE NOTES
"Chief Complaint   Patient presents with   • GLF     x2 yesterday. Pt states she fell while trying to get in van and attempting to get out of a rolling chair at home. Negative LOC. C/o L hip and R elbow.      /56   Pulse 81   Temp 36.6 °C (97.8 °F) (Temporal)   Resp 18   Ht 1.626 m (5' 4\")   Wt 87.1 kg (192 lb)   SpO2 98%   BMI 32.96 kg/m²     Pt brought in by FLORI from [facility] for placement, pt AOx4. Hx of UTI. Placed in room RD 1. Complaints and vitals as above. Pt on monitors, all alarms audible. Chart flagged for ERP to see.  " No

## 2019-05-25 PROBLEM — E11.69 DIABETIC FOOT ULCER WITH OSTEOMYELITIS (HCC): Status: ACTIVE | Noted: 2019-01-01

## 2019-05-25 PROBLEM — E11.621 DIABETIC ULCER OF HEEL (HCC): Status: ACTIVE | Noted: 2019-01-01

## 2019-05-25 PROBLEM — L97.409 DIABETIC ULCER OF HEEL (HCC): Status: ACTIVE | Noted: 2019-01-01

## 2019-05-25 PROBLEM — M86.9 DIABETIC FOOT ULCER WITH OSTEOMYELITIS (HCC): Status: ACTIVE | Noted: 2019-01-01

## 2019-05-25 PROBLEM — Z79.01 CHRONIC ANTICOAGULATION: Status: ACTIVE | Noted: 2019-01-01

## 2019-05-25 PROBLEM — L97.509 DIABETIC FOOT ULCER WITH OSTEOMYELITIS (HCC): Status: ACTIVE | Noted: 2019-01-01

## 2019-05-25 PROBLEM — N39.0 UTI (URINARY TRACT INFECTION): Status: ACTIVE | Noted: 2019-01-01

## 2019-05-25 NOTE — ED NOTES
IV access obtained.  Blood drawn and sent to lab.    Two set of blood cultures drawn and sent to lab.

## 2019-05-25 NOTE — ED PROVIDER NOTES
ED Provider Note    Scribed for Vicente Leal M.D. by Gale Saavedra. 5/25/2019  2:50 PM    Primary care provider: Floyd Gould M.D.  Means of arrival: Wheel Chair  History obtained from: Patient  History limited by: None    CHIEF COMPLAINT  Chief Complaint   Patient presents with   • Sent by MD     sent by podiatrist for wounds on bilateral heels of feet.    • Wound Infection     wounds noted to bilateral heels (RT bigger than LT), open, foul smell, drainage. redness extending up both legs.        HPI  Leonie Brock is a 73 y.o. Female, with a history of diabetes, who presents to the Emergency Department for evaluation of ulcers that onset 6 weeks ago. The ulcers are open and are located to the patient's bilateral heels of her feet. The wound on the patient's right heel is slightly larger in size than the left. The patient affirms additional symptoms of drainage, foul smell, and redness extending up her bilateral lower legs. The patient's redness extends from her bilateral feet to just below her knees. The patient has been following up with wound care in [place] for the past 2.5 weeks. On Tuesday, the patient presented to her podiatrist. Her podiatrist recommended the patient be admitted to the hospital no later than the next day. The patient returned home and did not present to the hospital until today because she developed diarrhea. Her diarrhea resolved last right. The patient reports her blood sugar was 166 this morning. The patient's last antibiotic use was 5 weeks ago. The patient denies any fevers, sweats, chills, nausea, vomiting, chest pain, shortness of breath, or abdominal pain.     REVIEW OF SYSTEMS  Pertinent positives include diarrhea (resolved), ulcers to bilateral heels, drainage, foul smell, and redness to bilateral lower legs. Pertinent negatives include no fevers, sweats, chills, nausea, vomiting, chest pain, shortness of breath, or abdominal pain.  All other systems reviewed and  "negative.    PAST MEDICAL HISTORY   has a past medical history of Arthritis; Backpain; CATARACT; Diabetes; and Myocardial infarct (HCC).    SURGICAL HISTORY   has a past surgical history that includes gyn surgery and irrigation & debridement ortho (Right, 12/30/2018).    SOCIAL HISTORY  Social History   Substance Use Topics   • Smoking status: Former Smoker   • Smokeless tobacco: Never Used   • Alcohol use No      History   Drug Use No       FAMILY HISTORY  None relevant.     CURRENT MEDICATIONS  Home Medications     Reviewed by Marquise Mclean R.N. (Registered Nurse) on 05/25/19 at 1426  Med List Status: Partial   Medication Last Dose Status   acetaminophen (TYLENOL) 325 MG Tab  Active   carvedilol (COREG) 3.125 MG Tab  Active   clopidogrel (PLAVIX) 75 MG Tab  Active   Diclofenac Sodium (VOLTAREN) 1 % Gel  Active   furosemide (LASIX) 20 MG Tab  Active   gabapentin (NEURONTIN) 400 MG Cap  Active   insulin aspart (NOVOLOG) 100 UNIT/ML Solution  Active   insulin glargine (LANTUS) 100 UNIT/ML Solution  Active   ipratropium-albuterol (DUONEB) 0.5-2.5 (3) MG/3ML nebulizer solution  Active   metFORMIN (GLUCOPHAGE) 500 MG Tab  Active   omeprazole (PRILOSEC) 20 MG delayed-release capsule  Active   ondansetron (ZOFRAN ODT) 4 MG TABLET DISPERSIBLE  Active   polyethylene glycol/lytes (MIRALAX) Pack  Active   potassium chloride SA (KDUR) 20 MEQ Tab CR  Active   pravastatin (PRAVACHOL) 20 MG Tab  Active   therapeutic multivitamin-minerals (THERAGRAN-M) Tab  Active   warfarin (COUMADIN) 7.5 MG Tab  Active                ALLERGIES  No Known Allergies    PHYSICAL EXAM  VITAL SIGNS: /61   Pulse 89   Temp 36.4 °C (97.5 °F) (Temporal)   Resp 14   Ht 1.626 m (5' 4\")   Wt 88 kg (194 lb)   SpO2 98%   BMI 33.30 kg/m²     Constitutional: Well developed, Well nourished, Mild distress, Non-toxic appearance.   HENT: Normocephalic, Atraumatic, Bilateral external ears normal, Oropharynx moist, No oral exudates.   Eyes: PERRLA, EOMI, " Conjunctiva normal, No discharge.   Neck: No tenderness, Supple, No stridor.   Lymphatic: No lymphadenopathy noted.   Cardiovascular: Normal heart rate, Normal rhythm.   Thorax & Lungs: Clear to auscultation bilaterally, No respiratory distress, No wheezing, No crackles.   Abdomen: Soft, No tenderness, No masses, No pulsatile masses.   Skin: Warm, Dry, No rash.   Extremities:, Bilateral lower extremity with erythema from the proximal tib-fib through the tib-fib area. Ulcers on bilateral heels, right greater than left. Right is 6 cm by 4 cm with foul smelling purulent drainage. Left appears superficial. No edema No cyanosis.   Musculoskeletal: No tenderness to palpation or major deformities noted.  Intact distal pulses  Neurologic: Awake, alert. Moves all extremities spontaneously.  Psychiatric: Affect normal, Judgment normal, Mood normal.     LABS  Labs Reviewed   CBC WITH DIFFERENTIAL - Abnormal; Notable for the following:        Result Value    RBC 4.12 (*)     MCV 99.0 (*)     MCHC 29.9 (*)     Neutrophils-Polys 80.00 (*)     Lymphocytes 9.60 (*)     Lymphs (Absolute) 0.84 (*)     All other components within normal limits   COMP METABOLIC PANEL - Abnormal; Notable for the following:     Glucose 279 (*)     AST(SGOT) 11 (*)     Alkaline Phosphatase 100 (*)     Albumin 3.1 (*)     All other components within normal limits   URINALYSIS - Abnormal; Notable for the following:     Character Cloudy (*)     Glucose 500 (*)     Nitrite Positive (*)     Leukocyte Esterase Moderate (*)     Occult Blood Small (*)     All other components within normal limits    Narrative:     Indication for culture:->Emergency Room Patient   URINE MICROSCOPIC (W/UA) - Abnormal; Notable for the following:     WBC Packed (*)     RBC 2-5 (*)     Bacteria Many (*)     All other components within normal limits    Narrative:     Indication for culture:->Emergency Room Patient   WESTERGREN SED RATE - Abnormal; Notable for the following:     Sed  "Rate Westergren 44 (*)     All other components within normal limits   CRP QUANTITIVE (NON-CARDIAC) - Abnormal; Notable for the following:     Stat C-Reactive Protein 1.41 (*)     All other components within normal limits   LACTIC ACID   URINE CULTURE(NEW)    Narrative:     Indication for culture:->Emergency Room Patient   BLOOD CULTURE    Narrative:     Per Hospital Policy: Only change Specimen Src: to \"Line\" if  specified by physician order.   BLOOD CULTURE    Narrative:     Per Hospital Policy: Only change Specimen Src: to \"Line\" if  specified by physician order.   DIFFERENTIAL MANUAL   PERIPHERAL SMEAR REVIEW   PLATELET ESTIMATE   MORPHOLOGY   ESTIMATED GFR   LACTIC ACID   HEMOGLOBIN A1C   LACTIC ACID   BLOOD CULTURE   BLOOD CULTURE   CULTURE WOUND W/ GRAM STAIN     All labs reviewed by me.    RADIOLOGY  DX-FOOT-COMPLETE 3+ RIGHT   Final Result      1.  No evidence of fracture or dislocation.      2.  Severe degenerative changes again present raising possibility of neuropathic foot.      3.  There appears to be increase in bone erosions along the medial and inferior tarsal bones which could indicate osteomyelitis.         DX-FOOT-COMPLETE 3+ LEFT   Final Result      1.  No evidence of fracture or dislocation.      2.  Osteoarthritis is noted. No bone erosions are identified.        The radiologist's interpretation of all radiological studies have been reviewed by me.      COURSE & MEDICAL DECISION MAKING  Pertinent Labs & Imaging studies reviewed. (See chart for details)    I reviewed the patient's medical records which showed the patient has a history of diabetes and coronary artery disease. 6 months ago the patient had surgery for a right foot wound.    2:50 PM - Patient seen and examined at bedside. Patient will be treated with 3 g Unasyn. Ordered DX-Foot right, DX-Foot left, Lactic acid, CBC, CMP, Urinalysis, Urine Culture, and Blood culture to evaluate her symptoms. The differential diagnoses include but " are not limited to: sepsis, cellulitis, osteomyelitis.    5:00 PM - I spoke with Dr Varela (Hospitalist) who accepts the patient for admission.    5:09 PM - I reevaluated the patient at bedside and updated her on her evaluation results. I informed her of my plan to admit today and IV antibiotic administration. Patient verbalizes understanding and agreement with my plan for admission.     Decision Making:  Patient with wounds bilateral heels, concern for bacterial infection, give the patient IV antibiotics, discussed case with hospitalist for admission the hospital.    DISPOSITION:  Patient will be admitted to Dr Varela in guarded condition.    FINAL IMPRESSION  1. Diabetic foot infection (HCC)    2. Ulcer of foot, unspecified laterality, unspecified ulcer stage (HCC)          IGale (Scribe), am scribing for, and in the presence of, Vicente Leal M.D..    Electronically signed by: Gale Saavedra (Scribe), 5/25/2019    IVicente M.D. personally performed the services described in this documentation, as scribed by Gale Saavedra in my presence, and it is both accurate and complete.    The note accurately reflects work and decisions made by me.  Vicente Leal  5/25/2019  8:53 PM

## 2019-05-25 NOTE — ED TRIAGE NOTES
"Chief Complaint   Patient presents with   • Sent by MD     sent by podiatrist for wounds on bilateral heels of feet.    • Wound Infection     wounds noted to bilateral heels (RT bigger than LT), open, foul smell, drainage. redness extending up both legs.      Pt to triage via personal w/c for above. Denies fevers. Reports changing bandages to heels every 4 days or so.    Pt to senior lounge until room obtained. Educated on triage process and to inform staff of any changes.     /61   Pulse 89   Temp 36.4 °C (97.5 °F) (Temporal)   Resp 14   Ht 1.626 m (5' 4\")   Wt 88 kg (194 lb)   SpO2 98%   BMI 33.30 kg/m²     "

## 2019-05-26 PROBLEM — D64.9 NORMOCYTIC ANEMIA: Status: ACTIVE | Noted: 2019-01-01

## 2019-05-26 PROBLEM — I73.9 PERIPHERAL VASCULAR DISEASE (HCC): Status: ACTIVE | Noted: 2019-01-01

## 2019-05-26 PROBLEM — R41.89 COGNITIVE DECLINE: Status: ACTIVE | Noted: 2019-01-01

## 2019-05-26 PROBLEM — R21 RASH: Status: ACTIVE | Noted: 2019-01-01

## 2019-05-26 NOTE — CARE PLAN
Problem: Infection  Goal: Will remain free from infection  Outcome: PROGRESSING AS EXPECTED  Pt afebrile.  Standard precautions in place.  Monitoring labs and vitals for s/s of infection.  Pt educated on the importance of hand washing especially while in hospital, pt verbalized understanding.    Problem: Pain Management  Goal: Pain level will decrease to patient's comfort goal  Outcome: PROGRESSING AS EXPECTED  Follow pain managment plan developed in collaboration with patient and Interdisciplinary Team  Patient will be aware of pain managment interventions available.

## 2019-05-26 NOTE — PROGRESS NOTES
"Pharmacy Kinetics 73 y.o. female on vancomycin day # 1 2019    Currently on Vancomycin 2200 mg iv x1 loading dose  (1622)    Indication for Treatment: SSTI/DFI    Pertinent history per medical record: Admitted on 2019 for wound infection. Wounds noted to bilateral heels (RT bigger than LT), open, foul smell, drainage. redness extending up both legs. Pt was discharged from her group home 3 weeks ago and stopped taking her medications except for her Lantus.    Other antibiotics: ampicillin/sulbactam 3g IV q6h    Allergies: Patient has no known allergies.     List concerns for renal function: age, obesity (BMI 33)    Pertinent cultures to date:   19: Blood x2 - in process  19: Urine - in process    MRSA nares swab if pneumonia is a concern (ordered/positive/negative/n-a): N/A    Recent Labs      19   1520   WBC  8.8   NEUTSPOLYS  80.00*   BANDSSTABS  0.90     Recent Labs      19   1520   BUN  12   CREATININE  0.83   ALBUMIN  3.1*     No results for input(s): VANCOTROUGH, VANCOPEAK, VANCORANDOM in the last 72 hours.No intake or output data in the 24 hours ending 19 1824   /69   Pulse 95   Temp 36.4 °C (97.5 °F) (Temporal)   Resp 18   Ht 1.626 m (5' 4\")   Wt 88 kg (194 lb)   SpO2 93%  Temp (24hrs), Av.4 °C (97.5 °F), Min:36.4 °C (97.5 °F), Max:36.4 °C (97.5 °F)      A/P   1. Vancomycin dose change: 900mg IV q24h  (1600)  2. Next vancomycin level: ~1-2 days  (not ordered)  3. Goal trough: 12-16 mcg/mL  4. Comments: Empiric antibiotics started for a DFI with drainage. No leukocytosis and afebrile. Cultures are in process. Renal function appears stable per renal indices. Given history appears a lower dose is needed; start ~10mg/kg per protocol. Recommend a trough level prior to 3rd dose to assess accumulation and dose appropriateness. Pharmacy to monitor cultures for possible de-escalation.      Rula Montura, PharmD., BCPS        "

## 2019-05-26 NOTE — THERAPY
PT consult received.  Pt to be seen by LPS for POC.  Hold PT evaluation until this occurs and POC is determined.    Ruth Aguilera PT/DPT  Pager# [phone]

## 2019-05-26 NOTE — ASSESSMENT & PLAN NOTE
See previous note. There is some concern that pt and her  are not able to take care of each other. Her  seems to remember details, however, still there is lack of following instruction. Medical recommendation. Might need more assistant as home health. Pt definitely has cognitive deficits. Her  is the one who manage her medications

## 2019-05-26 NOTE — RESPIRATORY CARE
COPD EDUCATION by COPD CLINICAL EDUCATOR  5/26/2019 at 7:12 AM by Kristie Navarro     Patient reviewed by COPD education team. Patient does not qualify for the COPD program.

## 2019-05-26 NOTE — PROGRESS NOTES
The pt arrived to the unit from the ED, she was settled into her room and assessed  2 RN skin check complete with Marilyn MAYS.  Devices in place, N/A.  Confirmed pressure ulcers found on, heels bilaterally, old healed ulcer on buttocks, and scabbing/ wounds on toes bilaterally.  New potential pressure ulcers noted on pt was POA. Wound consult placed per protocol with pictures.  The following interventions in place, the pt is able to reposition herself in bed, a waffle mattress will be placed under pt, pillows are floating heels with mepilex in place as well. .

## 2019-05-26 NOTE — CONSULTS
DATE OF SERVICE:  05/26/2019    INFECTIOUS DISEASE CONSULTATION    REASON FOR CONSULT:  Bilateral heel ulcerations and concern for osteomyelitis.    CONSULTING PHYSICIAN:  Dejuan Medellin MD    HISTORY OF PRESENT ILLNESS:  This is a 73-year-old white female with   insulin-dependent diabetes who was admitted to the hospital by the request of   her podiatrist on 05/25/2019 due to progression of bilateral heel wounds over   the past 6 weeks.  Her last antibiotic use was approximately 5 weeks ago.  She   was initially requested to go to the hospital on Tuesday, but did not present   until 05/25 because she had a diarrheal illness and wanted to stay home.  The   diarrhea resolved yesterday, so she came to the hospital.  She had no   associated fever, chills, nausea or vomiting.  She is known to the ID service   for prior treatment of right heel osteomyelitis that was diagnosed on   12/29/2019.  She presumably completed the 6-week course of IV antibiotics on 02/10/2019.    She failed to follow up in ID clinic x3.    She was treated with daptomycin for prior culture results that were positive   for MRSE, MSSE, and Enterococcus faecalis.  During her prior hospitalization   on 12/30, she had debridement of necrotic tissue to the bone.  It is unclear   if her wound ever healed.  She is now presenting with persistent open wound on   her right heel with surrounding erythema.  Per the ED, the erythema was   extending up her legs.  She is currently receiving Unasyn and vancomycin and   infectious disease is consulted for antibiotic recommendations and management.    Her only complaint is of the bilateral heel pain that is described as   constant, moderate to severe in intensity and is only partially relieved by   her oral narcotics.    Foul smelling drainage was noted from her right heel prior to admission.    She is currently on vancomycin and Unasyn.   Infectious Diseases consulted for antibiotic recommendation and management  LPS  has also been consulted    REVIEW OF SYSTEMS:  Positive for the bilateral heel pain.  Otherwise, all   other systems reviewed and are negative.    PAST MEDICAL HISTORY:  Insulin-dependent diabetes, coronary artery disease,   and prior osteomyelitis.    FAMILY HISTORY:  Diabetes.    SOCIAL HISTORY:  She is a former smoker.  Does not drink or use illicit drugs.    PHYSICAL EXAMINATION:  GENERAL:  She is an elderly female, in no acute distress.  VITAL SIGNS:  She has been afebrile since admission, temperature 98, blood   pressure 139/66, pulse of 86, respiratory rate 17, oxygen saturation 97% on 3   L nasal cannula.  She weighs 82 kilos.  HEENT:  Head is normocephalic, atraumatic.  Pupils are equal, round, and   reactive to light.  Extraocular movements are intact.  Oropharynx is clear.  NECK:  Supple.  There is no JVD or stridor.  She has no conjunctivitis.  No   scleral icterus.  CARDIOVASCULAR:  Regular rate and rhythm with ectopy.  Soft murmur.Unable to palpate pulses  CHEST:  Clear to auscultation bilaterally, unlabored.  ABDOMEN:  Soft, nontender, nondistended.  There is no rebound or guarding, no   hepatosplenomegaly.  EXTREMITIES:  Show bilateral heel ulcerations, right heel ulceration is open   with visible necrotic tissue. Measures approx 4 bX 6 cm.  The ulceration on the left   Heel measures approx. 3 X 4 cm.  Admitting erythema that had extended up her legs appears to be   resolving, particularly on the right.  Her right heel ulcer shows it denuded tissue  through to the muscle with necrotic eschar medially.  Left heel    NEUROLOGIC:  She is awake, alert, oriented.  Speech is fluent without   dysarthria.  Cranial nerves are intact.  She has decreased sensation in her   lower extremities.    BEHAVIOR: She has flat affect.    LABORATORY DATA:  Current labs show white blood cell count of 7.4, H and H of   11.5 and 36, platelets of 230.  Sodium 143, potassium 3.8, chloride 109,   bicarbonate 29, glucose 155,  BUN 11, creatinine 0.82.  Glycosylated hemoglobin   done in March was 7.3.  Urinalysis showed packed wbc's, 2-5 rbc's.  Culture   is pending.  Mild elevated C-reactive protein at 1.4.  Prior pathology from   12/30/2018 showed benign bone and soft tissue.  Blood cultures x2 this   admission are negative.    DIAGNOSTIC DATA:  X-ray of the left foot showed osteoarthritis, no erosions.    X-ray of the right foot showed severe degenerative changes, erosions on the   medial and inferior tarsal bones suggestive of osteomyelitis.  She did have a   vascular evaluation on her last admission, which showed calcified vessels, but   no obvious abnormalities.  EKG, QTC of 431.    ASSESSMENT AND PLAN:  A 73-year-old female known to the ID service from recent   hospitalization for osteomyelitis of the right heel with polymicrobial   infection including methicillin-resistant Staphylococcus epidermidis,   methicillin-susceptible Staphylococcus epidermidis, and Enterococcus faecalis.    She was discharged home to complete IV antibiotics with daptomycin on 02/10.    She unfortunately failed to follow up in ID clinic subsequently in spite of   multiple attempts to schedule.  She is now presenting with bilateral lower   extremity heel ulcerations, right worse than left.  Limb preservation service   has been consulted and she will likely require debridement.  There is concern   for underlying osteomyelitis again in the right foot.  Continue vancomycin, Zosyn, monitor renal function   closely. Vascular studies ordered. Her diabetes was previously   fairly well controlled.  Hemoglobin A1c was ordered.  She may have a urinary   tract infection.  She has significant pyuria, malaise, and recent nonspecific   illness.  She is on IV antibiotics for her heel ulcerations and suspected   osteomyelitis, which should cover. Further recommendations per culture results and clinical course.    Thank you and we will follow with you.        ____________________________________     MD ERICA LOERA / RUPINDER    DD:  05/26/2019 11:31:59  DT:  05/26/2019 12:32:46    D#:  [id number]  Job#:  [id number]

## 2019-05-26 NOTE — PROGRESS NOTES
Hospital Medicine Daily Progress Note    Date of Service  5/26/2019    Chief Complaint  73 y.o. female admitted 5/25/2019 with diabetic foot ulcers    Hospital Course    73-year-old male past medical history of coronary artery disease questionable stent placement-/2018 or 1998 ??, paroxysmal atrial fibrillation, type 2 diabetes (last a1c 7.3 03/2019) mild to moderate cognitive decline, hypertension, hyperlipidemia sent by podiatrist for bilateral wound on her feet.  X-ray showed right osteomyelitis.  She was started on vanco/unasyn. Pt was not septic on admission.  LPS was consulted. On admission was also found patient has UTI which was already covered by above antibiotic.       Interval Problem Update  Pt still has pain on her right foot. She also reports a rash on the right inguinal area. No other complains.     Consultants/Specialty  [facility]  Infectious disease physician     Code Status  Full code     Disposition  Inpatient, IV antibiotic, possible surgical debridement     Review of Systems  Review of Systems   Constitutional: Negative for chills and fever.   HENT: Negative for sore throat.    Eyes: Negative for blurred vision and double vision.   Respiratory: Negative for cough and shortness of breath.    Cardiovascular: Negative for chest pain, palpitations and leg swelling.   Gastrointestinal: Negative for abdominal pain, constipation, diarrhea and heartburn.   Genitourinary: Negative for dysuria and urgency.   Musculoskeletal: Positive for joint pain.        Right foot pain    Skin: Positive for itching and rash.   Neurological: Negative for dizziness, weakness and headaches.   Psychiatric/Behavioral: Negative for depression.        Physical Exam  Temp:  [36 °C (96.8 °F)-37.2 °C (98.9 °F)] 36 °C (96.8 °F)  Pulse:  [] 86  Resp:  [14-20] 17  BP: (115-156)/(59-76) 139/66  SpO2:  [90 %-100 %] 97 %    Physical Exam   Constitutional: She is oriented to person, place, and time. She appears well-developed and  well-nourished.   HENT:   Head: Normocephalic and atraumatic.   Right Ear: External ear normal.   Left Ear: External ear normal.   Nose: Nose normal.   Eyes: Pupils are equal, round, and reactive to light. EOM are normal. No scleral icterus.   Neck: Normal range of motion.   Cardiovascular: Normal rate, regular rhythm and normal heart sounds.  Exam reveals no gallop and no friction rub.    No murmur heard.  Weak pedal pulses    Pulmonary/Chest: Effort normal. No respiratory distress. She has no wheezes. She has no rales. She exhibits no tenderness.   Abdominal: Soft. She exhibits no distension. There is no tenderness. There is no rebound and no guarding.   Musculoskeletal: Normal range of motion. She exhibits edema.   Bilateral heel wounds, + drainage, erythematous  Right > L    Lymphadenopathy:     She has no cervical adenopathy.   Neurological: She is alert and oriented to person, place, and time. No cranial nerve deficit.   Some memory concerns    Skin:   Rash as per A&P    Psychiatric: She has a normal mood and affect.       Fluids    Intake/Output Summary (Last 24 hours) at 05/26/19 1125  Last data filed at 05/26/19 1000   Gross per 24 hour   Intake            740.1 ml   Output                0 ml   Net            740.1 ml       Laboratory  Recent Labs      05/25/19   1520  05/26/19   0346   WBC  8.8  7.4   RBC  4.12*  3.70*   HEMOGLOBIN  12.2  11.5*   HEMATOCRIT  40.8  36.4*   MCV  99.0*  98.4*   MCH  29.6  31.1   MCHC  29.9*  31.6*   RDW  47.0  46.4   PLATELETCT  274  230   MPV  9.4  9.4     Recent Labs      05/25/19   1520  05/26/19   0346   SODIUM  141  143   POTASSIUM  3.7  3.8   CHLORIDE  104  109   CO2  28  29   GLUCOSE  279*  155*   BUN  12  11   CREATININE  0.83  0.82   CALCIUM  9.3  8.8                   Imaging  DX-FOOT-COMPLETE 3+ RIGHT   Final Result      1.  No evidence of fracture or dislocation.      2.  Severe degenerative changes again present raising possibility of neuropathic foot.      3.   There appears to be increase in bone erosions along the medial and inferior tarsal bones which could indicate osteomyelitis.         DX-FOOT-COMPLETE 3+ LEFT   Final Result      1.  No evidence of fracture or dislocation.      2.  Osteoarthritis is noted. No bone erosions are identified.      US-EXTREMITY ARTERY LOWER BILAT W/SELWYN (COMBO)    (Results Pending)        Assessment/Plan  * Diabetic foot ulcer with osteomyelitis (HCC)   Assessment & Plan    Pt tells me that she has bilateral wound for about two months.. She was going to outpatient wound clinic but it is not getting better.  Right foot is worse than the left  Right foot XR showed:   Severe degenerative changes again present raising possibility of neuropathic foot.  There appears to be increase in bone erosions along the medial and inferior tarsal bones which could indicate osteomyelitis.   It might be also complex wound due to PAD  SELWYN to follow   ESR elevated.   Wound is round, + discharges, on the heel, painful   LPS consulted.   She is on vanco and unasyn.   Infectious disease consulted  Blood culture NGT      Coronary artery disease involving native coronary artery of native heart without angina pectoris- (present on admission)   Assessment & Plan    Not known details, per chart review possible she did have a stent placement 2018/ or 1998 ??  Resumed carvedilol   Statin, asa. She denies chest pain        Type 2 diabetes mellitus with skin complication, with long-term current use of insulin (HCC)- (present on admission)   Assessment & Plan    Continue lantus 20 U at bed time   Ordered SS insulin  a1c to follow  Need more diabetic education. She will benefits from possible janumet/and oral combination instead of lantus        Essential hypertension- (present on admission)   Assessment & Plan    Fair control  Continue Lasix and carvedilol     Cognitive decline   Assessment & Plan    Mild-moderate. She does not manage her own medications, she is not able to  tell me when was the last time she took medications. Her  is in the room, on scooter and he is the one who tells me that she did took medications at home but she stopped everything 4 days ago, because MA from her doctor clinic told her to do so.   I appreciate input from , PT/speech cognitive mateus, when is ready for discharge. Not at this time      Peripheral vascular disease (HCC)   Assessment & Plan    Per chart review, bilateral weak pulse.   SELWYN to follow  She is on plavix      Rash   Assessment & Plan    She complains of rash on the right inguinal area, dry, erythematous, rough. Itchy  Possible eczema   She does not remember for how long. I will start on triamcinolone  0.1% twice daily     Normocytic anemia   Assessment & Plan    No signs of bleeding. I suspect inflammatory anemia   Iron/b12/folic acid to follow      UTI (urinary tract infection)   Assessment & Plan    UA positive for nitrate on admission  She is on unasyn  Culture to follow       Chronic anticoagulation   Assessment & Plan    Per last discharge supposed to be on Coumadin.  She is telling me that she is only one Plavix. Not clear why  She has paroxysmal atrial fibrillation. I will resume lovenox BID   I will hold on plavix at this time, since pt might go for surgery . She did receive one dose 05/25  I did start asa 81 m g daily        Paroxysmal atrial fibrillation (HCC)- (present on admission)   Assessment & Plan    Rate and hemodynamics stable  Continue carvedilol and Lasix          VTE prophylaxis: lovenox

## 2019-05-26 NOTE — ASSESSMENT & PLAN NOTE
"Cultures growin MRSA  Continue wound care  Continue daptomycin through July 6  ID following   Vascular surgery following, right lower extremity angiogram, not flow-limiting lesions, assessing for bilateral CTAs.  Vascular felt no need to complete CTA felt circulation okay and signed off.  Patient has been declining PICC line citing bad experience with it in the past  SNFs have declined because \"dapto too expensive\"  [facility] referral currently  Patient now open to having PICC line on 6/6  [facility] accepted, preliminarily on 6/7. They are okay with daptomycin.  After further review, [facility] declined citing concerns about her rehab plan and compliance.  Resend SNF referrals.  "

## 2019-05-26 NOTE — ASSESSMENT & PLAN NOTE
Resolved  Completed 3-day course of antibiotics with Zosyn, currently on dapto for other infectious indication

## 2019-05-26 NOTE — ED NOTES
Assumed care of pt at this time. Pt resting in bed. Call light within reach. Denies any needs. Pt waiting for room to be cleaned upstairs. Pt updated on POC.

## 2019-05-26 NOTE — CARE PLAN
Problem: Safety  Goal: Will remain free from falls  Outcome: PROGRESSING AS EXPECTED  The pt is a moderate risk for falls, she has a bed alarm in place, her bed is low, a yellow arm band, and non-skid socks when she ambulates.     Problem: Knowledge Deficit  Goal: Knowledge of disease process/condition, treatment plan, diagnostic tests, and medications will improve  Outcome: PROGRESSING AS EXPECTED  The pt is educated on her plan of care, medications, treatments, and has her questions and concerns answered by the RN.

## 2019-05-26 NOTE — ED NOTES
Med Rec Updated and Complete per Pt at bedside  Allergies Reviewed  No PO ABX last 30 days.    Pt was discharged from her group home 3 weeks ago and stopped taking all of her medications including Plavix and Warfarin, with the exception of her Lantus.     PharmD notified.

## 2019-05-26 NOTE — H&P
"Hospital Medicine History & Physical Note    Date of Service  5/25/2019    Primary Care Physician  Floyd Gould M.D.    Consultants  LPS    Code Status  full    Chief Complaint  Sent by MD (sent by podiatrist for wounds on bilateral heels of feet. ) and Wound Infection (wounds noted to bilateral heels (RT bigger than LT), open, foul smell, drainage. redness extending up both legs. )        History of Presenting Illness  73 y.o. female who presented 5/25/2019 with Sent by MD (sent by podiatrist for wounds on bilateral heels of feet. ) and Wound Infection (wounds noted to bilateral heels (RT bigger than LT), open, foul smell, drainage. redness extending up both legs. )  History of chronic bilateral diabetic heel ulcers followed by [facility] and Dr. Kwon.  Also follows Podiatry.  Last wound chage was last Tuesday Dr. Leary, Podiatry. On exam, there was foul smelling discharge. Podiatry recommended her going to the hospital but she felt she had \"dysentery\" so preferred to go home and recuperate there first. She improved and instead came today as per Podiatry recommendations.   She was also having pain on those areas. Denied fevers or chills  She has diabetes but this has been uncontrolled  Denies smoking or alcohol.  At the ED, afebrile, mild hypertension.  Xrays of foot showed possible osteomyelitis  No leukocytosis. Hyperglycemia.  When I saw her at ED, no acute distress. Bandages, heels of foot with seeping somewhat malodorous serous discharge.    Review of Systems  Review of Systems   Constitutional: Positive for malaise/fatigue. Negative for chills and fever.   HENT: Negative for congestion, hearing loss and nosebleeds.    Eyes: Negative for pain and redness.   Respiratory: Negative for cough, hemoptysis, shortness of breath and wheezing.    Cardiovascular: Negative for chest pain and palpitations.   Gastrointestinal: Negative for abdominal pain, blood in stool, constipation, diarrhea, nausea and vomiting. "   Genitourinary: Negative for dysuria, frequency and hematuria.   Musculoskeletal: Positive for joint pain and myalgias. Negative for falls.   Skin: Negative for rash.   Neurological: Positive for weakness. Negative for dizziness, tremors, focal weakness, seizures, loss of consciousness and headaches.   Psychiatric/Behavioral: The patient is not nervous/anxious and does not have insomnia.    All other systems reviewed and are negative.      Past Medical History   has a past medical history of Arthritis; Backpain; CATARACT; Diabetes; and Myocardial infarct (HCC).    Surgical History   has a past surgical history that includes gyn surgery and irrigation & debridement ortho (Right, 12/30/2018).     Family History  family history is not on file.   Mom and brother has diabetes  Social History   reports that she has quit smoking. She has never used smokeless tobacco. She reports that she does not drink alcohol or use drugs.    Allergies  No Known Allergies    Medications  Prior to Admission Medications   Prescriptions Last Dose Informant Patient Reported? Taking?   insulin glargine (LANTUS) 100 UNIT/ML Solution 5/24/2019 at PM Patient Yes Yes   Sig: Inject 20 Units as instructed every evening.      Facility-Administered Medications: None       Physical Exam  Temp:  [36.4 °C (97.5 °F)] 36.4 °C (97.5 °F)  Pulse:  [89-98] 95  Resp:  [14-20] 18  BP: (134-156)/(61-76) 151/69  SpO2:  [90 %-98 %] 93 %    Physical Exam   Constitutional: She appears well-developed.   Elderly   HENT:   Head: Normocephalic and atraumatic.   Eyes: Conjunctivae are normal. No scleral icterus.   Neck: Normal range of motion. Neck supple.   Cardiovascular: Normal rate and regular rhythm.  Exam reveals no gallop and no friction rub.    Murmur heard.  Pulmonary/Chest: Effort normal and breath sounds normal. No respiratory distress. She has no wheezes. She has no rales.   Abdominal: Soft. Bowel sounds are normal. She exhibits no distension. There is no  tenderness. There is no rebound and no guarding.   Musculoskeletal: She exhibits no edema or tenderness (Mild foot soreness).   Bilateral heel ulcrs with wet dressings  Somewhat ododrous discharge   Neurological: She is alert.   Skin: Skin is warm.   Psychiatric: She has a normal mood and affect. Her behavior is normal.       Laboratory:  Recent Labs      05/25/19   1520   WBC  8.8   RBC  4.12*   HEMOGLOBIN  12.2   HEMATOCRIT  40.8   MCV  99.0*   MCH  29.6   MCHC  29.9*   RDW  47.0   PLATELETCT  274   MPV  9.4     Recent Labs      05/25/19   1520   SODIUM  141   POTASSIUM  3.7   CHLORIDE  104   CO2  28   GLUCOSE  279*   BUN  12   CREATININE  0.83   CALCIUM  9.3     Recent Labs      05/25/19   1520   ALTSGPT  7   ASTSGOT  11*   ALKPHOSPHAT  100*   TBILIRUBIN  0.3   GLUCOSE  279*                 No results for input(s): TROPONINI in the last 72 hours.    Urinalysis:    Recent Labs      05/25/19   1800   SPECGRAVITY  1.021   GLUCOSEUR  500*   KETONES  Negative   NITRITE  Positive*   LEUKESTERAS  Moderate*   WBCURINE  Packed*   RBCURINE  2-5*   BACTERIA  Many*   EPITHELCELL  Few        Imaging:  DX-FOOT-COMPLETE 3+ RIGHT   Final Result      1.  No evidence of fracture or dislocation.      2.  Severe degenerative changes again present raising possibility of neuropathic foot.      3.  There appears to be increase in bone erosions along the medial and inferior tarsal bones which could indicate osteomyelitis.         DX-FOOT-COMPLETE 3+ LEFT   Final Result      1.  No evidence of fracture or dislocation.      2.  Osteoarthritis is noted. No bone erosions are identified.            Assessment/Plan:  I anticipate this patient will require at least two midnights for appropriate medical management, necessitating inpatient admission.    * Diabetic foot ulcer with osteomyelitis (HCC)   Assessment & Plan    Probably chornic. Currently not septic.  Ordered IV antibiotics  Advised blood sugar control  Ordered ESR and CRP  Consulted  LPS. Usually they will notify [facility] if needed.  Consult ID in the AM     Coronary artery disease involving native coronary artery of native heart without angina pectoris- (present on admission)   Assessment & Plan    Continue statin, carvidolol and Lasix       Type 2 diabetes mellitus with skin complication, with long-term current use of insulin (HCC)- (present on admission)   Assessment & Plan    Continue basal insulin   Ordered SS insulin  Ordered A1c     Essential hypertension- (present on admission)   Assessment & Plan    Fair control  Continue Lasix and carvedilol     UTI (urinary tract infection)   Assessment & Plan    Urinalysis came back indicative of UTI  On antibiotics anyway  IV fluids  Follow cultures     Chronic anticoagulation   Assessment & Plan    She mentioned she is no longer taking warfarin?  She was therapeutic at least since March 27, 2019  Hold warfarin for now, in case debridement        Paroxysmal atrial fibrillation (HCC)- (present on admission)   Assessment & Plan    Rate and hemodynamics stable  Continue carvedilol and Lasix         VTE prophylaxis: Warfarin  Reviewed vitals, labs, imaging, staff notes.  Discussed assessment and plan with Leonie Brock  Discussed with ED physician.

## 2019-05-26 NOTE — PROGRESS NOTES
LIMB PRESERVATION SERVICE INITIAL CONSULT  - RN NOTE    REASON FOR LPS CONSULTATION: Bilateral Neuropathic Heel Full thickness ulcers     Past medical history, medicines, allergies, family history, social history,    reviewed    History of Present Illness:   Patient is well known to LPS and outpatient wound care services.    Leonie Brock is a 73 y.o. fe...male, with a past medical history that includes uncontrolled type 2 diabetes and a MI in 1998 with stint placement, admitted 5/25/2019 for Diabetic foot ulcer (HCC).   LPS has been consulted for her bilateral neuropathic full thickness heel ulcers.  These wounds started since she was at the [facility] per the pt  Pt has been treating the wounds with out patient wound care and podiatry.   The wounds have failed to improve. Pt was previously admitted 12/29/2018 and had an Infected R DFU and had Sx with Dr. Kwon on  12/31/2018  A Right gastrocsoleus recession, Right I and D, Right ostectomy/excision.  Pt was diagnosed with type 2 diabetes 35 years ago, and is currently managing with insulin.  Pt checks their blood sugars 3 times daily and reports that these typically run around 130s to 200s.  They have had previous diabetes education.  They do have numbness in their feet.  They usually wears diabetic shoes. They do check their feet routinely. They are retired.   Previous ABIs were brisk and multiphasic in december    Tobacco Abuse Hx:   reports that she has quit smoking. She has never used smokeless tobacco.    Alcohol Abuse Hx:   reports that she does not drink alcohol.    Drug Abuse Hx:   reports that she does not use drugs.    PAST MEDICAL HISTORY:   Past Medical History:   Diagnosis Date   • Arthritis     all over   • Backpain    • CATARACT     removed   • Diabetes     1990   • Myocardial infarct (HCC)     1998       MEDICATIONS:   Current Facility-Administered Medications   Medication Dose   • triamcinolone acetonide (KENALOG) 0.1 % cream     • enoxaparin  (LOVENOX) inj 40 mg  40 mg   • aspirin EC (ECOTRIN) tablet 81 mg  81 mg   • acetaminophen (TYLENOL) tablet 650 mg  650 mg   • carvedilol (COREG) tablet 3.125 mg  3.125 mg   • furosemide (LASIX) tablet 20 mg  20 mg   • gabapentin (NEURONTIN) capsule 400 mg  400 mg   • insulin glargine (LANTUS) injection 30 Units  30 Units   • ipratropium-albuterol (DUONEB) nebulizer solution  3 mL   • omeprazole (PRILOSEC) capsule 20 mg  20 mg   • ondansetron (ZOFRAN ODT) dispertab 4 mg  4 mg   • potassium chloride SA (Kdur) tablet 20 mEq  20 mEq   • pravastatin (PRAVACHOL) tablet 20 mg  20 mg   • senna-docusate (PERICOLACE or SENOKOT S) 8.6-50 MG per tablet 2 Tab  2 Tab    And   • polyethylene glycol/lytes (MIRALAX) PACKET 1 Packet  1 Packet    And   • magnesium hydroxide (MILK OF MAGNESIA) suspension 30 mL  30 mL    And   • bisacodyl (DULCOLAX) suppository 10 mg  10 mg   • NS infusion     • ondansetron (ZOFRAN) syringe/vial injection 4 mg  4 mg   • Pharmacy Consult Request ...Pain Management Review 1 Each  1 Each    And   • oxyCODONE immediate-release (ROXICODONE) tablet 2.5 mg  2.5 mg    And   • oxyCODONE immediate-release (ROXICODONE) tablet 5 mg  5 mg    And   • HYDROmorphone pf (DILAUDID) injection 0.25 mg  0.25 mg   • NS (BOLUS) infusion 500 mL  500 mL   • ampicillin/sulbactam (UNASYN) 3 g in  mL IVPB  3 g   • MD Alert...Vancomycin per Pharmacy  1 Each   • lactobacillus rhamnosus (CULTURELLE) capsule 1 Cap  1 Cap   • vancomycin 900 mg in  mL IVPB  10 mg/kg   • insulin regular (HUMULIN R) injection 2-9 Units  2-9 Units    And   • glucose 4 g chewable tablet 16 g  16 g    And   • DEXTROSE 10% BOLUS 250 mL  250 mL       ALLERGIES:  No Known Allergies     PAST SURGICAL HISTORY:   Past Surgical History:   Procedure Laterality Date   • IRRIGATION & DEBRIDEMENT ORTHO Right 12/30/2018    Procedure: IRRIGATION & DEBRIDEMENT, gastroc recession, ostectomy;  Surgeon: Tomi Kwon M.D.;  Location: SURGERY [facility];   Service: Orthopedics   • GYN SURGERY      hysterectomy       SOCIAL HISTORY:   Social History     Social History   • Marital status:      Spouse name: N/A   • Number of children: N/A   • Years of education: N/A     Social History Main Topics   • Smoking status: Former Smoker   • Smokeless tobacco: Never Used   • Alcohol use No   • Drug use: No   • Sexual activity: Not on file     Other Topics Concern   • Not on file     Social History Narrative   • No narrative on file        FAMILY HISTORY: No family history on file.    DIAGNOSTIC DATA:       Xray:      Right foot   1.  No evidence of fracture or dislocation.    2.  Severe degenerative changes again present raising possibility of neuropathic foot.    3.  There appears to be increase in bone erosions along the medial and inferior tarsal bones which could indicate osteomyelitis.    Left foot    1.  No evidence of fracture or dislocation.    2.  Osteoarthritis is noted. No bone erosions are identified.     MRI: NA             CT: Na             Vascular: PEN    Arterial studies:  12/30/18    R:   L:     RLE: Triphasic/triphasic   LLE: Triphasic/triphasic     Bilateral.    Doppler waveform of the common femoral artery is of high amplitude and    triphasic.    Doppler waveforms at the ankle are brisk and multiphasic.   Ankle pressures are not accurately measured due to calcification and    noncompressibility of the tibial vessels.    Toe-Brachial Index - Right: 1.01   Left: 1.03    Labs                                      ESR: 44                      CRP: 1.41           A1c:   Lab Results   Component Value Date/Time    HBA1C 7.3 (H) 03/27/2019 02:51 AM                 Lab Results   Component Value Date/Time    GLUCOSE 155 (H) 05/26/2019 03:46 AM          Microbiology:   WBC   Date/Time Value Ref Range Status   05/26/2019 03:46 AM 7.4 4.8 - 10.8 K/uL Final     Results     Procedure Component Value Units Date/Time    CULTURE WOUND W/ GRAM STAIN [[id number]]  "Collected:  05/26/19 1143    Order Status:  Completed Specimen:  Wound from Right Foot Updated:  05/26/19 1147    Narrative:       Collected By:[id number] FLAQUITO FLOWERS  Heel    BLOOD CULTURE [[id number]] Collected:  05/25/19 1525    Order Status:  Completed Specimen:  Blood from Peripheral Updated:  05/26/19 0831     Significant Indicator NEG     Source BLD     Site PERIPHERAL     Culture Result No Growth  Note: Blood cultures are incubated for 5 days and  are monitored continuously.Positive blood cultures  are called to the RN and reported as soon as  they are identified.      Narrative:       Per Hospital Policy: Only change Specimen Src: to \"Line\" if  specified by physician order.  No site indicated    BLOOD CULTURE [[id number]] Collected:  05/25/19 1520    Order Status:  Completed Specimen:  Blood from Peripheral Updated:  05/26/19 0831     Significant Indicator NEG     Source BLD     Site PERIPHERAL     Culture Result No Growth  Note: Blood cultures are incubated for 5 days and  are monitored continuously.Positive blood cultures  are called to the RN and reported as soon as  they are identified.      Narrative:       Per Hospital Policy: Only change Specimen Src: to \"Line\" if  specified by physician order.  No site indicated    URINALYSIS [[id number]]  (Abnormal) Collected:  05/25/19 1800    Order Status:  Completed Specimen:  Urine Updated:  05/25/19 1815     Color Yellow     Character Cloudy (A)     Specific Gravity 1.021     Ph 5.0     Glucose 500 (A) mg/dL      Ketones Negative mg/dL      Protein Negative mg/dL      Bilirubin Negative     Urobilinogen, Urine 0.2     Nitrite Positive (A)     Leukocyte Esterase Moderate (A)     Occult Blood Small (A)     Micro Urine Req Microscopic    Narrative:       Indication for culture:->Emergency Room Patient    URINE CULTURE(NEW) [[id number]] Collected:  05/25/19 1800    Order Status:  Completed Specimen:  Urine Updated:  05/25/19 1811    Narrative:       Indication for " "culture:->Emergency Room Patient    Blood Culture [[id number]]     Order Status:  Sent Specimen:  Blood from Peripheral     Blood Culture [[id number]]     Order Status:  Sent Specimen:  Blood from Peripheral     CULTURE WOUND W/ GRAM STAIN [[id number]]     Order Status:  Canceled Specimen:  Wound from Right Foot              PHYSICAL EXAMINATION:     Vitals  /66   Pulse 86   Temp 36 °C (96.8 °F) (Temporal)   Resp 17   Ht 1.626 m (5' 4\")   Wt 81.8 kg (180 lb 5.4 oz)   SpO2 97%   Breastfeeding? No   BMI 30.95 kg/m²      General Appearance:  Well developed, obese, in no acute distress    Vascular Assessment:    Edema +2    Pulses: RLE- DP pulse unpalpable; PT pulse unpalpable               LLE- DP pulse unpalpable; PT pulse unpalpable    Sensory Assessment  Monofilament testing    Feet Insensate to light touch    Wound Assessment:      Wound 05/26/19 Neuropathic Heel Left Heel (Active)   Wound Image   5/26/2019 12:00 PM   Site Assessment Brown;Black 5/26/2019 12:00 PM   Nataliya-wound Assessment Maceration;Dark edges;Callused 5/26/2019 12:00 PM   Margins Defined edges 5/26/2019 12:00 PM   Wound Length (cm) 3 cm 5/26/2019 12:00 PM   Wound Width (cm) 4 cm 5/26/2019 12:00 PM   Wound Depth (cm) 0.2 cm 5/26/2019 12:00 PM   Wound Surface Area (cm^2) 12 cm^2 5/26/2019 12:00 PM   Drainage Amount None 5/26/2019 12:00 PM   Non-staged Wound Description Not applicable 5/26/2019 12:00 PM   Treatments Cleansed;Site care 5/26/2019 12:00 PM   Periwound Protectant Not Applicable 5/26/2019 12:00 PM   Dressing Cleansing/Solutions 3% Betadine 5/26/2019 12:00 PM   Dressing Change Frequency Daily 5/26/2019 12:00 PM   NEXT Dressing Change  05/26/19 5/26/2019 12:00 PM   WOUND NURSE ONLY - Odor None 5/26/2019 12:00 PM   WOUND NURSE ONLY - Pulses Not palpable 5/26/2019 12:00 PM   WOUND NURSE ONLY - Exposed Structures None 5/26/2019 12:00 PM   WOUND NURSE ONLY - Tissue Type and Percentage 100% Brown 5/26/2019 12:00 PM       Wound 05/26/19 " Neuropathic Heel Right Heel (Active)   Wound Image     5/26/2019 11:10 AM   Site Assessment Red;Brown;Black 5/26/2019 11:10 AM   Nataliya-wound Assessment Maceration;Callused;Dark edges 5/26/2019 11:10 AM   Margins Defined edges 5/26/2019 11:10 AM   Wound Length (cm) 4 cm 5/26/2019 11:10 AM   Wound Width (cm) 6.5 cm 5/26/2019 11:10 AM   Wound Depth (cm) 0.2 cm 5/26/2019 11:10 AM   Wound Surface Area (cm^2) 26 cm^2 5/26/2019 11:10 AM   Closure Open to air 5/26/2019 11:10 AM   Drainage Amount Scant 5/26/2019 11:10 AM   Drainage Description Serosanguineous 5/26/2019 11:10 AM   Non-staged Wound Description Not applicable 5/26/2019 11:10 AM   Treatments Cleansed;Site care 5/26/2019 11:10 AM   Cleansing Not Applicable 5/26/2019 11:10 AM   Periwound Protectant Skin Protectant wipes to Periwound 5/26/2019 11:10 AM   Dressing Options Open to Air 5/26/2019 11:10 AM   Dressing Cleansing/Solutions 3% Betadine 5/26/2019 11:10 AM   Dressing Change Frequency Daily 5/26/2019 11:10 AM   NEXT Dressing Change  05/26/19 5/26/2019 11:10 AM   WOUND NURSE ONLY - Odor None 5/26/2019 11:10 AM   WOUND NURSE ONLY - Pulses Not palpable 5/26/2019 11:10 AM   WOUND NURSE ONLY - Exposed Structures None 5/26/2019 11:10 AM   WOUND NURSE ONLY - Tissue Type and Percentage 90% Brown 10% Red 5/26/2019 11:10 AM              Procedures:       Debridement :     Cleansed with:                                                                          Periwound protected with: betadine   Primary dressing:   Secondary Dressing:   Other:       Patient Education:    Implications of loss of protective sensation (LOPS) discussed with patient- including increased risk for amputation.  Advised to check foot at least daily, moisturize foot, and to always wear protective foot wear.      Plan:        1. Wound Care:   Bilateral Neuropathic Heels - Full thickness  Limb preservation status guarded  Will evaluate vascular status impact on wound healing, offload heels - no  tracts or visible bone will D/W with Dr travis to Evaluate pt tomorrow. MRI results likely reactionary to previous Sx. Will f/u with SELWYN results.     Dressing Orders Updated. Skin Care Updated.               Nursing to change paint with betadine daily      2. Offloading:  heel float boots bilaterally     3. Imaging: ABIs repeated    5. Diabetes    Diabetes Educator Involved:last admit  A1C is 7.3% Pt educated on keeping BS less than 150 to control infection and promote wound healing         6. Infection Management:    Infection: PEND   Microbiology PEND   Antibiotics: Continue current ABX    7. Referrals:   Vascular PEND ABIs   Ortho PEND SELWYN results   Infectious diseases NA   Diabetes Education Na   Ortho tech NA   Other Na    Professional collaboration:     LPS will follow    Bryan Soliman R.N.

## 2019-05-27 PROBLEM — I82.409 DEEP VEIN THROMBOSIS (DVT) OF LOWER EXTREMITY (HCC): Status: ACTIVE | Noted: 2019-01-01

## 2019-05-27 NOTE — PROGRESS NOTES
Patient refused SCDs, educated regarding importance.  Education reinforced by TABATHA Jenkins.  Patient continues to refuse.

## 2019-05-27 NOTE — PROGRESS NOTES
2 RN skin check complete with TABATHA Valentine.   Devices in place nasal cannula, SCDs, heel float boots.  Skin assessed under devices yes, skin intact.  Confirmed pressure ulcers found on alexandre heels, old healed ulcer on buttocks.  Wound consult placed yes.  Other findings: Moist assoc redness on R panus- nystatin powder being used. Elbows pink intact, sacrum/coccyx slow to toni redness but intact.  The following interventions in place: waffle mattress overlay, q2h turns, 2 RN skin check, barrier paste and barrier wipes being used, nystatin powder, extra pillows for repositioning, heel float boots.

## 2019-05-27 NOTE — PROGRESS NOTES
Hospital Medicine Daily Progress Note    Date of Service  5/27/2019    Chief Complaint  73 y.o. female admitted 5/25/2019 with diabetic foot ulcers    Hospital Course    73-year-old male past medical history of coronary artery disease questionable stent placement-/2018 or 1998 ??, paroxysmal atrial fibrillation, type 2 diabetes (last a1c 7.3 03/2019) mild to moderate cognitive decline, hypertension, hyperlipidemia sent by podiatrist for bilateral wound on her feet.  X-ray showed right osteomyelitis.  She was started on vanco/unasyn. Pt was not septic on admission.  LPS was consulted. On admission was also found patient has UTI which was already covered by above antibiotic.       Interval Problem Update  Pt still has pain on her right foot. She also reports a rash on the right inguinal area. No other complains.   05/27- feeling better. Slept well last night. Ortho referral pending     Consultants/Specialty  LPS  Infectious disease physician     Code Status  Full code     Disposition  Inpatient, IV antibiotic, possible surgical debridement     Review of Systems  Review of Systems   Constitutional: Negative for chills and fever.   HENT: Negative for sore throat.    Eyes: Negative for blurred vision and double vision.   Respiratory: Negative for cough and shortness of breath.    Cardiovascular: Negative for chest pain, palpitations and leg swelling.   Gastrointestinal: Negative for abdominal pain, constipation, diarrhea and heartburn.   Genitourinary: Positive for frequency and urgency. Negative for dysuria.   Musculoskeletal: Positive for joint pain.        Right foot pain    Skin: Positive for rash. Negative for itching.        Rash improving. Nystatin powder on    Neurological: Negative for dizziness, weakness and headaches.   Psychiatric/Behavioral: Negative for depression.        Physical Exam  Temp:  [36 °C (96.8 °F)-36.7 °C (98.1 °F)] 36.7 °C (98.1 °F)  Pulse:  [75-88] 81  Resp:  [16-18] 18  BP: (108-139)/(46-62)  117/53  SpO2:  [96 %-100 %] 96 %    Physical Exam   Constitutional: She is oriented to person, place, and time. She appears well-developed and well-nourished.   HENT:   Head: Normocephalic and atraumatic.   Right Ear: External ear normal.   Left Ear: External ear normal.   Nose: Nose normal.   Eyes: Pupils are equal, round, and reactive to light. EOM are normal. No scleral icterus.   Neck: Normal range of motion.   Cardiovascular: Normal rate, regular rhythm and normal heart sounds.  Exam reveals no gallop and no friction rub.    No murmur heard.  Weak pedal pulses    Pulmonary/Chest: Effort normal. No respiratory distress. She has no wheezes. She has no rales. She exhibits no tenderness.   Abdominal: Soft. She exhibits no distension. There is no tenderness. There is no rebound and no guarding.   Musculoskeletal: Normal range of motion. She exhibits no edema.   Right  heel wounds, + drainage, erythematous.   Left heel wound is healing    Lymphadenopathy:     She has no cervical adenopathy.   Neurological: She is alert and oriented to person, place, and time. No cranial nerve deficit.   Some memory concerns    Psychiatric: She has a normal mood and affect.       Fluids    Intake/Output Summary (Last 24 hours) at 05/27/19 1135  Last data filed at 05/26/19 2125   Gross per 24 hour   Intake              700 ml   Output                0 ml   Net              700 ml       Laboratory  Recent Labs      05/25/19   1520  05/26/19   0346  05/27/19   0407   WBC  8.8  7.4  7.4   RBC  4.12*  3.70*  3.40*   HEMOGLOBIN  12.2  11.5*  10.3*   HEMATOCRIT  40.8  36.4*  34.2*   MCV  99.0*  98.4*  100.6*   MCH  29.6  31.1  30.3   MCHC  29.9*  31.6*  30.1*   RDW  47.0  46.4  48.6   PLATELETCT  274  230  213   MPV  9.4  9.4  9.2     Recent Labs      05/25/19   1520  05/26/19   0346  05/27/19   0407   SODIUM  141  143  145   POTASSIUM  3.7  3.8  3.5*   CHLORIDE  104  109  110   CO2  28  29  31   GLUCOSE  279*  155*  132*   BUN  12  11  11    CREATININE  0.83  0.82  0.98   CALCIUM  9.3  8.8  8.3*                   Imaging  DX-FOOT-COMPLETE 3+ RIGHT   Final Result      1.  No evidence of fracture or dislocation.      2.  Severe degenerative changes again present raising possibility of neuropathic foot.      3.  There appears to be increase in bone erosions along the medial and inferior tarsal bones which could indicate osteomyelitis.         DX-FOOT-COMPLETE 3+ LEFT   Final Result      1.  No evidence of fracture or dislocation.      2.  Osteoarthritis is noted. No bone erosions are identified.      US-SELWYN SINGLE LEVEL BILAT    (Results Pending)   US-EXTREMITY ARTERY LOWER BILAT    (Results Pending)        Assessment/Plan  * Diabetic foot ulcer with osteomyelitis (HCC)   Assessment & Plan    Pt tells me that she has bilateral wound for about two months..per Dr. Breen notes she is well known to their service and pt never did complete IV antibiotic from previous admission    Right foot is worse than the left  Right foot XR showed:   Severe degenerative changes again present raising possibility of neuropathic foot.  There appears to be increase in bone erosions along the medial and inferior tarsal bones which could indicate osteomyelitis.   It might be also complex wound due to PAD  SELWYN to follow   ESR elevated.   Wound is round, + discharges, on the heel, painful   She is on vanco and unasyn.   Infectious disease consulted  Blood culture NGT   Wound culture pending      Coronary artery disease involving native coronary artery of native heart without angina pectoris- (present on admission)   Assessment & Plan    Not known details, per chart review possible she did have a stent placement 2018/ or 1998 ??  Resumed carvedilol   Statin, asa. She denies chest pain        Type 2 diabetes mellitus with skin complication, with long-term current use of insulin (HCC)- (present on admission)   Assessment & Plan    Continue lantus 30 U at bed time   Ordered SS  insulin  a1c 8.7  Need more diabetic education. She will benefits from possible janumet/and oral combination instead of lantus as OP        Essential hypertension- (present on admission)   Assessment & Plan    well controled  Continue Lasix and carvedilol     Cognitive decline   Assessment & Plan    See previous note. There is some concern that pt and her  are not able to take care of each other. Her  seems to remember details, however, still there is lack of following instruction. Medical recommendation. Might need more assistant as home health. Pt definitely has cognitive deficits. Her  is the one who manage her medications      Peripheral vascular disease (HCC)   Assessment & Plan    Per chart review, bilateral weak pulse.   SELWYN to follow  She is on plavix      Rash   Assessment & Plan    Less itchy  continue  triamcinolone  0.1% twice daily/nystatin powder      Normocytic anemia   Assessment & Plan    It is mixed iron def anemia and b12 def. No source of bleeding at this time. No acute bleeds  She will need GI as OP follow up   I will start on supplement, continue to monitor      UTI (urinary tract infection)   Assessment & Plan    UA Cx + for klebsiella   Sensitive to  Unasyn. Continues to have frequency on urination          Chronic anticoagulation   Assessment & Plan    Per last discharge supposed to be on Coumadin. INR therapeutic on admission.  She is telling me that she is only one Plavix. Not clear why  She has paroxysmal atrial fibrillation. I will resume lovenox 40 mg daily   I will hold on plavix at this time, since pt might go for surgery . She did receive one dose 05/25  I did start asa 81 m g daily        Paroxysmal atrial fibrillation (HCC)- (present on admission)   Assessment & Plan    chadvasc score 6. Moderate risk, but she has no history of DVT or stroke. Therefore just lovenox 40 mg daily. Discuss the care with pharmacist   Rate and hemodynamics stable  Continue carvedilol  and Lasix          VTE prophylaxis: lovenox

## 2019-05-27 NOTE — PROGRESS NOTES
"Pharmacy Kinetics 73 y.o. female on vancomycin day # 2 2019    Currently on Vancomycin 900mg iv q24hr (1600)    Indication for Treatment: SSTI/DFI, h/o osteomyelitis.      Pertinent history per medical record: Admitted on 2019 for wound infection. Wounds noted to bilateral heels (RT bigger than LT), open, foul smell, drainage. redness extending up both legs. Pt was discharged from her group home 3 weeks ago and stopped taking her medications except for her Lantus.      Other antibiotics: ampicillin/sulbactam 3g IV q6h     Allergies: Patient has no known allergies.      List concerns for renal function: Age, Obesity (BMI 31), Hypoalbuminemia, Diabetes     Pertinent cultures to date:   19: R foot wound cx - in process   19: Blood cultures - NGTD   19: Urine - LFGNR      Culture history, Dx: Osteomyelitis:   MRSE, MSSE and Enterococcus faecalis.     MRSA nares swab if pneumonia is a concern (ordered/positive/negative/n-a): N/A    Recent Labs      19   1520  19   0346   WBC  8.8  7.4   NEUTSPOLYS  80.00*   --    BANDSSTABS  0.90   --      Recent Labs      19   1520  19   0346   BUN  12  11   CREATININE  0.83  0.82   ALBUMIN  3.1*   --      No results for input(s): VANCOTROUGH, VANCOPEAK, VANCORANDOM in the last 72 hours.  Intake/Output Summary (Last 24 hours) at 19 1845  Last data filed at 19 1500   Gross per 24 hour   Intake           1100.1 ml   Output                0 ml   Net           1100.1 ml      /62   Pulse 78   Temp 36.2 °C (97.2 °F) (Temporal)   Resp 18   Ht 1.626 m (5' 4\")   Wt 81.8 kg (180 lb 5.4 oz)   SpO2 100%  Temp (24hrs), Av.4 °C (97.6 °F), Min:36 °C (96.8 °F), Max:37.2 °C (98.9 °F)      A/P   1. Vancomycin dose change: No.   2. Next vancomycin level: Tomorrow 19 at 1530  3. Goal trough: 12 - 16 mcg/mL   4. Comments:   · Wound culture collected today, in process. Urine culture positive with LFGNR, awaiting specimen ID " with sensitivities.   · Plan for first trough level tomorrow afternoon prior to the 3rd total dose of Vancomycin to preliminarily assess PK. Concern for renal function listed above, SCr stable - trending downward following vancomycin initiation. Increased risk for accumulation as vancomycin therapy continues d/t BMI of ~ 31. CTM.   · VS stable, afebrile without leukocytosis.   · ID consulted - patient discharged previously on Daptomycin for osteomyelitis with failure to follow-up with ID outpatient. Continue current coverage for now pending cultures and clinical course.     Shasta K. Rubens, PharmD

## 2019-05-27 NOTE — DIETARY
NUTR[facility] -   Alert received for newly identified wound. LPS assessed pt on 5/26. Per LPS pt with wound neuropathic left heel and right heel.   Consult also received for diabetic diet education. Pt refused education and states she has had education before. Pt had no questions regarding diet. Pt accepted booklet.     RD will monitor per dept policy.

## 2019-05-27 NOTE — THERAPY
PT eval order received. Pending consult with Dr. Kwon for definitive POC. Will defer eval until this has been established and pt is appropriate to participate.

## 2019-05-27 NOTE — CARE PLAN
Problem: Safety  Goal: Will remain free from injury  Outcome: PROGRESSING AS EXPECTED  Fall Precautions in place: Non-skid socks on, bed locked and in lowest position, upper bed rails up, DME in bathroom, bed alarm on, call light within reach.    Problem: Skin Integrity  Goal: Risk for impaired skin integrity will decrease  Outcome: PROGRESSING AS EXPECTED  Anthony Score of 15.   interventions implemented: waffle mattress overlay, q2h turns, 2 RN skin check, barrier paste and barrier wipes being used, extra pillows for repositioning.

## 2019-05-27 NOTE — PROGRESS NOTES
Vascular study showed DVT in L common femoral vein. Updated Bryan LUNA. Updated Dr. Medellin on the findings. No new orders received.

## 2019-05-27 NOTE — PROGRESS NOTES
2 RN skin check complete with TABATHA Jenkins.   Devices in place nasal cannula, SCDs, heel float boots.  Skin assessed under devices yes, skin intact.  Confirmed pressure ulcers found on alexandre heels, old healed ulcer on buttocks.  Wound consult placed yes.  Other findings: Moist assoc redness on R panus- nystatin powder being used. Elbows pink intact, sacrum/coccyx slow to toni redness but intact.  The following interventions in place: waffle mattress overlay, q2h turns, 2 RN skin check, barrier paste and barrier wipes being used, nystatin powder, extra pillows for repositioning, heel float boots.

## 2019-05-27 NOTE — PROGRESS NOTES
Update  SELWYN showed significant arterial disease on right side. Discuss case with Dr. Mcdowell, who will see pt tomorrow morning   Also SELWYN showed incidental DVTs. I will start heparin, since pt is possible going for surgery  Discuss case with LPS team too   Dejuan Medellin M.D.

## 2019-05-27 NOTE — PROGRESS NOTES
Infectious Disease Progress Note    Author: Meem Mazariegos M.D. Date & Time of service: 2019  2:42 PM    Chief Complaint:  Diabetic foot ulcer    Interval History:  2019 T-max is 98.1.  Vascular Dopplers are being performed.  Denies any fevers or chills.  WBC 7.4.  Creatinine is 0.98  Labs Reviewed.    Review of Systems:  Review of Systems   Constitutional: Positive for malaise/fatigue. Negative for fever.   HENT: Negative for hearing loss.    Respiratory: Negative for cough and shortness of breath.    Cardiovascular: Positive for leg swelling. Negative for chest pain.   Gastrointestinal: Negative for nausea and vomiting.   Genitourinary: Negative for dysuria.   Musculoskeletal: Positive for joint pain and myalgias.        Heel pain   Neurological: Negative for sensory change and speech change.       Hemodynamics:  Temp (24hrs), Av.3 °C (97.4 °F), Min:36.1 °C (97 °F), Max:36.7 °C (98.1 °F)  Temperature: 36.7 °C (98.1 °F)  Pulse  Av.6  Min: 71  Max: 107   Blood Pressure : 117/53       Physical Exam:  Physical Exam   Constitutional: She is oriented to person, place, and time. No distress.   Looks tired but nontoxic   HENT:   Mouth/Throat: No oropharyngeal exudate.   Eyes: No scleral icterus.   Neck: Neck supple.   Cardiovascular: Regular rhythm.    No murmur heard.  Pulmonary/Chest: She has no wheezes. She has no rales.   Abdominal: Soft. There is no tenderness. There is no rebound.   Musculoskeletal: She exhibits edema.   Chronic edema bilateral lower extremities  Right heel ulceration has some discharge-refer to the picture  Left heel has some ischemic changes   Neurological: She is alert and oriented to person, place, and time.   No gross focal neurological deficit   Skin: No rash noted. No erythema.   Vitals reviewed.              Meds:    Current Facility-Administered Medications:   •  ferrous sulfate-c-folic acid  •  cyanocobalamin  •  [COMPLETED] piperacillin-tazobactam **AND**  piperacillin-tazobactam  •  triamcinolone acetonide  •  aspirin EC  •  nystatin  •  Respiratory Care per Protocol  •  enoxaparin (LOVENOX) injection  •  ipratropium-albuterol  •  acetaminophen  •  carvedilol  •  furosemide  •  gabapentin  •  insulin glargine  •  omeprazole  •  ondansetron  •  potassium chloride SA  •  pravastatin  •  senna-docusate **AND** polyethylene glycol/lytes **AND** magnesium hydroxide **AND** bisacodyl  •  NS  •  ondansetron  •  Notify provider if pain remains uncontrolled **AND** Use the numeric rating scale (NRS-11) on regular floors and Critical-Care Pain Observation Tool (CPOT) on ICUs/Trauma to assess pain **AND** Pulse Ox (Oximetry) **AND** Pharmacy Consult Request **AND** If patient difficult to arouse and/or has respiratory depression, stop any opiates that are currently infusing and call a Rapid Response. **AND** oxyCODONE immediate-release **AND** oxyCODONE immediate-release **AND** HYDROmorphone  •  NS  •  lactobacillus rhamnosus  •  insulin regular **AND** Accu-Chek ACHS **AND** NOTIFY MD and PharmD **AND** glucose **AND** dextrose 10% bolus    Labs:  Recent Labs      05/25/19   1520  05/26/19 0346 05/27/19 0407   WBC  8.8  7.4  7.4   RBC  4.12*  3.70*  3.40*   HEMOGLOBIN  12.2  11.5*  10.3*   HEMATOCRIT  40.8  36.4*  34.2*   MCV  99.0*  98.4*  100.6*   MCH  29.6  31.1  30.3   RDW  47.0  46.4  48.6   PLATELETCT  274  230  213   MPV  9.4  9.4  9.2   NEUTSPOLYS  80.00*   --   71.00   LYMPHOCYTES  9.60*   --   13.90*   MONOCYTES  6.90   --   10.40   EOSINOPHILS  0.90   --   3.50   BASOPHILS  0.00   --   0.80   RBCMORPHOLO  Present   --    --      Recent Labs      05/25/19   1520  05/26/19   0346 05/27/19   0407   SODIUM  141  143  145   POTASSIUM  3.7  3.8  3.5*   CHLORIDE  104  109  110   CO2  28  29  31   GLUCOSE  279*  155*  132*   BUN  12  11  11     Recent Labs      05/25/19   1520  05/26/19   0346  05/27/19   0407   ALBUMIN  3.1*   --   2.4*   TBILIRUBIN  0.3   --   0.3    ALKPHOSPHAT  100*   --   71   TOTPROTEIN  6.4   --   4.9*   ALTSGPT  7   --   <5   ASTSGOT  11*   --   7*   CREATININE  0.83  0.82  0.98       Imaging:  Dx-foot-complete 3+ Right    Result Date: 5/25/2019 5/25/2019 3:17 PM HISTORY/REASON FOR EXAM:  Nonhealing wound on heel with history of diabetes TECHNIQUE/EXAM DESCRIPTION AND NUMBER OF VIEWS: 3 nonweightbearing views of the RIGHT foot. COMPARISON:  No comparison available FINDINGS:  Bone mineralization is normal.  There is no evidence of fracture or dislocation.  Soft tissue swelling is noted including in the calcaneal area. Bone erosions appear to be present along the medial edge of the tarsal bones and inferior edge of the tarsal bones. There is moderate to severe joint space narrowing and periarticular sclerosis and marginal spurring which is prominent in the mid foot area.     1.  No evidence of fracture or dislocation. 2.  Severe degenerative changes again present raising possibility of neuropathic foot. 3.  There appears to be increase in bone erosions along the medial and inferior tarsal bones which could indicate osteomyelitis.     Dx-foot-complete 3+ Left    Result Date: 5/25/2019 5/25/2019 3:18 PM HISTORY/REASON FOR EXAM:  Left foot pain with history of diabetes TECHNIQUE/EXAM DESCRIPTION AND NUMBER OF VIEWS: 3 nonweightbearing views of the LEFT foot. COMPARISON:  No comparison available FINDINGS:  Bone mineralization is normal.  There is no evidence of fracture or dislocation.  Soft tissue swelling is noted.  There is joint space narrowing and periarticular spurring. Hammertoe deformities are noted. No bone erosions are identified.     1.  No evidence of fracture or dislocation. 2.  Osteoarthritis is noted. No bone erosions are identified.      Micro:  Results     Procedure Component Value Units Date/Time    CULTURE WOUND W/ GRAM STAIN [[id number]]  (Abnormal) Collected:  05/26/19 1143    Order Status:  Completed Specimen:  Wound from Right Foot  Updated:  05/27/19 1415     Significant Indicator POS (POS)     Source WND     Site RIGHT FOOT     Culture Result Light growth mixed skin ab. (A)     Gram Stain Result No organisms seen.     Culture Result Staphylococcus aureus  Light growth   (A)    Narrative:       Collected By:[id number] FLAQUITO FLOWERS  Heel  Collected By:[id number] FLAQUITO FLOWERS    URINE CULTURE(NEW) [[id number]]  (Abnormal)  (Susceptibility) Collected:  05/25/19 1800    Order Status:  Completed Specimen:  Urine Updated:  05/27/19 1151     Significant Indicator POS (POS)     Source UR     Site -     Culture Result - (A)      Klebsiella pneumoniae  ,000 cfu/mL   (A)    Narrative:       Indication for culture:->Emergency Room Patient  Indication for culture:->Emergency Room Patient    Culture & Susceptibility     KLEBSIELLA PNEUMONIAE     Antibiotic Sensitivity Microscan Unit Status    Ampicillin Resistant >16 mcg/mL Final    Method: BERNARDINO    Ampicillin/sulbactam Resistant >16/8 mcg/mL Final    Method: BERNARDINO    Cefepime Sensitive <=8 mcg/mL Final    Method: BERNARDINO    Cefotaxime Sensitive <=2 mcg/mL Final    Method: BERNARDINO    Cefotetan Sensitive <=16 mcg/mL Final    Method: BERNARDINO    Ceftazidime Sensitive <=1 mcg/mL Final    Method: BERNARDINO    Ceftriaxone Sensitive <=8 mcg/mL Final    Method: BERNARDINO    Cefuroxime Sensitive <=4 mcg/mL Final    Method: BERNARDINO    Cephalothin Resistant >16 mcg/mL Final    Method: BERNARDINO    Ciprofloxacin Sensitive <=1 mcg/mL Final    Method: BERNARDINO    Gentamicin Sensitive <=4 mcg/mL Final    Method: BERNARDINO    Levofloxacin Sensitive <=2 mcg/mL Final    Method: BERNARDINO    Nitrofurantoin Sensitive <=32 mcg/mL Final    Method: BERNARDINO    Pip/Tazobactam Sensitive <=16 mcg/mL Final    Method: BERNARDINO    Piperacillin Resistant >64 mcg/mL Final    Method: BERNARDINO    Tigecycline Sensitive <=2 mcg/mL Final    Method: BERNARDINO    Tobramycin Sensitive <=4 mcg/mL Final    Method: BERNARDINO    Trimeth/Sulfa Resistant >2/38 mcg/mL Final    Method: BERNARDINO                       GRAM  "STAIN [[id number]] Collected:  05/26/19 1143    Order Status:  Completed Specimen:  Wound Updated:  05/26/19 1816     Significant Indicator .     Source WND     Site RIGHT FOOT     Gram Stain Result No organisms seen.    Narrative:       Collected By:[id number] FLAQUITO FLOWERS  Heel  Collected By:[id number] FLAQUITO FLOWERS    BLOOD CULTURE [[id number]] Collected:  05/25/19 1525    Order Status:  Completed Specimen:  Blood from Peripheral Updated:  05/26/19 0831     Significant Indicator NEG     Source BLD     Site PERIPHERAL     Culture Result No Growth  Note: Blood cultures are incubated for 5 days and  are monitored continuously.Positive blood cultures  are called to the RN and reported as soon as  they are identified.      Narrative:       Per Hospital Policy: Only change Specimen Src: to \"Line\" if  specified by physician order.  No site indicated    BLOOD CULTURE [[id number]] Collected:  05/25/19 1520    Order Status:  Completed Specimen:  Blood from Peripheral Updated:  05/26/19 0831     Significant Indicator NEG     Source BLD     Site PERIPHERAL     Culture Result No Growth  Note: Blood cultures are incubated for 5 days and  are monitored continuously.Positive blood cultures  are called to the RN and reported as soon as  they are identified.      Narrative:       Per Hospital Policy: Only change Specimen Src: to \"Line\" if  specified by physician order.  No site indicated    URINALYSIS [[id number]]  (Abnormal) Collected:  05/25/19 1800    Order Status:  Completed Specimen:  Urine Updated:  05/25/19 1815     Color Yellow     Character Cloudy (A)     Specific Gravity 1.021     Ph 5.0     Glucose 500 (A) mg/dL      Ketones Negative mg/dL      Protein Negative mg/dL      Bilirubin Negative     Urobilinogen, Urine 0.2     Nitrite Positive (A)     Leukocyte Esterase Moderate (A)     Occult Blood Small (A)     Micro Urine Req Microscopic    Narrative:       Indication for culture:->Emergency Room Patient    Blood Culture " [[id number]]     Order Status:  Sent Specimen:  Blood from Peripheral     Blood Culture [[id number]]     Order Status:  Sent Specimen:  Blood from Peripheral     CULTURE WOUND W/ GRAM STAIN [[id number]]     Order Status:  Canceled Specimen:  Wound from Right Foot           Assessment:  Active Hospital Problems    Diagnosis   • *Diabetic foot ulcer with osteomyelitis (HCC) [E11.621, E11.69, L97.509, M86.9]   • Coronary artery disease involving native coronary artery of native heart without angina pectoris [I25.10]   • Type 2 diabetes mellitus with skin complication, with long-term current use of insulin (HCC) [E11.628, Z79.4]   • Essential hypertension [I10]   • Normocytic anemia [D64.9]   • Rash [R21]   • Peripheral vascular disease (HCC) [I73.9]   • Cognitive decline [R41.89]   • Chronic anticoagulation [Z79.01]   • UTI (urinary tract infection) [N39.0]   • Paroxysmal atrial fibrillation (HCC) [I48.0]       Plan:  Diabetic foot ulcer with osteomyelitis  Patient has been treated in the past  Previous cultures were staph epi and Enterococcus faecalis  Continue with the wound care  Vascular studies have been done-follow the results  Discontinue Vanco and Unasyn-changed to Zosyn    UTI  Cultures are Klebsiella  Discontinue Unasyn and Vanco  Start the patient on Zosyn    Diabetes mellitus  Control the blood sugars for wound healing  Her hemoglobin A1c is 8.7      Discussed with internal medicine.

## 2019-05-27 NOTE — CARE PLAN
Problem: Safety  Goal: Will remain free from injury  Outcome: PROGRESSING AS EXPECTED  Discussed safety and fall risk. Safety and fall precautions in place, bed/chair alarm in use, call light within reach, bed locked in lowest position, non-skid socks in place, clutter-free environment, DME in bathroom. Patient verbalized understanding of safety and fall risk and uses call light appropriately for assistance.    Problem: Skin Integrity  Goal: Risk for impaired skin integrity will decrease  Outcome: PROGRESSING AS EXPECTED  Assessed for signs of skin breakdown. Encouraging increased mobility and repositioning to prevent development of pressure ulcers. Q 2 turns in place. Waffle overlay in place. Barrier cream in place

## 2019-05-27 NOTE — PROGRESS NOTES
" LIMB PRESERVATION SERVICE    HPI:      Patient is well known to LPS and outpatient wound care services.     Leonie Brock is a 73 y.o. fe...male, with a past medical history that includes uncontrolled type 2 diabetes and a MI in 1998 with stint placement, admitted 5/25/2019 for Diabetic foot ulcer (HCC).   LPS has been consulted for her bilateral neuropathic full thickness heel ulcers.  These wounds started since she was at the [facility] per the pt  Pt has been treating the wounds with out patient wound care and podiatry.   The wounds have failed to improve. Pt was previously admitted 12/29/2018 and had an Infected R DFU and had Sx with Dr. Kwon on  12/31/2018  A Right gastrocsoleus recession, Right I and D, Right ostectomy/excision.  Pt was diagnosed with type 2 diabetes 35 years ago, and is currently managing with insulin.  Pt checks their blood sugars 3 times daily and reports that these typically run around 130s to 200s.  They have had previous diabetes education.  They do have numbness in their feet.  They usually wears diabetic shoes. They do check their feet routinely. They are retired.   Previous ABIs were brisk and multiphasic in December 5/27/19 bedside with ultrasound tech and Dr Francis - Patient denies fevers, chills, nausea, vomiting.  Pain well controlled - mentions Left leg pain on elevation of that limb.       ASSESSMENT:  /53   Pulse 81   Temp 36.7 °C (98.1 °F) (Temporal)   Resp 18   Ht 1.626 m (5' 4.02\")   Wt 81.8 kg (180 lb 5.4 oz)   SpO2 96%   Breastfeeding? No   BMI 30.94 kg/m²                         Wound 05/26/19 Neuropathic Heel Left Heel (Active)   Wound Image   5/26/2019 12:00 PM   Site Assessment Brown;Black 5/27/2019  8:45 AM   Nataliya-wound Assessment Maceration;Dark edges;Callused 5/27/2019  8:45 AM   Margins Defined edges 5/27/2019  8:45 AM   Wound Length (cm) 3 cm 5/26/2019 12:00 PM   Wound Width (cm) 4 cm 5/26/2019 12:00 PM   Wound Depth (cm) 0.2 cm 5/26/2019 12:00 " PM   Wound Surface Area (cm^2) 12 cm^2 5/26/2019 12:00 PM   Drainage Amount None 5/27/2019  8:45 AM   Non-staged Wound Description Not applicable 5/27/2019  8:45 AM   Treatments Site care 5/27/2019  8:45 AM   Periwound Protectant Not Applicable 5/27/2019  8:45 AM   Dressing Options Open to Air 5/27/2019  8:45 AM   Dressing Cleansing/Solutions 3% Betadine 5/27/2019  8:45 AM   Dressing Change Frequency Daily 5/27/2019  8:45 AM   NEXT Dressing Change  05/27/19 5/27/2019  8:45 AM   WOUND NURSE ONLY - Odor None 5/26/2019 12:00 PM   WOUND NURSE ONLY - Pulses Not palpable 5/26/2019 12:00 PM   WOUND NURSE ONLY - Exposed Structures None 5/26/2019 12:00 PM   WOUND NURSE ONLY - Tissue Type and Percentage 100% Brown 5/26/2019 12:00 PM       Wound 05/26/19 Neuropathic Heel Right Heel (Active)   Wound Image     5/26/2019 11:10 AM   Site Assessment Red;Brown;Black 5/27/2019  8:45 AM   Nataliya-wound Assessment Maceration;Callused;Dark edges 5/27/2019  8:45 AM   Margins Defined edges 5/27/2019  8:45 AM   Wound Length (cm) 4 cm 5/26/2019 11:10 AM   Wound Width (cm) 6.5 cm 5/26/2019 11:10 AM   Wound Depth (cm) 0.2 cm 5/26/2019 11:10 AM   Wound Surface Area (cm^2) 26 cm^2 5/26/2019 11:10 AM   Closure Open to air 5/27/2019  8:45 AM   Drainage Amount Scant 5/27/2019  8:45 AM   Drainage Description Serosanguineous 5/27/2019  8:45 AM   Non-staged Wound Description Not applicable 5/27/2019  8:45 AM   Treatments Site care 5/27/2019  8:45 AM   Cleansing Not Applicable 5/27/2019  8:45 AM   Periwound Protectant Skin Protectant wipes to Periwound 5/26/2019 11:10 AM   Dressing Options Open to Air 5/27/2019  8:45 AM   Dressing Cleansing/Solutions 3% Betadine 5/27/2019  8:45 AM   Dressing Change Frequency Daily 5/27/2019  8:45 AM   NEXT Dressing Change  05/27/19 5/27/2019  8:45 AM   WOUND NURSE ONLY - Odor None 5/26/2019 11:10 AM   WOUND NURSE ONLY - Pulses Not palpable 5/26/2019 11:10 AM   WOUND NURSE ONLY - Exposed Structures None 5/26/2019 11:10 AM    WOUND NURSE ONLY - Tissue Type and Percentage 90% Brown 10% Red 5/26/2019 11:10 AM            DIABETES MANAGEMENT:  Lab Results   Component Value Date/Time    GLUCOSE 132 (H) 05/27/2019 04:07 AM      Lab Results   Component Value Date/Time    HBA1C 8.7 (H) 05/25/2019 03:20 PM        Diabetes education NA  INFECTION MANAGEMENT:  WBC   Date/Time Value Ref Range Status   05/27/2019 04:07 AM 7.4 4.8 - 10.8 K/uL Final       Microbiology:   Results     Procedure Component Value Units Date/Time    CULTURE WOUND W/ GRAM STAIN [[id number]]  (Abnormal) Collected:  05/26/19 1143    Order Status:  Completed Specimen:  Wound from Right Foot Updated:  05/27/19 1415     Significant Indicator POS (POS)     Source WND     Site RIGHT FOOT     Culture Result Light growth mixed skin ab. (A)     Gram Stain Result No organisms seen.     Culture Result Staphylococcus aureus  Light growth   (A)    Narrative:       Collected By:[id number] Alton LaneEN Motista  Heel  Collected By:[id number] Alton LaneMARGE FLOWERS    URINE CULTURE(NEW) [[id number]]  (Abnormal)  (Susceptibility) Collected:  05/25/19 1800    Order Status:  Completed Specimen:  Urine Updated:  05/27/19 1151     Significant Indicator POS (POS)     Source UR     Site -     Culture Result - (A)      Klebsiella pneumoniae  ,000 cfu/mL   (A)    Narrative:       Indication for culture:->Emergency Room Patient  Indication for culture:->Emergency Room Patient    Culture & Susceptibility     KLEBSIELLA PNEUMONIAE     Antibiotic Sensitivity Microscan Unit Status    Ampicillin Resistant >16 mcg/mL Final    Method: BERNARDINO    Ampicillin/sulbactam Resistant >16/8 mcg/mL Final    Method: BERNARDINO    Cefepime Sensitive <=8 mcg/mL Final    Method: BERNARDINO    Cefotaxime Sensitive <=2 mcg/mL Final    Method: BERNARDINO    Cefotetan Sensitive <=16 mcg/mL Final    Method: BERNARDINO    Ceftazidime Sensitive <=1 mcg/mL Final    Method: BERNARDINO    Ceftriaxone Sensitive <=8 mcg/mL Final    Method: BERNARDINO    Cefuroxime Sensitive <=4  "mcg/mL Final    Method: BERNARDINO    Cephalothin Resistant >16 mcg/mL Final    Method: BERNARDINO    Ciprofloxacin Sensitive <=1 mcg/mL Final    Method: BERNARDINO    Gentamicin Sensitive <=4 mcg/mL Final    Method: BERNARDINO    Levofloxacin Sensitive <=2 mcg/mL Final    Method: BERNARDINO    Nitrofurantoin Sensitive <=32 mcg/mL Final    Method: BERNARDINO    Pip/Tazobactam Sensitive <=16 mcg/mL Final    Method: BERNARDINO    Piperacillin Resistant >64 mcg/mL Final    Method: BERNARDINO    Tigecycline Sensitive <=2 mcg/mL Final    Method: BERNARDINO    Tobramycin Sensitive <=4 mcg/mL Final    Method: BERNARDINO    Trimeth/Sulfa Resistant >2/38 mcg/mL Final    Method: BERNARDINO                       GRAM STAIN [[id number]] Collected:  05/26/19 1143    Order Status:  Completed Specimen:  Wound Updated:  05/26/19 1816     Significant Indicator .     Source WND     Site RIGHT FOOT     Gram Stain Result No organisms seen.    Narrative:       Collected By:[id number] FLAQUITO FLOWERS  Heel  Collected By:[id number] FLAQUITO FLOWERS    BLOOD CULTURE [[id number]] Collected:  05/25/19 1525    Order Status:  Completed Specimen:  Blood from Peripheral Updated:  05/26/19 0831     Significant Indicator NEG     Source BLD     Site PERIPHERAL     Culture Result No Growth  Note: Blood cultures are incubated for 5 days and  are monitored continuously.Positive blood cultures  are called to the RN and reported as soon as  they are identified.      Narrative:       Per Hospital Policy: Only change Specimen Src: to \"Line\" if  specified by physician order.  No site indicated    BLOOD CULTURE [[id number]] Collected:  05/25/19 1520    Order Status:  Completed Specimen:  Blood from Peripheral Updated:  05/26/19 0831     Significant Indicator NEG     Source BLD     Site PERIPHERAL     Culture Result No Growth  Note: Blood cultures are incubated for 5 days and  are monitored continuously.Positive blood cultures  are called to the RN and reported as soon as  they are identified.      Narrative:       Per Hospital Policy: " "Only change Specimen Src: to \"Line\" if  specified by physician order.  No site indicated    URINALYSIS [[id number]]  (Abnormal) Collected:  05/25/19 1800    Order Status:  Completed Specimen:  Urine Updated:  05/25/19 1815     Color Yellow     Character Cloudy (A)     Specific Gravity 1.021     Ph 5.0     Glucose 500 (A) mg/dL      Ketones Negative mg/dL      Protein Negative mg/dL      Bilirubin Negative     Urobilinogen, Urine 0.2     Nitrite Positive (A)     Leukocyte Esterase Moderate (A)     Occult Blood Small (A)     Micro Urine Req Microscopic    Narrative:       Indication for culture:->Emergency Room Patient    Blood Culture [[id number]]     Order Status:  Sent Specimen:  Blood from Peripheral     Blood Culture [[id number]]     Order Status:  Sent Specimen:  Blood from Peripheral     CULTURE WOUND W/ GRAM STAIN [[id number]]     Order Status:  Canceled Specimen:  Wound from Right Foot             Bilateral.    Doppler waveform of the common femoral is abnormally dampened consistent    with significantl arterial occlusive disease in the aorto-iliac segment.    Doppler waveforms at the ankle are monophasic with moderate amplitude.    Ankle pressures were not taken due to non-compressibility of tibial    arteries on previous exam (12/2018).     Toe brachial indices were taken of the bilateral great toes.   Right:      0.41            Left:           0.72      Arterial duplex scan was performed in accordance with lower extremity    arterial evaluation protocol - see separate report.     Right.    Heavy atherosclerotic plaque seen through out the right lower extremity.   Loss of flow reversal at the proximal femoral artery prior to visualization    of hemodynamic significant stenosis   Stenosis of the proximal femoral artery. Velocities are consistent with 50-   75% stenosis.    Cannot rule out further hemodynamic significant stenosis within the femoral    or popliteal arteries due to calcific dropout.    Due to " heavy calcification, the tibial arteries could not be evaluated    entirely. Cannot rule out segmental occlusion or stenosis.   The peroneal artery is not visualized.    The anterior tibial and posterior tibial arteries are monophasic to the    ankle.      Left.    Heavy atherosclerotic plaque seen through out the left lower extremity.   Loss of flow reversal at the proximal femoral artery without visualization    of hemodynamic significant stenosis.   Cannot rule out  hemodynamic significant stenosis within the femoral or    popliteal arteries due to calcific dropout.    Due to heavy calcification, the tibial arteries could not be evaluated    entirely. Cannot rule out segmental occlusion or stenosis.   No flow can be demonstrated in the peroneal and posterior tibial  artery.    The anterior tibial artery is monophasic to the ankle.      Incidental finding:    Slightly echogenic material is visualized within the common femoral vein    that extends within the profunda femoral vein, consistent acute to subacute    venous thrombosis.        PLAN:  Bilateral Neuropathic Heels - Full thickness  Limb preservation status guarded  Wound care: Resume previous Wound Care orders.  Nursing to paint betadine daily    Wound Care by Nursing, LPS to Follow.    Offloading:   heel float boots bilaterally    Weight Bearing Status: Weight bearing as tolerated    Antibiotics: Per ID recommendation    Surgery:       7. Referrals:              Vascular SELWYN prelim shows ischemic disease              Ortho NA              Infectious diseases Dr Francis involved              Diabetes Education NA              Ortho tech NA              Other Na     Professional collaboration: Bedside RN, Dr Francis      LPS will follow    DISCHARGE PLAN:    Disposition: TBD    Follow-up: TBD    Other:     Bryan Sloiman R.N.

## 2019-05-28 NOTE — PROGRESS NOTES
2 RN skin check complete with TABATHA Granados.   Devices in place nasal cannula, SCDs, heel float boots.  Skin assessed under devices yes, skin intact.  Confirmed pressure ulcers found on alexandre heels (now with dressing, CDI) , old healed ulcer on buttocks.  Wound consult placed yes.  Other findings: Moist assoc redness on R panus- nystatin powder being used. Elbows pink intact, sacrum/coccyx slow to toni redness but intact.  The following interventions in place: waffle mattress overlay, q2h turns, 2 RN skin check, barrier paste and barrier wipes being used, nystatin powder, extra pillows for repositioning, heel float boots.

## 2019-05-28 NOTE — PROGRESS NOTES
Hospital Medicine Daily Progress Note    Date of Service  5/28/2019    Chief Complaint  73 y.o. female admitted 5/25/2019 with diabetic foot ulcers    Hospital Course    73-year-old male past medical history of coronary artery disease questionable stent placement-/2018 or 1998 ??, paroxysmal atrial fibrillation, type 2 diabetes (last a1c 7.3 03/2019) mild to moderate cognitive decline, hypertension, hyperlipidemia sent by podiatrist for bilateral wound on her feet.  X-ray showed right osteomyelitis.  She was started on vanco/unasyn. Pt was not septic on admission.  LPS was consulted. On admission was also found patient has UTI which was already covered by above antibiotic.       Interval Problem Update  DVT-patient with minimal pain, tolerating weight based heparin gtt. No bleeding issues.    Wound of foot-same today. Tolerating IV abx's without issues.     Consultants/Specialty  LPS  Infectious disease physician     Code Status  Full code     Disposition  Inpatient, IV antibiotic, possible surgical debridement     Review of Systems  Review of Systems   Constitutional: Negative for fever.   HENT: Negative for sore throat.    Eyes: Negative for blurred vision.   Respiratory: Negative for shortness of breath.    Cardiovascular: Negative for chest pain.   Gastrointestinal: Negative for vomiting.   Genitourinary: Negative for dysuria, frequency and urgency.   Musculoskeletal: Positive for joint pain.        Right foot pain-better controlled today   Skin: Positive for rash.        Rash improving. Nystatin powder on    Neurological: Negative for dizziness and weakness.   Psychiatric/Behavioral: Negative for depression.   All other systems reviewed and are negative.       Physical Exam  Temp:  [36.4 °C (97.5 °F)-37.2 °C (98.9 °F)] 36.4 °C (97.5 °F)  Pulse:  [68-91] 69  Resp:  [16-18] 16  BP: (118-126)/(50-61) 124/61  SpO2:  [95 %-98 %] 96 %    Physical Exam   Constitutional: She is oriented to person, place, and time. She  appears well-developed and well-nourished.   HENT:   Head: Normocephalic and atraumatic.   Right Ear: External ear normal.   Left Ear: External ear normal.   Nose: Nose normal.   Eyes: Pupils are equal, round, and reactive to light. EOM are normal. Right eye exhibits no discharge. Left eye exhibits no discharge.   Neck: Normal range of motion.   Cardiovascular: Normal rate, regular rhythm and normal heart sounds.    No murmur heard.  Weak pedal pulses    Pulmonary/Chest: Effort normal. No stridor. No respiratory distress. She has no wheezes.   Abdominal: Soft. There is no tenderness.   Musculoskeletal: Normal range of motion. She exhibits no edema.   Right  heel wounds, + drainage, erythematous.   Left heel wound is healing-unchanged   Neurological: She is alert and oriented to person, place, and time. No cranial nerve deficit.   Some memory concerns    Psychiatric: She has a normal mood and affect.                 Fluids    Intake/Output Summary (Last 24 hours) at 05/28/19 1432  Last data filed at 05/28/19 0600   Gross per 24 hour   Intake              610 ml   Output                0 ml   Net              610 ml       Laboratory  Recent Labs      05/26/19   0346  05/27/19   0407  05/28/19   0013   WBC  7.4  7.4  8.0   RBC  3.70*  3.40*  3.32*   HEMOGLOBIN  11.5*  10.3*  10.1*   HEMATOCRIT  36.4*  34.2*  33.7*   MCV  98.4*  100.6*  101.5*   MCH  31.1  30.3  30.4   MCHC  31.6*  30.1*  30.0*   RDW  46.4  48.6  48.9   PLATELETCT  230  213  195   MPV  9.4  9.2  9.4     Recent Labs      05/26/19   0346  05/27/19   0407  05/28/19   0013   SODIUM  143  145  141   POTASSIUM  3.8  3.5*  3.5*   CHLORIDE  109  110  109   CO2  29  31  28   GLUCOSE  155*  132*  188*   BUN  11  11  13   CREATININE  0.82  0.98  1.13   CALCIUM  8.8  8.3*  8.1*     Recent Labs      05/27/19   1654  05/28/19   0013  05/28/19   0638   APTT  26.7  150.4*  138.7*   INR  1.10   --    --                Imaging  US-EXTREMITY VENOUS LOWER BILAT   Final  Result      US-EXTREMITY ARTERY LOWER BILAT   Final Result      US-SELWYN SINGLE LEVEL BILAT         DX-FOOT-COMPLETE 3+ RIGHT   Final Result      1.  No evidence of fracture or dislocation.      2.  Severe degenerative changes again present raising possibility of neuropathic foot.      3.  There appears to be increase in bone erosions along the medial and inferior tarsal bones which could indicate osteomyelitis.         DX-FOOT-COMPLETE 3+ LEFT   Final Result      1.  No evidence of fracture or dislocation.      2.  Osteoarthritis is noted. No bone erosions are identified.      CTA ABDOMEN PELVIS W & W/O POST PROCESS    (Results Pending)        Assessment/Plan  * Diabetic foot ulcer with osteomyelitis (HCC)- (present on admission)   Assessment & Plan    Pt tells me that she has bilateral wound for about two months..per Dr. Breen notes she is well known to their service and pt never did complete IV antibiotic from previous admission    Right foot is worse than the left  Right foot XR showed:   Severe degenerative changes again present raising possibility of neuropathic foot.  There appears to be increase in bone erosions along the medial and inferior tarsal bones which could indicate osteomyelitis.   It might be also complex wound due to PAD  SELWYN with severe PVD as noted above, vascular consulted  ESR elevated.   Wound is round, + discharges, on the heel, painful   She is on vanco and unasyn.   Infectious disease consulted  Blood culture NGT   Wound culture pending      Coronary artery disease involving native coronary artery of native heart without angina pectoris- (present on admission)   Assessment & Plan    Not known details, per chart review possible she did have a stent placement 2018/ or 1998 ??  Resumed carvedilol   Statin, asa. She denies chest pain        Type 2 diabetes mellitus with skin complication, with long-term current use of insulin (HCC)- (present on admission)   Assessment & Plan    Continue lantus  30 U at bed time, may need to stop soon depending on surgery  Ordered SS insulin  a1c 8.7  Need more diabetic education. She will benefits from possible janumet/and oral combination instead of lantus as OP        Essential hypertension- (present on admission)   Assessment & Plan    well controled  Continue Lasix and carvedilol     Deep vein thrombosis (DVT) of lower extremity (HCC)- (present on admission)   Assessment & Plan    -weight based heparin gtt, goal PTT 60-90, check PTT Q6 hours, adjust PRN     Cognitive decline- (present on admission)   Assessment & Plan    See previous note. There is some concern that pt and her  are not able to take care of each other. Her  seems to remember details, however, still there is lack of following instruction. Medical recommendation. Might need more assistant as home health. Pt definitely has cognitive deficits. Her  is the one who manage her medications      Peripheral vascular disease (HCC)- (present on admission)   Assessment & Plan    Per chart review, bilateral weak pulse.   SELWYN shows severe PVD, [facility] of vascular surgery to see patient today  She is on plavix      Rash- (present on admission)   Assessment & Plan    Less itchy  continue  triamcinolone  0.1% twice daily/nystatin powder      Normocytic anemia- (present on admission)   Assessment & Plan    It is mixed iron def anemia and b12 def. No source of bleeding at this time. No acute bleeds  She will need GI as OP follow up   I will start on supplement, continue to monitor      UTI (urinary tract infection)- (present on admission)   Assessment & Plan    UA Cx + for klebsiella   Sensitive to  Unasyn. Urinary issues are improving         Chronic anticoagulation- (present on admission)   Assessment & Plan    -none at home  Now on plavix and weight based heparin gtt       Paroxysmal atrial fibrillation (HCC)- (present on admission)   Assessment & Plan    -High [id number] score, now on weight based  heparin gtt for acute DVT  Rate and hemodynamics stable  Continue carvedilol and Lasix          VTE prophylaxis: lovenox

## 2019-05-28 NOTE — PROGRESS NOTES
Patient refused SCDs, educated regarding importance.  Education reinforced by TABATHA Granados.  Patient continues to refuse.

## 2019-05-28 NOTE — CARE PLAN
Problem: Communication  Goal: The ability to communicate needs accurately and effectively will improve  Outcome: PROGRESSING AS EXPECTED  Discussed POC with patient Updated white board Calls appropriately  Call light within reach    Problem: Pain Management  Goal: Pain level will decrease to patient's comfort goal  Outcome: PROGRESSING AS EXPECTED  Reports of alexandre feet pain, medicated per MAR with some relief.  Non-pharm comfort measure offered: elevation , ROM, heel float boots, repositioning.

## 2019-05-28 NOTE — PROGRESS NOTES
APTT came back at 138.7, verified the result with Charge RN Rosalba, based on the protocol the heparin drip will be stopped and restrated in one hour at 850 units/hr at 17 ml/hr. The aPTT will be redrawn at 6 hours from the restart. Verified by two RN's

## 2019-05-28 NOTE — PROGRESS NOTES
Microbiology called about foot result from the 05/25/2019. Pt. Has MRSA on that left foot. Will notify MD and start Contact precautions

## 2019-05-28 NOTE — CARE PLAN
Problem: Safety  Goal: Will remain free from falls  Outcome: PROGRESSING AS EXPECTED  Pt. Calls appropriately, bed in lowest position, locked, upper side rails up, bedside table within reach, treaded socks in place, educated about falls education    Problem: Respiratory:  Goal: Respiratory status will improve  Outcome: PROGRESSING AS EXPECTED  Pt. Is educated about using IS and about mobilizing in bed

## 2019-05-28 NOTE — THERAPY
5/27:  OT eval order received.  No OT eval yet. Discussed with RN. Awaiting further clarification on POC and mobility. Will follow and eval when able

## 2019-05-28 NOTE — PROGRESS NOTES
Infectious Disease Progress Note    Author: MAXIME Mcintosh Date & Time of service: 2019  9:58 AM    Chief Complaint:  Diabetic foot ulcer    Interval History:  73 y.o. Female with hx of poorly controlled DM, COPD on 3 L NC at home.  Admitted on 19 for bilateral heel ulcerations with concerns for OM.   2019 T-max is 98.1.  Vascular Dopplers are being performed.  Denies any fevers or chills.  WBC 7.4.  Creatinine is 0.98  19- AF, WBC 8.0, no issue with abx, denies any pain currently to BLE but had 9/10 sharp BLE pain last night that improved with pain meds.    Labs Reviewed, Medications Reviewed, Radiology Reviewed and Wound Reviewed.    Review of Systems:  Review of Systems   Constitutional: Negative for chills, fever and malaise/fatigue.   Respiratory: Negative for cough and shortness of breath.    Cardiovascular: Positive for leg swelling. Negative for chest pain and palpitations.   Gastrointestinal: Negative for abdominal pain, constipation, diarrhea, nausea and vomiting.   Genitourinary: Negative for dysuria, flank pain and hematuria.   Musculoskeletal: Positive for joint pain and myalgias.        BLE pain at NOC   Skin: Negative for rash.   Neurological: Positive for sensory change. Negative for headaches.   Psychiatric/Behavioral: The patient is not nervous/anxious.        Hemodynamics:  Temp (24hrs), Av.7 °C (98 °F), Min:36.4 °C (97.5 °F), Max:37.2 °C (98.9 °F)  Temperature: 36.4 °C (97.5 °F)  Pulse  Av.3  Min: 68  Max: 107   Blood Pressure : 124/61       Physical Exam:  Physical Exam   Constitutional: She is oriented to person, place, and time. She appears well-developed and well-nourished.   Obese   HENT:   Head: Normocephalic and atraumatic.   Mouth/Throat: Oropharynx is clear and moist. No oropharyngeal exudate.   Eyes: Pupils are equal, round, and reactive to light. Conjunctivae and EOM are normal. No scleral icterus.   Neck: Normal range of motion. Neck supple.  No tracheal deviation present.   Cardiovascular: Normal rate, regular rhythm and normal heart sounds.    No murmur heard.  Faint BLE distal pulses   Pulmonary/Chest: Effort normal and breath sounds normal. No respiratory distress. She has no wheezes. She has no rales.   2 L NC   Abdominal: Soft. Bowel sounds are normal. She exhibits no distension. There is no tenderness. There is no rebound.   Musculoskeletal: She exhibits edema (Trace to +1 BLE). She exhibits no tenderness.   Bilateral heels- wound beds pink with ischemic changes, trace serous drainage, no odor, mild erythema to surrounding tissue, no maceration, non tender to palpation.   Neurological: She is alert and oriented to person, place, and time. No cranial nerve deficit.   Decreased sensation to BLE, R>L.   Skin: Skin is warm and dry. No rash noted. She is not diaphoretic. There is erythema.   Psychiatric: She has a normal mood and affect. Thought content normal.   Nursing note and vitals reviewed.    Meds:    Current Facility-Administered Medications:   •  DAPTOmycin  •  ferrous sulfate-c-folic acid  •  cyanocobalamin  •  [COMPLETED] piperacillin-tazobactam **AND** piperacillin-tazobactam  •  [COMPLETED] heparin **AND** heparin **AND** heparin **AND** Protocol 440 Heparin Weight Based DO NOT GIVE ANY HEPARIN BOLUS TO STROKE PATIENT **AND** Protocol 440 Heparin Weight Based Discontinue Enoxaparin (Lovenox), Dabigatran (Pradaxa), Rivaroxaban (Xarelto), Apixaban (Eliquis), Edoxaban (Savaysa, Lixiana), Fondaparinux (Arixtra) and Argatroban prior to heparin administration **AND** Protocol 440 Heparin Weight Based Draw baseline aPTT, PT, and platelet count if not already done **AND** Protocol 440 Heparin Weight Based Draw aPTT 6 hours after beginning infusion.  **AND** Protocol 440 Heparin Weight Based Draw Platelet count every three days. Contact MD if platelet is 50% lower than baseline count. **AND** Protocol 440 Heparin Weight Based Record Patient Data  **AND** Protocol 440 Heparin Weight Based INSTRUCTIONS **AND** Protocol 440 Heparin Weight Based Review aPTT results 6 hours after infusion is begun as detailed **AND** Protocol 440 Heparin Weight Based Adjust heparin to maintain aPTT between 55-96 sec **AND** Protocol 440 Heparin Weight Based Order aPTT 6 hours after any rate change or hold until aPTT is therapeutic (55-96 seconds) **AND** Protocol 440 Heparin Weight Based Documentation and verification  •  triamcinolone acetonide  •  aspirin EC  •  nystatin  •  Respiratory Care per Protocol  •  ipratropium-albuterol  •  acetaminophen  •  carvedilol  •  furosemide  •  gabapentin  •  insulin glargine  •  omeprazole  •  ondansetron  •  potassium chloride SA  •  pravastatin  •  senna-docusate **AND** polyethylene glycol/lytes **AND** magnesium hydroxide **AND** bisacodyl  •  NS  •  ondansetron  •  Notify provider if pain remains uncontrolled **AND** Use the numeric rating scale (NRS-11) on regular floors and Critical-Care Pain Observation Tool (CPOT) on ICUs/Trauma to assess pain **AND** Pulse Ox (Oximetry) **AND** Pharmacy Consult Request **AND** If patient difficult to arouse and/or has respiratory depression, stop any opiates that are currently infusing and call a Rapid Response. **AND** oxyCODONE immediate-release **AND** oxyCODONE immediate-release **AND** HYDROmorphone  •  lactobacillus rhamnosus  •  insulin regular **AND** Accu-Chek ACHS **AND** NOTIFY MD and PharmD **AND** glucose **AND** dextrose 10% bolus    Labs:  Recent Labs      05/25/19   1520  05/26/19   0346  05/27/19   0407  05/28/19   0013   WBC  8.8  7.4  7.4  8.0   RBC  4.12*  3.70*  3.40*  3.32*   HEMOGLOBIN  12.2  11.5*  10.3*  10.1*   HEMATOCRIT  40.8  36.4*  34.2*  33.7*   MCV  99.0*  98.4*  100.6*  101.5*   MCH  29.6  31.1  30.3  30.4   RDW  47.0  46.4  48.6  48.9   PLATELETCT  274  230  213  195   MPV  9.4  9.4  9.2  9.4   NEUTSPOLYS  80.00*   --   71.00  68.20   LYMPHOCYTES  9.60*   --    13.90*  16.00*   MONOCYTES  6.90   --   10.40  10.10   EOSINOPHILS  0.90   --   3.50  4.30   BASOPHILS  0.00   --   0.80  0.90   RBCMORPHOLO  Present   --    --    --      Recent Labs      05/26/19   0346  05/27/19   0407  05/28/19   0013   SODIUM  143  145  141   POTASSIUM  3.8  3.5*  3.5*   CHLORIDE  109  110  109   CO2  29  31  28   GLUCOSE  155*  132*  188*   BUN  11  11  13     Recent Labs      05/25/19   1520  05/26/19   0346  05/27/19   0407  05/28/19   0013   ALBUMIN  3.1*   --   2.4*  2.4*   TBILIRUBIN  0.3   --   0.3  0.2   ALKPHOSPHAT  100*   --   71  73   TOTPROTEIN  6.4   --   4.9*  5.1*   ALTSGPT  7   --   <5  5   ASTSGOT  11*   --   7*  8*   CREATININE  0.83  0.82  0.98  1.13       Imaging:  Last Resulted Time Tue May 28, 2019  5:01 AM  US-EXTREMITY VENOUS LOWER BILAT   CONCLUSIONS   1.  There is thrombus in the LEFT common femoral vein which is    noncompressible. There is nonocclusive thrombus in the LEFT femoral vein as    well.   This is likely acute  DVT with some superimposed chronic DVT .   2.  No evidence of RIGHT lower extremity DVT or SVT.   3.  Exam limited by superficial edema.      Micro:  Results     Procedure Component Value Units Date/Time    CULTURE WOUND W/ GRAM STAIN [[id number]]  (Abnormal)  (Susceptibility) Collected:  05/26/19 1143    Order Status:  Completed Specimen:  Wound from Right Foot Updated:  05/28/19 0850     Significant Indicator POS (POS)     Source WND     Site RIGHT FOOT     Culture Result Light growth mixed skin ab. (A)     Gram Stain Result No organisms seen.     Culture Result Methicillin Resistant Staphylococcus aureus  Light growth  This isolate is presumed to be clindamycin resistant based on  detection of inducible resistance.  Clindamycin may still  be effective in some patients.   (A)    Narrative:       CALL  Gerardo  131 tel. [phone],  CALLED  131 tel. [phone] 05/28/2019, 08:50, RB PERF. RESULTS CALLED  TO:Tucker [id number] Sammy  Collected By:[id number]  FLAQUITO FLOWERS  Heel  Collected By:[id number] FLAQUITO FLOWERS    Culture & Susceptibility     METHICILLIN RESISTANT STAPHYLOCOCCUS AUREUS     Antibiotic Sensitivity Microscan Unit Status    Ampicillin/sulbactam Resistant <=8/4 mcg/mL Final    Method: BERNARDINO    Clindamycin Resistant <=0.5 mcg/mL Final    Method: BERNARDINO    Daptomycin Sensitive <=0.5 mcg/mL Final    Method: BERNARDINO    Erythromycin Resistant >4 mcg/mL Final    Method: BERNARDINO    Moxifloxacin Intermediate 4 mcg/mL Final    Method: BERNARDINO    Oxacillin Resistant >2 mcg/mL Final    Method: BERNARDINO    Penicillin Resistant >8 mcg/mL Final    Method: BERNARDINO    Tetracycline Resistant >8 mcg/mL Final    Method: BERNARDINO    Trimeth/Sulfa Sensitive <=0.5/9.5 mcg/mL Final    Method: BERNARDINO    Vancomycin Sensitive 1 mcg/mL Final    Method: BERNARDINO                       URINE CULTURE(NEW) [[id number]]  (Abnormal)  (Susceptibility) Collected:  05/25/19 1800    Order Status:  Completed Specimen:  Urine Updated:  05/27/19 1151     Significant Indicator POS (POS)     Source UR     Site -     Culture Result - (A)      Klebsiella pneumoniae  ,000 cfu/mL   (A)    Narrative:       Indication for culture:->Emergency Room Patient  Indication for culture:->Emergency Room Patient    Culture & Susceptibility     KLEBSIELLA PNEUMONIAE     Antibiotic Sensitivity Microscan Unit Status    Ampicillin Resistant >16 mcg/mL Final    Method: BERNARDINO    Ampicillin/sulbactam Resistant >16/8 mcg/mL Final    Method: BERNARDINO    Cefepime Sensitive <=8 mcg/mL Final    Method: BERNARDINO    Cefotaxime Sensitive <=2 mcg/mL Final    Method: BERNARDINO    Cefotetan Sensitive <=16 mcg/mL Final    Method: BERNARDINO    Ceftazidime Sensitive <=1 mcg/mL Final    Method: BERNARDINO    Ceftriaxone Sensitive <=8 mcg/mL Final    Method: BERNARDINO    Cefuroxime Sensitive <=4 mcg/mL Final    Method: BERNARDINO    Cephalothin Resistant >16 mcg/mL Final    Method: BERNARDINO    Ciprofloxacin Sensitive <=1 mcg/mL Final    Method: BERNARDINO    Gentamicin Sensitive <=4 mcg/mL Final    Method: BERNARDINO     "Levofloxacin Sensitive <=2 mcg/mL Final    Method: BERNARDINO    Nitrofurantoin Sensitive <=32 mcg/mL Final    Method: BERNARDINO    Pip/Tazobactam Sensitive <=16 mcg/mL Final    Method: BERNARDINO    Piperacillin Resistant >64 mcg/mL Final    Method: BERNARDINO    Tigecycline Sensitive <=2 mcg/mL Final    Method: BERNARDINO    Tobramycin Sensitive <=4 mcg/mL Final    Method: BERNARDINO    Trimeth/Sulfa Resistant >2/38 mcg/mL Final    Method: BERNARDINO                       GRAM STAIN [[id number]] Collected:  05/26/19 1143    Order Status:  Completed Specimen:  Wound Updated:  05/26/19 1816     Significant Indicator .     Source WND     Site RIGHT FOOT     Gram Stain Result No organisms seen.    Narrative:       Collected By:[id number] FLAQUITO FLOWERS  Heel  Collected By:[id number] FLAQUITO FLOWERS    BLOOD CULTURE [[id number]] Collected:  05/25/19 1525    Order Status:  Completed Specimen:  Blood from Peripheral Updated:  05/26/19 0831     Significant Indicator NEG     Source BLD     Site PERIPHERAL     Culture Result No Growth  Note: Blood cultures are incubated for 5 days and  are monitored continuously.Positive blood cultures  are called to the RN and reported as soon as  they are identified.      Narrative:       Per Hospital Policy: Only change Specimen Src: to \"Line\" if  specified by physician order.  No site indicated    BLOOD CULTURE [[id number]] Collected:  05/25/19 1520    Order Status:  Completed Specimen:  Blood from Peripheral Updated:  05/26/19 0831     Significant Indicator NEG     Source BLD     Site PERIPHERAL     Culture Result No Growth  Note: Blood cultures are incubated for 5 days and  are monitored continuously.Positive blood cultures  are called to the RN and reported as soon as  they are identified.      Narrative:       Per Hospital Policy: Only change Specimen Src: to \"Line\" if  specified by physician order.  No site indicated    URINALYSIS [[id number]]  (Abnormal) Collected:  05/25/19 1800    Order Status:  Completed Specimen:  Urine " Updated:  05/25/19 1815     Color Yellow     Character Cloudy (A)     Specific Gravity 1.021     Ph 5.0     Glucose 500 (A) mg/dL      Ketones Negative mg/dL      Protein Negative mg/dL      Bilirubin Negative     Urobilinogen, Urine 0.2     Nitrite Positive (A)     Leukocyte Esterase Moderate (A)     Occult Blood Small (A)     Micro Urine Req Microscopic    Narrative:       Indication for culture:->Emergency Room Patient    Blood Culture [[id number]]     Order Status:  Sent Specimen:  Blood from Peripheral     Blood Culture [[id number]]     Order Status:  Sent Specimen:  Blood from Peripheral     CULTURE WOUND W/ GRAM STAIN [[id number]]     Order Status:  Canceled Specimen:  Wound from Right Foot           Assessment:  Active Hospital Problems    Diagnosis   • *Diabetic foot ulcer with osteomyelitis (HCC) [E11.621, E11.69, L97.509, M86.9]   • Type 2 diabetes mellitus with skin complication, with long-term current use of insulin (HCC) [E11.628, Z79.4]   • Peripheral vascular disease (HCC) [I73.9]   • UTI (urinary tract infection) [N39.0]       Plan:  Bilateral DM heel ulcers  Afebrile  No leukocytosis  Bcx on 5/25 NGTD  Was d/c'd home in January 2019 on IV Dapto for R foot OM; however, pt stated she did not finish IV abx and does not recall if she was given any other abx.  Never followed up in ID clinic.  Previous R foot cx +MRSE & E faecalis December 2018  Wound cx from R heel on 5/26 +MRSA  Start IV Daptomycin 8 mg/kg daily for now  CPK ordered for today  Duration of treatment will be based upon surgical plans  CBC and CMP ordered for tomorrow  Continue with wound care and offloading    PVD  Vascular surgery to consult today  US of BLE showing acute on chronic LLE DVT and 50% stenosis of Right mid SFA    UTI  Ucx on 5/25 +Klebsiella pneumoniae  Continue IV Zosyn 3.375g Q8h for now    Diabetes mellitus  Hemoglobin A1c was 8.7% on 5/25/19  Keep BS <150 to promote wound healing and control infection  BS 80 this  am      Discussed case with Dr. Maki, beside RN and JEANIE Knox.  Starting IV Dapto d/t +MRSA.  Waiting on Vascular consult.

## 2019-05-28 NOTE — CONSULTS
Date of Service  5/28/2019    Reason For Consult  Bilateral heel wounds    Requesting Physician  Dejuan Medellin MD    Consulting Physician  Gilbert Mcdowell M.D.    Primary Care Physician  Floyd Gould M.D.    Chief Complaint  Bilateral heel pain    History of Present Illness  Ms. Brock is a 73 y.o. female w/ CAD and DM who presented 5/25/2019 at the recommendation of her podiatrist with chronic infected heel wounds. The pt denies any prior vascular intervention.    She states the wounds have been present for many months and cause constant pain, R>L. Prior to their appearance, she was ambulatory but is now severely limited. She denies hx of claudication or rest pain.    NIVs suggest severe PAD.    Review of Systems  Review of Systems   Constitutional: Negative.    HENT: Negative.    Eyes: Negative.    Respiratory: Negative.    Cardiovascular: Positive for leg swelling.   Gastrointestinal: Negative.    Genitourinary: Negative.    Musculoskeletal: Positive for myalgias.   Skin: Negative.    Neurological: Negative.    Endo/Heme/Allergies: Negative.    Psychiatric/Behavioral: Negative.        Past Medical History  Past Medical History:   Diagnosis Date   • Arthritis     all over   • Backpain    • CATARACT     removed   • Diabetes     1990   • Myocardial infarct (HCC)     1998       Surgical History  Past Surgical History:   Procedure Laterality Date   • IRRIGATION & DEBRIDEMENT ORTHO Right 12/30/2018    Procedure: IRRIGATION & DEBRIDEMENT, gastroc recession, ostectomy;  Surgeon: Tomi Kwon M.D.;  Location: SURGERY [facility];  Service: Orthopedics   • GYN SURGERY      hysterectomy        Family History  No family history on file.     Social History  Social History     Social History   • Marital status:      Spouse name: N/A   • Number of children: N/A   • Years of education: N/A     Social History Main Topics   • Smoking status: Former Smoker   • Smokeless tobacco: Never Used   • Alcohol use No   •  Drug use: No   • Sexual activity: Not on file     Other Topics Concern   • Not on file     Social History Narrative   • No narrative on file       Medications  Prior to Admission Medications   Prescriptions Last Dose Informant Patient Reported? Taking?   insulin glargine (LANTUS) 100 UNIT/ML Solution 5/24/2019 at PM Patient Yes Yes   Sig: Inject 20 Units as instructed every evening.      Facility-Administered Medications: None       Current Facility-Administered Medications   Medication Dose Route Frequency Provider Last Rate Last Dose   • DAPTOmycin 650 mg in NS 50 mL IVPB  8 mg/kg Intravenous Q24HR MAXIME Mcintosh 100 mL/hr at 05/28/19 1253 650 mg at 05/28/19 1253   • ferrous sulfate-c-folic acid (FOLITAB 500) tablet 1 Tab  1 Tab Oral DAILY Dejuan Medellin M.D.   1 Tab at 05/28/19 0519   • cyanocobalamin (VITAMIN B-12) tablet 1,000 mcg  1,000 mcg Oral DAILY Dejuan Medellin M.D.   1,000 mcg at 05/28/19 0517   • piperacillin-tazobactam (ZOSYN) 3.375 g in  mL IVPB  3.375 g Intravenous Q8HRS Meme Mazariegos M.D.   Stopped at 05/28/19 0917   • heparin injection 2,600 Units  2,600 Units Intravenous PRN Dejuan Medellin M.D.        And   • heparin infusion 25,000 units in 500 ml 0.45% nacl   Intravenous Continuous Dejuan Medellin M.D. 17 mL/hr at 05/28/19 0940 850 Units/hr at 05/28/19 0940   • triamcinolone acetonide (KENALOG) 0.1 % cream   Topical BID Dejuan Medellin M.D.       • aspirin EC (ECOTRIN) tablet 81 mg  81 mg Oral DAILY Dejuan Medellin M.D.   81 mg at 05/28/19 0517   • nystatin (MYCOSTATIN) powder   Topical BID Dejuan Medellin M.D.       • Respiratory Care per Protocol   Nebulization Continuous RT Dejuan Medellin M.D.       • ipratropium-albuterol (DUONEB) nebulizer solution  3 mL Nebulization Q4H PRN (RT) Dejuan Medellin M.D.       • acetaminophen (TYLENOL) tablet 650 mg  650 mg Oral Q6HRS PRN Pop Varela M.D.   650 mg at 05/27/19 2046   • carvedilol (COREG) tablet 3.125 mg  3.125 mg Oral BID WITH MEALS Pop MEJIAS  NAV Varela   3.125 mg at 05/28/19 0917   • furosemide (LASIX) tablet 20 mg  20 mg Oral DAILY Pop Varela M.D.   20 mg at 05/28/19 0517   • gabapentin (NEURONTIN) capsule 400 mg  400 mg Oral BID Pop Varela M.D.   400 mg at 05/28/19 0517   • insulin glargine (LANTUS) injection 30 Units  30 Units Subcutaneous Q EVENING Pop Varela M.D.   30 Units at 05/27/19 1754   • omeprazole (PRILOSEC) capsule 20 mg  20 mg Oral DAILY Pop Varela M.D.   20 mg at 05/28/19 0517   • ondansetron (ZOFRAN ODT) dispertab 4 mg  4 mg Oral Q4HRS PRN Pop Varela M.D.       • potassium chloride SA (Kdur) tablet 20 mEq  20 mEq Oral DAILY Pop Varela M.D.   20 mEq at 05/28/19 0517   • pravastatin (PRAVACHOL) tablet 20 mg  20 mg Oral Nightly Pop Varela M.D.   20 mg at 05/27/19 2046   • senna-docusate (PERICOLACE or SENOKOT S) 8.6-50 MG per tablet 2 Tab  2 Tab Oral BID Pop Varela M.D.   2 Tab at 05/28/19 0517    And   • polyethylene glycol/lytes (MIRALAX) PACKET 1 Packet  1 Packet Oral QDAY PRN Pop Varela M.D.        And   • magnesium hydroxide (MILK OF MAGNESIA) suspension 30 mL  30 mL Oral QDAY PRN Pop Varela M.D.        And   • bisacodyl (DULCOLAX) suppository 10 mg  10 mg Rectal QDAY PRN Pop Varela M.D.       • NS infusion   Intravenous Continuous Pop Varela M.D. 75 mL/hr at 05/28/19 1031     • ondansetron (ZOFRAN) syringe/vial injection 4 mg  4 mg Intravenous Q4HRS PRN Pop Varela M.D.       • Pharmacy Consult Request ...Pain Management Review 1 Each  1 Each Other PHARMACY TO DOSE Pop Varela M.D.        And   • oxyCODONE immediate-release (ROXICODONE) tablet 2.5 mg  2.5 mg Oral Q3HRS PRN Pop Varela M.D.        And   • oxyCODONE immediate-release (ROXICODONE) tablet 5 mg  5 mg Oral Q3HRS PRN Pop Varela M.D.   5 mg at 05/28/19 1257    And   • HYDROmorphone pf (DILAUDID) injection 0.25 mg  0.25 mg Intravenous Q3HRS PRN Pop Varela M.D.        • lactobacillus rhamnosus (CULTURELLE) capsule 1 Cap  1 Cap Oral QDAY with Breakfast Pop Varela M.D.   1 Cap at 05/28/19 0917   • insulin regular (HUMULIN R) injection 2-9 Units  2-9 Units Subcutaneous 4X/DAY ACHS Pop Varela M.D.   2 Units at 05/28/19 1302    And   • glucose 4 g chewable tablet 16 g  16 g Oral Q15 MIN PRN Pop Varela M.D.        And   • DEXTROSE 10% BOLUS 250 mL  250 mL Intravenous Q15 MIN PRN Pop Varela M.D.           Allergies  No Known Allergies      Physical Exam  Temp:  [36.4 °C (97.5 °F)-37.2 °C (98.9 °F)] 36.4 °C (97.5 °F)  Pulse:  [68-91] 69  Resp:  [16-18] 16  BP: (118-126)/(50-61) 124/61  SpO2:  [95 %-98 %] 96 %    Pulse/Extremity Exam:    Femorals:        Right: palpable       Left palpable    Pedal Pulses:       Right DP monophasic       Right PT absent       Left DP monophasic       Left PT absent    Wounds: R heel w/ partial-thickness 3cm x 2cm ulceration. No drainage/malodor currently.  L heel w/ dry eschar    General appearance: NAD, conversing appropriately  Psych: Normal affect, mood, judgement  Neuro: CN II-XII grossly intact.   Neck: full range of motion  Lungs: No inspiratory stridor or wheezing  CV: RRR  Abdomen: Soft, NT/ND  Skin: No rashes    Labs Reviewed Today:  Recent Labs      05/26/19 0346 05/27/19   0407  05/28/19   0013   WBC  7.4  7.4  8.0   RBC  3.70*  3.40*  3.32*   HEMOGLOBIN  11.5*  10.3*  10.1*   HEMATOCRIT  36.4*  34.2*  33.7*   MCV  98.4*  100.6*  101.5*   MCH  31.1  30.3  30.4   MCHC  31.6*  30.1*  30.0*   RDW  46.4  48.6  48.9   PLATELETCT  230  213  195   MPV  9.4  9.2  9.4     Recent Labs      05/26/19   0346  05/27/19   0407  05/28/19   0013   SODIUM  143  145  141   POTASSIUM  3.8  3.5*  3.5*   CHLORIDE  109  110  109   CO2  29  31  28   GLUCOSE  155*  132*  188*   BUN  11  11  13   CREATININE  0.82  0.98  1.13   CALCIUM  8.8  8.3*  8.1*     Recent Labs      05/25/19   1520  05/26/19   0346  05/27/19   0407  05/28/19    0013   ALTSGPT  7   --   <5  5   ASTSGOT  11*   --   7*  8*   ALKPHOSPHAT  100*   --   71  73   TBILIRUBIN  0.3   --   0.3  0.2   PREALBUMIN   --   11.0*   --    --    GLUCOSE  279*  155*  132*  188*     Recent Labs      05/27/19   1654  05/28/19   0013  05/28/19   0638   APTT  26.7  150.4*  138.7*   INR  1.10   --    --              No results for input(s): TROPONINI in the last 72 hours.    Urinalysis:    Recent Labs      05/25/19   1800   SPECGRAVITY  1.021   GLUCOSEUR  500*   KETONES  Negative   NITRITE  Positive*   LEUKESTERAS  Moderate*   WBCURINE  Packed*   RBCURINE  2-5*   BACTERIA  Many*   EPITHELCELL  Few        Imaging Reviewed Today:  I personally reviewed all non-invasive vascular testing including images, x-rays, tracings, arterial waveforms, and duplex exams relevant to this admission. My interpretation is below:    5/26/2019 ABIs: Non-diagnostic d/t medial calcinosis.  TBIs:   R 0.41   L 0.72    5/26/2019 Arterial Duplex: R CFA w/ delayed biphasic waveforms but no flow-limiting lesions. Profunda is monophasic. SFA/pop are diffusely diseased without any areas of focal high-grade stenosis. Waveforms are biphasic. PT is diffusely calcified. Peroneal is likely occluded. AT is diffusely calcified but patent.    L CFA is patent w/ triphasic waveforms and no significant flow-limiting lesion. Profunda is patent with triphasic waveforms. The SFA/pop are diffusely diseased but without any high-grade flow-limiting lesion. Waveforms are biphasic throughout. PT and peroneal are likely occluded along their entire course. The AT is patent but highly calcified.    Also identified on this study is an acute DVT within the L CFV, extending into the profunda vein and likely to the mid-SFV. There is no respiratory variation or augmentation within the CFV or SFV in the thigh.    5/26/2019 BLE Venous Duplex: Acute L CFV/SFV DVT. No DVT in the RLE    CTA: pending    Assessment/Plan & Medical Decision-Making    Ms. Brock  presents with bilateral chronic heel ulcers in the setting of DM and PAD. ABIs and TBIs are diminished. Femoral waveforms on the right are delayed, which may suggest iliac disease. She also has diffuse disease throughout the SFA/pop and tibial vessels bilaterally.    Will obtain a CTA A/P c BLE runoff and she likely needs BLE angios. Would plan on starting on the right as this the more symptomatic side.     Cont hep gtt for acute DVT. ? Whether this is d/t body habitus and immobility.  Cont ASA/statin    Gilbert Mcdowell MD  Vascular Surgeon  [facility]  Office: [phone]

## 2019-05-29 PROBLEM — R07.9 CHEST PAIN: Status: ACTIVE | Noted: 2019-01-01

## 2019-05-29 PROBLEM — J96.20 ACUTE ON CHRONIC RESPIRATORY FAILURE (HCC): Status: ACTIVE | Noted: 2019-01-01

## 2019-05-29 NOTE — CARE PLAN
Problem: Bowel/Gastric:  Goal: Normal bowel function is maintained or improved  Outcome: PROGRESSING AS EXPECTED  Pt. Educated about bowel movement and how to help get one, on stool softners, check MAR    Problem: Pain Management  Goal: Pain level will decrease to patient's comfort goal  Outcome: PROGRESSING AS EXPECTED  Pt is educated about pain scales, educated about how to manage pain with nonpharmacological methods, check MAR

## 2019-05-29 NOTE — CONSULTS
Diabetes education: Met with pt and  regarding diabetes management. Pt is known from previous visit. Pt takes Lantus at night and fast acting during the day as needed. Pt uses vials and syringes and per , finger sticks are done 3 to 4 times a day.  Pt was admitted with blood sugar of 299 and Hg a1c of 8.9%.  Pt is currently on Lantus 20 units pm (lowered from 30 units) and Regular insulin ac and hs. Current blood sugars are 163 (2 units at 2056), 39, and 150. Pt would benefit in having an hs snack ( left message for RD), and maybe having finger sticks ac only.  Plan: Pt would benefit in having a hs snack. Pt may also benefit in having sliding scale coverage ac only. Please call [phone] if needs change,      Previous education:   Consults  Date of Service: 1/3/2019 7:30 PM  Miracle Horton R.N.       Diabetes education: Met with pt this afternoon regarding diabetes management.   Pt has history of diabetes using insulin with vials and syringes. Pt states she was on N and Regular but has a severe low and was changed to Lantus with occasional regular coverage.  Discussed what effects blood sugars, goals for blood sugars, storage, shelf life of insulin and site rotation.  Pt was admitted with blood sugar of 137 and HgA1c of 7.0%.  Pt is currently on Lantus 32 units in AM and regular insulin sliding scale coverage ac and hs with blood sugars of 230 (4 units), 196 ( 3 units) and 261 ( 6 units). Pt may need more insulin to bring blood sugars to goal of less than 150.  Pt states she has her insulin and supplies at home. Questions answered.  Plan: Pt has no further needs at this time. Pt may benefit in increasing insulin coverage or Lantus dose if blood sugars are not at goal tomorrow ( Lantus just increased from 27 units this am). Please call [phone] if needs change.         Progress Notes  Date of Service: 1/7/2019 3:00 PM  Miracle Horton R.N.       Diabetes education: Met with patient and  to answer  questions. Pt does have supplies and medication at home. Discussed Lantus dose as she was on 20 units of Lantus once a day at home with the occasional dose of regular. In hospital she has been using Lantus 32 units in AM and consistently using regular per sliding scale. Blood sugars have been 256 (6 units), 193 ( 3 units) an this am 253 ( 6 units). She has not been taking her Meformin in the hospital, so numbers may be better when she resumes the Metformin. Pt has number for CDE if questions.

## 2019-05-29 NOTE — PROGRESS NOTES
Hospital Medicine Daily Progress Note    Date of Service  5/29/2019    Chief Complaint  73 y.o. female admitted 5/25/2019 with diabetic foot ulcers    Hospital Course       73-year-old male past medical history of coronary artery disease questionable stent placement-/2018 or 1998 ??, paroxysmal atrial fibrillation, type 2 diabetes (last a1c 7.3 03/2019) mild to moderate cognitive decline, hypertension, hyperlipidemia sent by podiatrist for bilateral wound on her feet.  X-ray showed right osteomyelitis.  She was started on vanco/unasyn. Pt was not septic on admission.  LPS was consulted. On admission was also found patient has UTI which was already covered by above antibiotic.     Interval Problem Update    tolerating weight based heparin gtt. No bleeding issues.     Wound of foot-same today. Tolerating IV abx's without issues.      Consultants/Specialty  I.D  Vascular surgery    Code Status  full    Disposition  pending    Review of Systems  Review of Systems   Constitutional: Negative for chills, diaphoresis and fever.   HENT: Negative for ear discharge, ear pain and nosebleeds.    Eyes: Negative for double vision, photophobia and pain.   Respiratory: Negative for cough, hemoptysis and sputum production.    Cardiovascular: Negative for palpitations, orthopnea and claudication.   Gastrointestinal: Negative for heartburn, nausea and vomiting.   Genitourinary: Negative for dysuria, frequency and urgency.   Musculoskeletal: Negative for myalgias and neck pain.   Skin: Negative for itching.   Neurological: Negative for dizziness, tingling and headaches.        Physical Exam  Temp:  [36.5 °C (97.7 °F)-37.5 °C (99.5 °F)] 36.5 °C (97.7 °F)  Pulse:  [69-85] 69  Resp:  [16-17] 16  BP: (105-128)/(51-63) 105/63  SpO2:  [95 %-97 %] 96 %    Physical Exam   Constitutional: She is oriented to person, place, and time. No distress.   HENT:   Head: Normocephalic and atraumatic.   Eyes: Pupils are equal, round, and reactive to light.  Conjunctivae are normal.   Neck: Normal range of motion. Neck supple.   Cardiovascular: Normal rate and regular rhythm.    Pulmonary/Chest: Effort normal and breath sounds normal.   Abdominal: Soft. Bowel sounds are normal.   Musculoskeletal: She exhibits no tenderness.   Neurological: She is alert and oriented to person, place, and time.   Skin: Skin is warm. She is not diaphoretic.   Wound on the right heel is noted       Fluids  No intake or output data in the 24 hours ending 05/29/19 1112    Laboratory  Recent Labs      05/27/19   0407  05/28/19   0013  05/29/19   0341   WBC  7.4  8.0  9.8   RBC  3.40*  3.32*  3.50*   HEMOGLOBIN  10.3*  10.1*  10.7*   HEMATOCRIT  34.2*  33.7*  34.9*   MCV  100.6*  101.5*  99.7*   MCH  30.3  30.4  30.6   MCHC  30.1*  30.0*  30.7*   RDW  48.6  48.9  47.8   PLATELETCT  213  195  221   MPV  9.2  9.4  9.7     Recent Labs      05/27/19   0407  05/28/19   0013  05/29/19   0341   SODIUM  145  141  140   POTASSIUM  3.5*  3.5*  3.6   CHLORIDE  110  109  109   CO2  31  28  28   GLUCOSE  132*  188*  71   BUN  11  13  10   CREATININE  0.98  1.13  0.93   CALCIUM  8.3*  8.1*  8.3*     Recent Labs      05/27/19   1654   05/28/19   1623  05/28/19   2219  05/29/19   0747   APTT  26.7   < >  70.5*  54.2*  52.5*   INR  1.10   --    --    --    --     < > = values in this interval not displayed.               Imaging       Assessment/Plan  * Diabetic foot ulcer with osteomyelitis (HCC)- (present on admission)   Assessment & Plan    On daptomycin  On zosyn  I.D input is noted  vascuar surgery input is noted     Coronary artery disease involving native coronary artery of native heart without angina pectoris- (present on admission)   Assessment & Plan    On asa  On paravastatin       Type 2 diabetes mellitus with skin complication, with long-term current use of insulin (HCC)- (present on admission)   Assessment & Plan    lantus  S.S.I  hema1c 8.7  ACCU checks are noted       Essential hypertension-  (present on admission)   Assessment & Plan    well controled  Continue Lasix and carvedilol     Deep vein thrombosis (DVT) of lower extremity (HCC)- (present on admission)   Assessment & Plan    -weight based heparin gtt, goal PTT 60-90, check PTT Q6 hours, adjust PRN     Cognitive decline- (present on admission)   Assessment & Plan    See previous note. There is some concern that pt and her  are not able to take care of each other. Her  seems to remember details, however, still there is lack of following instruction. Medical recommendation. Might need more assistant as home health. Pt definitely has cognitive deficits. Her  is the one who manage her medications      Peripheral vascular disease (HCC)- (present on admission)   Assessment & Plan    Per chart review, bilateral weak pulse.   SELWYN shows severe PVD,   She is on asa and heparin drip  Vascular surgery input is noted     Rash- (present on admission)   Assessment & Plan    Less itchy  continue  triamcinolone  0.1% twice daily/nystatin powder      Normocytic anemia- (present on admission)   Assessment & Plan    It is mixed iron def anemia and b12 def. No source of bleeding at this time. No acute bleeds  She will need GI as OP follow up   I will monitor      UTI (urinary tract infection)- (present on admission)   Assessment & Plan    UA Cx + for klebsiella   ON DAPTOMYCIN  ON  zosyn         Chronic anticoagulation- (present on admission)   Assessment & Plan    -none at home  Now on asa and weight based heparin gtt       Paroxysmal atrial fibrillation (HCC)- (present on admission)   Assessment & Plan    Rate is controlled  On heparin drip  Coreg  Cardiac echo on 1/2/2019 showed:No prior study is available for comparison.   Mildly reduced left ventricular systolic function.  Left ventricular ejection fraction is visually estimated to be 45%.  Indeterminate diastolic function.  Normal left atrial size.  Mild mitral regurgitation.  Structurally  normal aortic valve without significant stenosis or   regurgitation.  Structurally normal tricuspid valve without significant stenosis or   regurgitation.          VTE prophylaxis: heparin

## 2019-05-29 NOTE — PROGRESS NOTES
Missed scheduling 0419 aPTT draw. Patient's 2219 aPTT was 54.2. Lab was below therapeutic level by 0.8. Hospitalist at bedside rounding updated about what happened. Pharmacy called. Decision by pharmacy and doctor was to wait for next aPTT to do a rate change.

## 2019-05-29 NOTE — PROGRESS NOTES
Infectious Disease Progress Note    Author: MAXIME Mcintosh Date & Time of service: 2019  11:15 AM    Chief Complaint:  Diabetic foot ulcer    Interval History:  73 y.o. Female with hx of poorly controlled DM, COPD on 3 L NC at home.  Admitted on 19 for bilateral heel ulcerations with concerns for OM.   2019 T-max is 98.1.  Vascular Dopplers are being performed.  Denies any fevers or chills.  WBC 7.4.  Creatinine is 0.98  19- AF, WBC 8.0, no issue with abx, denies any pain currently to BLE but had 9/10 sharp BLE pain last night that improved with pain meds.  - Tm 99.5, WBC 9.8, tolerating antibiotics without issue, denies any pain to BLE, abdomen distended and noting that she needs to have a bowel movement.    Labs Reviewed, Medications Reviewed, Radiology Reviewed and Wound Reviewed.    Review of Systems:  Review of Systems   Constitutional: Negative for chills, diaphoresis, fever and malaise/fatigue.   HENT: Negative for sore throat.    Respiratory: Negative for cough, sputum production and shortness of breath.    Cardiovascular: Positive for leg swelling. Negative for chest pain.   Gastrointestinal: Positive for constipation. Negative for abdominal pain, diarrhea, nausea and vomiting.   Genitourinary: Negative for dysuria and hematuria.   Musculoskeletal: Negative for joint pain and myalgias.   Skin: Negative for itching.   Neurological: Positive for sensory change. Negative for headaches.   Psychiatric/Behavioral: The patient is not nervous/anxious.        Hemodynamics:  Temp (24hrs), Av.8 °C (98.3 °F), Min:36.5 °C (97.7 °F), Max:37.5 °C (99.5 °F)  Temperature: 36.5 °C (97.7 °F)  Pulse  Av.7  Min: 68  Max: 107   Blood Pressure : 105/63       Physical Exam:  Physical Exam   Constitutional: She is oriented to person, place, and time. She appears well-developed and well-nourished. No distress.   Obese   HENT:   Head: Normocephalic and atraumatic.   Eyes: Pupils are  equal, round, and reactive to light. EOM are normal. No scleral icterus.   Neck: Normal range of motion. Neck supple. No JVD present.   Cardiovascular: Normal rate, regular rhythm and normal heart sounds.  Exam reveals no gallop and no friction rub.    +1 RLE distal pulse  Faint LLE distal pulse   Pulmonary/Chest: Effort normal. No respiratory distress. She has no wheezes. She has no rales.   2 L NC    Diminished breath sounds to BLL.   Abdominal: Soft. Bowel sounds are normal. She exhibits distension. There is tenderness. There is no rebound and no guarding.   Musculoskeletal: She exhibits edema (Trace to +1 BLE). She exhibits no tenderness.   Bilateral heels-wound beds red/pink with minimal ischemic changes, trace serous drainage, no odor, trace erythema surrounding tissue, no maceration, nontender to palpation.   Neurological: She is alert and oriented to person, place, and time. No cranial nerve deficit.   Decreased sensation to BLE, R>L.   Skin: Skin is warm and dry. No rash noted. There is erythema. No pallor.   Psychiatric: She has a normal mood and affect. Her behavior is normal.   Nursing note and vitals reviewed.    Meds:    Current Facility-Administered Medications:   •  DAPTOmycin  •  ferrous sulfate-c-folic acid  •  cyanocobalamin  •  [COMPLETED] piperacillin-tazobactam **AND** piperacillin-tazobactam  •  [COMPLETED] heparin **AND** heparin **AND** heparin **AND** Protocol 440 Heparin Weight Based DO NOT GIVE ANY HEPARIN BOLUS TO STROKE PATIENT **AND** Protocol 440 Heparin Weight Based Discontinue Enoxaparin (Lovenox), Dabigatran (Pradaxa), Rivaroxaban (Xarelto), Apixaban (Eliquis), Edoxaban (Savaysa, Lixiana), Fondaparinux (Arixtra) and Argatroban prior to heparin administration **AND** Protocol 440 Heparin Weight Based Draw baseline aPTT, PT, and platelet count if not already done **AND** Protocol 440 Heparin Weight Based Draw aPTT 6 hours after beginning infusion.  **AND** Protocol 440 Heparin Weight  Based Draw Platelet count every three days. Contact MD if platelet is 50% lower than baseline count. **AND** Protocol 440 Heparin Weight Based Record Patient Data **AND** Protocol 440 Heparin Weight Based INSTRUCTIONS **AND** Protocol 440 Heparin Weight Based Review aPTT results 6 hours after infusion is begun as detailed **AND** Protocol 440 Heparin Weight Based Adjust heparin to maintain aPTT between 55-96 sec **AND** Protocol 440 Heparin Weight Based Order aPTT 6 hours after any rate change or hold until aPTT is therapeutic (55-96 seconds) **AND** Protocol 440 Heparin Weight Based Documentation and verification  •  triamcinolone acetonide  •  aspirin EC  •  nystatin  •  Respiratory Care per Protocol  •  ipratropium-albuterol  •  acetaminophen  •  carvedilol  •  furosemide  •  gabapentin  •  insulin glargine  •  omeprazole  •  ondansetron  •  potassium chloride SA  •  pravastatin  •  senna-docusate **AND** polyethylene glycol/lytes **AND** magnesium hydroxide **AND** bisacodyl  •  NS  •  ondansetron  •  Notify provider if pain remains uncontrolled **AND** Use the numeric rating scale (NRS-11) on regular floors and Critical-Care Pain Observation Tool (CPOT) on ICUs/Trauma to assess pain **AND** Pulse Ox (Oximetry) **AND** Pharmacy Consult Request **AND** If patient difficult to arouse and/or has respiratory depression, stop any opiates that are currently infusing and call a Rapid Response. **AND** oxyCODONE immediate-release **AND** oxyCODONE immediate-release **AND** HYDROmorphone  •  lactobacillus rhamnosus  •  insulin regular **AND** Accu-Chek ACHS **AND** NOTIFY MD and PharmD **AND** glucose **AND** dextrose 10% bolus    Labs:  Recent Labs      05/27/19   0407  05/28/19   0013  05/29/19   0341   WBC  7.4  8.0  9.8   RBC  3.40*  3.32*  3.50*   HEMOGLOBIN  10.3*  10.1*  10.7*   HEMATOCRIT  34.2*  33.7*  34.9*   MCV  100.6*  101.5*  99.7*   MCH  30.3  30.4  30.6   RDW  48.6  48.9  47.8   PLATELETCT  213  949 541    MPV  9.2  9.4  9.7   NEUTSPOLYS  71.00  68.20  77.20*   LYMPHOCYTES  13.90*  16.00*  9.50*   MONOCYTES  10.40  10.10  9.30   EOSINOPHILS  3.50  4.30  3.20   BASOPHILS  0.80  0.90  0.40     Recent Labs      19   0407  19   0013  19   0341   SODIUM  145  141  140   POTASSIUM  3.5*  3.5*  3.6   CHLORIDE  110  109  109   CO2  31  28  28   GLUCOSE  132*  188*  71   BUN  11  13  10   CPKTOTAL   --   14   --      Recent Labs      19   0407  19   0013  19   0341   ALBUMIN  2.4*  2.4*  2.4*   TBILIRUBIN  0.3  0.2  0.2   ALKPHOSPHAT  71  73  73   TOTPROTEIN  4.9*  5.1*  5.1*   ALTSGPT  <5  5  7   ASTSGOT  7*  8*  13   CREATININE  0.98  1.13  0.93       Imagin2019 3:46 PM  CTA ABDOMEN PELVIS W & W/O POST PROCESS   Impression     1.  Moderate atherosclerotic calcification abdominal aorta without aneurysm or dissection.  2.  Moderate atherosclerotic calcification of the LEFT carotid system without segmental occlusion.  3.  Small bilateral pleural effusions with associated atelectasis.  4.  Increased colonic stool suggesting constipation.  5.  Colonic diverticula.       Micro:  Results     Procedure Component Value Units Date/Time    CULTURE WOUND W/ GRAM STAIN [[id number]]  (Abnormal)  (Susceptibility) Collected:  19 1143    Order Status:  Completed Specimen:  Wound from Right Foot Updated:  19 0850     Significant Indicator POS (POS)     Source WND     Site RIGHT FOOT     Culture Result Light growth mixed skin ab. (A)     Gram Stain Result No organisms seen.     Culture Result Methicillin Resistant Staphylococcus aureus  Light growth  This isolate is presumed to be clindamycin resistant based on  detection of inducible resistance.  Clindamycin may still  be effective in some patients.   (A)    Narrative:       CALL  Gerardo  131 tel. [phone],  CALLED  131 tel. [phone] 2019, 08:50, RB PERF. RESULTS CALLED  TO:Tucker Staley07 Sammy  Collected By:[id number] FLAQUITO  ARAMIS KRISTIE  Heel  Collected By:[id number] FLAQUITO FLOWERS    Culture & Susceptibility     METHICILLIN RESISTANT STAPHYLOCOCCUS AUREUS     Antibiotic Sensitivity Microscan Unit Status    Ampicillin/sulbactam Resistant <=8/4 mcg/mL Final    Method: BERNARDINO    Clindamycin Resistant <=0.5 mcg/mL Final    Method: BERNARDINO    Daptomycin Sensitive <=0.5 mcg/mL Final    Method: BERNARDINO    Erythromycin Resistant >4 mcg/mL Final    Method: BERNARDINO    Moxifloxacin Intermediate 4 mcg/mL Final    Method: BERNARDINO    Oxacillin Resistant >2 mcg/mL Final    Method: BERNARDINO    Penicillin Resistant >8 mcg/mL Final    Method: BERNARDINO    Tetracycline Resistant >8 mcg/mL Final    Method: BERNARDINO    Trimeth/Sulfa Sensitive <=0.5/9.5 mcg/mL Final    Method: BERNARDINO    Vancomycin Sensitive 1 mcg/mL Final    Method: BERNARDINO                       URINE CULTURE(NEW) [[id number]]  (Abnormal)  (Susceptibility) Collected:  05/25/19 1800    Order Status:  Completed Specimen:  Urine Updated:  05/27/19 1151     Significant Indicator POS (POS)     Source UR     Site -     Culture Result - (A)      Klebsiella pneumoniae  ,000 cfu/mL   (A)    Narrative:       Indication for culture:->Emergency Room Patient  Indication for culture:->Emergency Room Patient    Culture & Susceptibility     KLEBSIELLA PNEUMONIAE     Antibiotic Sensitivity Microscan Unit Status    Ampicillin Resistant >16 mcg/mL Final    Method: BERNARDINO    Ampicillin/sulbactam Resistant >16/8 mcg/mL Final    Method: BERNARDINO    Cefepime Sensitive <=8 mcg/mL Final    Method: BERNARDINO    Cefotaxime Sensitive <=2 mcg/mL Final    Method: BERNARDINO    Cefotetan Sensitive <=16 mcg/mL Final    Method: BERNARDINO    Ceftazidime Sensitive <=1 mcg/mL Final    Method: BERNARDINO    Ceftriaxone Sensitive <=8 mcg/mL Final    Method: BERNARDINO    Cefuroxime Sensitive <=4 mcg/mL Final    Method: BERNARDINO    Cephalothin Resistant >16 mcg/mL Final    Method: BERNARDINO    Ciprofloxacin Sensitive <=1 mcg/mL Final    Method: BERNARDINO    Gentamicin Sensitive <=4 mcg/mL Final    Method: BERNARDINO     "Levofloxacin Sensitive <=2 mcg/mL Final    Method: BERNARDINO    Nitrofurantoin Sensitive <=32 mcg/mL Final    Method: BERNARDINO    Pip/Tazobactam Sensitive <=16 mcg/mL Final    Method: BERNARDINO    Piperacillin Resistant >64 mcg/mL Final    Method: BERNARDINO    Tigecycline Sensitive <=2 mcg/mL Final    Method: BERNARDINO    Tobramycin Sensitive <=4 mcg/mL Final    Method: BERNARDINO    Trimeth/Sulfa Resistant >2/38 mcg/mL Final    Method: BERNARDINO                       GRAM STAIN [[id number]] Collected:  05/26/19 1143    Order Status:  Completed Specimen:  Wound Updated:  05/26/19 1816     Significant Indicator .     Source WND     Site RIGHT FOOT     Gram Stain Result No organisms seen.    Narrative:       Collected By:[id number] FLAQUITO FLOWERS  Heel  Collected By:[id number] FLAQUITO FLOWERS    BLOOD CULTURE [[id number]] Collected:  05/25/19 1525    Order Status:  Completed Specimen:  Blood from Peripheral Updated:  05/26/19 0831     Significant Indicator NEG     Source BLD     Site PERIPHERAL     Culture Result No Growth  Note: Blood cultures are incubated for 5 days and  are monitored continuously.Positive blood cultures  are called to the RN and reported as soon as  they are identified.      Narrative:       Per Hospital Policy: Only change Specimen Src: to \"Line\" if  specified by physician order.  No site indicated    BLOOD CULTURE [[id number]] Collected:  05/25/19 1520    Order Status:  Completed Specimen:  Blood from Peripheral Updated:  05/26/19 0831     Significant Indicator NEG     Source BLD     Site PERIPHERAL     Culture Result No Growth  Note: Blood cultures are incubated for 5 days and  are monitored continuously.Positive blood cultures  are called to the RN and reported as soon as  they are identified.      Narrative:       Per Hospital Policy: Only change Specimen Src: to \"Line\" if  specified by physician order.  No site indicated    URINALYSIS [[id number]]  (Abnormal) Collected:  05/25/19 1800    Order Status:  Completed Specimen:  Urine " Updated:  05/25/19 1815     Color Yellow     Character Cloudy (A)     Specific Gravity 1.021     Ph 5.0     Glucose 500 (A) mg/dL      Ketones Negative mg/dL      Protein Negative mg/dL      Bilirubin Negative     Urobilinogen, Urine 0.2     Nitrite Positive (A)     Leukocyte Esterase Moderate (A)     Occult Blood Small (A)     Micro Urine Req Microscopic    Narrative:       Indication for culture:->Emergency Room Patient    CULTURE WOUND W/ GRAM STAIN [[id number]]     Order Status:  Canceled Specimen:  Wound from Right Foot     Blood Culture [[id number]] Collected:  05/25/19 0000    Order Status:  Canceled Specimen:  Other from Peripheral     Blood Culture [[id number]] Collected:  05/25/19 0000    Order Status:  Canceled Specimen:  Other from Peripheral           Assessment:  Active Hospital Problems    Diagnosis   • *Diabetic foot ulcer with osteomyelitis (HCC) [E11.621, E11.69, L97.509, M86.9]   • Type 2 diabetes mellitus with skin complication, with long-term current use of insulin (HCC) [E11.628, Z79.4]   • Peripheral vascular disease (HCC) [I73.9]   • UTI (urinary tract infection) [N39.0]       Plan:  Bilateral DM heel ulcers  Afebrile  No leukocytosis  Bcx on 5/25 remains NGTD  Was d/c'd home in January 2019 on IV Dapto for R foot OM; however, pt stated she did not finish IV abx and does not recall if she was given any other abx.  Never followed up in ID clinic.  Previous R foot cx +MRSE (bone), MSSE (tissue) & E faecalis (tissue) December 2018  Wound cx from R heel on 5/26 +MRSA  Continue IV Daptomycin 8 mg/kg daily   CPK weekly while on daptomycin  CPK 14 on 5/28  Duration of treatment will be based upon surgical plans  Continue with wound care and offloading    PVD  Seen by vascular surgery, being evaluated for BLE angiogram  US of BLE showing acute on chronic LLE DVT and 50% stenosis of Right mid SFA    UTI  Ucx on 5/25 +Klebsiella pneumoniae  Continue IV Zosyn 3.375g Q8h     Diabetes mellitus  Hemoglobin  A1c was 8.7% on 5/25/19  Keep BS <150 to promote wound healing and control infection  BS 39 this am, recheck at 1037 was 111      Discussed case with bedside RN and Polly.  Waiting on vascular surgery to determine surgical course.  Continue antibiotics above.  Duration will be based off surgical response.

## 2019-05-29 NOTE — CARE PLAN
Problem: Communication  Goal: The ability to communicate needs accurately and effectively will improve  Outcome: MET Date Met: 05/29/19  Pt uses call light as necessary to contact medical staff    Problem: Safety  Goal: Will remain free from falls  Outcome: MET Date Met: 05/29/19

## 2019-05-29 NOTE — PROGRESS NOTES
Diabetes education: Met with pt and  this afternoon. Please see consult note.  Plan: Pt would benefit in having a hs snack. Pt may also benefit in having sliding scale coverage ac only. Please call [phone] if needs change,

## 2019-05-30 PROBLEM — I42.9 CARDIOMYOPATHY (HCC): Status: ACTIVE | Noted: 2019-01-01

## 2019-05-30 NOTE — RESPIRATORY CARE
Respiratory Rapid Response Note    Symptoms Desaturation  #SVN Performed: Yes (05/29/19 2105)  Breath Sounds  Pre/Post Intervention: Pre Intervention Assessment (05/29/19 0845)  RUL Breath Sounds: Clear (05/29/19 0845)  RML Breath Sounds: Clear (05/29/19 0845)  RLL Breath Sounds: Diminished (05/29/19 0845)  MALCOLM Breath Sounds: Clear (05/29/19 0845)  LLL Breath Sounds: Diminished (05/29/19 0845)              ABG Results Yes       O2 (LPM): 2 (05/29/19 1657)  O2 Daily Delivery Respiratory : Silicone Nasal Cannula (05/26/19 1645)    Events/Summary/Plan: RRT. SVN given per MD. (05/29/19 2105)  Transferred to ICU No

## 2019-05-30 NOTE — PROGRESS NOTES
Progress Note    CC: f/u for bilateral lower extremity critical limb ischemia    Interval History: 73 y.o. female who presented 5/25/2019 with bilateral lower extremity CLI w/ heel ulcers, present for several months.    Workup also found a L CFV DVT, currently on hep gtt    Currently denies rest pain.    Review of Systems  Negative for chest pain or SOB  Negative for stroke/TIA    Medications  Prior to Admission Medications   Prescriptions Last Dose Informant Patient Reported? Taking?   insulin glargine (LANTUS) 100 UNIT/ML Solution 5/24/2019 at PM Patient Yes Yes   Sig: Inject 20 Units as instructed every evening.      Facility-Administered Medications: None         Physical Exam  Vitals:    05/29/19 1657   BP: 115/69   Pulse: 84   Resp: 17   Temp: 36.2 °C (97.2 °F)   SpO2: 96%       Pulse/Extremity Exam:    Femorals:        Right: palpable       Left palpable    Pedal Pulses:       Right DP monophasic       Right PT absent       Left DP monophasic       Left PT absent    Wounds: R heel w/ partial-thickness 3cm x 2cm ulceration. L heel w/ dry eschar      General appearance: NAD, conversing appropriately  Psych: Normal affect, mood, judgement  Neuro: CN II-XII grossly intact.   Neck: full range of motion  Lungs: No inspiratory stridor or wheezing  CV: RRR  Abdomen: Soft, NT/ND  Skin: No rashes  Psych: Alert, oriented to person, place, time      Labs reviewed today:  Recent Labs      05/27/19   0407  05/28/19   0013  05/29/19   0341   WBC  7.4  8.0  9.8   RBC  3.40*  3.32*  3.50*   HEMOGLOBIN  10.3*  10.1*  10.7*   HEMATOCRIT  34.2*  33.7*  34.9*   MCV  100.6*  101.5*  99.7*   MCH  30.3  30.4  30.6   MCHC  30.1*  30.0*  30.7*   RDW  48.6  48.9  47.8   PLATELETCT  213  195  221   MPV  9.2  9.4  9.7     Recent Labs      05/27/19   0407  05/28/19   0013  05/29/19   0341   SODIUM  145  141  140   POTASSIUM  3.5*  3.5*  3.6   CHLORIDE  110  109  109   CO2  31  28  28   GLUCOSE  132*  188*  71   BUN  11  13  10    CREATININE  0.98  1.13  0.93   CALCIUM  8.3*  8.1*  8.3*     Recent Labs      05/27/19   0407  05/28/19   0013  05/29/19   0341   ALTSGPT  <5  5  7   ASTSGOT  7*  8*  13   ALKPHOSPHAT  71  73  73   TBILIRUBIN  0.3  0.2  0.2   GLUCOSE  132*  188*  71     Recent Labs      05/27/19   1654   05/28/19   2219  05/29/19   0747  05/29/19   1450   APTT  26.7   < >  54.2*  52.5*  75.8*   INR  1.10   --    --    --    --     < > = values in this interval not displayed.             No results for input(s): TROPONINI in the last 72 hours.    Urinalysis:    No results found     Imaging & Data Review:  I personally reviewed all non-invasive vascular testing including images, x-rays, tracings, arterial waveforms, and duplex exams relevant to this admission. My interpretation is below:    5/26/2019 ABIs: Non-diagnostic d/t medial calcinosis.  TBIs:   R 0.41   L 0.72     5/26/2019 Arterial Duplex: R CFA w/ delayed biphasic waveforms but no flow-limiting lesions. Profunda is monophasic. SFA/pop are diffusely diseased without any areas of focal high-grade stenosis. Waveforms are biphasic. PT is diffusely calcified. Peroneal is likely occluded. AT is diffusely calcified but patent.     L CFA is patent w/ triphasic waveforms and no significant flow-limiting lesion. Profunda is patent with triphasic waveforms. The SFA/pop are diffusely diseased but without any high-grade flow-limiting lesion. Waveforms are biphasic throughout. PT and peroneal are likely occluded along their entire course. The AT is patent but highly calcified.     Also identified on this study is an acute DVT within the L CFV, extending into the profunda vein and likely to the mid-SFV. There is no respiratory variation or augmentation within the CFV or SFV in the thigh.    5/28/2019 CTA: Large bilateral pleural effusions.   Bilateral TUCKER stenosis, high-grade on the left. Bilateral IIAs patent but diseased. No flow-limiting lesion in either EIA.     RLE: CFA and profunda  widely patent. Visualized portions of the SFA in the thigh show mild/moderate calcific disease. Scan cuts off in mid-thigh.    LLE: CFA and profunda calcified but patent. Visualized portions of the SFA are moderately diseased but scan cuts off mid-thigh.    Assessment/Plan & Medical Decision-Making:    The patient presents with bilateral lower extremity critical limb ischemia with bilateral heel ulcers. She has multiple chronic health issues including CAD, DM and HTN and her vascular disease has progressed such that there is limb-threatening ischemia. Given her multiple comorbidities, she poses a high-risk for intervention.    Plan for RLE angio tomorrow, 1630.    Pt will need the LLE addressed as well, but will plan on doing that in a different setting. She will ultimately need kissing iliac stents (L TUCKER w/ high-grade disease), but will not do that at this time as it would preclude LLE angio from a groin approach in the future.     Please keep NPO p MN  Hold hep gtt at 1330 tomorrow.    Thank you for involving [facility] in this patient's care. Please call with questions.    Gilbert Mcdowell MD  Vascular Surgeon  [facility]  Office: [phone]

## 2019-05-30 NOTE — CODE DOCUMENTATION
Respiratory distress, chest pain. Was on 2L, required increasing to 5L. MD at beside. Placing transfer orders to tele.

## 2019-05-30 NOTE — PROGRESS NOTES
Pt complains of 10/10 pain entire posterior head, neuro check done and only finding was a change in personality. Pt was angry and snapping at staff and . Baseline is very sweet.    Taken to CT of head without contrast, no acute findings shown on final report. Waiting for Dr. Velasco to call back

## 2019-05-30 NOTE — ASSESSMENT & PLAN NOTE
Chest x-ray consistent with acute cardiogenic pulmonary edema  Echo showed EF of 35%  Resp and O2 per protocol  On IV Lasix  Ordered repeat CXR to follow edema.  CXR showed unchanged from previous exam, stable.

## 2019-05-30 NOTE — PROGRESS NOTES
Hospital Medicine Daily Progress Note/Rapid response note    Date of Service  5/29/2019    Chief Complaint  73 y.o. female admitted 5/25/2019 with diabetic foot ulcers    Hospital Course    73-year-old male past medical history of coronary artery disease questionable stent placement-/2018 or 1998 ??, paroxysmal atrial fibrillation, type 2 diabetes (last a1c 7.3 03/2019) mild to moderate cognitive decline, hypertension, hyperlipidemia sent by podiatrist for bilateral wound on her feet.  X-ray showed right osteomyelitis.  She was started on vanco/unasyn. Pt was not septic on admission.  LPS was consulted. On admission was also found patient has UTI which was already covered by above antibiotic.         Interval Problem Update  Rapid response was called this evening due to patient having chest pain, shortness of breath, tachycardia and requiring more oxygen.  I went and saw the patient at the bedside.  She is currently on 6 L of oxygen increased from 2 L of oxygen earlier today.  She has recently diagnosed acute DVT.  Also she is having tachypnea and noticed to have bilateral expiratory wheezing on exam.  The patient received IV contrast yesterday and she is planning to get angiogram for her peripheral vascular disease by vascular surgeon tomorrow.  There is a possibility that she is having pulmonary embolism.  Currently she is on heparin infusion so I will hold off on CT PE study considering she will be getting contrast study again tomorrow to prevent her from getting ATN.  I ordered chest x-ray stent, troponin, EKG, lactic acid, ABG, and breathing treatment.  She will be transferred to intensive care unit.  The patient is critically ill at the moment.    Consultants/Specialty  ID  vascular    Code Status  full    Disposition  To ICU    Review of Systems  Review of Systems   Constitutional: Positive for malaise/fatigue. Negative for chills, fever and weight loss.   HENT: Negative for congestion and nosebleeds.     Eyes: Negative for blurred vision, pain, discharge and redness.   Respiratory: Positive for shortness of breath. Negative for cough, sputum production and stridor.    Cardiovascular: Positive for chest pain. Negative for palpitations and orthopnea.   Gastrointestinal: Negative for abdominal pain, diarrhea, heartburn, nausea and vomiting.   Genitourinary: Negative for dysuria, frequency and urgency.   Musculoskeletal: Negative for back pain, myalgias and neck pain.   Skin: Negative for itching and rash.   Neurological: Negative for dizziness, focal weakness, seizures and headaches.   Psychiatric/Behavioral: Negative for depression. The patient is not nervous/anxious and does not have insomnia.         Physical Exam  Temp:  [36.2 °C (97.2 °F)-37.5 °C (99.5 °F)] 36.2 °C (97.2 °F)  Pulse:  [69-84] 84  Resp:  [16-17] 17  BP: (105-128)/(51-69) 115/69  SpO2:  [96 %-97 %] 96 %    Physical Exam   Constitutional: No distress.   lethargic   HENT:   Head: Normocephalic and atraumatic.   Mouth/Throat: Oropharynx is clear and moist.   Eyes: Pupils are equal, round, and reactive to light. Conjunctivae and EOM are normal.   Neck: Normal range of motion. Neck supple. No tracheal deviation present. No thyromegaly present.   Cardiovascular: Normal rate and regular rhythm.    No murmur heard.  Pulmonary/Chest: She is in respiratory distress. She has wheezes.   Abdominal: Soft. Bowel sounds are normal. She exhibits no distension. There is no tenderness.   Musculoskeletal: She exhibits no edema or tenderness.   Neurological: She is alert. No cranial nerve deficit.   Skin: Skin is warm and dry. She is not diaphoretic. No erythema.   Nursing note and vitals reviewed.      Fluids  No intake or output data in the 24 hours ending 05/29/19 2051    Laboratory  Recent Labs      05/27/19   0407  05/28/19   0013  05/29/19   0341   WBC  7.4  8.0  9.8   RBC  3.40*  3.32*  3.50*   HEMOGLOBIN  10.3*  10.1*  10.7*   HEMATOCRIT  34.2*  33.7*  34.9*    MCV  100.6*  101.5*  99.7*   MCH  30.3  30.4  30.6   MCHC  30.1*  30.0*  30.7*   RDW  48.6  48.9  47.8   PLATELETCT  213  195  221   MPV  9.2  9.4  9.7     Recent Labs      05/27/19   0407  05/28/19   0013  05/29/19   0341   SODIUM  145  141  140   POTASSIUM  3.5*  3.5*  3.6   CHLORIDE  110  109  109   CO2  31  28  28   GLUCOSE  132*  188*  71   BUN  11  13  10   CREATININE  0.98  1.13  0.93   CALCIUM  8.3*  8.1*  8.3*     Recent Labs      05/27/19   1654   05/28/19   2219  05/29/19   0747  05/29/19   1450   APTT  26.7   < >  54.2*  52.5*  75.8*   INR  1.10   --    --    --    --     < > = values in this interval not displayed.               Imaging  US-SELWYN SINGLE LEVEL BILAT   Final Result      CTA ABDOMEN PELVIS W & W/O POST PROCESS   Final Result      1.  Moderate atherosclerotic calcification abdominal aorta without aneurysm or dissection.   2.  Moderate atherosclerotic calcification of the LEFT carotid system without segmental occlusion.   3.  Small bilateral pleural effusions with associated atelectasis.   4.  Increased colonic stool suggesting constipation.   5.  Colonic diverticula.      US-EXTREMITY VENOUS LOWER BILAT   Final Result      US-EXTREMITY ARTERY LOWER BILAT   Final Result      DX-FOOT-COMPLETE 3+ RIGHT   Final Result      1.  No evidence of fracture or dislocation.      2.  Severe degenerative changes again present raising possibility of neuropathic foot.      3.  There appears to be increase in bone erosions along the medial and inferior tarsal bones which could indicate osteomyelitis.         DX-FOOT-COMPLETE 3+ LEFT   Final Result      1.  No evidence of fracture or dislocation.      2.  Osteoarthritis is noted. No bone erosions are identified.      IR-EXTREMITY ANGIOGRAM-UNILATERAL RIGHT    (Results Pending)   DX-CHEST-LIMITED (1 VIEW)    (Results Pending)        Assessment/Plan  * Diabetic foot ulcer with osteomyelitis (HCC)- (present on admission)   Assessment & Plan    On  daptomycin/zosyn  I.D following  vascuar surgery on     Chest pain- (present on admission)   Assessment & Plan    Likely related to pulmonary embolism  Check troponin, EKG, chest x-ray  Monitor on telemetry closely     Acute on chronic respiratory failure (HCC)- (present on admission)   Assessment & Plan    Likely related to COPD exacerbation vs PE vs both  Aggressive breathing treatment with duoneb, albuterol, RT protocol  On heparin infusion, I am actively managing heparin infusion, titrating medications, monitoring APTT  Intensivist Dr. Lanza was informed     Essential hypertension- (present on admission)   Assessment & Plan    well controled  Continue Lasix and carvedilol     Coronary artery disease involving native coronary artery of native heart without angina pectoris- (present on admission)   Assessment & Plan    On asa  On paravastatin       Type 2 diabetes mellitus with skin complication, with long-term current use of insulin (HCC)- (present on admission)   Assessment & Plan    lantus  S.S.I  hema1c 8.7         Deep vein thrombosis (DVT) of lower extremity (HCC)- (present on admission)   Assessment & Plan    New onset  On heparin infusion       Cognitive decline- (present on admission)   Assessment & Plan    See previous note. There is some concern that pt and her  are not able to take care of each other. Her  seems to remember details, however, still there is lack of following instruction. Medical recommendation. Might need more assistant as home health. Pt definitely has cognitive deficits. Her  is the one who manage her medications      Peripheral vascular disease (HCC)- (present on admission)   Assessment & Plan    Per chart review, bilateral weak pulse.   SELWYN shows severe PVD,   She is on asa and heparin drip  Vascular surgery input is noted     Rash- (present on admission)   Assessment & Plan    Less itchy  continue  triamcinolone  0.1% twice daily/nystatin powder      Normocytic  anemia- (present on admission)   Assessment & Plan    It is mixed iron def anemia and b12 def. No source of bleeding at this time. No acute bleeds  She will need GI as OP follow up   I will monitor      UTI (urinary tract infection)- (present on admission)   Assessment & Plan    UA Cx + for klebsiella   ON DAPTOMYCIN  ON  zosyn         Chronic anticoagulation- (present on admission)   Assessment & Plan    -none at home  Now on asa and weight based heparin gtt       Paroxysmal atrial fibrillation (HCC)- (present on admission)   Assessment & Plan    Rate is controlled  Coreg  Cardiac echo on 1/2/2019 showed:No prior study is available for comparison.   Mildly reduced left ventricular systolic function.  Left ventricular ejection fraction is visually estimated to be 45%.  Heparin infusion        Patient is critically ill with acute hypoxic respiratory failure, chest pain, likely pulmonary embolism  The vital organ system that is affected is cardiovascular, pulmonary  If untreated there is a high chance of deterioration into cardiopulmonary failure and eventually death.   The critical care that I am providing today is heparin infusion, aggressive breathing treatment with DuoNeb, albuterol inhaler, ordering additional stat labs including lactic acid, troponin, chest x-ray, EKG, ABG, consulting intensivist and transferring the patient to intensive care unit.  The critical that has been undertaken is medically complex.   There has been no overlap in critical care time.   Critical Care Time not including procedures: 40 minutes  VTE prophylaxis: heparin infusion

## 2019-05-30 NOTE — PROGRESS NOTES
2 RN Skin Check complete with Justina TRAN RN.   Devices in place oxymask, BP cuff, O2 probe.  Skin assessed under devices is intact.  Confirmed pressure ulcers found on n/a. Bilateral heels have diabetic foot ulcers and is already being seen by wound team for it.  Coccyx is red but blanchable and small tear seen. Pictures uploaded to Epic.  The following interventions put in place draw sheet/pillows for positioning, heel floating boots, waffle cushion, mepilex, Q2hr turns, and ROM.

## 2019-05-30 NOTE — CARE PLAN
Problem: Infection  Goal: Will remain free from infection  Outcome: PROGRESSING SLOWER THAN EXPECTED  Pt has MRSA infection and receiving treatment    Problem: Skin Integrity  Goal: Risk for impaired skin integrity will decrease  Outcome: PROGRESSING SLOWER THAN EXPECTED  Pt has ulcers on bilateral heels and is being seen by wound. Full body skin assessment has been charted in a 2RN skin check progress note

## 2019-05-30 NOTE — PROGRESS NOTES
Lab called about next aPTT draw, will draw again at 2050 tonight per heparin protocol. If next aPTT draw is within range, than next redraw will be 24 hours.   Last aPTT draw was 75.8

## 2019-05-30 NOTE — PROGRESS NOTES
Infectious Disease Progress Note    Author: MAXIME Mcintosh Date & Time of service: 2019  11:20 AM    Chief Complaint:  Diabetic foot ulcer    Interval History:  73 y.o. Female with hx of poorly controlled DM, COPD on 3 L NC at home.  Admitted on 19 for bilateral heel ulcerations with concerns for OM.   2019 T-max is 98.1.  Vascular Dopplers are being performed.  Denies any fevers or chills.  WBC 7.4.  Creatinine is 0.98  19- AF, WBC 8.0, no issue with abx, denies any pain currently to BLE but had 9/10 sharp BLE pain last night that improved with pain meds.  - Tm 99.5, WBC 9.8, tolerating antibiotics without issue, denies any pain to BLE, abdomen distended and noting that she needs to have a bowel movement.  - AF, WBC 11.6, no issue with antibiotics, denies pain, denies shortness of breath or chest palpitations, resting comfortably in bed.    Labs Reviewed, Medications Reviewed and Radiology Reviewed.    Review of Systems:  Review of Systems   Constitutional: Positive for malaise/fatigue. Negative for chills and fever.   Respiratory: Negative for cough, sputum production and shortness of breath.    Cardiovascular: Positive for leg swelling. Negative for chest pain.   Gastrointestinal: Negative for abdominal pain, constipation, diarrhea, nausea and vomiting.   Genitourinary: Negative for dysuria, flank pain and hematuria.   Musculoskeletal: Negative for back pain, joint pain and myalgias.   Skin: Negative for rash.   Neurological: Positive for sensory change. Negative for headaches.   Psychiatric/Behavioral: The patient is not nervous/anxious.        Hemodynamics:  Temp (24hrs), Av.9 °C (98.5 °F), Min:36.2 °C (97.2 °F), Max:37.3 °C (99.1 °F)  Temperature: 37.1 °C (98.7 °F)  Pulse  Av.4  Min: 68  Max: 111Heart Rate (Monitored): 87  Blood Pressure : 157/70, NIBP: 132/59       Physical Exam:  Physical Exam   Constitutional: She is oriented to person, place, and time. She  appears well-developed. No distress.   Obese  Mildly fatigued   HENT:   Head: Normocephalic and atraumatic.   Nose: Nose normal.   Eyes: Pupils are equal, round, and reactive to light. Conjunctivae and EOM are normal. No scleral icterus.   Neck: Normal range of motion. Neck supple. No tracheal deviation present.   Cardiovascular: Normal rate and normal heart sounds.  An irregularly irregular rhythm present.   No murmur heard.  Faint BLE distal pulses   Pulmonary/Chest: Effort normal. No respiratory distress. She has no wheezes. She has no rales.   5 L oxygen mask    Diminished breath sounds to BLL.   Abdominal: Soft. Bowel sounds are normal. She exhibits no distension. There is no tenderness. There is no rebound and no guarding.   Musculoskeletal: She exhibits edema (Trace BLE). She exhibits no tenderness.   Bilateral heels-bandaged, no drainage seen seeping through dressings.  No erythema to surrounding tissue, nontender to palpation.   Neurological: She is alert and oriented to person, place, and time. No cranial nerve deficit.   No gross focal deficits.    Decreased sensation to BLE.   Skin: Skin is warm and dry. No rash noted. She is not diaphoretic. No pallor.   Psychiatric: She has a normal mood and affect. Thought content normal.   Nursing note and vitals reviewed.    Meds:    Current Facility-Administered Medications:   •  potassium chloride SA  •  D5W  •  insulin glargine  •  albuterol  •  DAPTOmycin  •  ferrous sulfate-c-folic acid  •  cyanocobalamin  •  [COMPLETED] piperacillin-tazobactam **AND** piperacillin-tazobactam  •  [COMPLETED] heparin **AND** heparin **AND** heparin **AND** Protocol 440 Heparin Weight Based DO NOT GIVE ANY HEPARIN BOLUS TO STROKE PATIENT **AND** Protocol 440 Heparin Weight Based Discontinue Enoxaparin (Lovenox), Dabigatran (Pradaxa), Rivaroxaban (Xarelto), Apixaban (Eliquis), Edoxaban (Savaysa, Lixiana), Fondaparinux (Arixtra) and Argatroban prior to heparin administration  **AND** Protocol 440 Heparin Weight Based Draw baseline aPTT, PT, and platelet count if not already done **AND** Protocol 440 Heparin Weight Based Draw aPTT 6 hours after beginning infusion.  **AND** Protocol 440 Heparin Weight Based Draw Platelet count every three days. Contact MD if platelet is 50% lower than baseline count. **AND** Protocol 440 Heparin Weight Based Record Patient Data **AND** Protocol 440 Heparin Weight Based INSTRUCTIONS **AND** Protocol 440 Heparin Weight Based Review aPTT results 6 hours after infusion is begun as detailed **AND** Protocol 440 Heparin Weight Based Adjust heparin to maintain aPTT between 55-96 sec **AND** Protocol 440 Heparin Weight Based Order aPTT 6 hours after any rate change or hold until aPTT is therapeutic (55-96 seconds) **AND** Protocol 440 Heparin Weight Based Documentation and verification  •  triamcinolone acetonide  •  aspirin EC  •  nystatin  •  Respiratory Care per Protocol  •  ipratropium-albuterol  •  acetaminophen  •  carvedilol  •  furosemide  •  gabapentin  •  omeprazole  •  ondansetron  •  pravastatin  •  senna-docusate **AND** polyethylene glycol/lytes **AND** magnesium hydroxide **AND** bisacodyl  •  NS  •  ondansetron  •  Notify provider if pain remains uncontrolled **AND** Use the numeric rating scale (NRS-11) on regular floors and Critical-Care Pain Observation Tool (CPOT) on ICUs/Trauma to assess pain **AND** Pulse Ox (Oximetry) **AND** Pharmacy Consult Request **AND** If patient difficult to arouse and/or has respiratory depression, stop any opiates that are currently infusing and call a Rapid Response. **AND** oxyCODONE immediate-release **AND** oxyCODONE immediate-release **AND** HYDROmorphone  •  lactobacillus rhamnosus  •  insulin regular **AND** Accu-Chek ACHS **AND** NOTIFY MD and PharmD **AND** glucose **AND** dextrose 10% bolus    Labs:  Recent Labs      05/29/19   0341  05/29/19   2114  05/30/19   0358   WBC  9.8  12.8*  11.6*   RBC  3.50*   3.99*  3.56*   HEMOGLOBIN  10.7*  12.5  10.9*   HEMATOCRIT  34.9*  39.6  35.2*   MCV  99.7*  99.2*  98.9*   MCH  30.6  31.3  30.6   RDW  47.8  47.1  47.1   PLATELETCT  221  265  227   MPV  9.7  9.6  9.4   NEUTSPOLYS  77.20*  84.70*  82.20*   LYMPHOCYTES  9.50*  6.00*  8.20*   MONOCYTES  9.30  6.00  7.10   EOSINOPHILS  3.20  2.60  1.50   BASOPHILS  0.40  0.40  0.50     Recent Labs      19   0013  19   0358   SODIUM  141  140  141  143   POTASSIUM  3.5*  3.6  3.6  3.4*   CHLORIDE  109  109  107  109   CO2  28  28  26  26   GLUCOSE  188*  71  148*  97   BUN  13  10  10  11   CPKTOTAL  14   --    --    --      Recent Labs      198   ALBUMIN  2.4*  3.0*  2.7*   TBILIRUBIN  0.2  0.3  0.3   ALKPHOSPHAT  73  86  74   TOTPROTEIN  5.1*  6.3  5.6*   ALTSGPT  7  9  9   ASTSGOT  13  19  16   CREATININE  0.93  0.89  0.86       Imagin19  CT-HEAD W/O  Impression     1.  Cerebral atrophy.    2.  White matter lucencies most consistent with small vessel ischemic change versus demyelination or gliosis.    3.  Otherwise, Head CT without contrast with no acute findings. No evidence of acute cerebral infarction, hemorrhage or mass lesion.     DX-CHEST-LIMITED   2019 8:56 PM  Impression     1.  Marked pulmonary edema.  2.  Possible underlying bibasilar airspace opacity/alveolar pulmonary edema and/or bilateral pleural effusions.       Micro:  Results     Procedure Component Value Units Date/Time    CULTURE WOUND W/ GRAM STAIN [[id number]]  (Abnormal)  (Susceptibility) Collected:  19 1143    Order Status:  Completed Specimen:  Wound from Right Foot Updated:  19 0850     Significant Indicator POS (POS)     Source WND     Site RIGHT FOOT     Culture Result Light growth mixed skin ab. (A)     Gram Stain Result No organisms seen.     Culture Result Methicillin Resistant Staphylococcus aureus  Light growth  This isolate is  presumed to be clindamycin resistant based on  detection of inducible resistance.  Clindamycin may still  be effective in some patients.   (A)    Narrative:       CALL  Gerardo  131 tel. [phone],  CALLED  131 tel. [phone] 05/28/2019, 08:50, RB PERF. RESULTS CALLED  TO:Tucker Brar Rn  Collected By:[id number] FLAQUITO FLOWERS  Heel  Collected By:[id number] FLAQUITO FLOWERS    Culture & Susceptibility     METHICILLIN RESISTANT STAPHYLOCOCCUS AUREUS     Antibiotic Sensitivity Microscan Unit Status    Ampicillin/sulbactam Resistant <=8/4 mcg/mL Final    Method: BERNARDINO    Clindamycin Resistant <=0.5 mcg/mL Final    Method: BERNARDINO    Daptomycin Sensitive <=0.5 mcg/mL Final    Method: BERNARDINO    Erythromycin Resistant >4 mcg/mL Final    Method: BERNARDINO    Moxifloxacin Intermediate 4 mcg/mL Final    Method: BERNARDINO    Oxacillin Resistant >2 mcg/mL Final    Method: BERNARDINO    Penicillin Resistant >8 mcg/mL Final    Method: BERNARDINO    Tetracycline Resistant >8 mcg/mL Final    Method: BERNARDINO    Trimeth/Sulfa Sensitive <=0.5/9.5 mcg/mL Final    Method: BERNARDINO    Vancomycin Sensitive 1 mcg/mL Final    Method: BERNARDINO                       URINE CULTURE(NEW) [[id number]]  (Abnormal)  (Susceptibility) Collected:  05/25/19 1800    Order Status:  Completed Specimen:  Urine Updated:  05/27/19 1151     Significant Indicator POS (POS)     Source UR     Site -     Culture Result - (A)      Klebsiella pneumoniae  ,000 cfu/mL   (A)    Narrative:       Indication for culture:->Emergency Room Patient  Indication for culture:->Emergency Room Patient    Culture & Susceptibility     KLEBSIELLA PNEUMONIAE     Antibiotic Sensitivity Microscan Unit Status    Ampicillin Resistant >16 mcg/mL Final    Method: BERNARDINO    Ampicillin/sulbactam Resistant >16/8 mcg/mL Final    Method: BERNARDINO    Cefepime Sensitive <=8 mcg/mL Final    Method: BERNARDINO    Cefotaxime Sensitive <=2 mcg/mL Final    Method: BERNARDINO    Cefotetan Sensitive <=16 mcg/mL Final    Method: BERNARDINO    Ceftazidime Sensitive <=1 mcg/mL  "Final    Method: BERNARDINO    Ceftriaxone Sensitive <=8 mcg/mL Final    Method: BERNARDINO    Cefuroxime Sensitive <=4 mcg/mL Final    Method: BERNARDINO    Cephalothin Resistant >16 mcg/mL Final    Method: BERNARDINO    Ciprofloxacin Sensitive <=1 mcg/mL Final    Method: BERNARDINO    Gentamicin Sensitive <=4 mcg/mL Final    Method: BERNARDINO    Levofloxacin Sensitive <=2 mcg/mL Final    Method: BERNARDINO    Nitrofurantoin Sensitive <=32 mcg/mL Final    Method: BERNARDINO    Pip/Tazobactam Sensitive <=16 mcg/mL Final    Method: BERNARDINO    Piperacillin Resistant >64 mcg/mL Final    Method: BERNARDINO    Tigecycline Sensitive <=2 mcg/mL Final    Method: BERNARDINO    Tobramycin Sensitive <=4 mcg/mL Final    Method: BERNARDINO    Trimeth/Sulfa Resistant >2/38 mcg/mL Final    Method: BERNARDINO                       GRAM STAIN [[id number]] Collected:  05/26/19 1143    Order Status:  Completed Specimen:  Wound Updated:  05/26/19 1816     Significant Indicator .     Source WND     Site RIGHT FOOT     Gram Stain Result No organisms seen.    Narrative:       Collected By:[id number] FLAQUITO FLOWERS  Heel  Collected By:[id number] FLAQUITO FLOWERS    BLOOD CULTURE [[id number]] Collected:  05/25/19 1525    Order Status:  Completed Specimen:  Blood from Peripheral Updated:  05/26/19 0831     Significant Indicator NEG     Source BLD     Site PERIPHERAL     Culture Result No Growth  Note: Blood cultures are incubated for 5 days and  are monitored continuously.Positive blood cultures  are called to the RN and reported as soon as  they are identified.      Narrative:       Per Hospital Policy: Only change Specimen Src: to \"Line\" if  specified by physician order.  No site indicated    BLOOD CULTURE [[id number]] Collected:  05/25/19 1520    Order Status:  Completed Specimen:  Blood from Peripheral Updated:  05/26/19 0831     Significant Indicator NEG     Source BLD     Site PERIPHERAL     Culture Result No Growth  Note: Blood cultures are incubated for 5 days and  are monitored continuously.Positive blood cultures  are " "called to the RN and reported as soon as  they are identified.      Narrative:       Per Hospital Policy: Only change Specimen Src: to \"Line\" if  specified by physician order.  No site indicated    URINALYSIS [[id number]]  (Abnormal) Collected:  05/25/19 1800    Order Status:  Completed Specimen:  Urine Updated:  05/25/19 1815     Color Yellow     Character Cloudy (A)     Specific Gravity 1.021     Ph 5.0     Glucose 500 (A) mg/dL      Ketones Negative mg/dL      Protein Negative mg/dL      Bilirubin Negative     Urobilinogen, Urine 0.2     Nitrite Positive (A)     Leukocyte Esterase Moderate (A)     Occult Blood Small (A)     Micro Urine Req Microscopic    Narrative:       Indication for culture:->Emergency Room Patient    CULTURE WOUND W/ GRAM STAIN [[id number]]     Order Status:  Canceled Specimen:  Wound from Right Foot     Blood Culture [[id number]] Collected:  05/25/19 0000    Order Status:  Canceled Specimen:  Other from Peripheral     Blood Culture [[id number]] Collected:  05/25/19 0000    Order Status:  Canceled Specimen:  Other from Peripheral           Assessment:  Active Hospital Problems    Diagnosis   • *Diabetic foot ulcer with osteomyelitis (HCC) [E11.621, E11.69, L97.509, M86.9]   • Type 2 diabetes mellitus with skin complication, with long-term current use of insulin (HCC) [E11.628, Z79.4]   • Peripheral vascular disease (HCC) [I73.9]   • UTI (urinary tract infection) [N39.0]       Plan:  Bilateral DM heel ulcers with Right diabetic foot OM  Afebrile  Mild leukocytosis  Bcx on 5/25 NGTD  Was d/c'd home in January 2019 on IV Dapto for R foot OM; however, pt stated she did not finish IV abx and does not recall if she was given any other abx.  Never followed up in ID clinic. OM not treated.   Previous R foot cx +MRSE (bone), MSSE (tissue) & E faecalis (tissue) December 2018  Wound cx from R heel on 5/26 +MRSA  Continue IV Daptomycin 8 mg/kg daily   CPK weekly while on daptomycin  CPK 14 on 5/28  Plan to " treat with 6 weeks IV antibiotics due to right diabetic foot OM that was untreated  6-week clock will start following RLE angiogram today  Estimated end date 7/12/2019  Continue with wound care and offloading    PVD  US of BLE showing acute on chronic LLE DVT and 50% stenosis of Right mid SFA  Plan for RLE angiogram with Dr. Mcdowell today    UTI  Ucx on 5/25 +Klebsiella pneumoniae  Continue IV Zosyn 3.375g Q8h through today  After today, will have completed 3 full days of treatment    Diabetes mellitus  Hemoglobin A1c was 8.7% on 5/25/19  Keep BS <150 to promote wound healing and control infection  BS 55 this am    Afib  Transferred to [facility] this am  Rate controlled

## 2019-05-30 NOTE — CODE DOCUMENTATION
Patient receiving breathing treatment. States that she feels as if she is improving and chest pain is resolving.

## 2019-05-31 PROBLEM — R07.9 CHEST PAIN: Status: RESOLVED | Noted: 2019-01-01 | Resolved: 2019-01-01

## 2019-05-31 PROBLEM — E87.8 ELECTROLYTE ABNORMALITY: Status: ACTIVE | Noted: 2019-01-01

## 2019-05-31 NOTE — PROGRESS NOTES
2 RN Skin Check complete.   Devices in place oxymask, BP cuff, O2 probe.  Skin assessed under devices is intact.  Confirmed pressure ulcers found on n/a. Bilateral heels have diabetic foot ulcers and is already being seen by wound team for it.  Coccyx is red but blanchable and small tear seen. Pictures uploaded to Epic.  The following interventions put in place draw sheet/pillows for positioning, heel floating boots, waffle cushion, and mepilex.

## 2019-05-31 NOTE — OP REPORT
OPERATIVE REPORT      Patient:  Leonie Brock     Procedure Date:  05/31/19    Indication:  ESRD in need of HD access    Pre-Operative Diagnosis:  ESRD    Post-Operative Diagnosis:  Same    Procedure:  Right IJ permacath placement    Surgeon:  Gilbert Mcdowell M.D., MD    Assistant:  Ananya Nash MD    Anesthesia:  General endotracheal    EBL:  5cc    Specimen:  None    Complications:  None    Findings:  The right IJ was interrogated with US and is thrombosed in the mid-neck but patent just below the clavicle.     A 23cm right IJ permacath was placed under flouroscopic guidance.    Ok to use the catheter for HD immediately.    Procedure:  Informed consent was obtained from the patient in the pre-operative holding area. All risks, benefits, and alternatives were explained and she elected to proceed. The patient was brought to the operating room and placed on the table in a supine position. General anesthesia was induced, michael-operative antibiotics were given, and a time-out was performed verifying the correct site of surgery. The patient was prepped and draped in the usual sterile fashion.    The skin on the anterior chest wall was anesthetized with 1% marcaine in preparation for tunneling. The right internal jugular vein was accessed using ultrasound guidance and is thrombosed at the level of the clavicle through the neck. The IJ is patent below the clavicle. The vessel was accessed under ultrasound visualization and a J-wire was advanced without resistance into the IVC under fluoroscopic visualization.    A counter incision was made on the chest wall 3cm below the clavicle and a tunneler was passed to the access site in the neck. A 23cm catheter was selected and then delivered through the tract.    The jugular vein was serially dilated with an 8 Cameroonian and 12 Cameroonian dilator and then a peel-away sheath was inserted over the wire under flouroscopic guidance. The catheter was advanced through  the sheath and over the wire into position and the sheath was peeled away. Catheter tip position in the SVC was verified with flouroscopy. Heparinized saline was instilled in each port.    The catheter was secured with nylon suture and the access site in the neck was closed with vicryl.    A biopatch and occlusive dressings were applied. The patient was recovered from anesthesia and taken to PACU in stable condition.      Gilbert Mcdowell MD  Vascular Surgeon  [facility]

## 2019-05-31 NOTE — OP REPORT
OPERATIVE REPORT      Patient:  Leonie Brock     Procedure Date:  05/31/19    Indication:  The patient presents for right lower extremity angiogram for critical limb ischemia. Diagnostic angiography is indicated because the patient's pre-operative CT scan was inadequate to fully evaluate the extent of tibial disease.    Pre-Operative Diagnosis:  PAD  Right diabetic foot infection    Post-Operative Diagnosis:  Same    Procedure(s):   Diagnostics  US guided access of the left common femoral artery  Selective catheterization of the right external iliac artery (1st order cath)  Right lower extremity angiogram     Intervention(s)  None    Moderate Sedation, 15 minutes    Stent(s) Placed:  None    Surgeon:  Gilbert Mcdowell M.D., MD    Assistant:  None    Anesthesia:  Moderate Sedation    EBL:  5cc    Radiation:  273 mGy    Contrast:  14 cc    Specimen:  None    Complications:  None    Findings:  Right lower extremity angiogram revealed wide patency of the EIA, CFA, profunda, SFA, popliteal, and all three tibial vessels. There were two areas of <50% in the SFA which are not flow limiting. No intervention was performed.    Procedure:  Informed consent was obtained from the patient in the pre-operative holding area. All risks, benefits, and alternatives were explained and she elected to proceed. The patient was brought to the interventional suite and placed on the table in a supine position. Nataliya-operative antibiotics were given and a time-out was performed verifying the correct site of surgery. The patient was prepped and draped in the usual sterile fashion.    A trained nurse acting under my direct supervision administered conscious sedation using fentanyl and versed. Pulse oximetry and continuous EKG tracings were monitored throughout the case. Total sedation time was 15 minutes.    I began by accessing the left common femoral artery under ultrasound visualization. The vessel was patent and the  needle was visualized entering the artery. Images were saved in the medical record.    A 5 Finnish sheath was inserted retrograde in the femoral artery. I performed left lower extremity angiography through the sheath which confirmed placement over the femoral head and revealed findings as noted above. I then advanced a catheter into the aorta and crossed the iliac bifurcation. The catheter was advanced into the distal right external iliac artery and right lower extremity angiography was performed. Findings are noted above.     This concluded the procedure. The sheath in the access site was removed and the artery was closed with a 6 Finnish angioseal. The patient was recovered from sedation and taken to the recovery room in stable condition.    Gilbert Mcdowell M.D., MD  Vascular Surgeon  [facility]

## 2019-05-31 NOTE — PROGRESS NOTES
" LIMB PRESERVATION SERVICE    HPI:      Patient is well known to LPS and outpatient wound care services.     Leonie Brock is a 73 y.o. fe...male, with a past medical history that includes uncontrolled type 2 diabetes and a MI in 1998 with stint placement, admitted 5/25/2019 for Diabetic foot ulcer (HCC).   LPS has been consulted for her bilateral neuropathic full thickness heel ulcers.  These wounds started since she was at the [facility] per the pt  Pt has been treating the wounds with out patient wound care and podiatry.   The wounds have failed to improve. Pt was previously admitted 12/29/2018 and had an Infected R DFU and had Sx with Dr. Kwon on  12/31/2018  A Right gastrocsoleus recession, Right I and D, Right ostectomy/excision.  Pt was diagnosed with type 2 diabetes 35 years ago, and is currently managing with insulin.  Pt checks their blood sugars 3 times daily and reports that these typically run around 130s to 200s.  They have had previous diabetes education.  They do have numbness in their feet.  They usually wears diabetic shoes. They do check their feet routinely. They are retired.   Previous ABIs were brisk and multiphasic in December 5/31/19  Pt evaluated by Dr Mcdwoell, pt found to have bilateral lower extremity critical limb ischemia. Iliac stents needed (L TUCKER w/ high-grade disease) LLE angio from a groin approach in the future. RLE angio was planned for 5/30/19. The Angioogram was Cancelled due to pt having tachypnea and bilateral expiratory wheezing previous evening on exam. Patient denies fevers, chills, nausea, vomiting today.  Pain well controlled.       ASSESSMENT:    /70   Pulse 75   Temp 36.7 °C (98.1 °F) (Temporal)   Resp (!) 21   Ht 1.626 m (5' 4.02\")   Wt 85 kg (187 lb 6.3 oz)   SpO2 95%   Breastfeeding? No   BMI 32.15 kg/m²                       Wound 05/26/19 Neuropathic Heel Left Heel (Active)   Wound Image   5/26/2019 12:00 PM   Site Assessment JEANINE 5/31/2019  8:00 AM "   Nataliya-wound Assessment JEANINE 5/31/2019  8:00 AM   Margins JEANINE 5/31/2019  8:00 AM   Wound Length (cm) 3 cm 5/26/2019 12:00 PM   Wound Width (cm) 4 cm 5/26/2019 12:00 PM   Wound Depth (cm) 0.2 cm 5/26/2019 12:00 PM   Wound Surface Area (cm^2) 12 cm^2 5/26/2019 12:00 PM   Drainage Amount None 5/31/2019  8:00 AM   Non-staged Wound Description Not applicable 5/27/2019  3:00 PM   Treatments Site care 5/27/2019  8:45 AM   Periwound Protectant Not Applicable 5/29/2019  1:22 PM   Dressing Options Nonadhesive Foam;Hypafix Tape 5/31/2019  8:00 AM   Dressing Cleansing/Solutions Normal Saline 5/29/2019  1:22 PM   Dressing Changed Reinforced 5/29/2019  9:45 PM   Dressing Status Clean;Dry;Intact 5/31/2019  8:00 AM   Dressing Change Frequency Daily 5/31/2019  8:00 AM   NEXT Dressing Change  05/30/19 5/29/2019  1:22 PM   WOUND NURSE ONLY - Odor None 5/26/2019 12:00 PM   WOUND NURSE ONLY - Pulses Not palpable 5/26/2019 12:00 PM   WOUND NURSE ONLY - Exposed Structures None 5/26/2019 12:00 PM   WOUND NURSE ONLY - Tissue Type and Percentage 100% Brown 5/26/2019 12:00 PM   WOUND NURSE ONLY - Time Spent with Patient (mins) 30 5/27/2019  3:00 PM       Wound 05/26/19 Neuropathic Heel Right Heel (Active)   Wound Image     5/26/2019 11:10 AM   Site Assessment JEANINE 5/31/2019  8:00 AM   Nataliya-wound Assessment JEANINE 5/31/2019  8:00 AM   Margins JEANINE 5/31/2019  8:00 AM   Wound Length (cm) 4 cm 5/29/2019  1:22 PM   Wound Width (cm) 6.5 cm 5/29/2019  1:22 PM   Wound Depth (cm) 0.2 cm 5/26/2019 11:10 AM   Wound Surface Area (cm^2) 26 cm^2 5/29/2019  1:22 PM   Closure Other (Comment) 5/29/2019  1:22 PM   Drainage Amount None 5/31/2019  8:00 AM   Drainage Description Serosanguineous 5/30/2019  4:00 PM   Non-staged Wound Description Not applicable 5/27/2019  8:45 AM   Treatments Site care;Cleansed 5/29/2019  1:22 PM   Cleansing Not Applicable 5/29/2019  1:22 PM   Periwound Protectant Skin Protectant wipes to Periwound 5/29/2019  1:22 PM   Dressing Options  "Nonadhesive Foam;Hypafix Tape 5/31/2019  8:00 AM   Dressing Cleansing/Solutions 3% Betadine 5/27/2019  8:45 AM   Dressing Changed Reinforced 5/29/2019  9:45 PM   Dressing Status Clean;Dry;Intact 5/31/2019  8:00 AM   Dressing Change Frequency Daily 5/31/2019  8:00 AM   NEXT Dressing Change  05/30/19 5/29/2019  1:22 PM   WOUND NURSE ONLY - Odor None 5/26/2019 11:10 AM   WOUND NURSE ONLY - Pulses Not palpable 5/26/2019 11:10 AM   WOUND NURSE ONLY - Exposed Structures None 5/26/2019 11:10 AM   WOUND NURSE ONLY - Tissue Type and Percentage 40% Brown 60% Red 5/26/2019 11:10 AM              DIABETES MANAGEMENT:  Lab Results   Component Value Date/Time    GLUCOSE 145 (H) 05/31/2019 05:29 AM      Lab Results   Component Value Date/Time    HBA1C 8.7 (H) 05/25/2019 03:20 PM        Diabetes education Seen 5/29/19    INFECTION MANAGEMENT:  WBC   Date/Time Value Ref Range Status   05/31/2019 05:29 AM 9.5 4.8 - 10.8 K/uL Final       Microbiology:   Results     Procedure Component Value Units Date/Time    BLOOD CULTURE [[id number]] Collected:  05/25/19 1520    Order Status:  Completed Specimen:  Blood from Peripheral Updated:  05/30/19 1700     Significant Indicator NEG     Source BLD     Site PERIPHERAL     Culture Result No growth after 5 days of incubation.    Narrative:       Per Hospital Policy: Only change Specimen Src: to \"Line\" if  specified by physician order.  No site indicated    BLOOD CULTURE [[id number]] Collected:  05/25/19 1525    Order Status:  Completed Specimen:  Blood from Peripheral Updated:  05/30/19 1700     Significant Indicator NEG     Source BLD     Site PERIPHERAL     Culture Result No growth after 5 days of incubation.    Narrative:       Per Hospital Policy: Only change Specimen Src: to \"Line\" if  specified by physician order.  No site indicated    CULTURE WOUND W/ GRAM STAIN [[id number]]  (Abnormal)  (Susceptibility) Collected:  05/26/19 1143    Order Status:  Completed Specimen:  Wound from Right Foot " Updated:  05/28/19 0850     Significant Indicator POS (POS)     Source WND     Site RIGHT FOOT     Culture Result Light growth mixed skin ab. (A)     Gram Stain Result No organisms seen.     Culture Result Methicillin Resistant Staphylococcus aureus  Light growth  This isolate is presumed to be clindamycin resistant based on  detection of inducible resistance.  Clindamycin may still  be effective in some patients.   (A)    Narrative:       CALL  Gerardo  131 tel. [phone],  CALLED  131 tel. [phone] 05/28/2019, 08:50, RB PERF. RESULTS CALLED  TO:Tucker Brar Rn  Collected By:[id number] FLAQUITO FLOWERS  Heel  Collected By:[id number] FLAQUITO FLOWERS         PLAN:  Bilateral Neuropathic Heels - Full thickness  Limb preservation status guarded  Wound care: Resume previous Wound Care orders.  Nursing to paint betadine daily     Wound Care by Nursing, LPS to Follow.     Offloading:   heel float boots bilaterally     Weight Bearing Status: Weight bearing as tolerated     Antibiotics: Per ID recommendation     Surgery: Plan for Dr Mcdowell to reattempt Aortogram today per nursing if pt is stable      7. Referrals:              Vascular Dr Mcdowell involved   Ortho NA              Infectious diseases Dr Francis Continue IV Daptomycin 8 mg/kg daily  end date 7/12/2019              Diabetes Education Seen 5/29/19              Ortho tech NA              Other Na     Professional collaboration: Bedside RN      LPS will follow     DISCHARGE PLAN:     Disposition: TBD     Follow-up: TBD     Other:   Bryan Soliman R.N.

## 2019-05-31 NOTE — ASSESSMENT & PLAN NOTE
Hypokalemia  Hypophosphatemia  Hypomagnesemia  Replete with IV magnesium sulfate and potassium phosphate and monitor electrolytes

## 2019-05-31 NOTE — CONSULTS
Cardiology Consult Note:    Austin To  Date & Time note created:    5/31/2019   4:40 PM     Referring MD:  Dr. Pimentel    Patient ID:   Name:             Leonie Brock   YOB: 1945  Age:                 73 y.o.  female   MRN:               [id number]                                                             Chief Complaint / Reason for consult:  Abnormal transthoracic echocardiogram, ischemic cardiomyopathy, persistent atrial fibrillation, pulmonary edema.    History of Present Illness:    This is a 73 years old women with prior history of coronary to disease status post 5 stents placement with her initial heart attack happened in 1998 and last coronary stent done in 2008 via patient report, hypertension, hyperlipidemia, presented to the hospital because of left lower extremity critical limb ischemia.  Patient denied having chest pain or shortness of breath.  She is able to lie flat in bed.  She has been evaluated by vascular surgery service for the treatment of her critical limb ischemia.  Cardiology been consulted due to an abnormal transthoracic echocardiogram showing left ventricular systolic function documented at 35%.  I personally interpreted the images of her transthoracic echocardiogram.    She last saw her cardiologist 7 years ago.    She is not a very reliable historian.    No family history of sudden cardiac death.    I personally interpreted EKG tracing which showed evidence of persistent atrial fibrillation.  No evidence of acute coronary syndrome.    Review of Systems:      Constitutional: Denies fevers, Denies weight changes  Eyes: Denies changes in vision, no eye pain  Ears/Nose/Throat/Mouth: Denies nasal congestion or sore throat   Cardiovascular: no chest pain, no palpitations   Respiratory: no shortness of breath , Denies cough  Gastrointestinal/Hepatic: Denies abdominal pain, nausea, vomiting, diarrhea, constipation or GI bleeding   Genitourinary: Denies dysuria or  frequency  Musculoskeletal/Rheum: Denies  joint pain and swelling   Skin: Denies rash  Neurological: Denies headache, confusion, memory loss or focal weakness/parasthesias  Psychiatric: denies mood disorder   Endocrine: Keira thyroid problems  Heme/Oncology/Lymph Nodes: Denies enlarged lymph nodes, denies brusing or known bleeding disorder  All other systems were reviewed and are negative (AMA/CMS criteria)                Past Medical History:   Past Medical History:   Diagnosis Date   • Arthritis     all over   • Backpain    • CATARACT     removed   • Diabetes     1990   • Myocardial infarct (HCC)     1998     Active Hospital Problems    Diagnosis   • Acute on chronic respiratory failure (HCC) [J96.20]     Priority: High   • Diabetic foot ulcer with osteomyelitis (HCC) [E11.621, E11.69, L97.509, M86.9]     Priority: High   • Coronary artery disease involving native coronary artery of native heart without angina pectoris [I25.10]     Priority: Medium   • Essential hypertension [I10]     Priority: Medium   • Type 2 diabetes mellitus with skin complication, with long-term current use of insulin (HCC) [E11.628, Z79.4]     Priority: Medium   • Electrolyte abnormality [E87.8]   • Cardiomyopathy (HCC) [I42.9]   • Deep vein thrombosis (DVT) of lower extremity (HCC) [I82.409]   • Normocytic anemia [D64.9]   • Rash [R21]   • Peripheral vascular disease (HCC) [I73.9]   • Cognitive decline [R41.89]   • Chronic anticoagulation [Z79.01]   • UTI (urinary tract infection) [N39.0]   • Paroxysmal atrial fibrillation (HCC) [I48.0]       Past Surgical History:  Past Surgical History:   Procedure Laterality Date   • IRRIGATION & DEBRIDEMENT ORTHO Right 12/30/2018    Procedure: IRRIGATION & DEBRIDEMENT, gastroc recession, ostectomy;  Surgeon: Tomi Kwon M.D.;  Location: SURGERY [facility];  Service: Orthopedics   • GYN SURGERY      [facility]       Hospital Medications:    Current Facility-Administered Medications:   •  MD  Alert...ICU Electrolyte Replacement per Pharmacy, , Other, PHARMACY TO DOSE, Lokesh Anderson M.D.  •  magnesium sulfate IVPB premix 4 g, 4 g, Intravenous, Once, Alden Gorman M.D., Last Rate: 25 mL/hr at 05/31/19 1359, 4 g at 05/31/19 1359  •  [START ON 6/1/2019] lisinopril (PRINIVIL) tablet 5 mg, 5 mg, Oral, Q DAY, Alden Gorman M.D.  •  potassium chloride SA (Kdur) tablet 20 mEq, 20 mEq, Oral, BID, Alden Gorman M.D., 20 mEq at 05/31/19 0538  •  furosemide (LASIX) injection 40 mg, 40 mg, Intravenous, Q DAY, Alden Gorman M.D., 40 mg at 05/31/19 0537  •  albuterol inhaler 2 Puff, 2 Puff, Inhalation, Q4H PRN (RT), Jourdan Pimentel M.D.  •  DAPTOmycin 650 mg in NS 50 mL IVPB, 8 mg/kg, Intravenous, Q24HR, SHELLEY McintoshR.NFara, Last Rate: 100 mL/hr at 05/31/19 0957, 650 mg at 05/31/19 0957  •  ferrous sulfate-c-folic acid (FOLITAB 500) tablet 1 Tab, 1 Tab, Oral, DAILY, Dejuan Medellin M.D., 1 Tab at 05/31/19 0537  •  cyanocobalamin (VITAMIN B-12) tablet 1,000 mcg, 1,000 mcg, Oral, DAILY, Dejuan Medellin M.D., 1,000 mcg at 05/31/19 0538  •  [COMPLETED] heparin injection 5,000 Units, 5,000 Units, Intravenous, Once, 5,000 Units at 05/27/19 1800 **AND** heparin injection 2,600 Units, 2,600 Units, Intravenous, PRN, 2,600 Units at 05/30/19 2319 **AND** heparin infusion 25,000 units in 500 ml 0.45% nacl, , Intravenous, Continuous, Stopped at 05/31/19 1245 **AND** Protocol 440 Heparin Weight Based DO NOT GIVE ANY HEPARIN BOLUS TO STROKE PATIENT, , , CONTINUOUS **AND** Protocol 440 Heparin Weight Based Discontinue Enoxaparin (Lovenox), Dabigatran (Pradaxa), Rivaroxaban (Xarelto), Apixaban (Eliquis), Edoxaban (Savaysa, Lixiana), Fondaparinux (Arixtra) and Argatroban prior to heparin administration, , , CONTINUOUS **AND** Protocol 440 Heparin Weight Based Draw baseline aPTT, PT, and platelet count if not already done, , , CONTINUOUS **AND** Protocol 440 Heparin Weight Based Draw aPTT 6 hours after beginning  infusion. , , , CONTINUOUS **AND** Protocol 440 Heparin Weight Based Draw Platelet count every three days. Contact MD if platelet is 50% lower than baseline count., , , CONTINUOUS **AND** Protocol 440 Heparin Weight Based Record Patient Data, , , CONTINUOUS **AND** Protocol 440 Heparin Weight Based INSTRUCTIONS, , , CONTINUOUS **AND** Protocol 440 Heparin Weight Based Review aPTT results 6 hours after infusion is begun as detailed, , , CONTINUOUS **AND** Protocol 440 Heparin Weight Based Adjust heparin to maintain aPTT between 55-96 sec, , , CONTINUOUS **AND** Protocol 440 Heparin Weight Based Order aPTT 6 hours after any rate change or hold until aPTT is therapeutic (55-96 seconds), , , CONTINUOUS **AND** Protocol 440 Heparin Weight Based Documentation and verification, , , CONTINUOUS, Dejuan Medellin M.D.  •  triamcinolone acetonide (KENALOG) 0.1 % cream, , Topical, BID, Dejuan Medellin M.D.  •  aspirin EC (ECOTRIN) tablet 81 mg, 81 mg, Oral, DAILY, Dejuan Medellin M.D., 81 mg at 05/31/19 0538  •  nystatin (MYCOSTATIN) powder, , Topical, BID, eDjuan Medellin M.D.  •  Respiratory Care per Protocol, , Nebulization, Continuous RT, Dejuan Medellin M.D.  •  ipratropium-albuterol (DUONEB) nebulizer solution, 3 mL, Nebulization, Q4H PRN (RT), Dejuan Medellin M.D.  •  acetaminophen (TYLENOL) tablet 650 mg, 650 mg, Oral, Q6HRS PRN, Pop Varela M.D., 650 mg at 05/27/19 2046  •  carvedilol (COREG) tablet 3.125 mg, 3.125 mg, Oral, BID WITH MEALS, Pop Varela M.D., 3.125 mg at 05/31/19 0741  •  gabapentin (NEURONTIN) capsule 400 mg, 400 mg, Oral, BID, Pop Varela M.D., 400 mg at 05/31/19 0538  •  omeprazole (PRILOSEC) capsule 20 mg, 20 mg, Oral, DAILY, Pop Varela M.D., 20 mg at 05/31/19 0538  •  ondansetron (ZOFRAN ODT) dispertab 4 mg, 4 mg, Oral, Q4HRS PRN, Pop Varela M.D.  •  pravastatin (PRAVACHOL) tablet 20 mg, 20 mg, Oral, Nightly, Pop Varela M.D., 20 mg at 05/30/19 2028  •  senna-docusate (PERICOLACE or  SENOKOT S) 8.6-50 MG per tablet 2 Tab, 2 Tab, Oral, BID, 2 Tab at 05/30/19 1715 **AND** polyethylene glycol/lytes (MIRALAX) PACKET 1 Packet, 1 Packet, Oral, QDAY PRN **AND** magnesium hydroxide (MILK OF MAGNESIA) suspension 30 mL, 30 mL, Oral, QDAY PRN **AND** bisacodyl (DULCOLAX) suppository 10 mg, 10 mg, Rectal, QDAY PRN, Pop Varela M.D.  •  ondansetron (ZOFRAN) syringe/vial injection 4 mg, 4 mg, Intravenous, Q4HRS PRN, Pop Varela M.D.  •  Notify provider if pain remains uncontrolled, , , CONTINUOUS **AND** Use the numeric rating scale (NRS-11) on regular floors and Critical-Care Pain Observation Tool (CPOT) on ICUs/Trauma to assess pain, , , CONTINUOUS **AND** Pulse Ox (Oximetry), , , CONTINUOUS **AND** Pharmacy Consult Request ...Pain Management Review 1 Each, 1 Each, Other, PHARMACY TO DOSE **AND** If patient difficult to arouse and/or has respiratory depression, stop any opiates that are currently infusing and call a Rapid Response., , , CONTINUOUS **AND** oxyCODONE immediate-release (ROXICODONE) tablet 2.5 mg, 2.5 mg, Oral, Q3HRS PRN **AND** oxyCODONE immediate-release (ROXICODONE) tablet 5 mg, 5 mg, Oral, Q3HRS PRN, 5 mg at 05/31/19 1127 **AND** HYDROmorphone pf (DILAUDID) injection 0.25 mg, 0.25 mg, Intravenous, Q3HRS PRN, Pop Varela M.D.  •  lactobacillus rhamnosus (CULTURELLE) capsule 1 Cap, 1 Cap, Oral, QDAY with Breakfast, Pop Varela M.D., Stopped at 05/30/19 0730  •  insulin regular (HUMULIN R) injection 2-9 Units, 2-9 Units, Subcutaneous, 4X/DAY ACHS, Stopped at 05/29/19 2100 **AND** Accu-Chek ACHS, , , Q AC AND BEDTIME(S) **AND** NOTIFY MD and PharmD, , , Once **AND** glucose 4 g chewable tablet 16 g, 16 g, Oral, Q15 MIN PRN, 16 g at 05/29/19 0906 **AND** DEXTROSE 10% BOLUS 250 mL, 250 mL, Intravenous, Q15 MIN PRN, Pop Varela M.D., Stopped at 05/30/19 1348    Current Outpatient Medications:  Prescriptions Prior to Admission   Medication Sig Dispense Refill Last Dose  "  • insulin glargine (LANTUS) 100 UNIT/ML Solution Inject 20 Units as instructed every evening.   5/24/2019 at PM       Medication Allergy:  No Known Allergies    Family History:  No family history on file.    Social History:  Social History     Social History   • Marital status:      Spouse name: N/A   • Number of children: N/A   • Years of education: N/A     Occupational History   • Not on file.     Social History Main Topics   • Smoking status: Former Smoker   • Smokeless tobacco: Never Used   • Alcohol use No   • Drug use: No   • Sexual activity: Not on file     Other Topics Concern   • Not on file     Social History Narrative   • No narrative on file         Physical Exam:  Vitals/ General Appearance:   Weight/BMI: Body mass index is 32.15 kg/m².  /70   Pulse 94   Temp 37 °C (98.6 °F) (Temporal)   Resp (!) 22   Ht 1.626 m (5' 4.02\")   Wt 85 kg (187 lb 6.3 oz)   SpO2 93%   Vitals:    05/31/19 1325 05/31/19 1329 05/31/19 1400 05/31/19 1500   BP:       Pulse: 81 82 76 94   Resp: 13 13 18 (!) 22   Temp:       TempSrc:       SpO2: 99% 98% 95% 93%   Weight:       Height:         Oxygen Therapy:  Pulse Oximetry: 93 %, O2 (LPM): 5, O2 Delivery: Silicone Nasal Cannula    Constitutional:   No acute distress, able to lie flat.  HENMT:  Normocephalic, Atraumatic, Oropharynx moist mucous membranes, No oral exudates, Nose normal.  No thyromegaly.  Eyes:  EOMI, Conjunctiva normal, No discharge.  Neck:  Normal range of motion, No cervical tenderness,  no JVD.  Cardiovascular:  Normal heart rate, Normal rhythm, No murmurs, No rubs, No gallops.   Extremitites with no significant edema.  Lungs:  + BS B, no wheezing.   Abdomen: Bowel sounds normal, Soft, No tenderness, No guarding, No rebound, No masses, No hepatosplenomegaly.  Skin: Warm, Dry, No erythema, No rash, no induration.  Neurologic: Alert & oriented x 3, No focal deficits noted, cranial nerves II through X are intact.  Psychiatric: Affect normal, " Judgment normal, Mood normal.    MDM (Data Review):     Records reviewed and summarized in current documentation    Lab Data Review:  Recent Results (from the past 24 hour(s))   ACCU-CHEK GLUCOSE    Collection Time: 05/30/19  5:13 PM   Result Value Ref Range    Glucose - Accu-Ck 121 (H) 65 - 99 mg/dL   ACCU-CHEK GLUCOSE    Collection Time: 05/30/19  8:30 PM   Result Value Ref Range    Glucose - Accu-Ck 212 (H) 65 - 99 mg/dL   APTT    Collection Time: 05/30/19 10:43 PM   Result Value Ref Range    APTT 25.2 24.7 - 36.0 sec   Basic Metabolic Panel    Collection Time: 05/31/19  5:29 AM   Result Value Ref Range    Sodium 139 135 - 145 mmol/L    Potassium 3.5 (L) 3.6 - 5.5 mmol/L    Chloride 106 96 - 112 mmol/L    Co2 29 20 - 33 mmol/L    Glucose 145 (H) 65 - 99 mg/dL    Bun 10 8 - 22 mg/dL    Creatinine 0.74 0.50 - 1.40 mg/dL    Calcium 8.7 8.5 - 10.5 mg/dL    Anion Gap 4.0 0.0 - 11.9   CBC WITH DIFFERENTIAL    Collection Time: 05/31/19  5:29 AM   Result Value Ref Range    WBC 9.5 4.8 - 10.8 K/uL    RBC 3.62 (L) 4.20 - 5.40 M/uL    Hemoglobin 11.1 (L) 12.0 - 16.0 g/dL    Hematocrit 35.5 (L) 37.0 - 47.0 %    MCV 98.1 (H) 81.4 - 97.8 fL    MCH 30.7 27.0 - 33.0 pg    MCHC 31.3 (L) 33.6 - 35.0 g/dL    RDW 46.3 35.9 - 50.0 fL    Platelet Count 219 164 - 446 K/uL    MPV 9.7 9.0 - 12.9 fL    Neutrophils-Polys 74.60 (H) 44.00 - 72.00 %    Lymphocytes 9.80 (L) 22.00 - 41.00 %    Monocytes 10.80 0.00 - 13.40 %    Eosinophils 3.80 0.00 - 6.90 %    Basophils 0.60 0.00 - 1.80 %    Immature Granulocytes 0.40 0.00 - 0.90 %    Nucleated RBC 0.00 /100 WBC    Neutrophils (Absolute) 7.05 2.00 - 7.15 K/uL    Lymphs (Absolute) 0.93 (L) 1.00 - 4.80 K/uL    Monos (Absolute) 1.02 (H) 0.00 - 0.85 K/uL    Eos (Absolute) 0.36 0.00 - 0.51 K/uL    Baso (Absolute) 0.06 0.00 - 0.12 K/uL    Immature Granulocytes (abs) 0.04 0.00 - 0.11 K/uL    NRBC (Absolute) 0.00 K/uL   MAGNESIUM    Collection Time: 05/31/19  5:29 AM   Result Value Ref Range     Magnesium 1.3 (L) 1.5 - 2.5 mg/dL   PHOSPHORUS    Collection Time: 05/31/19  5:29 AM   Result Value Ref Range    Phosphorus 2.4 (L) 2.5 - 4.5 mg/dL   APTT    Collection Time: 05/31/19  5:29 AM   Result Value Ref Range    APTT 70.7 (H) 24.7 - 36.0 sec   ESTIMATED GFR    Collection Time: 05/31/19  5:29 AM   Result Value Ref Range    GFR If African American >60 >60 mL/min/1.73 m 2    GFR If Non African American >60 >60 mL/min/1.73 m 2   ACCU-CHEK GLUCOSE    Collection Time: 05/31/19  5:59 AM   Result Value Ref Range    Glucose - Accu-Ck 121 (H) 65 - 99 mg/dL   ACCU-CHEK GLUCOSE    Collection Time: 05/31/19 10:35 AM   Result Value Ref Range    Glucose - Accu-Ck 133 (H) 65 - 99 mg/dL   EC-ECHOCARDIOGRAM COMPLETE W/O CONT    Collection Time: 05/31/19 10:50 AM   Result Value Ref Range    Eject.Frac. MOD BP 35.49     Eject.Frac. MOD 4C 38.12     Eject.Frac. MOD 2C 31.43     Left Ventrical Ejection Fraction 35    APTT    Collection Time: 05/31/19 11:46 AM   Result Value Ref Range    APTT 60.3 (H) 24.7 - 36.0 sec       Imaging/Procedures Review:    Chest Xray:  Reviewed    EKG:   As in HPI.     MDM (Assessment and Plan):     Active Hospital Problems    Diagnosis   • Acute on chronic respiratory failure (HCC) [J96.20]     Priority: High   • Diabetic foot ulcer with osteomyelitis (HCC) [E11.621, E11.69, L97.509, M86.9]     Priority: High   • Coronary artery disease involving native coronary artery of native heart without angina pectoris [I25.10]     Priority: Medium   • Essential hypertension [I10]     Priority: Medium   • Type 2 diabetes mellitus with skin complication, with long-term current use of insulin (HCC) [E11.628, Z79.4]     Priority: Medium   • Electrolyte abnormality [E87.8]   • Cardiomyopathy (HCC) [I42.9]   • Deep vein thrombosis (DVT) of lower extremity (HCC) [I82.409]   • Normocytic anemia [D64.9]   • Rash [R21]   • Peripheral vascular disease (HCC) [I73.9]   • Cognitive decline [R41.89]   • Chronic  anticoagulation [Z79.01]   • UTI (urinary tract infection) [N39.0]   • Paroxysmal atrial fibrillation (HCC) [I48.0]         At this time, no clinical evidence of acute heart failure decompensation.  Patient does not have heart failure symptomatology.  No clinical evidence of acute coronary syndrome.    No further cardiac work-up is indicated at this time.  Patient will be at moderate risk for future vascular surgery of procedure.  Her overall cardiovascular risk is nonmodifiable at this time.    We will optimize medical therapy for now.      Thank you for referring this patient to our cardiology service.  We will follow patient with you.      Austin Nolasco MD.   Cardiology Inpatient Service.  [facility].  [phone].  [place] [facility].

## 2019-05-31 NOTE — PROGRESS NOTES
Progress Note    CC: f/u for bilateral lower extremity critical limb ischemia    Interval History: 73 y.o. female who presented 5/25/2019 with bilateral lower extremity CLI w/ heel ulcers, present for several months.    Workup also found a L CFV DVT, currently on hep gtt    Plan for RLE angio today.    Review of Systems  Negative for chest pain or SOB  Negative for stroke/TIA    Medications  Prior to Admission Medications   Prescriptions Last Dose Informant Patient Reported? Taking?   insulin glargine (LANTUS) 100 UNIT/ML Solution 5/24/2019 at PM Patient Yes Yes   Sig: Inject 20 Units as instructed every evening.      Facility-Administered Medications: None         Physical Exam  Vitals:    05/31/19 0800   BP:    Pulse: 75   Resp: (!) 21   Temp: 36.7 °C (98.1 °F)   SpO2: 95%       Pulse/Extremity Exam:    Femorals:        Right: palpable       Left palpable    Pedal Pulses:       Right DP monophasic       Right PT absent       Left DP monophasic       Left PT absent    Wounds: R heel w/ partial-thickness 3cm x 2cm ulceration. L heel w/ dry eschar      General appearance: NAD, conversing appropriately  Psych: Normal affect, mood, judgement  Neuro: CN II-XII grossly intact.   Neck: full range of motion  Lungs: No inspiratory stridor or wheezing  CV: RRR  Abdomen: Soft, NT/ND  Skin: No rashes  Psych: Alert, oriented to person, place, time      Labs reviewed today:  Recent Labs      05/29/19 2114 05/30/19   0358  05/31/19   0529   WBC  12.8*  11.6*  9.5   RBC  3.99*  3.56*  3.62*   HEMOGLOBIN  12.5  10.9*  11.1*   HEMATOCRIT  39.6  35.2*  35.5*   MCV  99.2*  98.9*  98.1*   MCH  31.3  30.6  30.7   MCHC  31.6*  31.0*  31.3*   RDW  47.1  47.1  46.3   PLATELETCT  265  227  219   MPV  9.6  9.4  9.7     Recent Labs      05/29/19 2114 05/30/19   0358  05/31/19   0529   SODIUM  141  143  139   POTASSIUM  3.6  3.4*  3.5*   CHLORIDE  107  109  106   CO2  26  26  29   GLUCOSE  148*  97  145*   BUN  10  11  10    CREATININE  0.89  0.86  0.74   CALCIUM  8.6  8.2*  8.7     Recent Labs      05/29/19   0341  05/29/19 2114 05/30/19   0358  05/31/19   0529   ALTSGPT  7  9  9   --    ASTSGOT  13  19  16   --    ALKPHOSPHAT  73  86  74   --    TBILIRUBIN  0.2  0.3  0.3   --    GLUCOSE  71  148*  97  145*     Recent Labs      05/29/19 2114 05/30/19   1247  05/30/19   2243  05/31/19   0529   APTT  51.7*   < >  221.0*  25.2  70.7*   INR  1.09   --    --    --    --     < > = values in this interval not displayed.             Recent Labs      05/29/19 2114   TROPONINI  0.02       Urinalysis:    No results found     Imaging & Data Review:  I personally reviewed all non-invasive vascular testing including images, x-rays, tracings, arterial waveforms, and duplex exams relevant to this admission. My interpretation is below:    5/26/2019 ABIs: Non-diagnostic d/t medial calcinosis.  TBIs:   R 0.41   L 0.72     5/26/2019 Arterial Duplex: R CFA w/ delayed biphasic waveforms but no flow-limiting lesions. Profunda is monophasic. SFA/pop are diffusely diseased without any areas of focal high-grade stenosis. Waveforms are biphasic. PT is diffusely calcified. Peroneal is likely occluded. AT is diffusely calcified but patent.     L CFA is patent w/ triphasic waveforms and no significant flow-limiting lesion. Profunda is patent with triphasic waveforms. The SFA/pop are diffusely diseased but without any high-grade flow-limiting lesion. Waveforms are biphasic throughout. PT and peroneal are likely occluded along their entire course. The AT is patent but highly calcified.     Also identified on this study is an acute DVT within the L CFV, extending into the profunda vein and likely to the mid-SFV. There is no respiratory variation or augmentation within the CFV or SFV in the thigh.    5/28/2019 CTA: Large bilateral pleural effusions.   Bilateral TUCKER stenosis, high-grade on the left. Bilateral IIAs patent but diseased. No flow-limiting lesion  in either EIA.     RLE: CFA and profunda widely patent. Visualized portions of the SFA in the thigh show mild/moderate calcific disease. Scan cuts off in mid-thigh.    LLE: CFA and profunda calcified but patent. Visualized portions of the SFA are moderately diseased but scan cuts off mid-thigh.    Assessment/Plan & Medical Decision-Making:    The patient presents with bilateral lower extremity critical limb ischemia with bilateral heel ulcers. She has multiple chronic health issues including CAD, DM and HTN and her vascular disease has progressed such that there is limb-threatening ischemia. Given her multiple comorbidities, she poses a high-risk for intervention.    Plan for RLE angio today.    Pt will need the LLE addressed as well, but will plan on doing that in a different setting. She will ultimately need kissing iliac stents (L TUCKER w/ high-grade disease), but will not do that at this time as it would preclude LLE angio from a groin approach in the future.     Thank you for involving [facility] in this patient's care. Please call with questions.    Gilbert Mcdowell MD  Vascular Surgeon  [facility]  Office: [phone]

## 2019-05-31 NOTE — PROGRESS NOTES
Hospital Medicine Daily Progress Note/Rapid response note    Date of Service  5/31/2019    Chief Complaint  73 y.o. female admitted 5/25/2019 with diabetic foot ulcers    Hospital Course    73-year-old male past medical history of coronary artery disease questionable stent placement-/2018 or 1998 ??, paroxysmal atrial fibrillation, type 2 diabetes (last a1c 7.3 03/2019) mild to moderate cognitive decline, hypertension, hyperlipidemia sent by podiatrist for bilateral wound on her feet.  X-ray showed right osteomyelitis.  She was started on vanco/unasyn. Pt was not septic on admission.  LPS was consulted. On admission was also found patient has UTI which was already covered by above antibiotic.         Interval Problem Update    Afib rate controlled  tmax 99  Angiogram scheduled for 11:30  Hypoglycemia resolved  Oxygen at 5Lpm  Day 4 daptomycin              Consultants/Specialty  ID  vascular    Code Status  full    Disposition  TBD    Review of Systems  Review of Systems   Constitutional: Negative for chills and fever.   Respiratory: Positive for cough. Negative for shortness of breath.    Cardiovascular: Positive for leg swelling. Negative for chest pain and palpitations.   Gastrointestinal: Negative for abdominal pain, nausea and vomiting.   Skin:        Heel ulcers   Neurological: Negative for speech change and focal weakness.        Diabetic neuropathy   Psychiatric/Behavioral: Negative.    All other systems reviewed and are negative.       Physical Exam  Temp:  [35.9 °C (96.7 °F)-37.2 °C (99 °F)] 36.7 °C (98.1 °F)  Pulse:  [] 78  Resp:  [16-28] 17  SpO2:  [93 %-100 %] 96 %    Physical Exam   Constitutional: She is oriented to person, place, and time. She appears well-developed and well-nourished.   HENT:   Head: Normocephalic and atraumatic.   Mouth/Throat: No oropharyngeal exudate.   Eyes: Pupils are equal, round, and reactive to light. Right eye exhibits no discharge. Left eye exhibits no discharge. No  scleral icterus.   Neck: Neck supple. No JVD present. No tracheal deviation present.   Cardiovascular: Normal rate.  An irregularly irregular rhythm present. Exam reveals no gallop and no friction rub.    No murmur heard.  Pulmonary/Chest: Effort normal. No respiratory distress. She has no wheezes. She has rales. She exhibits no tenderness.   Abdominal: Soft. Bowel sounds are normal. She exhibits no distension. There is no tenderness. There is no rebound.   Musculoskeletal: She exhibits edema. She exhibits no tenderness.   Neurological: She is alert and oriented to person, place, and time. No cranial nerve deficit. She exhibits normal muscle tone.   Skin: Skin is warm and dry. She is not diaphoretic. No cyanosis. Nails show no clubbing.   Ulcers dressed images reviewed   Psychiatric: She has a normal mood and affect. Her behavior is normal.   Nursing note and vitals reviewed.      Fluids    Intake/Output Summary (Last 24 hours) at 05/31/19 1016  Last data filed at 05/31/19 0600   Gross per 24 hour   Intake            202.6 ml   Output                0 ml   Net            202.6 ml       Laboratory  Recent Labs      05/29/19 2114 05/30/19 0358  05/31/19   0529   WBC  12.8*  11.6*  9.5   RBC  3.99*  3.56*  3.62*   HEMOGLOBIN  12.5  10.9*  11.1*   HEMATOCRIT  39.6  35.2*  35.5*   MCV  99.2*  98.9*  98.1*   MCH  31.3  30.6  30.7   MCHC  31.6*  31.0*  31.3*   RDW  47.1  47.1  46.3   PLATELETCT  265  227  219   MPV  9.6  9.4  9.7     Recent Labs      05/29/19 2114 05/30/19   0358  05/31/19   0529   SODIUM  141  143  139   POTASSIUM  3.6  3.4*  3.5*   CHLORIDE  107  109  106   CO2  26  26  29   GLUCOSE  148*  97  145*   BUN  10  11  10   CREATININE  0.89  0.86  0.74   CALCIUM  8.6  8.2*  8.7     Recent Labs      05/29/19 2114 05/30/19   1247  05/30/19   2243  05/31/19   0529   APTT  51.7*   < >  221.0*  25.2  70.7*   INR  1.09   --    --    --    --     < > = values in this interval not displayed.                Imaging  CT-HEAD W/O   Final Result      1.  Cerebral atrophy.      2.  White matter lucencies most consistent with small vessel ischemic change versus demyelination or gliosis.      3.  Otherwise, Head CT without contrast with no acute findings. No evidence of acute cerebral infarction, hemorrhage or mass lesion.      DX-CHEST-LIMITED (1 VIEW)   Final Result      1.  Marked pulmonary edema.   2.  Possible underlying bibasilar airspace opacity/alveolar pulmonary edema and/or bilateral pleural effusions.      US-SELWNY SINGLE LEVEL BILAT   Final Result      CTA ABDOMEN PELVIS W & W/O POST PROCESS   Final Result      1.  Moderate atherosclerotic calcification abdominal aorta without aneurysm or dissection.   2.  Moderate atherosclerotic calcification of the LEFT carotid system without segmental occlusion.   3.  Small bilateral pleural effusions with associated atelectasis.   4.  Increased colonic stool suggesting constipation.   5.  Colonic diverticula.      US-EXTREMITY VENOUS LOWER BILAT   Final Result      US-EXTREMITY ARTERY LOWER BILAT   Final Result      DX-FOOT-COMPLETE 3+ RIGHT   Final Result      1.  No evidence of fracture or dislocation.      2.  Severe degenerative changes again present raising possibility of neuropathic foot.      3.  There appears to be increase in bone erosions along the medial and inferior tarsal bones which could indicate osteomyelitis.         DX-FOOT-COMPLETE 3+ LEFT   Final Result      1.  No evidence of fracture or dislocation.      2.  Osteoarthritis is noted. No bone erosions are identified.      IR-EXTREMITY ANGIOGRAM-UNILATERAL RIGHT    (Results Pending)   EC-ECHOCARDIOGRAM COMPLETE W/O CONT    (Results Pending)        Assessment/Plan  * Diabetic foot ulcer with osteomyelitis (HCC)- (present on admission)   Assessment & Plan    Culture MRSA    Continue wound care  Continue daptomycin per ID plan is to continue course through July 12, 2019       Acute on chronic respiratory  failure (HCC)- (present on admission)   Assessment & Plan    Chest x-ray consistent with acute cardiogenic pulmonary edema    Continue IV Lasix and monitor intake and output     Essential hypertension- (present on admission)   Assessment & Plan    Continue carvedilol and monitor blood pressure     Coronary artery disease involving native coronary artery of native heart without angina pectoris- (present on admission)   Assessment & Plan    Continue aspirin carvedilol and statin       Type 2 diabetes mellitus with skin complication, with long-term current use of insulin ([facility])- (present on admission)   Assessment & Plan    HbA1c 8.7    Given severe hypoglycemia I will continue to hold her Lantus today and monitor CBGs with sliding scale insulin         Electrolyte abnormality   Assessment & Plan    Hypokalemia  Hypophosphatemia  Hypomagnesemia    Replete with K-Phos mag sulfate and monitor     Cardiomyopathy ([facility])   Assessment & Plan    Repeat echo with EF of 35% and moderate MR with regional wall abnormalities  Continue IV Lasix for acute pulmonary edema  Continue carvedilol will also add lisinopril  Monitor renal function  We will ask for cardiology input         Deep vein thrombosis (DVT) of lower extremity ([facility])- (present on admission)   Assessment & Plan    Acute   continue heparin  She will need transitioning to oral agent depending on plan procedures per vascular surgery         Peripheral vascular disease ([facility])- (present on admission)   Assessment & Plan    With bilateral lower extremity ulcers    Follow-up on angiogram results  Vascular surgery following     Normocytic anemia- (present on admission)   Assessment & Plan    With iron deficiency    Hemoglobin stable monitor     UTI (urinary tract infection)- (present on admission)   Assessment & Plan    Resolved  Completed 3-day course of antibiotics with Zosyn         Chronic anticoagulation- (present on admission)   Assessment & Plan           Paroxysmal atrial  fibrillation (HCC)- (present on admission)   Assessment & Plan    Rate controlled  Continue carvedilol and heparin             Plan of care reviewed with patient and her questions answered also discussed with nursing staff and pharmacist    VTE prophylaxis: heparin infusion

## 2019-05-31 NOTE — PROGRESS NOTES
Hospital Medicine Daily Progress Note/Rapid response note    Date of Service  5/30/2019    Chief Complaint  73 y.o. female admitted 5/25/2019 with diabetic foot ulcers    Hospital Course    73-year-old male past medical history of coronary artery disease questionable stent placement-/2018 or 1998 ??, paroxysmal atrial fibrillation, type 2 diabetes (last a1c 7.3 03/2019) mild to moderate cognitive decline, hypertension, hyperlipidemia sent by podiatrist for bilateral wound on her feet.  X-ray showed right osteomyelitis.  She was started on vanco/unasyn. Pt was not septic on admission.  LPS was consulted. On admission was also found patient has UTI which was already covered by above antibiotic.         Interval Problem Update    Transferred to ICU for AFib  Hypoglycemia overnight  Heparin drip  AFib   Afebrile  NPO for angiogram  Day 4 zosyn/day 3 dapto   K 3.4      Consultants/Specialty  ID  vascular    Code Status  full    Disposition  TBD    Review of Systems  Review of Systems   Constitutional: Negative for chills and fever.   Respiratory: Positive for shortness of breath. Negative for cough and hemoptysis.    Cardiovascular: Positive for leg swelling. Negative for chest pain and palpitations.   Gastrointestinal: Negative for abdominal pain, nausea and vomiting.   Genitourinary: Negative for flank pain and hematuria.   Skin:        Heel ulcers   Neurological: Negative for speech change and focal weakness.   All other systems reviewed and are negative.       Physical Exam  Temp:  [35.9 °C (96.7 °F)-37.3 °C (99.1 °F)] 36.7 °C (98 °F)  Pulse:  [] 94  Resp:  [15-29] 24  BP: (153-172)/(70-88) 157/70  SpO2:  [94 %-100 %] 99 %    Physical Exam   Constitutional: She is oriented to person, place, and time. She appears well-developed and well-nourished.   HENT:   Head: Normocephalic and atraumatic.   Right Ear: External ear normal.   Left Ear: External ear normal.   Mouth/Throat: No oropharyngeal exudate.    Eyes: Conjunctivae are normal. Right eye exhibits no discharge. Left eye exhibits no discharge. No scleral icterus.   Neck: Neck supple. No JVD present. No tracheal deviation present.   Cardiovascular: Normal rate.  An irregularly irregular rhythm present. Exam reveals no gallop and no friction rub.    No murmur heard.  Pulmonary/Chest: Effort normal. No stridor. No respiratory distress. She has no wheezes. She has rales. She exhibits no tenderness.   Abdominal: Soft. Bowel sounds are normal. She exhibits no distension. There is no tenderness. There is no rebound.   Musculoskeletal: She exhibits edema. She exhibits no tenderness.   Neurological: She is oriented to person, place, and time. No cranial nerve deficit. She exhibits normal muscle tone.   Asleep arousable follows commands   Skin: Skin is warm and dry. She is not diaphoretic. No cyanosis. Nails show no clubbing.   He will also stress images reviewed   Psychiatric: She has a normal mood and affect. Her behavior is normal.   Nursing note and vitals reviewed.      Fluids  No intake or output data in the 24 hours ending 05/30/19 1755    Laboratory  Recent Labs      05/29/19 0341 05/29/19 2114 05/30/19 0358   WBC  9.8  12.8*  11.6*   RBC  3.50*  3.99*  3.56*   HEMOGLOBIN  10.7*  12.5  10.9*   HEMATOCRIT  34.9*  39.6  35.2*   MCV  99.7*  99.2*  98.9*   MCH  30.6  31.3  30.6   MCHC  30.7*  31.6*  31.0*   RDW  47.8  47.1  47.1   PLATELETCT  221  265  227   MPV  9.7  9.6  9.4     Recent Labs      05/29/19 0341 05/29/19 2114 05/30/19   0358   SODIUM  140  141  143   POTASSIUM  3.6  3.6  3.4*   CHLORIDE  109  107  109   CO2  28  26  26   GLUCOSE  71  148*  97   BUN  10  10  11   CREATININE  0.93  0.89  0.86   CALCIUM  8.3*  8.6  8.2*     Recent Labs      05/29/19 2114 05/30/19 0358  05/30/19   1247   APTT  51.7*  73.7*  221.0*   INR  1.09   --    --                Imaging  CT-HEAD W/O   Final Result      1.  Cerebral atrophy.      2.  White matter  lucencies most consistent with small vessel ischemic change versus demyelination or gliosis.      3.  Otherwise, Head CT without contrast with no acute findings. No evidence of acute cerebral infarction, hemorrhage or mass lesion.      DX-CHEST-LIMITED (1 VIEW)   Final Result      1.  Marked pulmonary edema.   2.  Possible underlying bibasilar airspace opacity/alveolar pulmonary edema and/or bilateral pleural effusions.      US-SELWYN SINGLE LEVEL BILAT   Final Result      CTA ABDOMEN PELVIS W & W/O POST PROCESS   Final Result      1.  Moderate atherosclerotic calcification abdominal aorta without aneurysm or dissection.   2.  Moderate atherosclerotic calcification of the LEFT carotid system without segmental occlusion.   3.  Small bilateral pleural effusions with associated atelectasis.   4.  Increased colonic stool suggesting constipation.   5.  Colonic diverticula.      US-EXTREMITY VENOUS LOWER BILAT   Final Result      US-EXTREMITY ARTERY LOWER BILAT   Final Result      DX-FOOT-COMPLETE 3+ RIGHT   Final Result      1.  No evidence of fracture or dislocation.      2.  Severe degenerative changes again present raising possibility of neuropathic foot.      3.  There appears to be increase in bone erosions along the medial and inferior tarsal bones which could indicate osteomyelitis.         DX-FOOT-COMPLETE 3+ LEFT   Final Result      1.  No evidence of fracture or dislocation.      2.  Osteoarthritis is noted. No bone erosions are identified.      IR-EXTREMITY ANGIOGRAM-UNILATERAL RIGHT    (Results Pending)        Assessment/Plan  * Diabetic foot ulcer with osteomyelitis (HCC)- (present on admission)   Assessment & Plan    Culture MRSA    ID following discussed with Dr. Ribeiro  Continue daptomycin  Continue wound care  Vascular surgery following     Chest pain- (present on admission)   Assessment & Plan    Resolved    Continue medical therapy  Doubt PE patient currently on heparin     Acute on chronic respiratory  failure (HCC)- (present on admission)   Assessment & Plan    Chest x-ray consistent with acute pulmonary edema  Patient +2.2 L since admit  We will change Lasix to IV  Monitor intake and output     Essential hypertension- (present on admission)   Assessment & Plan    Continue carvedilol and monitor blood pressure     Coronary artery disease involving native coronary artery of native heart without angina pectoris- (present on admission)   Assessment & Plan    Continue aspirin carvedilol and statin       Type 2 diabetes mellitus with skin complication, with long-term current use of insulin ([facility])- (present on admission)   Assessment & Plan    HbA1c 8.7    With persistent hypoglycemia she received D50 x2 was started on D5W with persistent hypoglycemia have changed her to D10 with continued close monitoring  Will discontinue Lantus and reevaluate long acting insulin once her blood sugars are more stable         Cardiomyopathy ([facility])   Assessment & Plan    EF 45%    Continue carvedilol and statin  We will change Lasix to IV  If renal function is stable post angiogram consider adding ACE inhibitor  We will repeat echocardiogram     Deep vein thrombosis (DVT) of lower extremity ([facility])- (present on admission)   Assessment & Plan    Continue heparin         Peripheral vascular disease ([facility])- (present on admission)   Assessment & Plan    With bilateral lower extremity ulcers    Vascular surgery following with plans for angiogram later today     Normocytic anemia- (present on admission)   Assessment & Plan    With iron deficiency    Clinical signs of active bleeding  She will need GI work-up as outpatient     UTI (urinary tract infection)- (present on admission)   Assessment & Plan    Completed 3-day course of antibiotics with Zosyn         Chronic anticoagulation- (present on admission)   Assessment & Plan           Paroxysmal atrial fibrillation ([facility])- (present on admission)   Assessment & Plan    Currently rate  controlled  Continue carvedilol  Continue heparin    Echo on January 2 reviewed revealed EF of 45%          VTE prophylaxis: heparin infusion

## 2019-05-31 NOTE — PROGRESS NOTES
Infectious Disease Progress Note    Author: MAXIME Mcintosh Date & Time of service: 2019  1:49 PM    Chief Complaint:  Diabetic foot ulcer    Interval History:  73 y.o. Female with hx of poorly controlled DM, COPD on 3 L NC at home.  Admitted on 19 for bilateral heel ulcerations with concerns for OM.   2019 T-max is 98.1.  Vascular Dopplers are being performed.  Denies any fevers or chills.  WBC 7.4.  Creatinine is 0.98  19- AF, WBC 8.0, no issue with abx, denies any pain currently to BLE but had 9/10 sharp BLE pain last night that improved with pain meds.  - Tm 99.5, WBC 9.8, tolerating antibiotics without issue, denies any pain to BLE, abdomen distended and noting that she needs to have a bowel movement.  - AF, WBC 11.6, no issue with antibiotics, denies pain, denies shortness of breath or chest palpitations, resting comfortably in bed.  -AF, WBC 9.5, complaining of 9/10 LLE intermittent sharp shooting pain that improves with rest and pain meds, n.p.o. for angiogram today, no issue with antibiotics.    Labs Reviewed, Medications Reviewed and Radiology Reviewed.    Review of Systems:  Review of Systems   Constitutional: Positive for malaise/fatigue (Improving). Negative for chills and fever.   HENT: Negative for sore throat.    Eyes: Negative for blurred vision.   Respiratory: Negative for cough and shortness of breath.    Cardiovascular: Positive for leg swelling. Negative for chest pain.   Gastrointestinal: Negative for abdominal pain, constipation, diarrhea, nausea and vomiting.   Genitourinary: Negative for dysuria, flank pain and hematuria.   Musculoskeletal: Positive for joint pain and myalgias. Negative for back pain.        LLE   Skin: Negative for itching and rash.   Neurological: Positive for tingling, sensory change and weakness. Negative for headaches.   Psychiatric/Behavioral: The patient is not nervous/anxious.        Hemodynamics:  Temp (24hrs), Av.9 °C  (98.5 °F), Min:36.7 °C (98 °F), Max:37.2 °C (99 °F)  Temperature: 37 °C (98.6 °F)  Pulse  Av.4  Min: 68  Max: 118Heart Rate (Monitored): 77  NIBP: 118/51       Physical Exam:  Physical Exam   Constitutional: She is oriented to person, place, and time. She appears well-developed and well-nourished. No distress.   Obese   HENT:   Head: Normocephalic and atraumatic.   Mouth/Throat: Oropharynx is clear and moist. No oropharyngeal exudate.   Eyes: Pupils are equal, round, and reactive to light. Conjunctivae and EOM are normal.   Neck: Normal range of motion. Neck supple. No JVD present.   Cardiovascular: Normal rate.  An irregularly irregular rhythm present. Exam reveals gallop. Exam reveals no friction rub.    Faint BLE distal pulses   Pulmonary/Chest: Effort normal. No respiratory distress. She has no wheezes. She has no rales.   4 L nasal cannula    Diminished breath sounds to BLL.   Abdominal: Soft. Bowel sounds are normal. She exhibits no distension. There is no tenderness.   Musculoskeletal: She exhibits edema (Trace BLE). She exhibits no tenderness.   Bilateral heels-bandaged, no drainage seen seeping through dressings.  No erythema to surrounding tissue, nontender to palpation.    Right medial foot-old surgical site well-healed without any breakdown or erythema, nontender to palpation.   Neurological: She is alert and oriented to person, place, and time. No cranial nerve deficit.   No gross focal deficits.    Decreased sensation to BLE.   Skin: Skin is warm and dry. No rash noted. No erythema.   Psychiatric: She has a normal mood and affect. Her behavior is normal.   Nursing note and vitals reviewed.    Meds:    Current Facility-Administered Medications:   •  MD Alert...Adult ICU Electrolyte Replacement per Pharmacy  •  potassium phosphate ivpb  •  magnesium sulfate  •  NS  •  fentaNYL  •  midazolam  •  ondansetron  •  potassium chloride SA  •  furosemide  •  albuterol  •  DAPTOmycin  •  ferrous  sulfate-c-folic acid  •  cyanocobalamin  •  [COMPLETED] heparin **AND** heparin **AND** heparin **AND** Protocol 440 Heparin Weight Based DO NOT GIVE ANY HEPARIN BOLUS TO STROKE PATIENT **AND** Protocol 440 Heparin Weight Based Discontinue Enoxaparin (Lovenox), Dabigatran (Pradaxa), Rivaroxaban (Xarelto), Apixaban (Eliquis), Edoxaban (Savaysa, Lixiana), Fondaparinux (Arixtra) and Argatroban prior to heparin administration **AND** Protocol 440 Heparin Weight Based Draw baseline aPTT, PT, and platelet count if not already done **AND** Protocol 440 Heparin Weight Based Draw aPTT 6 hours after beginning infusion.  **AND** Protocol 440 Heparin Weight Based Draw Platelet count every three days. Contact MD if platelet is 50% lower than baseline count. **AND** Protocol 440 Heparin Weight Based Record Patient Data **AND** Protocol 440 Heparin Weight Based INSTRUCTIONS **AND** Protocol 440 Heparin Weight Based Review aPTT results 6 hours after infusion is begun as detailed **AND** Protocol 440 Heparin Weight Based Adjust heparin to maintain aPTT between 55-96 sec **AND** Protocol 440 Heparin Weight Based Order aPTT 6 hours after any rate change or hold until aPTT is therapeutic (55-96 seconds) **AND** Protocol 440 Heparin Weight Based Documentation and verification  •  triamcinolone acetonide  •  aspirin EC  •  nystatin  •  Respiratory Care per Protocol  •  ipratropium-albuterol  •  acetaminophen  •  carvedilol  •  gabapentin  •  omeprazole  •  ondansetron  •  pravastatin  •  senna-docusate **AND** polyethylene glycol/lytes **AND** magnesium hydroxide **AND** bisacodyl  •  ondansetron  •  Notify provider if pain remains uncontrolled **AND** Use the numeric rating scale (NRS-11) on regular floors and Critical-Care Pain Observation Tool (CPOT) on ICUs/Trauma to assess pain **AND** Pulse Ox (Oximetry) **AND** Pharmacy Consult Request **AND** If patient difficult to arouse and/or has respiratory depression, stop any opiates that  are currently infusing and call a Rapid Response. **AND** oxyCODONE immediate-release **AND** oxyCODONE immediate-release **AND** HYDROmorphone  •  lactobacillus rhamnosus  •  insulin regular **AND** Accu-Chek ACHS **AND** NOTIFY MD and PharmD **AND** glucose **AND** dextrose 10% bolus    Labs:  Recent Labs      05/29/19 2114 05/30/19 0358 05/31/19   0529   WBC  12.8*  11.6*  9.5   RBC  3.99*  3.56*  3.62*   HEMOGLOBIN  12.5  10.9*  11.1*   HEMATOCRIT  39.6  35.2*  35.5*   MCV  99.2*  98.9*  98.1*   MCH  31.3  30.6  30.7   RDW  47.1  47.1  46.3   PLATELETCT  265  227  219   MPV  9.6  9.4  9.7   NEUTSPOLYS  84.70*  82.20*  74.60*   LYMPHOCYTES  6.00*  8.20*  9.80*   MONOCYTES  6.00  7.10  10.80   EOSINOPHILS  2.60  1.50  3.80   BASOPHILS  0.40  0.50  0.60     Recent Labs      05/29/19 2114 05/30/19 0358 05/31/19   0529   SODIUM  141  143  139   POTASSIUM  3.6  3.4*  3.5*   CHLORIDE  107  109  106   CO2  26  26  29   GLUCOSE  148*  97  145*   BUN  10  11  10     Recent Labs      05/29/19 0341 05/29/19 2114 05/30/19 0358 05/31/19   0529   ALBUMIN  2.4*  3.0*  2.7*   --    TBILIRUBIN  0.2  0.3  0.3   --    ALKPHOSPHAT  73  86  74   --    TOTPROTEIN  5.1*  6.3  5.6*   --    ALTSGPT  7  9  9   --    ASTSGOT  13  19  16   --    CREATININE  0.93  0.89  0.86  0.74       Imaging:  May 31, 2019 12:21 PM  EC-ECHOCARDIOGRAM COMPLETE W/O CONT   CONCLUSIONS  Akinetic inferolateral wall.  Moderately reduced left ventricular systolic function.  Left ventricular ejection fraction is visually estimated to be 35%.  Left ventricle is mildly dilated.  Eccentric posteriorly directed mitral regurgitation, probably moderate.  Moderately dilated left atrium.  Aortic sclerosis without stenosis.  Mild tricuspid regurgitation.  Normal inferior vena cava size without inspiratory collapse.  Right ventricular systolic pressure is estimated to be 40 mmHg.  Mildly dilated right heartventricle.  Normal right ventricular systolic  "function.  Pleural effusion present.  Normal pericardium without effusion.        Micro:  Results     Procedure Component Value Units Date/Time    BLOOD CULTURE [[id number]] Collected:  05/25/19 1520    Order Status:  Completed Specimen:  Blood from Peripheral Updated:  05/30/19 1700     Significant Indicator NEG     Source BLD     Site PERIPHERAL     Culture Result No growth after 5 days of incubation.    Narrative:       Per Hospital Policy: Only change Specimen Src: to \"Line\" if  specified by physician order.  No site indicated    BLOOD CULTURE [[id number]] Collected:  05/25/19 1525    Order Status:  Completed Specimen:  Blood from Peripheral Updated:  05/30/19 1700     Significant Indicator NEG     Source BLD     Site PERIPHERAL     Culture Result No growth after 5 days of incubation.    Narrative:       Per Hospital Policy: Only change Specimen Src: to \"Line\" if  specified by physician order.  No site indicated    CULTURE WOUND W/ GRAM STAIN [[id number]]  (Abnormal)  (Susceptibility) Collected:  05/26/19 1143    Order Status:  Completed Specimen:  Wound from Right Foot Updated:  05/28/19 0850     Significant Indicator POS (POS)     Source WND     Site RIGHT FOOT     Culture Result Light growth mixed skin ab. (A)     Gram Stain Result No organisms seen.     Culture Result Methicillin Resistant Staphylococcus aureus  Light growth  This isolate is presumed to be clindamycin resistant based on  detection of inducible resistance.  Clindamycin may still  be effective in some patients.   (A)    Narrative:       CALL  Gerardo  131 tel. [phone],  CALLED  131 tel. [phone] 05/28/2019, 08:50, RB PERF. RESULTS CALLED  TO:Tucker Staley07 Sammy  Collected By:[id number] FLAQUITO FLOWERS  Heel  Collected By:[id number] FLAQUITO FLOWERS    Culture & Susceptibility     METHICILLIN RESISTANT STAPHYLOCOCCUS AUREUS     Antibiotic Sensitivity Microscan Unit Status    Ampicillin/sulbactam Resistant <=8/4 mcg/mL Final    Method: BERNARDINO    " Clindamycin Resistant <=0.5 mcg/mL Final    Method: BERNARDINO    Daptomycin Sensitive <=0.5 mcg/mL Final    Method: BERNARDINO    Erythromycin Resistant >4 mcg/mL Final    Method: BERNARDINO    Moxifloxacin Intermediate 4 mcg/mL Final    Method: BERNARDINO    Oxacillin Resistant >2 mcg/mL Final    Method: BERNARDINO    Penicillin Resistant >8 mcg/mL Final    Method: BERNARDINO    Tetracycline Resistant >8 mcg/mL Final    Method: BERNARDINO    Trimeth/Sulfa Sensitive <=0.5/9.5 mcg/mL Final    Method: BERNARDINO    Vancomycin Sensitive 1 mcg/mL Final    Method: BERNARDINO                       URINE CULTURE(NEW) [[id number]]  (Abnormal)  (Susceptibility) Collected:  05/25/19 1800    Order Status:  Completed Specimen:  Urine Updated:  05/27/19 8801     Significant Indicator POS (POS)     Source UR     Site -     Culture Result - (A)      Klebsiella pneumoniae  ,000 cfu/mL   (A)    Narrative:       Indication for culture:->Emergency Room Patient  Indication for culture:->Emergency Room Patient    Culture & Susceptibility     KLEBSIELLA PNEUMONIAE     Antibiotic Sensitivity Microscan Unit Status    Ampicillin Resistant >16 mcg/mL Final    Method: BERNARDINO    Ampicillin/sulbactam Resistant >16/8 mcg/mL Final    Method: BERNARDINO    Cefepime Sensitive <=8 mcg/mL Final    Method: BERNARDINO    Cefotaxime Sensitive <=2 mcg/mL Final    Method: BERNARDINO    Cefotetan Sensitive <=16 mcg/mL Final    Method: BERNARDINO    Ceftazidime Sensitive <=1 mcg/mL Final    Method: BERNARDINO    Ceftriaxone Sensitive <=8 mcg/mL Final    Method: BERNARDINO    Cefuroxime Sensitive <=4 mcg/mL Final    Method: BERNARDINO    Cephalothin Resistant >16 mcg/mL Final    Method: BERNARDINO    Ciprofloxacin Sensitive <=1 mcg/mL Final    Method: BERNARDINO    Gentamicin Sensitive <=4 mcg/mL Final    Method: BERNARDINO    Levofloxacin Sensitive <=2 mcg/mL Final    Method: BERNARDINO    Nitrofurantoin Sensitive <=32 mcg/mL Final    Method: BERNARDINO    Pip/Tazobactam Sensitive <=16 mcg/mL Final    Method: BERNARDINO    Piperacillin Resistant >64 mcg/mL Final    Method: BERNARDINO    Tigecycline Sensitive <=2  mcg/mL Final    Method: BERNARDINO    Tobramycin Sensitive <=4 mcg/mL Final    Method: BERNARDINO    Trimeth/Sulfa Resistant >2/38 mcg/mL Final    Method: BERNARDINO                       GRAM STAIN [[id number]] Collected:  05/26/19 1143    Order Status:  Completed Specimen:  Wound Updated:  05/26/19 1816     Significant Indicator .     Source WND     Site RIGHT FOOT     Gram Stain Result No organisms seen.    Narrative:       Collected By:[id number] FLAQUITO FLOWERS  Heel  Collected By:[id number] FLAQUITO FLOWERS    URINALYSIS [[id number]]  (Abnormal) Collected:  05/25/19 1800    Order Status:  Completed Specimen:  Urine Updated:  05/25/19 1815     Color Yellow     Character Cloudy (A)     Specific Gravity 1.021     Ph 5.0     Glucose 500 (A) mg/dL      Ketones Negative mg/dL      Protein Negative mg/dL      Bilirubin Negative     Urobilinogen, Urine 0.2     Nitrite Positive (A)     Leukocyte Esterase Moderate (A)     Occult Blood Small (A)     Micro Urine Req Microscopic    Narrative:       Indication for culture:->Emergency Room Patient    CULTURE WOUND W/ GRAM STAIN [[id number]]     Order Status:  Canceled Specimen:  Wound from Right Foot     Blood Culture [[id number]] Collected:  05/25/19 0000    Order Status:  Canceled Specimen:  Other from Peripheral     Blood Culture [[id number]] Collected:  05/25/19 0000    Order Status:  Canceled Specimen:  Other from Peripheral           Assessment:  Active Hospital Problems    Diagnosis   • *Diabetic foot ulcer with osteomyelitis (HCC) [E11.621, E11.69, L97.509, M86.9]   • Type 2 diabetes mellitus with skin complication, with long-term current use of insulin (HCC) [E11.628, Z79.4]   • Peripheral vascular disease (HCC) [I73.9]   • UTI (urinary tract infection) [N39.0]       Plan:  Bilateral DM heel ulcers with Right diabetic foot OM  Afebrile  Leukocytosis resolved  Bcx on 5/25 negative  Was d/c'd home in January 2019 on IV Dapto for R foot OM; however, pt stated she did not finish IV abx and does  not recall if she was given any other abx.  Never followed up in ID clinic. OM not treated.   Previous R foot cx +MRSE (bone), MSSE (tissue) & E faecalis (tissue) December 2018  Wound cx from R heel on 5/26 +MRSA  Continue IV daptomycin 8 mg/kg daily  CPK weekly while on daptomycin  CPK 14 on 5/28  Plan to treat with 6 weeks IV antibiotics due to right diabetic foot OM that was untreated  6-week clock will start following RLE angiogram today  Estimated end date 7/12/2019  Continue with wound care and offloading    PVD  US of BLE showing acute on chronic LLE DVT and 50% stenosis of Right mid SFA  Underwent RLE angiogram with Dr. Pro today, no intervention performed    UTI-resolved  Ucx on 5/25 +Klebsiella pneumoniae  Completed IV Zosyn on 5/30     Diabetes mellitus  Hemoglobin A1c was 8.7% on 5/25/19  Keep BS <150 to promote wound healing and control infection   this am    Afib  Rate controlled      Discussed with bedside RN.  Going for angiogram today as it was canceled yesterday.

## 2019-05-31 NOTE — PROGRESS NOTES
IR Nursing Note:    Patient consented by Dr. Mcdowell, all questions answered. Dr. Mcdowell performed RLE Angiogram with possible intervention. Left Femoral Artery accessed.  The pt tolerated the procedure well; ETCo2 baseline 46, with consistent waveform during the procedure.  Angio-seal deployed in the left femoral artery at 1317, gauze and tegaderm applied to left groin, CDI and soft; pressure held x 5 minutes.  Pt alert and verbally appropriate post procedure, vital signs stable during procedure  and transport, see flow sheet for vital signs.  Report given to TABATHA Hunt.  RN transported pt to [facility] with ICU monitor.      Angio-seal 6F   REF [id number]. LOT [id number]

## 2019-05-31 NOTE — CARE PLAN
Problem: Safety  Goal: Will remain free from falls  Outcome: PROGRESSING AS EXPECTED  Fall precautions in place. Fall risk assessment every shift. Fall risk band on wrist. Sign posted outside of room. Bed locked and in low position. Call light and personal items within reach. Non-slip socks on for out of bed activities.     Problem: Pain Management  Goal: Pain level will decrease to patient's comfort goal  Outcome: PROGRESSING AS EXPECTED   05/31/19 0000 05/31/19 0400   OTHER   Pain Rating Scale (NPRS) --  3   Non Verbal Scale  Sleeping --    Comfort Goal --  Comfort at Rest;Comfort with Movement;Sleep Comfortably       Pain assessed on numerical scale of 0-10 q2h and PRN. C/o pain to LLE > RLE. PRN oxy given. Will continue to monitor.

## 2019-05-31 NOTE — PROGRESS NOTES
Monitor Summary    Rhythm: A fib with occasional PACs and PVCs  Rate: 70s-100s  Measurements: -/0.12/-

## 2019-05-31 NOTE — ASSESSMENT & PLAN NOTE
Repeat echo with EF of 35% and moderate MR with regional wall abnormalities  Continue IV Lasix and monitor intake and output  Dr. Nolasco, Cardiology consulted  Continue lisinopril,carvedilol and Aldactone  Proximal atrial fibrillation, Cardiology recommending anticoagulation to decrease stroke risk                                                    Xarelto initiated

## 2019-06-01 NOTE — CARE PLAN
Problem: Safety  Goal: Will remain free from falls    Intervention: Implement fall precautions   06/01/19 0244   OTHER   Environmental Precautions Treaded Slipper Socks on Patient;Personal Belongings, Wastebasket, Call Bell etc. in Easy Reach;Report Given to Other Health Care Providers Regarding Fall Risk;Bed in Low Position;Communication Sign for Patients & Families   Chair/Bed Strip Alarm Yes - Alarm On   Bed Alarm Yes - Alarm On         Problem: Respiratory:  Goal: Respiratory status will improve    Intervention: Assess and monitor pulmonary status   05/30/19 0316 05/31/19 1800 05/31/19 2000   OTHER   O2 (LPM) --  --  --    Work Of Breathing / Effort --  --  Mild   Pre/Post Intervention Pre Intervention Assessment --  --    RUL Breath Sounds --  --  Clear   RML Breath Sounds --  --  Diminished   RLL Breath Sounds --  --  Diminished   MALCOLM Breath Sounds --  --  Clear   LLL Breath Sounds --  --  Diminished   Vitals   Respiration --  19 --    Pulse Oximetry --  93 % --     06/01/19 0000   OTHER   O2 (LPM) 5   Work Of Breathing / Effort --    Pre/Post Intervention --    RUL Breath Sounds --    RML Breath Sounds --    RLL Breath Sounds --    MALCOLM Breath Sounds --    LLL Breath Sounds --    Vitals   Respiration --    Pulse Oximetry --

## 2019-06-01 NOTE — PROGRESS NOTES
Cardiology Follow Up Progress Note    Date of Service  6/1/2019    Attending Physician  Jourdan Pimentel M.D.    Chief Complaint   Ischemic cardiomyopathy.    HPI  Leonie Brock is a 73 y.o. female admitted 5/25/2019 with prior history of coronary to disease status post 5 stents placement with her initial heart attack happened in 1998 and last coronary stent done in 2008 via patient report, hypertension, hyperlipidemia, presented to the hospital because of lower extremity critical limb ischemia.  She has been evaluated by vascular surgery service for the treatment of her critical limb ischemia.  Cardiology was consulted due to an abnormal transthoracic echocardiogram showing left ventricular systolic function documented at 35%.  I personally interpreted the images of her transthoracic echocardiogram.     She last saw her cardiologist 7 years ago.    Interim Events  No telemetry events.  No acute events overnight.    Review of Systems  Review of Systems   Constitutional: Negative for chills and fever.   HENT: Negative for ear discharge, ear pain, hearing loss and nosebleeds.    Eyes: Negative for pain and discharge.   Respiratory: Negative for cough and shortness of breath.    Cardiovascular: Negative for chest pain, palpitations and leg swelling.   Gastrointestinal: Negative for abdominal pain, blood in stool, nausea and vomiting.   Genitourinary: Negative for dysuria and hematuria.   Musculoskeletal: Negative for myalgias.   Skin: Negative for rash.   Allergic/Immunologic: Negative for environmental allergies.   Neurological: Negative for dizziness and headaches.   Hematological: Does not bruise/bleed easily.   Psychiatric/Behavioral: Negative for hallucinations and suicidal ideas.       Vital signs in last 24 hours  Temp:  [36.6 °C (97.9 °F)-37 °C (98.6 °F)] 36.8 °C (98.2 °F)  Pulse:  [] 97  Resp:  [12-34] 17  SpO2:  [90 %-99 %] 92 %    Physical Exam  Physical Exam   Constitutional: She is oriented to  person, place, and time. No distress.   Able to lie flat.   HENT:   Head: Normocephalic and atraumatic.   Eyes: EOM are normal.   Neck: No JVD present.   Cardiovascular: Normal rate, regular rhythm and normal heart sounds.  Exam reveals no gallop and no friction rub.    No murmur heard.  Pulmonary/Chest: No respiratory distress. She has no wheezes. She has no rales. She exhibits no tenderness.   Abdominal: She exhibits no distension. There is no tenderness. There is no rebound and no guarding.   Musculoskeletal: She exhibits no edema or tenderness.   Lymphadenopathy:     She has no cervical adenopathy.   Neurological: She is alert and oriented to person, place, and time. No cranial nerve deficit.   Skin: Skin is dry.   Psychiatric: She has a normal mood and affect.   Nursing note and vitals reviewed.      Lab Review  Lab Results   Component Value Date/Time    WBC 9.5 06/01/2019 05:17 AM    RBC 3.40 (L) 06/01/2019 05:17 AM    HEMOGLOBIN 10.6 (L) 06/01/2019 05:17 AM    HEMATOCRIT 33.4 (L) 06/01/2019 05:17 AM    MCV 98.2 (H) 06/01/2019 05:17 AM    MCH 31.2 06/01/2019 05:17 AM    MCHC 31.7 (L) 06/01/2019 05:17 AM    MPV 9.7 06/01/2019 05:17 AM      Lab Results   Component Value Date/Time    SODIUM 139 06/01/2019 05:17 AM    POTASSIUM 4.1 06/01/2019 05:17 AM    CHLORIDE 104 06/01/2019 05:17 AM    CO2 30 06/01/2019 05:17 AM    GLUCOSE 235 (H) 06/01/2019 05:17 AM    BUN 13 06/01/2019 05:17 AM    CREATININE 0.83 06/01/2019 05:17 AM    BUNCREATRAT 23 12/21/2016 10:34 AM      Lab Results   Component Value Date/Time    ASTSGOT 16 05/30/2019 03:58 AM    ALTSGPT 9 05/30/2019 03:58 AM     Lab Results   Component Value Date/Time    CHOLSTRLTOT 81 (L) 01/03/2019 12:31 AM    LDL 33 01/03/2019 12:31 AM    HDL 34 (A) 01/03/2019 12:31 AM    TRIGLYCERIDE 69 01/03/2019 12:31 AM    TROPONINI 0.02 05/29/2019 09:14 PM             Cardiac Imaging and Procedures Review  EKG:  My personal interpretation of the EKG dated 05/30/2019 is atrial  fibrillation.    Echocardiogram:  I have independently interpreted and reviewed echocardiogram's actual images which showed reduced left ventricular systolic function. Moderate MR.    Cardiac Catheterization:  None.    Imaging  Chest X-Ray:  No edema.     Stress Test:  None.    Assessment/Plan  Principal Problem:    Diabetic foot ulcer with osteomyelitis (HCC) POA: Yes  Active Problems:    Acute on chronic respiratory failure (HCC) POA: Yes    Type 2 diabetes mellitus with skin complication, with long-term current use of insulin (HCC) POA: Yes    Coronary artery disease involving native coronary artery of native heart without angina pectoris POA: Yes    Essential hypertension POA: Yes    Paroxysmal atrial fibrillation (HCC) POA: Yes    Chronic anticoagulation POA: Yes    UTI (urinary tract infection) POA: Yes    Normocytic anemia POA: Yes    Rash POA: Yes    Peripheral vascular disease (HCC) POA: Yes    Cognitive decline POA: Yes    Deep vein thrombosis (DVT) of lower extremity (HCC) POA: Yes    Cardiomyopathy (HCC) POA: Unknown    Electrolyte abnormality POA: Unknown  Resolved Problems:    Chest pain POA: Yes    At this time, no clinical evidence of acute heart failure decompensation. Patient does not have heart failure symptomatology.  No clinical evidence of acute coronary syndrome.     No further cardiac work-up is indicated at this time.  Patient will be at moderate risk for future vascular surgery of procedure.  Her overall cardiovascular risk is nonmodifiable at this time.    Consider anticoagulation for stroke risk reduction if no contradiction with vascular surgery.     We will optimize medical therapy for now for ischemic cardiomyopathy.  Will increase Carvedilol to 6.25 mg po twice daily.  Will start Spironolactone 25 mg po daily.      Thank you for referring this patient to our cardiology service.  We will follow patient with you.        Austin Nolasco MD.   Cardiology Inpatient Service.  [facility].  .  [facility].

## 2019-06-01 NOTE — PROGRESS NOTES
LIMB PRESERVATION SERVICE    HPI:               Patient is well known to LPS and outpatient wound care services.     Leonie Brock is a 73 y.o. fe...male, with a past medical history that includes uncontrolled type 2 diabetes and a MI in 1998 with stint placement, admitted 5/25/2019 for Diabetic foot ulcer (HCC).   LPS has been consulted for her bilateral neuropathic full thickness heel ulcers.  These wounds [facility] the [facility] per the pt  Pt has been treating the wounds with out patient wound care and podiatry.   The wounds have failed to improve. Pt was previously admitted 12/29/2018 and had an Infected R DFU and had Sx with Dr. Kwon on  12/31/2018  A Right gastrocsoleus recession, Right I and D, Right ostectomy/excision.  Pt was diagnosed with type 2 diabetes 35 years ago, and is currently managing with insulin.  Pt checks their blood sugars 3 times daily and reports that these typically run around 130s to 200s.  They have had previous diabetes education.  They do have numbness in their feet.  They usually wears diabetic shoes. They do check their feet routinely. They are retired.   Previous ABIs were brisk and multiphasic in December 5/31/19  Pt evaluated by Dr Mcdowell, pt found to have bilateral lower extremity critical limb ischemia. Iliac stents needed (L TUCKER w/ high-grade disease) LLE angio from a groin approach in the future. RLE angio was planned for 5/30/19. The Angioogram was Cancelled due to pt having tachypnea and bilateral expiratory wheezing previous evening on exam. Patient denies fevers, chills, nausea, vomiting today.  Pain well controlled.     6/1/19  Pt evaluated by Dr Nash. Patient denies fevers, chills, nausea, vomiting today.  Pain well controlled.   Right lower extremity angiography completed by Dr Mcdowell 5/31/19. Left leg angiogram can be postponed with continued therapy for the bilateral heel ulcers with local wound care at this point. Left Heel stable Eschar.  "Will continue to monitor right heel with improved perfusion.    ASSESSMENT:    /70   Pulse 75   Temp 37.3 °C (99.2 °F) (Temporal)   Resp 16   Ht 1.626 m (5' 4.02\")   Wt 85 kg (187 lb 6.3 oz)   SpO2 94%   Breastfeeding? No   BMI 32.15 kg/m²           Wound 05/26/19 Neuropathic Heel Left Heel (Active)   Wound Image   5/26/2019 12:00 PM   Site Assessment Brown;Black 6/1/2019  4:06 PM   Nataliya-wound Assessment Maceration;Dark edges;Callused 6/1/2019  4:06 PM   Margins Defined edges 6/1/2019  4:06 PM   Wound Length (cm) 3 cm 5/26/2019 12:00 PM   Wound Width (cm) 4 cm 5/26/2019 12:00 PM   Wound Depth (cm) 0.2 cm 5/26/2019 12:00 PM   Wound Surface Area (cm^2) 12 cm^2 5/26/2019 12:00 PM   Drainage Amount None 6/1/2019  4:06 PM   Non-staged Wound Description Not applicable 6/1/2019  4:06 PM   Treatments Site care 5/27/2019  8:45 AM   Periwound Protectant Not Applicable 6/1/2019  4:06 PM   Dressing Options Nonadhesive Foam;Hypafix Tape 5/31/2019  8:00 PM   Dressing Cleansing/Solutions 3% Betadine 6/1/2019  4:06 PM   Dressing Changed Changed 5/31/2019  6:00 PM   Dressing Status Clean;Dry;Intact 5/31/2019  8:00 PM   Dressing Change Frequency Daily 6/1/2019  4:06 PM   NEXT Dressing Change  06/01/19 5/31/2019  6:00 PM   WOUND NURSE ONLY - Odor None 5/31/2019  9:15 AM   WOUND NURSE ONLY - Pulses Not palpable 5/31/2019  9:15 AM   WOUND NURSE ONLY - Exposed Structures None 5/31/2019  9:15 AM   WOUND NURSE ONLY - Tissue Type and Percentage 100% Brown 5/31/2019  9:15 AM   WOUND NURSE ONLY - Time Spent with Patient (mins) 45 5/31/2019  9:15 AM       Wound 05/26/19 Neuropathic Heel Right Heel (Active)   Wound Image     5/26/2019 11:10 AM   Site Assessment JEANINE 5/31/2019  8:00 PM   Nataliya-wound Assessment Maceration;Dark edges;Callused 6/1/2019  4:06 PM   Margins Attached edges 6/1/2019  4:06 PM   Wound Length (cm) 4 cm 5/29/2019  1:22 PM   Wound Width (cm) 6.5 cm 5/29/2019  1:22 PM   Wound Depth (cm) 0.2 cm 5/26/2019 11:10 AM " "  Wound Surface Area (cm^2) 26 cm^2 5/29/2019  1:22 PM   Closure Open to air 6/1/2019  4:06 PM   Drainage Amount None 6/1/2019  4:06 PM   Drainage Description Serosanguineous 6/1/2019  4:06 PM   Non-staged Wound Description Not applicable 6/1/2019  4:06 PM   Treatments Site care;Cleansed 5/29/2019  1:22 PM   Cleansing Not Applicable 5/29/2019  1:22 PM   Periwound Protectant Skin Protectant wipes to Periwound 5/29/2019  1:22 PM   Dressing Options Nonadhesive Foam;Hypafix Tape 6/1/2019  4:06 PM   Dressing Cleansing/Solutions 3% Betadine 5/27/2019  8:45 AM   Dressing Changed Changed 5/31/2019  6:00 PM   Dressing Status Clean;Dry;Intact 6/1/2019  4:06 PM   Dressing Change Frequency Daily 6/1/2019  4:06 PM   NEXT Dressing Change  06/01/19 5/31/2019  6:00 PM   WOUND NURSE ONLY - Odor None 5/31/2019  9:15 AM   WOUND NURSE ONLY - Pulses Not palpable 5/31/2019  9:15 AM   WOUND NURSE ONLY - Exposed Structures None 5/31/2019  9:15 AM   WOUND NURSE ONLY - Tissue Type and Percentage 60% Red 40% Brown 5/31/2019  9:15 AM   WOUND NURSE ONLY - Time Spent with Patient (mins) 45 6/1/2019  4:06 PM          DIABETES MANAGEMENT:  Lab Results   Component Value Date/Time    GLUCOSE 235 (H) 06/01/2019 05:17 AM      Lab Results   Component Value Date/Time    HBA1C 8.7 (H) 05/25/2019 03:20 PM        Diabetes education Seen 5/29/19    INFECTION MANAGEMENT:  WBC   Date/Time Value Ref Range Status   06/01/2019 05:17 AM 9.5 4.8 - 10.8 K/uL Final       Microbiology:   Results     Procedure Component Value Units Date/Time    BLOOD CULTURE [[id number]] Collected:  05/25/19 1520    Order Status:  Completed Specimen:  Blood from Peripheral Updated:  05/30/19 1700     Significant Indicator NEG     Source BLD     Site PERIPHERAL     Culture Result No growth after 5 days of incubation.    Narrative:       Per Hospital Policy: Only change Specimen Src: to \"Line\" if  specified by physician order.  No site indicated    BLOOD CULTURE [[id number]] Collected:  " "05/25/19 1525    Order Status:  Completed Specimen:  Blood from Peripheral Updated:  05/30/19 1700     Significant Indicator NEG     Source BLD     Site PERIPHERAL     Culture Result No growth after 5 days of incubation.    Narrative:       Per Hospital Policy: Only change Specimen Src: to \"Line\" if  specified by physician order.  No site indicated    CULTURE WOUND W/ GRAM STAIN [[id number]]  (Abnormal)  (Susceptibility) Collected:  05/26/19 1143    Order Status:  Completed Specimen:  Wound from Right Foot Updated:  05/28/19 0850     Significant Indicator POS (POS)     Source WND     Site RIGHT FOOT     Culture Result Light growth mixed skin ab. (A)     Gram Stain Result No organisms seen.     Culture Result Methicillin Resistant Staphylococcus aureus  Light growth  This isolate is presumed to be clindamycin resistant based on  detection of inducible resistance.  Clindamycin may still  be effective in some patients.   (A)    Narrative:       CALL  Gerardo  131 tel. [phone],  CALLED  131 tel. [phone] 05/28/2019, 08:50, RB PERF. RESULTS CALLED  TO:Tucker Staley07 Rn  Collected By:[id number] FLAQUITO FLOWERS  Heel  Collected By:[id number] FLAQUITO FLOWERS        PLAN:  Bilateral Neuropathic Heels - Full thickness - Stable eschar to left heel  Limb preservation status guarded  Wound care: Resume previous Wound Care orders.  Nursing to paint betadine daily - guaze and tape as needed     Wound Care by Nursing, LPS to Follow.     Offloading:   heel float boots bilaterally     Weight Bearing Status: Prafo to RLE and offloading shoe to LLE for transfers for stability. Bilateral heel float/Prevalon boots while in bed.  Pt is wheelchair bound.     Antibiotics: Per ID recommendation     Surgery: NA      7. Referrals:              Vascular Dr Mcdowell/Dr Nash involved              Ortho NA              Infectious diseases Dr Francis Continue IV Daptomycin 8 mg/kg daily  end date 7/12/2019              Diabetes Education Seen " 5/29/19              Ortho tech requested prafo Heel offloading to Right Heel and offloading shoe to Left Heel              Other Na     Professional collaboration: Bedside RN, ortho techs      LPS will follow     DISCHARGE PLAN:     Disposition: Patient requires skilled therapeutic intervention for debridement, product selection and application, education, wound bed preparation and assessment. Pt will need HH or OP wound care     Follow-up: ANTOLIN Soliman R.N.

## 2019-06-01 NOTE — CARE PLAN
Problem: Bowel/Gastric:  Goal: Normal bowel function is maintained or improved    Intervention: Educate patient and significant other/support system about diet, fluid intake, medications and activity to promote bowel function  Pt has poor po intake, less than 25% of meal trays and drinking only diet pepsi.  Pt encourage to increase intake by staff and , she denies appetite.      Problem: Respiratory:  Goal: Respiratory status will improve    Intervention: Assess and monitor pulmonary status  Pt remains on 5 liters nasal cannula 02, desaturates to 70% quickly when off.  Pt maintaining 02 sats > 90% on current setting, does use oxygen at home.      Problem: Skin Integrity  Goal: Risk for impaired skin integrity will decrease    Intervention: Assess risk factors for impaired skin integrity and/or pressure ulcers  Pt with heel ulcers on bilateral heels, float boots and dressings in place.  Pt on bedrest until plan of activity determined by physicians.

## 2019-06-01 NOTE — PROGRESS NOTES
Report to RN on tele 7, pt ready for transfer out of ICU.  Heel wounds cleaned and dressed, pt incontinent, pads changed.  Pt aware of transfer and agrees.

## 2019-06-01 NOTE — PROGRESS NOTES
Vascular    Patient sleeping.      VSS afeb    Access site looks good.  No evidence of hematoma or problems.      Reviewed the images of the bilateral heel ulcers.  Given normal perfusion in what might be the worst of both legs, I think a left leg angiogram can be postponed with continued therapy for the bilateral heel ulcers.

## 2019-06-01 NOTE — PROGRESS NOTES
Hospital Medicine Daily Progress Note/Rapid response note    Date of Service  6/1/2019    Chief Complaint  73 y.o. female admitted 5/25/2019 with diabetic foot ulcers    Hospital Course    73-year-old male past medical history of coronary artery disease questionable stent placement-/2018 or 1998 ??, paroxysmal atrial fibrillation, type 2 diabetes (last a1c 7.3 03/2019) mild to moderate cognitive decline, hypertension, hyperlipidemia sent by podiatrist for bilateral wound on her feet.  X-ray showed right osteomyelitis.  She was started on vanco/unasyn. Pt was not septic on admission.  LPS was consulted. On admission was also found patient has UTI which was already covered by above antibiotic.         Interval Problem Update    Oriented x2  AFib   -125  On heparin drip  Day 5 dapto               Consultants/Specialty  ID  vascular    Code Status  full    Disposition  TBD    Review of Systems  Review of Systems   Constitutional: Negative for chills and fever.   Respiratory: Positive for cough. Negative for shortness of breath.    Gastrointestinal: Negative for abdominal pain, nausea and vomiting.   Psychiatric/Behavioral: Positive for memory loss. The patient is not nervous/anxious.    All other systems reviewed and are negative.       Physical Exam  Temp:  [36.6 °C (97.9 °F)-37 °C (98.6 °F)] 36.8 °C (98.2 °F)  Pulse:  [] 97  Resp:  [12-34] 17  SpO2:  [90 %-99 %] 92 %    Physical Exam   Constitutional: She appears well-developed.   HENT:   Head: Normocephalic.   Right Ear: External ear normal.   Left Ear: External ear normal.   Eyes: Conjunctivae are normal. Right eye exhibits no discharge. Left eye exhibits no discharge.   Neck: No JVD present. No tracheal deviation present.   Cardiovascular: Normal rate.  An irregularly irregular rhythm present.   Pulmonary/Chest: She has decreased breath sounds. She has no rales.   Abdominal: Soft. Bowel sounds are normal. She exhibits no distension. There is no  tenderness. There is no rebound.   Musculoskeletal: She exhibits edema (Improved).   Neurological: She is alert. No cranial nerve deficit. She exhibits normal muscle tone.   Oriented x2   Skin: Skin is warm and dry. She is not diaphoretic.   Ulcers dressed with intact dressing no surrounding erythema   Psychiatric: She has a normal mood and affect. Her speech is delayed. She exhibits abnormal recent memory.       Fluids    Intake/Output Summary (Last 24 hours) at 06/01/19 0752  Last data filed at 06/01/19 0600   Gross per 24 hour   Intake             1075 ml   Output                0 ml   Net             1075 ml       Laboratory  Recent Labs      05/30/19   0358  05/31/19   0529  06/01/19   0517   WBC  11.6*  9.5  9.5   RBC  3.56*  3.62*  3.40*   HEMOGLOBIN  10.9*  11.1*  10.6*   HEMATOCRIT  35.2*  35.5*  33.4*   MCV  98.9*  98.1*  98.2*   MCH  30.6  30.7  31.2   MCHC  31.0*  31.3*  31.7*   RDW  47.1  46.3  46.5   PLATELETCT  227  219  256   MPV  9.4  9.7  9.7     Recent Labs      05/30/19   0358  05/31/19   0529  06/01/19   0517   SODIUM  143  139  139   POTASSIUM  3.4*  3.5*  4.1   CHLORIDE  109  106  104   CO2  26  29  30   GLUCOSE  97  145*  235*   BUN  11  10  13   CREATININE  0.86  0.74  0.83   CALCIUM  8.2*  8.7  8.7     Recent Labs      05/29/19   2114   05/30/19   2243  05/31/19   0529  05/31/19   1146   APTT  51.7*   < >  25.2  70.7*  60.3*   INR  1.09   --    --    --    --     < > = values in this interval not displayed.               Imaging  EC-ECHOCARDIOGRAM COMPLETE W/O CONT   Final Result      CT-HEAD W/O   Final Result      1.  Cerebral atrophy.      2.  White matter lucencies most consistent with small vessel ischemic change versus demyelination or gliosis.      3.  Otherwise, Head CT without contrast with no acute findings. No evidence of acute cerebral infarction, hemorrhage or mass lesion.      DX-CHEST-LIMITED (1 VIEW)   Final Result      1.  Marked pulmonary edema.   2.  Possible underlying  bibasilar airspace opacity/alveolar pulmonary edema and/or bilateral pleural effusions.      US-SELWYN SINGLE LEVEL BILAT   Final Result      CTA ABDOMEN PELVIS W & W/O POST PROCESS   Final Result      1.  Moderate atherosclerotic calcification abdominal aorta without aneurysm or dissection.   2.  Moderate atherosclerotic calcification of the LEFT carotid system without segmental occlusion.   3.  Small bilateral pleural effusions with associated atelectasis.   4.  Increased colonic stool suggesting constipation.   5.  Colonic diverticula.      US-EXTREMITY VENOUS LOWER BILAT   Final Result      US-EXTREMITY ARTERY LOWER BILAT   Final Result      DX-FOOT-COMPLETE 3+ RIGHT   Final Result      1.  No evidence of fracture or dislocation.      2.  Severe degenerative changes again present raising possibility of neuropathic foot.      3.  There appears to be increase in bone erosions along the medial and inferior tarsal bones which could indicate osteomyelitis.         DX-FOOT-COMPLETE 3+ LEFT   Final Result      1.  No evidence of fracture or dislocation.      2.  Osteoarthritis is noted. No bone erosions are identified.      IR-EXTREMITY ANGIOGRAM-UNILATERAL RIGHT    (Results Pending)        Assessment/Plan  * Diabetic foot ulcer with osteomyelitis (HCC)- (present on admission)   Assessment & Plan    Culture MRSA    Continue wound care  Continue daptomycin through July 12 discussed with ID  Will order PICC line       Acute on chronic respiratory failure (HCC)- (present on admission)   Assessment & Plan    Chest x-ray consistent with acute cardiogenic pulmonary edema    Continue forced diuresis and monitor intake and output renal function electrolytes     Essential hypertension- (present on admission)   Assessment & Plan    Continue carvedilol and monitor blood pressure     Coronary artery disease involving native coronary artery of native heart without angina pectoris- (present on admission)   Assessment & Plan    Continue  aspirin carvedilol and statin       Type 2 diabetes mellitus with skin complication, with long-term current use of insulin (HCC)- (present on admission)   Assessment & Plan    HbA1c 8.7    Will restart Lantus at 10 units and closely monitor given recent hypoglycemia  Continue insulin sliding scale         Electrolyte abnormality   Assessment & Plan    Hypokalemia  Hypophosphatemia  Hypomagnesemia    Replete with IV magnesium sulfate and potassium phosphate and monitor electrolytes     Cardiomyopathy (HCC)   Assessment & Plan    Repeat echo with EF of 35% and moderate MR with regional wall abnormalities    Continue IV Lasix and monitor intake and output  Cardiology following discussed with  To  Continue lisinopril increase carvedilol and start Aldactone         Deep vein thrombosis (DVT) of lower extremity (HCC)- (present on admission)   Assessment & Plan    Acute   continue heparin  Transition to Xarelto after PICC line placement         Peripheral vascular disease (HCC)- (present on admission)   Assessment & Plan    With bilateral lower extremity ulcers    Right lower extremity angiogram with good perfusion vascular surgery following with no plan further interventions at this time     Normocytic anemia- (present on admission)   Assessment & Plan    With iron deficiency    Hemoglobin 10.6 no clinical signs of bleeding continue to monitor     UTI (urinary tract infection)- (present on admission)   Assessment & Plan    Resolved  Completed 3-day course of antibiotics with Zosyn         Chronic anticoagulation- (present on admission)   Assessment & Plan           Paroxysmal atrial fibrillation (HCC)- (present on admission)   Assessment & Plan    Rate controlled  Continue carvedilol and heparin plan to transition to Xarelto after PICC line placement             Plan of care reviewed with patient and  at bedside and discussed with nursing staff and pharmacist    VTE prophylaxis: heparin infusion

## 2019-06-01 NOTE — PROGRESS NOTES
"Pt oriented to self, states she is in \"[place]\", states year is \"2019\", does not know month or date, can't state reason for being in hospital.  Pt denies pain this morning.  Ate ~25% or less of breakfast saying \"it was good when it was hot, not so much now\".  Offered to heat up food, pt states \"now I'm full anyway\".   at bedside in scooter, does not assist with patient care.  Awaiting plan from vascular surgery regarding activity, heel ulcer intervention.  Pt incontinent of stool and urine states \"I just don't know when it is coming\".  Patient and  live together in [place], both use scooters and have limited mobility, pt will require rehab prior to going back home.  "

## 2019-06-02 NOTE — PROGRESS NOTES
2 RN Skin check complete with TABATHA Alex.  Devices in place: silicone oxygen tubing and heel float boots  Skin assessed under devices: wounds to bilateral heels, otherwise skin intact  Confirmed pressure ulcers: found on bilateral heels   New potential pressure ulcers noted on: N/A   The following interventions in place: silicone oxygen tubing, waffle mattress, Q2 turns, heel float boots on bilateral heels, and eloina barrier cream and barrier wipes in use.     Generalized integumentary is pale, fragile, with generalized bruising  Bilateral ears - pink, blanching  Bilateral elbows - red, blanching - mepilex lite in place  Pannus / Groin / Sacrum - red, blanching, excoriated, incontinence associated dermatitis - mepilex contraindicated, nystatin powder applied per MAR

## 2019-06-02 NOTE — PROGRESS NOTES
Pt transferred to telemetry 702 via bed witn transport and RN.  Report and hand off to TABATHA Miranda.  All personal belongings sent with pt, electric scooter to room as well.

## 2019-06-02 NOTE — PROGRESS NOTES
Infectious Disease Progress Note    Author: Eddie Ribeiro M.D. Date & Time of service: 2019  9:16 AM    Chief Complaint:  Diabetic foot ulcer    Interval History:  73 y.o. Female with hx of poorly controlled DM, COPD on 3 L NC at home.  Admitted on 19 for bilateral heel ulcerations with concerns for OM.   2019 T-max is 98.1.  Vascular Dopplers are being performed.  Denies any fevers or chills.  WBC 7.4.  Creatinine is 0.98  19- AF, WBC 8.0, no issue with abx, denies any pain currently to BLE but had 9/10 sharp BLE pain last night that improved with pain meds.  - Tm 99.5, WBC 9.8, tolerating antibiotics without issue, denies any pain to BLE, abdomen distended and noting that she needs to have a bowel movement.  - AF, WBC 11.6, no issue with antibiotics, denies pain, denies shortness of breath or chest palpitations, resting comfortably in bed.  -AF, WBC 9.5, complaining of 9/10 LLE intermittent sharp shooting pain that improves with rest and pain meds, n.p.o. for angiogram today, no issue with antibiotics.   afebrile, white count 9.3.  Underwent right lower extremity angiogram on  with normal perfusion, no intervention performed.  Resting comfortably this a.m., no new issues.    Labs Reviewed, Medications Reviewed and Radiology Reviewed.    Review of Systems:  Review of Systems   Constitutional: Positive for malaise/fatigue (Improving). Negative for chills and fever.   HENT: Negative for sore throat.    Respiratory: Negative for shortness of breath.    Cardiovascular: Negative for leg swelling.   Gastrointestinal: Negative for abdominal pain, diarrhea, nausea and vomiting.   Skin: Negative for itching and rash.   Neurological: Negative for tingling, sensory change and weakness.   Psychiatric/Behavioral: The patient is not nervous/anxious.    Note unreliable ROS due to mental status    Hemodynamics:  Temp (24hrs), Av.8 °C (98.2 °F), Min:36.1 °C (97 °F), Max:37.3 °C (99.2  °F)  Temperature: 36.1 °C (97 °F)  Pulse  Av.4  Min: 68  Max: 118Heart Rate (Monitored): 75  Blood Pressure : 100/57, NIBP: 116/51       Physical Exam:  Physical Exam   Constitutional: She appears well-developed and well-nourished. No distress.   Obese   HENT:   Head: Normocephalic and atraumatic.   Mouth/Throat: Oropharynx is clear and moist.   Eyes: Pupils are equal, round, and reactive to light. Conjunctivae are normal.   Neck: Normal range of motion. Neck supple.   Cardiovascular: Normal rate.  An irregularly irregular rhythm present. Exam reveals no friction rub.    No murmur heard.  Pulmonary/Chest: Effort normal. No respiratory distress. She has no wheezes.   Diminished breath sounds to B/L bases   Abdominal: Soft. Bowel sounds are normal. She exhibits no distension. There is no tenderness.   Musculoskeletal: She exhibits edema (Trace BLE). She exhibits no tenderness.   Bilateral heels with dressing in place.  See pictures in media tab   Neurological: She is alert. No cranial nerve deficit.   No gross focal deficits.  Decreased sensation to BLE.  Oriented to self and year   Skin: Skin is warm and dry. No rash noted. She is not diaphoretic. No erythema.   Nursing note and vitals reviewed.    Meds:    Current Facility-Administered Medications:   •  carvedilol  •  spironolactone  •  insulin glargine  •  heparin **AND** heparin **AND** Protocol 440 Heparin Weight Based DO NOT GIVE ANY HEPARIN BOLUS TO STROKE PATIENT **AND** Protocol 440 Heparin Weight Based Discontinue Enoxaparin (Lovenox), Dabigatran (Pradaxa), Rivaroxaban (Xarelto), Apixaban (Eliquis), Edoxaban (Savaysa, Lixiana), Fondaparinux (Arixtra) and Argatroban prior to heparin administration **AND** Protocol 440 Heparin Weight Based Draw baseline aPTT, PT, and platelet count if not already done **AND** Protocol 440 Heparin Weight Based Draw aPTT 6 hours after beginning infusion.  **AND** Protocol 440 Heparin Weight Based Record Patient Data **AND**  Protocol 440 Heparin Weight Based INSTRUCTIONS **AND** Protocol 440 Heparin Weight Based Review aPTT results 6 hours after infusion is begun as detailed **AND** Protocol 440 Heparin Weight Based Draw Platelet count every three days. Contact MD if platelet is 50% lower than baseline count. **AND** Protocol 440 Heparin Weight Based Adjust heparin to maintain aPTT between 55-96 sec **AND** Protocol 440 Heparin Weight Based Order aPTT 6 hours after any rate change or hold until aPTT is therapeutic (55-96 seconds) **AND** Protocol 440 Heparin Weight Based Documentation and verification  •  lisinopril  •  potassium chloride SA  •  furosemide  •  albuterol  •  DAPTOmycin  •  ferrous sulfate-c-folic acid  •  cyanocobalamin  •  triamcinolone acetonide  •  aspirin EC  •  nystatin  •  Respiratory Care per Protocol  •  ipratropium-albuterol  •  acetaminophen  •  gabapentin  •  omeprazole  •  ondansetron  •  pravastatin  •  senna-docusate **AND** polyethylene glycol/lytes **AND** magnesium hydroxide **AND** bisacodyl  •  ondansetron  •  Notify provider if pain remains uncontrolled **AND** Use the numeric rating scale (NRS-11) on regular floors and Critical-Care Pain Observation Tool (CPOT) on ICUs/Trauma to assess pain **AND** Pulse Ox (Oximetry) **AND** Pharmacy Consult Request **AND** If patient difficult to arouse and/or has respiratory depression, stop any opiates that are currently infusing and call a Rapid Response. **AND** oxyCODONE immediate-release **AND** oxyCODONE immediate-release **AND** HYDROmorphone  •  lactobacillus rhamnosus  •  insulin regular **AND** Accu-Chek ACHS **AND** NOTIFY MD and PharmD **AND** glucose **AND** dextrose 10% bolus    Labs:  Recent Labs      05/31/19   0529 06/01/19   0517  06/02/19   0623   WBC  9.5  9.5  9.3   RBC  3.62*  3.40*  3.65*   HEMOGLOBIN  11.1*  10.6*  11.0*   HEMATOCRIT  35.5*  33.4*  35.8*   MCV  98.1*  98.2*  98.1*   MCH  30.7  31.2  30.1   RDW  46.3  46.5  46.5  "  PLATELETCT  219  256  275   MPV  9.7  9.7  9.6   NEUTSPOLYS  74.60*  77.40*  75.50*   LYMPHOCYTES  9.80*  7.70*  8.90*   MONOCYTES  10.80  9.50  9.30   EOSINOPHILS  3.80  4.40  5.00   BASOPHILS  0.60  0.60  0.80     Recent Labs      05/31/19   0529  06/01/19   0517  06/02/19   0623   SODIUM  139  139  140   POTASSIUM  3.5*  4.1  4.4   CHLORIDE  106  104  103   CO2  29  30  31   GLUCOSE  145*  235*  214*   BUN  10  13  17     Recent Labs      05/31/19   0529  06/01/19   0517  06/02/19   0623   CREATININE  0.74  0.83  1.00       Imaging:  May 31, 2019 12:21 PM  EC-ECHOCARDIOGRAM COMPLETE W/O CONT   CONCLUSIONS  Akinetic inferolateral wall.  Moderately reduced left ventricular systolic function.  Left ventricular ejection fraction is visually estimated to be 35%.  Left ventricle is mildly dilated.  Eccentric posteriorly directed mitral regurgitation, probably moderate.  Moderately dilated left atrium.  Aortic sclerosis without stenosis.  Mild tricuspid regurgitation.  Normal inferior vena cava size without inspiratory collapse.  Right ventricular systolic pressure is estimated to be 40 mmHg.  Mildly dilated right heartventricle.  Normal right ventricular systolic function.  Pleural effusion present.  Normal pericardium without effusion.        Micro:  Results     Procedure Component Value Units Date/Time    BLOOD CULTURE [[id number]] Collected:  05/25/19 1520    Order Status:  Completed Specimen:  Blood from Peripheral Updated:  05/30/19 1700     Significant Indicator NEG     Source BLD     Site PERIPHERAL     Culture Result No growth after 5 days of incubation.    Narrative:       Per Hospital Policy: Only change Specimen Src: to \"Line\" if  specified by physician order.  No site indicated    BLOOD CULTURE [[id number]] Collected:  05/25/19 1525    Order Status:  Completed Specimen:  Blood from Peripheral Updated:  05/30/19 1700     Significant Indicator NEG     Source BLD     Site PERIPHERAL     Culture Result No " "growth after 5 days of incubation.    Narrative:       Per Hospital Policy: Only change Specimen Src: to \"Line\" if  specified by physician order.  No site indicated    CULTURE WOUND W/ GRAM STAIN [[id number]]  (Abnormal)  (Susceptibility) Collected:  05/26/19 1143    Order Status:  Completed Specimen:  Wound from Right Foot Updated:  05/28/19 0850     Significant Indicator POS (POS)     Source WND     Site RIGHT FOOT     Culture Result Light growth mixed skin ab. (A)     Gram Stain Result No organisms seen.     Culture Result Methicillin Resistant Staphylococcus aureus  Light growth  This isolate is presumed to be clindamycin resistant based on  detection of inducible resistance.  Clindamycin may still  be effective in some patients.   (A)    Narrative:       CALL  Gerardo  131 tel. [phone],  CALLED  131 tel. [phone] 05/28/2019, 08:50, RB PERF. RESULTS CALLED  TO:[id number] Rn  Collected By:[id number] FLAQUITO FLOWERS  Heel  Collected By:[id number] FLAQUITO FLOWERS    Culture & Susceptibility     METHICILLIN RESISTANT STAPHYLOCOCCUS AUREUS     Antibiotic Sensitivity Microscan Unit Status    Ampicillin/sulbactam Resistant <=8/4 mcg/mL Final    Method: BERNARDINO    Clindamycin Resistant <=0.5 mcg/mL Final    Method: BERNARDINO    Daptomycin Sensitive <=0.5 mcg/mL Final    Method: BERNARDINO    Erythromycin Resistant >4 mcg/mL Final    Method: BERNARDINO    Moxifloxacin Intermediate 4 mcg/mL Final    Method: BERNARDINO    Oxacillin Resistant >2 mcg/mL Final    Method: BERNARDINO    Penicillin Resistant >8 mcg/mL Final    Method: BERNARDINO    Tetracycline Resistant >8 mcg/mL Final    Method: BERNARDINO    Trimeth/Sulfa Sensitive <=0.5/9.5 mcg/mL Final    Method: BERNARDINO    Vancomycin Sensitive 1 mcg/mL Final    Method: BERNARDINO                       URINE CULTURE(NEW) [[id number]]  (Abnormal)  (Susceptibility) Collected:  05/25/19 1800    Order Status:  Completed Specimen:  Urine Updated:  05/27/19 1151     Significant Indicator POS (POS)     Source UR     Site -     Culture " Result - (A)      Klebsiella pneumoniae  ,000 cfu/mL   (A)    Narrative:       Indication for culture:->Emergency Room Patient  Indication for culture:->Emergency Room Patient    Culture & Susceptibility     KLEBSIELLA PNEUMONIAE     Antibiotic Sensitivity Microscan Unit Status    Ampicillin Resistant >16 mcg/mL Final    Method: BERNARDINO    Ampicillin/sulbactam Resistant >16/8 mcg/mL Final    Method: BERNARDINO    Cefepime Sensitive <=8 mcg/mL Final    Method: BERNARDINO    Cefotaxime Sensitive <=2 mcg/mL Final    Method: BERNARDINO    Cefotetan Sensitive <=16 mcg/mL Final    Method: BERNARDINO    Ceftazidime Sensitive <=1 mcg/mL Final    Method: BERNARDINO    Ceftriaxone Sensitive <=8 mcg/mL Final    Method: BERNARDINO    Cefuroxime Sensitive <=4 mcg/mL Final    Method: BERNARDINO    Cephalothin Resistant >16 mcg/mL Final    Method: BERNARDINO    Ciprofloxacin Sensitive <=1 mcg/mL Final    Method: BERNARDINO    Gentamicin Sensitive <=4 mcg/mL Final    Method: BERNARDINO    Levofloxacin Sensitive <=2 mcg/mL Final    Method: BERNARDINO    Nitrofurantoin Sensitive <=32 mcg/mL Final    Method: BERNARDINO    Pip/Tazobactam Sensitive <=16 mcg/mL Final    Method: BERNARDINO    Piperacillin Resistant >64 mcg/mL Final    Method: BERNARDINO    Tigecycline Sensitive <=2 mcg/mL Final    Method: BERNARDINO    Tobramycin Sensitive <=4 mcg/mL Final    Method: BERNARDINO    Trimeth/Sulfa Resistant >2/38 mcg/mL Final    Method: BERNARDINO                       GRAM STAIN [[id number]] Collected:  05/26/19 1143    Order Status:  Completed Specimen:  Wound Updated:  05/26/19 1816     Significant Indicator .     Source WND     Site RIGHT FOOT     Gram Stain Result No organisms seen.    Narrative:       Collected By:[id number] FLAQUITO FLOWERS  Heel  Collected By:[id number] FLAQUITO FLOWERS          Assessment:  Active Hospital Problems    Diagnosis   • *Diabetic foot ulcer with osteomyelitis (HCC) [E11.621, E11.69, L97.509, M86.9]   • Type 2 diabetes mellitus with skin complication, with long-term current use of insulin (HCC) [E11.628, Z79.4]   •  Peripheral vascular disease (HCC) [I73.9]   • UTI (urinary tract infection) [N39.0]       Plan:  Bilateral DM heel ulcers with Right diabetic foot OM  Afebrile  Leukocytosis resolved  Bcx on 5/25 negative  Was d/c'd home in January 2019 on IV Dapto for R foot OM; however, pt stated she did not finish IV abx and does not recall if she was given any other abx.  Never followed up in ID clinic. OM not treated.   Previous R foot cx +MRSE (bone), MSSE (tissue) & E faecalis (tissue) December 2018  Wound cx from R heel on 5/26 +MRSA  Continue IV daptomycin 8 mg/kg daily  CPK weekly while on daptomycin  CPK 14 on 5/28. Will repeat with AM labs  Plan to treat with 6 weeks IV antibiotics due to right diabetic foot OM that was untreated  Not a good candidate for oral antibiotics given poor compliance and cognitive decline  Estimated end date 7/6/2019  Weekly CBC with differential, CMP, CK while on IV daptomycin  Okay to place PICC line  Continue with wound care and offloading    PVD  US of BLE showing acute on chronic LLE DVT and 50% stenosis of Right mid SFA  Underwent RLE angiogram with Dr. Pro 5/31 with normal flow, no intervention performed  Vascular planning to postpone left lower extremity angiogram since the heel wound on the right appeared worse but had normal perfusion    UTI-resolved  Ucx on 5/25 +Klebsiella pneumoniae  Completed IV Zosyn on 5/30     Diabetes mellitus  Hemoglobin A1c was 8.7% on 5/25/19  Will adversely affect wound healing and resolution of infection    Afib  Rate controlled    Disposition  Likely SNF    Discussed with primary, Dr. Quinn Morin    ID will follow.  Please call with questions.

## 2019-06-02 NOTE — PROGRESS NOTES
Cardiology Follow Up Progress Note    Date of Service  6/2/2019    Attending Physician  Yessy Pretty D.O.    Chief Complaint   Abnormal TTE.      HPI  Leonie Brock is a 73 y.o. female admitted 5/25/2019 with critical limb ischemia who underwent RLE angiogram which did not find obstructive lesions.    Interim Events  No telemetry events.  No acute events overnight.      Review of Systems  Review of Systems   Constitutional: Negative for chills and fever.   HENT: Negative for ear discharge, ear pain, hearing loss and nosebleeds.    Eyes: Negative for pain and discharge.   Respiratory: Negative for cough and shortness of breath.    Cardiovascular: Negative for chest pain, palpitations and leg swelling.   Gastrointestinal: Negative for abdominal pain, blood in stool, nausea and vomiting.   Genitourinary: Negative for dysuria and hematuria.   Musculoskeletal: Negative for myalgias.   Skin: Negative for rash.   Allergic/Immunologic: Negative for environmental allergies.   Neurological: Negative for dizziness and headaches.   Hematological: Does not bruise/bleed easily.   Psychiatric/Behavioral: Negative for hallucinations and suicidal ideas.       Vital signs in last 24 hours  Temp:  [36.1 °C (97 °F)-37.3 °C (99.2 °F)] 36.1 °C (97 °F)  Pulse:  [72-88] 72  Resp:  [11-23] 18  BP: (100-133)/(57-65) 133/63  SpO2:  [91 %-96 %] 96 %    Physical Exam  Physical Exam   Constitutional: She is oriented to person, place, and time. No distress.   HENT:   Head: Normocephalic and atraumatic.   Eyes: Right eye exhibits no discharge. Left eye exhibits no discharge.   Neck: No JVD present.   Cardiovascular: Normal rate, regular rhythm and normal heart sounds.  Exam reveals no gallop and no friction rub.    No murmur heard.  Pulmonary/Chest: No respiratory distress.   Abdominal: She exhibits no distension.   Musculoskeletal: She exhibits no edema or tenderness.   + lower extremities wound.   Lymphadenopathy:     She has no cervical  adenopathy.   Neurological: She is alert and oriented to person, place, and time.   Skin: Skin is dry.   Psychiatric: She has a normal mood and affect.   Nursing note and vitals reviewed.      Lab Review  Lab Results   Component Value Date/Time    WBC 9.3 06/02/2019 06:23 AM    RBC 3.65 (L) 06/02/2019 06:23 AM    HEMOGLOBIN 11.0 (L) 06/02/2019 06:23 AM    HEMATOCRIT 35.8 (L) 06/02/2019 06:23 AM    MCV 98.1 (H) 06/02/2019 06:23 AM    MCH 30.1 06/02/2019 06:23 AM    MCHC 30.7 (L) 06/02/2019 06:23 AM    MPV 9.6 06/02/2019 06:23 AM      Lab Results   Component Value Date/Time    SODIUM 140 06/02/2019 06:23 AM    POTASSIUM 4.4 06/02/2019 06:23 AM    CHLORIDE 103 06/02/2019 06:23 AM    CO2 31 06/02/2019 06:23 AM    GLUCOSE 214 (H) 06/02/2019 06:23 AM    BUN 17 06/02/2019 06:23 AM    CREATININE 1.00 06/02/2019 06:23 AM    BUNCREATRAT 23 12/21/2016 10:34 AM      Lab Results   Component Value Date/Time    ASTSGOT 16 05/30/2019 03:58 AM    ALTSGPT 9 05/30/2019 03:58 AM     Lab Results   Component Value Date/Time    CHOLSTRLTOT 81 (L) 01/03/2019 12:31 AM    LDL 33 01/03/2019 12:31 AM    HDL 34 (A) 01/03/2019 12:31 AM    TRIGLYCERIDE 69 01/03/2019 12:31 AM    TROPONINI 0.02 05/29/2019 09:14 PM             Cardiac Imaging and Procedures Review  EKG:  My personal interpretation of the EKG dated 05/30/2019 is sinus, no ACS.    Echocardiogram:  LVEF of 35%.  No significant valvular disease.  I personally interpreted the images.    Cardiac Catheterization:  None.    Imaging  Chest X-Ray:  No edema.     Stress Test:  None.    Assessment/Plan  Principal Problem:    Diabetic foot ulcer with osteomyelitis (HCC) POA: Yes  Active Problems:    Acute on chronic respiratory failure (HCC) POA: Yes    Type 2 diabetes mellitus with skin complication, with long-term current use of insulin (HCC) POA: Yes    Coronary artery disease involving native coronary artery of native heart without angina pectoris POA: Yes    Essential hypertension POA:  Yes    Paroxysmal atrial fibrillation (HCC) POA: Yes    Chronic anticoagulation POA: Yes    UTI (urinary tract infection) POA: Yes    Normocytic anemia POA: Yes    Rash POA: Yes    Peripheral vascular disease (HCC) POA: Yes    Cognitive decline POA: Yes    Deep vein thrombosis (DVT) of lower extremity (HCC) POA: Yes    Cardiomyopathy (HCC) POA: Unknown    Electrolyte abnormality POA: Unknown  Resolved Problems:    Chest pain POA: Yes      Leonie Brock is a 73 y.o. female admitted 5/25/2019 with prior history of coronary to disease status post 5 stents placement with her initial heart attack happened in 1998 and last coronary stent done in 2008 via patient report, hypertension, hyperlipidemia, presented to the hospital because of lower extremity critical limb ischemia.    At this time, no clinical evidence of acute heart failure decompensation. Patient does not have heart failure symptomatology.  No clinical evidence of acute coronary syndrome.    No further cardiac work-up is indicated at this time.     Cardiomyopathy is presumed to be ischemic in nature due to prior history of coronary stents. Will consider outpatient stress test once she is out of hospital.    Will optimize medical therapy. Carvedilol 6.25 mg po bid. Lisinopril 5 mg po daily, Spironolactone 25 mg po daily.    Continue ASA, Pravastatin 20 mg po daily. Consider switching to Atorvastatin in outpatient setting.    Patient is a good candidate for Xarelto 2.5 mg po bid to further decrease risk of CV mortality given her established CAD with prior coronary stents. However, we cannot initiate such therapy in inpatient setting due to pharmacy policy. Will consider in the outpatient.    Will sign off at this time, please call us with further questions.  Patient will be followed in the outpatient cardiology clinic for further cardiac care.    Thank you for referring this patient to our cardiology service.    Austin Nolasco MD.   [facility]  Heart and Vascular Health.

## 2019-06-02 NOTE — PROGRESS NOTES
Report received, pt care assumed, tele box on and confirmed with monitor room. Pt aaox4, no signs of distress noted at this time. POC discussed with pt and verbalizes no questions. Pt denies any additional needs at this time. Bed in lowest position, pt educated on fall risk and verbalized understanding, call light within reach.

## 2019-06-02 NOTE — PROGRESS NOTES
Received report from NOC RN. Assumed care of patient at 0700. Patient sleeping comfortably in bed at this time. On 4L NC, no signs of respiratory distress at this time. No s/sx of pain or discomfort. Call light and belongings within reach. Bed in lowest locked position. Upper side rails raised. Hourly rounding in place. Will continue to monitor.

## 2019-06-02 NOTE — PROGRESS NOTES
Hospital Medicine Daily Progress Note/Rapid response note    Date of Service  6/2/2019    Chief Complaint  73 y.o. female admitted 5/25/2019 with diabetic foot ulcers    Hospital Course    73-year-old male past medical history of coronary artery disease questionable stent placement-/2018 or 1998 ??, paroxysmal atrial fibrillation, type 2 diabetes (last a1c 7.3 03/2019) mild to moderate cognitive decline, hypertension, hyperlipidemia sent by podiatrist for bilateral wound on her feet.  X-ray showed right osteomyelitis.  She was started on vanco/unasyn. Pt was not septic on admission.  LPS was consulted. On admission was also found patient has UTI which was already covered by above antibiotic.         Interval Problem Update  Arousable  Agitated  Discussed need for PICC line placement, patient reports upset stomach with PICC line  Discussed with patient plan for trial therapy  At this point patient does not want a PICC line placed, she is aware that the antibiotics may cause some stomach upset, and is agreeable to continue antibiotics        Consultants/Specialty  ID  vascular    Code Status  full    Disposition  PT/OT  Will need skilled nursing facility placement for antibiotics through July 6    Review of Systems  Review of Systems   Constitutional: Negative for chills, diaphoresis, fever and malaise/fatigue.   Respiratory: Negative for cough and shortness of breath.    Gastrointestinal: Negative for abdominal pain and nausea.   Musculoskeletal: Positive for myalgias.   Neurological: Positive for weakness. Negative for dizziness.   Psychiatric/Behavioral: Positive for memory loss. Negative for depression. The patient is not nervous/anxious.    All other systems reviewed and are negative.       Physical Exam  Temp:  [36.1 °C (97 °F)-37 °C (98.6 °F)] 36.1 °C (97 °F)  Pulse:  [72-88] 72  Resp:  [13-18] 18  BP: (100-133)/(57-65) 133/63  SpO2:  [91 %-96 %] 96 %    Physical Exam   HENT:   Head: Normocephalic.    Mouth/Throat: No oropharyngeal exudate.   Eyes: Conjunctivae are normal. No scleral icterus.   Neck: Normal range of motion. Neck supple. No tracheal deviation present.   Cardiovascular: Normal rate.  An irregularly irregular rhythm present.   Pulmonary/Chest: She has decreased breath sounds. She has no rales.   Abdominal: Soft. Bowel sounds are normal. She exhibits no distension and no mass. There is no tenderness.   Musculoskeletal: She exhibits edema (Improved). She exhibits no tenderness.   Neurological: She is alert. No cranial nerve deficit. Coordination normal.   Oriented x2   Skin: Skin is warm and dry. She is not diaphoretic. No erythema.   Ulcers dressed with intact dressing no surrounding erythema   Psychiatric: She has a normal mood and affect. Her speech is delayed. She exhibits abnormal recent memory.       Fluids    Intake/Output Summary (Last 24 hours) at 06/02/19 1354  Last data filed at 06/01/19 1756   Gross per 24 hour   Intake           211.25 ml   Output                0 ml   Net           211.25 ml       Laboratory  Recent Labs      05/31/19   0529  06/01/19   0517  06/02/19   0623   WBC  9.5  9.5  9.3   RBC  3.62*  3.40*  3.65*   HEMOGLOBIN  11.1*  10.6*  11.0*   HEMATOCRIT  35.5*  33.4*  35.8*   MCV  98.1*  98.2*  98.1*   MCH  30.7  31.2  30.1   MCHC  31.3*  31.7*  30.7*   RDW  46.3  46.5  46.5   PLATELETCT  219  256  275   MPV  9.7  9.7  9.6     Recent Labs      05/31/19   0529  06/01/19   0517  06/02/19   0623   SODIUM  139  139  140   POTASSIUM  3.5*  4.1  4.4   CHLORIDE  106  104  103   CO2  29  30  31   GLUCOSE  145*  235*  214*   BUN  10  13  17   CREATININE  0.74  0.83  1.00   CALCIUM  8.7  8.7  8.8     Recent Labs      05/31/19   1146  06/01/19   1230  06/02/19   0623   APTT  60.3*  72.5*  55.4*               Imaging  EC-ECHOCARDIOGRAM COMPLETE W/O CONT   Final Result      CT-HEAD W/O   Final Result      1.  Cerebral atrophy.      2.  White matter lucencies most consistent with  small vessel ischemic change versus demyelination or gliosis.      3.  Otherwise, Head CT without contrast with no acute findings. No evidence of acute cerebral infarction, hemorrhage or mass lesion.      DX-CHEST-LIMITED (1 VIEW)   Final Result      1.  Marked pulmonary edema.   2.  Possible underlying bibasilar airspace opacity/alveolar pulmonary edema and/or bilateral pleural effusions.      US-SELWYN SINGLE LEVEL BILAT   Final Result      CTA ABDOMEN PELVIS W & W/O POST PROCESS   Final Result      1.  Moderate atherosclerotic calcification abdominal aorta without aneurysm or dissection.   2.  Moderate atherosclerotic calcification of the LEFT carotid system without segmental occlusion.   3.  Small bilateral pleural effusions with associated atelectasis.   4.  Increased colonic stool suggesting constipation.   5.  Colonic diverticula.      US-EXTREMITY VENOUS LOWER BILAT   Final Result      US-EXTREMITY ARTERY LOWER BILAT   Final Result      DX-FOOT-COMPLETE 3+ RIGHT   Final Result      1.  No evidence of fracture or dislocation.      2.  Severe degenerative changes again present raising possibility of neuropathic foot.      3.  There appears to be increase in bone erosions along the medial and inferior tarsal bones which could indicate osteomyelitis.         DX-FOOT-COMPLETE 3+ LEFT   Final Result      1.  No evidence of fracture or dislocation.      2.  Osteoarthritis is noted. No bone erosions are identified.      IR-EXTREMITY ANGIOGRAM-UNILATERAL RIGHT    (Results Pending)   IR-PICC LINE PLACEMENT W/ GUIDANCE > AGE 5    (Results Pending)        Assessment/Plan  * Diabetic foot ulcer with osteomyelitis (HCC)- (present on admission)   Assessment & Plan    Culture MRSA    Continue wound care  Continue daptomycin through July 6 discussed with ID       6/2 PICC line, patient refusing  Discussed with patient need for continued antibiotics  May need continued peripheral line placement for antibiotic  administration  Needs skilled nursing facility placement  PT/OT evaluation    Vascular surgery following, right lower extremity angiogram, not flow-limiting lesions  Plan for left lower extremity angiogram, per vascular surgery     Acute on chronic respiratory failure (HCC)- (present on admission)   Assessment & Plan    Chest x-ray consistent with acute cardiogenic pulmonary edema    Continue forced diuresis and monitor intake and output renal function electrolytes    Now on 4 L oxygen supplementation, taper as tolerated       Essential hypertension- (present on admission)   Assessment & Plan    Continue carvedilol and monitor blood pressure     Coronary artery disease involving native coronary artery of native heart without angina pectoris- (present on admission)   Assessment & Plan    Continue aspirin carvedilol and statin       Type 2 diabetes mellitus with skin complication, with long-term current use of insulin ([facility])- (present on admission)   Assessment & Plan    HbA1c 8.7    Will restart Lantus at 10 units and closely monitor given recent hypoglycemia  Continue insulin sliding scale         Electrolyte abnormality   Assessment & Plan    Hypokalemia  Hypophosphatemia  Hypomagnesemia    Replete with IV magnesium sulfate and potassium phosphate and monitor electrolytes     Cardiomyopathy ([facility])   Assessment & Plan    Repeat echo with EF of 35% and moderate MR with regional wall abnormalities    Continue IV Lasix and monitor intake and output  Cardiology following discussed with  To  Continue lisinopril,carvedilol and Aldactone    Proximal atrial fibrillation, cardiology recommending anticoagulation to decrease stroke risk  To initiate if cleared by vascular surgery, possible planned additional procedures         Deep vein thrombosis (DVT) of lower extremity ([facility])- (present on admission)   Assessment & Plan    Acute   continue heparin  Transition to Xarelto after PICC line placement         Peripheral vascular  disease (HCC)- (present on admission)   Assessment & Plan    With bilateral lower extremity ulcers    Right lower extremity angiogram with good perfusion vascular surgery following with no plan further interventions at this time     Normocytic anemia- (present on admission)   Assessment & Plan    With iron deficiency    Hemoglobin 10.6 no clinical signs of bleeding continue to monitor     UTI (urinary tract infection)- (present on admission)   Assessment & Plan    Resolved  Completed 3-day course of antibiotics with Zosyn         Chronic anticoagulation- (present on admission)   Assessment & Plan           Paroxysmal atrial fibrillation (HCC)- (present on admission)   Assessment & Plan    Rate controlled  Continue carvedilol and heparin plan to transition to Xarelto after PICC line placement             Plan of care reviewed with patient and  at bedside and discussed with nursing staff and pharmacist    VTE prophylaxis: heparin infusion

## 2019-06-02 NOTE — PROGRESS NOTES
Discussed PICC line placement order with primary RN Yessy.  The patient is currently refusing placement of PICC despite education from Dr. Pretty.  Will continue to follow this patient in the case they change their mind r/t line placement.  Please call ext [phone] for any updates r/t line placement.

## 2019-06-02 NOTE — CARE PLAN
Problem: Safety  Goal: Will remain free from falls  Outcome: PROGRESSING AS EXPECTED  Patient educated to use call light for assistance. Fall precautions in place. Staff will assist with mobilization. Hourly rounding in place.    Problem: Knowledge Deficit  Goal: Knowledge of disease process/condition, treatment plan, diagnostic tests, and medications will improve  Outcome: PROGRESSING SLOWER THAN EXPECTED  Provided patient with a verbal discussion related to managing diabetes. Patient demonstrated several deficiencies in knowledge base as it applied to managing her chronic disease. Provided patient with a verbal discussion related to insulin; rapid, regular and long nacting; when to check blood sugars, discussed signs and symptoms of hyperglycemia and hypoglycemia. Will provide client with printed hand out R/T diabetes self-care and management. Discussed with client the importance of checking feet daily for wounds.

## 2019-06-02 NOTE — PROGRESS NOTES
Received bedside report from TABATHA Miranda, pt care assumed, VSS, pt assessment complete. Pt AAOx3 disoriented to place, no c/o pain at this time. No signs of acute distress noted at this time. POC discussed with pt and verbalizes no questions. Pt denies any additional needs at this time. Bed in lowest position, bed alarm on, pt educated on fall risk and verbalized understanding, call light within reach, hourly rounding initiated.

## 2019-06-02 NOTE — PROGRESS NOTES
2 RN Skin Check Completed;    Noted generalized bruising and fragile skin. Ears pink and blanching bilaterally; silicone nasal cannula in place. Red slow to toni skin on right elbow; placed mepilex lite pad. Pink to erythematic skin observed over pannus, inner thighs and groin. Dressing in place over heels bilaterally; dressings are clean, dry, and intact. Sacrum excoriated, red and blanching; waffle mattress in place and providing turning every 2 hours. Otherwise all other skin surfaces intact and unremarkable.

## 2019-06-02 NOTE — PROGRESS NOTES
Isolation Precautions:    Patient is on contact isolation for MRSA in wounds.    Patient educated on reason for isolation, how the infection may be transmitted, and how to help prevent transmission to others.     Patient educated that contact precautions involves staff and visitors wearing PPE, follow  Standard Precautions and perform meticulous hand hygiene in order to prevent transmission of infection. (Contact Precautions: gown and gloves; Special Contact Precautions: gown and gloves, with the added requirement of soap and water for hand hygiene; Droplet Precautions: surgical mask worn by staff and visitors in the room, and worn by the patient when out of the room; Airborne Precautions: involves staff wearing PPE to include an N95 Respirator or Controlled Air Purifying Respirator (CAPR).  Visitors should be limited and may wear an N95 mask).         Patient transport and mobilization on unit. Patient educated that they may leave their room, but prior to exiting, the patient needs to have on a fresh patient gown, ensure the potentially infectious area is covered, perform appropriate hand hygiene immediately prior to exiting the room. (*For Airborne Precautions: Patient educated that time out of the room should be minimized and limited to transport to diagnostic procedures or other activities. Patient is to wear surgical mask when required to leave the patient room).

## 2019-06-02 NOTE — CARE PLAN
Problem: Infection  Goal: Will remain free from infection    Intervention: Implement standard precautions and perform hand washing before and after patient contact  Patient educated on hand hygiene and importance of cleanliness. Patient verbalizes understanding. RN performs appropriate hand hygiene and maintains aseptic technique / contact precautions when in contact with patient.      Problem: Knowledge Deficit  Goal: Knowledge of disease process/condition, treatment plan, diagnostic tests, and medications will improve    Intervention: Explain information regarding disease process/condition, treatment plan, diagnostic tests, and medications and document in education  Patient educated on POC, treatment plan, medications, diet, safety, activity, and encouraged to ask questions regarding care. Patient verbalizes understanding, and participates in care. Reoriented to call light for assistance. Hourly rounding in place.

## 2019-06-03 NOTE — PROGRESS NOTES
Spoke with Robin in [facility] pharm regarding heparin. 1430 heparin order is to be a continuation of existing heparin. It was added in order to reschedule the heparin gtt for a stop time of 1700

## 2019-06-03 NOTE — PROGRESS NOTES
Hospital Medicine Daily Progress Note/Rapid response note    Date of Service  6/3/2019    Chief Complaint  73 y.o. female admitted 5/25/2019 with diabetic foot ulcers    Hospital Course    73-year-old male past medical history of coronary artery disease questionable stent placement-/2018 or 1998 ??, paroxysmal atrial fibrillation, type 2 diabetes (last a1c 7.3 03/2019) mild to moderate cognitive decline, hypertension, hyperlipidemia sent by podiatrist for bilateral wound on her feet.  X-ray showed right osteomyelitis.  She was started on vanco/unasyn. Pt was not septic on admission.  LPS was consulted. On admission was also found patient has UTI which was already covered by above antibiotic.         Interval Problem Update  Awake and alert  Still refusing PICC line and midline placement  Plan for continued peripheral IV access for antibiotics through July 6  No new complaints  Pain controlled    Consultants/Specialty  ID  vascular    Code Status  full    Disposition  PT/OT, encourage activity  Transition to [facility]  Follow-up a.m. labs  Will need skilled nursing facility placement for antibiotics through July 6    Review of Systems  Review of Systems   Constitutional: Negative for chills, diaphoresis, fever and malaise/fatigue.   Respiratory: Negative for shortness of breath.    Gastrointestinal: Negative for abdominal pain, constipation, diarrhea, heartburn and nausea.   Genitourinary: Negative for dysuria.   Musculoskeletal: Positive for myalgias. Negative for back pain and joint pain.   Neurological: Positive for weakness. Negative for tingling and focal weakness.   Psychiatric/Behavioral: Positive for memory loss. The patient is not nervous/anxious.    All other systems reviewed and are negative.       Physical Exam  Temp:  [36 °C (96.8 °F)-36.4 °C (97.6 °F)] 36.1 °C (96.9 °F)  Pulse:  [68-90] 90  Resp:  [16-20] 17  BP: (109-128)/(52-89) 120/89  SpO2:  [93 %-97 %] 96 %    Physical Exam   Constitutional: No  distress.   HENT:   Head: Normocephalic.   Eyes: Conjunctivae are normal.   Neck: Normal range of motion. Neck supple.   Cardiovascular: Normal rate.  An irregularly irregular rhythm present.   Pulmonary/Chest: Effort normal. No respiratory distress. She has decreased breath sounds.   Abdominal: Soft. Bowel sounds are normal. She exhibits no distension. There is no tenderness.   Musculoskeletal: She exhibits edema (Improved).   Neurological: She is alert. No cranial nerve deficit. Coordination normal.   Oriented x2   Skin: Skin is warm and dry. She is not diaphoretic. No erythema.   Ulcers dressed with intact dressing no surrounding erythema   Psychiatric: She has a normal mood and affect. Her speech is delayed. She exhibits abnormal recent memory.       Fluids    Intake/Output Summary (Last 24 hours) at 06/03/19 1338  Last data filed at 06/02/19 1800   Gross per 24 hour   Intake              240 ml   Output                0 ml   Net              240 ml       Laboratory  Recent Labs      06/01/19   0517  06/02/19   0623  06/03/19   0251   WBC  9.5  9.3  7.7   RBC  3.40*  3.65*  3.53*   HEMOGLOBIN  10.6*  11.0*  10.8*   HEMATOCRIT  33.4*  35.8*  35.0*   MCV  98.2*  98.1*  99.2*   MCH  31.2  30.1  30.6   MCHC  31.7*  30.7*  30.9*   RDW  46.5  46.5  46.5   PLATELETCT  256  275  291   MPV  9.7  9.6  9.8     Recent Labs      06/01/19   0517  06/02/19   0623   SODIUM  139  140   POTASSIUM  4.1  4.4   CHLORIDE  104  103   CO2  30  31   GLUCOSE  235*  214*   BUN  13  17   CREATININE  0.83  1.00   CALCIUM  8.7  8.8     Recent Labs      06/02/19   0623  06/03/19   0251  06/03/19   0855   APTT  55.4*  52.2*  68.5*               Imaging  EC-ECHOCARDIOGRAM COMPLETE W/O CONT   Final Result      CT-HEAD W/O   Final Result      1.  Cerebral atrophy.      2.  White matter lucencies most consistent with small vessel ischemic change versus demyelination or gliosis.      3.  Otherwise, Head CT without contrast with no acute findings.  No evidence of acute cerebral infarction, hemorrhage or mass lesion.      DX-CHEST-LIMITED (1 VIEW)   Final Result      1.  Marked pulmonary edema.   2.  Possible underlying bibasilar airspace opacity/alveolar pulmonary edema and/or bilateral pleural effusions.      US-SELWYN SINGLE LEVEL BILAT   Final Result      CTA ABDOMEN PELVIS W & W/O POST PROCESS   Final Result      1.  Moderate atherosclerotic calcification abdominal aorta without aneurysm or dissection.   2.  Moderate atherosclerotic calcification of the LEFT carotid system without segmental occlusion.   3.  Small bilateral pleural effusions with associated atelectasis.   4.  Increased colonic stool suggesting constipation.   5.  Colonic diverticula.      US-EXTREMITY VENOUS LOWER BILAT   Final Result      US-EXTREMITY ARTERY LOWER BILAT   Final Result      DX-FOOT-COMPLETE 3+ RIGHT   Final Result      1.  No evidence of fracture or dislocation.      2.  Severe degenerative changes again present raising possibility of neuropathic foot.      3.  There appears to be increase in bone erosions along the medial and inferior tarsal bones which could indicate osteomyelitis.         DX-FOOT-COMPLETE 3+ LEFT   Final Result      1.  No evidence of fracture or dislocation.      2.  Osteoarthritis is noted. No bone erosions are identified.      IR-EXTREMITY ANGIOGRAM-UNILATERAL RIGHT    (Results Pending)        Assessment/Plan  * Diabetic foot ulcer with osteomyelitis (HCC)- (present on admission)   Assessment & Plan    Culture MRSA    Continue wound care  Continue daptomycin through July 6 discussed with ID       6/2 PICC line, patient refusing  Discussed with patient need for continued antibiotics  May need continued peripheral line placement for antibiotic administration  Needs skilled nursing facility placement  PT/OT evaluation      6/3  Vascular surgery following, right lower extremity angiogram, not flow-limiting lesions  Plan for left lower extremity angiogram,  per vascular surgery     Acute on chronic respiratory failure (HCC)- (present on admission)   Assessment & Plan    Chest x-ray consistent with acute cardiogenic pulmonary edema    Continue forced diuresis and monitor intake and output renal function electrolytes    Now on 4 L oxygen supplementation, taper as tolerated       Essential hypertension- (present on admission)   Assessment & Plan    Continue carvedilol and monitor blood pressure     Coronary artery disease involving native coronary artery of native heart without angina pectoris- (present on admission)   Assessment & Plan    Continue aspirin carvedilol and statin       Type 2 diabetes mellitus with skin complication, with long-term current use of insulin ([facility])- (present on admission)   Assessment & Plan    HbA1c 8.7    Will restart Lantus at 10 units and closely monitor given recent hypoglycemia  Continue insulin sliding scale         Electrolyte abnormality   Assessment & Plan    Hypokalemia  Hypophosphatemia  Hypomagnesemia    Replete with IV magnesium sulfate and potassium phosphate and monitor electrolytes     Cardiomyopathy ([facility])   Assessment & Plan    Repeat echo with EF of 35% and moderate MR with regional wall abnormalities    Continue IV Lasix and monitor intake and output  Cardiology following discussed with  To  Continue lisinopril,carvedilol and Aldactone    Proximal atrial fibrillation, cardiology recommending anticoagulation to decrease stroke risk                                                    To initiate if cleared by vascular surgery, possible planned additional procedures    6/3  Cardiology to start low-dose Xarelto as an outpatient  Okay to DC heparin drip         Deep vein thrombosis (DVT) of lower extremity ([facility])- (present on admission)   Assessment & Plan    Acute   Dc heparin    Transition to Xarelto   Pt refusing picc/midline placement       Peripheral vascular disease ([facility])- (present on admission)   Assessment & Plan    With  bilateral lower extremity ulcers    Right lower extremity angiogram with good perfusion vascular surgery following with no plan further interventions at this time     Normocytic anemia- (present on admission)   Assessment & Plan    With iron deficiency    Hemoglobin 10.6 no clinical signs of bleeding continue to monitor     UTI (urinary tract infection)- (present on admission)   Assessment & Plan    Resolved  Completed 3-day course of antibiotics with Zosyn         Chronic anticoagulation- (present on admission)   Assessment & Plan           Paroxysmal atrial fibrillation (HCC)- (present on admission)   Assessment & Plan    Rate controlled  Continue carvedilol and heparin plan to transition to Xarelto after PICC line placement             Plan of care reviewed with patient and  at bedside and discussed with nursing staff and pharmacist    VTE prophylaxis: heparin infusion

## 2019-06-03 NOTE — PROGRESS NOTES
2 RN skin check complete.   Devices in place [place].  Skin assessed under devices intact.  Confirmed pressure ulcers found on NA.  New potential pressure ulcers noted on NA. Wound consult placed wound is following this pt.  The following interventions in place Q2 turning, waffle, float boats, gray foams, silicone NC, mepilex not in use due to incontinence, pt kept clean and dry, barrier wipes and paste    Skin intact throughout, DM foot ulcers to BL heels followed by wound, sacrum red and blanching.

## 2019-06-03 NOTE — DISCHARGE PLANNING
"Anticipated Discharge Disposition: SNF    Action: CM received order for SNF and met with pt at bedside. Pt states that she has been to [facility] in the past and \"will never go back.\"   Pt is in agreement to send referral to [facility] in [place] but states that she will have to talk to her spouse before making a second choice.   CM completed SNF choice form and sent to [facility].     Barriers to Discharge:     Plan: Monitor for acceptance to SNF.     "

## 2019-06-03 NOTE — CARE PLAN
Problem: Safety  Goal: Will remain free from injury  Outcome: PROGRESSING AS EXPECTED    Goal: Will remain free from falls  Outcome: PROGRESSING AS EXPECTED      Problem: Infection  Goal: Will remain free from infection  Outcome: PROGRESSING AS EXPECTED      Problem: Venous Thromboembolism (VTW)/Deep Vein Thrombosis (DVT) Prevention:  Goal: Patient will participate in Venous Thrombosis (VTE)/Deep Vein Thrombosis (DVT)Prevention Measures  Outcome: PROGRESSING AS EXPECTED      Problem: Bowel/Gastric:  Goal: Will not experience complications related to bowel motility  Outcome: PROGRESSING AS EXPECTED

## 2019-06-03 NOTE — PROGRESS NOTES
Order received for PICC placement. Pt refusing placement 6/2/19. Spoke to Mo MAYS, pt continues to refuse PICC placement. Order cancelled at this time. Please place new order when pt agreeable to placement.

## 2019-06-03 NOTE — DISCHARGE PLANNING
Received Choice form at 1200  Agency/Facility Name: [facility]  Referral sent per Choice form @ 1200     @[id number]  Agency/Facility Name: [facility]  Spoke To: Brianne  Outcome: Declined due to no beds available.

## 2019-06-03 NOTE — THERAPY
PT consult received. Please clarify partial weight bearing status for transfers for PT to complete evaluation.

## 2019-06-03 NOTE — CARE PLAN
Problem: Communication  Goal: The ability to communicate needs accurately and effectively will improve  Outcome: PROGRESSING SLOWER THAN EXPECTED  Patient educated on medications, procedures and use of call light. Patient will continue to verbalize and improve on education and communication in the plan of care. Continually reinforcement needed due to pat's confusion at baseline    Problem: Safety  Goal: Will remain free from injury  Outcome: PROGRESSING AS EXPECTED  Educated patient on use of call light, no slip socks on, bed lowest position. All needs attended to. Patient verbalized understanding.

## 2019-06-04 NOTE — PROGRESS NOTES
"Pt refusing 1000 Q2 turning stating she \"doesn't want to be uncomfortable\". Patient educated on her high risk for skin breakdown and potential pressure injury to sacrum. Patient verbalized understanding but continues to refuse.    I also asked the patient if she would be willing to get up to the chair today and if we could schedule a time. Patient feels unwilling to do this and states she \"has a bad knee.\"    Will continue to educate and encourage the patient.      "

## 2019-06-04 NOTE — PROGRESS NOTES
Pt is  Lethargic requiring frequent refocusing with occasional  BL hand twitching. This is a change from yesterday, Dr Varela updated in person. FSBG 273, VSS,  in place.

## 2019-06-04 NOTE — PROGRESS NOTES
Pt home medications found in nurse . Medications sent to pharmacy. Updated in admission profile. Ticket number [id number], in pt chart.

## 2019-06-04 NOTE — DISCHARGE PLANNING
Anticipated Discharge Disposition: SNF    Action: Per Precious HICKMAN, [facility] does not have any beds open at this time. CM met with pt and her spouse at bedside and they state that they would like to choose [facility] as their second choice. CM updated SNF choice form and sent to [facility].     Barriers to Discharge:     Plan: Monitor for acceptance to [facility].

## 2019-06-04 NOTE — DISCHARGE PLANNING
Received Choice form at 1150  Agency/Facility Name: [facility]  Referral sent per Choice form @ 1150     @2130  Agency/Facility Name: [facility]  Spoke To: Brianne  Outcome: Pending review.    @7348  Agency/Facility Name: [facility]  Spoke To: Brianne  Outcome: Reviewing referral.

## 2019-06-04 NOTE — THERAPY
"Physical Therapy Evaluation completed.   Bed Mobility:  Supine to Sit: Moderate Assist  Transfers: Sit to Stand: Maximal Assist  Gait: Level Of Assist: Unable to Participate   Plan of Care: Will benefit from Physical Therapy 3 times per week  Discharge Recommendations: Equipment: Will Continue to Assess for Equipment Needs. Recommend inpatient transitional care services for continued physical therapy services.      See \"Rehab Therapy-Acute\" Patient Summary Report for complete documentation.     Pt is a 72yo female who was sent by podiatrist for B heel wounds. Per wound care team, pt with PRAFO boot to RLE and offloading shoe to LLE for transfers and stability. Need percentage for PWB for further mobility. Assisted pt supine > sit with mod A. Pt demonstrated good initiation at LEs for bed mobility. Required assist at trunk and cues for sequencing. Pt able to scoot EOB with min A. Instructed pt to perform squat pivot xfr with LLE and offloading shoe to power WC with therapist. Pt required max A and VCs throughout for sequencing. Pt with minimal weight bearing through LE. RN present throughout session. Pt reports was indep in xfrs prior to admission, though was very lethargic and unable to provide detail regarding PLOF and home. Recommend postacute placement prior to return home. Pt may benefit from mechanical lift for transfers. Will continue to follow in acute setting.   "

## 2019-06-04 NOTE — PROGRESS NOTES
Report received at bedside, patient care assumed. Tele box on. Patient resting in bed, spouse at bedside, updated on POC, no requests at this time. Fall precautions in place with bed in lowest position, treaded socks on, and call light within reach.

## 2019-06-04 NOTE — PROGRESS NOTES
2 RN skin check complete.   Devices in place silicone oxygen tubing, heel float boots.  Skin assessed under devices yes.  Confirmed pressure ulcers found on N/A, confirmed diabetic foot ulcers to bilateral heels.  New potential pressure ulcers noted on sacrum. Wound consult placed , new LDA opened and picture taken.  The following interventions in place Q2H turns, waffle mattress, barrier cream, heel float boots, pillows for support and positioning.     Pt generalized skin is fragile with scattered bruises. Moisture fissure to left pannus. Moisture redness/excoriating to groin, pannus and under bilateral breasts.

## 2019-06-04 NOTE — PROGRESS NOTES
2 RN skin check complete.   Devices in place Carola.  Skin assessed under devices intact.  Confirmed pressure ulcers found on NA.  New potential pressure ulcers noted on sacrum. Wound consult placed wound is following this pt.  The following interventions in place Q2 turning, waffle, float boats, gray foams, silicone NC, mepilex not in use due to incontinence, pt kept clean and dry, barrier wipes and paste     Skin intact throughout, DM foot ulcers to BL heels followed by wound, sacrum red slow to toni.

## 2019-06-04 NOTE — THERAPY
"Occupational Therapy Evaluation completed.   Functional Status:  Pt pleasant, co-operative supine in bed on arrival.   Pt agreeable to sit on EOB for ADL's.  Pt required Mod A for supine to sit EOB.  Pt able to perform simple grooming tasks with set up.  Pt reports she use to stand & transfer into her electric scooter.  Pt now has bilateral heel wounds that she wears prafoe boots to float heels in bed.  Pt tolerated sitting EOB for 20 min.  Pt's  present during session.  Plan of Care: Will benefit from Occupational Therapy 3 times per week  Discharge Recommendations:  Equipment: Will Continue to Assess for Equipment Needs. Post-acute therapy Discharge to a transitional care facility for continued skilled therapy services.    See \"Rehab Therapy-Acute\" Patient Summary Report for complete documentation.    "

## 2019-06-04 NOTE — CARE PLAN
Problem: Safety  Goal: Will remain free from injury  Outcome: PROGRESSING AS EXPECTED  Educated patient on use of call light, no slip socks on, bed lowest position. All needs attended to. Patient verbalized understanding.     Problem: Skin Integrity  Goal: Risk for impaired skin integrity will decrease  Outcome: PROGRESSING SLOWER THAN EXPECTED  Patient and spouse educated on the importance of skin interventions including: Q2 turning, waffle, barrier wipes/paste, nutrition, 2 RN skin checks, heel float boots. Patient and spouse verbalized understanding.    Patient strongly encouraged to get up to the chair. Patient is currently refusing. Will continue to reinforce teaching and and encourage patient.

## 2019-06-04 NOTE — CARE PLAN
Problem: Communication  Goal: The ability to communicate needs accurately and effectively will improve  Outcome: PROGRESSING AS EXPECTED    Intervention: Reorient patient to environment as needed   06/03/19 2212   OTHER   Oriented to: All of the Following : Location of Bathroom, Visiting Policy, Unit Routine, Call Light and Bedside Controls, Bedside Rail Policy, Smoking Policy, Rights and Responsibilities, Bedside Report, and Patient Education Notebook         Problem: Knowledge Deficit  Goal: Knowledge of the prescribed therapeutic regimen will improve  Outcome: PROGRESSING AS EXPECTED  Pt updated on POC and pt and family questions answered. Pt has general confusion but verbalized understanding. Spouse who was present verbalized clear understanding of current therapeutic regimen.

## 2019-06-05 PROBLEM — J81.0 ACUTE PULMONARY EDEMA (HCC): Status: ACTIVE | Noted: 2019-01-01

## 2019-06-05 PROBLEM — I50.43 ACUTE ON CHRONIC SYSTOLIC AND DIASTOLIC HEART FAILURE, NYHA CLASS 4 (HCC): Status: ACTIVE | Noted: 2019-01-01

## 2019-06-05 NOTE — DISCHARGE PLANNING
Received Choice form at 1789  Agency/Facility Name: [facility]  Referral sent per Choice form @ 8241

## 2019-06-05 NOTE — PROGRESS NOTES
Notified by monitor room that pt sustaining in [facility] Dr Varela updated in person, orders received.

## 2019-06-05 NOTE — CARE PLAN
Problem: Safety  Goal: Will remain free from falls  Outcome: PROGRESSING AS EXPECTED  Educated patient on use of call light, no slip socks on, bed lowest position. All needs attended to. Patient verbalized understanding.     Problem: Skin Integrity  Goal: Risk for impaired skin integrity will decrease  Outcome: PROGRESSING SLOWER THAN EXPECTED  Patient educated on the importance of skin interventions including: Q2 turning, waffle, barrier wipes/paste, nutrition, 2 RN skin checks, heel float boots. Patient and spouse verbalized understanding.     Patient strongly encouraged to get up to her wheelchair as she did yesterday, patient agreeable. Will continue to reinforce teaching and and encourage patient.

## 2019-06-05 NOTE — DISCHARGE PLANNING
Agency/Facility Name: [facility]  Spoke To: Brianne  Outcome: Declined due to non-compliance, no safe discharge and IV meds.

## 2019-06-05 NOTE — DISCHARGE PLANNING
CM met with pt at bedside to discuss that [facility] has declined. Pt is in agreement to send referral to all local SNF's to see who is able to accept her. CM updated Precious HICKMAN.

## 2019-06-05 NOTE — PROGRESS NOTES
Hospital Medicine Daily Progress Note/Rapid response note    Date of Service  6/4/2019    Chief Complaint  73 y.o. female admitted 5/25/2019 with diabetic foot ulcers    Hospital Course    73-year-old male past medical history of coronary artery disease questionable stent placement-/2018 or 1998 ??, paroxysmal atrial fibrillation, type 2 diabetes (last a1c 7.3 03/2019) mild to moderate cognitive decline, hypertension, hyperlipidemia sent by podiatrist for bilateral wound on her feet.  X-ray showed right osteomyelitis.  She was started on vanco/unasyn. Pt was not septic on admission.  LPS was consulted. On admission was also found patient has UTI which was already covered by above antibiotic.         Interval Problem Update  6/4. Sleeping but arousable. Cognitive deficits.  at bedside.    Consultants/Specialty  ID  vascular    Code Status  full    Disposition  PT/OT ordered  On Xarelto  IV antibiotics to July 6  Pending clearance from Vascular, planning arterial circulation studies  SNF planned for IV antibiotics, [place] reviewing.    Review of Systems  Review of Systems   Constitutional: Negative for chills, diaphoresis, fever and malaise/fatigue.   Respiratory: Negative for shortness of breath.    Gastrointestinal: Negative for abdominal pain, constipation, diarrhea, heartburn and nausea.   Genitourinary: Negative for dysuria.   Musculoskeletal: Positive for myalgias. Negative for back pain and joint pain.   Neurological: Positive for weakness. Negative for tingling and focal weakness.   Psychiatric/Behavioral: Positive for memory loss. The patient is not nervous/anxious.    All other systems reviewed and are negative.       Physical Exam  Temp:  [36.1 °C (97 °F)-37.3 °C (99.2 °F)] 36.6 °C (97.8 °F)  Pulse:  [58-87] 82  Resp:  [16-20] 18  BP: ()/(40-72) 101/68  SpO2:  [90 %-96 %] 90 %    Physical Exam   Constitutional: No distress.   HENT:   Head: Normocephalic.   Eyes: Conjunctivae are normal.    Neck: Normal range of motion. Neck supple.   Cardiovascular: Normal rate.  An irregularly irregular rhythm present.   Pulmonary/Chest: Effort normal. No respiratory distress. She has decreased breath sounds.   Abdominal: Soft. Bowel sounds are normal. She exhibits no distension. There is no tenderness.   Musculoskeletal: She exhibits edema (Improved).   Foot ulcer dressings CDI   Neurological: She is alert. No cranial nerve deficit. Coordination normal.   Cognitive deficits.   Skin: Skin is warm and dry. She is not diaphoretic. No erythema.   Psychiatric: She has a normal mood and affect. Her speech is delayed. She exhibits abnormal recent memory.       Fluids    Intake/Output Summary (Last 24 hours) at 06/04/19 1707  Last data filed at 06/04/19 1200   Gross per 24 hour   Intake              480 ml   Output                0 ml   Net              480 ml       Laboratory  Recent Labs      06/02/19   0623  06/03/19   0251  06/04/19   0359   WBC  9.3  7.7  10.0   RBC  3.65*  3.53*  3.63*   HEMOGLOBIN  11.0*  10.8*  10.8*   HEMATOCRIT  35.8*  35.0*  35.3*   MCV  98.1*  99.2*  97.2   MCH  30.1  30.6  29.8   MCHC  30.7*  30.9*  30.6*   RDW  46.5  46.5  47.2   PLATELETCT  275  291  346   MPV  9.6  9.8  9.6     Recent Labs      06/02/19   0623   SODIUM  140   POTASSIUM  4.4   CHLORIDE  103   CO2  31   GLUCOSE  214*   BUN  17   CREATININE  1.00   CALCIUM  8.8     Recent Labs      06/03/19   0251  06/03/19   0855  06/03/19   1459   APTT  52.2*  68.5*  40.7*               Imaging  EC-ECHOCARDIOGRAM COMPLETE W/O CONT   Final Result      CT-HEAD W/O   Final Result      1.  Cerebral atrophy.      2.  White matter lucencies most consistent with small vessel ischemic change versus demyelination or gliosis.      3.  Otherwise, Head CT without contrast with no acute findings. No evidence of acute cerebral infarction, hemorrhage or mass lesion.      DX-CHEST-LIMITED (1 VIEW)   Final Result      1.  Marked pulmonary edema.   2.   Possible underlying bibasilar airspace opacity/alveolar pulmonary edema and/or bilateral pleural effusions.      US-SELWYN SINGLE LEVEL BILAT   Final Result      CTA ABDOMEN PELVIS W & W/O POST PROCESS   Final Result      1.  Moderate atherosclerotic calcification abdominal aorta without aneurysm or dissection.   2.  Moderate atherosclerotic calcification of the LEFT carotid system without segmental occlusion.   3.  Small bilateral pleural effusions with associated atelectasis.   4.  Increased colonic stool suggesting constipation.   5.  Colonic diverticula.      US-EXTREMITY VENOUS LOWER BILAT   Final Result      US-EXTREMITY ARTERY LOWER BILAT   Final Result      DX-FOOT-COMPLETE 3+ RIGHT   Final Result      1.  No evidence of fracture or dislocation.      2.  Severe degenerative changes again present raising possibility of neuropathic foot.      3.  There appears to be increase in bone erosions along the medial and inferior tarsal bones which could indicate osteomyelitis.         DX-FOOT-COMPLETE 3+ LEFT   Final Result      1.  No evidence of fracture or dislocation.      2.  Osteoarthritis is noted. No bone erosions are identified.      IR-EXTREMITY ANGIOGRAM-UNILATERAL RIGHT    (Results Pending)        Assessment/Plan  * Diabetic foot ulcer with osteomyelitis (HCC)- (present on admission)   Assessment & Plan    Cultures growin MRSA  Continue wound care  Continue daptomycin through July 6  ID following   Vascular surgery following, right lower extremity angiogram, not flow-limiting lesions, assessing for bilateral CTAs.     Acute on chronic respiratory failure (HCC)- (present on admission)   Assessment & Plan    Chest x-ray consistent with acute cardiogenic pulmonary edema  Resp and O2 per protocol  On IV Lasix       Essential hypertension- (present on admission)   Assessment & Plan    Continue carvedilol and monitor blood pressure     Coronary artery disease involving native coronary artery of native heart without  angina pectoris- (present on admission)   Assessment & Plan    Continue aspirin carvedilol and statin       Type 2 diabetes mellitus with skin complication, with long-term current use of insulin (HCC)- (present on admission)   Assessment & Plan    HbA1c 8.7  Continue Lantus 10u  Continue insulin sliding scale         Electrolyte abnormality   Assessment & Plan    Hypokalemia  Hypophosphatemia  Hypomagnesemia  Replete with IV magnesium sulfate and potassium phosphate and monitor electrolytes     Cardiomyopathy (HCC)   Assessment & Plan    Repeat echo with EF of 35% and moderate MR with regional wall abnormalities  Continue IV Lasix and monitor intake and output  Dr. Nolasco, Cardiology consulted  Continue lisinopril,carvedilol and Aldactone  Proximal atrial fibrillation, Cardiology recommending anticoagulation to decrease stroke risk                                                    Xarelto initiated         Deep vein thrombosis (DVT) of lower extremity (HCC)- (present on admission)   Assessment & Plan    Xarelto       Cognitive decline- (present on admission)   Assessment & Plan    Noted  SNF planned     Peripheral vascular disease (HCC)- (present on admission)   Assessment & Plan    With bilateral lower extremity ulcers  Vascular Surgery planning bilateral CTAs to assess     Normocytic anemia- (present on admission)   Assessment & Plan    With iron deficiency  Hemoglobin 10.6 no clinical signs of bleeding continue to monitor     UTI (urinary tract infection)- (present on admission)   Assessment & Plan    Resolved  Completed 3-day course of antibiotics with Zosyn         Chronic anticoagulation- (present on admission)   Assessment & Plan    On Xarelto       Paroxysmal atrial fibrillation (HCC)- (present on admission)   Assessment & Plan    Rate controlled  Continue carvedilol  Xarelto           Plan of care reviewed with patient and  at bedside and discussed with nursing staff and pharmacist    VTE prophylaxis:  Xarelto    Reviewed vitals, labs, imaging, staff notes.  Discussed assessment and plan with Leonie Brock   Discussed with RN.

## 2019-06-05 NOTE — PROGRESS NOTES
Bedside report received, assumed pt care. Pt sitting up in wheelchair. Pt has generalized confusion but is alert, oriented x3, disoriented to event. Tele monitor on, call light in reach, safety precautions in place. Will continue to monitor.

## 2019-06-05 NOTE — PROGRESS NOTES
Report received at bedside, patient care assumed. Tele box on. Patient resting in bed, AOx2, reoriented and updated on POC, no requests at this time. Fall precautions in place with bed in lowest position, treaded socks on, and call light within reach.

## 2019-06-05 NOTE — DISCHARGE PLANNING
Anticipated Discharge Disposition: LTAC    Action: Pt discussed in rounds today. All SNF's have been declining due to Dapto. Pt refuses to have a PICC line placed.  Discussed with Dr. Varela that pt may qualify for LTAC. He states that he will place an order for this.   CM met with pt at bedside to discuss. She states that she is in agreement to send a referral to both [facility]'s but she would want to talk with her spouse before making the decision to actually go there.    Barriers to Discharge:     Plan: Pt in agreement to make referral to [facility] at this time. CM completed choice form and sent to [facility].

## 2019-06-05 NOTE — CARE PLAN
Problem: Mobility  Goal: Risk for activity intolerance will decrease  Outcome: PROGRESSING SLOWER THAN EXPECTED    Intervention: Encourage patient to increase activity level in collaboration with Interdisciplinary Team  Pt reluctant to participate in mobilization. Pt ambulated with 2 person assist from wheelchair to bed. Pt educated on importance of mobilization in the prevention of skin breakdown.       Problem: Psychosocial Needs:  Goal: Level of anxiety will decrease  Outcome: PROGRESSING AS EXPECTED   06/04/19 2000   OTHER   Patient Behaviors Confused   Pt has general confusion yet does not display anxiety r/t current hospitalization.

## 2019-06-05 NOTE — DISCHARGE PLANNING
Received Choice form at 1000  [facility]  Referral sent per Choice form @ 1000     @1030  Agency/Facility Name: [facility]  Spoke To: Otilia  Outcome: Declined unless patient is off Dapto.    @1130  Agency/Facility Name: [facility]  Spoke To: Marshall  Outcome: Declined due to dapto.    @1200  Agency/Facility Name: [facility]   Spoke To: Antonio  Outcome: Declined due to dapto.    Agency/Facility Name: [facility]  Spoke To: Mari  Outcome: Declined due to dapto.    Agency/Facility Name: [facility]  Outcome: Left message, awaiting call back.    Agency/Facility Name: [facility]  Spoke To: Collette  Outcome: Declined due to dapto    Agency/Facility Name: [facility]  Spoke To: Collette  Outcome: Declined due to dapto.

## 2019-06-05 NOTE — PROGRESS NOTES
2 RN skin check complete with TABATHA Spencer.   Devices in place silicone NC, heel float boots.  Skin assessed under devices intact.  Confirmed pressure ulcers found on NA.  New potential pressure ulcers noted on sacrum. Wound consult placed wound is following this pt, pics in chart, LDA open.  The following interventions in place Q2 turning, waffle, float boats, gray foams, silicone NC, mepilex not in use due to incontinence, pt kept clean and dry, barrier wipes and paste     Skin intact with the following exceptions:  DM foot ulcers to BL heels followed by wound, moisture dermitis under pannus with nystatin per MAR, sacrum red slow to toni, ears pink and blanching, skin tear to right forearm associated with tegaderm on IV.

## 2019-06-06 NOTE — HEART FAILURE PROGRAM
Cardiovascular Nurse Navigator () Advanced Heart Failure Program Inpatient Consult Note:     Patient was sent by her podiatrist for wounds on bilateral heels and wound infection she was admitted on 5/25/19 and HF was added to her problems list on 6/5/19.    She was found to also have a reduced EF. She has been diuresed. She does have a new diagnosis of HF however, but this is not her primary reason for admission.     HFrEF (35%)  NYHA: not being noted  De Marshall/Exacerbation: de marshall  If de marshall, etiology: per Dr. Nolasco, presumed to be ischemic due to history of stents, stress test outpatient.  Consider for CardioMEMS commercial or GUIDE HF? No, new diagnosis  Diuresis: IV furosemide given this morning    Demographics:    · Insurance: Medicare  · Residence: [facility] Secondary Prevention interventions:    · Pneumococcal vaccine:   · Influenza vaccine:     HFrEF GDMT:    · CAROL - I: lisinopril  · Evidence Based BB (bisoprolol, carvedilol, or Toprol XL): carvedilol  · Aldosterone receptor antagonist: spironolactone   · AC for AF: xarelto      BEDSIDE NURSING Daily Weights and Intake and Output already ordered? Weights were not ordered    · Every HF patient should have daily weights and I's and O's  · Please weigh patient daily on a standing scale if not clinically contraindicated.   · While doing this, teach patient to write weight down on the Symptom Tracker in the HF book - and ask them what they would do with that weight at home (ie: call for 3# weight gain). This is an excellent opportunity for impactful, in the moment teaching.  Providers rely on your complete documentation of weights and I's and O's to decide whether or not our treatment is working and whether or not a HF patient is ready to discharge!    If pt does not own a working scale and cannot afford to purchase one, please provide one. Scales can be found in the Care Coordination Rooms on T-7&T-8.    [facility] Plan Notes:   Going to [facility]    Advanced  "Care Planning:  No AD on file. Full code status at the time of this note's filing.    The ACC recommends engaging palliative care as part of optimization of HFrEF treatment to solicit goals of care and focus on quality of life throughout the clinical course of HF.    Once all diagnostics are in, please consider an order for palliative to discuss Advanced Care Planning.      Speaking with patients frankly about their end-of-life wishes is one of the most important things a palliative care team can do. This is especially important in the context of heart failure, since it’s such an unpredictable disease.    Follow up appointment:   If discharged from acute care to home (exception hospice discharge), pt must have an appointment scheduled within 7 days of discharge (Cardiology, PCP, or DC Clinic).    If discharged to Transitional Care Facility (LTAC, SNF, IRH), appointment should be made about a month out for after TCF.     Bedside Nursing Education:  Please provide HF booklet and repeated, ongoing education while administering medications, weighing patient, discussing management of symptoms, diet and need to follow up and act on changes.    Bedside Nursing Discharge:  When completing the after visit summary (discharge instructions) please select \"Cardiac Diagnosis, and Heart Failure\" in the special instructions section to populate the heart failure specific discharge instructions.     Referrals/Orders Placed:    Hospital Schedulers for HF f/u?  yes  Social Work   yes  Registered Dietician  yes  REMSA CP Program for patients with Medicaid, [facility], or [facility] coverage?  no  Outpatient Care Coordination for patients with [facility] coverage and with 3 or more admissions within a year?  no    Ruth MOYA RN, [facility] ext. [phone] M-F        "

## 2019-06-06 NOTE — CARE PLAN
Problem: Urinary Elimination:  Goal: Ability to reestablish a normal urinary elimination pattern will improve  Outcome: PROGRESSING SLOWER THAN EXPECTED   06/05/19 2000   OTHER   Urinary Elimination Incontinence

## 2019-06-06 NOTE — PROGRESS NOTES
"Hospital Medicine Daily Progress Note/Rapid response note    Date of Service  6/5/2019    Chief Complaint  73 y.o. female admitted 5/25/2019 with diabetic foot ulcers    Hospital Course    73-year-old male past medical history of coronary artery disease questionable stent placement-/2018 or 1998 ??, paroxysmal atrial fibrillation, type 2 diabetes (last a1c 7.3 03/2019) mild to moderate cognitive decline, hypertension, hyperlipidemia sent by podiatrist for bilateral wound on her feet.  X-ray showed right osteomyelitis.  She was started on vanco/unasyn. Pt was not septic on admission.  LPS was consulted. On admission was also found patient has UTI which was already covered by above antibiotic.         Interval Problem Update  6/4. Sleeping but arousable. Cognitive deficits.  at bedside.  6/5. Awake, grumpy. She does not want PICC line. She had a bad experience with it before. She says she will stay in the hospital if she has to.    Consultants/Specialty  ID  vascular    Code Status  full    Disposition  PT/OT ordered  On Xarelto  IV antibiotics to July 6  SNF declined \"dapto too expensive\"  LTACH referral sent    Review of Systems  Review of Systems   Constitutional: Negative for chills, diaphoresis, fever and malaise/fatigue.   Respiratory: Negative for shortness of breath.    Gastrointestinal: Negative for abdominal pain, constipation, diarrhea, heartburn and nausea.   Genitourinary: Negative for dysuria.   Musculoskeletal: Positive for myalgias. Negative for back pain and joint pain.   Neurological: Positive for weakness. Negative for tingling and focal weakness.   Psychiatric/Behavioral: Positive for memory loss. The patient is not nervous/anxious.    All other systems reviewed and are negative.       Physical Exam  Temp:  [36.1 °C (97 °F)-37.1 °C (98.8 °F)] 36.9 °C (98.4 °F)  Pulse:  [68-93] 82  Resp:  [18] 18  BP: ()/(46-77) 110/68  SpO2:  [91 %-93 %] 93 %    Physical Exam   Constitutional: No " distress.   HENT:   Head: Normocephalic.   Eyes: Conjunctivae are normal.   Neck: Normal range of motion. Neck supple.   Cardiovascular: Normal rate.  An irregularly irregular rhythm present.   Pulmonary/Chest: Effort normal. No respiratory distress. She has decreased breath sounds.   Abdominal: Soft. Bowel sounds are normal. She exhibits no distension. There is no tenderness.   Musculoskeletal: She exhibits edema (Improved).   Foot ulcer dressings CDI   Neurological: She is alert. No cranial nerve deficit. Coordination normal.   Cognitive deficits.   Skin: Skin is warm and dry. She is not diaphoretic. No erythema.   Psychiatric: She has a normal mood and affect. Her speech is delayed. She exhibits abnormal recent memory.       Fluids    Intake/Output Summary (Last 24 hours) at 06/05/19 1742  Last data filed at 06/05/19 0800   Gross per 24 hour   Intake              800 ml   Output              200 ml   Net              600 ml       Laboratory  Recent Labs      06/03/19   0251  06/04/19   0359   WBC  7.7  10.0   RBC  3.53*  3.63*   HEMOGLOBIN  10.8*  10.8*   HEMATOCRIT  35.0*  35.3*   MCV  99.2*  97.2   MCH  30.6  29.8   MCHC  30.9*  30.6*   RDW  46.5  47.2   PLATELETCT  291  346   MPV  9.8  9.6     Recent Labs      06/05/19   1320   SODIUM  139   POTASSIUM  4.7   CHLORIDE  98   CO2  35*   GLUCOSE  303*   BUN  26*   CREATININE  1.14   CALCIUM  9.1     Recent Labs      06/03/19   0251  06/03/19   0855  06/03/19   1459   APTT  52.2*  68.5*  40.7*               Imaging  EC-ECHOCARDIOGRAM COMPLETE W/O CONT   Final Result      CT-HEAD W/O   Final Result      1.  Cerebral atrophy.      2.  White matter lucencies most consistent with small vessel ischemic change versus demyelination or gliosis.      3.  Otherwise, Head CT without contrast with no acute findings. No evidence of acute cerebral infarction, hemorrhage or mass lesion.      DX-CHEST-LIMITED (1 VIEW)   Final Result      1.  Marked pulmonary edema.   2.  Possible  "underlying bibasilar airspace opacity/alveolar pulmonary edema and/or bilateral pleural effusions.      US-SELWYN SINGLE LEVEL BILAT   Final Result      CTA ABDOMEN PELVIS W & W/O POST PROCESS   Final Result      1.  Moderate atherosclerotic calcification abdominal aorta without aneurysm or dissection.   2.  Moderate atherosclerotic calcification of the LEFT carotid system without segmental occlusion.   3.  Small bilateral pleural effusions with associated atelectasis.   4.  Increased colonic stool suggesting constipation.   5.  Colonic diverticula.      US-EXTREMITY VENOUS LOWER BILAT   Final Result      US-EXTREMITY ARTERY LOWER BILAT   Final Result      DX-FOOT-COMPLETE 3+ RIGHT   Final Result      1.  No evidence of fracture or dislocation.      2.  Severe degenerative changes again present raising possibility of neuropathic foot.      3.  There appears to be increase in bone erosions along the medial and inferior tarsal bones which could indicate osteomyelitis.         DX-FOOT-COMPLETE 3+ LEFT   Final Result      1.  No evidence of fracture or dislocation.      2.  Osteoarthritis is noted. No bone erosions are identified.      IR-EXTREMITY ANGIOGRAM-UNILATERAL RIGHT    (Results Pending)   DX-CHEST-PORTABLE (1 VIEW)    (Results Pending)        Assessment/Plan  * Diabetic foot ulcer with osteomyelitis (HCC)- (present on admission)   Assessment & Plan    Cultures growin MRSA  Continue wound care  Continue daptomycin through July 6  ID following   Vascular surgery following, right lower extremity angiogram, not flow-limiting lesions, assessing for bilateral CTAs.  Vascular felt no need to complete CTA felt circulation okay and signed off.  Patient has been declining PICC line citing bad experience with it in the past  SNFs have declined because \"dapto too expensive\"  LTACH referral currently     Acute on chronic respiratory failure with pulmonary edema, EF 35% (HCC)- (present on admission)   Assessment & Plan    Chest " x-ray consistent with acute cardiogenic pulmonary edema  Echo showed EF of 35%  Resp and O2 per protocol  On IV Lasix  Ordered repeat CXR to follow edema       Essential hypertension- (present on admission)   Assessment & Plan    Continue carvedilol and monitor blood pressure     Coronary artery disease involving native coronary artery of native heart without angina pectoris- (present on admission)   Assessment & Plan    Continue aspirin carvedilol and statin       Type 2 diabetes mellitus with skin complication, with long-term current use of insulin ([facility])- (present on admission)   Assessment & Plan    HbA1c 8.7  Continue Lantus 10u  Continue insulin sliding scale         Acute on chronic systolic and diastolic heart failure, NYHA class 4 ([facility])   Assessment & Plan    Continue Lasix, lisinopril, aldactone, carvedilol.   On Xarelto     Acute pulmonary edema ([facility])   Assessment & Plan    (see above)     Electrolyte abnormality   Assessment & Plan    Hypokalemia  Hypophosphatemia  Hypomagnesemia  Replete with IV magnesium sulfate and potassium phosphate and monitor electrolytes     Cardiomyopathy ([facility])   Assessment & Plan    Repeat echo with EF of 35% and moderate MR with regional wall abnormalities  Continue IV Lasix and monitor intake and output  Dr. Nolasco, Cardiology consulted  Continue lisinopril,carvedilol and Aldactone  Proximal atrial fibrillation, Cardiology recommending anticoagulation to decrease stroke risk                                                    Xarelto initiated         Deep vein thrombosis (DVT) of lower extremity ([facility])- (present on admission)   Assessment & Plan    Xarelto       Cognitive decline- (present on admission)   Assessment & Plan    Noted  LTACH referral     Peripheral vascular disease (HCC)- (present on admission)   Assessment & Plan    With bilateral lower extremity ulcers  Vascular Surgery planning bilateral CTAs to assess     Normocytic anemia- (present on admission)   Assessment &  Plan    With iron deficiency  Hemoglobin 10.6 no clinical signs of bleeding continue to monitor     UTI (urinary tract infection)- (present on admission)   Assessment & Plan    Resolved  Completed 3-day course of antibiotics with Zosyn         Chronic anticoagulation- (present on admission)   Assessment & Plan    On Xarelto       Paroxysmal atrial fibrillation (HCC)- (present on admission)   Assessment & Plan    Rate controlled  Continue carvedilol  Xarelto           Plan of care reviewed with patient and  at bedside and discussed with nursing staff and pharmacist    VTE prophylaxis: Xarelto    Reviewed vitals, labs, imaging, staff notes.  Discussed assessment and plan with Leonie Brock   Discussed with RN.

## 2019-06-06 NOTE — PROGRESS NOTES
Bedside report received, assumed pt care. Pt sitting up in wheelchair eating dinner. Pt is alert with some confusion. Pt updated on POC. Tele monitor on, safety precautions in place, call light and possessions in reach.

## 2019-06-06 NOTE — PROGRESS NOTES
Bedside nurse report complete with Gerardo RN. New PIV placed by Gerardo and Pt sitting up for breakfast. Educated on rationale for PICC line placement, pt seems to understand but reinforcement needed. Also asking to review with her  when present.

## 2019-06-06 NOTE — PROCEDURES
Vascular Access Team     Date of Insertion: 6/6/19  Arm Circumference: 33  Internal length: 42  External Length: 0  Vein Occupancy %: 41  Reason for PICC: Antibiotic therapy  Labs: WBC 9.8, , BUN 25, Cr 1.02, GFR 53, INR n/a    Consents confirmed, vessel patency confirmed with ultrasound. Risks and benefits of procedure explained to patient and education regarding central line associated bloodstream infections provided. Questions answered.     PICC placed in RUE per MD order with ultrasound guidance, initial arm circumference 33cm. 4 Fr, single lumen PICC placed in cephalic vein after 1 attempt(s). 2 cc's of 1% lidocaine injected intradermally, 21 gauge microintroducer needle and modified Seldinger technique used. 42 cm catheter inserted with good blood return. Secured at 0 cm marker. Each lumen flushed without resistance with 10 mL 0.9% normal saline. PICC line secured with Biopatch and Tegaderm.     PICC placement is confirmed by chest x-ray. Patient experienced some pain, resolved post procedure, without complications.  Patient condition relayed to unit RN or ordering physician via this post procedure note in the EMR.     Ultrasound images uploaded to PACS and viewable in the EMR - yes  Ultrasound imaged printed and placed in paper chart - no     [employer] Power PICC ref # [id number], Lot # [id number]

## 2019-06-06 NOTE — DISCHARGE PLANNING
Agency/Facility Name: [facility]  Outcome: Left message, awaiting call back.    @[id number]  Agency/Facility Name: [facility]  Spoke To: Lola  Outcome: Checking medicare days and pending a bed.    @[id number]  Agency/Facility Name: [facility]  Spoke To: Lola  Outcome: Accepted pending bed maybe tomorrow. Can take Dapto.

## 2019-06-06 NOTE — PROGRESS NOTES
Infectious Disease Progress Note    Author: Fide Faust M.D. Date & Time of service: 2019  11:08 AM    Chief Complaint:  Diabetic foot ulcer     Interval History:  73 y.o. Female with hx of poorly controlled DM, COPD on 3 L NC at home.  Admitted on 19 for bilateral heel ulcerations with concerns for OM.   2019 T-max is 98.1.  Vascular Dopplers are being performed.  Denies any fevers or chills.  WBC 7.4.  Creatinine is 0.98  19- AF, WBC 8.0, no issue with abx, denies any pain currently to BLE but had 9/10 sharp BLE pain last night that improved with pain meds.  - Tm 99.5, WBC 9.8, tolerating antibiotics without issue, denies any pain to BLE, abdomen distended and noting that she needs to have a bowel movement.  - AF, WBC 11.6, no issue with antibiotics, denies pain, denies shortness of breath or chest palpitations, resting comfortably in bed.  -AF, WBC 9.5, complaining of 9/10 LLE intermittent sharp shooting pain that improves with rest and pain meds, n.p.o. for angiogram today, no issue with antibiotics.   afebrile, white count 9.3.  Underwent right lower extremity angiogram on  with normal perfusion, no intervention performed.  Resting comfortably this a.m., no new issues.   Interval 24 hours of Vitals/Labs/Micro,and imaging results reviewed as available. See assessment.     Review of Systems:  Review of Systems   Constitutional: Negative for chills and fever.   Gastrointestinal: Negative for abdominal pain, constipation, diarrhea, nausea and vomiting.   Musculoskeletal: Negative for myalgias.       Hemodynamics:  Temp (24hrs), Av.7 °C (98.1 °F), Min:36.3 °C (97.4 °F), Max:37.1 °C (98.8 °F)  Temperature: 36.9 °C (98.4 °F)  Pulse  Av.2  Min: 58  Max: 118   Blood Pressure : 119/66       Physical Exam:  Physical Exam   Constitutional: She is oriented to person, place, and time. She appears well-developed and well-nourished.   HENT:   Head: Normocephalic and  atraumatic.   Eyes: Pupils are equal, round, and reactive to light. Conjunctivae and EOM are normal.   Cardiovascular: Normal rate, regular rhythm and normal heart sounds.    Pulmonary/Chest: Effort normal and breath sounds normal.   Abdominal: Soft. Bowel sounds are normal. She exhibits no distension. There is no tenderness. There is no rebound and no guarding.   Musculoskeletal: She exhibits tenderness.   Right heel with ulceration, some surrounding eschar, pink granulation tissue, minimal drainage, tender to palpation   Neurological: She is alert and oriented to person, place, and time.   Skin: Skin is warm and dry.   Psychiatric: She has a normal mood and affect. Her behavior is normal.       Meds:    Current Facility-Administered Medications:   •  rivaroxaban **FOLLOWED BY** [START ON 6/24/2019] rivaroxaban  •  carvedilol  •  spironolactone  •  insulin glargine  •  lisinopril  •  potassium chloride SA  •  furosemide  •  albuterol  •  DAPTOmycin  •  ferrous sulfate-c-folic acid  •  cyanocobalamin  •  triamcinolone acetonide  •  aspirin EC  •  nystatin  •  Respiratory Care per Protocol  •  ipratropium-albuterol  •  acetaminophen  •  gabapentin  •  omeprazole  •  ondansetron  •  pravastatin  •  senna-docusate **AND** polyethylene glycol/lytes **AND** magnesium hydroxide **AND** bisacodyl  •  ondansetron  •  Notify provider if pain remains uncontrolled **AND** Use the numeric rating scale (NRS-11) on regular floors and Critical-Care Pain Observation Tool (CPOT) on ICUs/Trauma to assess pain **AND** Pulse Ox (Oximetry) **AND** Pharmacy Consult Request **AND** If patient difficult to arouse and/or has respiratory depression, stop any opiates that are currently infusing and call a Rapid Response. **AND** oxyCODONE immediate-release **AND** oxyCODONE immediate-release **AND** HYDROmorphone  •  lactobacillus rhamnosus  •  insulin regular **AND** Accu-Chek ACHS **AND** NOTIFY MD and PharmD **AND** glucose **AND**  dextrose 10% bolus    Labs:  Recent Labs      06/04/19   0359  06/06/19   0208   WBC  10.0  9.8   RBC  3.63*  3.62*   HEMOGLOBIN  10.8*  10.9*   HEMATOCRIT  35.3*  35.4*   MCV  97.2  97.8   MCH  29.8  30.1   RDW  47.2  47.1   PLATELETCT  346  351   MPV  9.6  9.4   NEUTSPOLYS  76.40*   --    LYMPHOCYTES  9.40*   --    MONOCYTES  10.00   --    EOSINOPHILS  2.90   --    BASOPHILS  0.70   --      Recent Labs      06/05/19   1320  06/06/19   0208   SODIUM  139  141   POTASSIUM  4.7  4.5   CHLORIDE  98  99   CO2  35*  35*   GLUCOSE  303*  209*   BUN  26*  25*   CPKTOTAL   --   18     Recent Labs      06/05/19   1320  06/06/19   0208   ALBUMIN  2.5*   --    CREATININE  1.14  1.02       Imaging:  Cta Abdomen Pelvis W & W/o Post Process    Result Date: 5/28/2019 5/28/2019 3:46 PM HISTORY/REASON FOR EXAM:  AIOD Chronic bilateral heel ulcers.  Diabetes.  Peripheral vascular disease. TECHNIQUE/EXAM DESCRIPTION:  CT angiogram of the abdomen and pelvis without and with contrast, with reconstructions. Initial precontrast helical sections were obtained from the diaphragmatic domes to the lesser trochanters. Following this, 100 mL of Omnipaque 350 nonionic contrast was administered at 5.0 mL/sec and helical scanning was obtained from the diaphragmatic domes through the lesser trochanters. Thin section overlapping reconstruction interval was utilized and multiplanar volume reformatted were generated in the sagittal and coronal planes. 3D angiographic images were reviewed on PACS. Maximum intensity projection (MIP) images were generated and reviewed. Low dose optimization technique was utilized for this CT exam including automated exposure control and adjustment of the mA and/or kV according to patient size. COMPARISON:   None. FINDINGS: Noncontrast images: There is no intramural hematoma. Contrast images: Abdominal aorta shows moderate atherosclerotic calcifications without aneurysm or dissection.  Mild narrowing distally.  Moderate  atherosclerotic calcification of the iliofemoral arterial system bilaterally without focal occlusion. Celiac trunk, superior and inferior mesenteric arteries enhance normally. Renal arteries show mild atherosclerotic narrowing proximally. Small bilateral pleural effusions with associated atelectasis. The liver is unremarkable. The spleen is unremarkable. Adrenal glands are unremarkable. The kidneys are unremarkable. Pancreas is atrophic. The gallbladder is unremarkable. Increased colonic stool. Colonic diverticula noted. Bladder is unremarkable. Uterus is absent.  Ovaries are not visualized. Appendix has a normal appearance. Degenerative change of lumbar spine. 3D angiographic/MIP images of the vasculature confirm the vascular findings as described above.     1.  Moderate atherosclerotic calcification abdominal aorta without aneurysm or dissection. 2.  Moderate atherosclerotic calcification of the LEFT carotid system without segmental occlusion. 3.  Small bilateral pleural effusions with associated atelectasis. 4.  Increased colonic stool suggesting constipation. 5.  Colonic diverticula.    Ct-head W/o    Result Date: 5/30/2019 5/30/2019 4:07 AM HISTORY/REASON FOR EXAM:  Headache, acute, severe, worst HA of life. TECHNIQUE/EXAM DESCRIPTION AND NUMBER OF VIEWS: CT of the head without contrast. The study was performed on a helical multidetector CT scanner. Contiguous axial sections were obtained from the skull base through the vertex. Up to date radiation dose reduction adjustments have been utilized to meet ALARA standards for radiation dose reduction. COMPARISON:  Head CT 3/19/2019 FINDINGS: The calvariae and skull base are unremarkable. There are no extraaxial fluid collections. There is a pattern of cerebral atrophy manifest as enlargement of sulcal markings and ventricular prominence.  The ventricular system and basal cisterns are otherwise unremarkable. There are areas of hypodensity in the white matter most  consistent with small vessel ischemic change versus demyelination or gliosis. There are no hemorrhagic lesions. There are no mass effects or shift of midline structures. Incidentally noted are symmetric basal ganglia calcifications, likely idiopathic and of no clinical significance. The brainstem and posterior fossa structures are unremarkable. Paranasal sinuses in the field of view are unremarkable. Mastoids in the field of view are unremarkable.     1.  Cerebral atrophy. 2.  White matter lucencies most consistent with small vessel ischemic change versus demyelination or gliosis. 3.  Otherwise, Head CT without contrast with no acute findings. No evidence of acute cerebral infarction, hemorrhage or mass lesion.    Dx-chest-limited (1 View)    Result Date: 5/30/2019 5/29/2019 8:56 PM HISTORY/REASON FOR EXAM:  Shortness of Breath. Rapid response TECHNIQUE/EXAM DESCRIPTION AND NUMBER OF VIEWS: Single AP view of the chest. COMPARISON: 1 view chest outside films 3/19/2019 FINDINGS: There is hypoinflation. This accentuates the cardiac shadow. There is marked central and lower lung zone pulmonary edema with suspected bibasilar airspace opacities and/or pleural effusions.     1.  Marked pulmonary edema. 2.  Possible underlying bibasilar airspace opacity/alveolar pulmonary edema and/or bilateral pleural effusions.    Dx-chest-portable (1 View)    Result Date: 6/5/2019 6/5/2019 8:03 PM HISTORY/REASON FOR EXAM:  Shortness of Breath; interval changes of edemea. TECHNIQUE/EXAM DESCRIPTION AND NUMBER OF VIEWS: Single portable view of the chest. COMPARISON: 5/29/2019 FINDINGS: Cardiomediastinal silhouette is stable. Moderate left and small right pleural effusions. Bilateral interstitial opacity/edema appears mildly improved.. No pneumothorax. No acute osseous abnormality.     1. Left greater than right pleural effusions with basilar atelectasis. Correlate clinically for infection. 2. Cardiomegaly with mildly improved pulmonary  interstitial edema.    Dx-foot-complete 3+ Right    Result Date: 5/25/2019 5/25/2019 3:17 PM HISTORY/REASON FOR EXAM:  Nonhealing wound on heel with history of diabetes TECHNIQUE/EXAM DESCRIPTION AND NUMBER OF VIEWS: 3 nonweightbearing views of the RIGHT foot. COMPARISON:  No comparison available FINDINGS:  Bone mineralization is normal.  There is no evidence of fracture or dislocation.  Soft tissue swelling is noted including in the calcaneal area. Bone erosions appear to be present along the medial edge of the tarsal bones and inferior edge of the tarsal bones. There is moderate to severe joint space narrowing and periarticular sclerosis and marginal spurring which is prominent in the mid foot area.     1.  No evidence of fracture or dislocation. 2.  Severe degenerative changes again present raising possibility of neuropathic foot. 3.  There appears to be increase in bone erosions along the medial and inferior tarsal bones which could indicate osteomyelitis.     Dx-foot-complete 3+ Left    Result Date: 5/25/2019 5/25/2019 3:18 PM HISTORY/REASON FOR EXAM:  Left foot pain with history of diabetes TECHNIQUE/EXAM DESCRIPTION AND NUMBER OF VIEWS: 3 nonweightbearing views of the LEFT foot. COMPARISON:  No comparison available FINDINGS:  Bone mineralization is normal.  There is no evidence of fracture or dislocation.  Soft tissue swelling is noted.  There is joint space narrowing and periarticular spurring. Hammertoe deformities are noted. No bone erosions are identified.     1.  No evidence of fracture or dislocation. 2.  Osteoarthritis is noted. No bone erosions are identified.    Us-giovana Single Level Bilat    Result Date: 5/28/2019   Vascular Laboratory  Conclusions  1.  Monophasic arterial waveforms in the bilateral common femoral arteries  indicate aortoiliac stenosis or occlusion.  2.  Diminished right tow pressures, and to a lesser degree, left toe  pressure indicate poor healing potential.  DAVID KEYS   Age:    73    Gender:     F  MRN:    [id number]  :    1945      BSA:  Exam Date:     2019 10:38  Room #:     Inpatient  Priority:     Routine  Ht (in):             Wt (lb):  Ordering Physician:        MAYITO GOODSON  Referring Physician:  Sonographer:               Ruth Wne RVT  Study Type:                Complete Bilateral  Technical Quality:         Adequate  Indications:     Ulceration of LE  CPT Codes:       81224  ICD Codes:       707.1  History:         Bilateral heel wounds  Limitations:     Right brachial pressure not obtained due to IV                 RIGHT  Waveform            Systolic BPs (mmHg)                             0             Brachial  Monophasic                               Common Femoral  Monophasic                               Posterior Tibial  Monophasic                               Dorsalis Pedis                                           Peroneal                                           SELWYN                                           TBI                       LEFT  Waveform        Systolic BPs (mmHg)                             123           Brachial  Monophasic                               Common Femoral  Monophasic                               Posterior Tibial  Monophasic                               Dorsalis Pedis                                           Peroneal                                           SELWYN                                           TBI  Findings  Bilateral.  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  Doppler waveforms at the ankle are monophasic with moderate amplitude.  Ankle pressures were not taken due to non-compressibility of tibial  arteries on previous exam (2018).  Toe brachial indices were taken of the bilateral great toes.  Right:      0.41            Left:           0.72  Arterial duplex scan  was performed in accordance with lower extremity  arterial evaluation protocol - see separate report.  Ananya Nash M.D.  (Electronically Signed)  Final Date:      28 May 2019 18:19    Us-extremity Artery Lower Bilat    Result Date: 2019  Lower Extremity  Arterial Duplex Report  Vascular Laboratory  CONCLUSIONS  acute vs subacute DVT left cfv and profunda v.  Rt pop cyst  Arteries poorly seen due to extensive calcification. Multiple levels  bilateral non-vis could contain stenosis. No obvious post occlusive  waveforms.  50% stenosis is seen in rt mid sfa.  called to DAVID Paiz  Exam Date:     2019 13:40  Room #:     Inpatient  Priority:     Routine  Ht (in):             Wt (lb):  Ordering Physician:        MAYITO GOODSON  Referring Physician:       KELLEE Emmanuel  Sonographer:               Ruth Wen RVT  Study Type:                Complete Bilateral  Technical Quality:         Adequate  Age:    73    Gender:     F  MRN:    [id number]  :    1945      BSA:  Indications:     Ulcer of lower extremity  CPT Codes:       48920  ICD Codes:       707.1  History:         Ulceration of the bilateral heels.  Limitations:     Calcific dropout.                RIGHT  Waveform        Peak Systolic Velocity (cm/s)                  Prox    Prox-Mid  Mid    Mid-Dist  Distal  Triphasic                         164                      CFA  Triphasic       89                                         PFA  Monophasic      109      211      114              105     SFA  Monophasic                        90                       POP  Monophasic      74                                 121     AT  Monophasic      74                                 88      PT  Not                                                        GUTIERREZ  attained                LEFT  Waveform        Peak Systolic Velocity (cm/s)                  Prox    Prox-Mid  Mid    Mid-Dist  Distal  Triphasic                          127                      CFA  Biphasic        70                                         PFA  Monophasic      150               118              85      SFA  Monophasic                        95                       POP  Monophasic      121                                177     AT  Absent                                                     PT  Absent                                                     GUTIERREZ  FINDINGS  Right.  Heavy atherosclerotic plaque seen through out the right lower extremity.  Loss of flow reversal at the proximal femoral artery prior to visualization  of hemodynamic significant stenosis  Stenosis of the proximal femoral artery. Velocities are consistent with 50-  75% stenosis.  Cannot rule out further hemodynamic significant stenosis within the femoral  or popliteal arteries due to calcific dropout.  Due to heavy calcification, the tibial arteries could not be evaluated  entirely. Cannot rule out segmental occlusion or stenosis.  The peroneal artery is not visualized.  The anterior tibial and posterior tibial arteries are monophasic to the  ankle.  Left.  Heavy atherosclerotic plaque seen through out the left lower extremity.  Loss of flow reversal at the proximal femoral artery without visualization  of hemodynamic significant stenosis.  Cannot rule out  hemodynamic significant stenosis within the femoral or  popliteal arteries due to calcific dropout.  Due to heavy calcification, the tibial arteries could not be evaluated  entirely. Cannot rule out segmental occlusion or stenosis.  No flow can be demonstrated in the peroneal and posterior tibial  artery.  The anterior tibial artery is monophasic to the ankle.  Incidental finding:  Slightly echogenic material is visualized within the common femoral vein  that extends within the profunda femoral vein, consistent acute to subacute  venous thrombosis.   Rubens Elam MD  (Electronically Signed)  Final Date:      27 May 2019  16:16    Us-extremity Venous Lower Bilat    Result Date: 2019   Vascular Laboratory  CONCLUSIONS  1.  There is thrombus in the LEFT common femoral vein which is  noncompressible. There is nonocclusive thrombus in the LEFT femoral vein as  well.   This is likely acute  DVT with some superimposed chronic DVT .  2.  No evidence of RIGHT lower extremity DVT or SVT.  3.  Exam limited by superficial edema.  Findings discussed with Dr. Charlotte Carlson  at 5:00 am on 19.  KEYSDAVID  Exam Date:     2019 02:35  Room #:     [facility]  Priority:     Stat  Ht (in):             Wt (lb):  Ordering Physician:        MAYITO GOODSON  Referring Physician:       [id number] Wolfe  Sonographer:               Khurram Messina RDMS  Study Type:                Complete Bilateral  Technical Quality:         Fair  Age:    73    Gender:     F  MRN:    [id number]  :    1945      BSA:  Indications:     Pain in unspecified lower leg  CPT Codes:       18312  ICD Codes:       M79.669  History:         Incidental finding in previous SELWYN study.  Bilateral lower                   extremity pain.  Limitations:     Patient couldn't lift legs, unable to image Popliteal veins  PROCEDURES:  Bilateral lower extremity venous duplex imaging.  The following venous structures were evaluated: common femoral vein,  profunda vein, proximal portion of the greater saphenous vein, superficial  femoral vein, and the popliteal vein.  In addition, the posterior tibial and peroneal veins were evaluated.  Serial compression, augmentation maneuvers, and spectral Doppler flow  evaluation were performed.  FINDINGS:  Right lower extremity -.  Complete color filling and compressibility with normal venous flow dynamics  including spontaneous flow, response to augmentation maneuvers, and  respiratory phasicity.  Interstitial fluid consistent with edema is observed below the knee.  Edema reduces the image quality.  Left lower extremity -.  Densely echogenic material is  within  the vein that is consistent with  chronic venous thrombosis; non-occlusive.  Common Femoral vein is noncompressible.  Interstitial fluid consistent with edema is observed below the knee.  Edema reduces the image quality.  Mandy HUNTER Kentrell  (Electronically Signed)  Final Date:      28 May 2019 05:01    Ec-echocardiogram Complete W/o Cont    Result Date: 2019  Transthoracic Echo Report Echocardiography Laboratory CONCLUSIONS Akinetic inferolateral wall. Moderately reduced left ventricular systolic function. Left ventricular ejection fraction is visually estimated to be 35%. Left ventricle is mildly dilated. Eccentric posteriorly directed mitral regurgitation, probably moderate. Moderately dilated left atrium. Aortic sclerosis without stenosis. Mild tricuspid regurgitation. Normal inferior vena cava size without inspiratory collapse. Right ventricular systolic pressure is estimated to be 40 mmHg. Mildly dilated right heartventricle. Normal right ventricular systolic function. Pleural effusion present. Normal pericardium without effusion. KEYSDAVID Exam Date:         2019                    08:55 Exam Location:     Inpatient Priority:          Routine Ordering Physician:        VERONICA HENDERSON Referring Physician:       [id number] Michele Sonographer:               JUDIE Lozada Age:    73     Gender:    F MRN:    [id number] :    1945 BSA:    1.9    Ht (in):    64     Wt (lb):    187 Exam Type:     Complete Indications:     Heart failure, unspecified ICD Codes:       I50.9 CPT Codes:       13367 BP:   117    /   40     HR:   72 Technical Quality:       Fair MEASUREMENTS  (Male / Female) Normal Values 2D ECHO LV Diastolic Diameter PLAX        5.5 cm                4.2 - 5.9 / 3.9 - 5.3 cm LV Systolic Diameter PLAX         4.7 cm                2.1 - 4.0 cm IVS Diastolic Thickness           0.98 cm               LVPW Diastolic Thickness          0.87 cm               RV Diameter 4C                     3.7 cm                2.5 - 2.9 cm LVOT Diameter                     1.9 cm                RA Diameter                       4.6 cm                Estimated LV Ejection Fraction    35 %                  LV Ejection Fraction MOD BP       35.5 %                >= 55  % LV Ejection Fraction MOD 4C       38.1 %                LV Ejection Fraction MOD 2C       31.4 %                LA Volume Index                   31.4 cm³/m²           16 - 28 cm³/m² IVC Diameter                      1.8 cm                DOPPLER AV Peak Velocity                  1 m/s                 AV Peak Gradient                  4.3 mmHg              AV Mean Gradient                  2.4 mmHg              LVOT Peak Velocity                0.68 m/s              AV Area Cont Eq vti               1.6 cm²               Mitral E Point Velocity           1.1 m/s               Mitral E to A Ratio               2.5                   MV Pressure Half Time             35.4 ms               MV Area PHT                       6.2 cm²               MV Deceleration Time              122 ms                MR Flow Convergence Radius        0.57 cm               MR ERO PISA                       0.16 cm²              MR Regurgitant Volume PISA        23.6 cm³              Pulmonary Vein Systolic Velocity  0.2 m/s               Pulmonary Vein Diastolic Velocit  0.55 m/s              Pulmonary Vein S/D Ratio          0.37                  TR Peak Velocity                  283 cm/s              PV Peak Velocity                  1 m/s                 PV Peak Gradient                  4.1 mmHg              RVOT Peak Velocity                0.73 m/s              * Indicates values subject to auto-interpretation LV EF:  35    % FINDINGS Left Ventricle Left ventricle is mildly dilated. Moderately reduced left ventricular systolic function. Left ventricular ejection fraction is visually estimated to be 35%. Akinetic inferolateral wall.  Diastolic function is  "difficult to assess with atrial fibrillation and mitral valve disease. . Right Ventricle Mildly dilated right ventricle. Normal right ventricular systolic function. Right Atrium Enlarged right atrium. Normal inferior vena cava size without inspiratory collapse. Left Atrium Moderately dilated left atrium. Mitral Valve Thickened mitral valve leaflets with mitral annular calcification. Eccentric posteriorly directed mitral regurgitation, probably moderate. Aortic Valve Tricuspid aortic valve. Aortic sclerosis without stenosis. No aortic insufficiency. Tricuspid Valve Structurally normal tricuspid valve without significant stenosis. Mild tricuspid regurgitation. Right ventricular systolic pressure is estimated to be 40 mmHg. Pulmonic Valve The pulmonic valve is not well visualized. No stenosis or regurgitation seen. Pericardium Normal pericardium without effusion. Pleural effusion present. Aorta Normal aortic root for body surface area. Ascending aorta diameter is  2.3 cm. Hai Ch M.D. (Electronically Signed) Final Date:     31 May 2019 12:21      Micro:  Results     Procedure Component Value Units Date/Time    BLOOD CULTURE [[id number]] Collected:  05/25/19 1520    Order Status:  Completed Specimen:  Blood from Peripheral Updated:  05/30/19 1700     Significant Indicator NEG     Source BLD     Site PERIPHERAL     Culture Result No growth after 5 days of incubation.    Narrative:       Per Hospital Policy: Only change Specimen Src: to \"Line\" if  specified by physician order.  No site indicated    BLOOD CULTURE [[id number]] Collected:  05/25/19 1525    Order Status:  Completed Specimen:  Blood from Peripheral Updated:  05/30/19 1700     Significant Indicator NEG     Source BLD     Site PERIPHERAL     Culture Result No growth after 5 days of incubation.    Narrative:       Per Hospital Policy: Only change Specimen Src: to \"Line\" if  specified by physician order.  No site indicated          Assessment:  Active " Hospital Problems    Diagnosis   • *Diabetic foot ulcer with osteomyelitis (HCC) [E11.621, E11.69, L97.509, M86.9]   • Acute on chronic respiratory failure with pulmonary edema, EF 35% ([facility]) [J96.20]   • Coronary artery disease involving native coronary artery of native heart without angina pectoris [I25.10]   • Essential hypertension [I10]   • Type 2 diabetes mellitus with skin complication, with long-term current use of insulin ([facility]) [E11.628, Z79.4]   • Acute pulmonary edema ([facility]) [J81.0]   • Acute on chronic systolic and diastolic heart failure, NYHA class 4 ([facility]) [I50.43]   • Electrolyte abnormality [E87.8]   • Cardiomyopathy ([facility]) [I42.9]   • Deep vein thrombosis (DVT) of lower extremity ([facility]) [I82.409]   • Normocytic anemia [D64.9]   • Rash [R21]   • Peripheral vascular disease ([facility]) [I73.9]   • Cognitive decline [R41.89]   • Chronic anticoagulation [Z79.01]   • UTI (urinary tract infection) [N39.0]   • Paroxysmal atrial fibrillation ([facility]) [I48.0]     Interval 24 hour assessment:    Events: ID reconsulted as patient is unable to be placed in skilled facility due to cost of daptomycin.  Request ID explore other antibiotic options.  AF, O2 3.5 NC, stable     Labs reviewed  Imaging personally reviewed both images and report.   Micro reviewed    Pt antibiotics transition to vancomycin to see if she will tolerate.  Patient agreeable to PICC line.  She is tolerating the daptomycin and denies any myalgias or diarrhea.  The foot wound is open and I requested wound care to see the patient.      Plan:  Bilateral DM heel ulcers with Right diabetic foot OM  Afebrile  Leukocytosis resolved  Bcx on 5/25 negative  Was d/c'd home in January 2019 on IV Dapto for R foot OM; however, pt stated she did not finish IV abx and does not recall if she was given any other abx.  Never followed up in ID clinic. OM not treated.   Previous R foot cx +MRSE (bone), MSSE (tissue) & E faecalis (tissue) December 2018  Wound cx from R heel on  5/26 +MRSA  --- Plan had been to give IV daptomycin 8 mg/kg daily--due to cost of daptomycin patient is unable to be placed in skilled facility.  Daptomycin was initiated due to concern for kidney injury with vancomycin.  Will stop daptomycin initiate vancomycin.  However will need at least a 72-hour trial on vancomycin to see if she is able to tolerate before she can be discharged.  Skilled facility would need to be able to manage vancomycin if it is continued.   --- Monitor renal function  CPK weekly while on daptomycin  CPK 14 on 5/28. Will repeat with AM labs  Plan to treat with 6 weeks IV antibiotics due to right diabetic foot OM that was untreated  Not a good candidate for oral antibiotics given poor compliance and cognitive decline  Estimated end date 7/6/2019  Weekly CBC with differential, BMP  Okay to place PICC line  Continue with wound care and offloading     PVD  US of BLE showing acute on chronic LLE DVT and 50% stenosis of Right mid SFA  Underwent RLE angiogram with Dr. Pro 5/31 with normal flow, no intervention performed  Vascular planning to postpone left lower extremity angiogram since the heel wound on the right appeared worse but had normal perfusion     UTI-resolved  Ucx on 5/25 +Klebsiella pneumoniae  Completed IV Zosyn on 5/30     Diabetes mellitus  Hemoglobin A1c was 8.7% on 5/25/19  Will adversely affect wound healing and resolution of infection     Afib  Rate controlled    Pleural effusions noted on chest x-ray 6/5, cardiomegaly also noted  -Suspect secondary to known heart failure       Disposition  Likely SNF     Discussed with primary, Dr. Varela.      ID will follow.  Please call with questions.    [facility] was contacted by hospitalist and patient has been accepted at LTAC who will give her daptomycin as planned.  We will stop vancomycin and reinitiate daptomycin as previously outlined.  Plan for discharge to LTAC tomorrow.  ID will sign off

## 2019-06-06 NOTE — PROGRESS NOTES
2 RN skin check complete.   Devices in place silicone oxygen tubing, heel float boots.  Skin assessed under devices yes.  Confirmed pressure ulcers found on N/A, confirmed diabetic foot ulcers to bilateral heels.  New potential pressure ulcers noted on sacrum. Wound following.   The following interventions in place Q2H turns, waffle mattress, barrier cream, heel float boots, pillows for support and positioning.      Pt generalized skin is fragile with scattered bruises. Skin tear to right arm near elbow from Tegaderm of PIV.  Moisture fissure to left pannus. Moisture redness/excoriating to groin, pannus and under bilateral breasts

## 2019-06-06 NOTE — PROGRESS NOTES
2 RN skin check complete.   Devices in place silicone oxygen tubing, heel float boots.  Skin assessed under devices yes.  Confirmed pressure ulcers found on N/A, confirmed diabetic foot ulcers to bilateral heels.  New potential pressure ulcers noted on sacrum. Wound following.   The following interventions in place Q2H turns, waffle mattress, barrier cream, heel float boots, pillows for support and positioning.      Pt generalized skin is fragile with scattered bruises. Skin tear to right arm near elbow from Tegaderm of PIV.  Moisture fissure to left pannus. Moisture redness/excoriating to groin, pannus and under bilateral breasts.

## 2019-06-07 NOTE — PROGRESS NOTES
"Hospital Medicine Daily Progress Note/Rapid response note    Date of Service  6/6/2019    Chief Complaint  73 y.o. female admitted 5/25/2019 with diabetic foot ulcers    Hospital Course    73-year-old male past medical history of coronary artery disease questionable stent placement-/2018 or 1998 ??, paroxysmal atrial fibrillation, type 2 diabetes (last a1c 7.3 03/2019) mild to moderate cognitive decline, hypertension, hyperlipidemia sent by podiatrist for bilateral wound on her feet.  X-ray showed right osteomyelitis.  She was started on vanco/unasyn. Pt was not septic on admission.  LPS was consulted. On admission was also found patient has UTI which was already covered by above antibiotic.         Interval Problem Update  6/4. Sleeping but arousable. Cognitive deficits.  at bedside.  6/5. Awake, grumpy. She does not want PICC line. She had a bad experience with it before. She says she will stay in the hospital if she has to.  6/6. More open now to PICC as IV keeps failing.    Consultants/Specialty  ID  vascular    Code Status  full    Disposition  PT/OT ordered  On Xarelto  IV antibiotics to July 6  SNF declined \"dapto too expensive\"  LTACH referral sent  LTACH accepts, dapto okay    Review of Systems  Review of Systems   Constitutional: Negative for chills, diaphoresis, fever and malaise/fatigue.   Respiratory: Negative for shortness of breath.    Gastrointestinal: Negative for abdominal pain, constipation, diarrhea, heartburn and nausea.   Genitourinary: Negative for dysuria.   Musculoskeletal: Positive for myalgias. Negative for back pain and joint pain.   Neurological: Positive for weakness. Negative for tingling and focal weakness.   Psychiatric/Behavioral: Positive for memory loss. The patient is not nervous/anxious.    All other systems reviewed and are negative.       Physical Exam  Temp:  [36.3 °C (97.4 °F)-36.9 °C (98.4 °F)] 36.4 °C (97.5 °F)  Pulse:  [77-93] 77  Resp:  [16-20] 18  BP: " ()/(43-66) 98/55  SpO2:  [91 %-95 %] 94 %    Physical Exam   Constitutional: No distress.   HENT:   Head: Normocephalic.   Eyes: Conjunctivae are normal.   Neck: Normal range of motion. Neck supple.   Cardiovascular: Normal rate.  An irregularly irregular rhythm present.   Pulmonary/Chest: Effort normal. No respiratory distress. She has decreased breath sounds.   Abdominal: Soft. Bowel sounds are normal. She exhibits no distension. There is no tenderness.   Musculoskeletal: She exhibits edema (Improved).   Foot ulcer dressings CDI   Neurological: She is alert. No cranial nerve deficit. Coordination normal.   Cognitive deficits.   Skin: Skin is warm and dry. Rash (Old ecchymoses) noted. She is not diaphoretic. No erythema.   Psychiatric: She has a normal mood and affect. Her speech is delayed. She exhibits abnormal recent memory.   Less irritable       Fluids    Intake/Output Summary (Last 24 hours) at 06/06/19 1803  Last data filed at 06/05/19 2000   Gross per 24 hour   Intake              320 ml   Output                0 ml   Net              320 ml       Laboratory  Recent Labs      06/04/19   0359  06/06/19   0208   WBC  10.0  9.8   RBC  3.63*  3.62*   HEMOGLOBIN  10.8*  10.9*   HEMATOCRIT  35.3*  35.4*   MCV  97.2  97.8   MCH  29.8  30.1   MCHC  30.6*  30.8*   RDW  47.2  47.1   PLATELETCT  346  351   MPV  9.6  9.4     Recent Labs      06/05/19   1320  06/06/19   0208   SODIUM  139  141   POTASSIUM  4.7  4.5   CHLORIDE  98  99   CO2  35*  35*   GLUCOSE  303*  209*   BUN  26*  25*   CREATININE  1.14  1.02   CALCIUM  9.1  9.1                   Imaging  DX-CHEST-FOR LINE PLACEMENT Perform procedure in: Patient's Room   Final Result      1.  Supportive tubing as described above.   2.  No other significant change from prior exam.         IR-PICC LINE PLACEMENT W/ GUIDANCE > AGE 5   Final Result                  Ultrasound-guided PICC placement performed by qualified nursing staff as    above.           DX-CHEST-PORTABLE (1 VIEW)   Final Result         1. Left greater than right pleural effusions with basilar atelectasis. Correlate clinically for infection.   2. Cardiomegaly with mildly improved pulmonary interstitial edema.      EC-ECHOCARDIOGRAM COMPLETE W/O CONT   Final Result      CT-HEAD W/O   Final Result      1.  Cerebral atrophy.      2.  White matter lucencies most consistent with small vessel ischemic change versus demyelination or gliosis.      3.  Otherwise, Head CT without contrast with no acute findings. No evidence of acute cerebral infarction, hemorrhage or mass lesion.      DX-CHEST-LIMITED (1 VIEW)   Final Result      1.  Marked pulmonary edema.   2.  Possible underlying bibasilar airspace opacity/alveolar pulmonary edema and/or bilateral pleural effusions.      US-SELWYN SINGLE LEVEL BILAT   Final Result      CTA ABDOMEN PELVIS W & W/O POST PROCESS   Final Result      1.  Moderate atherosclerotic calcification abdominal aorta without aneurysm or dissection.   2.  Moderate atherosclerotic calcification of the LEFT carotid system without segmental occlusion.   3.  Small bilateral pleural effusions with associated atelectasis.   4.  Increased colonic stool suggesting constipation.   5.  Colonic diverticula.      US-EXTREMITY VENOUS LOWER BILAT   Final Result      US-EXTREMITY ARTERY LOWER BILAT   Final Result      DX-FOOT-COMPLETE 3+ RIGHT   Final Result      1.  No evidence of fracture or dislocation.      2.  Severe degenerative changes again present raising possibility of neuropathic foot.      3.  There appears to be increase in bone erosions along the medial and inferior tarsal bones which could indicate osteomyelitis.         DX-FOOT-COMPLETE 3+ LEFT   Final Result      1.  No evidence of fracture or dislocation.      2.  Osteoarthritis is noted. No bone erosions are identified.      IR-EXTREMITY ANGIOGRAM-UNILATERAL RIGHT    (Results Pending)        Assessment/Plan  * Diabetic foot ulcer with  "osteomyelitis (HCC)- (present on admission)   Assessment & Plan    Cultures growin MRSA  Continue wound care  Continue daptomycin through July 6  ID following   Vascular surgery following, right lower extremity angiogram, not flow-limiting lesions, assessing for bilateral CTAs.  Vascular felt no need to complete CTA felt circulation okay and signed off.  Patient has been declining PICC line citing bad experience with it in the past  SNFs have declined because \"dapto too expensive\"  LTACH referral currently  Patient now open to having PICC line on 6/6  LTACH accepted, preliinarily on 6/7. They are okay with daptomycin.     Acute on chronic respiratory failure with pulmonary edema, EF 35% ([facility])- (present on admission)   Assessment & Plan    Chest x-ray consistent with acute cardiogenic pulmonary edema  Echo showed EF of 35%  Resp and O2 per protocol  On IV Lasix  Ordered repeat CXR to follow edema.  CXR showed unchanged from previous exam, stable.       Essential hypertension- (present on admission)   Assessment & Plan    Continue carvedilol and monitor blood pressure     Coronary artery disease involving native coronary artery of native heart without angina pectoris- (present on admission)   Assessment & Plan    Continue aspirin carvedilol and statin       Type 2 diabetes mellitus with skin complication, with long-term current use of insulin ([facility])- (present on admission)   Assessment & Plan    HbA1c 8.7  Continue Lantus 10u  Continue insulin sliding scale         Acute on chronic systolic and diastolic heart failure, NYHA class 4 ([facility])   Assessment & Plan    Continue Lasix, lisinopril, aldactone, carvedilol.   On Xarelto     Acute pulmonary edema (HCC)   Assessment & Plan    (see above)     Electrolyte abnormality   Assessment & Plan    Hypokalemia  Hypophosphatemia  Hypomagnesemia  Replete with IV magnesium sulfate and potassium phosphate and monitor electrolytes     Cardiomyopathy ([facility])   Assessment & Plan    " Repeat echo with EF of 35% and moderate MR with regional wall abnormalities  Continue IV Lasix and monitor intake and output  Dr. Nolasco, Cardiology consulted  Continue lisinopril,carvedilol and Aldactone  Proximal atrial fibrillation, Cardiology recommending anticoagulation to decrease stroke risk                                                    Xarelto initiated         Deep vein thrombosis (DVT) of lower extremity (HCC)- (present on admission)   Assessment & Plan    Xarelto       Cognitive decline- (present on admission)   Assessment & Plan    Noted  LTACH referral     Peripheral vascular disease (HCC)- (present on admission)   Assessment & Plan    With bilateral lower extremity ulcers  Vascular Surgery planning bilateral CTAs to assess     Normocytic anemia- (present on admission)   Assessment & Plan    With iron deficiency  Hemoglobin 10.6 no clinical signs of bleeding continue to monitor     UTI (urinary tract infection)- (present on admission)   Assessment & Plan    Resolved  Completed 3-day course of antibiotics with Zosyn, currently on dapto for other infectious indication         Chronic anticoagulation- (present on admission)   Assessment & Plan    On Xarelto       Paroxysmal atrial fibrillation (HCC)- (present on admission)   Assessment & Plan    Rate controlled  Continue carvedilol  Xarelto           Plan of care reviewed with patient and  at bedside and discussed with nursing staff and pharmacist    VTE prophylaxis: Xarelto    Reviewed vitals, labs, imaging, staff notes.  Discussed assessment and plan with Leonie Brock Discussed with RN.

## 2019-06-07 NOTE — PROGRESS NOTES
Patient refused scd's, educated regarding importance.  Education reinforced by Angela Cannon, RN, Johanna MAYS.  Patient continues to refuse.

## 2019-06-07 NOTE — PROGRESS NOTES
"Hospital Medicine Daily Progress Note/Rapid response note    Date of Service  6/7/2019    Chief Complaint  73 y.o. female admitted 5/25/2019 with diabetic foot ulcers    Hospital Course    73-year-old male past medical history of coronary artery disease questionable stent placement-/2018 or 1998 ??, paroxysmal atrial fibrillation, type 2 diabetes (last a1c 7.3 03/2019) mild to moderate cognitive decline, hypertension, hyperlipidemia sent by podiatrist for bilateral wound on her feet.  X-ray showed right osteomyelitis.  She was started on vanco/unasyn. Pt was not septic on admission.  LPS was consulted. On admission was also found patient has UTI which was already covered by above antibiotic.         Interval Problem Update  6/4. Sleeping but arousable. Cognitive deficits.  at bedside.  6/5. Awake, grumpy. She does not want PICC line. She had a bad experience with it before. She says she will stay in the hospital if she has to.  6/6. More open now to PICC as IV keeps failing.  6/7. Converses more. More energy. Open to PICC line.      Consultants/Specialty  ID  Vascular    Code Status  full    Disposition  PT/OT ordered  On Xarelto  IV antibiotics to July 6  SNF declined \"dapto too expensive\"  LTACH referral sent  LTACH declines as she has history of leaving AMA from SNF and worries about being unable to discharge her.  SW to resend SNF referrals. Option of vancomycin instead of daptomycin on the table.  Currently no telemetry need.  Difficult discharge.    Review of Systems  Review of Systems   Constitutional: Negative for chills, diaphoresis, fever and malaise/fatigue.   Respiratory: Negative for shortness of breath.    Gastrointestinal: Negative for abdominal pain, constipation, diarrhea, heartburn and nausea.   Genitourinary: Negative for dysuria.   Musculoskeletal: Positive for myalgias. Negative for back pain and joint pain.   Neurological: Positive for weakness. Negative for tingling and focal weakness. "   Psychiatric/Behavioral: Positive for memory loss. The patient is not nervous/anxious.    All other systems reviewed and are negative.       Physical Exam  Temp:  [36 °C (96.8 °F)-36.7 °C (98.1 °F)] 36.7 °C (98.1 °F)  Pulse:  [62-90] 78  Resp:  [16-18] 17  BP: ()/(55-70) 110/69  SpO2:  [93 %-97 %] 93 %    Physical Exam   Constitutional: No distress.   HENT:   Head: Normocephalic.   Eyes: Conjunctivae are normal.   Neck: Normal range of motion. Neck supple.   Cardiovascular: Normal rate.  An irregularly irregular rhythm present.   Pulmonary/Chest: Effort normal. No respiratory distress. She has decreased breath sounds.   Abdominal: Soft. Bowel sounds are normal. She exhibits no distension. There is no tenderness.   Musculoskeletal: She exhibits edema (Improved).   Foot ulcer dressings CDI   Neurological: She is alert. No cranial nerve deficit. Coordination normal.   Cognitive deficits.   Skin: Skin is warm and dry. Rash (Old ecchymoses) noted. She is not diaphoretic. No erythema.   Psychiatric: She has a normal mood and affect. Her speech is delayed. She exhibits abnormal recent memory.   Less irritable       Fluids  No intake or output data in the 24 hours ending 06/07/19 1412    Laboratory  Recent Labs      06/06/19   0208   WBC  9.8   RBC  3.62*   HEMOGLOBIN  10.9*   HEMATOCRIT  35.4*   MCV  97.8   MCH  30.1   MCHC  30.8*   RDW  47.1   PLATELETCT  351   MPV  9.4     Recent Labs      06/05/19   1320  06/06/19   0208   SODIUM  139  141   POTASSIUM  4.7  4.5   CHLORIDE  98  99   CO2  35*  35*   GLUCOSE  303*  209*   BUN  26*  25*   CREATININE  1.14  1.02   CALCIUM  9.1  9.1                   Imaging  DX-CHEST-FOR LINE PLACEMENT Perform procedure in: Patient's Room   Final Result      1.  Supportive tubing as described above.   2.  No other significant change from prior exam.         IR-PICC LINE PLACEMENT W/ GUIDANCE > AGE 5   Final Result                  Ultrasound-guided PICC placement performed by  qualified nursing staff as    above.          DX-CHEST-PORTABLE (1 VIEW)   Final Result         1. Left greater than right pleural effusions with basilar atelectasis. Correlate clinically for infection.   2. Cardiomegaly with mildly improved pulmonary interstitial edema.      EC-ECHOCARDIOGRAM COMPLETE W/O CONT   Final Result      CT-HEAD W/O   Final Result      1.  Cerebral atrophy.      2.  White matter lucencies most consistent with small vessel ischemic change versus demyelination or gliosis.      3.  Otherwise, Head CT without contrast with no acute findings. No evidence of acute cerebral infarction, hemorrhage or mass lesion.      DX-CHEST-LIMITED (1 VIEW)   Final Result      1.  Marked pulmonary edema.   2.  Possible underlying bibasilar airspace opacity/alveolar pulmonary edema and/or bilateral pleural effusions.      US-SELWYN SINGLE LEVEL BILAT   Final Result      CTA ABDOMEN PELVIS W & W/O POST PROCESS   Final Result      1.  Moderate atherosclerotic calcification abdominal aorta without aneurysm or dissection.   2.  Moderate atherosclerotic calcification of the LEFT carotid system without segmental occlusion.   3.  Small bilateral pleural effusions with associated atelectasis.   4.  Increased colonic stool suggesting constipation.   5.  Colonic diverticula.      US-EXTREMITY VENOUS LOWER BILAT   Final Result      US-EXTREMITY ARTERY LOWER BILAT   Final Result      DX-FOOT-COMPLETE 3+ RIGHT   Final Result      1.  No evidence of fracture or dislocation.      2.  Severe degenerative changes again present raising possibility of neuropathic foot.      3.  There appears to be increase in bone erosions along the medial and inferior tarsal bones which could indicate osteomyelitis.         DX-FOOT-COMPLETE 3+ LEFT   Final Result      1.  No evidence of fracture or dislocation.      2.  Osteoarthritis is noted. No bone erosions are identified.      IR-EXTREMITY ANGIOGRAM-UNILATERAL RIGHT    (Results Pending)     "    Assessment/Plan  * Diabetic foot ulcer with osteomyelitis (HCC)- (present on admission)   Assessment & Plan    Cultures growin MRSA  Continue wound care  Continue daptomycin through July 6  ID following   Vascular surgery following, right lower extremity angiogram, not flow-limiting lesions, assessing for bilateral CTAs.  Vascular felt no need to complete CTA felt circulation okay and signed off.  Patient has been declining PICC line citing bad experience with it in the past  SNFs have declined because \"dapto too expensive\"  LTACH referral currently  Patient now open to having PICC line on 6/6  LTACH accepted, preliminarily on 6/7. They are okay with daptomycin.  After further review, LTACH declined citing concerns about her rehab plan and compliance.  Resend SNF referrals.     Acute on chronic respiratory failure with pulmonary edema, EF 35% (HCC)- (present on admission)   Assessment & Plan    Chest x-ray consistent with acute cardiogenic pulmonary edema  Echo showed EF of 35%  Resp and O2 per protocol  On IV Lasix  Ordered repeat CXR to follow edema.  CXR showed unchanged from previous exam, stable.       Essential hypertension- (present on admission)   Assessment & Plan    Continue carvedilol and monitor blood pressure     Coronary artery disease involving native coronary artery of native heart without angina pectoris- (present on admission)   Assessment & Plan    Continue aspirin carvedilol and statin       Type 2 diabetes mellitus with skin complication, with long-term current use of insulin (HCC)- (present on admission)   Assessment & Plan    HbA1c 8.7  Continue Lantus 10u  Continue insulin sliding scale         Acute on chronic systolic and diastolic heart failure, NYHA class 4 ([facility])   Assessment & Plan    Continue Lasix, lisinopril, aldactone, carvedilol.   On Xarelto     Acute pulmonary edema (HCC)   Assessment & Plan    (see above)     Electrolyte abnormality   Assessment & Plan    " Hypokalemia  Hypophosphatemia  Hypomagnesemia  Replete with IV magnesium sulfate and potassium phosphate and monitor electrolytes     Cardiomyopathy (HCC)   Assessment & Plan    Repeat echo with EF of 35% and moderate MR with regional wall abnormalities  Continue IV Lasix and monitor intake and output  Dr. Nolasco, Cardiology consulted  Continue lisinopril,carvedilol and Aldactone  Proximal atrial fibrillation, Cardiology recommending anticoagulation to decrease stroke risk                                                    Xarelto initiated         Deep vein thrombosis (DVT) of lower extremity (HCC)- (present on admission)   Assessment & Plan    Xarelto       Cognitive decline- (present on admission)   Assessment & Plan    Noted  LTACH and SNF referral     Peripheral vascular disease (HCC)- (present on admission)   Assessment & Plan    With bilateral lower extremity ulcers  Vascular Surgery planning bilateral CTAs to assess     Normocytic anemia- (present on admission)   Assessment & Plan    With iron deficiency  Hemoglobin 10.6 no clinical signs of bleeding continue to monitor     UTI (urinary tract infection)- (present on admission)   Assessment & Plan    Resolved  Completed 3-day course of antibiotics with Zosyn, currently on dapto for other infectious indication         Chronic anticoagulation- (present on admission)   Assessment & Plan    On Xarelto       Paroxysmal atrial fibrillation (HCC)- (present on admission)   Assessment & Plan    Rate controlled  Continue carvedilol  Xarelto           Plan of care reviewed with patient and  at bedside and discussed with nursing staff and pharmacist    VTE prophylaxis: Xarelto    Reviewed vitals, labs, imaging, staff notes.  Discussed assessment and plan with Leonie Brock Discussed with RN and SW.

## 2019-06-07 NOTE — CARE PLAN
Problem: Pain Management  Goal: Pain level will decrease to patient's comfort goal  Pain assessed regularly, repositioned for pain alleviation, medicated per MAR PRN    Problem: Skin Integrity  Goal: Risk for impaired skin integrity will decrease  Full skin assessment performed, collaboration with wound RN, heel float boots in use, silicone nasal cannula in use, waffle cushion in use, barrier wipes and barrier paste, q2 hour turns

## 2019-06-07 NOTE — PROGRESS NOTES
LIMB PRESERVATION SERVICE    HPI:               Patient is well known to LPS and outpatient wound care services.     Leonie Brock is a 73 y.o. fe...male, with a past medical history that includes uncontrolled type 2 diabetes and a MI in 1998 with stint placement, admitted 5/25/2019 for Diabetic foot ulcer (HCC).   LPS has been consulted for her bilateral neuropathic full thickness heel ulcers.  These wounds started since she was at the SNF per the pt  Pt has been treating the wounds with out patient wound care and podiatry.   The wounds have failed to improve. Pt was previously admitted 12/29/2018 and had an Infected R DFU and had Sx with Dr. Kwon on  12/31/2018  A Right gastrocsoleus recession, Right I and D, Right ostectomy/excision.  Pt was diagnosed with type 2 diabetes 35 years ago, and is currently managing with insulin.  Pt checks their blood sugars 3 times daily and reports that these typically run around 130s to 200s.  They have had previous diabetes education.  They do have numbness in their feet.  They usually wears diabetic shoes. They do check their feet routinely. They are retired.   Previous ABIs were brisk and multiphasic in December 5/31/19  Pt evaluated by Dr Mcdowell, pt found to have bilateral lower extremity critical limb ischemia. Iliac stents needed (L TUCKER w/ high-grade disease) LLE angio from a groin approach in the future. RLE angio was planned for 5/30/19. The Angioogram was Cancelled due to pt having tachypnea and bilateral expiratory wheezing previous evening on exam. Patient denies fevers, chills, nausea, vomiting today.  Pain well controlled.     6/1/19  Pt evaluated by Dr Nash. Patient denies fevers, chills, nausea, vomiting today.  Pain well controlled.   Right lower extremity angiography completed by Dr Mcdowell 5/31/19. Left leg angiogram can be postponed with continued therapy for the bilateral heel ulcers with local wound care at this point. Left Heel stable Eschar.  "Will continue to monitor right heel with improved perfusion.     6/7/19 Dr Nash assessed, states perfusion is normal    ASSESSMENT:    /69   Pulse 78   Temp 36.7 °C (98.1 °F) (Temporal)   Resp 17   Ht 1.626 m (5' 4.02\")   Wt 86.6 kg (190 lb 14.7 oz)   SpO2 93%   Breastfeeding? No   BMI 32.75 kg/m²                 Wound 05/26/19 Neuropathic Heel Left Heel (Active)   Wound Image   6/6/2019  8:00 PM   Site Assessment Dry;Brown;Black;Dark edges;Fragile;Painful 6/7/2019  8:00 AM   Nataliya-wound Assessment Intact 6/7/2019  4:00 PM   Margins Defined edges 6/7/2019  8:00 AM   Wound Length (cm) 0 cm 6/7/2019  4:00 PM   Wound Width (cm) 0 cm 6/7/2019  4:00 PM   Wound Depth (cm) 0 cm 6/7/2019  4:00 PM   Wound Surface Area (cm^2) 0 cm^2 6/7/2019  4:00 PM   Drainage Amount None 6/6/2019  8:00 PM   Non-staged Wound Description Not applicable 6/6/2019  8:00 PM   Treatments Site care 6/5/2019  8:00 PM   Cleansing Normal Saline Irrigation 6/4/2019  4:00 PM   Periwound Protectant Not Applicable 6/6/2019  8:00 PM   Dressing Options Open to Air 6/7/2019  4:00 PM   Dressing Cleansing/Solutions 3% Betadine 6/6/2019  8:00 PM   Dressing Changed Changed 6/1/2019  4:06 PM   Dressing Status Clean;Dry;Intact 6/6/2019  8:00 PM   Dressing Change Frequency Daily 6/6/2019  8:00 PM   NEXT Dressing Change  06/06/19 6/6/2019  8:00 PM   NEXT Weekly Photo (Inpatient Only) 06/12/19 6/5/2019  8:00 PM   WOUND NURSE ONLY - Odor None 5/31/2019  9:15 AM   WOUND NURSE ONLY - Pulses Not palpable 5/31/2019  9:15 AM   WOUND NURSE ONLY - Exposed Structures None 5/31/2019  9:15 AM   WOUND NURSE ONLY - Tissue Type and Percentage epithelial 6/7/2019  4:00 PM   WOUND NURSE ONLY - Time Spent with Patient (mins) 45 5/31/2019  9:15 AM       Wound 05/26/19 Neuropathic Heel Right Heel (Active)   Wound Image     5/26/2019 11:10 AM   Site Assessment Red;Brown 6/7/2019  4:00 PM   Nataliya-wound Assessment Dry 6/7/2019  4:00 PM   Margins Attached edges 6/7/2019  " 8:00 AM   Wound Length (cm) 5 cm 6/7/2019  4:00 PM   Wound Width (cm) 6 cm 6/7/2019  4:00 PM   Wound Depth (cm) 0.2 cm 5/26/2019 11:10 AM   Wound Surface Area (cm^2) 30 cm^2 6/7/2019  4:00 PM   Tunneling 0 cm 6/7/2019  4:00 PM   Undermining 0 cm 6/7/2019  4:00 PM   Closure Secondary intention 6/7/2019  4:00 PM   Drainage Amount Scant 6/7/2019  4:00 PM   Drainage Description Serous 6/7/2019  4:00 PM   Non-staged Wound Description Full thickness 6/7/2019  4:00 PM   Treatments Site care;Cleansed 6/7/2019  4:00 PM   Cleansing Normal Saline Irrigation 6/7/2019  4:00 PM   Periwound Protectant Skin Protectant wipes to Periwound 6/7/2019  4:00 PM   Dressing Options Honey Colloid;Nonadhesive Foam;Hypafix Tape 6/7/2019  4:00 PM   Dressing Cleansing/Solutions 3% Betadine 6/6/2019  8:00 PM   Dressing Changed Changed 6/7/2019  4:00 PM   Dressing Status Clean;Dry;Intact 6/6/2019  8:00 PM   Dressing Change Frequency Every 48 hrs 6/7/2019  4:00 PM   NEXT Dressing Change  06/09/19 6/7/2019  4:00 PM   NEXT Weekly Photo (Inpatient Only) 06/14/19 6/7/2019  4:00 PM   WOUND NURSE ONLY - Odor None 6/7/2019  4:00 PM   WOUND NURSE ONLY - Pulses 1+;Right;DP;PT 6/7/2019  4:00 PM   WOUND NURSE ONLY - Exposed Structures None 6/7/2019  4:00 PM   WOUND NURSE ONLY - Tissue Type and Percentage 80 red, 20 brown 6/7/2019  4:00 PM   WOUND NURSE ONLY - Time Spent with Patient (mins) 60 6/7/2019  4:00 PM               DIABETES MANAGEMENT:  Lab Results   Component Value Date/Time    GLUCOSE 209 (H) 06/06/2019 02:08 AM      Lab Results   Component Value Date/Time    HBA1C 8.7 (H) 05/25/2019 03:20 PM        Diabetes education Seen 5/29/19    INFECTION MANAGEMENT:  WBC   Date/Time Value Ref Range Status   06/06/2019 02:08 AM 9.8 4.8 - 10.8 K/uL Final       Microbiology:   Results     Procedure Component Value Units Date/Time    BLOOD CULTURE [[id number]] Collected:  05/25/19 1520    Order Status:  Completed Specimen:  Blood from Peripheral Updated:  05/30/19  "1700     Significant Indicator NEG     Source BLD     Site PERIPHERAL     Culture Result No growth after 5 days of incubation.    Narrative:       Per Hospital Policy: Only change Specimen Src: to \"Line\" if  specified by physician order.  No site indicated    BLOOD CULTURE [[id number]] Collected:  05/25/19 1525    Order Status:  Completed Specimen:  Blood from Peripheral Updated:  05/30/19 1700     Significant Indicator NEG     Source BLD     Site PERIPHERAL     Culture Result No growth after 5 days of incubation.    Narrative:       Per Hospital Policy: Only change Specimen Src: to \"Line\" if  specified by physician order.  No site indicated    CULTURE WOUND W/ GRAM STAIN [[id number]]  (Abnormal)  (Susceptibility) Collected:  05/26/19 1143    Order Status:  Completed Specimen:  Wound from Right Foot Updated:  05/28/19 0850     Significant Indicator POS (POS)     Source WND     Site RIGHT FOOT     Culture Result Light growth mixed skin ab. (A)     Gram Stain Result No organisms seen.     Culture Result Methicillin Resistant Staphylococcus aureus  Light growth  This isolate is presumed to be clindamycin resistant based on  detection of inducible resistance.  Clindamycin may still  be effective in some patients.   (A)    Narrative:       CALL  Gerardo  131 tel. [phone],  CALLED  131 tel. [phone] 05/28/2019, 08:50, RB PERF. RESULTS CALLED  TO:Tucker Staley07 Rn  Collected By:[id number] FLAQUITO FLOWERS  Heel  Collected By:[id number] FLAQUITO LFOWERS        PLAN:  Right heel resolved,Left heel presented with 50% granular tissue, debrided most of remaining necrotic tissue 2 cm2 brown slough with scalpel.  Honey colloid on wound bed covered with foam and hypafix  Limb preservation status guarded  Wound care: New  Wound Care orders written.       Wound Care by Nursing, LPS to Follow.     Offloading:   heel float boots bilaterally     Weight Bearing Status: Prafo to RLE and offloading shoe to LLE for transfers for stability. " Bilateral heel float/Prevalon boots while in bed.  Pt is wheelchair bound.     Antibiotics: Per ID recommendation     Surgery: NA      7. Referrals:              Vascular Dr Mcdowell/Dr Nash involved              Ortho NA              Infectious diseases Dr Francis Continue IV Daptomycin 8 mg/kg daily  end date 7/12/2019              Diabetes Education Seen 5/29/19              Ortho tech requested prafo Heel offloading to Right Heel and offloading shoe to Left Heel              Other Na     Professional collaboration: Bedside RN,       LPS will follow     DISCHARGE PLAN:     Disposition: [facility] 6/8/19   Follow-up: ANTOLIN Guzman, PT

## 2019-06-07 NOTE — CARE PLAN
Problem: Bowel/Gastric:  Goal: Will not experience complications related to bowel motility  Outcome: PROGRESSING AS EXPECTED  bm today x2

## 2019-06-07 NOTE — PROGRESS NOTES
Bedside report received from NOC RN. Patient AOx3, disoriented to event. POC discussed. Patient refusing to wear SCDs despite education from this RN. Bed in low and locked position, call light and personal items within reach.

## 2019-06-07 NOTE — PROGRESS NOTES
2 RN skin check complete with TABATHA Rene.   Devices in place silicone oxygen tubing, heel float boots, mepilex.  Skin assessed under devices yes.  Confirmed pressure ulcers found on previously noted diabetic foot ulcers R>L, open area to sacum.  New potential pressure ulcers noted. Wound consult placed to access possible new wound to sacrum.  The following interventions in place Q2 turns, waffle mattress, heel float boots, PRN incontinence care, barrier cream, pillows in use for support/positioning.     Generalized skin is fragile with scattered bruises. Skin tear to R arm, excoriation to R elbow, moisture fissure to L pannus. Moisture, redness and excoriation to groin, pannus and bilateral breasts.

## 2019-06-07 NOTE — PROGRESS NOTES
2 RN Skin assessment:    Diabetic wounds to bilateral heels. Wound RN treated and dressed with hydrocolloid. New dressing in place on R heel. L heel TRESA    Skin tear R upper arm, dressing in place    Wound to sacrum. Pink and non-blanching. Wound RN assessed with this RN.     Moisture fissure under L side of pannus. Barrier paste applied     Redness and excoriation to groin, pannus and breasts    Scattered bruising throughout    Q2h turns, waffle mattress, heel float boots, barrier wipes, and barrier paste in use    No other areas of concern noted at this time.

## 2019-06-07 NOTE — CARE PLAN
Problem: Communication  Goal: The ability to communicate needs accurately and effectively will improve    Intervention: Reorient patient to environment as needed   06/07/19 0254   OTHER   Oriented to: All of the Following : Location of Bathroom, Visiting Policy, Unit Routine, Call Light and Bedside Controls, Bedside Rail Policy, Smoking Policy, Rights and Responsibilities, Bedside Report, and Patient Education Notebook;Location of bathroom;Visiting Policy;Unit Routine;Call Light & Bedside Controls;Bedside Rail Policy;Smoking Policy;Patient Rights and Responsibilities;Patient Education Notebook   Re-orientation to the environment PRN as orientation is waxing and waning.      Problem: Skin Integrity  Goal: Risk for impaired skin integrity will decrease    Intervention: Implement precautions to protect skin integrity in collaboration with the interdisciplinary team   06/06/19 2000 06/07/19 0200   OTHER   Skin Preventative Measures Pillows in Use for Support / Positioning;Heel Float Boots in Use ;Silicone Oxygen Tubing in Use --    Bed Types Pressure Redistribution Mattress (Atmosair) --    Friction Interventions Draw Sheet / Pad Used for Repositioning --    Moisturizers Moisturizer  --    PT / OT Involved in Care Physical Therapy Involved --    Activity  Bed --    Patient Turns / Repositioning --  Right Side   Assistance / Tolerance for Turning/Repositioning Assistance of Two or More;General Weakness --    Patient is Receiving Nutrition Oral Intake Adequate --

## 2019-06-07 NOTE — DISCHARGE PLANNING
Anticipated Discharge Disposition: [facility]    Action: Met with pt's spouse at bedside to discuss discharge from SNF. Spouse states he plans on taking pt home upon discharge and is aware SNF may not be an option. He states he's able to care for pt and has all the DME he needs at home.   Called Lola with [facility] to notify. Per Lloa, they can accept pt, however they don't have a bed today. Anticipate bed tomorrow 6/8/19    Barriers to Discharge: None    Plan: F/U with [facility] for bed availability

## 2019-06-07 NOTE — PROGRESS NOTES
Diabetes education: Pt is currently on Lantus 10 units pm and Regular insulin sliding scale coverage ac and hs. Blood sugars remain above goal at : 282 (5 units), 173 ( 2 units ) and ac lunch today 294 ( 5 units).  Plan: Pt may benefit in increasing Lantus dose to get blood sugars to goal . Please call [phone] if needs change.

## 2019-06-07 NOTE — PROGRESS NOTES
Received bedside report, assumed patient care. Patient sitting up in bed, awake and alert, wearing 3.5 L NC, no c/o of pain or signs of acute distress. POC discussed, safety precautions in place, call light in reach, bed in lowest locked position with bed alarm on. Educated to call for assistance. Tele box in place, rhythm verified.

## 2019-06-07 NOTE — DISCHARGE PLANNING
Anticipated Discharge Disposition: [facility] [place]    Action: Per BSN, pt got her PICC placed already. Met with pt at bedside to discuss discharge plan. Pt is agreeable to transfer to [facility] [place].   Called [facility] to inquire about bed availability. Lola states they're concerned regarding pt's discharge plan. Pt has Medicare and only has 53 co-pay days left. Pt previously left [facility] and there were concerns pt's  is unable to care for her at home. Lola is concerned they won't be able to d/c pt to SNF due to no SNF days and hx of leaving AMA.      left for pt's spouse requesting call back to discuss discharge plan    Barriers to Discharge: [facility] [place] acceptance     Plan: F/U with spouse and [facility] [place]

## 2019-06-07 NOTE — WOUND TEAM
Wound team in to see sacrum with RN.  Sacral area is pink and blanching.  Recommend barrier paste applied 2x/day to area.  Pt is incontinent.  Waffle overlay on bed.

## 2019-06-08 NOTE — PROGRESS NOTES
Bedside report received, pt care assumed, tele box on.  Pt is resting in bed, A&Ox3, and denies any additional needs at this time. Bed in lowest position, bed alarm on pt educated on fall risk and verbalized understanding, call light within reach.

## 2019-06-08 NOTE — DISCHARGE PLANNING
Anticipated Discharge Disposition: [facility]    Action: VM left for pt's spouse notifying of transfer. Spouse was made aware yesterday by this SW that plan was to transfer pt to [facility] today. Spouse was agreeable.     Barriers to Discharge: None    Plan: Transfer to [facility]    Second call to pt's spouse to notify of transfer. No answer

## 2019-06-08 NOTE — DISCHARGE PLANNING
Received Transport Form @ 0915  Spoke to Jyoti @ [facility]    Transport is scheduled for 06/08/2019 @1130 [facility] [address]    MIGUEL ÁNGEL Rivas notified

## 2019-06-08 NOTE — CARE PLAN
Problem: Knowledge Deficit  Goal: Knowledge of disease process/condition, treatment plan, diagnostic tests, and medications will improve    Intervention: Assess knowledge level of disease process/condition, treatment plan, diagnostic tests, and medications  Pt updated on POC, tests, and medications. Pt verbalizes understanding and has no further questions at this time. Pt educated on calling for any more questions.         Problem: Discharge Barriers/Planning  Goal: Patient's continuum of care needs will be met    Intervention: Assess potential discharge barriers on admission and throughout hospital stay  RN will assess potential discharge barriers throughout patient's hospital stay.

## 2019-06-08 NOTE — DISCHARGE INSTRUCTIONS
Discharge Instructions    Discharged to other by medical transportation with escort. Discharged via ambulance, hospital escort: Yes.  Special equipment needed: Oxygen    Be sure to schedule a follow-up appointment with your primary care doctor or any specialists as instructed.     Discharge Plan:   Influenza Vaccine Indication: Not indicated: Previously immunized this influenza season and > 8 years of age    I understand that a diet low in cholesterol, fat, and sodium is recommended for good health. Unless I have been given specific instructions below for another diet, I accept this instruction as my diet prescription.   Other diet:   Diabetic 2 gm sodium  Special Instructions: Partial weight bearing/wheelchair bound. Prafo/AFO to RLE and offloading shoe to LLE  HF Patient Discharge Instructions  · Monitor your weight daily, and maintain a weight chart, to track your weight changes.   · Activity as tolerated, unless your Doctor has ordered otherwise. Other activity order: weight bearing as tolerated.  · Follow a low fat, low cholesterol, low salt diet unless instructed otherwise by your Doctor. Read the labels on the back of food products and track your intake of fat, cholesterol and salt.   · Fluid Restriction No. If a Fluid Restriction has been ordered by your Doctor, measure fluids with a measuring cup to ensure that you are not exceeding the restriction.   · No smoking.  · Oxygen Yes. If your Doctor has ordered that you wear Oxygen at home, it is important to wear it as ordered.  · Did you receive an explanation from staff on the importance of taking each of your medications and why it is necessary to keep taking them unless your doctor says to stop? Yes  · Were all of your questions answered about how to manage your heart failure and what to do if you have increased signs and symptoms after you go home? Yes  · Do you feel like your heart failure care team involved you in the care treatment plan and allowed you  to make decisions regarding your care while in the hospital and addressed any discharge needs you might have? Yes    See the educational handout provided at discharge for more information on monitoring your daily weight, activity and diet. This also explains more about Heart Failure, symptoms of a flare-up and some of the tests that you have undergone.     Warning Signs of a Flare-Up include:  · Swelling in the ankles or lower legs.  · Shortness of breath, while at rest, or while doing normal activities.   · Shortness of breath at night when in bed, or coughing in bed.   · Requiring more pillows to sleep at night, or needing to sit up at night to sleep.  · Feeling weak, dizzy or fatigued.     When to call your Doctor:  · Call [facility] seven days a week from 8:00 a.m. to 8:00 p.m. for medical questions [phone].  · Call your Primary Care Physician or Cardiologist if:   1. You experience any pain radiating to your jaw or neck.  2. You have any difficulty breathing.  3. You experience weight gain of 3 lbs in a day or 5 lbs in a week.   4. You feel any palpitations or irregular heartbeats.  5. You become dizzy or lose consciousness.   If you have had an angiogram or had a pacemaker or AICD placed, and experience:  1. Bleeding, drainage or swelling at the surgical / puncture site.  2. Fever greater than 100.0 F  3. Shock from internal defibrillator.  4. Cool and / or numb extremities.      · Is patient discharged on Warfarin / Coumadin?   No     Depression / Suicide Risk    As you are discharged from this [facility], it is important to learn how to keep safe from harming yourself.    Recognize the warning signs:  · Abrupt changes in personality, positive or negative- including increase in energy   · Giving away possessions  · Change in eating patterns- significant weight changes-  positive or negative  · Change in sleeping patterns- unable to sleep or sleeping all the time   · Unwillingness  or inability to communicate  · Depression  · Unusual sadness, discouragement and loneliness  · Talk of wanting to die  · Neglect of personal appearance   · Rebelliousness- reckless behavior  · Withdrawal from people/activities they love  · Confusion- inability to concentrate     If you or a loved one observes any of these behaviors or has concerns about self-harm, here's what you can do:  · Talk about it- your feelings and reasons for harming yourself  · Remove any means that you might use to hurt yourself (examples: pills, rope, extension cords, firearm)  · Get professional help from the community (Mental Health, Substance Abuse, psychological counseling)  · Do not be alone:Call your Safe Contact- someone whom you trust who will be there for you.  · Call your local CRISIS HOTLINE 579-2796 or 675-017-3043  · Call your local Children's Mobile Crisis Response Team [place] (601) 079-9687 or www.Memamp  · Call the toll free National Suicide Prevention Hotlines   · National Suicide Prevention Lifeline 914-049-CTJJ (9708)  · [employer] Hope Line Network 800-SUICIDE (686-2224)    Heart Failure  Heart failure is a condition in which the heart has trouble pumping blood because it has become weak or stiff. This means that the heart does not pump blood efficiently for the body to work well. For some people with heart failure, fluid may back up into the lungs and there may be swelling (edema) in the lower legs. Heart failure is usually a long-term (chronic) condition. It is important for you to take good care of yourself and follow the treatment plan from your health care provider.  What are the causes?  This condition is caused by some health problems, including:  · High blood pressure (hypertension). Hypertension causes the heart muscle to work harder than normal. High blood pressure eventually causes the heart to become stiff and weak.  · Coronary artery disease (CAD). CAD is the buildup of cholesterol and fat  (plaques) in the arteries of the heart.  · Heart attack (myocardial infarction). Injured tissue, which is caused by the heart attack, does not contract as well and the heart's ability to pump blood is weakened.  · Abnormal heart valves. When the heart valves do not open and close properly, the heart muscle must pump harder to keep the blood flowing.  · Heart muscle disease (cardiomyopathy or myocarditis). Heart muscle disease is damage to the heart muscle from a variety of causes, such as drug or alcohol abuse, infections, or unknown causes. These can increase the risk of heart failure.  · Lung disease. When the lungs do not work properly, the heart must work harder.  What increases the risk?  Risk of heart failure increases as a person ages. This condition is also more likely to develop in people who:  · Are overweight.  · Are male.  · Smoke or chew tobacco.  · Abuse alcohol or illegal drugs.  · Have taken medicines that can damage the heart, such as chemotherapy drugs.  · Have diabetes.  ¨ High blood sugar (glucose) is associated with high fat (lipid) levels in the blood.  ¨ Diabetes can also damage tiny blood vessels that carry nutrients to the heart muscle.  · Have abnormal heart rhythms.  · Have thyroid problems.  · Have low blood counts (anemia).  What are the signs or symptoms?  Symptoms of this condition include:  · Shortness of breath with activity, such as when climbing stairs.  · Persistent cough.  · Swelling of the feet, ankles, legs, or abdomen.  · Unexplained weight gain.  · Difficulty breathing when lying flat (orthopnea).  · Waking from sleep because of the need to sit up and get more air.  · Rapid heartbeat.  · Fatigue and loss of energy.  · Feeling light-headed, dizzy, or close to fainting.  · Loss of appetite.  · Nausea.  · Increased urination during the night (nocturia).  · Confusion.  How is this diagnosed?  This condition is diagnosed based on:  · Medical history, symptoms, and a physical  exam.  · Diagnostic tests, which may include:  ¨ Echocardiogram.  ¨ Electrocardiogram (ECG).  ¨ Chest X-ray.  ¨ Blood tests.  ¨ Exercise stress test.  ¨ Radionuclide scans.  ¨ Cardiac catheterization and angiogram.  How is this treated?  Treatment for this condition is aimed at managing the symptoms of heart failure. Medicines, behavioral changes, or other treatments may be necessary to treat heart failure.  Medicines   These may include:  · Angiotensin-converting enzyme (ACE) inhibitors. This type of medicine blocks the effects of a blood protein called angiotensin-converting enzyme. ACE inhibitors relax (dilate) the blood vessels and help to lower blood pressure.  · Angiotensin receptor blockers (ARBs). This type of medicine blocks the actions of a blood protein called angiotensin. ARBs dilate the blood vessels and help to lower blood pressure.  · Water pills (diuretics). Diuretics cause the kidneys to remove salt and water from the blood. The extra fluid is removed through urination, leaving a lower volume of blood that the heart has to pump.  · Beta blockers. These improve heart muscle strength and they prevent the heart from beating too quickly.  · Digoxin. This increases the force of the heartbeat.  Healthy behavior changes   These may include:  · Reaching and maintaining a healthy weight.  · Stopping smoking or chewing tobacco.  · Eating heart-healthy foods.  · Limiting or avoiding alcohol.  · Stopping use of street drugs (illegal drugs).  · Physical activity.  Other treatments   These may include:  · Surgery to open blocked coronary arteries or repair damaged heart valves.  · Placement of a biventricular pacemaker to improve heart muscle function (cardiac resynchronization therapy). This device paces both the right ventricle and left ventricle.  · Placement of a device to treat serious abnormal heart rhythms (implantable cardioverter defibrillator, or ICD).  · Placement of a device to improve the pumping  ability of the heart (left ventricular assist device, or LVAD).  · Heart transplant. This can cure heart failure, and it is considered for certain patients who do not improve with other therapies.  Follow these instructions at home:  Medicines  · Take over-the-counter and prescription medicines only as told by your health care provider. Medicines are important in reducing the workload of your heart, slowing the progression of heart failure, and improving your symptoms.  ¨ Do not stop taking your medicine unless your health care provider told you to do that.  ¨ Do not skip any dose of medicine.  ¨ Refill your prescriptions before you run out of medicine. You need your medicines every day.  Eating and drinking  · Eat heart-healthy foods. Talk with a dietitian to make an eating plan that is right for you.  ¨ Choose foods that contain no trans fat and are low in saturated fat and cholesterol. Healthy choices include fresh or frozen fruits and vegetables, fish, lean meats, legumes, fat-free or low-fat dairy products, and whole-grain or high-fiber foods.  ¨ Limit salt (sodium) if directed by your health care provider. Sodium restriction may reduce symptoms of heart failure. Ask a dietitian to recommend heart-healthy seasonings.  ¨ Use healthy cooking methods instead of frying. Healthy methods include roasting, grilling, broiling, baking, poaching, steaming, and stir-frying.  · Limit your fluid intake if directed by your health care provider. Fluid restriction may reduce symptoms of heart failure.  Lifestyle  · Stop smoking or using chewing tobacco. Nicotine and tobacco can damage your heart and your blood vessels. Do not use nicotine gum or patches before talking to your health care provider.  · Limit alcohol intake to no more than 1 drink per day for non-pregnant women and 2 drinks per day for men. One drink equals 12 oz of beer, 5 oz of wine, or 1½ oz of hard liquor.  ¨ Drinking more than that is harmful to your heart.  Tell your health care provider if you drink alcohol several times a week.  ¨ Talk with your health care provider about whether any level of alcohol use is safe for you.  ¨ If your heart has already been damaged by alcohol or you have severe heart failure, drinking alcohol should be stopped completely.  · Stop use of illegal drugs.  · Lose weight if directed by your health care provider. Weight loss may reduce symptoms of heart failure.  · Do moderate physical activity if directed by your health care provider. People who are elderly and people with severe heart failure should consult with a health care provider for physical activity recommendations.  Monitor important information  · Weigh yourself every day. Keeping track of your weight daily helps you to notice excess fluid sooner.  ¨ Weigh yourself every morning after you urinate and before you eat breakfast.  ¨ Wear the same amount of clothing each time you weigh yourself.  ¨ Record your daily weight. Provide your health care provider with your weight record.  · Monitor and record your blood pressure as told by your health care provider.  · Check your pulse as told by your health care provider.  Dealing with extreme temperatures  · If the weather is extremely hot:  ¨ Avoid vigorous physical activity.  ¨ Use air conditioning or fans or seek a cooler location.  ¨ Avoid caffeine and alcohol.  ¨ Wear loose-fitting, lightweight, and light-colored clothing.  · If the weather is extremely cold:  ¨ Avoid vigorous physical activity.  ¨ Layer your clothes.  ¨ Wear mittens or gloves, a hat, and a scarf when you go outside.  ¨ Avoid alcohol.  General instructions  · Manage other health conditions such as hypertension, diabetes, thyroid disease, or abnormal heart rhythms as told by your health care provider.  · Learn to manage stress. If you need help to do this, ask your health care provider.  · Plan rest periods when fatigued.  · Get ongoing education and support as  needed.  · Participate in or seek rehabilitation as needed to maintain or improve independence and quality of life.  · Stay up to date with immunizations. Keeping current on pneumococcal and influenza immunizations is especially important to prevent respiratory infections.  · Keep all follow-up visits as told by your health care provider. This is important.  Contact a health care provider if:  · You have a rapid weight gain.  · You have increasing shortness of breath that is unusual for you.  · You are unable to participate in your usual physical activities.  · You tire easily.  · You cough more than normal, especially with physical activity.  · You have any swelling or more swelling in areas such as your hands, feet, ankles, or abdomen.  · You are unable to sleep because it is hard to breathe.  · You feel like your heart is beating quickly (palpitations).  · You become dizzy or light-headed when you stand up.  Get help right away if:  · You have difficulty breathing.  · You notice or your family notices a change in your awareness, such as having trouble staying awake or having difficulty with concentration.  · You have pain or discomfort in your chest.  · You have an episode of fainting (syncope).  This information is not intended to replace advice given to you by your health care provider. Make sure you discuss any questions you have with your health care provider.  Document Released: 12/18/2006 Document Revised: 08/22/2017 Document Reviewed: 07/12/2017  [employer] Interactive Patient Education © 2017 [employer]

## 2019-06-08 NOTE — CARE PLAN
Problem: Safety  Goal: Will remain free from falls    Intervention: Implement fall precautions  Fall precautions in place. Treaded socks on pt. Appropriate signs on doorway. IV pole on same side as bathroom. Bedrails up. Bed in lowest position and locked.  Call light and phone within reach. Patient educated on importance of calling nurses before getting out of bed, verbalizes understanding. Bed alarm on.

## 2019-06-08 NOTE — PROGRESS NOTES
Attempted X 2 with  both times to call pt mother.  Pt brother Jossue here.  Per Jossue, mom and dad never  but have lived together until 2 days ago.  Per Jossue, mom and dad have been fighting a lot the last few weeks and dad moved out a couple of days ago.   Bedside report received. Patient sitting up in bed. RN assessed pain; patient declines pain present at this time. Call light within reach. Need for bed alarm assessed; patient is at high risk for falls, precautions in place as appropriate.

## 2019-06-08 NOTE — PROGRESS NOTES
Patient discharged to [facility]. Monitor taken off; monitor room notified. Patient belongings discharged with patient. Discharge summary done with patient. RN provided education regarding follow up care, appointments, and medications. Rn  Also provided education regarding when to call doctor and when to call 911. Patient  at bedside to take patient's belongings and wheelchair home.

## 2019-06-08 NOTE — DISCHARGE SUMMARY
Discharge Summary    CHIEF COMPLAINT ON ADMISSION  Chief Complaint   Patient presents with   • Sent by MD     sent by podiatrist for wounds on bilateral heels of feet.    • Wound Infection     wounds noted to bilateral heels (RT bigger than LT), open, foul smell, drainage. redness extending up both legs.        Reason for Admission  SENT BY MD FOOT PAIN     CODE STATUS  Full Code    HPI & HOSPITAL COURSE  This is a 73 y.o. female here with Sent by MD (sent by podiatrist for wounds on bilateral heels of feet. ) and Wound Infection (wounds noted to bilateral heels (RT bigger than LT), open, foul smell, drainage. redness extending up both legs. )    Please review Dr. Pop Varela M.D. notes for further details of history of present illness, past medical/social/family histories, allergies and medications. Please review Dr. Nolasco, Cardiology, Dr. Mcdowell, Vascular, Dr. Israel ID consultation notes.    History of chronic bilateral diabetic heel ulcers followed by LPS and Dr. Kwon.  History of coronary artery disease questionable stent placement-/2018 or 1998 ??, paroxysmal atrial fibrillation  Mild to moderate cognitive decline    Her diabetic foot ulcers were evaluated. X-ray showed right osteomyelitis.  She was started on IV antibiotics. Pt was not septic on admission.  LPS was consulted. On admission was also found patient has UTI which was already covered because she is on antibiotics. 5/26 wound cultures growing MRSA. Urine cultures grew Klebsiella. Her venous Dopplers showed L common femoral DVT. She is on Xarelto. She had US ABIs done showing heavy arterial calcifications that stenosis can't be ruled out. Because of her vasculopathy, it was deemed that her extremities are high risk for ischemia. Dr. Nolasco was consulted for cardiac clearance for possible vascular surgery. Currently, she is not in decompensation and no preoparative cardiac work-up is necessary. Dr. Mcdowell, Vascular Surgery consulted lizette nur  question of bilateral lower extremity critical limb ischemia. He did do a RLE angiogram with these results:  Right lower extremity angiogram revealed wide patency of the EIA, CFA, profunda, SFA, popliteal, and all three tibial vessels. There were two areas of <50% in the SFA which are not flow limiting. No intervention was performed. He planened to evaluate LLE but felt this can be done in an outpatient basis. He felt she will ultimately need kissing iliac stents (L TUCKER w/ high-grade disease), but will not do that at this time as it would preclude LLE angio from a groin approach in the future. He felt that given her comorbidities she poses a high-risk for intervention. Infectious Disease and LPS/Wound team followed her. She was switched to IV daptomycin with STOP date of 7/6/19. Weekly CBC, CPK can be obtained at the facility. She was initially referred to SNF who declined due to cost of medications. [facility] revaluated and will be accepting her for IV antibiotics, wound care.     Her blood sugars and blood pressures can be managed at [facility]. She will continue her basal and SS insulin along with her blood pressure medications.    At discharge date, Leonie Brock afebrile and hemodynamically stable.  Leonie Brock wanted to be discharged today.    Discharge Physical Exam  Constitutional: No distress.   HENT:   Head: Normocephalic.   Eyes: Conjunctivae are normal.   Neck: Normal range of motion. Neck supple.   Cardiovascular: Normal rate.  An irregularly irregular rhythm present.   Pulmonary/Chest: Effort normal. No respiratory distress. She has decreased breath sounds.   Abdominal: Soft. Bowel sounds are normal. She exhibits no distension. There is no tenderness.   Musculoskeletal: She exhibits edema (Improved).   Foot ulcer dressings CDI   Neurological: She is alert. No cranial nerve deficit. Coordination normal.   Cognitive deficits.   Skin: Skin is warm and dry. Rash (Old ecchymoses) noted. She is not  diaphoretic. No erythema.   Psychiatric: She has a normal mood and affect. Her speech is delayed. She exhibits abnormal recent memory.   Less irritable     Imaging  DX-CHEST-FOR LINE PLACEMENT Perform procedure in: Patient's Room   Final Result      1.  Supportive tubing as described above.   2.  No other significant change from prior exam.         IR-PICC LINE PLACEMENT W/ GUIDANCE > AGE 5   Final Result                  Ultrasound-guided PICC placement performed by qualified nursing staff as    above.          DX-CHEST-PORTABLE (1 VIEW)   Final Result         1. Left greater than right pleural effusions with basilar atelectasis. Correlate clinically for infection.   2. Cardiomegaly with mildly improved pulmonary interstitial edema.      EC-ECHOCARDIOGRAM COMPLETE W/O CONT   Final Result      CT-HEAD W/O   Final Result      1.  Cerebral atrophy.      2.  White matter lucencies most consistent with small vessel ischemic change versus demyelination or gliosis.      3.  Otherwise, Head CT without contrast with no acute findings. No evidence of acute cerebral infarction, hemorrhage or mass lesion.      DX-CHEST-LIMITED (1 VIEW)   Final Result      1.  Marked pulmonary edema.   2.  Possible underlying bibasilar airspace opacity/alveolar pulmonary edema and/or bilateral pleural effusions.      US-SELWYN SINGLE LEVEL BILAT   Final Result      CTA ABDOMEN PELVIS W & W/O POST PROCESS   Final Result      1.  Moderate atherosclerotic calcification abdominal aorta without aneurysm or dissection.   2.  Moderate atherosclerotic calcification of the LEFT carotid system without segmental occlusion.   3.  Small bilateral pleural effusions with associated atelectasis.   4.  Increased colonic stool suggesting constipation.   5.  Colonic diverticula.      US-EXTREMITY VENOUS LOWER BILAT   Final Result      US-EXTREMITY ARTERY LOWER BILAT   Final Result      DX-FOOT-COMPLETE 3+ RIGHT   Final Result      1.  No evidence of fracture or  dislocation.      2.  Severe degenerative changes again present raising possibility of neuropathic foot.      3.  There appears to be increase in bone erosions along the medial and inferior tarsal bones which could indicate osteomyelitis.         DX-FOOT-COMPLETE 3+ LEFT   Final Result      1.  No evidence of fracture or dislocation.      2.  Osteoarthritis is noted. No bone erosions are identified.      IR-EXTREMITY ANGIOGRAM-UNILATERAL RIGHT    (Results Pending)             Therefore, she is discharged in fair and stable condition to a long-term acute care hospital.    The patient met 2-midnight criteria for an inpatient stay at the time of discharge.      FOLLOW UP ITEMS POST DISCHARGE      DISCHARGE DIAGNOSES  Principal Problem:    Diabetic foot ulcer with osteomyelitis (HCC) POA: Yes  Active Problems:    Acute on chronic respiratory failure with pulmonary edema, EF 35% (HCC) POA: Yes    Type 2 diabetes mellitus with skin complication, with long-term current use of insulin (HCC) POA: Yes    Coronary artery disease involving native coronary artery of native heart without angina pectoris POA: Yes    Essential hypertension POA: Yes    Paroxysmal atrial fibrillation (HCC) POA: Yes    Chronic anticoagulation POA: Yes    UTI (urinary tract infection) POA: Yes    Normocytic anemia POA: Yes    Rash POA: Yes    Peripheral vascular disease (HCC) POA: Yes    Cognitive decline POA: Yes    Deep vein thrombosis (DVT) of lower extremity (HCC) POA: Yes    Cardiomyopathy (HCC) POA: Unknown    Electrolyte abnormality POA: Unknown    Acute pulmonary edema (HCC) POA: Unknown    Acute on chronic systolic and diastolic heart failure, NYHA class 4 (HCC) POA: Unknown      FOLLOW UP  Follow up with Floyd Gould M.D. Or attendong physician at [facility]  Follow up with Dr. Ravi or ID physician in 1-2 weeks  Follow up to LPS or Wound Team  MEDICATIONS ON DISCHARGE     Medication List      START taking these medications      Instructions    albuterol 108 (90 Base) MCG/ACT Aers inhalation aerosol   Inhale 2 Puffs by mouth every four hours as needed for Shortness of Breath.  Dose:  2 Puff     aspirin 81 MG EC tablet   Take 1 Tab by mouth every day.  Dose:  81 mg     bisacodyl 10 MG Supp  Commonly known as:  DULCOLAX   Insert 1 Suppository in rectum 1 time daily as needed (if magnesium hydroxide ineffective after 24 hours).  Dose:  10 mg     cyanocobalamin 1000 MCG Tabs  Commonly known as:  VITAMIN B12   Take 1 Tab by mouth every day.  Dose:  1000 mcg     DEXTROSE 10% BOLUS   250 mL by Intravenous route as needed (If FSBG is less than or equal to 70 mg/dL and If patient is NPO).  Dose:  250 mL     ferrous sulfate-c-folic acid 105-500-0.8 MG Tbcr  Commonly known as:  FOLITAB 500   Take 1 Tab by mouth every day.  Dose:  1 Tab     furosemide 40 MG Tabs  Commonly known as:  LASIX   Take 0.5 Tabs by mouth every day.  Dose:  20 mg     glucose 4 g chewable tablet   Take 4 Tabs by mouth as needed for Low Blood Sugar (If FSBG is less than or equal to 70 mg/dL and patient able to eat or drink).  Dose:  16 g     insulin regular 100 Unit/mL Soln  Commonly known as:  HUMULIN R   Inject 2-9 Units as instructed 4 Times a Day,Before Meals and at Bedtime.  Dose:  2-9 Units     lactobacillus rhamnosus Caps capsule   Take 1 Cap by mouth every morning with breakfast.  Dose:  1 Cap     lisinopril 5 MG Tabs  Commonly known as:  PRINIVIL   Take 1 Tab by mouth every day.  Dose:  5 mg     magnesium hydroxide 400 MG/5ML Susp  Commonly known as:  MILK OF MAGNESIA   Take 30 mL by mouth 1 time daily as needed (if polyethylene glycol ineffective after 24 hours).  Dose:  30 mL     NS SOLN 50 mL with DAPTOmycin 350 MG SOLR 690 mg   690 mg by Intravenous route every 24 hours.  Dose:  8 mg/kg     oxyCODONE immediate-release 5 MG Tabs  Commonly known as:  ROXICODONE   Take 1 Tab by mouth every 3 hours as needed for up to 3 days.  Dose:  5 mg     polyethylene glycol/lytes Pack  Commonly  known as:  MIRALAX   Take 1 Packet by mouth 1 time daily as needed (if sennosides and docusate ineffective after 24 hours).  Dose:  17 g     * rivaroxaban 15 MG Tabs tablet  Commonly known as:  XARELTO   Take 1 Tab by mouth 2 times a day, with meals.  Dose:  15 mg     * rivaroxaban 20 MG Tabs tablet  Start taking on:  6/24/2019  Commonly known as:  XARELTO   Take 1 Tab by mouth with dinner.  Dose:  20 mg     senna-docusate 8.6-50 MG Tabs  Commonly known as:  PERICOLACE or SENOKOT S   Take 2 Tabs by mouth 2 Times a Day.  Dose:  2 Tab     spironolactone 25 MG Tabs  Commonly known as:  ALDACTONE   Take 1 Tab by mouth every day.  Dose:  25 mg     triamcinolone acetonide 0.1 % Crea  Commonly known as:  KENALOG   Apply 1 Application to affected area(s) 2 Times a Day.  Dose:  1 Application        * This list has 2 medication(s) that are the same as other medications prescribed for you. Read the directions carefully, and ask your doctor or other care provider to review them with you.            CHANGE how you take these medications      Instructions   carvedilol 6.25 MG Tabs  What changed:  · medication strength  · how much to take  Commonly known as:  COREG   Take 1 Tab by mouth 2 times a day, with meals.  Dose:  6.25 mg     ipratropium-albuterol 0.5-2.5 (3) MG/3ML nebulizer solution  What changed:  · when to take this  · reasons to take this  Commonly known as:  DUONEB   3 mL by Nebulization route every four hours as needed for Shortness of Breath.  Dose:  3 mL     potassium chloride SA 20 MEQ Tbcr  What changed:  when to take this  Commonly known as:  Kdur   Take 1 Tab by mouth 2 Times a Day.  Dose:  20 mEq     pravastatin 20 MG Tabs  What changed:  when to take this  Commonly known as:  PRAVACHOL   Take 1 Tab by mouth every day.  Dose:  20 mg        CONTINUE taking these medications      Instructions   acetaminophen 325 MG Tabs  Commonly known as:  TYLENOL   Take 2 Tabs by mouth every 6 hours as needed (Mild Pain; (Pain  scale 1-3); Temp greater than 100.5 F).  Dose:  650 mg     gabapentin 400 MG Caps  Commonly known as:  NEURONTIN   Take 1 Cap by mouth 2 times a day.  Dose:  400 mg     LANTUS 100 UNIT/ML Soln  Generic drug:  insulin glargine   Inject 20 Units as instructed every evening.  Dose:  20 Units     omeprazole 20 MG delayed-release capsule  Commonly known as:  PRILOSEC   Take 1 Cap by mouth every day.  Dose:  20 mg     ondansetron 4 MG Tbdp  Commonly known as:  ZOFRAN ODT   Take 1 Tab by mouth every four hours as needed (give PO if IV route is unavailable. May give per feeding tube.).  Dose:  4 mg            Allergies  No Known Allergies    DIET  Orders Placed This Encounter   Procedures   • Diet Order Diabetic, 2 Gram Sodium     Standing Status:   Standing     Number of Occurrences:   1     Order Specific Question:   Diet:     Answer:   Diabetic [3]     Order Specific Question:   Diet:     Answer:   2 Gram Sodium [7]       ACTIVITY  As per [facility]    LINES, DRAINS, AND WOUNDS  This is an automated list. Peripheral IVs will be removed prior to discharge.  PICC Single Lumen 06/06/19 Right Cephalic (Active)   Site Assessment Clean;Dry;Intact 6/7/2019  8:00 PM   Line Status Flushed;Saline locked 6/7/2019  8:00 PM   Line Secured at (cm) 0 cm 6/6/2019  3:00 PM   Extremity Circumference (cm) 33 cm 6/6/2019  3:00 PM   Dressing Type Biopatch;Securing device;Transparent 6/7/2019  8:00 PM   Dressing Status Clean;Dry;Intact 6/7/2019  8:00 PM   Dressing Intervention N/A 6/7/2019  8:00 PM   Dressing Change Due 06/08/19 6/7/2019  8:00 PM       Wound 05/26/19 Neuropathic Heel Left Heel (Active)   Wound Image   6/6/2019  8:00 PM   Site Assessment Dry;Brown;Black;Fragile;Painful 6/7/2019  8:00 PM   Nataliya-wound Assessment Intact 6/7/2019  8:00 PM   Margins Defined edges 6/7/2019  8:00 AM   Wound Length (cm) 0 cm 6/7/2019  4:00 PM   Wound Width (cm) 0 cm 6/7/2019  4:00 PM   Wound Depth (cm) 0 cm 6/7/2019  4:00 PM   Wound Surface Area (cm^2) 0  cm^2 6/7/2019  4:00 PM   Drainage Amount None 6/7/2019  8:00 PM   Non-staged Wound Description Not applicable 6/6/2019  8:00 PM   Treatments Site care 6/5/2019  8:00 PM   Cleansing Normal Saline Irrigation 6/4/2019  4:00 PM   Periwound Protectant Not Applicable 6/6/2019  8:00 PM   Dressing Options Open to Air 6/7/2019  8:00 PM   Dressing Cleansing/Solutions 3% Betadine 6/6/2019  8:00 PM   Dressing Changed Changed 6/1/2019  4:06 PM   Dressing Status Clean;Dry;Intact 6/6/2019  8:00 PM   Dressing Change Frequency Daily 6/6/2019  8:00 PM   NEXT Dressing Change  06/06/19 6/6/2019  8:00 PM   NEXT Weekly Photo (Inpatient Only) 06/12/19 6/5/2019  8:00 PM   WOUND NURSE ONLY - Odor None 5/31/2019  9:15 AM   WOUND NURSE ONLY - Pulses Not palpable 5/31/2019  9:15 AM   WOUND NURSE ONLY - Exposed Structures None 5/31/2019  9:15 AM   WOUND NURSE ONLY - Tissue Type and Percentage epithelial 6/7/2019  4:00 PM   WOUND NURSE ONLY - Time Spent with Patient (mins) 45 5/31/2019  9:15 AM       Wound 05/26/19 Neuropathic Heel Right Heel (Active)   Wound Image     5/26/2019 11:10 AM   Site Assessment JEANINE 6/7/2019  8:00 PM   Nataliya-wound Assessment JEANINE 6/7/2019  8:00 PM   Margins Attached edges 6/7/2019  8:00 AM   Wound Length (cm) 5 cm 6/7/2019  4:00 PM   Wound Width (cm) 6 cm 6/7/2019  4:00 PM   Wound Depth (cm) 0.2 cm 5/26/2019 11:10 AM   Wound Surface Area (cm^2) 30 cm^2 6/7/2019  4:00 PM   Tunneling 0 cm 6/7/2019  4:00 PM   Undermining 0 cm 6/7/2019  4:00 PM   Closure Secondary intention 6/7/2019  4:00 PM   Drainage Amount Scant 6/7/2019  4:00 PM   Drainage Description Serous 6/7/2019  4:00 PM   Non-staged Wound Description Full thickness 6/7/2019  4:00 PM   Treatments Site care;Cleansed 6/7/2019  4:00 PM   Cleansing Normal Saline Irrigation 6/7/2019  4:00 PM   Periwound Protectant Skin Protectant wipes to Periwound 6/7/2019  4:00 PM   Dressing Options Honey Colloid;Nonadhesive Foam;Hypafix Tape 6/7/2019  4:00 PM   Dressing  Cleansing/Solutions 3% Betadine 6/6/2019  8:00 PM   Dressing Changed Changed 6/7/2019  4:00 PM   Dressing Status Clean;Dry;Intact 6/6/2019  8:00 PM   Dressing Change Frequency Every 48 hrs 6/7/2019  4:00 PM   NEXT Dressing Change  06/09/19 6/7/2019  4:00 PM   NEXT Weekly Photo (Inpatient Only) 06/14/19 6/7/2019  4:00 PM   WOUND NURSE ONLY - Odor None 6/7/2019  4:00 PM   WOUND NURSE ONLY - Pulses 1+;Right;DP;PT 6/7/2019  4:00 PM   WOUND NURSE ONLY - Exposed Structures None 6/7/2019  4:00 PM   WOUND NURSE ONLY - Tissue Type and Percentage 80 red, 20 brown 6/7/2019  4:00 PM   WOUND NURSE ONLY - Time Spent with Patient (mins) 60 6/7/2019  4:00 PM       Moisture Associated Skin Damage Left Panus (Active)   Drainage Amount Scant 6/7/2019  8:00 PM   Drainage Description Serous 6/7/2019  8:00 PM   Nataliya-wound Assessment Fragile;Pink;Excoriated;Red 6/7/2019  8:00 PM   Cleansing Approved Wound Cleanser 6/7/2019  8:00 PM   Periwound Protectant Antifungal Therapy 6/7/2019  8:00 PM   NEXT Weekly Photo (Inpatient Only) 06/05/19 6/4/2019  8:00 PM      PICC Single Lumen 06/06/19 Right Cephalic (Active)   Site Assessment Clean;Dry;Intact 6/7/2019  8:00 PM   Line Status Flushed;Saline locked 6/7/2019  8:00 PM   Line Secured at (cm) 0 cm 6/6/2019  3:00 PM   Extremity Circumference (cm) 33 cm 6/6/2019  3:00 PM   Dressing Type Biopatch;Securing device;Transparent 6/7/2019  8:00 PM   Dressing Status Clean;Dry;Intact 6/7/2019  8:00 PM   Dressing Intervention N/A 6/7/2019  8:00 PM   Dressing Change Due 06/08/19 6/7/2019  8:00 PM                MENTAL STATUS ON TRANSFER  Level of Consciousness: Alert  Orientation : Disoriented to Time  Speech: Speech Clear    CONSULTATIONS  Cardiology  Vascular Surgery  Infectious Disease    PROCEDURES  Cta Abdomen Pelvis W & W/o Post Process    Result Date: 5/28/2019  5/28/2019 3:46 PM HISTORY/REASON FOR EXAM:  AIOD Chronic bilateral heel ulcers.  Diabetes.  Peripheral vascular disease. TECHNIQUE/EXAM  DESCRIPTION:  CT angiogram of the abdomen and pelvis without and with contrast, with reconstructions. Initial precontrast helical sections were obtained from the diaphragmatic domes to the lesser trochanters. Following this, 100 mL of Omnipaque 350 nonionic contrast was administered at 5.0 mL/sec and helical scanning was obtained from the diaphragmatic domes through the lesser trochanters. Thin section overlapping reconstruction interval was utilized and multiplanar volume reformatted were generated in the sagittal and coronal planes. 3D angiographic images were reviewed on PACS. Maximum intensity projection (MIP) images were generated and reviewed. Low dose optimization technique was utilized for this CT exam including automated exposure control and adjustment of the mA and/or kV according to patient size. COMPARISON:   None. FINDINGS: Noncontrast images: There is no intramural hematoma. Contrast images: Abdominal aorta shows moderate atherosclerotic calcifications without aneurysm or dissection.  Mild narrowing distally.  Moderate atherosclerotic calcification of the iliofemoral arterial system bilaterally without focal occlusion. Celiac trunk, superior and inferior mesenteric arteries enhance normally. Renal arteries show mild atherosclerotic narrowing proximally. Small bilateral pleural effusions with associated atelectasis. The liver is unremarkable. The spleen is unremarkable. Adrenal glands are unremarkable. The kidneys are unremarkable. Pancreas is atrophic. The gallbladder is unremarkable. Increased colonic stool. Colonic diverticula noted. Bladder is unremarkable. Uterus is absent.  Ovaries are not visualized. Appendix has a normal appearance. Degenerative change of lumbar spine. 3D angiographic/MIP images of the vasculature confirm the vascular findings as described above.     1.  Moderate atherosclerotic calcification abdominal aorta without aneurysm or dissection. 2.  Moderate atherosclerotic  calcification of the LEFT carotid system without segmental occlusion. 3.  Small bilateral pleural effusions with associated atelectasis. 4.  Increased colonic stool suggesting constipation. 5.  Colonic diverticula.    Ct-head W/o    Result Date: 5/30/2019 5/30/2019 4:07 AM HISTORY/REASON FOR EXAM:  Headache, acute, severe, worst HA of life. TECHNIQUE/EXAM DESCRIPTION AND NUMBER OF VIEWS: CT of the head without contrast. The study was performed on a helical multidetector CT scanner. Contiguous axial sections were obtained from the skull base through the vertex. Up to date radiation dose reduction adjustments have been utilized to meet ALARA standards for radiation dose reduction. COMPARISON:  Head CT 3/19/2019 FINDINGS: The calvariae and skull base are unremarkable. There are no extraaxial fluid collections. There is a pattern of cerebral atrophy manifest as enlargement of sulcal markings and ventricular prominence.  The ventricular system and basal cisterns are otherwise unremarkable. There are areas of hypodensity in the white matter most consistent with small vessel ischemic change versus demyelination or gliosis. There are no hemorrhagic lesions. There are no mass effects or shift of midline structures. Incidentally noted are symmetric basal ganglia calcifications, likely idiopathic and of no clinical significance. The brainstem and posterior fossa structures are unremarkable. Paranasal sinuses in the field of view are unremarkable. Mastoids in the field of view are unremarkable.     1.  Cerebral atrophy. 2.  White matter lucencies most consistent with small vessel ischemic change versus demyelination or gliosis. 3.  Otherwise, Head CT without contrast with no acute findings. No evidence of acute cerebral infarction, hemorrhage or mass lesion.    Dx-chest-for Line Placement Perform Procedure In: Patient's Room    Result Date: 6/6/2019 6/6/2019 4:50 PM HISTORY/REASON FOR EXAM:  PICC. TECHNIQUE/EXAM DESCRIPTION  AND NUMBER OF VIEWS: Single view of the chest. COMPARISON: 6/5/2019 FINDINGS: Cardiac mediastinal contour is unchanged. Lungs show hypoinflation. Ill-defined increased opacity again seen in both lower lungs, more extensive on the LEFT. Bilateral pleural fluid collections again seen, larger on the LEFT. No pneumothorax. RIGHT arm PICC line in place with tip at the cavoatrial junction level.     1.  Supportive tubing as described above. 2.  No other significant change from prior exam.     Dx-chest-limited (1 View)    Result Date: 5/30/2019 5/29/2019 8:56 PM HISTORY/REASON FOR EXAM:  Shortness of Breath. Rapid response TECHNIQUE/EXAM DESCRIPTION AND NUMBER OF VIEWS: Single AP view of the chest. COMPARISON: 1 view chest outside films 3/19/2019 FINDINGS: There is hypoinflation. This accentuates the cardiac shadow. There is marked central and lower lung zone pulmonary edema with suspected bibasilar airspace opacities and/or pleural effusions.     1.  Marked pulmonary edema. 2.  Possible underlying bibasilar airspace opacity/alveolar pulmonary edema and/or bilateral pleural effusions.    Dx-chest-portable (1 View)    Result Date: 6/5/2019 6/5/2019 8:03 PM HISTORY/REASON FOR EXAM:  Shortness of Breath; interval changes of edemea. TECHNIQUE/EXAM DESCRIPTION AND NUMBER OF VIEWS: Single portable view of the chest. COMPARISON: 5/29/2019 FINDINGS: Cardiomediastinal silhouette is stable. Moderate left and small right pleural effusions. Bilateral interstitial opacity/edema appears mildly improved.. No pneumothorax. No acute osseous abnormality.     1. Left greater than right pleural effusions with basilar atelectasis. Correlate clinically for infection. 2. Cardiomegaly with mildly improved pulmonary interstitial edema.    Dx-foot-complete 3+ Right    Result Date: 5/25/2019 5/25/2019 3:17 PM HISTORY/REASON FOR EXAM:  Nonhealing wound on heel with history of diabetes TECHNIQUE/EXAM DESCRIPTION AND NUMBER OF VIEWS: 3  nonweightbearing views of the RIGHT foot. COMPARISON:  No comparison available FINDINGS:  Bone mineralization is normal.  There is no evidence of fracture or dislocation.  Soft tissue swelling is noted including in the calcaneal area. Bone erosions appear to be present along the medial edge of the tarsal bones and inferior edge of the tarsal bones. There is moderate to severe joint space narrowing and periarticular sclerosis and marginal spurring which is prominent in the mid foot area.     1.  No evidence of fracture or dislocation. 2.  Severe degenerative changes again present raising possibility of neuropathic foot. 3.  There appears to be increase in bone erosions along the medial and inferior tarsal bones which could indicate osteomyelitis.     Dx-foot-complete 3+ Left    Result Date: 2019 3:18 PM HISTORY/REASON FOR EXAM:  Left foot pain with history of diabetes TECHNIQUE/EXAM DESCRIPTION AND NUMBER OF VIEWS: 3 nonweightbearing views of the LEFT foot. COMPARISON:  No comparison available FINDINGS:  Bone mineralization is normal.  There is no evidence of fracture or dislocation.  Soft tissue swelling is noted.  There is joint space narrowing and periarticular spurring. Hammertoe deformities are noted. No bone erosions are identified.     1.  No evidence of fracture or dislocation. 2.  Osteoarthritis is noted. No bone erosions are identified.    Us-giovana Single Level Bilat    Result Date: 2019   Vascular Laboratory  Conclusions  1.  Monophasic arterial waveforms in the bilateral common femoral arteries  indicate aortoiliac stenosis or occlusion.  2.  Diminished right tow pressures, and to a lesser degree, left toe  pressure indicate poor healing potential.  DAVID KEYS  Age:    73    Gender:     F  MRN:    [id number]  :    1945      BSA:  Exam Date:     2019 10:38  Room #:     Inpatient  Priority:     Routine  Ht (in):             Wt (lb):  Ordering Physician:        KELLEE  MAYITO CALZADA  Referring Physician:  Sonographer:               Ruth Wen RVT  Study Type:                Complete Bilateral  Technical Quality:         Adequate  Indications:     Ulceration of LE  CPT Codes:       58933  ICD Codes:       707.1  History:         Bilateral heel wounds  Limitations:     Right brachial pressure not obtained due to IV                 RIGHT  Waveform            Systolic BPs (mmHg)                             0             Brachial  Monophasic                               Common Femoral  Monophasic                               Posterior Tibial  Monophasic                               Dorsalis Pedis                                           Peroneal                                           SELWYN                                           TBI                       LEFT  Waveform        Systolic BPs (mmHg)                             123           Brachial  Monophasic                               Common Femoral  Monophasic                               Posterior Tibial  Monophasic                               Dorsalis Pedis                                           Peroneal                                           SELWYN                                           TBI  Findings  Bilateral.  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  Doppler waveforms at the ankle are monophasic with moderate amplitude.  Ankle pressures were not taken due to non-compressibility of tibial  arteries on previous exam (12/2018).  Toe brachial indices were taken of the bilateral great toes.  Right:      0.41            Left:           0.72  Arterial duplex scan was performed in accordance with lower extremity  arterial evaluation protocol - see separate report.  Ananya Nash M.D.  (Electronically Signed)  Final Date:      28 May 2019 18:19    Us-extremity Artery Lower  Bilat    Result Date: 2019  Lower Extremity  Arterial Duplex Report  Vascular Laboratory  CONCLUSIONS  acute vs subacute DVT left cfv and profunda v.  Rt pop cyst  Arteries poorly seen due to extensive calcification. Multiple levels  bilateral non-vis could contain stenosis. No obvious post occlusive  waveforms.  50% stenosis is seen in rt mid sfa.  called to DAVID Paiz  Exam Date:     2019 13:40  Room #:     Inpatient  Priority:     Routine  Ht (in):             Wt (lb):  Ordering Physician:        MAYITO GOODSON  Referring Physician:       [id number] Wolfe  Sonographer:               Ruth Wen RVT  Study Type:                Complete Bilateral  Technical Quality:         Adequate  Age:    73    Gender:     F  MRN:    [id number]  :    1945      BSA:  Indications:     Ulcer of lower extremity  CPT Codes:       89579  ICD Codes:       707.1  History:         Ulceration of the bilateral heels.  Limitations:     Calcific dropout.                RIGHT  Waveform        Peak Systolic Velocity (cm/s)                  Prox    Prox-Mid  Mid    Mid-Dist  Distal  Triphasic                         164                      CFA  Triphasic       89                                         PFA  Monophasic      109      211      114              105     SFA  Monophasic                        90                       POP  Monophasic      74                                 121     AT  Monophasic      74                                 88      PT  Not                                                        GUTIERREZ  attained                LEFT  Waveform        Peak Systolic Velocity (cm/s)                  Prox    Prox-Mid  Mid    Mid-Dist  Distal  Triphasic                         127                      CFA  Biphasic        70                                         PFA  Monophasic      150               118              85      SFA  Monophasic                        95                        POP  Monophasic      121                                177     AT  Absent                                                     PT  Absent                                                     GUTIERREZ  FINDINGS  Right.  Heavy atherosclerotic plaque seen through out the right lower extremity.  Loss of flow reversal at the proximal femoral artery prior to visualization  of hemodynamic significant stenosis  Stenosis of the proximal femoral artery. Velocities are consistent with 50-  75% stenosis.  Cannot rule out further hemodynamic significant stenosis within the femoral  or popliteal arteries due to calcific dropout.  Due to heavy calcification, the tibial arteries could not be evaluated  entirely. Cannot rule out segmental occlusion or stenosis.  The peroneal artery is not visualized.  The anterior tibial and posterior tibial arteries are monophasic to the  ankle.  Left.  Heavy atherosclerotic plaque seen through out the left lower extremity.  Loss of flow reversal at the proximal femoral artery without visualization  of hemodynamic significant stenosis.  Cannot rule out  hemodynamic significant stenosis within the femoral or  popliteal arteries due to calcific dropout.  Due to heavy calcification, the tibial arteries could not be evaluated  entirely. Cannot rule out segmental occlusion or stenosis.  No flow can be demonstrated in the peroneal and posterior tibial  artery.  The anterior tibial artery is monophasic to the ankle.  Incidental finding:  Slightly echogenic material is visualized within the common femoral vein  that extends within the profunda femoral vein, consistent acute to subacute  venous thrombosis.   Rubens Elam MD  (Electronically Signed)  Final Date:      27 May 2019 16:16    Us-extremity Venous Lower Bilat    Result Date: 5/28/2019   Vascular Laboratory  CONCLUSIONS  1.  There is thrombus in the LEFT common femoral vein which is  noncompressible. There is nonocclusive thrombus in  the LEFT femoral vein as  well.   This is likely acute  DVT with some superimposed chronic DVT .  2.  No evidence of RIGHT lower extremity DVT or SVT.  3.  Exam limited by superficial edema.  Findings discussed with Dr. Charlotte Carlson  at 5:00 am on 19.  SHAY KEYSLY  Exam Date:     2019 02:35  Room #:     [facility]  Priority:     Stat  Ht (in):             Wt (lb):  Ordering Physician:        MAYITO GOODSON  Referring Physician:       KELLEE Emmanuel  Sonographer:               Khurram Messina RDMS  Study Type:                Complete Bilateral  Technical Quality:         Fair  Age:    73    Gender:     F  MRN:    [id number]  :    1945      BSA:  Indications:     Pain in unspecified lower leg  CPT Codes:       35578  ICD Codes:       M79.669  History:         Incidental finding in previous SELWYN study.  Bilateral lower                   extremity pain.  Limitations:     Patient couldn't lift legs, unable to image Popliteal veins  PROCEDURES:  Bilateral lower extremity venous duplex imaging.  The following venous structures were evaluated: common femoral vein,  profunda vein, proximal portion of the greater saphenous vein, superficial  femoral vein, and the popliteal vein.  In addition, the posterior tibial and peroneal veins were evaluated.  Serial compression, augmentation maneuvers, and spectral Doppler flow  evaluation were performed.  FINDINGS:  Right lower extremity -.  Complete color filling and compressibility with normal venous flow dynamics  including spontaneous flow, response to augmentation maneuvers, and  respiratory phasicity.  Interstitial fluid consistent with edema is observed below the knee.  Edema reduces the image quality.  Left lower extremity -.  Densely echogenic material is within  the vein that is consistent with  chronic venous thrombosis; non-occlusive.  Common Femoral vein is noncompressible.  Interstitial fluid consistent with edema is observed below the knee.  Edema reduces the image  quality.  Mandy Pfeiffer  (Electronically Signed)  Final Date:      28 May 2019 05:01    Ec-echocardiogram Complete W/o Cont    Result Date: 2019  Transthoracic Echo Report Echocardiography Laboratory CONCLUSIONS Akinetic inferolateral wall. Moderately reduced left ventricular systolic function. Left ventricular ejection fraction is visually estimated to be 35%. Left ventricle is mildly dilated. Eccentric posteriorly directed mitral regurgitation, probably moderate. Moderately dilated left atrium. Aortic sclerosis without stenosis. Mild tricuspid regurgitation. Normal inferior vena cava size without inspiratory collapse. Right ventricular systolic pressure is estimated to be 40 mmHg. Mildly dilated right heartventricle. Normal right ventricular systolic function. Pleural effusion present. Normal pericardium without effusion. [facility]AVID Exam Date:         2019                    08:55 Exam Location:     Inpatient Priority:          Routine Ordering Physician:        VERONICA HENDERSON Referring Physician:       [id number] Michele Sonographer:               JUDIE Lozada Age:    73     Gender:    F MRN:    [id number] :    1945 BSA:    1.9    Ht (in):    64     Wt (lb):    187 Exam Type:     Complete Indications:     Heart failure, unspecified ICD Codes:       I50.9 CPT Codes:       03131 BP:   117    /   40     HR:   72 Technical Quality:       Fair MEASUREMENTS  (Male / Female) Normal Values 2D ECHO LV Diastolic Diameter PLAX        5.5 cm                4.2 - 5.9 / 3.9 - 5.3 cm LV Systolic Diameter PLAX         4.7 cm                2.1 - 4.0 cm IVS Diastolic Thickness           0.98 cm               LVPW Diastolic Thickness          0.87 cm               RV Diameter 4C                    3.7 cm                2.5 - 2.9 cm LVOT Diameter                     1.9 cm                RA Diameter                       4.6 cm                Estimated LV Ejection Fraction    35 %                   LV Ejection Fraction MOD BP       35.5 %                >= 55  % LV Ejection Fraction MOD 4C       38.1 %                LV Ejection Fraction MOD 2C       31.4 %                LA Volume Index                   31.4 cm³/m²           16 - 28 cm³/m² IVC Diameter                      1.8 cm                DOPPLER AV Peak Velocity                  1 m/s                 AV Peak Gradient                  4.3 mmHg              AV Mean Gradient                  2.4 mmHg              LVOT Peak Velocity                0.68 m/s              AV Area Cont Eq vti               1.6 cm²               Mitral E Point Velocity           1.1 m/s               Mitral E to A Ratio               2.5                   MV Pressure Half Time             35.4 ms               MV Area PHT                       6.2 cm²               MV Deceleration Time              122 ms                MR Flow Convergence Radius        0.57 cm               MR ERO PISA                       0.16 cm²              MR Regurgitant Volume PISA        23.6 cm³              Pulmonary Vein Systolic Velocity  0.2 m/s               Pulmonary Vein Diastolic Velocit  0.55 m/s              Pulmonary Vein S/D Ratio          0.37                  TR Peak Velocity                  283 cm/s              PV Peak Velocity                  1 m/s                 PV Peak Gradient                  4.1 mmHg              RVOT Peak Velocity                0.73 m/s              * Indicates values subject to auto-interpretation LV EF:  35    % FINDINGS Left Ventricle Left ventricle is mildly dilated. Moderately reduced left ventricular systolic function. Left ventricular ejection fraction is visually estimated to be 35%. Akinetic inferolateral wall.  Diastolic function is difficult to assess with atrial fibrillation and mitral valve disease. . Right Ventricle Mildly dilated right ventricle. Normal right ventricular systolic function. Right Atrium Enlarged right atrium. Normal inferior  vena cava size without inspiratory collapse. Left Atrium Moderately dilated left atrium. Mitral Valve Thickened mitral valve leaflets with mitral annular calcification. Eccentric posteriorly directed mitral regurgitation, probably moderate. Aortic Valve Tricuspid aortic valve. Aortic sclerosis without stenosis. No aortic insufficiency. Tricuspid Valve Structurally normal tricuspid valve without significant stenosis. Mild tricuspid regurgitation. Right ventricular systolic pressure is estimated to be 40 mmHg. Pulmonic Valve The pulmonic valve is not well visualized. No stenosis or regurgitation seen. Pericardium Normal pericardium without effusion. Pleural effusion present. Aorta Normal aortic root for body surface area. Ascending aorta diameter is  2.3 cm. Hai Ch M.D. (Electronically Signed) Final Date:     31 May 2019 12:21    Ir-picc Line Placement W/ Guidance > Age 5    Result Date: 6/6/2019  HISTORY/REASON FOR EXAM:   PICC placement. TECHNIQUE/EXAM DESCRIPTION AND NUMBER OF VIEWS:   PICC line insertion with ultrasound guidance.  The procedure was performed using maximal sterile barrier technique including sterile gown, mask, cap, and donning of sterile gloves following appropriate hand hygiene and/or sterile scrub. Patient skin site was prepped with 2% Chlorhexidine solution. FINDINGS:  PICC line insertion with Ultrasound Guidance was performed by qualified nursing staff without the assistance of a Radiologist. PICC positioning appropriateness confirmed by 3CG technology; chest xray only needed in the instance 3CG unable to confirm placement.              Ultrasound-guided PICC placement performed by qualified nursing staff as above.     Please see Dr. Mcdowell's OP NOTE    LABORATORY  Lab Results   Component Value Date    SODIUM 141 06/06/2019    POTASSIUM 4.5 06/06/2019    CHLORIDE 99 06/06/2019    CO2 35 (H) 06/06/2019    GLUCOSE 209 (H) 06/06/2019    BUN 25 (H) 06/06/2019    CREATININE 1.02  06/06/2019        Lab Results   Component Value Date    WBC 9.8 06/06/2019    HEMOGLOBIN 10.9 (L) 06/06/2019    HEMATOCRIT 35.4 (L) 06/06/2019    PLATELETCT 351 06/06/2019        Total time of the discharge process exceeds 40 minutes.

## 2019-06-08 NOTE — DISCHARGE PLANNING
Anticipated Discharge Disposition: [facility]    Action: Received call from Lola with [facility] stating they can take pt today. Accepting MD is Dr. Haro.   REMSA form completed and faxed to [facility]    Barriers to Discharge: None    Plan: Transfer to [facility]    D/c summary faxed to [phone] as requested

## 2019-06-14 NOTE — [facility]
Infectious Disease Progress Note    Author: Blanquita Israel M.D. Date & Time of service: 6/14/2019  2:03 PM    Chief Complaint:  FUDiabetic foot ulcer      Interval History:  73 y.o. WF with poorly controlled DM, COPD on 3 L NC at home.  Admitted on 5/25/19 to [facility] for bilateral heel ulcerations with concerns for OM.   5/27/2019 T-max is 98.1.  Vascular Dopplers are being performed.  Denies any fevers or chills.  WBC 7.4.  Creatinine is 0.98  5/28/19- AF, WBC 8.0, no issue with abx, denies any pain currently to BLE but had 9/10 sharp BLE pain last night that improved with pain meds.  5/29- Tm 99.5, WBC 9.8, tolerating antibiotics without issue, denies any pain to BLE, abdomen distended and noting that she needs to have a bowel movement.  5/30- AF, WBC 11.6, no issue with antibiotics, denies pain, denies shortness of breath or chest palpitations, resting comfortably in bed.  5/31-AF, WBC 9.5, complaining of 9/10 LLE intermittent sharp shooting pain that improves with rest and pain meds, n.p.o. for angiogram today, no issue with antibiotics.  6/2 afebrile, white count 9.3.  Underwent right lower extremity angiogram on 5/31 with normal perfusion, no intervention performed.  Resting comfortably this a.m., no new issues  6/14 AF WBC 12.8transferred to [facility] on 6/8. Consulted for abx management    Labs Reviewed and Wound Reviewed.    Review of Systems:  Review of Systems   Constitutional: Negative for chills and fever.   Gastrointestinal: Negative for abdominal pain, diarrhea, nausea and vomiting.   Musculoskeletal: Negative for myalgias.   Neurological: Positive for sensory change.   All other systems reviewed and are negative.      Hemodynamics:  T 98.6 /59 P 77 RR 22 O2 sat 96%  Physical Exam:  Physical Exam   Constitutional: She is oriented to person, place, and time. No distress.   HENT:   Mouth/Throat: No oropharyngeal exudate.   Eyes: No scleral icterus.   Neck: No JVD present.   Cardiovascular: Normal  rate.    Pulmonary/Chest: Effort normal. No stridor. No respiratory distress.   Abdominal: Soft. She exhibits no distension. There is no tenderness.   Musculoskeletal: She exhibits edema.   Neurological: She is alert and oriented to person, place, and time.   Skin: Skin is warm. She is not diaphoretic. There is pallor.   Right heel 6 cm X 5.8 cm X 0.2 cm ulceration with inf. Slough  Left heel nearly resolved   Vitals reviewed.    Labs:  Recent Labs      06/13/19   0540   WBC  12.8*   RBC  3.53*   HEMOGLOBIN  10.4*   HEMATOCRIT  33.1*   MCV  93.8   MCH  29.5   RDW  44.5   PLATELETCT  389   MPV  9.0     Recent Labs      06/13/19   0540   SODIUM  137   POTASSIUM  4.5   CHLORIDE  97   CO2  33   GLUCOSE  73   BUN  25*     Recent Labs      06/13/19   0540   ALBUMIN  2.0*   TBILIRUBIN  0.6   ALKPHOSPHAT  72   TOTPROTEIN  6.0   ALTSGPT  12   ASTSGOT  16   CREATININE  1.01       Imaging:  Cta Abdomen Pelvis W & W/o Post Process    Result Date: 5/28/2019 5/28/2019 3:46 PM HISTORY/REASON FOR EXAM:  AIOD Chronic bilateral heel ulcers.  Diabetes.  Peripheral vascular disease. TECHNIQUE/EXAM DESCRIPTION:  CT angiogram of the abdomen and pelvis without and with contrast, with reconstructions. Initial precontrast helical sections were obtained from the diaphragmatic domes to the lesser trochanters. Following this, 100 mL of Omnipaque 350 nonionic contrast was administered at 5.0 mL/sec and helical scanning was obtained from the diaphragmatic domes through the lesser trochanters. Thin section overlapping reconstruction interval was utilized and multiplanar volume reformatted were generated in the sagittal and coronal planes. 3D angiographic images were reviewed on PACS. Maximum intensity projection (MIP) images were generated and reviewed. Low dose optimization technique was utilized for this CT exam including automated exposure control and adjustment of the mA and/or kV according to patient size. COMPARISON:   None. FINDINGS:  Noncontrast images: There is no intramural hematoma. Contrast images: Abdominal aorta shows moderate atherosclerotic calcifications without aneurysm or dissection.  Mild narrowing distally.  Moderate atherosclerotic calcification of the iliofemoral arterial system bilaterally without focal occlusion. Celiac trunk, superior and inferior mesenteric arteries enhance normally. Renal arteries show mild atherosclerotic narrowing proximally. Small bilateral pleural effusions with associated atelectasis. The liver is unremarkable. The spleen is unremarkable. Adrenal glands are unremarkable. The kidneys are unremarkable. Pancreas is atrophic. The gallbladder is unremarkable. Increased colonic stool. Colonic diverticula noted. Bladder is unremarkable. Uterus is absent.  Ovaries are not visualized. Appendix has a normal appearance. Degenerative change of lumbar spine. 3D angiographic/MIP images of the vasculature confirm the vascular findings as described above.     1.  Moderate atherosclerotic calcification abdominal aorta without aneurysm or dissection. 2.  Moderate atherosclerotic calcification of the LEFT carotid system without segmental occlusion. 3.  Small bilateral pleural effusions with associated atelectasis. 4.  Increased colonic stool suggesting constipation. 5.  Colonic diverticula.    Ct-head W/o    Result Date: 5/30/2019 5/30/2019 4:07 AM HISTORY/REASON FOR EXAM:  Headache, acute, severe, worst HA of life. TECHNIQUE/EXAM DESCRIPTION AND NUMBER OF VIEWS: CT of the head without contrast. The study was performed on a helical multidetector CT scanner. Contiguous axial sections were obtained from the skull base through the vertex. Up to date radiation dose reduction adjustments have been utilized to meet ALARA standards for radiation dose reduction. COMPARISON:  Head CT 3/19/2019 FINDINGS: The calvariae and skull base are unremarkable. There are no extraaxial fluid collections. There is a pattern of cerebral atrophy  manifest as enlargement of sulcal markings and ventricular prominence.  The ventricular system and basal cisterns are otherwise unremarkable. There are areas of hypodensity in the white matter most consistent with small vessel ischemic change versus demyelination or gliosis. There are no hemorrhagic lesions. There are no mass effects or shift of midline structures. Incidentally noted are symmetric basal ganglia calcifications, likely idiopathic and of no clinical significance. The brainstem and posterior fossa structures are unremarkable. Paranasal sinuses in the field of view are unremarkable. Mastoids in the field of view are unremarkable.     1.  Cerebral atrophy. 2.  White matter lucencies most consistent with small vessel ischemic change versus demyelination or gliosis. 3.  Otherwise, Head CT without contrast with no acute findings. No evidence of acute cerebral infarction, hemorrhage or mass lesion.    Dx-chest-for Line Placement Perform Procedure In: Patient's Room    Result Date: 6/6/2019 6/6/2019 4:50 PM HISTORY/REASON FOR EXAM:  PICC. TECHNIQUE/EXAM DESCRIPTION AND NUMBER OF VIEWS: Single view of the chest. COMPARISON: 6/5/2019 FINDINGS: Cardiac mediastinal contour is unchanged. Lungs show hypoinflation. Ill-defined increased opacity again seen in both lower lungs, more extensive on the LEFT. Bilateral pleural fluid collections again seen, larger on the LEFT. No pneumothorax. RIGHT arm PICC line in place with tip at the cavoatrial junction level.     1.  Supportive tubing as described above. 2.  No other significant change from prior exam.     Dx-chest-limited (1 View)    Result Date: 5/30/2019 5/29/2019 8:56 PM HISTORY/REASON FOR EXAM:  Shortness of Breath. Rapid response TECHNIQUE/EXAM DESCRIPTION AND NUMBER OF VIEWS: Single AP view of the chest. COMPARISON: 1 view chest outside films 3/19/2019 FINDINGS: There is hypoinflation. This accentuates the cardiac shadow. There is marked central and lower lung  zone pulmonary edema with suspected bibasilar airspace opacities and/or pleural effusions.     1.  Marked pulmonary edema. 2.  Possible underlying bibasilar airspace opacity/alveolar pulmonary edema and/or bilateral pleural effusions.    Dx-chest-portable (1 View)    Result Date: 6/8/2019 6/8/2019 1:05 PM HISTORY/REASON FOR EXAM:  PICC placement. TECHNIQUE/EXAM DESCRIPTION AND NUMBER OF VIEWS: Single portable view of the chest. COMPARISON: 6/6/2019 FINDINGS: LUNGS: Bibasilar opacities are unchanged since recent prior. Stable left effusion. No significant right effusion. PNEUMOTHORAX: None. LINES AND TUBES: Right PICC tip is in the mid/distal SVC. MEDIASTINUM: Stable cardiac silhouette. Coronary calcifications/stents MUSCULOSKELETAL STRUCTURES: Unchanged.     1.  Right PICC tip is in the mid/distal SVC. 2.  Stable chest otherwise.    Dx-chest-portable (1 View)    Result Date: 6/5/2019 6/5/2019 8:03 PM HISTORY/REASON FOR EXAM:  Shortness of Breath; interval changes of edemea. TECHNIQUE/EXAM DESCRIPTION AND NUMBER OF VIEWS: Single portable view of the chest. COMPARISON: 5/29/2019 FINDINGS: Cardiomediastinal silhouette is stable. Moderate left and small right pleural effusions. Bilateral interstitial opacity/edema appears mildly improved.. No pneumothorax. No acute osseous abnormality.     1. Left greater than right pleural effusions with basilar atelectasis. Correlate clinically for infection. 2. Cardiomegaly with mildly improved pulmonary interstitial edema.    Dx-foot-complete 3+ Right    Result Date: 5/25/2019 5/25/2019 3:17 PM HISTORY/REASON FOR EXAM:  Nonhealing wound on heel with history of diabetes TECHNIQUE/EXAM DESCRIPTION AND NUMBER OF VIEWS: 3 nonweightbearing views of the RIGHT foot. COMPARISON:  No comparison available FINDINGS:  Bone mineralization is normal.  There is no evidence of fracture or dislocation.  Soft tissue swelling is noted including in the calcaneal area. Bone erosions appear to be  present along the medial edge of the tarsal bones and inferior edge of the tarsal bones. There is moderate to severe joint space narrowing and periarticular sclerosis and marginal spurring which is prominent in the mid foot area.     1.  No evidence of fracture or dislocation. 2.  Severe degenerative changes again present raising possibility of neuropathic foot. 3.  There appears to be increase in bone erosions along the medial and inferior tarsal bones which could indicate osteomyelitis.     Dx-foot-complete 3+ Left    Result Date: 2019 3:18 PM HISTORY/REASON FOR EXAM:  Left foot pain with history of diabetes TECHNIQUE/EXAM DESCRIPTION AND NUMBER OF VIEWS: 3 nonweightbearing views of the LEFT foot. COMPARISON:  No comparison available FINDINGS:  Bone mineralization is normal.  There is no evidence of fracture or dislocation.  Soft tissue swelling is noted.  There is joint space narrowing and periarticular spurring. Hammertoe deformities are noted. No bone erosions are identified.     1.  No evidence of fracture or dislocation. 2.  Osteoarthritis is noted. No bone erosions are identified.    Us-giovana Single Level Bilat    Result Date: 2019   Vascular Laboratory  Conclusions  1.  Monophasic arterial waveforms in the bilateral common femoral arteries  indicate aortoiliac stenosis or occlusion.  2.  Diminished right tow pressures, and to a lesser degree, left toe  pressure indicate poor healing potential.  DAVID KEYS  Age:    73    Gender:     F  MRN:    [id number]  :    1945      BSA:  Exam Date:     2019 10:38  Room #:     Inpatient  Priority:     Routine  Ht (in):             Wt (lb):  Ordering Physician:        MAYITO GOODSON  Referring Physician:  Sonographer:               Ruth Wen RVT  Study Type:                Complete Bilateral  Technical Quality:         Adequate  Indications:     Ulceration of LE  CPT Codes:       08454  ICD Codes:        707.1  History:         Bilateral heel wounds  Limitations:     Right brachial pressure not obtained due to IV                 RIGHT  Waveform            Systolic BPs (mmHg)                             0             Brachial  Monophasic                               Common Femoral  Monophasic                               Posterior Tibial  Monophasic                               Dorsalis Pedis                                           Peroneal                                           SELWYN                                           TBI                       LEFT  Waveform        Systolic BPs (mmHg)                             123           Brachial  Monophasic                               Common Femoral  Monophasic                               Posterior Tibial  Monophasic                               Dorsalis Pedis                                           Peroneal                                           SELWYN                                           TBI  Findings  Bilateral.  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  Doppler waveforms at the ankle are monophasic with moderate amplitude.  Ankle pressures were not taken due to non-compressibility of tibial  arteries on previous exam (12/2018).  Toe brachial indices were taken of the bilateral great toes.  Right:      0.41            Left:           0.72  Arterial duplex scan was performed in accordance with lower extremity  arterial evaluation protocol - see separate report.  Ananya Nash M.D.  (Electronically Signed)  Final Date:      28 May 2019 18:19    Us-extremity Artery Lower Bilat    Result Date: 5/27/2019  Lower Extremity  Arterial Duplex Report  Vascular Laboratory  CONCLUSIONS  acute vs subacute DVT left cfv and profunda v.  Rt pop cyst  Arteries poorly seen due to extensive calcification. Multiple levels  bilateral non-vis could contain  stenosis. No obvious post occlusive  waveforms.  50% stenosis is seen in rt mid sfa.  called to DAVID Paiz  Exam Date:     2019 13:40  Room #:     Inpatient  Priority:     Routine  Ht (in):             Wt (lb):  Ordering Physician:        MAYITO GOODSON  Referring Physician:       [id number], ZIU  Sonographer:               Ruth Wen RVT  Study Type:                Complete Bilateral  Technical Quality:         Adequate  Age:    73    Gender:     F  MRN:    [id number]  :    1945      BSA:  Indications:     Ulcer of lower extremity  CPT Codes:       33155  ICD Codes:       707.1  History:         Ulceration of the bilateral heels.  Limitations:     Calcific dropout.                RIGHT  Waveform        Peak Systolic Velocity (cm/s)                  Prox    Prox-Mid  Mid    Mid-Dist  Distal  Triphasic                         164                      CFA  Triphasic       89                                         PFA  Monophasic      109      211      114              105     SFA  Monophasic                        90                       POP  Monophasic      74                                 121     AT  Monophasic      74                                 88      PT  Not                                                        GUTIERREZ  attained                LEFT  Waveform        Peak Systolic Velocity (cm/s)                  Prox    Prox-Mid  Mid    Mid-Dist  Distal  Triphasic                         127                      CFA  Biphasic        70                                         PFA  Monophasic      150               118              85      SFA  Monophasic                        95                       POP  Monophasic      121                                177     AT  Absent                                                     PT  Absent                                                     GUTIERREZ  FINDINGS  Right.  Heavy atherosclerotic plaque seen  through out the right lower extremity.  Loss of flow reversal at the proximal femoral artery prior to visualization  of hemodynamic significant stenosis  Stenosis of the proximal femoral artery. Velocities are consistent with 50-  75% stenosis.  Cannot rule out further hemodynamic significant stenosis within the femoral  or popliteal arteries due to calcific dropout.  Due to heavy calcification, the tibial arteries could not be evaluated  entirely. Cannot rule out segmental occlusion or stenosis.  The peroneal artery is not visualized.  The anterior tibial and posterior tibial arteries are monophasic to the  ankle.  Left.  Heavy atherosclerotic plaque seen through out the left lower extremity.  Loss of flow reversal at the proximal femoral artery without visualization  of hemodynamic significant stenosis.  Cannot rule out  hemodynamic significant stenosis within the femoral or  popliteal arteries due to calcific dropout.  Due to heavy calcification, the tibial arteries could not be evaluated  entirely. Cannot rule out segmental occlusion or stenosis.  No flow can be demonstrated in the peroneal and posterior tibial  artery.  The anterior tibial artery is monophasic to the ankle.  Incidental finding:  Slightly echogenic material is visualized within the common femoral vein  that extends within the profunda femoral vein, consistent acute to subacute  venous thrombosis.   Rubens Elam MD  (Electronically Signed)  Final Date:      27 May 2019 16:16    Us-extremity Venous Lower Bilat    Result Date: 5/28/2019   Vascular Laboratory  CONCLUSIONS  1.  There is thrombus in the LEFT common femoral vein which is  noncompressible. There is nonocclusive thrombus in the LEFT femoral vein as  well.   This is likely acute  DVT with some superimposed chronic DVT .  2.  No evidence of RIGHT lower extremity DVT or SVT.  3.  Exam limited by superficial edema.  Findings discussed with Dr. Charlotte Carlson  at 5:00 am on 5/28/19.  LUDMILA  DAVID  Exam Date:     2019 02:35  Room #:     [facility]  Priority:     Stat  Ht (in):             Wt (lb):  Ordering Physician:        MAYITO GOODSON  Referring Physician:       KELLEE Emmanuel  Sonographer:               Khurram Messina RDMS  Study Type:                Complete Bilateral  Technical Quality:         Fair  Age:    73    Gender:     F  MRN:    [id number]  :    1945      BSA:  Indications:     Pain in unspecified lower leg  CPT Codes:       91684  ICD Codes:       M79.669  History:         Incidental finding in previous SELWYN study.  Bilateral lower                   extremity pain.  Limitations:     Patient couldn't lift legs, unable to image Popliteal veins  PROCEDURES:  Bilateral lower extremity venous duplex imaging.  The following venous structures were evaluated: common femoral vein,  profunda vein, proximal portion of the greater saphenous vein, superficial  femoral vein, and the popliteal vein.  In addition, the posterior tibial and peroneal veins were evaluated.  Serial compression, augmentation maneuvers, and spectral Doppler flow  evaluation were performed.  FINDINGS:  Right lower extremity -.  Complete color filling and compressibility with normal venous flow dynamics  including spontaneous flow, response to augmentation maneuvers, and  respiratory phasicity.  Interstitial fluid consistent with edema is observed below the knee.  Edema reduces the image quality.  Left lower extremity -.  Densely echogenic material is within  the vein that is consistent with  chronic venous thrombosis; non-occlusive.  Common Femoral vein is noncompressible.  Interstitial fluid consistent with edema is observed below the knee.  Edema reduces the image quality.  Mandy Pfeiffer  (Electronically Signed)  Final Date:      28 May 2019 05:01    Ec-echocardiogram Complete W/o Cont    Result Date: 2019  Transthoracic Echo Report Echocardiography Laboratory CONCLUSIONS Akinetic inferolateral wall. Moderately reduced  left ventricular systolic function. Left ventricular ejection fraction is visually estimated to be 35%. Left ventricle is mildly dilated. Eccentric posteriorly directed mitral regurgitation, probably moderate. Moderately dilated left atrium. Aortic sclerosis without stenosis. Mild tricuspid regurgitation. Normal inferior vena cava size without inspiratory collapse. Right ventricular systolic pressure is estimated to be 40 mmHg. Mildly dilated right heartventricle. Normal right ventricular systolic function. Pleural effusion present. Normal pericardium without effusion. DAVID KEYS Exam Date:         2019                    08:55 Exam Location:     Inpatient Priority:          Routine Ordering Physician:        VERONICA HENDERSON Referring Physician:       MICHELLE Sonographer:               JUDIE Lozada Age:    73     Gender:    F MRN:    [id number] :    1945 BSA:    1.9    Ht (in):    64     Wt (lb):    187 Exam Type:     Complete Indications:     Heart failure, unspecified ICD Codes:       I50.9 CPT Codes:       19445 BP:   117    /   40     HR:   72 Technical Quality:       Fair MEASUREMENTS  (Male / Female) Normal Values 2D ECHO LV Diastolic Diameter PLAX        5.5 cm                4.2 - 5.9 / 3.9 - 5.3 cm LV Systolic Diameter PLAX         4.7 cm                2.1 - 4.0 cm IVS Diastolic Thickness           0.98 cm               LVPW Diastolic Thickness          0.87 cm               RV Diameter 4C                    3.7 cm                2.5 - 2.9 cm LVOT Diameter                     1.9 cm                RA Diameter                       4.6 cm                Estimated LV Ejection Fraction    35 %                  LV Ejection Fraction MOD BP       35.5 %                >= 55  % LV Ejection Fraction MOD 4C       38.1 %                LV Ejection Fraction MOD 2C       31.4 %                LA Volume Index                   31.4 cm³/m²           16 - 28 cm³/m² IVC Diameter                       1.8 cm                DOPPLER AV Peak Velocity                  1 m/s                 AV Peak Gradient                  4.3 mmHg              AV Mean Gradient                  2.4 mmHg              LVOT Peak Velocity                0.68 m/s              AV Area Cont Eq vti               1.6 cm²               Mitral E Point Velocity           1.1 m/s               Mitral E to A Ratio               2.5                   MV Pressure Half Time             35.4 ms               MV Area PHT                       6.2 cm²               MV Deceleration Time              122 ms                MR Flow Convergence Radius        0.57 cm               MR ERO PISA                       0.16 cm²              MR Regurgitant Volume PISA        23.6 cm³              Pulmonary Vein Systolic Velocity  0.2 m/s               Pulmonary Vein Diastolic Velocit  0.55 m/s              Pulmonary Vein S/D Ratio          0.37                  TR Peak Velocity                  283 cm/s              PV Peak Velocity                  1 m/s                 PV Peak Gradient                  4.1 mmHg              RVOT Peak Velocity                0.73 m/s              * Indicates values subject to auto-interpretation LV EF:  35    % FINDINGS Left Ventricle Left ventricle is mildly dilated. Moderately reduced left ventricular systolic function. Left ventricular ejection fraction is visually estimated to be 35%. Akinetic inferolateral wall.  Diastolic function is difficult to assess with atrial fibrillation and mitral valve disease. . Right Ventricle Mildly dilated right ventricle. Normal right ventricular systolic function. Right Atrium Enlarged right atrium. Normal inferior vena cava size without inspiratory collapse. Left Atrium Moderately dilated left atrium. Mitral Valve Thickened mitral valve leaflets with mitral annular calcification. Eccentric posteriorly directed mitral regurgitation, probably moderate. Aortic Valve Tricuspid  aortic valve. Aortic sclerosis without stenosis. No aortic insufficiency. Tricuspid Valve Structurally normal tricuspid valve without significant stenosis. Mild tricuspid regurgitation. Right ventricular systolic pressure is estimated to be 40 mmHg. Pulmonic Valve The pulmonic valve is not well visualized. No stenosis or regurgitation seen. Pericardium Normal pericardium without effusion. Pleural effusion present. Aorta Normal aortic root for body surface area. Ascending aorta diameter is  2.3 cm. Hai Ch M.D. (Electronically Signed) Final Date:     31 May 2019 12:21    Ir-picc Line Placement W/ Guidance > Age 5    Result Date: 6/6/2019  HISTORY/REASON FOR EXAM:   PICC placement. TECHNIQUE/EXAM DESCRIPTION AND NUMBER OF VIEWS:   PICC line insertion with ultrasound guidance.  The procedure was performed using maximal sterile barrier technique including sterile gown, mask, cap, and donning of sterile gloves following appropriate hand hygiene and/or sterile scrub. Patient skin site was prepped with 2% Chlorhexidine solution. FINDINGS:  PICC line insertion with Ultrasound Guidance was performed by qualified nursing staff without the assistance of a Radiologist. PICC positioning appropriateness confirmed by 3CG technology; chest xray only needed in the instance 3CG unable to confirm placement.              Ultrasound-guided PICC placement performed by qualified nursing staff as above.       Micro:  Results     Procedure Component Value Units Date/Time    MRSA BY PCR (AMP) [[id number]]     Order Status:  Canceled Specimen:  Respirate from Nares         METHICILLIN RESISTANT STAPHYLOCOCCUS AUREUS wound              Antibiotic Sensitivity Microscan Unit Status      Ampicillin/sulbactam Resistant <=8/4 mcg/mL Final      Method: BERNARDINO      Clindamycin Resistant <=0.5 mcg/mL Final      Method: BERNARDINO      Daptomycin Sensitive <=0.5 mcg/mL Final      Method: BERNARDINO      Erythromycin Resistant >4 mcg/mL Final      Method:  BERNARDINO      Moxifloxacin Intermediate 4 mcg/mL Final      Method: BERNARDINO      Oxacillin Resistant >2 mcg/mL Final      Method: BERNARDINO      Penicillin Resistant >8 mcg/mL Final      Method: BERNARDINO      Tetracycline Resistant >8 mcg/mL Final      Method: BERNARDINO      Trimeth/Sulfa Sensitive <=0.5/9.5 mcg/mL Final      Method: BERNARDINO      Vancomycin Sensitive 1 mcg/mL Final          Assessment:      Active Hospital Problems     Diagnosis   • *Diabetic foot ulcer with osteomyelitis ([facility]) [E11.621, E11.69, L97.509, M86.9]   • Type 2 diabetes mellitus with skin complication, with long-term current use of insulin ([facility]) [E11.628, Z79.4]   • Peripheral vascular disease ([facility]) [I73.9]         Plan:  Bilateral DM heel ulcers   Right diabetic foot OM  Afebrile  Leukocytosis, mild  Bcx on 5/25 negative  Was d/c'd home in January 2019 on IV Dapto for R foot OM; however, did not finish IV abx Never followed up in ID clinic.   Previous R foot cx +MRSE (bone), MSSE (tissue) & E faecalis (tissue) December 2018  Wound cx from R heel on 5/26 +MRSA  Continue IV daptomycin 8 mg/kg daily-decreased dose due to decrease weight  CPK weekly while on daptomycin  Plan to treat with 6 weeks IV antibiotics due to right diabetic foot OM  Stop date 7/6/2019  Continue with wound care and offloading     PVD  US of BLE showing acute on chronic LLE DVT and 50% stenosis of Right mid SFA  Underwent RLE angiogram with Dr. Pro 5/31 with normal flow, no intervention performed     Diabetes mellitus  Hemoglobin A1c was 8.7% on 5/25/19  Keep BS under 150 to help control current infection    Prognosis for limb salvage guarded  Discussed with Pharmacy

## 2019-06-15 NOTE — [facility]
Infectious Disease Progress Note    Author: Blanquita Israel M.D. Date & Time of service: 6/15/2019  11:48 AM    Chief Complaint:  FUDiabetic foot ulcer      Interval History:  73 y.o. WF with poorly controlled DM, COPD on 3 L NC at home.  Admitted on 5/25/19 to [facility] for bilateral heel ulcerations with concerns for OM.   5/27/2019 T-max is 98.1.  Vascular Dopplers are being performed.  Denies any fevers or chills.  WBC 7.4.  Creatinine is 0.98  5/28/19- AF, WBC 8.0, no issue with abx, denies any pain currently to BLE but had 9/10 sharp BLE pain last night that improved with pain meds.  5/29- Tm 99.5, WBC 9.8, tolerating antibiotics without issue, denies any pain to BLE, abdomen distended and noting that she needs to have a bowel movement.  5/30- AF, WBC 11.6, no issue with antibiotics, denies pain, denies shortness of breath or chest palpitations, resting comfortably in bed.  5/31-AF, WBC 9.5, complaining of 9/10 LLE intermittent sharp shooting pain that improves with rest and pain meds, n.p.o. for angiogram today, no issue with antibiotics.  6/2 afebrile, white count 9.3.  Underwent right lower extremity angiogram on 5/31 with normal perfusion, no intervention performed.  Resting comfortably this a.m., no new issues  6/14 AF WBC 12.8transferred to [facility] on 6/8. Consulted for abx management  6/15 AF sleepy today-no new complaints    Labs Reviewed and Wound Reviewed.    Review of Systems:  Review of Systems   Constitutional: Negative for chills and fever.   Gastrointestinal: Negative for abdominal pain, diarrhea, nausea and vomiting.   Musculoskeletal: Negative for myalgias.   Neurological: Positive for sensory change.   All other systems reviewed and are negative.      Hemodynamics:  T 98.3 /53 P 74 RR 20   Physical Exam:  Physical Exam   Constitutional: No distress.   HENT:   Mouth/Throat: No oropharyngeal exudate.   Eyes: Right eye exhibits no discharge. Left eye exhibits no discharge.   Neck: No JVD  present.   Cardiovascular: Normal rate.    Pulmonary/Chest: Effort normal. No stridor. She has no wheezes. She has no rales.   Abdominal: Soft. There is no rebound and no guarding.   Musculoskeletal: She exhibits edema.   Neurological:   somnolent   Skin: Skin is warm. She is not diaphoretic. There is pallor.   Right heel 6 cm X 5.8 cm X 0.2 cm ulceration with inf. Slough  Left heel nearly resolved   Vitals reviewed.    Labs:  Recent Labs      06/13/19   0540  06/15/19   0400   WBC  12.8*  12.8*   RBC  3.53*  3.55*   HEMOGLOBIN  10.4*  10.4*   HEMATOCRIT  33.1*  33.2*   MCV  93.8  93.5   MCH  29.5  29.3   RDW  44.5  44.3   PLATELETCT  389  388   MPV  9.0  9.2     Recent Labs      06/13/19   0540  06/15/19   0340   SODIUM  137  137   POTASSIUM  4.5  5.2   CHLORIDE  97  97   CO2  33  33   GLUCOSE  73  214*   BUN  25*  23*     Recent Labs      06/13/19   0540  06/15/19   0340   ALBUMIN  2.0*  2.0*   TBILIRUBIN  0.6  0.3   ALKPHOSPHAT  72  78   TOTPROTEIN  6.0  6.0   ALTSGPT  12  11   ASTSGOT  16  16   CREATININE  1.01  1.17       Imaging:  Cta Abdomen Pelvis W & W/o Post Process    Result Date: 5/28/2019 5/28/2019 3:46 PM HISTORY/REASON FOR EXAM:  AIOD Chronic bilateral heel ulcers.  Diabetes.  Peripheral vascular disease. TECHNIQUE/EXAM DESCRIPTION:  CT angiogram of the abdomen and pelvis without and with contrast, with reconstructions. Initial precontrast helical sections were obtained from the diaphragmatic domes to the lesser trochanters. Following this, 100 mL of Omnipaque 350 nonionic contrast was administered at 5.0 mL/sec and helical scanning was obtained from the diaphragmatic domes through the lesser trochanters. Thin section overlapping reconstruction interval was utilized and multiplanar volume reformatted were generated in the sagittal and coronal planes. 3D angiographic images were reviewed on PACS. Maximum intensity projection (MIP) images were generated and reviewed. Low dose optimization technique was  utilized for this CT exam including automated exposure control and adjustment of the mA and/or kV according to patient size. COMPARISON:   None. FINDINGS: Noncontrast images: There is no intramural hematoma. Contrast images: Abdominal aorta shows moderate atherosclerotic calcifications without aneurysm or dissection.  Mild narrowing distally.  Moderate atherosclerotic calcification of the iliofemoral arterial system bilaterally without focal occlusion. Celiac trunk, superior and inferior mesenteric arteries enhance normally. Renal arteries show mild atherosclerotic narrowing proximally. Small bilateral pleural effusions with associated atelectasis. The liver is unremarkable. The spleen is unremarkable. Adrenal glands are unremarkable. The kidneys are unremarkable. Pancreas is atrophic. The gallbladder is unremarkable. Increased colonic stool. Colonic diverticula noted. Bladder is unremarkable. Uterus is absent.  Ovaries are not visualized. Appendix has a normal appearance. Degenerative change of lumbar spine. 3D angiographic/MIP images of the vasculature confirm the vascular findings as described above.     1.  Moderate atherosclerotic calcification abdominal aorta without aneurysm or dissection. 2.  Moderate atherosclerotic calcification of the LEFT carotid system without segmental occlusion. 3.  Small bilateral pleural effusions with associated atelectasis. 4.  Increased colonic stool suggesting constipation. 5.  Colonic diverticula.    Ct-head W/o    Result Date: 5/30/2019 5/30/2019 4:07 AM HISTORY/REASON FOR EXAM:  Headache, acute, severe, worst HA of life. TECHNIQUE/EXAM DESCRIPTION AND NUMBER OF VIEWS: CT of the head without contrast. The study was performed on a helical multidetector CT scanner. Contiguous axial sections were obtained from the skull base through the vertex. Up to date radiation dose reduction adjustments have been utilized to meet ALARA standards for radiation dose reduction. COMPARISON:   Head CT 3/19/2019 FINDINGS: The calvariae and skull base are unremarkable. There are no extraaxial fluid collections. There is a pattern of cerebral atrophy manifest as enlargement of sulcal markings and ventricular prominence.  The ventricular system and basal cisterns are otherwise unremarkable. There are areas of hypodensity in the white matter most consistent with small vessel ischemic change versus demyelination or gliosis. There are no hemorrhagic lesions. There are no mass effects or shift of midline structures. Incidentally noted are symmetric basal ganglia calcifications, likely idiopathic and of no clinical significance. The brainstem and posterior fossa structures are unremarkable. Paranasal sinuses in the field of view are unremarkable. Mastoids in the field of view are unremarkable.     1.  Cerebral atrophy. 2.  White matter lucencies most consistent with small vessel ischemic change versus demyelination or gliosis. 3.  Otherwise, Head CT without contrast with no acute findings. No evidence of acute cerebral infarction, hemorrhage or mass lesion.    Dx-chest-for Line Placement Perform Procedure In: Patient's Room    Result Date: 6/6/2019 6/6/2019 4:50 PM HISTORY/REASON FOR EXAM:  PICC. TECHNIQUE/EXAM DESCRIPTION AND NUMBER OF VIEWS: Single view of the chest. COMPARISON: 6/5/2019 FINDINGS: Cardiac mediastinal contour is unchanged. Lungs show hypoinflation. Ill-defined increased opacity again seen in both lower lungs, more extensive on the LEFT. Bilateral pleural fluid collections again seen, larger on the LEFT. No pneumothorax. RIGHT arm PICC line in place with tip at the cavoatrial junction level.     1.  Supportive tubing as described above. 2.  No other significant change from prior exam.     Dx-chest-limited (1 View)    Result Date: 5/30/2019 5/29/2019 8:56 PM HISTORY/REASON FOR EXAM:  Shortness of Breath. Rapid response TECHNIQUE/EXAM DESCRIPTION AND NUMBER OF VIEWS: Single AP view of the chest.  COMPARISON: 1 view chest outside films 3/19/2019 FINDINGS: There is hypoinflation. This accentuates the cardiac shadow. There is marked central and lower lung zone pulmonary edema with suspected bibasilar airspace opacities and/or pleural effusions.     1.  Marked pulmonary edema. 2.  Possible underlying bibasilar airspace opacity/alveolar pulmonary edema and/or bilateral pleural effusions.    Dx-chest-portable (1 View)    Result Date: 6/8/2019 6/8/2019 1:05 PM HISTORY/REASON FOR EXAM:  PICC placement. TECHNIQUE/EXAM DESCRIPTION AND NUMBER OF VIEWS: Single portable view of the chest. COMPARISON: 6/6/2019 FINDINGS: LUNGS: Bibasilar opacities are unchanged since recent prior. Stable left effusion. No significant right effusion. PNEUMOTHORAX: None. LINES AND TUBES: Right PICC tip is in the mid/distal SVC. MEDIASTINUM: Stable cardiac silhouette. Coronary calcifications/stents MUSCULOSKELETAL STRUCTURES: Unchanged.     1.  Right PICC tip is in the mid/distal SVC. 2.  Stable chest otherwise.    Dx-chest-portable (1 View)    Result Date: 6/5/2019 6/5/2019 8:03 PM HISTORY/REASON FOR EXAM:  Shortness of Breath; interval changes of edemea. TECHNIQUE/EXAM DESCRIPTION AND NUMBER OF VIEWS: Single portable view of the chest. COMPARISON: 5/29/2019 FINDINGS: Cardiomediastinal silhouette is stable. Moderate left and small right pleural effusions. Bilateral interstitial opacity/edema appears mildly improved.. No pneumothorax. No acute osseous abnormality.     1. Left greater than right pleural effusions with basilar atelectasis. Correlate clinically for infection. 2. Cardiomegaly with mildly improved pulmonary interstitial edema.    Dx-foot-complete 3+ Right    Result Date: 5/25/2019 5/25/2019 3:17 PM HISTORY/REASON FOR EXAM:  Nonhealing wound on heel with history of diabetes TECHNIQUE/EXAM DESCRIPTION AND NUMBER OF VIEWS: 3 nonweightbearing views of the RIGHT foot. COMPARISON:  No comparison available FINDINGS:  Bone  mineralization is normal.  There is no evidence of fracture or dislocation.  Soft tissue swelling is noted including in the calcaneal area. Bone erosions appear to be present along the medial edge of the tarsal bones and inferior edge of the tarsal bones. There is moderate to severe joint space narrowing and periarticular sclerosis and marginal spurring which is prominent in the mid foot area.     1.  No evidence of fracture or dislocation. 2.  Severe degenerative changes again present raising possibility of neuropathic foot. 3.  There appears to be increase in bone erosions along the medial and inferior tarsal bones which could indicate osteomyelitis.     Dx-foot-complete 3+ Left    Result Date: 2019 3:18 PM HISTORY/REASON FOR EXAM:  Left foot pain with history of diabetes TECHNIQUE/EXAM DESCRIPTION AND NUMBER OF VIEWS: 3 nonweightbearing views of the LEFT foot. COMPARISON:  No comparison available FINDINGS:  Bone mineralization is normal.  There is no evidence of fracture or dislocation.  Soft tissue swelling is noted.  There is joint space narrowing and periarticular spurring. Hammertoe deformities are noted. No bone erosions are identified.     1.  No evidence of fracture or dislocation. 2.  Osteoarthritis is noted. No bone erosions are identified.    Us-giovana Single Level Bilat    Result Date: 2019   Vascular Laboratory  Conclusions  1.  Monophasic arterial waveforms in the bilateral common femoral arteries  indicate aortoiliac stenosis or occlusion.  2.  Diminished right tow pressures, and to a lesser degree, left toe  pressure indicate poor healing potential.  DAVID KEYS  Age:    73    Gender:     F  MRN:    [id number]  :    1945      BSA:  Exam Date:     2019 10:38  Room #:     Inpatient  Priority:     Routine  Ht (in):             Wt (lb):  Ordering Physician:        MAYITO GOODSON  Referring Physician:  Sonographer:               Ruth Byrd                              LIBBY Wen  Study Type:                Complete Bilateral  Technical Quality:         Adequate  Indications:     Ulceration of LE  CPT Codes:       12388  ICD Codes:       707.1  History:         Bilateral heel wounds  Limitations:     Right brachial pressure not obtained due to IV                 RIGHT  Waveform            Systolic BPs (mmHg)                             0             Brachial  Monophasic                               Common Femoral  Monophasic                               Posterior Tibial  Monophasic                               Dorsalis Pedis                                           Peroneal                                           SELWYN                                           TBI                       LEFT  Waveform        Systolic BPs (mmHg)                             123           Brachial  Monophasic                               Common Femoral  Monophasic                               Posterior Tibial  Monophasic                               Dorsalis Pedis                                           Peroneal                                           SELWYN                                           TBI  Findings  Bilateral.  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  Doppler waveforms at the ankle are monophasic with moderate amplitude.  Ankle pressures were not taken due to non-compressibility of tibial  arteries on previous exam (12/2018).  Toe brachial indices were taken of the bilateral great toes.  Right:      0.41            Left:           0.72  Arterial duplex scan was performed in accordance with lower extremity  arterial evaluation protocol - see separate report.  Ananya Nash M.D.  (Electronically Signed)  Final Date:      28 May 2019 18:19    Us-extremity Artery Lower Bilat    Result Date: 5/27/2019  Lower Extremity  Arterial Duplex Report  Vascular Laboratory   CONCLUSIONS  acute vs subacute DVT left cfv and profunda v.  Rt pop cyst  Arteries poorly seen due to extensive calcification. Multiple levels  bilateral non-vis could contain stenosis. No obvious post occlusive  waveforms.  50% stenosis is seen in rt mid sfa.  called to DAVID Paiz  Exam Date:     2019 13:40  Room #:     Inpatient  Priority:     Routine  Ht (in):             Wt (lb):  Ordering Physician:        MAYITO GOODSON  Referring Physician:       KELLEE Emmanuel  Sonographer:               Ruth Wen RVT  Study Type:                Complete Bilateral  Technical Quality:         Adequate  Age:    73    Gender:     F  MRN:    [id number]  :    1945      BSA:  Indications:     Ulcer of lower extremity  CPT Codes:       21100  ICD Codes:       707.1  History:         Ulceration of the bilateral heels.  Limitations:     Calcific dropout.                RIGHT  Waveform        Peak Systolic Velocity (cm/s)                  Prox    Prox-Mid  Mid    Mid-Dist  Distal  Triphasic                         164                      CFA  Triphasic       89                                         PFA  Monophasic      109      211      114              105     SFA  Monophasic                        90                       POP  Monophasic      74                                 121     AT  Monophasic      74                                 88      PT  Not                                                        GUTIERREZ  attained                LEFT  Waveform        Peak Systolic Velocity (cm/s)                  Prox    Prox-Mid  Mid    Mid-Dist  Distal  Triphasic                         127                      CFA  Biphasic        70                                         PFA  Monophasic      150               118              85      SFA  Monophasic                        95                       POP  Monophasic      121                                177     AT  Absent                                                      PT  Absent                                                     GUTIERREZ  FINDINGS  Right.  Heavy atherosclerotic plaque seen through out the right lower extremity.  Loss of flow reversal at the proximal femoral artery prior to visualization  of hemodynamic significant stenosis  Stenosis of the proximal femoral artery. Velocities are consistent with 50-  75% stenosis.  Cannot rule out further hemodynamic significant stenosis within the femoral  or popliteal arteries due to calcific dropout.  Due to heavy calcification, the tibial arteries could not be evaluated  entirely. Cannot rule out segmental occlusion or stenosis.  The peroneal artery is not visualized.  The anterior tibial and posterior tibial arteries are monophasic to the  ankle.  Left.  Heavy atherosclerotic plaque seen through out the left lower extremity.  Loss of flow reversal at the proximal femoral artery without visualization  of hemodynamic significant stenosis.  Cannot rule out  hemodynamic significant stenosis within the femoral or  popliteal arteries due to calcific dropout.  Due to heavy calcification, the tibial arteries could not be evaluated  entirely. Cannot rule out segmental occlusion or stenosis.  No flow can be demonstrated in the peroneal and posterior tibial  artery.  The anterior tibial artery is monophasic to the ankle.  Incidental finding:  Slightly echogenic material is visualized within the common femoral vein  that extends within the profunda femoral vein, consistent acute to subacute  venous thrombosis.   Rubens Elam MD  (Electronically Signed)  Final Date:      27 May 2019 16:16    Us-extremity Venous Lower Bilat    Result Date: 5/28/2019   Vascular Laboratory  CONCLUSIONS  1.  There is thrombus in the LEFT common femoral vein which is  noncompressible. There is nonocclusive thrombus in the LEFT femoral vein as  well.   This is likely acute  DVT with some superimposed chronic DVT  .  2.  No evidence of RIGHT lower extremity DVT or SVT.  3.  Exam limited by superficial edema.  Findings discussed with Dr. Charlotte Carlson  at 5:00 am on 19.  DAVID KEYS  Exam Date:     2019 02:35  Room #:     [facility]  Priority:     Stat  Ht (in):             Wt (lb):  Ordering Physician:        MAYITO GOODSON  Referring Physician:       [id number] Wolfe  Sonographer:               Khurram Messina RDMS  Study Type:                Complete Bilateral  Technical Quality:         Fair  Age:    73    Gender:     F  MRN:    [id number]  :    1945      BSA:  Indications:     Pain in unspecified lower leg  CPT Codes:       62420  ICD Codes:       M79.669  History:         Incidental finding in previous SELWYN study.  Bilateral lower                   extremity pain.  Limitations:     Patient couldn't lift legs, unable to image Popliteal veins  PROCEDURES:  Bilateral lower extremity venous duplex imaging.  The following venous structures were evaluated: common femoral vein,  profunda vein, proximal portion of the greater saphenous vein, superficial  femoral vein, and the popliteal vein.  In addition, the posterior tibial and peroneal veins were evaluated.  Serial compression, augmentation maneuvers, and spectral Doppler flow  evaluation were performed.  FINDINGS:  Right lower extremity -.  Complete color filling and compressibility with normal venous flow dynamics  including spontaneous flow, response to augmentation maneuvers, and  respiratory phasicity.  Interstitial fluid consistent with edema is observed below the knee.  Edema reduces the image quality.  Left lower extremity -.  Densely echogenic material is within  the vein that is consistent with  chronic venous thrombosis; non-occlusive.  Common Femoral vein is noncompressible.  Interstitial fluid consistent with edema is observed below the knee.  Edema reduces the image quality.  Mandy Pfeiffer  (Electronically Signed)  Final Date:      28 May 2019  05:01    Ec-echocardiogram Complete W/o Cont    Result Date: 2019  Transthoracic Echo Report Echocardiography Laboratory CONCLUSIONS Akinetic inferolateral wall. Moderately reduced left ventricular systolic function. Left ventricular ejection fraction is visually estimated to be 35%. Left ventricle is mildly dilated. Eccentric posteriorly directed mitral regurgitation, probably moderate. Moderately dilated left atrium. Aortic sclerosis without stenosis. Mild tricuspid regurgitation. Normal inferior vena cava size without inspiratory collapse. Right ventricular systolic pressure is estimated to be 40 mmHg. Mildly dilated right heartventricle. Normal right ventricular systolic function. Pleural effusion present. Normal pericardium without effusion. KEYSDAVID Exam Date:         2019                    08:55 Exam Location:     Inpatient Priority:          Routine Ordering Physician:        VERONICA HENDERSON Referring Physician:       [id number] Monet Sonographer:               JUDIE Lozada Age:    73     Gender:    F MRN:    [id number] :    1945 BSA:    1.9    Ht (in):    64     Wt (lb):    187 Exam Type:     Complete Indications:     Heart failure, unspecified ICD Codes:       I50.9 CPT Codes:       22010 BP:   117    /   40     HR:   72 Technical Quality:       Fair MEASUREMENTS  (Male / Female) Normal Values 2D ECHO LV Diastolic Diameter PLAX        5.5 cm                4.2 - 5.9 / 3.9 - 5.3 cm LV Systolic Diameter PLAX         4.7 cm                2.1 - 4.0 cm IVS Diastolic Thickness           0.98 cm               LVPW Diastolic Thickness          0.87 cm               RV Diameter 4C                    3.7 cm                2.5 - 2.9 cm LVOT Diameter                     1.9 cm                RA Diameter                       4.6 cm                Estimated LV Ejection Fraction    35 %                  LV Ejection Fraction MOD BP       35.5 %                >= 55  % LV Ejection  Fraction MOD 4C       38.1 %                LV Ejection Fraction MOD 2C       31.4 %                LA Volume Index                   31.4 cm³/m²           16 - 28 cm³/m² IVC Diameter                      1.8 cm                DOPPLER AV Peak Velocity                  1 m/s                 AV Peak Gradient                  4.3 mmHg              AV Mean Gradient                  2.4 mmHg              LVOT Peak Velocity                0.68 m/s              AV Area Cont Eq vti               1.6 cm²               Mitral E Point Velocity           1.1 m/s               Mitral E to A Ratio               2.5                   MV Pressure Half Time             35.4 ms               MV Area PHT                       6.2 cm²               MV Deceleration Time              122 ms                MR Flow Convergence Radius        0.57 cm               MR ERO PISA                       0.16 cm²              MR Regurgitant Volume PISA        23.6 cm³              Pulmonary Vein Systolic Velocity  0.2 m/s               Pulmonary Vein Diastolic Velocit  0.55 m/s              Pulmonary Vein S/D Ratio          0.37                  TR Peak Velocity                  283 cm/s              PV Peak Velocity                  1 m/s                 PV Peak Gradient                  4.1 mmHg              RVOT Peak Velocity                0.73 m/s              * Indicates values subject to auto-interpretation LV EF:  35    % FINDINGS Left Ventricle Left ventricle is mildly dilated. Moderately reduced left ventricular systolic function. Left ventricular ejection fraction is visually estimated to be 35%. Akinetic inferolateral wall.  Diastolic function is difficult to assess with atrial fibrillation and mitral valve disease. . Right Ventricle Mildly dilated right ventricle. Normal right ventricular systolic function. Right Atrium Enlarged right atrium. Normal inferior vena cava size without inspiratory collapse. Left Atrium Moderately dilated  left atrium. Mitral Valve Thickened mitral valve leaflets with mitral annular calcification. Eccentric posteriorly directed mitral regurgitation, probably moderate. Aortic Valve Tricuspid aortic valve. Aortic sclerosis without stenosis. No aortic insufficiency. Tricuspid Valve Structurally normal tricuspid valve without significant stenosis. Mild tricuspid regurgitation. Right ventricular systolic pressure is estimated to be 40 mmHg. Pulmonic Valve The pulmonic valve is not well visualized. No stenosis or regurgitation seen. Pericardium Normal pericardium without effusion. Pleural effusion present. Aorta Normal aortic root for body surface area. Ascending aorta diameter is  2.3 cm. Hai Ch M.D. (Electronically Signed) Final Date:     31 May 2019 12:21    Ir-picc Line Placement W/ Guidance > Age 3    Result Date: 6/6/2019  HISTORY/REASON FOR EXAM:   PICC placement. TECHNIQUE/EXAM DESCRIPTION AND NUMBER OF VIEWS:   PICC line insertion with ultrasound guidance.  The procedure was performed using maximal sterile barrier technique including sterile gown, mask, cap, and donning of sterile gloves following appropriate hand hygiene and/or sterile scrub. Patient skin site was prepped with 2% Chlorhexidine solution. FINDINGS:  PICC line insertion with Ultrasound Guidance was performed by qualified nursing staff without the assistance of a Radiologist. PICC positioning appropriateness confirmed by 3CG technology; chest xray only needed in the instance 3CG unable to confirm placement.              Ultrasound-guided PICC placement performed by qualified nursing staff as above.       Micro:  Results     Procedure Component Value Units Date/Time    MRSA BY PCR (AMP) [[id number]]     Order Status:  Canceled Specimen:  Respirate from Nares         METHICILLIN RESISTANT STAPHYLOCOCCUS AUREUS wound              Antibiotic Sensitivity Microscan Unit Status      Ampicillin/sulbactam Resistant <=8/4 mcg/mL Final      Method:  EBRNARDINO      Clindamycin Resistant <=0.5 mcg/mL Final      Method: BERNARDINO      Daptomycin Sensitive <=0.5 mcg/mL Final      Method: BERNARDINO      Erythromycin Resistant >4 mcg/mL Final      Method: BERNARDINO      Moxifloxacin Intermediate 4 mcg/mL Final      Method: BERNARDINO      Oxacillin Resistant >2 mcg/mL Final      Method: BERNARDINO      Penicillin Resistant >8 mcg/mL Final      Method: BERNARDINO      Tetracycline Resistant >8 mcg/mL Final      Method: BERNARDINO      Trimeth/Sulfa Sensitive <=0.5/9.5 mcg/mL Final      Method: BERNADRINO      Vancomycin Sensitive 1 mcg/mL Final          Assessment:      Active Hospital Problems     Diagnosis   • *Diabetic foot ulcer with osteomyelitis (HCC) [E11.621, E11.69, L97.509, M86.9]   • Type 2 diabetes mellitus with skin complication, with long-term current use of insulin ([facility]) [E11.628, Z79.4]   • Peripheral vascular disease ([facility]) [I73.9]         Plan:  Bilateral DM heel ulcers   Right diabetic foot OM  Afebrile  Leukocytosis, mild  Bcx on 5/25 negative  Was d/c'd home in January 2019 on IV Dapto for R foot OM; however, did not finish IV abx Never followed up in ID clinic.   Previous R foot cx +MRSE (bone), MSSE (tissue) & E faecalis (tissue) December 2018  Wound cx from R heel on 5/26 +MRSA  Continue IV daptomycin 8 mg/kg daily  CPK weekly while on daptomycin  Plan to treat with 6 weeks IV antibiotics due to right diabetic foot OM  Stop date 7/6/2019  Continue with wound care and offloading     PVD  US of BLE showing acute on chronic LLE DVT and 50% stenosis of Right mid SFA  Underwent RLE angiogram with Dr. Pro 5/31 with normal flow, no intervention performed     Diabetes mellitus  Hemoglobin A1c was 8.7% on 5/25/19  Keep BS under 150 to help control current infection  -[phone]    Prognosis for limb salvage guarded

## 2019-06-16 NOTE — [facility]
Infectious Disease Progress Note    Author: Blanquita Israel M.D. Date & Time of service: 6/16/2019  12:03 PM    Chief Complaint:  FUDiabetic foot ulcer      Interval History:  73 y.o. WF with poorly controlled DM, COPD on 3 L NC at home.  Admitted on 5/25/19 to [facility] for bilateral heel ulcerations with concerns for OM.   5/27/2019 T-max is 98.1.  Vascular Dopplers are being performed.  Denies any fevers or chills.  WBC 7.4.  Creatinine is 0.98  5/28/19- AF, WBC 8.0, no issue with abx, denies any pain currently to BLE but had 9/10 sharp BLE pain last night that improved with pain meds.  5/29- Tm 99.5, WBC 9.8, tolerating antibiotics without issue, denies any pain to BLE, abdomen distended and noting that she needs to have a bowel movement.  5/30- AF, WBC 11.6, no issue with antibiotics, denies pain, denies shortness of breath or chest palpitations, resting comfortably in bed.  5/31-AF, WBC 9.5, complaining of 9/10 LLE intermittent sharp shooting pain that improves with rest and pain meds, n.p.o. for angiogram today, no issue with antibiotics.  6/2 afebrile, white count 9.3.  Underwent right lower extremity angiogram on 5/31 with normal perfusion, no intervention performed.  Resting comfortably this a.m., no new issues  6/14 AF WBC 12.8transferred to [facility] on 6/8. Consulted for abx management  6/15 AF sleepy today-no new complaints  6/16 AF  No new wound pictures-denies SE abx    Labs Reviewed and Wound Reviewed.    Review of Systems:  Review of Systems   Constitutional: Negative for chills and fever.   Gastrointestinal: Negative for abdominal pain, diarrhea, nausea and vomiting.   Musculoskeletal: Negative for myalgias.   Neurological: Positive for sensory change.   All other systems reviewed and are negative.      Hemodynamics:  P76 RR 16  Physical Exam:  Physical Exam   Constitutional: No distress.   HENT:   Mouth/Throat: No oropharyngeal exudate.   Eyes: Right eye exhibits no discharge. Left eye exhibits no  discharge.   Neck: No JVD present.   Cardiovascular: Normal rate.    Pulmonary/Chest: Effort normal. No stridor. She has no wheezes. She has no rales.   Abdominal: Soft. There is no rebound and no guarding.   Musculoskeletal: She exhibits edema.   Neurological:   somnolent   Skin: Skin is warm. She is not diaphoretic. There is pallor.   Right heel 6 cm X 5.8 cm X 0.2 cm ulceration with inf. Slough  Left heel nearly resolved   Vitals reviewed.    Labs:  Recent Labs      06/15/19   0400   WBC  12.8*   RBC  3.55*   HEMOGLOBIN  10.4*   HEMATOCRIT  33.2*   MCV  93.5   MCH  29.3   RDW  44.3   PLATELETCT  388   MPV  9.2     Recent Labs      06/15/19   0340   SODIUM  137   POTASSIUM  5.2   CHLORIDE  97   CO2  33   GLUCOSE  214*   BUN  23*     Recent Labs      06/15/19   0340   ALBUMIN  2.0*   TBILIRUBIN  0.3   ALKPHOSPHAT  78   TOTPROTEIN  6.0   ALTSGPT  11   ASTSGOT  16   CREATININE  1.17       Imaging:  Cta Abdomen Pelvis W & W/o Post Process    Result Date: 5/28/2019 5/28/2019 3:46 PM HISTORY/REASON FOR EXAM:  AIOD Chronic bilateral heel ulcers.  Diabetes.  Peripheral vascular disease. TECHNIQUE/EXAM DESCRIPTION:  CT angiogram of the abdomen and pelvis without and with contrast, with reconstructions. Initial precontrast helical sections were obtained from the diaphragmatic domes to the lesser trochanters. Following this, 100 mL of Omnipaque 350 nonionic contrast was administered at 5.0 mL/sec and helical scanning was obtained from the diaphragmatic domes through the lesser trochanters. Thin section overlapping reconstruction interval was utilized and multiplanar volume reformatted were generated in the sagittal and coronal planes. 3D angiographic images were reviewed on PACS. Maximum intensity projection (MIP) images were generated and reviewed. Low dose optimization technique was utilized for this CT exam including automated exposure control and adjustment of the mA and/or kV according to patient size. COMPARISON:    None. FINDINGS: Noncontrast images: There is no intramural hematoma. Contrast images: Abdominal aorta shows moderate atherosclerotic calcifications without aneurysm or dissection.  Mild narrowing distally.  Moderate atherosclerotic calcification of the iliofemoral arterial system bilaterally without focal occlusion. Celiac trunk, superior and inferior mesenteric arteries enhance normally. Renal arteries show mild atherosclerotic narrowing proximally. Small bilateral pleural effusions with associated atelectasis. The liver is unremarkable. The spleen is unremarkable. Adrenal glands are unremarkable. The kidneys are unremarkable. Pancreas is atrophic. The gallbladder is unremarkable. Increased colonic stool. Colonic diverticula noted. Bladder is unremarkable. Uterus is absent.  Ovaries are not visualized. Appendix has a normal appearance. Degenerative change of lumbar spine. 3D angiographic/MIP images of the vasculature confirm the vascular findings as described above.     1.  Moderate atherosclerotic calcification abdominal aorta without aneurysm or dissection. 2.  Moderate atherosclerotic calcification of the LEFT carotid system without segmental occlusion. 3.  Small bilateral pleural effusions with associated atelectasis. 4.  Increased colonic stool suggesting constipation. 5.  Colonic diverticula.    Ct-head W/o    Result Date: 5/30/2019 5/30/2019 4:07 AM HISTORY/REASON FOR EXAM:  Headache, acute, severe, worst HA of life. TECHNIQUE/EXAM DESCRIPTION AND NUMBER OF VIEWS: CT of the head without contrast. The study was performed on a helical multidetector CT scanner. Contiguous axial sections were obtained from the skull base through the vertex. Up to date radiation dose reduction adjustments have been utilized to meet ALARA standards for radiation dose reduction. COMPARISON:  Head CT 3/19/2019 FINDINGS: The calvariae and skull base are unremarkable. There are no extraaxial fluid collections. There is a pattern of  cerebral atrophy manifest as enlargement of sulcal markings and ventricular prominence.  The ventricular system and basal cisterns are otherwise unremarkable. There are areas of hypodensity in the white matter most consistent with small vessel ischemic change versus demyelination or gliosis. There are no hemorrhagic lesions. There are no mass effects or shift of midline structures. Incidentally noted are symmetric basal ganglia calcifications, likely idiopathic and of no clinical significance. The brainstem and posterior fossa structures are unremarkable. Paranasal sinuses in the field of view are unremarkable. Mastoids in the field of view are unremarkable.     1.  Cerebral atrophy. 2.  White matter lucencies most consistent with small vessel ischemic change versus demyelination or gliosis. 3.  Otherwise, Head CT without contrast with no acute findings. No evidence of acute cerebral infarction, hemorrhage or mass lesion.    Dx-chest-for Line Placement Perform Procedure In: Patient's Room    Result Date: 6/6/2019 6/6/2019 4:50 PM HISTORY/REASON FOR EXAM:  PICC. TECHNIQUE/EXAM DESCRIPTION AND NUMBER OF VIEWS: Single view of the chest. COMPARISON: 6/5/2019 FINDINGS: Cardiac mediastinal contour is unchanged. Lungs show hypoinflation. Ill-defined increased opacity again seen in both lower lungs, more extensive on the LEFT. Bilateral pleural fluid collections again seen, larger on the LEFT. No pneumothorax. RIGHT arm PICC line in place with tip at the cavoatrial junction level.     1.  Supportive tubing as described above. 2.  No other significant change from prior exam.     Dx-chest-limited (1 View)    Result Date: 5/30/2019 5/29/2019 8:56 PM HISTORY/REASON FOR EXAM:  Shortness of Breath. Rapid response TECHNIQUE/EXAM DESCRIPTION AND NUMBER OF VIEWS: Single AP view of the chest. COMPARISON: 1 view chest outside films 3/19/2019 FINDINGS: There is hypoinflation. This accentuates the cardiac shadow. There is marked  central and lower lung zone pulmonary edema with suspected bibasilar airspace opacities and/or pleural effusions.     1.  Marked pulmonary edema. 2.  Possible underlying bibasilar airspace opacity/alveolar pulmonary edema and/or bilateral pleural effusions.    Dx-chest-portable (1 View)    Result Date: 6/8/2019 6/8/2019 1:05 PM HISTORY/REASON FOR EXAM:  PICC placement. TECHNIQUE/EXAM DESCRIPTION AND NUMBER OF VIEWS: Single portable view of the chest. COMPARISON: 6/6/2019 FINDINGS: LUNGS: Bibasilar opacities are unchanged since recent prior. Stable left effusion. No significant right effusion. PNEUMOTHORAX: None. LINES AND TUBES: Right PICC tip is in the mid/distal SVC. MEDIASTINUM: Stable cardiac silhouette. Coronary calcifications/stents MUSCULOSKELETAL STRUCTURES: Unchanged.     1.  Right PICC tip is in the mid/distal SVC. 2.  Stable chest otherwise.    Dx-chest-portable (1 View)    Result Date: 6/5/2019 6/5/2019 8:03 PM HISTORY/REASON FOR EXAM:  Shortness of Breath; interval changes of edemea. TECHNIQUE/EXAM DESCRIPTION AND NUMBER OF VIEWS: Single portable view of the chest. COMPARISON: 5/29/2019 FINDINGS: Cardiomediastinal silhouette is stable. Moderate left and small right pleural effusions. Bilateral interstitial opacity/edema appears mildly improved.. No pneumothorax. No acute osseous abnormality.     1. Left greater than right pleural effusions with basilar atelectasis. Correlate clinically for infection. 2. Cardiomegaly with mildly improved pulmonary interstitial edema.    Dx-foot-complete 3+ Right    Result Date: 5/25/2019 5/25/2019 3:17 PM HISTORY/REASON FOR EXAM:  Nonhealing wound on heel with history of diabetes TECHNIQUE/EXAM DESCRIPTION AND NUMBER OF VIEWS: 3 nonweightbearing views of the RIGHT foot. COMPARISON:  No comparison available FINDINGS:  Bone mineralization is normal.  There is no evidence of fracture or dislocation.  Soft tissue swelling is noted including in the calcaneal area. Bone  erosions appear to be present along the medial edge of the tarsal bones and inferior edge of the tarsal bones. There is moderate to severe joint space narrowing and periarticular sclerosis and marginal spurring which is prominent in the mid foot area.     1.  No evidence of fracture or dislocation. 2.  Severe degenerative changes again present raising possibility of neuropathic foot. 3.  There appears to be increase in bone erosions along the medial and inferior tarsal bones which could indicate osteomyelitis.     Dx-foot-complete 3+ Left    Result Date: 2019 3:18 PM HISTORY/REASON FOR EXAM:  Left foot pain with history of diabetes TECHNIQUE/EXAM DESCRIPTION AND NUMBER OF VIEWS: 3 nonweightbearing views of the LEFT foot. COMPARISON:  No comparison available FINDINGS:  Bone mineralization is normal.  There is no evidence of fracture or dislocation.  Soft tissue swelling is noted.  There is joint space narrowing and periarticular spurring. Hammertoe deformities are noted. No bone erosions are identified.     1.  No evidence of fracture or dislocation. 2.  Osteoarthritis is noted. No bone erosions are identified.    Us-giovana Single Level Bilat    Result Date: 2019   Vascular Laboratory  Conclusions  1.  Monophasic arterial waveforms in the bilateral common femoral arteries  indicate aortoiliac stenosis or occlusion.  2.  Diminished right tow pressures, and to a lesser degree, left toe  pressure indicate poor healing potential.  DAVID KEYS  Age:    73    Gender:     F  MRN:    [id number]  :    1945      BSA:  Exam Date:     2019 10:38  Room #:     Inpatient  Priority:     Routine  Ht (in):             Wt (lb):  Ordering Physician:        MAYITO GOODSON  Referring Physician:  Sonographer:               Ruth Wen RVT  Study Type:                Complete Bilateral  Technical Quality:         Adequate  Indications:     Ulceration of LE  CPT Codes:        80822  ICD Codes:       707.1  History:         Bilateral heel wounds  Limitations:     Right brachial pressure not obtained due to IV                 RIGHT  Waveform            Systolic BPs (mmHg)                             0             Brachial  Monophasic                               Common Femoral  Monophasic                               Posterior Tibial  Monophasic                               Dorsalis Pedis                                           Peroneal                                           SELWYN                                           TBI                       LEFT  Waveform        Systolic BPs (mmHg)                             123           Brachial  Monophasic                               Common Femoral  Monophasic                               Posterior Tibial  Monophasic                               Dorsalis Pedis                                           Peroneal                                           SELWYN                                           TBI  Findings  Bilateral.  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  Doppler waveforms at the ankle are monophasic with moderate amplitude.  Ankle pressures were not taken due to non-compressibility of tibial  arteries on previous exam (12/2018).  Toe brachial indices were taken of the bilateral great toes.  Right:      0.41            Left:           0.72  Arterial duplex scan was performed in accordance with lower extremity  arterial evaluation protocol - see separate report.  Ananya Nash M.D.  (Electronically Signed)  Final Date:      28 May 2019 18:19    Us-extremity Artery Lower Bilat    Result Date: 5/27/2019  Lower Extremity  Arterial Duplex Report  Vascular Laboratory  CONCLUSIONS  acute vs subacute DVT left cfv and profunda v.  Rt pop cyst  Arteries poorly seen due to extensive calcification. Multiple levels  bilateral  non-vis could contain stenosis. No obvious post occlusive  waveforms.  50% stenosis is seen in rt mid sfa.  called to DAVID Paiz  Exam Date:     2019 13:40  Room #:     Inpatient  Priority:     Routine  Ht (in):             Wt (lb):  Ordering Physician:        MAYITO GOODSON  Referring Physician:       [id number]  Sonographer:               Ruth Wen RVT  Study Type:                Complete Bilateral  Technical Quality:         Adequate  Age:    73    Gender:     F  MRN:    [id number]  :    1945      BSA:  Indications:     Ulcer of lower extremity  CPT Codes:       61248  ICD Codes:       707.1  History:         Ulceration of the bilateral heels.  Limitations:     Calcific dropout.                RIGHT  Waveform        Peak Systolic Velocity (cm/s)                  Prox    Prox-Mid  Mid    Mid-Dist  Distal  Triphasic                         164                      CFA  Triphasic       89                                         PFA  Monophasic      109      211      114              105     SFA  Monophasic                        90                       POP  Monophasic      74                                 121     AT  Monophasic      74                                 88      PT  Not                                                        GUTIERREZ  attained                LEFT  Waveform        Peak Systolic Velocity (cm/s)                  Prox    Prox-Mid  Mid    Mid-Dist  Distal  Triphasic                         127                      CFA  Biphasic        70                                         PFA  Monophasic      150               118              85      SFA  Monophasic                        95                       POP  Monophasic      121                                177     AT  Absent                                                     PT  Absent                                                     GUTIERREZ  FINDINGS  Right.  Heavy  atherosclerotic plaque seen through out the right lower extremity.  Loss of flow reversal at the proximal femoral artery prior to visualization  of hemodynamic significant stenosis  Stenosis of the proximal femoral artery. Velocities are consistent with 50-  75% stenosis.  Cannot rule out further hemodynamic significant stenosis within the femoral  or popliteal arteries due to calcific dropout.  Due to heavy calcification, the tibial arteries could not be evaluated  entirely. Cannot rule out segmental occlusion or stenosis.  The peroneal artery is not visualized.  The anterior tibial and posterior tibial arteries are monophasic to the  ankle.  Left.  Heavy atherosclerotic plaque seen through out the left lower extremity.  Loss of flow reversal at the proximal femoral artery without visualization  of hemodynamic significant stenosis.  Cannot rule out  hemodynamic significant stenosis within the femoral or  popliteal arteries due to calcific dropout.  Due to heavy calcification, the tibial arteries could not be evaluated  entirely. Cannot rule out segmental occlusion or stenosis.  No flow can be demonstrated in the peroneal and posterior tibial  artery.  The anterior tibial artery is monophasic to the ankle.  Incidental finding:  Slightly echogenic material is visualized within the common femoral vein  that extends within the profunda femoral vein, consistent acute to subacute  venous thrombosis.   Rubens Elam MD  (Electronically Signed)  Final Date:      27 May 2019 16:16    Us-extremity Venous Lower Bilat    Result Date: 5/28/2019   Vascular Laboratory  CONCLUSIONS  1.  There is thrombus in the LEFT common femoral vein which is  noncompressible. There is nonocclusive thrombus in the LEFT femoral vein as  well.   This is likely acute  DVT with some superimposed chronic DVT .  2.  No evidence of RIGHT lower extremity DVT or SVT.  3.  Exam limited by superficial edema.  Findings discussed with Dr. Charlotte Carlson  at  5:00 am on 19.  DAVID KEYS  Exam Date:     2019 02:35  Room #:     [facility]  Priority:     Stat  Ht (in):             Wt (lb):  Ordering Physician:        MAYITO GOODSON  Referring Physician:       [id number]  Sonographer:               Khurram Messina RDMS  Study Type:                Complete Bilateral  Technical Quality:         Fair  Age:    73    Gender:     F  MRN:    [id number]  :    1945      BSA:  Indications:     Pain in unspecified lower leg  CPT Codes:       48537  ICD Codes:       M79.669  History:         Incidental finding in previous SELWYN study.  Bilateral lower                   extremity pain.  Limitations:     Patient couldn't lift legs, unable to image Popliteal veins  PROCEDURES:  Bilateral lower extremity venous duplex imaging.  The following venous structures were evaluated: common femoral vein,  profunda vein, proximal portion of the greater saphenous vein, superficial  femoral vein, and the popliteal vein.  In addition, the posterior tibial and peroneal veins were evaluated.  Serial compression, augmentation maneuvers, and spectral Doppler flow  evaluation were performed.  FINDINGS:  Right lower extremity -.  Complete color filling and compressibility with normal venous flow dynamics  including spontaneous flow, response to augmentation maneuvers, and  respiratory phasicity.  Interstitial fluid consistent with edema is observed below the knee.  Edema reduces the image quality.  Left lower extremity -.  Densely echogenic material is within  the vein that is consistent with  chronic venous thrombosis; non-occlusive.  Common Femoral vein is noncompressible.  Interstitial fluid consistent with edema is observed below the knee.  Edema reduces the image quality.  Mandy Pfeiffer  (Electronically Signed)  Final Date:      28 May 2019 05:01    Ec-echocardiogram Complete W/o Cont    Result Date: 2019  Transthoracic Echo Report Echocardiography Laboratory CONCLUSIONS Akinetic  inferolateral wall. Moderately reduced left ventricular systolic function. Left ventricular ejection fraction is visually estimated to be 35%. Left ventricle is mildly dilated. Eccentric posteriorly directed mitral regurgitation, probably moderate. Moderately dilated left atrium. Aortic sclerosis without stenosis. Mild tricuspid regurgitation. Normal inferior vena cava size without inspiratory collapse. Right ventricular systolic pressure is estimated to be 40 mmHg. Mildly dilated right heartventricle. Normal right ventricular systolic function. Pleural effusion present. Normal pericardium without effusion. DAVID KEYS Exam Date:         2019                    08:55 Exam Location:     Inpatient Priority:          Routine Ordering Physician:        VERONICA HENDERSON Referring Physician:       [id number] Sonographer:               JUDIE Lozada Age:    73     Gender:    F MRN:    [id number] :    1945 BSA:    1.9    Ht (in):    64     Wt (lb):    187 Exam Type:     Complete Indications:     Heart failure, unspecified ICD Codes:       I50.9 CPT Codes:       04749 BP:   117    /   40     HR:   72 Technical Quality:       Fair MEASUREMENTS  (Male / Female) Normal Values 2D ECHO LV Diastolic Diameter PLAX        5.5 cm                4.2 - 5.9 / 3.9 - 5.3 cm LV Systolic Diameter PLAX         4.7 cm                2.1 - 4.0 cm IVS Diastolic Thickness           0.98 cm               LVPW Diastolic Thickness          0.87 cm               RV Diameter 4C                    3.7 cm                2.5 - 2.9 cm LVOT Diameter                     1.9 cm                RA Diameter                       4.6 cm                Estimated LV Ejection Fraction    35 %                  LV Ejection Fraction MOD BP       35.5 %                >= 55  % LV Ejection Fraction MOD 4C       38.1 %                LV Ejection Fraction MOD 2C       31.4 %                LA Volume Index                   31.4 cm³/m²            16 - 28 cm³/m² IVC Diameter                      1.8 cm                DOPPLER AV Peak Velocity                  1 m/s                 AV Peak Gradient                  4.3 mmHg              AV Mean Gradient                  2.4 mmHg              LVOT Peak Velocity                0.68 m/s              AV Area Cont Eq vti               1.6 cm²               Mitral E Point Velocity           1.1 m/s               Mitral E to A Ratio               2.5                   MV Pressure Half Time             35.4 ms               MV Area PHT                       6.2 cm²               MV Deceleration Time              122 ms                MR Flow Convergence Radius        0.57 cm               MR ERO PISA                       0.16 cm²              MR Regurgitant Volume PISA        23.6 cm³              Pulmonary Vein Systolic Velocity  0.2 m/s               Pulmonary Vein Diastolic Velocit  0.55 m/s              Pulmonary Vein S/D Ratio          0.37                  TR Peak Velocity                  283 cm/s              PV Peak Velocity                  1 m/s                 PV Peak Gradient                  4.1 mmHg              RVOT Peak Velocity                0.73 m/s              * Indicates values subject to auto-interpretation LV EF:  35    % FINDINGS Left Ventricle Left ventricle is mildly dilated. Moderately reduced left ventricular systolic function. Left ventricular ejection fraction is visually estimated to be 35%. Akinetic inferolateral wall.  Diastolic function is difficult to assess with atrial fibrillation and mitral valve disease. . Right Ventricle Mildly dilated right ventricle. Normal right ventricular systolic function. Right Atrium Enlarged right atrium. Normal inferior vena cava size without inspiratory collapse. Left Atrium Moderately dilated left atrium. Mitral Valve Thickened mitral valve leaflets with mitral annular calcification. Eccentric posteriorly directed mitral regurgitation, probably  moderate. Aortic Valve Tricuspid aortic valve. Aortic sclerosis without stenosis. No aortic insufficiency. Tricuspid Valve Structurally normal tricuspid valve without significant stenosis. Mild tricuspid regurgitation. Right ventricular systolic pressure is estimated to be 40 mmHg. Pulmonic Valve The pulmonic valve is not well visualized. No stenosis or regurgitation seen. Pericardium Normal pericardium without effusion. Pleural effusion present. Aorta Normal aortic root for body surface area. Ascending aorta diameter is  2.3 cm. Hai Ch M.D. (Electronically Signed) Final Date:     31 May 2019 12:21    Ir-picc Line Placement W/ Guidance > Age 5    Result Date: 6/6/2019  HISTORY/REASON FOR EXAM:   PICC placement. TECHNIQUE/EXAM DESCRIPTION AND NUMBER OF VIEWS:   PICC line insertion with ultrasound guidance.  The procedure was performed using maximal sterile barrier technique including sterile gown, mask, cap, and donning of sterile gloves following appropriate hand hygiene and/or sterile scrub. Patient skin site was prepped with 2% Chlorhexidine solution. FINDINGS:  PICC line insertion with Ultrasound Guidance was performed by qualified nursing staff without the assistance of a Radiologist. PICC positioning appropriateness confirmed by 3CG technology; chest xray only needed in the instance 3CG unable to confirm placement.              Ultrasound-guided PICC placement performed by qualified nursing staff as above.       Micro:  Results     ** No results found for the last 168 hours. **        METHICILLIN RESISTANT STAPHYLOCOCCUS AUREUS wound              Antibiotic Sensitivity Microscan Unit Status      Ampicillin/sulbactam Resistant <=8/4 mcg/mL Final      Method: BERNARDINO      Clindamycin Resistant <=0.5 mcg/mL Final      Method: BERNARDINO      Daptomycin Sensitive <=0.5 mcg/mL Final      Method: BERNARDINO      Erythromycin Resistant >4 mcg/mL Final      Method: BERNARDINO      Moxifloxacin Intermediate 4 mcg/mL Final       Method: BERNARDINO      Oxacillin Resistant >2 mcg/mL Final      Method: BERNARDINO      Penicillin Resistant >8 mcg/mL Final      Method: BERNARDINO      Tetracycline Resistant >8 mcg/mL Final      Method: BERNARDINO      Trimeth/Sulfa Sensitive <=0.5/9.5 mcg/mL Final      Method: BERNARDINO      Vancomycin Sensitive 1 mcg/mL Final          Assessment:      Active Hospital Problems     Diagnosis   • *Diabetic foot ulcer with osteomyelitis ([facility]) [E11.621, E11.69, L97.509, M86.9]   • Type 2 diabetes mellitus with skin complication, with long-term current use of insulin ([facility]) [E11.628, Z79.4]   • Peripheral vascular disease ([facility]) [I73.9]         Plan:  Bilateral DM heel ulcers   Right diabetic foot OM  Afebrile  Leukocytosis, mild at last check  Bcx on 5/25 negative  Was d/c'd home in January 2019 on IV Dapto for R foot OM; however, did not finish IV abx Never followed up in ID clinic.   Previous R foot cx +MRSE (bone), MSSE (tissue) & E faecalis (tissue) December 2018  Wound cx from R heel on 5/26 +MRSA  Continue IV daptomycin 8 mg/kg daily  CPK weekly while on daptomycin  Stop date 7/6/2019  Continue with wound care and offloading     PVD  US of BLE showing acute on chronic LLE DVT and 50% stenosis of Right mid SFA  Underwent RLE angiogram with Dr. Pro 5/31 with normal flow, no intervention performed     Diabetes mellitus  Hemoglobin A1c was 8.7% on 5/25/19  Keep BS under 150 to help control current infection  -[id number]    Prognosis for limb salvage guarded

## 2019-06-17 NOTE — [facility]
Infectious Disease Progress Note    Author: Blanquita Israel M.D. Date & Time of service: 6/17/2019  12:43 PM    Chief Complaint:  FUDiabetic foot ulcer      Interval History:  73 y.o. WF with poorly controlled DM, COPD on 3 L NC at home.  Admitted on 5/25/19 to [facility] for bilateral heel ulcerations with concerns for OM.   5/27/2019 T-max is 98.1.  Vascular Dopplers are being performed.  Denies any fevers or chills.  WBC 7.4.  Creatinine is 0.98  5/28/19- AF, WBC 8.0, no issue with abx, denies any pain currently to BLE but had 9/10 sharp BLE pain last night that improved with pain meds.  5/29- Tm 99.5, WBC 9.8, tolerating antibiotics without issue, denies any pain to BLE, abdomen distended and noting that she needs to have a bowel movement.  5/30- AF, WBC 11.6, no issue with antibiotics, denies pain, denies shortness of breath or chest palpitations, resting comfortably in bed.  5/31-AF, WBC 9.5, complaining of 9/10 LLE intermittent sharp shooting pain that improves with rest and pain meds, n.p.o. for angiogram today, no issue with antibiotics.  6/2 afebrile, white count 9.3.  Underwent right lower extremity angiogram on 5/31 with normal perfusion, no intervention performed.  Resting comfortably this a.m., no new issues  6/14 AF WBC 12.8transferred to [facility] on 6/8. Consulted for abx management  6/15 AF sleepy today-no new complaints  6/16 AF  No new wound pictures-denies SE abx  6/17 AF feels OK-eating lunch Had dressing change today-tolerated well. Eating lunch. No new pictures in chart as yet of wound    Labs Reviewed and Wound Reviewed.    Review of Systems:  Review of Systems   Constitutional: Negative for chills and fever.   Gastrointestinal: Negative for abdominal pain, diarrhea, nausea and vomiting.   Musculoskeletal: Negative for myalgias.   Neurological: Positive for sensory change.   All other systems reviewed and are negative.      Hemodynamics:  T 98.9 P79 RR 16 O2 sat 99%  Physical Exam:  Physical Exam    Constitutional: She is oriented to person, place, and time. No distress.   HENT:   Mouth/Throat: No oropharyngeal exudate.   Eyes: Pupils are equal, round, and reactive to light. EOM are normal. No scleral icterus.   Neck: No JVD present.   Cardiovascular: Normal rate.    Pulmonary/Chest: Effort normal. No stridor. She has no wheezes. She has no rales.   Abdominal: Soft. There is no rebound and no guarding.   Musculoskeletal: She exhibits edema.   Neurological: She is alert and oriented to person, place, and time.   Skin: Skin is warm. She is not diaphoretic. There is pallor.   Right heel 6 cm X 5.8 cm X 0.2 cm ulceration with inf. Slough  Left heel nearly resolved   Vitals reviewed.    Labs:  Recent Labs      06/15/19   0400   WBC  12.8*   RBC  3.55*   HEMOGLOBIN  10.4*   HEMATOCRIT  33.2*   MCV  93.5   MCH  29.3   RDW  44.3   PLATELETCT  388   MPV  9.2     Recent Labs      06/15/19   0340  06/17/19   0400   SODIUM  137   --    POTASSIUM  5.2   --    CHLORIDE  97   --    CO2  33   --    GLUCOSE  214*   --    BUN  23*   --    CPKTOTAL   --   35     Recent Labs      06/15/19   0340   ALBUMIN  2.0*   TBILIRUBIN  0.3   ALKPHOSPHAT  78   TOTPROTEIN  6.0   ALTSGPT  11   ASTSGOT  16   CREATININE  1.17       Imaging:  Cta Abdomen Pelvis W & W/o Post Process    Result Date: 5/28/2019 5/28/2019 3:46 PM HISTORY/REASON FOR EXAM:  AIOD Chronic bilateral heel ulcers.  Diabetes.  Peripheral vascular disease. TECHNIQUE/EXAM DESCRIPTION:  CT angiogram of the abdomen and pelvis without and with contrast, with reconstructions. Initial precontrast helical sections were obtained from the diaphragmatic domes to the lesser trochanters. Following this, 100 mL of Omnipaque 350 nonionic contrast was administered at 5.0 mL/sec and helical scanning was obtained from the diaphragmatic domes through the lesser trochanters. Thin section overlapping reconstruction interval was utilized and multiplanar volume reformatted were generated in the  sagittal and coronal planes. 3D angiographic images were reviewed on PACS. Maximum intensity projection (MIP) images were generated and reviewed. Low dose optimization technique was utilized for this CT exam including automated exposure control and adjustment of the mA and/or kV according to patient size. COMPARISON:   None. FINDINGS: Noncontrast images: There is no intramural hematoma. Contrast images: Abdominal aorta shows moderate atherosclerotic calcifications without aneurysm or dissection.  Mild narrowing distally.  Moderate atherosclerotic calcification of the iliofemoral arterial system bilaterally without focal occlusion. Celiac trunk, superior and inferior mesenteric arteries enhance normally. Renal arteries show mild atherosclerotic narrowing proximally. Small bilateral pleural effusions with associated atelectasis. The liver is unremarkable. The spleen is unremarkable. Adrenal glands are unremarkable. The kidneys are unremarkable. Pancreas is atrophic. The gallbladder is unremarkable. Increased colonic stool. Colonic diverticula noted. Bladder is unremarkable. Uterus is absent.  Ovaries are not visualized. Appendix has a normal appearance. Degenerative change of lumbar spine. 3D angiographic/MIP images of the vasculature confirm the vascular findings as described above.     1.  Moderate atherosclerotic calcification abdominal aorta without aneurysm or dissection. 2.  Moderate atherosclerotic calcification of the LEFT carotid system without segmental occlusion. 3.  Small bilateral pleural effusions with associated atelectasis. 4.  Increased colonic stool suggesting constipation. 5.  Colonic diverticula.    Ct-head W/o    Result Date: 5/30/2019 5/30/2019 4:07 AM HISTORY/REASON FOR EXAM:  Headache, acute, severe, worst HA of life. TECHNIQUE/EXAM DESCRIPTION AND NUMBER OF VIEWS: CT of the head without contrast. The study was performed on a helical multidetector CT scanner. Contiguous axial sections were  obtained from the skull base through the vertex. Up to date radiation dose reduction adjustments have been utilized to meet ALARA standards for radiation dose reduction. COMPARISON:  Head CT 3/19/2019 FINDINGS: The calvariae and skull base are unremarkable. There are no extraaxial fluid collections. There is a pattern of cerebral atrophy manifest as enlargement of sulcal markings and ventricular prominence.  The ventricular system and basal cisterns are otherwise unremarkable. There are areas of hypodensity in the white matter most consistent with small vessel ischemic change versus demyelination or gliosis. There are no hemorrhagic lesions. There are no mass effects or shift of midline structures. Incidentally noted are symmetric basal ganglia calcifications, likely idiopathic and of no clinical significance. The brainstem and posterior fossa structures are unremarkable. Paranasal sinuses in the field of view are unremarkable. Mastoids in the field of view are unremarkable.     1.  Cerebral atrophy. 2.  White matter lucencies most consistent with small vessel ischemic change versus demyelination or gliosis. 3.  Otherwise, Head CT without contrast with no acute findings. No evidence of acute cerebral infarction, hemorrhage or mass lesion.    Dx-chest-for Line Placement Perform Procedure In: Patient's Room    Result Date: 6/6/2019 6/6/2019 4:50 PM HISTORY/REASON FOR EXAM:  PICC. TECHNIQUE/EXAM DESCRIPTION AND NUMBER OF VIEWS: Single view of the chest. COMPARISON: 6/5/2019 FINDINGS: Cardiac mediastinal contour is unchanged. Lungs show hypoinflation. Ill-defined increased opacity again seen in both lower lungs, more extensive on the LEFT. Bilateral pleural fluid collections again seen, larger on the LEFT. No pneumothorax. RIGHT arm PICC line in place with tip at the cavoatrial junction level.     1.  Supportive tubing as described above. 2.  No other significant change from prior exam.     Dx-chest-limited (1  View)    Result Date: 5/30/2019 5/29/2019 8:56 PM HISTORY/REASON FOR EXAM:  Shortness of Breath. Rapid response TECHNIQUE/EXAM DESCRIPTION AND NUMBER OF VIEWS: Single AP view of the chest. COMPARISON: 1 view chest outside films 3/19/2019 FINDINGS: There is hypoinflation. This accentuates the cardiac shadow. There is marked central and lower lung zone pulmonary edema with suspected bibasilar airspace opacities and/or pleural effusions.     1.  Marked pulmonary edema. 2.  Possible underlying bibasilar airspace opacity/alveolar pulmonary edema and/or bilateral pleural effusions.    Dx-chest-portable (1 View)    Result Date: 6/8/2019 6/8/2019 1:05 PM HISTORY/REASON FOR EXAM:  PICC placement. TECHNIQUE/EXAM DESCRIPTION AND NUMBER OF VIEWS: Single portable view of the chest. COMPARISON: 6/6/2019 FINDINGS: LUNGS: Bibasilar opacities are unchanged since recent prior. Stable left effusion. No significant right effusion. PNEUMOTHORAX: None. LINES AND TUBES: Right PICC tip is in the mid/distal SVC. MEDIASTINUM: Stable cardiac silhouette. Coronary calcifications/stents MUSCULOSKELETAL STRUCTURES: Unchanged.     1.  Right PICC tip is in the mid/distal SVC. 2.  Stable chest otherwise.    Dx-chest-portable (1 View)    Result Date: 6/5/2019 6/5/2019 8:03 PM HISTORY/REASON FOR EXAM:  Shortness of Breath; interval changes of edemea. TECHNIQUE/EXAM DESCRIPTION AND NUMBER OF VIEWS: Single portable view of the chest. COMPARISON: 5/29/2019 FINDINGS: Cardiomediastinal silhouette is stable. Moderate left and small right pleural effusions. Bilateral interstitial opacity/edema appears mildly improved.. No pneumothorax. No acute osseous abnormality.     1. Left greater than right pleural effusions with basilar atelectasis. Correlate clinically for infection. 2. Cardiomegaly with mildly improved pulmonary interstitial edema.    Dx-foot-complete 3+ Right    Result Date: 5/25/2019 5/25/2019 3:17 PM HISTORY/REASON FOR EXAM:  Nonhealing wound  on heel with history of diabetes TECHNIQUE/EXAM DESCRIPTION AND NUMBER OF VIEWS: 3 nonweightbearing views of the RIGHT foot. COMPARISON:  No comparison available FINDINGS:  Bone mineralization is normal.  There is no evidence of fracture or dislocation.  Soft tissue swelling is noted including in the calcaneal area. Bone erosions appear to be present along the medial edge of the tarsal bones and inferior edge of the tarsal bones. There is moderate to severe joint space narrowing and periarticular sclerosis and marginal spurring which is prominent in the mid foot area.     1.  No evidence of fracture or dislocation. 2.  Severe degenerative changes again present raising possibility of neuropathic foot. 3.  There appears to be increase in bone erosions along the medial and inferior tarsal bones which could indicate osteomyelitis.     Dx-foot-complete 3+ Left    Result Date: 2019 3:18 PM HISTORY/REASON FOR EXAM:  Left foot pain with history of diabetes TECHNIQUE/EXAM DESCRIPTION AND NUMBER OF VIEWS: 3 nonweightbearing views of the LEFT foot. COMPARISON:  No comparison available FINDINGS:  Bone mineralization is normal.  There is no evidence of fracture or dislocation.  Soft tissue swelling is noted.  There is joint space narrowing and periarticular spurring. Hammertoe deformities are noted. No bone erosions are identified.     1.  No evidence of fracture or dislocation. 2.  Osteoarthritis is noted. No bone erosions are identified.    Us-giovana Single Level Bilat    Result Date: 2019   Vascular Laboratory  Conclusions  1.  Monophasic arterial waveforms in the bilateral common femoral arteries  indicate aortoiliac stenosis or occlusion.  2.  Diminished right tow pressures, and to a lesser degree, left toe  pressure indicate poor healing potential.  KEYSDAVID  Age:    73    Gender:     F  MRN:    [id number]  :    1945      BSA:  Exam Date:     2019 10:38  Room #:     Inpatient   Priority:     Routine  Ht (in):             Wt (lb):  Ordering Physician:        MAYITO GOODSON  Referring Physician:  Sonographer:               Ruth Wen RVT  Study Type:                Complete Bilateral  Technical Quality:         Adequate  Indications:     Ulceration of LE  CPT Codes:       97105  ICD Codes:       707.1  History:         Bilateral heel wounds  Limitations:     Right brachial pressure not obtained due to IV                 RIGHT  Waveform            Systolic BPs (mmHg)                             0             Brachial  Monophasic                               Common Femoral  Monophasic                               Posterior Tibial  Monophasic                               Dorsalis Pedis                                           Peroneal                                           SELWYN                                           TBI                       LEFT  Waveform        Systolic BPs (mmHg)                             123           Brachial  Monophasic                               Common Femoral  Monophasic                               Posterior Tibial  Monophasic                               Dorsalis Pedis                                           Peroneal                                           SELWYN                                           TBI  Findings  Bilateral.  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  Doppler waveforms at the ankle are monophasic with moderate amplitude.  Ankle pressures were not taken due to non-compressibility of tibial  arteries on previous exam (12/2018).  Toe brachial indices were taken of the bilateral great toes.  Right:      0.41            Left:           0.72  Arterial duplex scan was performed in accordance with lower extremity  arterial evaluation protocol - see separate report.  Ananya Nash M.D.   (Electronically Signed)  Final Date:      28 May 2019 18:19    Us-extremity Artery Lower Bilat    Result Date: 2019  Lower Extremity  Arterial Duplex Report  Vascular Laboratory  CONCLUSIONS  acute vs subacute DVT left cfv and profunda v.  Rt pop cyst  Arteries poorly seen due to extensive calcification. Multiple levels  bilateral non-vis could contain stenosis. No obvious post occlusive  waveforms.  50% stenosis is seen in rt mid sfa.  called to DAVID Paiz  Exam Date:     2019 13:40  Room #:     Inpatient  Priority:     Routine  Ht (in):             Wt (lb):  Ordering Physician:        MAYITO GOODSON  Referring Physician:       [id number], [id number]  Sonographer:               Ruth Wen RVT  Study Type:                Complete Bilateral  Technical Quality:         Adequate  Age:    73    Gender:     F  MRN:    [id number]  :    1945      BSA:  Indications:     Ulcer of lower extremity  CPT Codes:       22905  ICD Codes:       707.1  History:         Ulceration of the bilateral heels.  Limitations:     Calcific dropout.                RIGHT  Waveform        Peak Systolic Velocity (cm/s)                  Prox    Prox-Mid  Mid    Mid-Dist  Distal  Triphasic                         164                      CFA  Triphasic       89                                         PFA  Monophasic      109      211      114              105     SFA  Monophasic                        90                       POP  Monophasic      74                                 121     AT  Monophasic      74                                 88      PT  Not                                                        GUTIERREZ  attained                LEFT  Waveform        Peak Systolic Velocity (cm/s)                  Prox    Prox-Mid  Mid    Mid-Dist  Distal  Triphasic                         127                      CFA  Biphasic        70                                         PFA  Monophasic       150               118              85      SFA  Monophasic                        95                       POP  Monophasic      121                                177     AT  Absent                                                     PT  Absent                                                     GUTIERREZ  FINDINGS  Right.  Heavy atherosclerotic plaque seen through out the right lower extremity.  Loss of flow reversal at the proximal femoral artery prior to visualization  of hemodynamic significant stenosis  Stenosis of the proximal femoral artery. Velocities are consistent with 50-  75% stenosis.  Cannot rule out further hemodynamic significant stenosis within the femoral  or popliteal arteries due to calcific dropout.  Due to heavy calcification, the tibial arteries could not be evaluated  entirely. Cannot rule out segmental occlusion or stenosis.  The peroneal artery is not visualized.  The anterior tibial and posterior tibial arteries are monophasic to the  ankle.  Left.  Heavy atherosclerotic plaque seen through out the left lower extremity.  Loss of flow reversal at the proximal femoral artery without visualization  of hemodynamic significant stenosis.  Cannot rule out  hemodynamic significant stenosis within the femoral or  popliteal arteries due to calcific dropout.  Due to heavy calcification, the tibial arteries could not be evaluated  entirely. Cannot rule out segmental occlusion or stenosis.  No flow can be demonstrated in the peroneal and posterior tibial  artery.  The anterior tibial artery is monophasic to the ankle.  Incidental finding:  Slightly echogenic material is visualized within the common femoral vein  that extends within the profunda femoral vein, consistent acute to subacute  venous thrombosis.   Rubens Elam MD  (Electronically Signed)  Final Date:      27 May 2019 16:16    Us-extremity Venous Lower Bilat    Result Date: 5/28/2019   Vascular Laboratory  CONCLUSIONS  1.  There is thrombus in  the LEFT common femoral vein which is  noncompressible. There is nonocclusive thrombus in the LEFT femoral vein as  well.   This is likely acute  DVT with some superimposed chronic DVT .  2.  No evidence of RIGHT lower extremity DVT or SVT.  3.  Exam limited by superficial edema.  Findings discussed with Dr. Charlotte Carlson  at 5:00 am on 19.  DAVID KEYS  Exam Date:     2019 02:35  Room #:     [facility]  Priority:     Stat  Ht (in):             Wt (lb):  Ordering Physician:        MAYITO GOODSON  Referring Physician:       [id number] Wolfe  Sonographer:               Khurram Messina RDMS  Study Type:                Complete Bilateral  Technical Quality:         Fair  Age:    73    Gender:     F  MRN:    [id number]  :    1945      BSA:  Indications:     Pain in unspecified lower leg  CPT Codes:       67417  ICD Codes:       M79.669  History:         Incidental finding in previous SELWYN study.  Bilateral lower                   extremity pain.  Limitations:     Patient couldn't lift legs, unable to image Popliteal veins  PROCEDURES:  Bilateral lower extremity venous duplex imaging.  The following venous structures were evaluated: common femoral vein,  profunda vein, proximal portion of the greater saphenous vein, superficial  femoral vein, and the popliteal vein.  In addition, the posterior tibial and peroneal veins were evaluated.  Serial compression, augmentation maneuvers, and spectral Doppler flow  evaluation were performed.  FINDINGS:  Right lower extremity -.  Complete color filling and compressibility with normal venous flow dynamics  including spontaneous flow, response to augmentation maneuvers, and  respiratory phasicity.  Interstitial fluid consistent with edema is observed below the knee.  Edema reduces the image quality.  Left lower extremity -.  Densely echogenic material is within  the vein that is consistent with  chronic venous thrombosis; non-occlusive.  Common Femoral vein is noncompressible.   Interstitial fluid consistent with edema is observed below the knee.  Edema reduces the image quality.  Mandy HARRISON V[place]idloco  (Electronically Signed)  Final Date:      28 May 2019 05:01    Ec-echocardiogram Complete W/o Cont    Result Date: 2019  Transthoracic Echo Report Echocardiography Laboratory CONCLUSIONS Akinetic inferolateral wall. Moderately reduced left ventricular systolic function. Left ventricular ejection fraction is visually estimated to be 35%. Left ventricle is mildly dilated. Eccentric posteriorly directed mitral regurgitation, probably moderate. Moderately dilated left atrium. Aortic sclerosis without stenosis. Mild tricuspid regurgitation. Normal inferior vena cava size without inspiratory collapse. Right ventricular systolic pressure is estimated to be 40 mmHg. Mildly dilated right heartventricle. Normal right ventricular systolic function. Pleural effusion present. Normal pericardium without effusion. DAVID KEYS Exam Date:         2019                    08:55 Exam Location:     Inpatient Priority:          Routine Ordering Physician:        VERONICA HENDERSON Referring Physician:       [id number] Sonographer:               JUDIE Lozada Age:    73     Gender:    F MRN:    [id number] :    1945 BSA:    1.9    Ht (in):    64     Wt (lb):    187 Exam Type:     Complete Indications:     Heart failure, unspecified ICD Codes:       I50.9 CPT Codes:       61359 BP:   117    /   40     HR:   72 Technical Quality:       Fair MEASUREMENTS  (Male / Female) Normal Values 2D ECHO LV Diastolic Diameter PLAX        5.5 cm                4.2 - 5.9 / 3.9 - 5.3 cm LV Systolic Diameter PLAX         4.7 cm                2.1 - 4.0 cm IVS Diastolic Thickness           0.98 cm               LVPW Diastolic Thickness          0.87 cm               RV Diameter 4C                    3.7 cm                2.5 - 2.9 cm LVOT Diameter                     1.9 cm                RA Diameter                        4.6 cm                Estimated LV Ejection Fraction    35 %                  LV Ejection Fraction MOD BP       35.5 %                >= 55  % LV Ejection Fraction MOD 4C       38.1 %                LV Ejection Fraction MOD 2C       31.4 %                LA Volume Index                   31.4 cm³/m²           16 - 28 cm³/m² IVC Diameter                      1.8 cm                DOPPLER AV Peak Velocity                  1 m/s                 AV Peak Gradient                  4.3 mmHg              AV Mean Gradient                  2.4 mmHg              LVOT Peak Velocity                0.68 m/s              AV Area Cont Eq vti               1.6 cm²               Mitral E Point Velocity           1.1 m/s               Mitral E to A Ratio               2.5                   MV Pressure Half Time             35.4 ms               MV Area PHT                       6.2 cm²               MV Deceleration Time              122 ms                MR Flow Convergence Radius        0.57 cm               MR ERO PISA                       0.16 cm²              MR Regurgitant Volume PISA        23.6 cm³              Pulmonary Vein Systolic Velocity  0.2 m/s               Pulmonary Vein Diastolic Velocit  0.55 m/s              Pulmonary Vein S/D Ratio          0.37                  TR Peak Velocity                  283 cm/s              PV Peak Velocity                  1 m/s                 PV Peak Gradient                  4.1 mmHg              RVOT Peak Velocity                0.73 m/s              * Indicates values subject to auto-interpretation LV EF:  35    % FINDINGS Left Ventricle Left ventricle is mildly dilated. Moderately reduced left ventricular systolic function. Left ventricular ejection fraction is visually estimated to be 35%. Akinetic inferolateral wall.  Diastolic function is difficult to assess with atrial fibrillation and mitral valve disease. . Right Ventricle Mildly dilated right ventricle.  Normal right ventricular systolic function. Right Atrium Enlarged right atrium. Normal inferior vena cava size without inspiratory collapse. Left Atrium Moderately dilated left atrium. Mitral Valve Thickened mitral valve leaflets with mitral annular calcification. Eccentric posteriorly directed mitral regurgitation, probably moderate. Aortic Valve Tricuspid aortic valve. Aortic sclerosis without stenosis. No aortic insufficiency. Tricuspid Valve Structurally normal tricuspid valve without significant stenosis. Mild tricuspid regurgitation. Right ventricular systolic pressure is estimated to be 40 mmHg. Pulmonic Valve The pulmonic valve is not well visualized. No stenosis or regurgitation seen. Pericardium Normal pericardium without effusion. Pleural effusion present. Aorta Normal aortic root for body surface area. Ascending aorta diameter is  2.3 cm. Hai Ch M.D. (Electronically Signed) Final Date:     31 May 2019 12:21    Ir-picc Line Placement W/ Guidance > Age 3    Result Date: 6/6/2019  HISTORY/REASON FOR EXAM:   PICC placement. TECHNIQUE/EXAM DESCRIPTION AND NUMBER OF VIEWS:   PICC line insertion with ultrasound guidance.  The procedure was performed using maximal sterile barrier technique including sterile gown, mask, cap, and donning of sterile gloves following appropriate hand hygiene and/or sterile scrub. Patient skin site was prepped with 2% Chlorhexidine solution. FINDINGS:  PICC line insertion with Ultrasound Guidance was performed by qualified nursing staff without the assistance of a Radiologist. PICC positioning appropriateness confirmed by 3CG technology; chest xray only needed in the instance 3CG unable to confirm placement.              Ultrasound-guided PICC placement performed by qualified nursing staff as above.       Micro:  Results     ** No results found for the last 168 hours. **        METHICILLIN RESISTANT STAPHYLOCOCCUS AUREUS wound              Antibiotic Sensitivity  Microscan Unit Status      Ampicillin/sulbactam Resistant <=8/4 mcg/mL Final      Method: BERNARDINO      Clindamycin Resistant <=0.5 mcg/mL Final      Method: BERNARDINO      Daptomycin Sensitive <=0.5 mcg/mL Final      Method: BERNARDINO      Erythromycin Resistant >4 mcg/mL Final      Method: BERNARDINO      Moxifloxacin Intermediate 4 mcg/mL Final      Method: BERNARDINO      Oxacillin Resistant >2 mcg/mL Final      Method: BERNARDINO      Penicillin Resistant >8 mcg/mL Final      Method: BERNARDINO      Tetracycline Resistant >8 mcg/mL Final      Method: BERNARDINO      Trimeth/Sulfa Sensitive <=0.5/9.5 mcg/mL Final      Method: BERNARDINO      Vancomycin Sensitive 1 mcg/mL Final          Assessment:      Active Hospital Problems     Diagnosis   • *Diabetic foot ulcer with osteomyelitis ([facility]) [E11.621, E11.69, L97.509, M86.9]   • Type 2 diabetes mellitus with skin complication, with long-term current use of insulin ([facility]) [E11.628, Z79.4]   • Peripheral vascular disease ([facility]) [I73.9]         Plan:  Bilateral DM heel ulcers   Right diabetic foot OM  Afebrile  Leukocytosis, mild at last check  Bcx on 5/25 negative  Was d/c'd home in January 2019 on IV Dapto for R foot OM; however, did not finish IV abx Never followed up in ID clinic.   Previous R foot cx +MRSE (bone), MSSE (tissue) & E faecalis (tissue) December 2018  Wound cx from R heel on 5/26 +MRSA  Continue IV daptomycin 8 mg/kg daily  CPK weekly while on daptomycin  Stop date 7/6/2019  Continue with wound care and offloading     PVD  US of BLE showing acute on chronic LLE DVT and 50% stenosis of Right mid SFA  Underwent RLE angiogram with Dr. Pro 5/31 with normal flow, no intervention performed     Diabetes mellitus, not controlled  Hemoglobin A1c was 8.7% on 5/25/19  Keep BS under 150 to help control current infection  -[phone]    Prognosis for limb salvage guarded

## 2019-06-18 NOTE — [facility]
Infectious Disease Progress Note    Author: Blanquita Israel M.D. Date & Time of service: 6/18/2019  1:57 PM    Chief Complaint:  FUDiabetic foot ulcer      Interval History:  73 y.o. WF with poorly controlled DM, COPD on 3 L NC at home.  Admitted on 5/25/19 to [facility] for bilateral heel ulcerations with concerns for OM.   5/27/2019 T-max is 98.1.  Vascular Dopplers are being performed.  Denies any fevers or chills.  WBC 7.4.  Creatinine is 0.98  5/28/19- AF, WBC 8.0, no issue with abx, denies any pain currently to BLE but had 9/10 sharp BLE pain last night that improved with pain meds.  5/29- Tm 99.5, WBC 9.8, tolerating antibiotics without issue, denies any pain to BLE, abdomen distended and noting that she needs to have a bowel movement.  5/30- AF, WBC 11.6, no issue with antibiotics, denies pain, denies shortness of breath or chest palpitations, resting comfortably in bed.  5/31-AF, WBC 9.5, complaining of 9/10 LLE intermittent sharp shooting pain that improves with rest and pain meds, n.p.o. for angiogram today, no issue with antibiotics.  6/2 afebrile, white count 9.3.  Underwent right lower extremity angiogram on 5/31 with normal perfusion, no intervention performed.  Resting comfortably this a.m., no new issues  6/14 AF WBC 12.8transferred to [facility] on 6/8. Consulted for abx management  6/15 AF sleepy today-no new complaints  6/16 AF  No new wound pictures-denies SE abx  6/17 AF feels OK-eating lunch Had dressing change today-tolerated well. Eating lunch. No new pictures in chart as yet of wound  6/18 AF feels a little better today-up to WC. Denies SE abx-pain controlled RLE    Labs Reviewed and Wound Reviewed.    Review of Systems:  Review of Systems   Constitutional: Negative for chills and fever.   Respiratory: Negative for cough.    Cardiovascular: Negative for chest pain.   Gastrointestinal: Negative for abdominal pain, diarrhea, nausea and vomiting.   Musculoskeletal: Negative for myalgias.    Neurological: Positive for sensory change.   All other systems reviewed and are negative.      Hemodynamics:  T 98.4 P82 /56 RR 16 O2 sat 99%  Physical Exam:  Physical Exam   Constitutional: She is oriented to person, place, and time. No distress.   HENT:   Mouth/Throat: No oropharyngeal exudate.   Eyes: Pupils are equal, round, and reactive to light. EOM are normal. No scleral icterus.   Neck: No JVD present.   Cardiovascular: Normal rate.    Pulmonary/Chest: Effort normal. No stridor. No respiratory distress.   Abdominal: Soft. She exhibits no distension. There is no tenderness.   Musculoskeletal: She exhibits edema.   Neurological: She is alert and oriented to person, place, and time.   Skin: Skin is warm. She is not diaphoretic. There is pallor.   Right foot wound 4.5 X 4.5 X 0.3 cm ulceration with approx 50% slough  Left heel nearly resolved   Vitals reviewed.    Labs:  No results for input(s): WBC, RBC, HEMOGLOBIN, HEMATOCRIT, MCV, MCH, RDW, PLATELETCT, MPV, NEUTSPOLYS, LYMPHOCYTES, MONOCYTES, EOSINOPHILS, BASOPHILS, RBCMORPHOLO in the last 72 hours.  Recent Labs      06/17/19   0400   CPKTOTAL  35     No results for input(s): ALBUMIN, TBILIRUBIN, ALKPHOSPHAT, TOTPROTEIN, ALTSGPT, ASTSGOT, CREATININE in the last 72 hours.    Imaging:  Cta Abdomen Pelvis W & W/o Post Process    Result Date: 5/28/2019 5/28/2019 3:46 PM HISTORY/REASON FOR EXAM:  AIOD Chronic bilateral heel ulcers.  Diabetes.  Peripheral vascular disease. TECHNIQUE/EXAM DESCRIPTION:  CT angiogram of the abdomen and pelvis without and with contrast, with reconstructions. Initial precontrast helical sections were obtained from the diaphragmatic domes to the lesser trochanters. Following this, 100 mL of Omnipaque 350 nonionic contrast was administered at 5.0 mL/sec and helical scanning was obtained from the diaphragmatic domes through the lesser trochanters. Thin section overlapping reconstruction interval was utilized and multiplanar volume  reformatted were generated in the sagittal and coronal planes. 3D angiographic images were reviewed on PACS. Maximum intensity projection (MIP) images were generated and reviewed. Low dose optimization technique was utilized for this CT exam including automated exposure control and adjustment of the mA and/or kV according to patient size. COMPARISON:   None. FINDINGS: Noncontrast images: There is no intramural hematoma. Contrast images: Abdominal aorta shows moderate atherosclerotic calcifications without aneurysm or dissection.  Mild narrowing distally.  Moderate atherosclerotic calcification of the iliofemoral arterial system bilaterally without focal occlusion. Celiac trunk, superior and inferior mesenteric arteries enhance normally. Renal arteries show mild atherosclerotic narrowing proximally. Small bilateral pleural effusions with associated atelectasis. The liver is unremarkable. The spleen is unremarkable. Adrenal glands are unremarkable. The kidneys are unremarkable. Pancreas is atrophic. The gallbladder is unremarkable. Increased colonic stool. Colonic diverticula noted. Bladder is unremarkable. Uterus is absent.  Ovaries are not visualized. Appendix has a normal appearance. Degenerative change of lumbar spine. 3D angiographic/MIP images of the vasculature confirm the vascular findings as described above.     1.  Moderate atherosclerotic calcification abdominal aorta without aneurysm or dissection. 2.  Moderate atherosclerotic calcification of the LEFT carotid system without segmental occlusion. 3.  Small bilateral pleural effusions with associated atelectasis. 4.  Increased colonic stool suggesting constipation. 5.  Colonic diverticula.    Ct-head W/o    Result Date: 5/30/2019 5/30/2019 4:07 AM HISTORY/REASON FOR EXAM:  Headache, acute, severe, worst HA of life. TECHNIQUE/EXAM DESCRIPTION AND NUMBER OF VIEWS: CT of the head without contrast. The study was performed on a helical multidetector CT scanner.  Contiguous axial sections were obtained from the skull base through the vertex. Up to date radiation dose reduction adjustments have been utilized to meet ALARA standards for radiation dose reduction. COMPARISON:  Head CT 3/19/2019 FINDINGS: The calvariae and skull base are unremarkable. There are no extraaxial fluid collections. There is a pattern of cerebral atrophy manifest as enlargement of sulcal markings and ventricular prominence.  The ventricular system and basal cisterns are otherwise unremarkable. There are areas of hypodensity in the white matter most consistent with small vessel ischemic change versus demyelination or gliosis. There are no hemorrhagic lesions. There are no mass effects or shift of midline structures. Incidentally noted are symmetric basal ganglia calcifications, likely idiopathic and of no clinical significance. The brainstem and posterior fossa structures are unremarkable. Paranasal sinuses in the field of view are unremarkable. Mastoids in the field of view are unremarkable.     1.  Cerebral atrophy. 2.  White matter lucencies most consistent with small vessel ischemic change versus demyelination or gliosis. 3.  Otherwise, Head CT without contrast with no acute findings. No evidence of acute cerebral infarction, hemorrhage or mass lesion.    Dx-chest-for Line Placement Perform Procedure In: Patient's Room    Result Date: 6/6/2019 6/6/2019 4:50 PM HISTORY/REASON FOR EXAM:  PICC. TECHNIQUE/EXAM DESCRIPTION AND NUMBER OF VIEWS: Single view of the chest. COMPARISON: 6/5/2019 FINDINGS: Cardiac mediastinal contour is unchanged. Lungs show hypoinflation. Ill-defined increased opacity again seen in both lower lungs, more extensive on the LEFT. Bilateral pleural fluid collections again seen, larger on the LEFT. No pneumothorax. RIGHT arm PICC line in place with tip at the cavoatrial junction level.     1.  Supportive tubing as described above. 2.  No other significant change from prior exam.      Dx-chest-limited (1 View)    Result Date: 5/30/2019 5/29/2019 8:56 PM HISTORY/REASON FOR EXAM:  Shortness of Breath. Rapid response TECHNIQUE/EXAM DESCRIPTION AND NUMBER OF VIEWS: Single AP view of the chest. COMPARISON: 1 view chest outside films 3/19/2019 FINDINGS: There is hypoinflation. This accentuates the cardiac shadow. There is marked central and lower lung zone pulmonary edema with suspected bibasilar airspace opacities and/or pleural effusions.     1.  Marked pulmonary edema. 2.  Possible underlying bibasilar airspace opacity/alveolar pulmonary edema and/or bilateral pleural effusions.    Dx-chest-portable (1 View)    Result Date: 6/8/2019 6/8/2019 1:05 PM HISTORY/REASON FOR EXAM:  PICC placement. TECHNIQUE/EXAM DESCRIPTION AND NUMBER OF VIEWS: Single portable view of the chest. COMPARISON: 6/6/2019 FINDINGS: LUNGS: Bibasilar opacities are unchanged since recent prior. Stable left effusion. No significant right effusion. PNEUMOTHORAX: None. LINES AND TUBES: Right PICC tip is in the mid/distal SVC. MEDIASTINUM: Stable cardiac silhouette. Coronary calcifications/stents MUSCULOSKELETAL STRUCTURES: Unchanged.     1.  Right PICC tip is in the mid/distal SVC. 2.  Stable chest otherwise.    Dx-chest-portable (1 View)    Result Date: 6/5/2019 6/5/2019 8:03 PM HISTORY/REASON FOR EXAM:  Shortness of Breath; interval changes of edemea. TECHNIQUE/EXAM DESCRIPTION AND NUMBER OF VIEWS: Single portable view of the chest. COMPARISON: 5/29/2019 FINDINGS: Cardiomediastinal silhouette is stable. Moderate left and small right pleural effusions. Bilateral interstitial opacity/edema appears mildly improved.. No pneumothorax. No acute osseous abnormality.     1. Left greater than right pleural effusions with basilar atelectasis. Correlate clinically for infection. 2. Cardiomegaly with mildly improved pulmonary interstitial edema.    Dx-foot-complete 3+ Right    Result Date: 5/25/2019 5/25/2019 3:17 PM HISTORY/REASON FOR  EXAM:  Nonhealing wound on heel with history of diabetes TECHNIQUE/EXAM DESCRIPTION AND NUMBER OF VIEWS: 3 nonweightbearing views of the RIGHT foot. COMPARISON:  No comparison available FINDINGS:  Bone mineralization is normal.  There is no evidence of fracture or dislocation.  Soft tissue swelling is noted including in the calcaneal area. Bone erosions appear to be present along the medial edge of the tarsal bones and inferior edge of the tarsal bones. There is moderate to severe joint space narrowing and periarticular sclerosis and marginal spurring which is prominent in the mid foot area.     1.  No evidence of fracture or dislocation. 2.  Severe degenerative changes again present raising possibility of neuropathic foot. 3.  There appears to be increase in bone erosions along the medial and inferior tarsal bones which could indicate osteomyelitis.     Dx-foot-complete 3+ Left    Result Date: 2019 3:18 PM HISTORY/REASON FOR EXAM:  Left foot pain with history of diabetes TECHNIQUE/EXAM DESCRIPTION AND NUMBER OF VIEWS: 3 nonweightbearing views of the LEFT foot. COMPARISON:  No comparison available FINDINGS:  Bone mineralization is normal.  There is no evidence of fracture or dislocation.  Soft tissue swelling is noted.  There is joint space narrowing and periarticular spurring. Hammertoe deformities are noted. No bone erosions are identified.     1.  No evidence of fracture or dislocation. 2.  Osteoarthritis is noted. No bone erosions are identified.    Us-giovana Single Level Bilat    Result Date: 2019   Vascular Laboratory  Conclusions  1.  Monophasic arterial waveforms in the bilateral common femoral arteries  indicate aortoiliac stenosis or occlusion.  2.  Diminished right tow pressures, and to a lesser degree, left toe  pressure indicate poor healing potential.  DAVID KEYS  Age:    73    Gender:     F  MRN:    [id number]  :    1945      BSA:  Exam Date:     2019 10:38  Room #:      Inpatient  Priority:     Routine  Ht (in):             Wt (lb):  Ordering Physician:        MAYITO GOODSON  Referring Physician:  Sonographer:               Ruth Wen RVT  Study Type:                Complete Bilateral  Technical Quality:         Adequate  Indications:     Ulceration of LE  CPT Codes:       70787  ICD Codes:       707.1  History:         Bilateral heel wounds  Limitations:     Right brachial pressure not obtained due to IV                 RIGHT  Waveform            Systolic BPs (mmHg)                             0             Brachial  Monophasic                               Common Femoral  Monophasic                               Posterior Tibial  Monophasic                               Dorsalis Pedis                                           Peroneal                                           SELWYN                                           TBI                       LEFT  Waveform        Systolic BPs (mmHg)                             123           Brachial  Monophasic                               Common Femoral  Monophasic                               Posterior Tibial  Monophasic                               Dorsalis Pedis                                           Peroneal                                           SELWYN                                           TBI  Findings  Bilateral.  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  Doppler waveforms at the ankle are monophasic with moderate amplitude.  Ankle pressures were not taken due to non-compressibility of tibial  arteries on previous exam (12/2018).  Toe brachial indices were taken of the bilateral great toes.  Right:      0.41            Left:           0.72  Arterial duplex scan was performed in accordance with lower extremity  arterial evaluation protocol - see separate report.  Ananya BERRY  Charity CHOPRA  (Electronically Signed)  Final Date:      28 May 2019 18:19    Us-extremity Artery Lower Bilat    Result Date: 2019  Lower Extremity  Arterial Duplex Report  Vascular Laboratory  CONCLUSIONS  acute vs subacute DVT left cfv and profunda v.  Rt pop cyst  Arteries poorly seen due to extensive calcification. Multiple levels  bilateral non-vis could contain stenosis. No obvious post occlusive  waveforms.  50% stenosis is seen in rt mid sfa.  called to DAVID Paiz  Exam Date:     2019 13:40  Room #:     Inpatient  Priority:     Routine  Ht (in):             Wt (lb):  Ordering Physician:        MAYITO GOODSON  Referring Physician:       [id number] Wolfe  Sonographer:               Ruth Wen RVT  Study Type:                Complete Bilateral  Technical Quality:         Adequate  Age:    73    Gender:     F  MRN:    [id number]  :    1945      BSA:  Indications:     Ulcer of lower extremity  CPT Codes:       31521  ICD Codes:       707.1  History:         Ulceration of the bilateral heels.  Limitations:     Calcific dropout.                RIGHT  Waveform        Peak Systolic Velocity (cm/s)                  Prox    Prox-Mid  Mid    Mid-Dist  Distal  Triphasic                         164                      CFA  Triphasic       89                                         PFA  Monophasic      109      211      114              105     SFA  Monophasic                        90                       POP  Monophasic      74                                 121     AT  Monophasic      74                                 88      PT  Not                                                        GUTIERREZ  attained                LEFT  Waveform        Peak Systolic Velocity (cm/s)                  Prox    Prox-Mid  Mid    Mid-Dist  Distal  Triphasic                         127                      CFA  Biphasic        70                                         PFA   Monophasic      150               118              85      SFA  Monophasic                        95                       POP  Monophasic      121                                177     AT  Absent                                                     PT  Absent                                                     GUTIERREZ  FINDINGS  Right.  Heavy atherosclerotic plaque seen through out the right lower extremity.  Loss of flow reversal at the proximal femoral artery prior to visualization  of hemodynamic significant stenosis  Stenosis of the proximal femoral artery. Velocities are consistent with 50-  75% stenosis.  Cannot rule out further hemodynamic significant stenosis within the femoral  or popliteal arteries due to calcific dropout.  Due to heavy calcification, the tibial arteries could not be evaluated  entirely. Cannot rule out segmental occlusion or stenosis.  The peroneal artery is not visualized.  The anterior tibial and posterior tibial arteries are monophasic to the  ankle.  Left.  Heavy atherosclerotic plaque seen through out the left lower extremity.  Loss of flow reversal at the proximal femoral artery without visualization  of hemodynamic significant stenosis.  Cannot rule out  hemodynamic significant stenosis within the femoral or  popliteal arteries due to calcific dropout.  Due to heavy calcification, the tibial arteries could not be evaluated  entirely. Cannot rule out segmental occlusion or stenosis.  No flow can be demonstrated in the peroneal and posterior tibial  artery.  The anterior tibial artery is monophasic to the ankle.  Incidental finding:  Slightly echogenic material is visualized within the common femoral vein  that extends within the profunda femoral vein, consistent acute to subacute  venous thrombosis.   Rubens Elam MD  (Electronically Signed)  Final Date:      27 May 2019 16:16    Us-extremity Venous Lower Bilat    Result Date: 5/28/2019   Vascular Laboratory  CONCLUSIONS  1.  There  is thrombus in the LEFT common femoral vein which is  noncompressible. There is nonocclusive thrombus in the LEFT femoral vein as  well.   This is likely acute  DVT with some superimposed chronic DVT .  2.  No evidence of RIGHT lower extremity DVT or SVT.  3.  Exam limited by superficial edema.  Findings discussed with Dr. Charlotte Carlson  at 5:00 am on 19.  DAVID KEYS  Exam Date:     2019 02:35  Room #:     [facility]  Priority:     Stat  Ht (in):             Wt (lb):  Ordering Physician:        MAYITO GOODSON  Referring Physician:       [id number] Wolfe  Sonographer:               Khurram Messina RDMS  Study Type:                Complete Bilateral  Technical Quality:         Fair  Age:    73    Gender:     F  MRN:    [id number]  :    1945      BSA:  Indications:     Pain in unspecified lower leg  CPT Codes:       02739  ICD Codes:       M79.669  History:         Incidental finding in previous SELWYN study.  Bilateral lower                   extremity pain.  Limitations:     Patient couldn't lift legs, unable to image Popliteal veins  PROCEDURES:  Bilateral lower extremity venous duplex imaging.  The following venous structures were evaluated: common femoral vein,  profunda vein, proximal portion of the greater saphenous vein, superficial  femoral vein, and the popliteal vein.  In addition, the posterior tibial and peroneal veins were evaluated.  Serial compression, augmentation maneuvers, and spectral Doppler flow  evaluation were performed.  FINDINGS:  Right lower extremity -.  Complete color filling and compressibility with normal venous flow dynamics  including spontaneous flow, response to augmentation maneuvers, and  respiratory phasicity.  Interstitial fluid consistent with edema is observed below the knee.  Edema reduces the image quality.  Left lower extremity -.  Densely echogenic material is within  the vein that is consistent with  chronic venous thrombosis; non-occlusive.  Common Femoral vein is  noncompressible.  Interstitial fluid consistent with edema is observed below the knee.  Edema reduces the image quality.  Mandy HARRISON Vahidloco  (Electronically Signed)  Final Date:      28 May 2019 05:01    Ec-echocardiogram Complete W/o Cont    Result Date: 2019  Transthoracic Echo Report Echocardiography Laboratory CONCLUSIONS Akinetic inferolateral wall. Moderately reduced left ventricular systolic function. Left ventricular ejection fraction is visually estimated to be 35%. Left ventricle is mildly dilated. Eccentric posteriorly directed mitral regurgitation, probably moderate. Moderately dilated left atrium. Aortic sclerosis without stenosis. Mild tricuspid regurgitation. Normal inferior vena cava size without inspiratory collapse. Right ventricular systolic pressure is estimated to be 40 mmHg. Mildly dilated right heartventricle. Normal right ventricular systolic function. Pleural effusion present. Normal pericardium without effusion. DAVID KEYS Exam Date:         2019                    08:55 Exam Location:     Inpatient Priority:          Routine Ordering Physician:        VERONICA HENDERSON Referring Physician:       [id number] Sonographer:               JUDIE Lozada Age:    73     Gender:    F MRN:    [id number] :    1945 BSA:    1.9    Ht (in):    64     Wt (lb):    187 Exam Type:     Complete Indications:     Heart failure, unspecified ICD Codes:       I50.9 CPT Codes:       07908 BP:   117    /   40     HR:   72 Technical Quality:       Fair MEASUREMENTS  (Male / Female) Normal Values 2D ECHO LV Diastolic Diameter PLAX        5.5 cm                4.2 - 5.9 / 3.9 - 5.3 cm LV Systolic Diameter PLAX         4.7 cm                2.1 - 4.0 cm IVS Diastolic Thickness           0.98 cm               LVPW Diastolic Thickness          0.87 cm               RV Diameter 4C                    3.7 cm                2.5 - 2.9 cm LVOT Diameter                     1.9 cm                 RA Diameter                       4.6 cm                Estimated LV Ejection Fraction    35 %                  LV Ejection Fraction MOD BP       35.5 %                >= 55  % LV Ejection Fraction MOD 4C       38.1 %                LV Ejection Fraction MOD 2C       31.4 %                LA Volume Index                   31.4 cm³/m²           16 - 28 cm³/m² IVC Diameter                      1.8 cm                DOPPLER AV Peak Velocity                  1 m/s                 AV Peak Gradient                  4.3 mmHg              AV Mean Gradient                  2.4 mmHg              LVOT Peak Velocity                0.68 m/s              AV Area Cont Eq vti               1.6 cm²               Mitral E Point Velocity           1.1 m/s               Mitral E to A Ratio               2.5                   MV Pressure Half Time             35.4 ms               MV Area PHT                       6.2 cm²               MV Deceleration Time              122 ms                MR Flow Convergence Radius        0.57 cm               MR ERO PISA                       0.16 cm²              MR Regurgitant Volume PISA        23.6 cm³              Pulmonary Vein Systolic Velocity  0.2 m/s               Pulmonary Vein Diastolic Velocit  0.55 m/s              Pulmonary Vein S/D Ratio          0.37                  TR Peak Velocity                  283 cm/s              PV Peak Velocity                  1 m/s                 PV Peak Gradient                  4.1 mmHg              RVOT Peak Velocity                0.73 m/s              * Indicates values subject to auto-interpretation LV EF:  35    % FINDINGS Left Ventricle Left ventricle is mildly dilated. Moderately reduced left ventricular systolic function. Left ventricular ejection fraction is visually estimated to be 35%. Akinetic inferolateral wall.  Diastolic function is difficult to assess with atrial fibrillation and mitral valve disease. . Right Ventricle Mildly dilated  right ventricle. Normal right ventricular systolic function. Right Atrium Enlarged right atrium. Normal inferior vena cava size without inspiratory collapse. Left Atrium Moderately dilated left atrium. Mitral Valve Thickened mitral valve leaflets with mitral annular calcification. Eccentric posteriorly directed mitral regurgitation, probably moderate. Aortic Valve Tricuspid aortic valve. Aortic sclerosis without stenosis. No aortic insufficiency. Tricuspid Valve Structurally normal tricuspid valve without significant stenosis. Mild tricuspid regurgitation. Right ventricular systolic pressure is estimated to be 40 mmHg. Pulmonic Valve The pulmonic valve is not well visualized. No stenosis or regurgitation seen. Pericardium Normal pericardium without effusion. Pleural effusion present. Aorta Normal aortic root for body surface area. Ascending aorta diameter is  2.3 cm. Hai Ch M.D. (Electronically Signed) Final Date:     31 May 2019 12:21    Ir-picc Line Placement W/ Guidance > Age 3    Result Date: 6/6/2019  HISTORY/REASON FOR EXAM:   PICC placement. TECHNIQUE/EXAM DESCRIPTION AND NUMBER OF VIEWS:   PICC line insertion with ultrasound guidance.  The procedure was performed using maximal sterile barrier technique including sterile gown, mask, cap, and donning of sterile gloves following appropriate hand hygiene and/or sterile scrub. Patient skin site was prepped with 2% Chlorhexidine solution. FINDINGS:  PICC line insertion with Ultrasound Guidance was performed by qualified nursing staff without the assistance of a Radiologist. PICC positioning appropriateness confirmed by 3CG technology; chest xray only needed in the instance 3CG unable to confirm placement.              Ultrasound-guided PICC placement performed by qualified nursing staff as above.       Micro:  Results     ** No results found for the last 168 hours. **        METHICILLIN RESISTANT STAPHYLOCOCCUS AUREUS wound              Antibiotic  Sensitivity Microscan Unit Status      Ampicillin/sulbactam Resistant <=8/4 mcg/mL Final      Method: BERNARDINO      Clindamycin Resistant <=0.5 mcg/mL Final      Method: BERNARDINO      Daptomycin Sensitive <=0.5 mcg/mL Final      Method: BERNARDINO      Erythromycin Resistant >4 mcg/mL Final      Method: BERNARDINO      Moxifloxacin Intermediate 4 mcg/mL Final      Method: BERNARDINO      Oxacillin Resistant >2 mcg/mL Final      Method: BERNARDINO      Penicillin Resistant >8 mcg/mL Final      Method: BERNARDINO      Tetracycline Resistant >8 mcg/mL Final      Method: BERNARDINO      Trimeth/Sulfa Sensitive <=0.5/9.5 mcg/mL Final      Method: BERNARDINO      Vancomycin Sensitive 1 mcg/mL Final          Assessment:      Active Hospital Problems     Diagnosis   • *Diabetic foot ulcer with osteomyelitis ([facility]) [E11.621, E11.69, L97.509, M86.9]   • Type 2 diabetes mellitus with skin complication, with long-term current use of insulin ([facility]) [E11.628, Z79.4]   • Peripheral vascular disease ([facility]) [I73.9]         Plan:  Bilateral DM heel ulcers   Right diabetic foot OM  Afebrile  Leukocytosis, mild at last check  Bcx on 5/25 negative  Was d/c'd home in January 2019 on IV Dapto for R foot OM; however, did not finish IV abx Never followed up in ID clinic.   Previous R foot cx +MRSE (bone), MSSE (tissue) & E faecalis (tissue) December 2018  Wound cx from R heel on 5/26 +MRSA  Continue IV daptomycin 8 mg/kg daily  CPK weekly while on daptomycin-35 at last check  Stop date 7/6/2019  Continue with wound care and offloading-signs of healing  Check CBC     PVD  US of BLE showing acute on chronic LLE DVT and 50% stenosis of Right mid SFA  Underwent RLE angiogram with Dr. Pro 5/31 with normal flow, no intervention performed     Diabetes mellitus, not controlled  Hemoglobin A1c was 8.7% on 5/25/19  Keep BS under 150 to help control current infection  [phone]    Prognosis for limb salvage guarded

## 2019-06-19 NOTE — [facility]
Infectious Disease Progress Note    Author: Blanquita Israel M.D. Date & Time of service: 6/19/2019  2:25 PM    Chief Complaint:  FUDiabetic foot ulcer      Interval History:  73 y.o. WF with poorly controlled DM, COPD on 3 L NC at home.  Admitted on 5/25/19 to [facility] for bilateral heel ulcerations with concerns for OM.   5/27/2019 T-max is 98.1.  Vascular Dopplers are being performed.  Denies any fevers or chills.  WBC 7.4.  Creatinine is 0.98  5/28/19- AF, WBC 8.0, no issue with abx, denies any pain currently to BLE but had 9/10 sharp BLE pain last night that improved with pain meds.  5/29- Tm 99.5, WBC 9.8, tolerating antibiotics without issue, denies any pain to BLE, abdomen distended and noting that she needs to have a bowel movement.  5/30- AF, WBC 11.6, no issue with antibiotics, denies pain, denies shortness of breath or chest palpitations, resting comfortably in bed.  5/31-AF, WBC 9.5, complaining of 9/10 LLE intermittent sharp shooting pain that improves with rest and pain meds, n.p.o. for angiogram today, no issue with antibiotics.  6/2 afebrile, white count 9.3.  Underwent right lower extremity angiogram on 5/31 with normal perfusion, no intervention performed.  Resting comfortably this a.m., no new issues  6/14 AF WBC 12.8transferred to [facility] on 6/8. Consulted for abx management  6/15 AF sleepy today-no new complaints  6/16 AF  No new wound pictures-denies SE abx  6/17 AF feels OK-eating lunch Had dressing change today-tolerated well. Eating lunch. No new pictures in chart as yet of wound  6/18 AF feels a little better today-up to WC. Denies SE abx-pain controlled RLE  6/19 AF sleepy tody-per  no new complaints. Not eating much    Labs Reviewed and Wound Reviewed.    Review of Systems:  Review of Systems   Unable to perform ROS: Other   Constitutional: Negative for fever.       Hemodynamics:  P84 RR 16  Physical Exam:  Physical Exam   Constitutional: No distress.   HENT:   Mouth/Throat: No  oropharyngeal exudate.   Neck: No JVD present.   Cardiovascular: Normal rate.    Pulmonary/Chest: Effort normal. No stridor. No respiratory distress.   Abdominal: Soft. She exhibits no distension.   Musculoskeletal: She exhibits edema.   Lymphadenopathy:     She has no cervical adenopathy.   Neurological:   somnolent   Skin: Skin is warm. She is not diaphoretic. There is pallor.   Right foot wound 4.5 X 4.5 X 0.3 cm ulceration with approx 50% slough  Left heel nearly resolved   Vitals reviewed.    Labs:  No results for input(s): WBC, RBC, HEMOGLOBIN, HEMATOCRIT, MCV, MCH, RDW, PLATELETCT, MPV, NEUTSPOLYS, LYMPHOCYTES, MONOCYTES, EOSINOPHILS, BASOPHILS, RBCMORPHOLO in the last 72 hours.  Recent Labs      06/17/19   0400   CPKTOTAL  35     No results for input(s): ALBUMIN, TBILIRUBIN, ALKPHOSPHAT, TOTPROTEIN, ALTSGPT, ASTSGOT, CREATININE in the last 72 hours.    Imaging:  Cta Abdomen Pelvis W & W/o Post Process    Result Date: 5/28/2019 5/28/2019 3:46 PM HISTORY/REASON FOR EXAM:  AIOD Chronic bilateral heel ulcers.  Diabetes.  Peripheral vascular disease. TECHNIQUE/EXAM DESCRIPTION:  CT angiogram of the abdomen and pelvis without and with contrast, with reconstructions. Initial precontrast helical sections were obtained from the diaphragmatic domes to the lesser trochanters. Following this, 100 mL of Omnipaque 350 nonionic contrast was administered at 5.0 mL/sec and helical scanning was obtained from the diaphragmatic domes through the lesser trochanters. Thin section overlapping reconstruction interval was utilized and multiplanar volume reformatted were generated in the sagittal and coronal planes. 3D angiographic images were reviewed on PACS. Maximum intensity projection (MIP) images were generated and reviewed. Low dose optimization technique was utilized for this CT exam including automated exposure control and adjustment of the mA and/or kV according to patient size. COMPARISON:   None. FINDINGS: Noncontrast  images: There is no intramural hematoma. Contrast images: Abdominal aorta shows moderate atherosclerotic calcifications without aneurysm or dissection.  Mild narrowing distally.  Moderate atherosclerotic calcification of the iliofemoral arterial system bilaterally without focal occlusion. Celiac trunk, superior and inferior mesenteric arteries enhance normally. Renal arteries show mild atherosclerotic narrowing proximally. Small bilateral pleural effusions with associated atelectasis. The liver is unremarkable. The spleen is unremarkable. Adrenal glands are unremarkable. The kidneys are unremarkable. Pancreas is atrophic. The gallbladder is unremarkable. Increased colonic stool. Colonic diverticula noted. Bladder is unremarkable. Uterus is absent.  Ovaries are not visualized. Appendix has a normal appearance. Degenerative change of lumbar spine. 3D angiographic/MIP images of the vasculature confirm the vascular findings as described above.     1.  Moderate atherosclerotic calcification abdominal aorta without aneurysm or dissection. 2.  Moderate atherosclerotic calcification of the LEFT carotid system without segmental occlusion. 3.  Small bilateral pleural effusions with associated atelectasis. 4.  Increased colonic stool suggesting constipation. 5.  Colonic diverticula.    Ct-head W/o    Result Date: 5/30/2019 5/30/2019 4:07 AM HISTORY/REASON FOR EXAM:  Headache, acute, severe, worst HA of life. TECHNIQUE/EXAM DESCRIPTION AND NUMBER OF VIEWS: CT of the head without contrast. The study was performed on a helical multidetector CT scanner. Contiguous axial sections were obtained from the skull base through the vertex. Up to date radiation dose reduction adjustments have been utilized to meet ALARA standards for radiation dose reduction. COMPARISON:  Head CT 3/19/2019 FINDINGS: The calvariae and skull base are unremarkable. There are no extraaxial fluid collections. There is a pattern of cerebral atrophy manifest as  enlargement of sulcal markings and ventricular prominence.  The ventricular system and basal cisterns are otherwise unremarkable. There are areas of hypodensity in the white matter most consistent with small vessel ischemic change versus demyelination or gliosis. There are no hemorrhagic lesions. There are no mass effects or shift of midline structures. Incidentally noted are symmetric basal ganglia calcifications, likely idiopathic and of no clinical significance. The brainstem and posterior fossa structures are unremarkable. Paranasal sinuses in the field of view are unremarkable. Mastoids in the field of view are unremarkable.     1.  Cerebral atrophy. 2.  White matter lucencies most consistent with small vessel ischemic change versus demyelination or gliosis. 3.  Otherwise, Head CT without contrast with no acute findings. No evidence of acute cerebral infarction, hemorrhage or mass lesion.    Dx-chest-for Line Placement Perform Procedure In: Patient's Room    Result Date: 6/6/2019 6/6/2019 4:50 PM HISTORY/REASON FOR EXAM:  PICC. TECHNIQUE/EXAM DESCRIPTION AND NUMBER OF VIEWS: Single view of the chest. COMPARISON: 6/5/2019 FINDINGS: Cardiac mediastinal contour is unchanged. Lungs show hypoinflation. Ill-defined increased opacity again seen in both lower lungs, more extensive on the LEFT. Bilateral pleural fluid collections again seen, larger on the LEFT. No pneumothorax. RIGHT arm PICC line in place with tip at the cavoatrial junction level.     1.  Supportive tubing as described above. 2.  No other significant change from prior exam.     Dx-chest-limited (1 View)    Result Date: 5/30/2019 5/29/2019 8:56 PM HISTORY/REASON FOR EXAM:  Shortness of Breath. Rapid response TECHNIQUE/EXAM DESCRIPTION AND NUMBER OF VIEWS: Single AP view of the chest. COMPARISON: 1 view chest outside films 3/19/2019 FINDINGS: There is hypoinflation. This accentuates the cardiac shadow. There is marked central and lower lung zone  pulmonary edema with suspected bibasilar airspace opacities and/or pleural effusions.     1.  Marked pulmonary edema. 2.  Possible underlying bibasilar airspace opacity/alveolar pulmonary edema and/or bilateral pleural effusions.    Dx-chest-portable (1 View)    Result Date: 6/8/2019 6/8/2019 1:05 PM HISTORY/REASON FOR EXAM:  PICC placement. TECHNIQUE/EXAM DESCRIPTION AND NUMBER OF VIEWS: Single portable view of the chest. COMPARISON: 6/6/2019 FINDINGS: LUNGS: Bibasilar opacities are unchanged since recent prior. Stable left effusion. No significant right effusion. PNEUMOTHORAX: None. LINES AND TUBES: Right PICC tip is in the mid/distal SVC. MEDIASTINUM: Stable cardiac silhouette. Coronary calcifications/stents MUSCULOSKELETAL STRUCTURES: Unchanged.     1.  Right PICC tip is in the mid/distal SVC. 2.  Stable chest otherwise.    Dx-chest-portable (1 View)    Result Date: 6/5/2019 6/5/2019 8:03 PM HISTORY/REASON FOR EXAM:  Shortness of Breath; interval changes of edemea. TECHNIQUE/EXAM DESCRIPTION AND NUMBER OF VIEWS: Single portable view of the chest. COMPARISON: 5/29/2019 FINDINGS: Cardiomediastinal silhouette is stable. Moderate left and small right pleural effusions. Bilateral interstitial opacity/edema appears mildly improved.. No pneumothorax. No acute osseous abnormality.     1. Left greater than right pleural effusions with basilar atelectasis. Correlate clinically for infection. 2. Cardiomegaly with mildly improved pulmonary interstitial edema.    Dx-foot-complete 3+ Right    Result Date: 5/25/2019 5/25/2019 3:17 PM HISTORY/REASON FOR EXAM:  Nonhealing wound on heel with history of diabetes TECHNIQUE/EXAM DESCRIPTION AND NUMBER OF VIEWS: 3 nonweightbearing views of the RIGHT foot. COMPARISON:  No comparison available FINDINGS:  Bone mineralization is normal.  There is no evidence of fracture or dislocation.  Soft tissue swelling is noted including in the calcaneal area. Bone erosions appear to be present  along the medial edge of the tarsal bones and inferior edge of the tarsal bones. There is moderate to severe joint space narrowing and periarticular sclerosis and marginal spurring which is prominent in the mid foot area.     1.  No evidence of fracture or dislocation. 2.  Severe degenerative changes again present raising possibility of neuropathic foot. 3.  There appears to be increase in bone erosions along the medial and inferior tarsal bones which could indicate osteomyelitis.     Dx-foot-complete 3+ Left    Result Date: 2019 3:18 PM HISTORY/REASON FOR EXAM:  Left foot pain with history of diabetes TECHNIQUE/EXAM DESCRIPTION AND NUMBER OF VIEWS: 3 nonweightbearing views of the LEFT foot. COMPARISON:  No comparison available FINDINGS:  Bone mineralization is normal.  There is no evidence of fracture or dislocation.  Soft tissue swelling is noted.  There is joint space narrowing and periarticular spurring. Hammertoe deformities are noted. No bone erosions are identified.     1.  No evidence of fracture or dislocation. 2.  Osteoarthritis is noted. No bone erosions are identified.    Us-giovana Single Level Bilat    Result Date: 2019   Vascular Laboratory  Conclusions  1.  Monophasic arterial waveforms in the bilateral common femoral arteries  indicate aortoiliac stenosis or occlusion.  2.  Diminished right tow pressures, and to a lesser degree, left toe  pressure indicate poor healing potential.  DAVID KEYS  Age:    73    Gender:     F  MRN:    [id number]  :    1945      BSA:  Exam Date:     2019 10:38  Room #:     Inpatient  Priority:     Routine  Ht (in):             Wt (lb):  Ordering Physician:        MAYITO GOODSON  Referring Physician:  Sonographer:               Ruth Wen RVT  Study Type:                Complete Bilateral  Technical Quality:         Adequate  Indications:     Ulceration of LE  CPT Codes:       86814  ICD Codes:        707.1  History:         Bilateral heel wounds  Limitations:     Right brachial pressure not obtained due to IV                 RIGHT  Waveform            Systolic BPs (mmHg)                             0             Brachial  Monophasic                               Common Femoral  Monophasic                               Posterior Tibial  Monophasic                               Dorsalis Pedis                                           Peroneal                                           SELWYN                                           TBI                       LEFT  Waveform        Systolic BPs (mmHg)                             123           Brachial  Monophasic                               Common Femoral  Monophasic                               Posterior Tibial  Monophasic                               Dorsalis Pedis                                           Peroneal                                           SELWYN                                           TBI  Findings  Bilateral.  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  Doppler waveforms at the ankle are monophasic with moderate amplitude.  Ankle pressures were not taken due to non-compressibility of tibial  arteries on previous exam (12/2018).  Toe brachial indices were taken of the bilateral great toes.  Right:      0.41            Left:           0.72  Arterial duplex scan was performed in accordance with lower extremity  arterial evaluation protocol - see separate report.  Ananya Nash M.D.  (Electronically Signed)  Final Date:      28 May 2019 18:19    Us-extremity Artery Lower Bilat    Result Date: 5/27/2019  Lower Extremity  Arterial Duplex Report  Vascular Laboratory  CONCLUSIONS  acute vs subacute DVT left cfv and profunda v.  Rt pop cyst  Arteries poorly seen due to extensive calcification. Multiple levels  bilateral non-vis could contain  stenosis. No obvious post occlusive  waveforms.  50% stenosis is seen in rt mid sfa.  called to DAVID Paiz  Exam Date:     2019 13:40  Room #:     Inpatient  Priority:     Routine  Ht (in):             Wt (lb):  Ordering Physician:        MAYITO GOODSON  Referring Physician:       [id number], [id number]  Sonographer:               Ruth Wen RVT  Study Type:                Complete Bilateral  Technical Quality:         Adequate  Age:    73    Gender:     F  MRN:    [id number]  :    1945      BSA:  Indications:     Ulcer of lower extremity  CPT Codes:       27326  ICD Codes:       707.1  History:         Ulceration of the bilateral heels.  Limitations:     Calcific dropout.                RIGHT  Waveform        Peak Systolic Velocity (cm/s)                  Prox    Prox-Mid  Mid    Mid-Dist  Distal  Triphasic                         164                      CFA  Triphasic       89                                         PFA  Monophasic      109      211      114              105     SFA  Monophasic                        90                       POP  Monophasic      74                                 121     AT  Monophasic      74                                 88      PT  Not                                                        GUTIERREZ  attained                LEFT  Waveform        Peak Systolic Velocity (cm/s)                  Prox    Prox-Mid  Mid    Mid-Dist  Distal  Triphasic                         127                      CFA  Biphasic        70                                         PFA  Monophasic      150               118              85      SFA  Monophasic                        95                       POP  Monophasic      121                                177     AT  Absent                                                     PT  Absent                                                     GUTIERREZ  FINDINGS  Right.  Heavy atherosclerotic plaque seen  through out the right lower extremity.  Loss of flow reversal at the proximal femoral artery prior to visualization  of hemodynamic significant stenosis  Stenosis of the proximal femoral artery. Velocities are consistent with 50-  75% stenosis.  Cannot rule out further hemodynamic significant stenosis within the femoral  or popliteal arteries due to calcific dropout.  Due to heavy calcification, the tibial arteries could not be evaluated  entirely. Cannot rule out segmental occlusion or stenosis.  The peroneal artery is not visualized.  The anterior tibial and posterior tibial arteries are monophasic to the  ankle.  Left.  Heavy atherosclerotic plaque seen through out the left lower extremity.  Loss of flow reversal at the proximal femoral artery without visualization  of hemodynamic significant stenosis.  Cannot rule out  hemodynamic significant stenosis within the femoral or  popliteal arteries due to calcific dropout.  Due to heavy calcification, the tibial arteries could not be evaluated  entirely. Cannot rule out segmental occlusion or stenosis.  No flow can be demonstrated in the peroneal and posterior tibial  artery.  The anterior tibial artery is monophasic to the ankle.  Incidental finding:  Slightly echogenic material is visualized within the common femoral vein  that extends within the profunda femoral vein, consistent acute to subacute  venous thrombosis.   Rubens Elam MD  (Electronically Signed)  Final Date:      27 May 2019 16:16    Us-extremity Venous Lower Bilat    Result Date: 5/28/2019   Vascular Laboratory  CONCLUSIONS  1.  There is thrombus in the LEFT common femoral vein which is  noncompressible. There is nonocclusive thrombus in the LEFT femoral vein as  well.   This is likely acute  DVT with some superimposed chronic DVT .  2.  No evidence of RIGHT lower extremity DVT or SVT.  3.  Exam limited by superficial edema.  Findings discussed with Dr. Charlotte Carlson  at 5:00 am on 5/28/19.  LUDMILA  DAVID  Exam Date:     2019 02:35  Room #:     [facility]  Priority:     Stat  Ht (in):             Wt (lb):  Ordering Physician:        MAYITO GOODSON  Referring Physician:       KELLEE Emmanuel  Sonographer:               Khurram Messina RDMS  Study Type:                Complete Bilateral  Technical Quality:         Fair  Age:    73    Gender:     F  MRN:    [id number]  :    1945      BSA:  Indications:     Pain in unspecified lower leg  CPT Codes:       61906  ICD Codes:       M79.669  History:         Incidental finding in previous SELWYN study.  Bilateral lower                   extremity pain.  Limitations:     Patient couldn't lift legs, unable to image Popliteal veins  PROCEDURES:  Bilateral lower extremity venous duplex imaging.  The following venous structures were evaluated: common femoral vein,  profunda vein, proximal portion of the greater saphenous vein, superficial  femoral vein, and the popliteal vein.  In addition, the posterior tibial and peroneal veins were evaluated.  Serial compression, augmentation maneuvers, and spectral Doppler flow  evaluation were performed.  FINDINGS:  Right lower extremity -.  Complete color filling and compressibility with normal venous flow dynamics  including spontaneous flow, response to augmentation maneuvers, and  respiratory phasicity.  Interstitial fluid consistent with edema is observed below the knee.  Edema reduces the image quality.  Left lower extremity -.  Densely echogenic material is within  the vein that is consistent with  chronic venous thrombosis; non-occlusive.  Common Femoral vein is noncompressible.  Interstitial fluid consistent with edema is observed below the knee.  Edema reduces the image quality.  Mandy Pfeiffer  (Electronically Signed)  Final Date:      28 May 2019 05:01    Ec-echocardiogram Complete W/o Cont    Result Date: 2019  Transthoracic Echo Report Echocardiography Laboratory CONCLUSIONS Akinetic inferolateral wall. Moderately reduced  left ventricular systolic function. Left ventricular ejection fraction is visually estimated to be 35%. Left ventricle is mildly dilated. Eccentric posteriorly directed mitral regurgitation, probably moderate. Moderately dilated left atrium. Aortic sclerosis without stenosis. Mild tricuspid regurgitation. Normal inferior vena cava size without inspiratory collapse. Right ventricular systolic pressure is estimated to be 40 mmHg. Mildly dilated right heartventricle. Normal right ventricular systolic function. Pleural effusion present. Normal pericardium without effusion. DAVID KEYS Exam Date:         2019                    08:55 Exam Location:     Inpatient Priority:          Routine Ordering Physician:        VERONICA HENDERSON Referring Physician:       MICHELLE Sonographer:               JUDIE Lozada Age:    73     Gender:    F MRN:    [id number] :    1945 BSA:    1.9    Ht (in):    64     Wt (lb):    187 Exam Type:     Complete Indications:     Heart failure, unspecified ICD Codes:       I50.9 CPT Codes:       71988 BP:   117    /   40     HR:   72 Technical Quality:       Fair MEASUREMENTS  (Male / Female) Normal Values 2D ECHO LV Diastolic Diameter PLAX        5.5 cm                4.2 - 5.9 / 3.9 - 5.3 cm LV Systolic Diameter PLAX         4.7 cm                2.1 - 4.0 cm IVS Diastolic Thickness           0.98 cm               LVPW Diastolic Thickness          0.87 cm               RV Diameter 4C                    3.7 cm                2.5 - 2.9 cm LVOT Diameter                     1.9 cm                RA Diameter                       4.6 cm                Estimated LV Ejection Fraction    35 %                  LV Ejection Fraction MOD BP       35.5 %                >= 55  % LV Ejection Fraction MOD 4C       38.1 %                LV Ejection Fraction MOD 2C       31.4 %                LA Volume Index                   31.4 cm³/m²           16 - 28 cm³/m² IVC Diameter                       1.8 cm                DOPPLER AV Peak Velocity                  1 m/s                 AV Peak Gradient                  4.3 mmHg              AV Mean Gradient                  2.4 mmHg              LVOT Peak Velocity                0.68 m/s              AV Area Cont Eq vti               1.6 cm²               Mitral E Point Velocity           1.1 m/s               Mitral E to A Ratio               2.5                   MV Pressure Half Time             35.4 ms               MV Area PHT                       6.2 cm²               MV Deceleration Time              122 ms                MR Flow Convergence Radius        0.57 cm               MR ERO PISA                       0.16 cm²              MR Regurgitant Volume PISA        23.6 cm³              Pulmonary Vein Systolic Velocity  0.2 m/s               Pulmonary Vein Diastolic Velocit  0.55 m/s              Pulmonary Vein S/D Ratio          0.37                  TR Peak Velocity                  283 cm/s              PV Peak Velocity                  1 m/s                 PV Peak Gradient                  4.1 mmHg              RVOT Peak Velocity                0.73 m/s              * Indicates values subject to auto-interpretation LV EF:  35    % FINDINGS Left Ventricle Left ventricle is mildly dilated. Moderately reduced left ventricular systolic function. Left ventricular ejection fraction is visually estimated to be 35%. Akinetic inferolateral wall.  Diastolic function is difficult to assess with atrial fibrillation and mitral valve disease. . Right Ventricle Mildly dilated right ventricle. Normal right ventricular systolic function. Right Atrium Enlarged right atrium. Normal inferior vena cava size without inspiratory collapse. Left Atrium Moderately dilated left atrium. Mitral Valve Thickened mitral valve leaflets with mitral annular calcification. Eccentric posteriorly directed mitral regurgitation, probably moderate. Aortic Valve Tricuspid  aortic valve. Aortic sclerosis without stenosis. No aortic insufficiency. Tricuspid Valve Structurally normal tricuspid valve without significant stenosis. Mild tricuspid regurgitation. Right ventricular systolic pressure is estimated to be 40 mmHg. Pulmonic Valve The pulmonic valve is not well visualized. No stenosis or regurgitation seen. Pericardium Normal pericardium without effusion. Pleural effusion present. Aorta Normal aortic root for body surface area. Ascending aorta diameter is  2.3 cm. Hai Ch M.D. (Electronically Signed) Final Date:     31 May 2019 12:21    Ir-picc Line Placement W/ Guidance > Age 5    Result Date: 6/6/2019  HISTORY/REASON FOR EXAM:   PICC placement. TECHNIQUE/EXAM DESCRIPTION AND NUMBER OF VIEWS:   PICC line insertion with ultrasound guidance.  The procedure was performed using maximal sterile barrier technique including sterile gown, mask, cap, and donning of sterile gloves following appropriate hand hygiene and/or sterile scrub. Patient skin site was prepped with 2% Chlorhexidine solution. FINDINGS:  PICC line insertion with Ultrasound Guidance was performed by qualified nursing staff without the assistance of a Radiologist. PICC positioning appropriateness confirmed by 3CG technology; chest xray only needed in the instance 3CG unable to confirm placement.              Ultrasound-guided PICC placement performed by qualified nursing staff as above.       Micro:  Results     ** No results found for the last 168 hours. **        METHICILLIN RESISTANT STAPHYLOCOCCUS AUREUS wound              Antibiotic Sensitivity Microscan Unit Status      Ampicillin/sulbactam Resistant <=8/4 mcg/mL Final      Method: BERNARDINO      Clindamycin Resistant <=0.5 mcg/mL Final      Method: BERNARDINO      Daptomycin Sensitive <=0.5 mcg/mL Final      Method: BERNARDINO      Erythromycin Resistant >4 mcg/mL Final      Method: BERNARDINO      Moxifloxacin Intermediate 4 mcg/mL Final      Method: BERNARDINO      Oxacillin Resistant  >2 mcg/mL Final      Method: BERNARDINO      Penicillin Resistant >8 mcg/mL Final      Method: BERNARDINO      Tetracycline Resistant >8 mcg/mL Final      Method: BERNARDINO      Trimeth/Sulfa Sensitive <=0.5/9.5 mcg/mL Final      Method: BERNARDINO      Vancomycin Sensitive 1 mcg/mL Final          Assessment:      Active Hospital Problems     Diagnosis   • *Diabetic foot ulcer with osteomyelitis ([facility]) [E11.621, E11.69, L97.509, M86.9]   • Type 2 diabetes mellitus with skin complication, with long-term current use of insulin ([facility]) [E11.628, Z79.4]   • Peripheral vascular disease ([facility]) [I73.9]         Plan:  Bilateral DM heel ulcers   Right diabetic foot OM  Afebrile  Leukocytosis, mild at last check  Bcx on 5/25 negative  Was d/c'd home in January 2019 on IV Dapto for R foot OM; however, did not finish IV abx Never followed up in ID clinic.   Previous R foot cx +MRSE (bone), MSSE (tissue) & E faecalis (tissue) December 2018  Wound cx from R heel on 5/26 +MRSA  Continue IV daptomycin 8 mg/kg daily  CPK weekly while on daptomycin-35 at last check  Stop date 7/6/2019  Continue with wound care and offloading-signs of healing  Check CBC     PVD  US of BLE showing acute on chronic LLE DVT and 50% stenosis of Right mid SFA  Underwent RLE angiogram with Dr. Pro 5/31 with normal flow, no intervention performed     Diabetes mellitus, not controlled  Hemoglobin A1c was 8.7% on 5/25/19  Keep BS under 150 to help control current infection  -[phone]    Prognosis for limb salvage guarded

## 2019-06-20 NOTE — [facility]
Infectious Disease Progress Note    Author: Blanquita Israel M.D. Date & Time of service: 6/20/2019  2:18 PM    Chief Complaint:  FUDiabetic foot ulcer      Interval History:  73 y.o. WF with poorly controlled DM, COPD on 3 L NC at home.  Admitted on 5/25/19 to [facility] for bilateral heel ulcerations with concerns for OM.   5/27/2019 T-max is 98.1.  Vascular Dopplers are being performed.  Denies any fevers or chills.  WBC 7.4.  Creatinine is 0.98  5/28/19- AF, WBC 8.0, no issue with abx, denies any pain currently to BLE but had 9/10 sharp BLE pain last night that improved with pain meds.  5/29- Tm 99.5, WBC 9.8, tolerating antibiotics without issue, denies any pain to BLE, abdomen distended and noting that she needs to have a bowel movement.  5/30- AF, WBC 11.6, no issue with antibiotics, denies pain, denies shortness of breath or chest palpitations, resting comfortably in bed.  5/31-AF, WBC 9.5, complaining of 9/10 LLE intermittent sharp shooting pain that improves with rest and pain meds, n.p.o. for angiogram today, no issue with antibiotics.  6/2 afebrile, white count 9.3.  Underwent right lower extremity angiogram on 5/31 with normal perfusion, no intervention performed.  Resting comfortably this a.m., no new issues  6/14 AF WBC 12.8transferred to [facility] on 6/8. Consulted for abx management  6/15 AF sleepy today-no new complaints  6/16 AF  No new wound pictures-denies SE abx  6/17 AF feels OK-eating lunch Had dressing change today-tolerated well. Eating lunch. No new pictures in chart as yet of wound  6/18 AF feels a little better today-up to WC. Denies SE abx-pain controlled RLE  6/19 AF sleepy tody-per  no new complaints. Not eating much  6/20 AF WBC 14.6 no new complaints    Labs Reviewed and Wound Reviewed.    Review of Systems:  Review of Systems   Constitutional: Negative for chills and fever.   Gastrointestinal: Negative for abdominal pain, diarrhea, nausea and vomiting.   Musculoskeletal: Positive for  joint pain.       Hemodynamics:  T98.6 /47P82 RR 16  Physical Exam:  Physical Exam   Constitutional: No distress.   HENT:   Mouth/Throat: No oropharyngeal exudate.   Eyes: Right eye exhibits no discharge. Left eye exhibits no discharge.   Neck: No JVD present.   Cardiovascular: Normal rate.    Pulmonary/Chest: Effort normal. No stridor. No respiratory distress. She has no wheezes. She has no rales.   Abdominal: Soft. She exhibits no distension. There is no tenderness.   Musculoskeletal: She exhibits edema.   Lymphadenopathy:     She has no cervical adenopathy.   Neurological:   somnolent   Skin: Skin is warm. She is not diaphoretic. There is pallor.   Right foot wound 4.5 X 4.5 X 0.3 cm ulceration with approx 50% slough-no pics since 6/17  Left heel nearly resolved   Vitals reviewed.    Labs:  Recent Labs      06/20/19   0355   WBC  14.6*   RBC  3.91*   HEMOGLOBIN  11.1*   HEMATOCRIT  35.6*   MCV  91.0   MCH  28.4   RDW  42.9   PLATELETCT  393   MPV  9.0     Recent Labs      06/20/19   0355   SODIUM  133*   POTASSIUM  5.3   CHLORIDE  97   CO2  27   GLUCOSE  232*   BUN  36*     Recent Labs      06/20/19   0355   ALBUMIN  2.3*   TBILIRUBIN  0.3   ALKPHOSPHAT  72   TOTPROTEIN  6.8   ALTSGPT  15   ASTSGOT  24   CREATININE  1.14       Imaging:  Cta Abdomen Pelvis W & W/o Post Process    Result Date: 5/28/2019 5/28/2019 3:46 PM HISTORY/REASON FOR EXAM:  AIOD Chronic bilateral heel ulcers.  Diabetes.  Peripheral vascular disease. TECHNIQUE/EXAM DESCRIPTION:  CT angiogram of the abdomen and pelvis without and with contrast, with reconstructions. Initial precontrast helical sections were obtained from the diaphragmatic domes to the lesser trochanters. Following this, 100 mL of Omnipaque 350 nonionic contrast was administered at 5.0 mL/sec and helical scanning was obtained from the diaphragmatic domes through the lesser trochanters. Thin section overlapping reconstruction interval was utilized and multiplanar volume  reformatted were generated in the sagittal and coronal planes. 3D angiographic images were reviewed on PACS. Maximum intensity projection (MIP) images were generated and reviewed. Low dose optimization technique was utilized for this CT exam including automated exposure control and adjustment of the mA and/or kV according to patient size. COMPARISON:   None. FINDINGS: Noncontrast images: There is no intramural hematoma. Contrast images: Abdominal aorta shows moderate atherosclerotic calcifications without aneurysm or dissection.  Mild narrowing distally.  Moderate atherosclerotic calcification of the iliofemoral arterial system bilaterally without focal occlusion. Celiac trunk, superior and inferior mesenteric arteries enhance normally. Renal arteries show mild atherosclerotic narrowing proximally. Small bilateral pleural effusions with associated atelectasis. The liver is unremarkable. The spleen is unremarkable. Adrenal glands are unremarkable. The kidneys are unremarkable. Pancreas is atrophic. The gallbladder is unremarkable. Increased colonic stool. Colonic diverticula noted. Bladder is unremarkable. Uterus is absent.  Ovaries are not visualized. Appendix has a normal appearance. Degenerative change of lumbar spine. 3D angiographic/MIP images of the vasculature confirm the vascular findings as described above.     1.  Moderate atherosclerotic calcification abdominal aorta without aneurysm or dissection. 2.  Moderate atherosclerotic calcification of the LEFT carotid system without segmental occlusion. 3.  Small bilateral pleural effusions with associated atelectasis. 4.  Increased colonic stool suggesting constipation. 5.  Colonic diverticula.    Ct-head W/o    Result Date: 5/30/2019 5/30/2019 4:07 AM HISTORY/REASON FOR EXAM:  Headache, acute, severe, worst HA of life. TECHNIQUE/EXAM DESCRIPTION AND NUMBER OF VIEWS: CT of the head without contrast. The study was performed on a helical multidetector CT scanner.  Contiguous axial sections were obtained from the skull base through the vertex. Up to date radiation dose reduction adjustments have been utilized to meet ALARA standards for radiation dose reduction. COMPARISON:  Head CT 3/19/2019 FINDINGS: The calvariae and skull base are unremarkable. There are no extraaxial fluid collections. There is a pattern of cerebral atrophy manifest as enlargement of sulcal markings and ventricular prominence.  The ventricular system and basal cisterns are otherwise unremarkable. There are areas of hypodensity in the white matter most consistent with small vessel ischemic change versus demyelination or gliosis. There are no hemorrhagic lesions. There are no mass effects or shift of midline structures. Incidentally noted are symmetric basal ganglia calcifications, likely idiopathic and of no clinical significance. The brainstem and posterior fossa structures are unremarkable. Paranasal sinuses in the field of view are unremarkable. Mastoids in the field of view are unremarkable.     1.  Cerebral atrophy. 2.  White matter lucencies most consistent with small vessel ischemic change versus demyelination or gliosis. 3.  Otherwise, Head CT without contrast with no acute findings. No evidence of acute cerebral infarction, hemorrhage or mass lesion.    Dx-chest-for Line Placement Perform Procedure In: Patient's Room    Result Date: 6/6/2019 6/6/2019 4:50 PM HISTORY/REASON FOR EXAM:  PICC. TECHNIQUE/EXAM DESCRIPTION AND NUMBER OF VIEWS: Single view of the chest. COMPARISON: 6/5/2019 FINDINGS: Cardiac mediastinal contour is unchanged. Lungs show hypoinflation. Ill-defined increased opacity again seen in both lower lungs, more extensive on the LEFT. Bilateral pleural fluid collections again seen, larger on the LEFT. No pneumothorax. RIGHT arm PICC line in place with tip at the cavoatrial junction level.     1.  Supportive tubing as described above. 2.  No other significant change from prior exam.      Dx-chest-limited (1 View)    Result Date: 5/30/2019 5/29/2019 8:56 PM HISTORY/REASON FOR EXAM:  Shortness of Breath. Rapid response TECHNIQUE/EXAM DESCRIPTION AND NUMBER OF VIEWS: Single AP view of the chest. COMPARISON: 1 view chest outside films 3/19/2019 FINDINGS: There is hypoinflation. This accentuates the cardiac shadow. There is marked central and lower lung zone pulmonary edema with suspected bibasilar airspace opacities and/or pleural effusions.     1.  Marked pulmonary edema. 2.  Possible underlying bibasilar airspace opacity/alveolar pulmonary edema and/or bilateral pleural effusions.    Dx-chest-portable (1 View)    Result Date: 6/8/2019 6/8/2019 1:05 PM HISTORY/REASON FOR EXAM:  PICC placement. TECHNIQUE/EXAM DESCRIPTION AND NUMBER OF VIEWS: Single portable view of the chest. COMPARISON: 6/6/2019 FINDINGS: LUNGS: Bibasilar opacities are unchanged since recent prior. Stable left effusion. No significant right effusion. PNEUMOTHORAX: None. LINES AND TUBES: Right PICC tip is in the mid/distal SVC. MEDIASTINUM: Stable cardiac silhouette. Coronary calcifications/stents MUSCULOSKELETAL STRUCTURES: Unchanged.     1.  Right PICC tip is in the mid/distal SVC. 2.  Stable chest otherwise.    Dx-chest-portable (1 View)    Result Date: 6/5/2019 6/5/2019 8:03 PM HISTORY/REASON FOR EXAM:  Shortness of Breath; interval changes of edemea. TECHNIQUE/EXAM DESCRIPTION AND NUMBER OF VIEWS: Single portable view of the chest. COMPARISON: 5/29/2019 FINDINGS: Cardiomediastinal silhouette is stable. Moderate left and small right pleural effusions. Bilateral interstitial opacity/edema appears mildly improved.. No pneumothorax. No acute osseous abnormality.     1. Left greater than right pleural effusions with basilar atelectasis. Correlate clinically for infection. 2. Cardiomegaly with mildly improved pulmonary interstitial edema.    Dx-foot-complete 3+ Right    Result Date: 5/25/2019 5/25/2019 3:17 PM HISTORY/REASON FOR  EXAM:  Nonhealing wound on heel with history of diabetes TECHNIQUE/EXAM DESCRIPTION AND NUMBER OF VIEWS: 3 nonweightbearing views of the RIGHT foot. COMPARISON:  No comparison available FINDINGS:  Bone mineralization is normal.  There is no evidence of fracture or dislocation.  Soft tissue swelling is noted including in the calcaneal area. Bone erosions appear to be present along the medial edge of the tarsal bones and inferior edge of the tarsal bones. There is moderate to severe joint space narrowing and periarticular sclerosis and marginal spurring which is prominent in the mid foot area.     1.  No evidence of fracture or dislocation. 2.  Severe degenerative changes again present raising possibility of neuropathic foot. 3.  There appears to be increase in bone erosions along the medial and inferior tarsal bones which could indicate osteomyelitis.     Dx-foot-complete 3+ Left    Result Date: 2019 3:18 PM HISTORY/REASON FOR EXAM:  Left foot pain with history of diabetes TECHNIQUE/EXAM DESCRIPTION AND NUMBER OF VIEWS: 3 nonweightbearing views of the LEFT foot. COMPARISON:  No comparison available FINDINGS:  Bone mineralization is normal.  There is no evidence of fracture or dislocation.  Soft tissue swelling is noted.  There is joint space narrowing and periarticular spurring. Hammertoe deformities are noted. No bone erosions are identified.     1.  No evidence of fracture or dislocation. 2.  Osteoarthritis is noted. No bone erosions are identified.    Us-giovana Single Level Bilat    Result Date: 2019   Vascular Laboratory  Conclusions  1.  Monophasic arterial waveforms in the bilateral common femoral arteries  indicate aortoiliac stenosis or occlusion.  2.  Diminished right tow pressures, and to a lesser degree, left toe  pressure indicate poor healing potential.  DAVID KEYS  Age:    73    Gender:     F  MRN:    [id number]  :    1945      BSA:  Exam Date:     2019 10:38  Room #:      Inpatient  Priority:     Routine  Ht (in):             Wt (lb):  Ordering Physician:        MAYITO GOODSON  Referring Physician:  Sonographer:               Ruth Wen RVT  Study Type:                Complete Bilateral  Technical Quality:         Adequate  Indications:     Ulceration of LE  CPT Codes:       50150  ICD Codes:       707.1  History:         Bilateral heel wounds  Limitations:     Right brachial pressure not obtained due to IV                 RIGHT  Waveform            Systolic BPs (mmHg)                             0             Brachial  Monophasic                               Common Femoral  Monophasic                               Posterior Tibial  Monophasic                               Dorsalis Pedis                                           Peroneal                                           SELWYN                                           TBI                       LEFT  Waveform        Systolic BPs (mmHg)                             123           Brachial  Monophasic                               Common Femoral  Monophasic                               Posterior Tibial  Monophasic                               Dorsalis Pedis                                           Peroneal                                           SELWYN                                           TBI  Findings  Bilateral.  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  Doppler waveforms at the ankle are monophasic with moderate amplitude.  Ankle pressures were not taken due to non-compressibility of tibial  arteries on previous exam (12/2018).  Toe brachial indices were taken of the bilateral great toes.  Right:      0.41            Left:           0.72  Arterial duplex scan was performed in accordance with lower extremity  arterial evaluation protocol - see separate report.  Ananya BERRY  Charity CHOPRA  (Electronically Signed)  Final Date:      28 May 2019 18:19    Us-extremity Artery Lower Bilat    Result Date: 2019  Lower Extremity  Arterial Duplex Report  Vascular Laboratory  CONCLUSIONS  acute vs subacute DVT left cfv and profunda v.  Rt pop cyst  Arteries poorly seen due to extensive calcification. Multiple levels  bilateral non-vis could contain stenosis. No obvious post occlusive  waveforms.  50% stenosis is seen in rt mid sfa.  called to DAVID Paiz  Exam Date:     2019 13:40  Room #:     Inpatient  Priority:     Routine  Ht (in):             Wt (lb):  Ordering Physician:        MAYITO GOODSON  Referring Physician:       [id number] Wofle  Sonographer:               Ruth Wen RVT  Study Type:                Complete Bilateral  Technical Quality:         Adequate  Age:    73    Gender:     F  MRN:    [id number]  :    1945      BSA:  Indications:     Ulcer of lower extremity  CPT Codes:       88007  ICD Codes:       707.1  History:         Ulceration of the bilateral heels.  Limitations:     Calcific dropout.                RIGHT  Waveform        Peak Systolic Velocity (cm/s)                  Prox    Prox-Mid  Mid    Mid-Dist  Distal  Triphasic                         164                      CFA  Triphasic       89                                         PFA  Monophasic      109      211      114              105     SFA  Monophasic                        90                       POP  Monophasic      74                                 121     AT  Monophasic      74                                 88      PT  Not                                                        GUTIERREZ  attained                LEFT  Waveform        Peak Systolic Velocity (cm/s)                  Prox    Prox-Mid  Mid    Mid-Dist  Distal  Triphasic                         127                      CFA  Biphasic        70                                         PFA   Monophasic      150               118              85      SFA  Monophasic                        95                       POP  Monophasic      121                                177     AT  Absent                                                     PT  Absent                                                     GUTIERREZ  FINDINGS  Right.  Heavy atherosclerotic plaque seen through out the right lower extremity.  Loss of flow reversal at the proximal femoral artery prior to visualization  of hemodynamic significant stenosis  Stenosis of the proximal femoral artery. Velocities are consistent with 50-  75% stenosis.  Cannot rule out further hemodynamic significant stenosis within the femoral  or popliteal arteries due to calcific dropout.  Due to heavy calcification, the tibial arteries could not be evaluated  entirely. Cannot rule out segmental occlusion or stenosis.  The peroneal artery is not visualized.  The anterior tibial and posterior tibial arteries are monophasic to the  ankle.  Left.  Heavy atherosclerotic plaque seen through out the left lower extremity.  Loss of flow reversal at the proximal femoral artery without visualization  of hemodynamic significant stenosis.  Cannot rule out  hemodynamic significant stenosis within the femoral or  popliteal arteries due to calcific dropout.  Due to heavy calcification, the tibial arteries could not be evaluated  entirely. Cannot rule out segmental occlusion or stenosis.  No flow can be demonstrated in the peroneal and posterior tibial  artery.  The anterior tibial artery is monophasic to the ankle.  Incidental finding:  Slightly echogenic material is visualized within the common femoral vein  that extends within the profunda femoral vein, consistent acute to subacute  venous thrombosis.   Rubens Elam MD  (Electronically Signed)  Final Date:      27 May 2019 16:16    Us-extremity Venous Lower Bilat    Result Date: 5/28/2019   Vascular Laboratory  CONCLUSIONS  1.  There  is thrombus in the LEFT common femoral vein which is  noncompressible. There is nonocclusive thrombus in the LEFT femoral vein as  well.   This is likely acute  DVT with some superimposed chronic DVT .  2.  No evidence of RIGHT lower extremity DVT or SVT.  3.  Exam limited by superficial edema.  Findings discussed with Dr. Charlotte Carlson  at 5:00 am on 19.  DAVID KEYS  Exam Date:     2019 02:35  Room #:     [facility]  Priority:     Stat  Ht (in):             Wt (lb):  Ordering Physician:        MAYITO GOODSON  Referring Physician:       [id number] Wolfe  Sonographer:               Khurram Messina RDMS  Study Type:                Complete Bilateral  Technical Quality:         Fair  Age:    73    Gender:     F  MRN:    [id number]  :    1945      BSA:  Indications:     Pain in unspecified lower leg  CPT Codes:       65707  ICD Codes:       M79.669  History:         Incidental finding in previous SELWYN study.  Bilateral lower                   extremity pain.  Limitations:     Patient couldn't lift legs, unable to image Popliteal veins  PROCEDURES:  Bilateral lower extremity venous duplex imaging.  The following venous structures were evaluated: common femoral vein,  profunda vein, proximal portion of the greater saphenous vein, superficial  femoral vein, and the popliteal vein.  In addition, the posterior tibial and peroneal veins were evaluated.  Serial compression, augmentation maneuvers, and spectral Doppler flow  evaluation were performed.  FINDINGS:  Right lower extremity -.  Complete color filling and compressibility with normal venous flow dynamics  including spontaneous flow, response to augmentation maneuvers, and  respiratory phasicity.  Interstitial fluid consistent with edema is observed below the knee.  Edema reduces the image quality.  Left lower extremity -.  Densely echogenic material is within  the vein that is consistent with  chronic venous thrombosis; non-occlusive.  Common Femoral vein is  noncompressible.  Interstitial fluid consistent with edema is observed below the knee.  Edema reduces the image quality.  Mandy HARRISON Vahidloco  (Electronically Signed)  Final Date:      28 May 2019 05:01    Ec-echocardiogram Complete W/o Cont    Result Date: 2019  Transthoracic Echo Report Echocardiography Laboratory CONCLUSIONS Akinetic inferolateral wall. Moderately reduced left ventricular systolic function. Left ventricular ejection fraction is visually estimated to be 35%. Left ventricle is mildly dilated. Eccentric posteriorly directed mitral regurgitation, probably moderate. Moderately dilated left atrium. Aortic sclerosis without stenosis. Mild tricuspid regurgitation. Normal inferior vena cava size without inspiratory collapse. Right ventricular systolic pressure is estimated to be 40 mmHg. Mildly dilated right heartventricle. Normal right ventricular systolic function. Pleural effusion present. Normal pericardium without effusion. DAVID KEYS Exam Date:         2019                    08:55 Exam Location:     Inpatient Priority:          Routine Ordering Physician:        VERONICA HENDERSON Referring Physician:       [id number] Sonographer:               JUDIE Lozada Age:    73     Gender:    F MRN:    [id number] :    1945 BSA:    1.9    Ht (in):    64     Wt (lb):    187 Exam Type:     Complete Indications:     Heart failure, unspecified ICD Codes:       I50.9 CPT Codes:       00125 BP:   117    /   40     HR:   72 Technical Quality:       Fair MEASUREMENTS  (Male / Female) Normal Values 2D ECHO LV Diastolic Diameter PLAX        5.5 cm                4.2 - 5.9 / 3.9 - 5.3 cm LV Systolic Diameter PLAX         4.7 cm                2.1 - 4.0 cm IVS Diastolic Thickness           0.98 cm               LVPW Diastolic Thickness          0.87 cm               RV Diameter 4C                    3.7 cm                2.5 - 2.9 cm LVOT Diameter                     1.9 cm                 RA Diameter                       4.6 cm                Estimated LV Ejection Fraction    35 %                  LV Ejection Fraction MOD BP       35.5 %                >= 55  % LV Ejection Fraction MOD 4C       38.1 %                LV Ejection Fraction MOD 2C       31.4 %                LA Volume Index                   31.4 cm³/m²           16 - 28 cm³/m² IVC Diameter                      1.8 cm                DOPPLER AV Peak Velocity                  1 m/s                 AV Peak Gradient                  4.3 mmHg              AV Mean Gradient                  2.4 mmHg              LVOT Peak Velocity                0.68 m/s              AV Area Cont Eq vti               1.6 cm²               Mitral E Point Velocity           1.1 m/s               Mitral E to A Ratio               2.5                   MV Pressure Half Time             35.4 ms               MV Area PHT                       6.2 cm²               MV Deceleration Time              122 ms                MR Flow Convergence Radius        0.57 cm               MR ERO PISA                       0.16 cm²              MR Regurgitant Volume PISA        23.6 cm³              Pulmonary Vein Systolic Velocity  0.2 m/s               Pulmonary Vein Diastolic Velocit  0.55 m/s              Pulmonary Vein S/D Ratio          0.37                  TR Peak Velocity                  283 cm/s              PV Peak Velocity                  1 m/s                 PV Peak Gradient                  4.1 mmHg              RVOT Peak Velocity                0.73 m/s              * Indicates values subject to auto-interpretation LV EF:  35    % FINDINGS Left Ventricle Left ventricle is mildly dilated. Moderately reduced left ventricular systolic function. Left ventricular ejection fraction is visually estimated to be 35%. Akinetic inferolateral wall.  Diastolic function is difficult to assess with atrial fibrillation and mitral valve disease. . Right Ventricle Mildly dilated  right ventricle. Normal right ventricular systolic function. Right Atrium Enlarged right atrium. Normal inferior vena cava size without inspiratory collapse. Left Atrium Moderately dilated left atrium. Mitral Valve Thickened mitral valve leaflets with mitral annular calcification. Eccentric posteriorly directed mitral regurgitation, probably moderate. Aortic Valve Tricuspid aortic valve. Aortic sclerosis without stenosis. No aortic insufficiency. Tricuspid Valve Structurally normal tricuspid valve without significant stenosis. Mild tricuspid regurgitation. Right ventricular systolic pressure is estimated to be 40 mmHg. Pulmonic Valve The pulmonic valve is not well visualized. No stenosis or regurgitation seen. Pericardium Normal pericardium without effusion. Pleural effusion present. Aorta Normal aortic root for body surface area. Ascending aorta diameter is  2.3 cm. Hai Ch M.D. (Electronically Signed) Final Date:     31 May 2019 12:21    Ir-picc Line Placement W/ Guidance > Age 5    Result Date: 6/6/2019  HISTORY/REASON FOR EXAM:   PICC placement. TECHNIQUE/EXAM DESCRIPTION AND NUMBER OF VIEWS:   PICC line insertion with ultrasound guidance.  The procedure was performed using maximal sterile barrier technique including sterile gown, mask, cap, and donning of sterile gloves following appropriate hand hygiene and/or sterile scrub. Patient skin site was prepped with 2% Chlorhexidine solution. FINDINGS:  PICC line insertion with Ultrasound Guidance was performed by qualified nursing staff without the assistance of a Radiologist. PICC positioning appropriateness confirmed by 3CG technology; chest xray only needed in the instance 3CG unable to confirm placement.              Ultrasound-guided PICC placement performed by qualified nursing staff as above.       Micro:  Results     ** No results found for the last 168 hours. **        METHICILLIN RESISTANT STAPHYLOCOCCUS AUREUS wound              Antibiotic  Sensitivity Microscan Unit Status      Ampicillin/sulbactam Resistant <=8/4 mcg/mL Final      Method: BERNARDINO      Clindamycin Resistant <=0.5 mcg/mL Final      Method: BERNARDINO      Daptomycin Sensitive <=0.5 mcg/mL Final      Method: BERNARDINO      Erythromycin Resistant >4 mcg/mL Final      Method: BERNARDINO      Moxifloxacin Intermediate 4 mcg/mL Final      Method: BERNARDINO      Oxacillin Resistant >2 mcg/mL Final      Method: BERNARDINO      Penicillin Resistant >8 mcg/mL Final      Method: BERNARDINO      Tetracycline Resistant >8 mcg/mL Final      Method: BERNARDINO      Trimeth/Sulfa Sensitive <=0.5/9.5 mcg/mL Final      Method: BERNARDINO      Vancomycin Sensitive 1 mcg/mL Final          Assessment:      Active Hospital Problems     Diagnosis   • *Diabetic foot ulcer with osteomyelitis ([facility]) [E11.621, E11.69, L97.509, M86.9]   • Type 2 diabetes mellitus with skin complication, with long-term current use of insulin ([facility]) [E11.628, Z79.4]   • Peripheral vascular disease ([facility]) [I73.9]         Plan:  Bilateral DM heel ulcers   Right diabetic foot OM  Afebrile  Leukocytosis, persistent  Bcx on 5/25 negative  Was d/c'd home in January 2019 on IV Dapto for R foot OM; however, did not finish IV abx Never followed up in ID clinic.   Previous R foot cx +MRSE (bone), MSSE (tissue) & E faecalis (tissue) December 2018  Wound cx from R heel on 5/26 +MRSA  COntinue IV daptomycin 8 mg/kg daily  CPK weekly while on daptomycin-35 at last check  Stop date 7/6/2019  Continue with wound care and offloading-signs of healing  Check CBC     Leukocytosis  Multifactorial  Continue abx as above  Wound care  Pulm toilet  OOB as tolerated    PVD  US of BLE showing acute on chronic LLE DVT and 50% stenosis of Right mid SFA  Underwent RLE angiogram with Dr. Pro 5/31 with normal flow, no intervention performed     Diabetes mellitus, not controlled  Hemoglobin A1c was 8.7% on 5/25/19  Keep BS under 150 to help control current infection  -[phone]    Prognosis for limb salvage guarded  I  have performed a physical exam and reviewed and updated ROS as of today.  In review of yesterday's note dated 6/19/2019 , there are no changes except as documented above.

## 2019-06-21 NOTE — [facility]
Infectious Disease Progress Note    Author: Renu Nicole M.D. Date & Time of service: 6/21/2019  8:44 AM    Chief Complaint:  FUDiabetic foot ulcer      Interval History:  73 y.o. WF with poorly controlled DM, COPD on 3 L NC at home.  Admitted on 5/25/19 to [facility] for bilateral heel ulcerations with concerns for OM.   5/27/2019 T-max is 98.1.  Vascular Dopplers are being performed.  Denies any fevers or chills.  WBC 7.4.  Creatinine is 0.98  5/28/19- AF, WBC 8.0, no issue with abx, denies any pain currently to BLE but had 9/10 sharp BLE pain last night that improved with pain meds.  5/29- Tm 99.5, WBC 9.8, tolerating antibiotics without issue, denies any pain to BLE, abdomen distended and noting that she needs to have a bowel movement.  5/30- AF, WBC 11.6, no issue with antibiotics, denies pain, denies shortness of breath or chest palpitations, resting comfortably in bed.  5/31-AF, WBC 9.5, complaining of 9/10 LLE intermittent sharp shooting pain that improves with rest and pain meds, n.p.o. for angiogram today, no issue with antibiotics.  6/2 afebrile, white count 9.3.  Underwent right lower extremity angiogram on 5/31 with normal perfusion, no intervention performed.  Resting comfortably this a.m., no new issues  6/14 AF WBC 12.8transferred to [facility] on 6/8. Consulted for abx management  6/15 AF sleepy today-no new complaints  6/16 AF  No new wound pictures-denies SE abx  6/17 AF feels OK-eating lunch Had dressing change today-tolerated well. Eating lunch. No new pictures in chart as yet of wound  6/18 AF feels a little better today-up to WC. Denies SE abx-pain controlled RLE  6/19 AF sleepy tody-per  no new complaints. Not eating much  6/20 AF WBC 14.6 no new complaints  6/21 AF no CBC pt states R foot pain controlled. Pt's wound examined with wound care RN at bedside. Wound improving with less slough and no surrounding erythema. Tolerating abx without any issues    Labs Reviewed and Wound  Reviewed.    Review of Systems:  Review of Systems   Constitutional: Negative for chills and fever.   Respiratory: Negative for cough and shortness of breath.    Gastrointestinal: Negative for abdominal pain, diarrhea, nausea and vomiting.   Musculoskeletal: Positive for joint pain.        Controlled   Neurological: Negative for dizziness and headaches.   All other systems reviewed and are negative.      Hemodynamics:  T97.7 /40 P75 RR 18 O2 sats 94%    Physical Exam:  Physical Exam   Constitutional: She is oriented to person, place, and time. No distress.   Obese   HENT:   Mouth/Throat: No oropharyngeal exudate.   Eyes: Pupils are equal, round, and reactive to light. EOM are normal. Right eye exhibits no discharge. Left eye exhibits no discharge.   Neck: Neck supple. No JVD present.   Cardiovascular: Normal rate.    Pulmonary/Chest: Effort normal. No stridor. No respiratory distress. She has no wheezes. She has no rales.   Abdominal: Soft. She exhibits no distension. There is no tenderness.   Musculoskeletal: She exhibits edema.   RUE PICC line - nontender, no surrounding erythema    R heel wound with superficial slough, no surrounding erythema or drainage. Improving overall   Lymphadenopathy:     She has no cervical adenopathy.   Neurological: She is alert and oriented to person, place, and time.   Skin: Skin is warm. She is not diaphoretic. There is pallor.   Psychiatric: She has a normal mood and affect. Her behavior is normal.   Vitals reviewed.    Labs:  Recent Labs      06/20/19   0355   WBC  14.6*   RBC  3.91*   HEMOGLOBIN  11.1*   HEMATOCRIT  35.6*   MCV  91.0   MCH  28.4   RDW  42.9   PLATELETCT  393   MPV  9.0     Recent Labs      06/20/19   0355   SODIUM  133*   POTASSIUM  5.3   CHLORIDE  97   CO2  27   GLUCOSE  232*   BUN  36*     Recent Labs      06/20/19   0355   ALBUMIN  2.3*   TBILIRUBIN  0.3   ALKPHOSPHAT  72   TOTPROTEIN  6.8   ALTSGPT  15   ASTSGOT  24   CREATININE  1.14        Imaging:  None new to review      Micro:  Results     ** No results found for the last 168 hours. **        METHICILLIN RESISTANT STAPHYLOCOCCUS AUREUS wound              Antibiotic Sensitivity Microscan Unit Status      Ampicillin/sulbactam Resistant <=8/4 mcg/mL Final      Method: BERNARDINO      Clindamycin Resistant <=0.5 mcg/mL Final      Method: BERNARDINO      Daptomycin Sensitive <=0.5 mcg/mL Final      Method: BERNARDINO      Erythromycin Resistant >4 mcg/mL Final      Method: BERNARDINO      Moxifloxacin Intermediate 4 mcg/mL Final      Method: BERNARDINO      Oxacillin Resistant >2 mcg/mL Final      Method: BERNARDINO      Penicillin Resistant >8 mcg/mL Final      Method: BERNARDINO      Tetracycline Resistant >8 mcg/mL Final      Method: BERNARDINO      Trimeth/Sulfa Sensitive <=0.5/9.5 mcg/mL Final      Method: BERNARDINO      Vancomycin Sensitive 1 mcg/mL Final          Assessment:      Active Hospital Problems     Diagnosis   • *Diabetic foot ulcer with osteomyelitis ([facility]) [E11.621, E11.69, L97.509, M86.9]   • Type 2 diabetes mellitus with skin complication, with long-term current use of insulin ([facility]) [E11.628, Z79.4]   • Peripheral vascular disease ([facility]) [I73.9]         Plan:  Bilateral DM heel ulcers   Right diabetic foot OM. On treatment. Improving  Afebrile  Leukocytosis, persistent  Bcx on 5/25 negative  Was d/c'd home in January 2019 on IV Dapto for R foot OM; however, did not finish IV abx Never followed up in ID clinic.   Previous R foot cx +MRSE (bone), MSSE (tissue) & E faecalis (tissue) December 2018  Wound cx from R heel on 5/26 +MRSA  Cntinue IV daptomycin 8 mg/kg daily  CPK weekly while on daptomycin. Last CPK 56 on 6/9  Stop date 7/6/2019  Continue with wound care and offloading-signs of healing  Check CBC and CPK     Leukocytosis, persistent  Multifactorial  Continue abx as above  Wound care  Pulm toilet  OOB as tolerated    PVD  US of BLE showing acute on chronic LLE DVT and 50% stenosis of Right mid SFA  Underwent RLE angiogram with   Morteza 5/31 with normal flow, no intervention performed     Type 2 Diabetes mellitus, not controlled  Hemoglobin A1c was 8.7% on 5/25/19  Keep BS under 150 to help control current infection    Prognosis for limb salvage guarded  .

## 2019-06-23 NOTE — [facility]
Infectious Disease Progress Note    Author: Renu Nicoel M.D. Date & Time of service: 6/23/2019  8:32 AM    Chief Complaint:  FUDiabetic foot ulcer      Interval History:  73 y.o. WF with poorly controlled DM, COPD on 3 L NC at home.  Admitted on 5/25/19 to [facility] [place] for bilateral heel ulcerations with concerns for OM.   5/27/2019 T-max is 98.1.  Vascular Dopplers are being performed.  Denies any fevers or chills.  WBC 7.4.  Creatinine is 0.98  5/28/19- AF, WBC 8.0, no issue with abx, denies any pain currently to BLE but had 9/10 sharp BLE pain last night that improved with pain meds.  5/29- Tm 99.5, WBC 9.8, tolerating antibiotics without issue, denies any pain to BLE, abdomen distended and noting that she needs to have a bowel movement.  5/30- AF, WBC 11.6, no issue with antibiotics, denies pain, denies shortness of breath or chest palpitations, resting comfortably in bed.  5/31-AF, WBC 9.5, complaining of 9/10 LLE intermittent sharp shooting pain that improves with rest and pain meds, n.p.o. for angiogram today, no issue with antibiotics.  6/2 afebrile, white count 9.3.  Underwent right lower extremity angiogram on 5/31 with normal perfusion, no intervention performed.  Resting comfortably this a.m., no new issues  6/14 AF WBC 12.8transferred to [facility] on 6/8. Consulted for abx management  6/15 AF sleepy today-no new complaints  6/16 AF  No new wound pictures-denies SE abx  6/17 AF feels OK-eating lunch Had dressing change today-tolerated well. Eating lunch. No new pictures in chart as yet of wound  6/18 AF feels a little better today-up to WC. Denies SE abx-pain controlled RLE  6/19 AF sleepy tody-per  no new complaints. Not eating much  6/20 AF WBC 14.6 no new complaints  6/21 AF no CBC pt states R foot pain controlled. Pt's wound examined with wound care RN at bedside. Wound improving with less slough and no surrounding erythema. Tolerating abx without any issues  6/23 AF no CBC, ongoing R heel pain but  otherwise no new symptoms. Plan for [place] change tmrw      Labs Reviewed and Wound Reviewed.    Review of Systems:  Review of Systems   Constitutional: Negative for chills and fever.   Respiratory: Negative for cough and shortness of breath.    Gastrointestinal: Negative for abdominal pain, diarrhea, nausea and vomiting.   Musculoskeletal: Positive for joint pain.        Controlled   Neurological: Negative for dizziness and headaches.   All other systems reviewed and are negative.      Hemodynamics:  T97.5 /59 P76 RR 18 O2 sats 98%    Physical Exam:  Physical Exam   Constitutional: She is oriented to person, place, and time. No distress.   Obese   HENT:   Mouth/Throat: No oropharyngeal exudate.   Eyes: Pupils are equal, round, and reactive to light. EOM are normal. Right eye exhibits no discharge. Left eye exhibits no discharge.   Neck: Neck supple. No JVD present.   Cardiovascular: Normal rate.    Pulmonary/Chest: Effort normal. No stridor. No respiratory distress. She has no wheezes. She has no rales.   Abdominal: Soft. She exhibits no distension. There is no tenderness.   Musculoskeletal: She exhibits edema.   RUE PICC line - nontender, no surrounding erythema    R heel wound vac   Lymphadenopathy:     She has no cervical adenopathy.   Neurological: She is alert and oriented to person, place, and time.   Skin: Skin is warm. She is not diaphoretic. There is pallor.   Psychiatric: She has a normal mood and affect. Her behavior is normal.   Vitals reviewed.    Labs:  Recent Labs      06/22/19   0250   WBC  12.5*   RBC  3.74*   HEMOGLOBIN  10.7*   HEMATOCRIT  33.9*   MCV  90.6   MCH  28.6   RDW  43.2   PLATELETCT  360   MPV  9.3     Recent Labs      06/22/19   0250   SODIUM  134*   POTASSIUM  5.0   CHLORIDE  101   CO2  26   GLUCOSE  215*   BUN  45*     Recent Labs      06/22/19   0250   ALBUMIN  2.3*   TBILIRUBIN  0.3   ALKPHOSPHAT  62   TOTPROTEIN  6.4   ALTSGPT  19   ASTSGOT  35   CREATININE  1.23        Imaging:  None new to review      Micro:  Results     ** No results found for the last 168 hours. **        METHICILLIN RESISTANT STAPHYLOCOCCUS AUREUS wound              Antibiotic Sensitivity Microscan Unit Status      Ampicillin/sulbactam Resistant <=8/4 mcg/mL Final      Method: BERNARDINO      Clindamycin Resistant <=0.5 mcg/mL Final      Method: BERNARDINO      Daptomycin Sensitive <=0.5 mcg/mL Final      Method: BERNARDINO      Erythromycin Resistant >4 mcg/mL Final      Method: BERNARDINO      Moxifloxacin Intermediate 4 mcg/mL Final      Method: BERNARDINO      Oxacillin Resistant >2 mcg/mL Final      Method: BERNARDINO      Penicillin Resistant >8 mcg/mL Final      Method: BERNARDINO      Tetracycline Resistant >8 mcg/mL Final      Method: BERNARDINO      Trimeth/Sulfa Sensitive <=0.5/9.5 mcg/mL Final      Method: BERNARDINO      Vancomycin Sensitive 1 mcg/mL Final          Assessment:      Active Hospital Problems     Diagnosis   • *Diabetic foot ulcer with osteomyelitis ([facility]) [E11.621, E11.69, L97.509, M86.9]   • Type 2 diabetes mellitus with skin complication, with long-term current use of insulin ([facility]) [E11.628, Z79.4]   • Peripheral vascular disease ([facility]) [I73.9]         Plan:  Bilateral DM heel ulcers   Right diabetic foot OM. On treatment. Improving  Afebrile  Leukocytosis, persistent  Bcx on 5/25 negative  Was d/c'd home in January 2019 on IV Dapto for R foot OM; however, did not finish IV abx Never followed up in ID clinic.   Previous R foot cx +MRSE (bone), MSSE (tissue) & E faecalis (tissue) December 2018  Wound cx from R heel on 5/26 +MRSA  Cntinue IV daptomycin 8 mg/kg daily  CPK weekly while on daptomycin. Last CPK 35 on 6/17  Stop date 7/6/2019  Continue with wound care and offloading-signs of healing  Check CPK     Leukocytosis, persistent  Multifactorial  Continue abx as above  Wound care  Pulm toilet  OOB as tolerated    ISAIAH, worse  Avoid nephrotoxins  Monitor  Hydration    PVD  US of BLE showing acute on chronic LLE DVT and 50% stenosis of  Right mid SFA  Underwent RLE angiogram with Dr. Pro 5/31 with normal flow, no intervention performed     Type 2 Diabetes mellitus, not controlled  Hemoglobin A1c was 8.7% on 5/25/19  Keep BS under 150 to help control current infection    Prognosis for limb salvage guarded  .

## 2019-06-25 NOTE — [facility]
Infectious Disease Progress Note    Author: Renu Nicole M.D. Date & Time of service: 6/25/2019  10:11 AM    Chief Complaint:  FU Right Diabetic foot ulcer      Interval History:  73 y.o. WF with poorly controlled DM, COPD on 3 L NC at home.  Admitted on 5/25/19 to [facility] for bilateral heel ulcerations with concerns for OM.   5/27/2019 T-max is 98.1.  Vascular Dopplers are being performed.  Denies any fevers or chills.  WBC 7.4.  Creatinine is 0.98  5/28/19- AF, WBC 8.0, no issue with abx, denies any pain currently to BLE but had 9/10 sharp BLE pain last night that improved with pain meds.  5/29- Tm 99.5, WBC 9.8, tolerating antibiotics without issue, denies any pain to BLE, abdomen distended and noting that she needs to have a bowel movement.  5/30- AF, WBC 11.6, no issue with antibiotics, denies pain, denies shortness of breath or chest palpitations, resting comfortably in bed.  5/31-AF, WBC 9.5, complaining of 9/10 LLE intermittent sharp shooting pain that improves with rest and pain meds, n.p.o. for angiogram today, no issue with antibiotics.  6/2 afebrile, white count 9.3.  Underwent right lower extremity angiogram on 5/31 with normal perfusion, no intervention performed.  Resting comfortably this a.m., no new issues  6/14 AF WBC 12.8transferred to [facility] on 6/8. Consulted for abx management  6/15 AF sleepy today-no new complaints  6/16 AF  No new wound pictures-denies SE abx  6/17 AF feels OK-eating lunch Had dressing change today-tolerated well. Eating lunch. No new pictures in chart as yet of wound  6/18 AF feels a little better today-up to WC. Denies SE abx-pain controlled RLE  6/19 AF sleepy tody-per  no new complaints. Not eating much  6/20 AF WBC 14.6 no new complaints  6/21 AF no CBC pt states R foot pain controlled. Pt's wound examined with wound care RN at bedside. Wound improving with less slough and no surrounding erythema. Tolerating abx without any issues  6/23 AF no CBC, ongoing R heel  pain but otherwise no new symptoms. Plan for [place] change tmrw  6/25 AF WBC 12 feels tired. Wound care photos reviewed from yesterday and improving overall    Labs Reviewed and Wound Reviewed.    Review of Systems:  Review of Systems   Constitutional: Positive for malaise/fatigue. Negative for chills and fever.   Respiratory: Negative for cough and shortness of breath.    Gastrointestinal: Negative for abdominal pain, diarrhea, nausea and vomiting.   Musculoskeletal: Positive for joint pain.        Controlled   Neurological: Positive for weakness. Negative for dizziness and headaches.   All other systems reviewed and are negative.      Hemodynamics:  T97.9 /63 P70 RR 16 O2Sat 100%    Physical Exam:  Physical Exam   Constitutional: She is oriented to person, place, and time. No distress.   Obese   HENT:   Mouth/Throat: No oropharyngeal exudate.   Eyes: Pupils are equal, round, and reactive to light. EOM are normal. Right eye exhibits no discharge. Left eye exhibits no discharge.   Neck: Neck supple. No JVD present.   Cardiovascular: Normal rate.    Pulmonary/Chest: Effort normal. No stridor. No respiratory distress. She has no wheezes. She has no rales.   Abdominal: Soft. She exhibits no distension. There is no tenderness.   Musculoskeletal: She exhibits edema.   RUE PICC line - nontender, no surrounding erythema    R heel wound vac. Wound care notes/photos from 6/24 reviewed. 40% slough in wound bed. No odor. Edges slightly macerated   Lymphadenopathy:     She has no cervical adenopathy.   Neurological: She is alert and oriented to person, place, and time.   Skin: Skin is warm. She is not diaphoretic. There is pallor.   Psychiatric: She has a normal mood and affect. Her behavior is normal.   Vitals reviewed.    Labs:  Recent Labs      06/25/19   0400   WBC  12.0*   RBC  3.73*   HEMOGLOBIN  10.8*   HEMATOCRIT  33.8*   MCV  90.6   MCH  29.0   RDW  45.0   PLATELETCT  340   MPV  9.5     Recent Labs      06/24/19    1530  06/25/19   0400   SODIUM   --   133*   POTASSIUM   --   5.1   CHLORIDE   --   103   CO2   --   25   GLUCOSE   --   142*   BUN   --   44*   CPKTOTAL  311*   --      Recent Labs      06/25/19   0400   ALBUMIN  2.5*   TBILIRUBIN  0.6   ALKPHOSPHAT  62   TOTPROTEIN  6.4   ALTSGPT  21   ASTSGOT  26   CREATININE  1.13       Imaging:  None new to review      Micro:  Results     ** No results found for the last 168 hours. **        METHICILLIN RESISTANT STAPHYLOCOCCUS AUREUS wound              Antibiotic Sensitivity Microscan Unit Status      Ampicillin/sulbactam Resistant <=8/4 mcg/mL Final      Method: BERNARDINO      Clindamycin Resistant <=0.5 mcg/mL Final      Method: BERNARDINO      Daptomycin Sensitive <=0.5 mcg/mL Final      Method: BERNARDINO      Erythromycin Resistant >4 mcg/mL Final      Method: BERNARDINO      Moxifloxacin Intermediate 4 mcg/mL Final      Method: BERNARDINO      Oxacillin Resistant >2 mcg/mL Final      Method: BERNARDINO      Penicillin Resistant >8 mcg/mL Final      Method: BERNARDINO      Tetracycline Resistant >8 mcg/mL Final      Method: BERNARDINO      Trimeth/Sulfa Sensitive <=0.5/9.5 mcg/mL Final      Method: BERNARDINO      Vancomycin Sensitive 1 mcg/mL Final          Assessment:      Active Hospital Problems     Diagnosis   • *Diabetic foot ulcer with osteomyelitis ([facility]) [E11.621, E11.69, L97.509, M86.9]   • Type 2 diabetes mellitus with skin complication, with long-term current use of insulin ([facility]) [E11.628, Z79.4]   • Peripheral vascular disease ([facility]) [I73.9]         Plan:  Bilateral DM heel ulcers   Right diabetic foot OM. On treatment. Improving  Afebrile  Leukocytosis, persistent  Bcx on 5/25 negative  Was d/c'd home in January 2019 on IV Dapto for R foot OM; however, did not finish IV abx Never followed up in ID clinic.   Previous R foot cx +MRSE (bone), MSSE (tissue) & E faecalis (tissue) December 2018  Wound cx from R heel on 5/26 +MRSA  Cntinue IV daptomycin 8 mg/kg daily  CPK weekly while on daptomycin. Last  (increased from  35). Pt asymptomatic. Will recheck CPK on 6/26 - ordered  Stop date 7/6/2019  Continue with wound care and offloading-signs of healing     Leukocytosis, persistent  Multifactorial  Continue abx as above  Wound care  Pulm toilet  OOB as tolerated    ISAIAH, stable  Avoid nephrotoxins  Monitor  Hydration    PVD  US of BLE showing acute on chronic LLE DVT and 50% stenosis of Right mid SFA  Underwent RLE angiogram with Dr. Pro 5/31 with normal flow, no intervention performed     Type 2 Diabetes mellitus, not controlled  Hemoglobin A1c was 8.7% on 5/25/19  Keep BS under 150 to help control current infection    Prognosis for limb salvage guarded  .

## 2019-06-27 NOTE — [facility]
Infectious Disease Progress Note    Author: Renu Nicole M.D. Date & Time of service: 6/27/2019  10:02 AM    Chief Complaint:  FU Right Diabetic foot ulcer      Interval History:  73 y.o. WF with poorly controlled DM, COPD on 3 L NC at home.  Admitted on 5/25/19 to [facility] for bilateral heel ulcerations with concerns for OM.   5/27/2019 T-max is 98.1.  Vascular Dopplers are being performed.  Denies any fevers or chills.  WBC 7.4.  Creatinine is 0.98  5/28/19- AF, WBC 8.0, no issue with abx, denies any pain currently to BLE but had 9/10 sharp BLE pain last night that improved with pain meds.  5/29- Tm 99.5, WBC 9.8, tolerating antibiotics without issue, denies any pain to BLE, abdomen distended and noting that she needs to have a bowel movement.  5/30- AF, WBC 11.6, no issue with antibiotics, denies pain, denies shortness of breath or chest palpitations, resting comfortably in bed.  5/31-AF, WBC 9.5, complaining of 9/10 LLE intermittent sharp shooting pain that improves with rest and pain meds, n.p.o. for angiogram today, no issue with antibiotics.  6/2 afebrile, white count 9.3.  Underwent right lower extremity angiogram on 5/31 with normal perfusion, no intervention performed.  Resting comfortably this a.m., no new issues  6/14 AF WBC 12.8transferred to [facility] on 6/8. Consulted for abx management  6/15 AF sleepy today-no new complaints  6/16 AF  No new wound pictures-denies SE abx  6/17 AF feels OK-eating lunch Had dressing change today-tolerated well. Eating lunch. No new pictures in chart as yet of wound  6/18 AF feels a little better today-up to WC. Denies SE abx-pain controlled RLE  6/19 AF sleepy tody-per  no new complaints. Not eating much  6/20 AF WBC 14.6 no new complaints  6/21 AF no CBC pt states R foot pain controlled. Pt's wound examined with wound care RN at bedside. Wound improving with less slough and no surrounding erythema. Tolerating abx without any issues  6/23 AF no CBC, ongoing R heel  pain but otherwise no new symptoms. Plan for WV change tmrw  6/25 AF WBC 12 feels tired. Wound care photos reviewed from yesterday and improving overall  6/27 afebrile no CBC done.  Ongoing right heel pain and no changes.  Creatinine increased    Labs Reviewed and Wound Reviewed.    Review of Systems:  Review of Systems   Constitutional: Positive for malaise/fatigue. Negative for chills and fever.   Respiratory: Negative for cough and shortness of breath.    Gastrointestinal: Negative for abdominal pain, diarrhea, nausea and vomiting.   Musculoskeletal: Positive for joint pain.        Controlled   Neurological: Positive for weakness. Negative for dizziness and headaches.   All other systems reviewed and are negative.      Hemodynamics:  T 98 /46 RR 18 oxygen saturation 99%    Physical Exam:  Physical Exam   Constitutional: She is oriented to person, place, and time. No distress.   Obese   HENT:   Mouth/Throat: No oropharyngeal exudate.   Eyes: Pupils are equal, round, and reactive to light. EOM are normal. Right eye exhibits no discharge. Left eye exhibits no discharge.   Neck: Neck supple. No JVD present.   Cardiovascular: Normal rate.    Pulmonary/Chest: Effort normal. No stridor. No respiratory distress. She has no wheezes. She has no rales.   Abdominal: Soft. She exhibits no distension. There is no tenderness.   Musculoskeletal: She exhibits edema.   RUE PICC line - nontender, no surrounding erythema    R heel wound vac.    Lymphadenopathy:     She has no cervical adenopathy.   Neurological: She is alert and oriented to person, place, and time.   Skin: Skin is warm. She is not diaphoretic. There is pallor.   Psychiatric: She has a normal mood and affect. Her behavior is normal.   Vitals reviewed.    Labs:  Recent Labs      06/25/19   0400   WBC  12.0*   RBC  3.73*   HEMOGLOBIN  10.8*   HEMATOCRIT  33.8*   MCV  90.6   MCH  29.0   RDW  45.0   PLATELETCT  340   MPV  9.5     Recent Labs      06/24/19   1530   06/25/19   0400  06/26/19   0425  06/27/19   0240   SODIUM   --   133*   --   133*   POTASSIUM   --   5.1   --   4.9   CHLORIDE   --   103   --   99   CO2   --   25   --   26   GLUCOSE   --   142*   --   151*   BUN   --   44*   --   47*   CPKTOTAL  311*   --   194*   --      Recent Labs      06/25/19   0400  06/27/19   0240   ALBUMIN  2.5*   --    TBILIRUBIN  0.6   --    ALKPHOSPHAT  62   --    TOTPROTEIN  6.4   --    ALTSGPT  21   --    ASTSGOT  26   --    CREATININE  1.13  1.41*       Imaging:  None new to review      Micro:  Results     ** No results found for the last 168 hours. **        METHICILLIN RESISTANT STAPHYLOCOCCUS AUREUS wound              Antibiotic Sensitivity Microscan Unit Status      Ampicillin/sulbactam Resistant <=8/4 mcg/mL Final      Method: BERNARDINO      Clindamycin Resistant <=0.5 mcg/mL Final      Method: BERNARDINO      Daptomycin Sensitive <=0.5 mcg/mL Final      Method: BERNARDINO      Erythromycin Resistant >4 mcg/mL Final      Method: BERNARDINO      Moxifloxacin Intermediate 4 mcg/mL Final      Method: BERNARDINO      Oxacillin Resistant >2 mcg/mL Final      Method: BERNARDINO      Penicillin Resistant >8 mcg/mL Final      Method: BERNARDINO      Tetracycline Resistant >8 mcg/mL Final      Method: BERNARDINO      Trimeth/Sulfa Sensitive <=0.5/9.5 mcg/mL Final      Method: BERNARDINO      Vancomycin Sensitive 1 mcg/mL Final          Assessment:      Active Hospital Problems     Diagnosis   • *Diabetic foot ulcer with osteomyelitis ([facility]) [E11.621, E11.69, L97.509, M86.9]   • Type 2 diabetes mellitus with skin complication, with long-term current use of insulin ([facility]) [E11.628, Z79.4]   • Peripheral vascular disease ([facility]) [I73.9]         Plan:  Bilateral DM heel ulcers   Right diabetic foot OM. On treatment. Improving  Afebrile  Leukocytosis, persistent  Bcx on 5/25 negative  Was d/c'd home in January 2019 on IV Dapto for R foot OM; however, did not finish IV abx Never followed up in ID clinic.   Previous R foot cx +MRSE (bone), MSSE (tissue) & E  faecalis (tissue) December 2018  Wound cx from R heel on 5/26 +MRSA  Cntinue IV daptomycin 8 mg/kg daily  CPK weekly while on daptomycin.   Stop date 7/6/2019  Continue with wound care and offloading-signs of healing  Plan for MRI     Leukocytosis, persistent  Multifactorial  Continue abx as above  Wound care  Pulm toilet  OOB as tolerated  Check CBC in AM - ordered    ISAIAH, worse  Avoid nephrotoxins  Renally dose abx accordingly  Monitor  Hydration  Check BMP in AM - ordered      PVD  US of BLE showing acute on chronic LLE DVT and 50% stenosis of Right mid SFA  Underwent RLE angiogram with Dr. Pro 5/31 with normal flow, no intervention performed     Type 2 Diabetes mellitus, not controlled  Hemoglobin A1c was 8.7% on 5/25/19  Keep BS under 150 to help control current infection    Prognosis for limb salvage guarded  .

## 2019-06-28 NOTE — [facility]
Infectious Disease Progress Note    Author: Renu Nicole M.D. Date & Time of service: 6/28/2019  12:08 PM    Chief Complaint:  FU Right Diabetic foot ulcer      Interval History:  73 y.o. WF with poorly controlled DM, COPD on 3 L NC at home.  Admitted on 5/25/19 to [facility] for bilateral heel ulcerations with concerns for OM.   5/27/2019 T-max is 98.1.  Vascular Dopplers are being performed.  Denies any fevers or chills.  WBC 7.4.  Creatinine is 0.98  5/28/19- AF, WBC 8.0, no issue with abx, denies any pain currently to BLE but had 9/10 sharp BLE pain last night that improved with pain meds.  5/29- Tm 99.5, WBC 9.8, tolerating antibiotics without issue, denies any pain to BLE, abdomen distended and noting that she needs to have a bowel movement.  5/30- AF, WBC 11.6, no issue with antibiotics, denies pain, denies shortness of breath or chest palpitations, resting comfortably in bed.  5/31-AF, WBC 9.5, complaining of 9/10 LLE intermittent sharp shooting pain that improves with rest and pain meds, n.p.o. for angiogram today, no issue with antibiotics.  6/2 afebrile, white count 9.3.  Underwent right lower extremity angiogram on 5/31 with normal perfusion, no intervention performed.  Resting comfortably this a.m., no new issues  6/14 AF WBC 12.8transferred to [facility] on 6/8. Consulted for abx management  6/15 AF sleepy today-no new complaints  6/16 AF  No new wound pictures-denies SE abx  6/17 AF feels OK-eating lunch Had dressing change today-tolerated well. Eating lunch. No new pictures in chart as yet of wound  6/18 AF feels a little better today-up to WC. Denies SE abx-pain controlled RLE  6/19 AF sleepy tody-per  no new complaints. Not eating much  6/20 AF WBC 14.6 no new complaints  6/21 AF no CBC pt states R foot pain controlled. Pt's wound examined with wound care RN at bedside. Wound improving with less slough and no surrounding erythema. Tolerating abx without any issues  6/23 AF no CBC, ongoing R heel  pain but otherwise no new symptoms. Plan for [place] change tmrw  6/25 AF WBC 12 feels tired. Wound care photos reviewed from yesterday and improving overall  6/27 afebrile no CBC done.  Ongoing right heel pain and no changes.  Creatinine increased  6/28 afebrile wound care nurse at bedside and right heel wound examined.  Patient has significant yellow slough at wound base and may need bedside debridement.  No changes in pain.  Kidney function improved today.  MRI with osteomyelitis and results discussed with patient    Labs Reviewed and Wound Reviewed.    Review of Systems:  Review of Systems   Constitutional: Positive for malaise/fatigue. Negative for chills and fever.   Respiratory: Negative for cough and shortness of breath.    Gastrointestinal: Negative for abdominal pain, diarrhea, nausea and vomiting.   Musculoskeletal: Positive for joint pain.        Controlled   Neurological: Positive for weakness. Negative for dizziness and headaches.   All other systems reviewed and are negative.      Hemodynamics:  T 97.4 /60 2P 61 RR 18 oxygen saturation 99%    Physical Exam:  Physical Exam   Constitutional: She is oriented to person, place, and time. No distress.   Obese   HENT:   Mouth/Throat: No oropharyngeal exudate.   Eyes: Pupils are equal, round, and reactive to light. EOM are normal. Right eye exhibits no discharge. Left eye exhibits no discharge.   Neck: Neck supple. No JVD present.   Cardiovascular: Normal rate.    Pulmonary/Chest: Effort normal. No stridor. No respiratory distress. She has no wheezes. She has no rales.   Abdominal: Soft. She exhibits no distension. There is no tenderness.   Musculoskeletal: She exhibits edema.   RUE PICC line - nontender, no surrounding erythema    R heel wound with yellow slough in wound base.  No granulation tissue noted.  No surrounding erythema.  Right heel is very boggy   Lymphadenopathy:     She has no cervical adenopathy.   Neurological: She is alert and oriented to  person, place, and time.   Skin: Skin is warm. She is not diaphoretic. There is pallor.   Psychiatric: She has a normal mood and affect. Her behavior is normal.   Vitals reviewed.    Labs:  No results for input(s): WBC, RBC, HEMOGLOBIN, HEMATOCRIT, MCV, MCH, RDW, PLATELETCT, MPV, NEUTSPOLYS, LYMPHOCYTES, MONOCYTES, EOSINOPHILS, BASOPHILS, RBCMORPHOLO in the last 72 hours.  Recent Labs      06/26/19   0425  06/27/19   0240  06/28/19   0325   SODIUM   --   133*  133*   POTASSIUM   --   4.9  4.8   CHLORIDE   --   99  98   CO2   --   26 27   GLUCOSE   --   151*  174*   BUN   --   47*  52*   CPKTOTAL  194*   --    --      Recent Labs      06/27/19   0240  06/28/19   0325   CREATININE  1.41*  1.22       Imaging:  MRI of the right heel on 6/27/2019  Impression:       1.  Very mild bone marrow edema within the posterior/lateral calcaneus deep to the open wound with alteration of T2 signal, mild enhancement, but no change in T1 signal.    2.  This bone marrow edema pattern can indicate early osteomyelitis versus reactive change    3.  Hindfoot cellulitis    4.  No abscess    5.  osteoarthritis of the hindfoot, midfoot, and forefoot which may be Charcot change           Micro:  Results     ** No results found for the last 168 hours. **        METHICILLIN RESISTANT STAPHYLOCOCCUS AUREUS wound              Antibiotic Sensitivity Microscan Unit Status      Ampicillin/sulbactam Resistant <=8/4 mcg/mL Final      Method: BERNARDINO      Clindamycin Resistant <=0.5 mcg/mL Final      Method: BERNARDINO      Daptomycin Sensitive <=0.5 mcg/mL Final      Method: BERNARDINO      Erythromycin Resistant >4 mcg/mL Final      Method: BERNARDINO      Moxifloxacin Intermediate 4 mcg/mL Final      Method: BERNARDINO      Oxacillin Resistant >2 mcg/mL Final      Method: BERNARDINO      Penicillin Resistant >8 mcg/mL Final      Method: BERNARDINO      Tetracycline Resistant >8 mcg/mL Final      Method: BERNARDINO      Trimeth/Sulfa Sensitive <=0.5/9.5 mcg/mL Final      Method: BERNARDINO      Vancomycin  Sensitive 1 mcg/mL Final          Assessment:      Active Hospital Problems     Diagnosis   • *Diabetic foot ulcer with osteomyelitis ([facility]) [E11.621, E11.69, L97.509, M86.9]   • Type 2 diabetes mellitus with skin complication, with long-term current use of insulin ([facility]) [E11.628, Z79.4]   • Peripheral vascular disease ([facility]) [I73.9]         Plan:  Bilateral DM heel ulcers   Right diabetic foot/heel osteomyelitis. On treatment.   Afebrile  Leukocytosis, persistent  Bcx on 5/25 negative  Was d/c'd home in January 2019 on IV Dapto for R foot OM; however, did not finish IV abx Never followed up in ID clinic.   Previous R foot cx +MRSE (bone), MSSE (tissue) & E faecalis (tissue) December 2018  Wound cx from R heel on 5/26 +MRSA  Cntinue IV daptomycin 8 mg/kg daily  CPK weekly while on daptomycin.  on 6/26  Stop date 7/6/2019 followed by p.o. doxy and Augmentin  MRI c/w osteomyelitis  Continue wound VAC/care  Wound is significant yellow slough.  Consult Dr. Nash for bedside debridement     Leukocytosis, persistent  Multifactorial  Continue abx as above  Wound care  Pulm toilet  OOB as tolerated  Monitor    ISAIAH, improved  Avoid nephrotoxins  Renally dose abx accordingly  Monitor  Hydration  Creatinine 1.22 today    PVD  US of BLE showing acute on chronic LLE DVT and 50% stenosis of Right mid SFA  Underwent RLE angiogram with Dr. Pro 5/31 with normal flow, no intervention performed     Type 2 Diabetes mellitus, not controlled  Hemoglobin A1c was 8.7% on 5/25/19  Keep BS under 150 to help control current infection    Prognosis for limb salvage guarded    Discussed with wound care nurse at bedside.

## 2019-07-02 NOTE — [facility]
NOTE: Accidentally addended Dr. Mazariegos's note from 7/2 on 7/22    Infectious Disease Progress Note    Author: Meme Mazariegos M.D. Date & Time of service: 7/2/2019  2:15 PM    Chief Complaint:  FU Right Diabetic foot ulcer    Interval History:  73 y.o. WF with poorly controlled DM, COPD on 3 L NC at home.  Admitted on 5/25/19 to [facility] for bilateral heel ulcerations with concerns for OM.   5/27/2019 T-max is 98.1.  Vascular Dopplers are being performed.  Denies any fevers or chills.  WBC 7.4.  Creatinine is 0.98  5/28/19- AF, WBC 8.0, no issue with abx, denies any pain currently to BLE but had 9/10 sharp BLE pain last night that improved with pain meds.  5/29- Tm 99.5, WBC 9.8, tolerating antibiotics without issue, denies any pain to BLE, abdomen distended and noting that she needs to have a bowel movement.  5/30- AF, WBC 11.6, no issue with antibiotics, denies pain, denies shortness of breath or chest palpitations, resting comfortably in bed.  5/31-AF, WBC 9.5, complaining of 9/10 LLE intermittent sharp shooting pain that improves with rest and pain meds, n.p.o. for angiogram today, no issue with antibiotics.  6/2 afebrile, white count 9.3.  Underwent right lower extremity angiogram on 5/31 with normal perfusion, no intervention performed.  Resting comfortably this a.m., no new issues  6/14 AF WBC 12.8transferred to [facility] on 6/8. Consulted for abx management  6/15 AF sleepy today-no new complaints  6/16 AF  No new wound pictures-denies SE abx  6/17 AF feels OK-eating lunch Had dressing change today-tolerated well. Eating lunch. No new pictures in chart as yet of wound  6/18 AF feels a little better today-up to WC. Denies SE abx-pain controlled RLE  6/19 AF sleepy tody-per  no new complaints. Not eating much  6/20 AF WBC 14.6 no new complaints  6/21 AF no CBC pt states R foot pain controlled. Pt's wound examined with wound care RN at bedside. Wound improving with less slough and no surrounding erythema.  Tolerating abx without any issues  6/23 AF no CBC, ongoing R heel pain but otherwise no new symptoms. Plan for W[place] change tmrw  6/25 AF WBC 12 feels tired. Wound care photos reviewed from yesterday and improving overall  6/27 afebrile no CBC done.  Ongoing right heel pain and no changes.  Creatinine increased  6/28 afebrile wound care nurse at bedside and right heel wound examined.  Patient has significant yellow slough at wound base and may need bedside debridement.  No changes in pain.  Kidney function improved today.  MRI with osteomyelitis and results discussed with patient  7/2/2019-no fevers.  WBC 12.1 creatinine 1.27.c/o foot pain    Labs Reviewed and Wound Reviewed.    Review of Systems:  Review of Systems   Constitutional: Positive for malaise/fatigue. Negative for chills and fever.   Respiratory: Negative for cough and shortness of breath.    Gastrointestinal: Negative for abdominal pain, diarrhea, nausea and vomiting.   Musculoskeletal: Positive for joint pain.        Improved   Neurological: Positive for weakness. Negative for dizziness and headaches.   All other systems reviewed and are negative.    Hemodynamics:  T 98.6 /65 HR 75 RR 20 O2 96%    Physical Exam:  Physical Exam   Constitutional: She is oriented to person, place, and time. No distress.   HENT:   Mouth/Throat: No oropharyngeal exudate.   Eyes: Pupils are equal, round, and reactive to light. EOM are normal. Right eye exhibits no discharge. Left eye exhibits no discharge.   Neck: Neck supple.   Cardiovascular: Normal rate and normal heart sounds.    No murmur heard.  Pulmonary/Chest: Effort normal and breath sounds normal. No stridor. No respiratory distress. She has no wheezes.   Abdominal: Soft. She exhibits no distension. There is no tenderness.   Musculoskeletal: She exhibits edema.   Right ankle dressing in place.  See wound care notes below.  Chronic edema with some pinkish discoloration of bilateral lower extremities noted.   Extremities are cool to touch.   Lymphadenopathy:     She has no cervical adenopathy.   Neurological: She is alert and oriented to person, place, and time.   No gross cranial nerve deficit   Skin: Skin is warm. She is not diaphoretic. There is pallor.   Psychiatric: She has a normal mood and affect. Her behavior is normal.   Vitals reviewed.    Labs:  Recent Labs      07/01/19   0235  07/02/19   1330   WBC  12.1*   --    RBC  3.87*   --    HEMOGLOBIN  11.3*  11.2*   HEMATOCRIT  34.5*  34.7*   MCV  89.1   --    MCH  29.2   --    RDW  46.1   --    PLATELETCT  327   --    MPV  9.1   --      Recent Labs      07/01/19   0235   SODIUM  136   POTASSIUM  4.5   CHLORIDE  100   CO2  26   GLUCOSE  111*   BUN  50*   CPKTOTAL  198*     Recent Labs      07/01/19   0235   ALBUMIN  2.5*   TBILIRUBIN  0.6   ALKPHOSPHAT  59   TOTPROTEIN  6.4   ALTSGPT  22   ASTSGOT  26   CREATININE  1.27       Imaging:  MRI of the right heel on 6/27/2019  Impression:       1.  Very mild bone marrow edema within the posterior/lateral calcaneus deep to the open wound with alteration of T2 signal, mild enhancement, but no change in T1 signal.    2.  This bone marrow edema pattern can indicate early osteomyelitis versus reactive change    3.  Hindfoot cellulitis    4.  No abscess    5.  osteoarthritis of the hindfoot, midfoot, and forefoot which may be Charcot change           Micro:  Results     ** No results found for the last 168 hours. **        METHICILLIN RESISTANT STAPHYLOCOCCUS AUREUS wound              Antibiotic Sensitivity Microscan Unit Status      Ampicillin/sulbactam Resistant <=8/4 mcg/mL Final      Method: BERNARDINO      Clindamycin Resistant <=0.5 mcg/mL Final      Method: BERNARDINO      Daptomycin Sensitive <=0.5 mcg/mL Final      Method: BERNARDINO      Erythromycin Resistant >4 mcg/mL Final      Method: BERNARDINO      Moxifloxacin Intermediate 4 mcg/mL Final      Method: BERNARDINO      Oxacillin Resistant >2 mcg/mL Final      Method: BERNARDINO      Penicillin Resistant  >8 mcg/mL Final      Method: BERNARDINO      Tetracycline Resistant >8 mcg/mL Final      Method: BERNARDINO      Trimeth/Sulfa Sensitive <=0.5/9.5 mcg/mL Final      Method: BERNARDINO      Vancomycin Sensitive 1 mcg/mL Final          Assessment:      Active Hospital Problems     Diagnosis   • *Diabetic foot ulcer with osteomyelitis ([facility]) [E11.621, E11.69, L97.509, M86.9]   • Type 2 diabetes mellitus with skin complication, with long-term current use of insulin ([facility]) [E11.628, Z79.4]   • Peripheral vascular disease ([facility]) [I73.9]         Plan:  Bilateral DM heel ulcers   Right diabetic foot/heel osteomyelitis. On treatment.   Afebrile  Leukocytosis, persistent  MRI c/w osteomyelitis  Bcx on 5/25 negative  Was d/c'd home in January 2019 on IV Dapto for R foot OM; however, did not finish IV abx Never followed up in ID clinic.   Previous R foot cx +MRSE (bone), MSSE (tissue) & E faecalis (tissue) December 2018  Wound cx from R heel on 5/26 +MRSA  Continued IV daptomycin 8 mg/kg daily for 6 weeks till 7/6/2019  Plan was to follow this with p.o. doxy and Augmentin, patient is only on doxycycline  Per wound care notes, no signs or symptoms of infection, granulation tissue noted  Patient has now received 2 months of antibiotics.  Okay to discontinue  Continue wound VAC/care.  Patient remains at risk of reinfection, will need clinical     PVD  US of BLE showing acute on chronic LLE DVT and 50% stenosis of Right mid SFA  Underwent RLE angiogram with Dr. Pro 5/31 with normal flow, no intervention performed     Type 2 Diabetes mellitus, not controlled  Hemoglobin A1c was 8.7% on 5/25/19  Will adversely affect wound healing, and places patient at risk of recurrent infections    ID will sign off.  Please call as needed    Discussed with primary

## 2019-07-04 NOTE — PROCEDURES
PROCEDURE NOTE      Patient:  Leonie Brock     Procedure Date:  07/03/19    Indication:  Chronic right heel wound    Pre-Operative Diagnosis:  PAD  RLE critical limb ischemia    Post-Operative Diagnosis:  Same    Procedure:  Excisional debridement of right heel wound, 7sq cm    Surgeon:  Gilbert Mcdowell M.D., MD    Assistant:  None    Anesthesia:  Topical lidocaine    Specimen:  None    Complications:  None    Findings:  Excisional debridement of fibrinous debris in a 7sq cm right heel wound was performed down to healthy bleeding tissue. The wound bed bled briskly and there was healthy-appearing granulation tissue.    Procedure:  Informed consent was obtained from the patient after all risks, benefits, and alternatives were explained and she elected to proceed. Topical lidocaine was applied prior to the procedure.    Sharp debridement using a scalpel and scissors was performed to excise necrotic subcutaneous fat and fibrinous debris. Total wound area was 7sq cm. There was healthy bleeding tissue at the wound base and granulation tissue in the periphery.    Gilbert Mcdowell M.D., MD  Vascular Surgeon  [facility]

## 2019-07-23 NOTE — [facility]
Infectious Disease Progress Note    Author: Eddie Ribeiro M.D. Date & Time of service: 7/22/2019  5:43 PM    Chief Complaint:  FU Right Diabetic foot ulcer    Interval History:  73 y.o. WF with poorly controlled DM, COPD on 3 L NC at home.  Admitted on 5/25/19 to [facility] for bilateral heel ulcerations with concerns for OM.   5/27/2019 T-max is 98.1.  Vascular Dopplers are being performed.  Denies any fevers or chills.  WBC 7.4.  Creatinine is 0.98  5/28/19- AF, WBC 8.0, no issue with abx, denies any pain currently to BLE but had 9/10 sharp BLE pain last night that improved with pain meds.  5/29- Tm 99.5, WBC 9.8, tolerating antibiotics without issue, denies any pain to BLE, abdomen distended and noting that she needs to have a bowel movement.  5/30- AF, WBC 11.6, no issue with antibiotics, denies pain, denies shortness of breath or chest palpitations, resting comfortably in bed.  5/31-AF, WBC 9.5, complaining of 9/10 LLE intermittent sharp shooting pain that improves with rest and pain meds, n.p.o. for angiogram today, no issue with antibiotics.  6/2 afebrile, white count 9.3.  Underwent right lower extremity angiogram on 5/31 with normal perfusion, no intervention performed.  Resting comfortably this a.m., no new issues  6/14 AF WBC 12.8transferred to [facility] on 6/8. Consulted for abx management  6/15 AF sleepy today-no new complaints  6/16 AF  No new wound pictures-denies SE abx  6/17 AF feels OK-eating lunch Had dressing change today-tolerated well. Eating lunch. No new pictures in chart as yet of wound  6/18 AF feels a little better today-up to WC. Denies SE abx-pain controlled RLE  6/19 AF sleepy tody-per  no new complaints. Not eating much  6/20 AF WBC 14.6 no new complaints  6/21 AF no CBC pt states R foot pain controlled. Pt's wound examined with wound care RN at bedside. Wound improving with less slough and no surrounding erythema. Tolerating abx without any issues  6/23 AF no CBC, ongoing R heel pain  but otherwise no new symptoms. Plan for [place] change tmrw  6/25 AF WBC 12 feels tired. Wound care photos reviewed from yesterday and improving overall  6/27 afebrile no CBC done.  Ongoing right heel pain and no changes.  Creatinine increased  6/28 afebrile wound care nurse at bedside and right heel wound examined.  Patient has significant yellow slough at wound base and may need bedside debridement.  No changes in pain.  Kidney function improved today.  MRI with osteomyelitis and results discussed with patient  7/2/2019-no fevers.  WBC 12.1 creatinine 1.27.c/o foot pain  7/22 afebrile, white count 8.4.  ID called back for final plan.  Patient reports doing well, no new issues, tolerating antibiotics, happy with progress of wound.    Labs Reviewed and Wound Reviewed.    Review of Systems:  Review of Systems   Constitutional: Positive for malaise/fatigue. Negative for chills and fever.   Respiratory: Negative for cough and shortness of breath.    Gastrointestinal: Negative for abdominal pain, diarrhea, nausea and vomiting.   Musculoskeletal: Positive for joint pain.        Improved   Neurological: Positive for weakness. Negative for dizziness and headaches.   All other systems reviewed and are negative.    Hemodynamics:  T 98.6 /65 HR 75 RR 20 O2 96%    Physical Exam:  Physical Exam   Constitutional: She is oriented to person, place, and time. No distress.   HENT:   Mouth/Throat: No oropharyngeal exudate.   Eyes: Pupils are equal, round, and reactive to light. EOM are normal. Right eye exhibits no discharge. Left eye exhibits no discharge.   Neck: Neck supple.   Cardiovascular: Normal rate and normal heart sounds.    No murmur heard.  Pulmonary/Chest: Effort normal and breath sounds normal. No stridor. No respiratory distress. She has no wheezes.   Abdominal: Soft. She exhibits no distension. There is no tenderness.   Musculoskeletal: She exhibits edema.   Right ankle dressing in place.  See wound care notes below.   Chronic edema with some pinkish discoloration of bilateral lower extremities noted.  Extremities are cool to touch.   Lymphadenopathy:     She has no cervical adenopathy.   Neurological: She is alert and oriented to person, place, and time.   No gross cranial nerve deficit   Skin: Skin is warm. She is not diaphoretic. There is pallor.   Psychiatric: She has a normal mood and affect. Her behavior is normal.   Vitals reviewed.    Labs:  Recent Labs      07/22/19   0310   WBC  8.4   RBC  3.68*   HEMOGLOBIN  11.0*   HEMATOCRIT  34.3*   MCV  93.2   MCH  29.9   RDW  61.5*   PLATELETCT  262   MPV  9.5     Recent Labs      07/22/19   0310   SODIUM  138   POTASSIUM  3.9   CHLORIDE  103   CO2  25   GLUCOSE  256*   BUN  40*   CPKTOTAL  22     Recent Labs      07/22/19   0310   ALBUMIN  2.5*   TBILIRUBIN  0.4   ALKPHOSPHAT  60   TOTPROTEIN  6.0   ALTSGPT  20   ASTSGOT  20   CREATININE  0.89       Imaging:  MRI of the right heel on 6/27/2019  Impression:       1.  Very mild bone marrow edema within the posterior/lateral calcaneus deep to the open wound with alteration of T2 signal, mild enhancement, but no change in T1 signal.    2.  This bone marrow edema pattern can indicate early osteomyelitis versus reactive change    3.  Hindfoot cellulitis    4.  No abscess    5.  osteoarthritis of the hindfoot, midfoot, and forefoot which may be Charcot change           Micro:  Results     ** No results found for the last 168 hours. **        METHICILLIN RESISTANT STAPHYLOCOCCUS AUREUS wound              Antibiotic Sensitivity Microscan Unit Status      Ampicillin/sulbactam Resistant <=8/4 mcg/mL Final      Method: BERNARDINO      Clindamycin Resistant <=0.5 mcg/mL Final      Method: BERNARDINO      Daptomycin Sensitive <=0.5 mcg/mL Final      Method: BERNARDINO      Erythromycin Resistant >4 mcg/mL Final      Method: BERNARDINO      Moxifloxacin Intermediate 4 mcg/mL Final      Method: BERNARDINO      Oxacillin Resistant >2 mcg/mL Final      Method: BERNARDINO      Penicillin  Resistant >8 mcg/mL Final      Method: BERNARDINO      Tetracycline Resistant >8 mcg/mL Final      Method: BERNARDINO      Trimeth/Sulfa Sensitive <=0.5/9.5 mcg/mL Final      Method: BERNARDINO      Vancomycin Sensitive 1 mcg/mL Final          Assessment:      Active Hospital Problems     Diagnosis   • *Diabetic foot ulcer with osteomyelitis ([facility]) [E11.621, E11.69, L97.509, M86.9]   • Type 2 diabetes mellitus with skin complication, with long-term current use of insulin ([facility]) [E11.628, Z79.4]   • Peripheral vascular disease ([facility]) [I73.9]         Plan:  Bilateral DM heel ulcers   Right diabetic foot/heel osteomyelitis. On treatment.   Afebrile  Leukocytosis, persistent  MRI c/w osteomyelitis  Bcx on 5/25 negative  Was d/c'd home in January 2019 on IV Dapto for R foot OM; however, did not finish IV abx Never followed up in ID clinic.   Previous R foot cx +MRSE (bone), MSSE (tissue) & E faecalis (tissue) December 2018  Wound cx from R heel on 5/26 +MRSA  Continued IV daptomycin 8 mg/kg daily for 6 weeks till 7/6/2019  Plan was to follow this with p.o. doxy and Augmentin, patient is only on doxycycline  Per wound care notes, no signs or symptoms of infection, granulation tissue noted  Patient has now received 2 months of antibiotics.  Okay to discontinue  Continue wound VAC/care.  Patient remains at risk of reinfection, will need clinical     PVD  US of BLE showing acute on chronic LLE DVT and 50% stenosis of Right mid SFA  Underwent RLE angiogram with Dr. Pro 5/31 with normal flow, no intervention performed     Type 2 Diabetes mellitus, not controlled  Hemoglobin A1c was 8.7% on 5/25/19  Will adversely affect wound healing, and places patient at risk of recurrent infections    ID will sign off.  Please call as needed    Discussed with primary NP Ramiro

## 2019-08-17 NOTE — ED PROVIDER NOTES
ED Provider Note    Scribed for Gio James M.D. by Emely Romeo. 8/17/2019, 12:35 PM.    Primary care provider: Floyd Gould M.D.  Means of arrival: EMS  History obtained from: EMS  History limited by: Cardiac arrest, patient is unresponsive    CHIEF COMPLAINT  Chief Complaint   Patient presents with   • Cardiac Arrest     HPI  Leonie Brock is a 74 y.o. female with a significant cardiac history including 5 stents and multiple MI's who presents to the Emergency Department brought in by ambulance secondary to acute onset of severe substernal chest pain followed by cardiac arrest around 9:40 AM. Per EMS, patient was at [facility] visiting her  when she suddenly starting experiencing chest pain. EMS reports immediately after complaining of chest pain she slumped over in her chair and was unresponsive. Staff at [facility] placed an AED and shocked her twice while calling EMS. Upon EMS arrival, patient was already shocked twice and in asystole. She was then given 3 doses of epinephrine and 300 mg IV amiodarone by EMS and shocked again with ROSC. EMS reports 2 minutes later she was in v-tach with a weak pulse and converted to sinus rhythm with inferior STEMI. Last known normal was 9:40 AM.     Further HPI unobtainable secondary to unresponsive patient secondary to cardiac arrest.     REVIEW OF SYSTEMS  Review of Systems   Unable to perform ROS: Patient unresponsive   Cardiovascular: Positive for chest pain.     PAST MEDICAL HISTORY   has a past medical history of Arthritis, Backpain, CATARACT, Diabetes, and Myocardial infarct (HCC).    SURGICAL HISTORY   has a past surgical history that includes gyn surgery and irrigation & debridement ortho (Right, 12/30/2018).    SOCIAL HISTORY  Social History     Tobacco Use   • Smoking status: Former Smoker   • Smokeless tobacco: Never Used   Substance Use Topics   • Alcohol use: No   • Drug use: No      Social History     Substance and Sexual Activity    Drug Use No       FAMILY HISTORY  No family history on file.    CURRENT MEDICATIONS  No current facility-administered medications for this encounter.     Current Outpatient Medications:   •  acetaminophen (TYLENOL) 325 MG Tab, Take 2 Tabs by mouth every 6 hours as needed (Mild Pain; (Pain scale 1-3); Temp greater than 100.5 F)., Disp: 30 Tab, Rfl: 0  •  albuterol 108 (90 Base) MCG/ACT Aero Soln inhalation aerosol, Inhale 2 Puffs by mouth every four hours as needed for Shortness of Breath., Disp: 8.5 g, Rfl:   •  aspirin EC 81 MG EC tablet, Take 1 Tab by mouth every day., Disp: 30 Tab, Rfl:   •  carvedilol (COREG) 6.25 MG Tab, Take 1 Tab by mouth 2 times a day, with meals., Disp: 60 Tab, Rfl:   •  cyanocobalamin (VITAMIN B12) 1000 MCG Tab, Take 1 Tab by mouth every day., Disp: 30 Tab, Rfl: 3  •  NS SOLN 50 mL with DAPTOmycin 350 MG SOLR 690 mg, 690 mg by Intravenous route every 24 hours., Disp: , Rfl:   •  ferrous sulfate-c-folic acid (FOLITAB 500) 105-500-0.8 MG Tab CR, Take 1 Tab by mouth every day., Disp: 30 Tab, Rfl:   •  gabapentin (NEURONTIN) 400 MG Cap, Take 1 Cap by mouth 2 times a day., Disp: 90 Cap, Rfl:   •  insulin regular (HUMULIN R) 100 Unit/mL Solution, Inject 2-9 Units as instructed 4 Times a Day,Before Meals and at Bedtime., Disp: 10 mL, Rfl:   •  Dextrose, Diabetic Use, (GLUCOSE) 4 g chewable tablet, Take 4 Tabs by mouth as needed for Low Blood Sugar (If FSBG is less than or equal to 70 mg/dL and patient able to eat or drink)., Disp: 30 Tab, Rfl:   •  DEXTROSE 10% BOLUS, 250 mL by Intravenous route as needed (If FSBG is less than or equal to 70 mg/dL and If patient is NPO)., Disp: , Rfl:   •  ipratropium-albuterol (DUONEB) 0.5-2.5 (3) MG/3ML nebulizer solution, 3 mL by Nebulization route every four hours as needed for Shortness of Breath., Disp: 30 Bullet, Rfl:   •  lactobacillus rhamnosus (CULTURELLE) Cap capsule, Take 1 Cap by mouth every morning with breakfast., Disp: 30 Cap, Rfl:   •   lisinopril (PRINIVIL) 5 MG Tab, Take 1 Tab by mouth every day., Disp: 30 Tab, Rfl:   •  omeprazole (PRILOSEC) 20 MG delayed-release capsule, Take 1 Cap by mouth every day., Disp: 30 Cap, Rfl:   •  ondansetron (ZOFRAN ODT) 4 MG TABLET DISPERSIBLE, Take 1 Tab by mouth every four hours as needed (give PO if IV route is unavailable. May give per feeding tube.)., Disp: 10 Tab, Rfl: 0  •  potassium chloride SA (KDUR) 20 MEQ Tab CR, Take 1 Tab by mouth 2 Times a Day., Disp: 60 Tab, Rfl: 11  •  pravastatin (PRAVACHOL) 20 MG Tab, Take 1 Tab by mouth every day., Disp: 30 Tab, Rfl: 11  •  rivaroxaban (XARELTO) 15 MG Tab tablet, Take 1 Tab by mouth 2 times a day, with meals., Disp: 42 Tab, Rfl:   •  rivaroxaban (XARELTO) 20 MG Tab tablet, Take 1 Tab by mouth with dinner., Disp: 30 Tab, Rfl:   •  senna-docusate (PERICOLACE OR SENOKOT S) 8.6-50 MG Tab, Take 2 Tabs by mouth 2 Times a Day., Disp: 30 Tab, Rfl: 0  •  polyethylene glycol/lytes (MIRALAX) Pack, Take 1 Packet by mouth 1 time daily as needed (if sennosides and docusate ineffective after 24 hours)., Disp: , Rfl: 3  •  magnesium hydroxide (MILK OF MAGNESIA) 400 MG/5ML Suspension, Take 30 mL by mouth 1 time daily as needed (if polyethylene glycol ineffective after 24 hours)., Disp: 1 Bottle, Rfl:   •  bisacodyl (DULCOLAX) 10 MG Suppos, Insert 1 Suppository in rectum 1 time daily as needed (if magnesium hydroxide ineffective after 24 hours)., Disp: , Rfl: 0  •  spironolactone (ALDACTONE) 25 MG Tab, Take 1 Tab by mouth every day., Disp: 30 Tab, Rfl: 3  •  triamcinolone acetonide (KENALOG) 0.1 % Cream, Apply 1 Application to affected area(s) 2 Times a Day., Disp: 1 Tube, Rfl: 0  •  furosemide (LASIX) 40 MG Tab, Take 0.5 Tabs by mouth every day., Disp: 30 Tab, Rfl: 0  •  insulin glargine (LANTUS) 100 UNIT/ML Solution, Inject 20 Units as instructed every evening., Disp: , Rfl:     ALLERGIES  No Known Allergies    PHYSICAL EXAM  VITAL SIGNS: Wt 83.9 kg (185 lb)   BMI 31.74 kg/m²      Constitutional:  Unresponsive.  HENT: Deferred  Eyes: No conjunctivitis or icterus  Neck: Trachea is midline, no palpable thyroid  Lymphatic: No cervical lymphadenopathy  Cardiovascular: No heart tones.   Thorax & Lungs: Intubated with bilateral lung sounds.  Abdomen: Non distended.  Skin: Pale and cool. No rash  Back: Deferred  Extremities:  Deferred   Vascular: No pulses  Neurologic: Flaccid. Unresponsive.       COURSE & MEDICAL DECISION MAKING  Pertinent Labs & Imaging studies reviewed. (See chart for details)    10:25 AM - Patient seen and examined at bedside. Compressions actively taking place.     10:27 AM - Compressions continued.     10:31 AM - Pulse check. Patient is pulseless with PEA.    10:34 AM - Dr. Al (Cardiology) at bedside. CPR was stopped. Time of death called.     CRITICAL CARE  The very real possibility of a deterioration of this patient's condition required the highest level of my preparedness for sudden, emergent intervention. I provided critical care services, which included medication orders, frequent reevaluations of the patient's condition and response to treatment, ordering and reviewing test results, and discussing the case with various consultants. The critical care time associated with the care of the patient was 30 minutes. Review chart for interventions. This time is exclusive of any other billable procedures.     Medical Decision Making:   Patient presented to the ER with PEA and CPR in progress.  She was given multiple doses of epinephrine.  She never did have cardiac activity to the pump blood just some slight movement of the valves.  We continued CPR for about 15 more minutes she still had no cardiac activity by ultrasound and had evidence what appeared to be clotting in the chambers and was pronounced .      DISPOSITION:        FINAL IMPRESSION  Performed 30 minutes of critical care time  1. Cardiac arrest (HCC)    CPR     [email or web address]), am  scribing for, and in the presence of, Gio James M.D..    Electronically signed by: Emely Romeo (Scribe), 8/17/2019    IGio M.D. personally performed the services described in this documentation, as scribed by Emely Romeo in my presence, and it is both accurate and complete.    C    The note accurately reflects work and decisions made by me.  Gio James  8/17/2019  3:43 PM

## 2019-08-17 NOTE — ED TRIAGE NOTES
EMS reports the Pt received 2 shocks before they arrived. Upon their arrival they found the Pt in Vfib and provided one shock, 300 mg of amiodarone and 2 epi with CPR. They had ROSC and transported to ER. Pt went PEA 5 minutes out and CPR was resumed.    Pt arrived at 1026 with CPR in progress.   Pt arrived with ET and right LLE IO in place.  1026 CPR was taken over by ER staff and Pt was placed on Zoll monitor. ERP James to bedside  1028 Pulse check PEA CPR resumed.   1029 1 mg Epinephrine given.  1030 Pulse check PEA CPR resumed.   1032 Pulse check PEA CPR resumed.   1033 1 mg Epinephrine given.  1034 Pulse check PEA CPR resumed.   1036 1 mg Epinephrine given. and Pulse check PEA CPR resumed.   1038 Pulse check PEA CPR resumed.   1040 1 mg Epinephrine given. Pulse check PEA CPR resumed.   1042 Pulse check PEA CPR resumed.   1043 ERP shields and Cardiologist Servando called time of death 1043.     Social work notified Pt's .  provided moruary information and told social work that he would arrive at the ER in an hour or so.

## 2019-08-17 NOTE — ED NOTES
[facility] Called. Not a 's case.     Tissue Bank notified. Awaiting confirmation from family on tissue status.    Lines removed.     Belongings remain with body as PAR and Security refuse to take them.      [facility] called for transport.

## 2019-08-17 NOTE — CONSULTS
Cardiology Initial Consult Note    Date of note:    8/17/2019      Consulting Physician: Gio James M.D.    Patient ID:    Name:   Leonie Brock   YOB: 1945  Age:   74 y.o.  female   MRN:   [id number]      Reason for Consultation: cardiac arrest    HPI:  Leonie Brock is a 74 y.o.-year-old female with a history of diabetes, CAD s/p previous PCI, ischemic cardiomyopathy, chronic systolic heart failure, hypertension, and paroxysmal atrial fibrillation who presented with cardiac arrest.    History was taken from EMS and ED providers as unfortunately the patient had GCS of 3 on arrival and active CPR was occurring at the time of my consultation.     This morning at her snf she had acute onset of severe substernal chest pain at around 9:40 AM.  She then had a cardaic arrest soon after with bystander CPR started and she was actually shocked for initial shockable rhythm twice prior to EMS arrival.  She received 3 more shocks, 3 doses of epi, and 300mg IV amiodarone by EMS with ROSC.  EKG during her time with pulses was reviewed personally and showed sustained ventricular tachycardia.     In the ED, she was pulseless with PEA.  Bedside echo showed LVEF <5% during pulse checks.  I discussed her case with Dr. James and after assessing her history and a significant amount of time without ROSC in the ER, CPR was stopped.       Review of Systems   Unable to perform ROS: patient unresponsive         Past Medical History:   Diagnosis Date   • Arthritis     all over   • Backpain    • CATARACT     removed   • Diabetes     1990   • Myocardial infarct (HCC)     1998       Past Surgical History:   Procedure Laterality Date   • IRRIGATION & DEBRIDEMENT ORTHO Right 12/30/2018    Procedure: IRRIGATION & DEBRIDEMENT, gastroc recession, ostectomy;  Surgeon: Tomi Kwon M.D.;  Location: SURGERY [facility];  Service: Orthopedics   • GYN SURGERY      hysterectomy           No current  facility-administered medications for this encounter.      Current Outpatient Medications   Medication Sig Dispense Refill   • acetaminophen (TYLENOL) 325 MG Tab Take 2 Tabs by mouth every 6 hours as needed (Mild Pain; (Pain scale 1-3); Temp greater than 100.5 F). 30 Tab 0   • albuterol 108 (90 Base) MCG/ACT Aero Soln inhalation aerosol Inhale 2 Puffs by mouth every four hours as needed for Shortness of Breath. 8.5 g    • aspirin EC 81 MG EC tablet Take 1 Tab by mouth every day. 30 Tab    • carvedilol (COREG) 6.25 MG Tab Take 1 Tab by mouth 2 times a day, with meals. 60 Tab    • cyanocobalamin (VITAMIN B12) 1000 MCG Tab Take 1 Tab by mouth every day. 30 Tab 3   • NS SOLN 50 mL with DAPTOmycin 350 MG SOLR 690 mg 690 mg by Intravenous route every 24 hours.     • ferrous sulfate-c-folic acid (FOLITAB 500) 105-500-0.8 MG Tab CR Take 1 Tab by mouth every day. 30 Tab    • gabapentin (NEURONTIN) 400 MG Cap Take 1 Cap by mouth 2 times a day. 90 Cap    • insulin regular (HUMULIN R) 100 Unit/mL Solution Inject 2-9 Units as instructed 4 Times a Day,Before Meals and at Bedtime. 10 mL    • Dextrose, Diabetic Use, (GLUCOSE) 4 g chewable tablet Take 4 Tabs by mouth as needed for Low Blood Sugar (If FSBG is less than or equal to 70 mg/dL and patient able to eat or drink). 30 Tab    • DEXTROSE 10% BOLUS 250 mL by Intravenous route as needed (If FSBG is less than or equal to 70 mg/dL and If patient is NPO).     • ipratropium-albuterol (DUONEB) 0.5-2.5 (3) MG/3ML nebulizer solution 3 mL by Nebulization route every four hours as needed for Shortness of Breath. 30 Bullet    • lactobacillus rhamnosus (CULTURELLE) Cap capsule Take 1 Cap by mouth every morning with breakfast. 30 Cap    • lisinopril (PRINIVIL) 5 MG Tab Take 1 Tab by mouth every day. 30 Tab    • omeprazole (PRILOSEC) 20 MG delayed-release capsule Take 1 Cap by mouth every day. 30 Cap    • ondansetron (ZOFRAN ODT) 4 MG TABLET DISPERSIBLE Take 1 Tab by mouth every four hours  as needed (give PO if IV route is unavailable. May give per feeding tube.). 10 Tab 0   • potassium chloride SA (KDUR) 20 MEQ Tab CR Take 1 Tab by mouth 2 Times a Day. 60 Tab 11   • pravastatin (PRAVACHOL) 20 MG Tab Take 1 Tab by mouth every day. 30 Tab 11   • rivaroxaban (XARELTO) 15 MG Tab tablet Take 1 Tab by mouth 2 times a day, with meals. 42 Tab    • rivaroxaban (XARELTO) 20 MG Tab tablet Take 1 Tab by mouth with dinner. 30 Tab    • senna-docusate (PERICOLACE OR SENOKOT S) 8.6-50 MG Tab Take 2 Tabs by mouth 2 Times a Day. 30 Tab 0   • polyethylene glycol/lytes (MIRALAX) Pack Take 1 Packet by mouth 1 time daily as needed (if sennosides and docusate ineffective after 24 hours).  3   • magnesium hydroxide (MILK OF MAGNESIA) 400 MG/5ML Suspension Take 30 mL by mouth 1 time daily as needed (if polyethylene glycol ineffective after 24 hours). 1 Bottle    • bisacodyl (DULCOLAX) 10 MG Suppos Insert 1 Suppository in rectum 1 time daily as needed (if magnesium hydroxide ineffective after 24 hours).  0   • spironolactone (ALDACTONE) 25 MG Tab Take 1 Tab by mouth every day. 30 Tab 3   • triamcinolone acetonide (KENALOG) 0.1 % Cream Apply 1 Application to affected area(s) 2 Times a Day. 1 Tube 0   • furosemide (LASIX) 40 MG Tab Take 0.5 Tabs by mouth every day. 30 Tab 0   • insulin glargine (LANTUS) 100 UNIT/ML Solution Inject 20 Units as instructed every evening.           No Known Allergies      No family history on file. unable to obtain FH as patient was unresponsive.       Social History     Socioeconomic History   • Marital status:      Spouse name: Not on file   • Number of children: Not on file   • Years of education: Not on file   • Highest education level: Not on file   Occupational History   • Not on file   Social Needs   • Financial resource strain: Not on file   • Food insecurity:     Worry: Not on file     Inability: Not on file   • Transportation needs:     Medical: Not on file     Non-medical: Not on  file   Tobacco Use   • Smoking status: Former Smoker   • Smokeless tobacco: Never Used   Substance and Sexual Activity   • Alcohol use: No   • Drug use: No   • Sexual activity: Not on file   Lifestyle   • Physical activity:     Days per week: Not on file     Minutes per session: Not on file   • Stress: Not on file   Relationships   • Social connections:     Talks on phone: Not on file     Gets together: Not on file     Attends Nondenominational service: Not on file     Active member of club or organization: Not on file     Attends meetings of clubs or organizations: Not on file     Relationship status: Not on file   • Intimate partner violence:     Fear of current or ex partner: Not on file     Emotionally abused: Not on file     Physically abused: Not on file     Forced sexual activity: Not on file   Other Topics Concern   • Not on file   Social History Narrative   • Not on file         Physical Exam  No pulse.  Oxygen sat 70% while inbutated.    General: ill appearing  Eyes: pale conjunctiva  ENT: OP clear  Neck: JVP indeterminate.   Lungs: no respirations.   Heart: no pulse  Abdomen: non-distended.   Extremities/MSK: cyanosis, bilateral LE swelling.   Neurological: intubated, unresponsive.   Psychiatric:  intubated, unresponsive.   Skin: cool extremities        Labs (personally reviewed and notable for):   No labs available.      Cardiac Imaging and Procedures Review:    EKG dated 8/17/2019: My personal interpretation is sustained ventricular tachycardia.     Echo dated 8/17/2019: bedside images performed by Dr. James showed <5% LVEF.       Impression and Medical Decision Making:  # VT cardiac arrest, likely ischemic, unable to recover ROSC in the ER.   # History of CAD    Recommendations:  # agree with Dr. James about no further CPR given inability to obtain ROSC despite full maximal medical therapy. Unfortunately prognosis after 1 hour of coding is extremely poor, given her frailty prior to this incident.      Discussed with Dr. James.       Silvano Al MD  Cardiologist, [facility] [place]   [phone]

## 2019-08-17 NOTE — DISCHARGE PLANNING
Social Work:    SW responded to a Cardiac Arrest.  Pt brought in by FLORI from [facility].    ERP ended code.  ERP spoke to Pt  by phone as he was still at [phone].  is Mayo Brock [phone].  stated he would like to use [facility] in [place].     SW gave Mort[facility] information and  contact information to RN.  TED notified of [facility] Preference.    SW available for needs.

## 2023-11-09 NOTE — PROGRESS NOTES
Received report and assumed patient care. Patient is AAOx4. Arm pain associated with patient's elbow/sutures. Assessment complete on 2L NC. No family at bedside. Safety precautions and hourly rounding in place.    Result sent to patients live well.

## 2025-03-10 NOTE — PROGRESS NOTES
· 2 RN skin check complete with TABATHA Corea.  · Devices in place: Nasal cannula  · Skin assessed under devices: Skin intact.   · Confirmed pressure ulcers found on: sacrum  · New potential pressure ulcers noted on: Open wound noted to R elbow with mepilex dressing in place. Scabs and bruising noted throughout body.   · The following interventions in place: [id number], eloina cream, waffle overlay, incontinence care provided with linen changes. Wound team is following.     Normal vision: sees adequately in most situations; can see medication labels, newsprint

## (undated) DEVICE — SET EXTENSION WITH 2 PORTS (48EA/CA) ***PART #2C8610 IS A SUBSTITUTE*****

## (undated) DEVICE — ELECTRODE DUAL RETURN W/ CORD - (50/PK)

## (undated) DEVICE — SENSOR SPO2 NEO LNCS ADHESIVE (20/BX) SEE USER NOTES

## (undated) DEVICE — LACTATED RINGERS INJ 1000 ML - (14EA/CA 60CA/PF)

## (undated) DEVICE — GLOVE SZ 7 BIOGEL PI MICRO - PF LF (50PR/BX 4BX/CA)

## (undated) DEVICE — PADDING CAST 6 IN STERILE - 6 X 4 YDS (24/CA)

## (undated) DEVICE — MASK, LARYNGEAL AIRWAY #4

## (undated) DEVICE — CHLORAPREP 26 ML APPLICATOR - ORANGE TINT(25/CA)

## (undated) DEVICE — GLOVE BIOGEL INDICATOR SZ 7.5 SURGICAL PF LTX - (50PR/BX 4BX/CA)

## (undated) DEVICE — Device

## (undated) DEVICE — NEPTUNE 4 PORT MANIFOLD - (20/PK)

## (undated) DEVICE — KIT ANESTHESIA W/CIRCUIT & 3/LT BAG W/FILTER (20EA/CA)

## (undated) DEVICE — SUTURE 3-0 ETHILON PS-1 (36PK/BX)

## (undated) DEVICE — GLOVE BIOGEL PI INDICATOR SZ 7.0 SURGICAL PF LF - (50/BX 4BX/CA)

## (undated) DEVICE — DRESSING 3X8 ADAPTIC GAUZE - NON-ADHERING (36/PK 6PK/BX)

## (undated) DEVICE — TUBING CLEARLINK DUO-VENT - C-FLO (48EA/CA)

## (undated) DEVICE — BLADE SURGICAL #15 - (50/BX 3BX/CA)

## (undated) DEVICE — SUCTION INSTRUMENT YANKAUER BULBOUS TIP W/O VENT (50EA/CA)

## (undated) DEVICE — WRAP CO-FLEX 4IN X 5YD STERIL - SELF-ADHERENT (18/CA)

## (undated) DEVICE — GLOVE BIOGEL PI INDICATOR SZ 8.0 SURGICAL PF LF -(50/BX 4BX/CA)

## (undated) DEVICE — ELECTRODE 850 FOAM ADHESIVE - HYDROGEL RADIOTRNSPRNT (50/PK)

## (undated) DEVICE — SODIUM CHL IRRIGATION 0.9% 1000ML (12EA/CA)

## (undated) DEVICE — KIT ROOM DECONTAMINATION

## (undated) DEVICE — BANDAGE ELASTIC 6 HONEYCOMB - 6X5YD LF (20/CA)"

## (undated) DEVICE — DRESSING ABDOMINAL PAD STERILE 8 X 10" (360EA/CA)"

## (undated) DEVICE — GOWN WARMING STANDARD FLEX - (30/CA)

## (undated) DEVICE — GLOVE BIOGEL SZ 7.5 SURGICAL PF LTX - (50PR/BX 4BX/CA)

## (undated) DEVICE — TRAY SRGPRP PVP IOD WT PRP - (20/CA)

## (undated) DEVICE — GLOVE BIOGEL ECLIPSE PF LATEX SIZE 7.5

## (undated) DEVICE — STOCKINET BIAS 6 IN STERILE - (20/CA)

## (undated) DEVICE — SUTURE GENERAL

## (undated) DEVICE — MASK ANESTHESIA ADULT  - (100/CA)

## (undated) DEVICE — CONTAINER SPECIMEN BAG OR - STERILE 4 OZ W/LID (100EA/CA)

## (undated) DEVICE — HEAD HOLDER JUNIOR/ADULT

## (undated) DEVICE — CANISTER SUCTION 3000ML MECHANICAL FILTER AUTO SHUTOFF MEDI-VAC NONSTERILE LF DISP  (40EA/CA)

## (undated) DEVICE — PROTECTOR ULNA NERVE - (36PR/CA)

## (undated) DEVICE — PAD LAP STERILE 18 X 18 - (5/PK 40PK/CA)

## (undated) DEVICE — GOWN SURGEONS X-LARGE - DISP. (30/CA)

## (undated) DEVICE — SET LEADWIRE 5 LEAD BEDSIDE DISPOSABLE ECG (1SET OF 5/EA)

## (undated) DEVICE — SLEEVE, VASO, THIGH, MED

## (undated) DEVICE — GLOVE BIOGEL ECLIPSE PF LATEX SIZE 8.0  (50PR/BX)

## (undated) DEVICE — PACK LOWER EXTREMITY - (2/CA)

## (undated) DEVICE — SUTURE 3-0 VICRYL PLUS SH - 8X 18 INCH (12/BX)